# Patient Record
Sex: MALE | Race: WHITE | NOT HISPANIC OR LATINO | Employment: OTHER | URBAN - METROPOLITAN AREA
[De-identification: names, ages, dates, MRNs, and addresses within clinical notes are randomized per-mention and may not be internally consistent; named-entity substitution may affect disease eponyms.]

---

## 2017-01-10 ENCOUNTER — HOSPITAL ENCOUNTER (OUTPATIENT)
Dept: LAB | Facility: MEDICAL CENTER | Age: 82
End: 2017-01-10
Attending: NURSE PRACTITIONER
Payer: MEDICARE

## 2017-01-10 LAB
BASOPHILS # BLD AUTO: 0.7 % (ref 0–1.8)
BASOPHILS # BLD: 0.05 K/UL (ref 0–0.12)
EOSINOPHIL # BLD AUTO: 0.26 K/UL (ref 0–0.51)
EOSINOPHIL NFR BLD: 3.5 % (ref 0–6.9)
ERYTHROCYTE [DISTWIDTH] IN BLOOD BY AUTOMATED COUNT: 41.6 FL (ref 35.9–50)
EST. AVERAGE GLUCOSE BLD GHB EST-MCNC: 183 MG/DL
HBA1C MFR BLD: 8 % (ref 0–5.6)
HCT VFR BLD AUTO: 41.9 % (ref 42–52)
HGB BLD-MCNC: 14.5 G/DL (ref 14–18)
IMM GRANULOCYTES # BLD AUTO: 0.02 K/UL (ref 0–0.11)
IMM GRANULOCYTES NFR BLD AUTO: 0.3 % (ref 0–0.9)
LYMPHOCYTES # BLD AUTO: 1.68 K/UL (ref 1–4.8)
LYMPHOCYTES NFR BLD: 22.7 % (ref 22–41)
MCH RBC QN AUTO: 31.7 PG (ref 27–33)
MCHC RBC AUTO-ENTMCNC: 34.6 G/DL (ref 33.7–35.3)
MCV RBC AUTO: 91.5 FL (ref 81.4–97.8)
MONOCYTES # BLD AUTO: 0.67 K/UL (ref 0–0.85)
MONOCYTES NFR BLD AUTO: 9.1 % (ref 0–13.4)
NEUTROPHILS # BLD AUTO: 4.71 K/UL (ref 1.82–7.42)
NEUTROPHILS NFR BLD: 63.7 % (ref 44–72)
NRBC # BLD AUTO: 0 K/UL
NRBC BLD AUTO-RTO: 0 /100 WBC
PLATELET # BLD AUTO: 178 K/UL (ref 164–446)
PMV BLD AUTO: 10.8 FL (ref 9–12.9)
RBC # BLD AUTO: 4.58 M/UL (ref 4.7–6.1)
WBC # BLD AUTO: 7.4 K/UL (ref 4.8–10.8)

## 2017-01-10 PROCEDURE — 36415 COLL VENOUS BLD VENIPUNCTURE: CPT

## 2017-01-10 PROCEDURE — 83036 HEMOGLOBIN GLYCOSYLATED A1C: CPT

## 2017-01-10 PROCEDURE — 85025 COMPLETE CBC W/AUTO DIFF WBC: CPT

## 2017-04-16 ENCOUNTER — HOSPITAL ENCOUNTER (EMERGENCY)
Facility: MEDICAL CENTER | Age: 82
End: 2017-04-16
Attending: EMERGENCY MEDICINE
Payer: MEDICARE

## 2017-04-16 VITALS
WEIGHT: 173.28 LBS | BODY MASS INDEX: 27.2 KG/M2 | HEIGHT: 67 IN | SYSTOLIC BLOOD PRESSURE: 137 MMHG | RESPIRATION RATE: 18 BRPM | DIASTOLIC BLOOD PRESSURE: 64 MMHG | OXYGEN SATURATION: 95 % | HEART RATE: 61 BPM | TEMPERATURE: 97.7 F

## 2017-04-16 DIAGNOSIS — R73.9 HYPERGLYCEMIA: ICD-10-CM

## 2017-04-16 LAB
ALBUMIN SERPL BCP-MCNC: 4.2 G/DL (ref 3.2–4.9)
ALBUMIN/GLOB SERPL: 1.3 G/DL
ALP SERPL-CCNC: 37 U/L (ref 30–99)
ALT SERPL-CCNC: 30 U/L (ref 2–50)
ANION GAP SERPL CALC-SCNC: 13 MMOL/L (ref 0–11.9)
AST SERPL-CCNC: 29 U/L (ref 12–45)
B-OH-BUTYR SERPL-MCNC: 0.28 MMOL/L (ref 0.02–0.27)
BASOPHILS # BLD AUTO: 0.8 % (ref 0–1.8)
BASOPHILS # BLD: 0.06 K/UL (ref 0–0.12)
BILIRUB SERPL-MCNC: 1.2 MG/DL (ref 0.1–1.5)
BNP SERPL-MCNC: 118 PG/ML (ref 0–100)
BUN SERPL-MCNC: 24 MG/DL (ref 8–22)
CALCIUM SERPL-MCNC: 9.4 MG/DL (ref 8.4–10.2)
CHLORIDE SERPL-SCNC: 97 MMOL/L (ref 96–112)
CO2 SERPL-SCNC: 24 MMOL/L (ref 20–33)
CREAT SERPL-MCNC: 0.82 MG/DL (ref 0.5–1.4)
EOSINOPHIL # BLD AUTO: 0.14 K/UL (ref 0–0.51)
EOSINOPHIL NFR BLD: 1.9 % (ref 0–6.9)
ERYTHROCYTE [DISTWIDTH] IN BLOOD BY AUTOMATED COUNT: 40.6 FL (ref 35.9–50)
GFR SERPL CREATININE-BSD FRML MDRD: >60 ML/MIN/1.73 M 2
GLOBULIN SER CALC-MCNC: 3.2 G/DL (ref 1.9–3.5)
GLUCOSE SERPL-MCNC: 286 MG/DL (ref 65–99)
HCT VFR BLD AUTO: 43.7 % (ref 42–52)
HGB BLD-MCNC: 15.6 G/DL (ref 14–18)
IMM GRANULOCYTES # BLD AUTO: 0.02 K/UL (ref 0–0.11)
IMM GRANULOCYTES NFR BLD AUTO: 0.3 % (ref 0–0.9)
LIPASE SERPL-CCNC: 44 U/L (ref 7–58)
LYMPHOCYTES # BLD AUTO: 1.83 K/UL (ref 1–4.8)
LYMPHOCYTES NFR BLD: 25.1 % (ref 22–41)
MCH RBC QN AUTO: 32.2 PG (ref 27–33)
MCHC RBC AUTO-ENTMCNC: 35.7 G/DL (ref 33.7–35.3)
MCV RBC AUTO: 90.3 FL (ref 81.4–97.8)
MONOCYTES # BLD AUTO: 0.64 K/UL (ref 0–0.85)
MONOCYTES NFR BLD AUTO: 8.8 % (ref 0–13.4)
NEUTROPHILS # BLD AUTO: 4.59 K/UL (ref 1.82–7.42)
NEUTROPHILS NFR BLD: 63.1 % (ref 44–72)
NRBC # BLD AUTO: 0 K/UL
NRBC BLD AUTO-RTO: 0 /100 WBC
PLATELET # BLD AUTO: 208 K/UL (ref 164–446)
PMV BLD AUTO: 10.6 FL (ref 9–12.9)
POTASSIUM SERPL-SCNC: 3.4 MMOL/L (ref 3.6–5.5)
PROT SERPL-MCNC: 7.4 G/DL (ref 6–8.2)
RBC # BLD AUTO: 4.84 M/UL (ref 4.7–6.1)
SODIUM SERPL-SCNC: 134 MMOL/L (ref 135–145)
TROPONIN I SERPL-MCNC: <0.02 NG/ML (ref 0–0.04)
WBC # BLD AUTO: 7.3 K/UL (ref 4.8–10.8)

## 2017-04-16 PROCEDURE — 85025 COMPLETE CBC W/AUTO DIFF WBC: CPT

## 2017-04-16 PROCEDURE — 84484 ASSAY OF TROPONIN QUANT: CPT

## 2017-04-16 PROCEDURE — 83880 ASSAY OF NATRIURETIC PEPTIDE: CPT

## 2017-04-16 PROCEDURE — 82010 KETONE BODYS QUAN: CPT

## 2017-04-16 PROCEDURE — 83690 ASSAY OF LIPASE: CPT

## 2017-04-16 PROCEDURE — 700105 HCHG RX REV CODE 258: Performed by: EMERGENCY MEDICINE

## 2017-04-16 PROCEDURE — 94760 N-INVAS EAR/PLS OXIMETRY 1: CPT

## 2017-04-16 PROCEDURE — 99284 EMERGENCY DEPT VISIT MOD MDM: CPT

## 2017-04-16 PROCEDURE — 96360 HYDRATION IV INFUSION INIT: CPT

## 2017-04-16 PROCEDURE — 96361 HYDRATE IV INFUSION ADD-ON: CPT

## 2017-04-16 PROCEDURE — 80053 COMPREHEN METABOLIC PANEL: CPT

## 2017-04-16 PROCEDURE — 36415 COLL VENOUS BLD VENIPUNCTURE: CPT

## 2017-04-16 RX ORDER — SODIUM CHLORIDE 9 MG/ML
INJECTION, SOLUTION INTRAVENOUS CONTINUOUS
Status: DISCONTINUED | OUTPATIENT
Start: 2017-04-16 | End: 2017-04-16 | Stop reason: HOSPADM

## 2017-04-16 RX ORDER — MULTIVIT-MINERALS/FA/LYCOPENE 0.4 MG-6
1 TABLET ORAL EVERY MORNING
Status: SHIPPED | COMMUNITY
End: 2021-09-27

## 2017-04-16 RX ORDER — SODIUM CHLORIDE 9 MG/ML
500 INJECTION, SOLUTION INTRAVENOUS ONCE
Status: COMPLETED | OUTPATIENT
Start: 2017-04-16 | End: 2017-04-16

## 2017-04-16 RX ORDER — CHLORTHALIDONE 25 MG/1
25 TABLET ORAL 2 TIMES DAILY
Status: ON HOLD | COMMUNITY
End: 2018-11-06

## 2017-04-16 RX ORDER — ANTIOX #8/OM3/DHA/EPA/LUT/ZEAX 250-2.5 MG
1 CAPSULE ORAL 2 TIMES DAILY
Status: SHIPPED | COMMUNITY
End: 2018-08-29

## 2017-04-16 RX ORDER — METOPROLOL TARTRATE 50 MG/1
50 TABLET, FILM COATED ORAL 2 TIMES DAILY
Status: SHIPPED | COMMUNITY
End: 2020-01-08 | Stop reason: SDUPTHER

## 2017-04-16 RX ADMIN — SODIUM CHLORIDE: 9 INJECTION, SOLUTION INTRAVENOUS at 12:16

## 2017-04-16 RX ADMIN — SODIUM CHLORIDE 500 ML: 9 INJECTION, SOLUTION INTRAVENOUS at 11:37

## 2017-04-16 ASSESSMENT — PAIN SCALES - GENERAL
PAINLEVEL_OUTOF10: 0
PAINLEVEL_OUTOF10: 0

## 2017-04-16 NOTE — ED NOTES
"Chief Complaint   Patient presents with   • High Blood Sugar     pt states bs 307 PTA     Pt amb to triage with above complaint. Pt states type 2 diabetic and takes oral medications. Pt states he had a spike in blood sugar 2 years ago and he \"just waited it out.\" pt denies any change in eating habits recently.  "

## 2017-04-16 NOTE — ED PROVIDER NOTES
"ED Provider Note  CHIEF COMPLAINT  Chief Complaint   Patient presents with   • High Blood Sugar     pt states bs 307 PTA       HPI  Julien Dao is a 88 y.o. male who presents for evaluation of a blood sugar of 370 had at home. He has a history of diabetes. He takes Glucophage. He also has hypertension for which he takes Norvasc.  He is asymptomatic. No headache, no dizziness, no chest pain, no difficulty breathing. No polyuria. No polydipsia. No abdominal pain. He has a good appetite. He was just little concerned because his blood sugar was 370 at home. As he feels great and is eating well and taking in fluids well.  REVIEW OF SYSTEMS  No headache, no jaw pain, no chest pain, no abdominal pain. No vomiting or diarrhea.  ALL OTHER SYSTEMS NEGATIVE    ALLERGIES  Allergies   Allergen Reactions   • Iodine        CURRENT MEDICATIONS  Lopressor, Hygroton, Glucophage, Norvasc, Zocor. Prinivil. Lopressor and hydrated diarrhea.    PAST MEDICAL HISTORY  Past Medical History   Diagnosis Date   • CAD (coronary artery disease)    • Hypertension    • DM (diabetes mellitus) ([facility]) 1/7/2014       SURGICAL HISTORY  Past Surgical History   Procedure Laterality Date   • Other cardiac surgery         FAMILY HISTORY  History reviewed. No pertinent family history.    SOCIAL HISTORY  Social History     Social History   • Marital Status:      Spouse Name: N/A   • Number of Children: N/A   • Years of Education: N/A     Social History Main Topics   • Smoking status: Former Smoker     Quit date: 10/19/1972   • Smokeless tobacco: None   • Alcohol Use: No   • Drug Use: No   • Sexual Activity: Not Asked     Other Topics Concern   • None     Social History Narrative       PHYSICAL EXAM  GENERAL: Alert male adult  VITAL SIGNS: /82 mmHg  Pulse 63  Temp(Src) 36.5 °C (97.7 °F)  Resp 18  Ht 1.702 m (5' 7\")  Wt 78.6 kg (173 lb 4.5 oz)  BMI 27.13 kg/m2   Constitutional: Alert healthy-appearing adult HENT: Scalp is normal size and " nontender. Ears are clear. Nose is clear. Throat is clear with no stridor no drooling no trismus. Teeth are all intact.  Eyes: Pupils equal round and reactive to light, extraocular motor fall. There is no scleral icterus.  Neck: Neck is supple and nontender. There is no meningismus. No adenitis. No thyromegaly.  Lymphatic: No adenopathy.   Cardiovascular: Heart regular rhythm without murmurs or gallops   Thorax & Lungs: No chest wall tenderness. Lungs are clear. Patient has good breath sounds bilateral. No rales, no rhonchi, no wheezes.  Abdomen: Abdomen is soft, nontender, not rigid, no guarding, and no organomegaly. There is no palpable hernia   Skin: Warm, pink, and dry with no erythema and no rash.   Back: Nontender, no midline bony tenderness, no flank tenderness.  Extremities: Full range of motion  No tenderness to palpation and no deformities noted. No calf or thigh swelling. No calf or thigh tenderness. No clinical DVT.  Neurologic: Alert & oriented . Cranial nerves are grossly intact as tested. Patient moves all 4 extremities well. Patient has good strong flexion and extension of the ankle joints knee joints hip joints and elbow joints. Sensation is normal and symmetrical in the upper and lower extremities.   Psychiatric: Patient is alert oriented coherent and rational.       COURSE & MEDICAL DECISION MAKING  Patient arrives for evaluation of an elevated blood sugar 307. He is type II diabetic and takes oral medication. He is otherwise asymptomatic.    Plan: #1 IV #2 bolus of 500 mL of IV fluid #3 laboratory including CBC, CMP, acetone level. Rule out infection, anemia, metabolic problem, etc.    Laboratory and reexamination: Blood sugars 286. CO2 24. Potassium minimally low at 3.4. Chemistry panel otherwise unremarkable. Troponin negative. Lipase 44. CBC is normal. No anemia. No bandemia.  Results for orders placed or performed during the hospital encounter of 04/16/17   CBC w/ Differential   Result Value  Ref Range    WBC 7.3 4.8 - 10.8 K/uL    RBC 4.84 4.70 - 6.10 M/uL    Hemoglobin 15.6 14.0 - 18.0 g/dL    Hematocrit 43.7 42.0 - 52.0 %    MCV 90.3 81.4 - 97.8 fL    MCH 32.2 27.0 - 33.0 pg    MCHC 35.7 (H) 33.7 - 35.3 g/dL    RDW 40.6 35.9 - 50.0 fL    Platelet Count 208 164 - 446 K/uL    MPV 10.6 9.0 - 12.9 fL    Neutrophils-Polys 63.10 44.00 - 72.00 %    Lymphocytes 25.10 22.00 - 41.00 %    Monocytes 8.80 0.00 - 13.40 %    Eosinophils 1.90 0.00 - 6.90 %    Basophils 0.80 0.00 - 1.80 %    Immature Granulocytes 0.30 0.00 - 0.90 %    Nucleated RBC 0.00 /100 WBC    Neutrophils (Absolute) 4.59 1.82 - 7.42 K/uL    Lymphs (Absolute) 1.83 1.00 - 4.80 K/uL    Monos (Absolute) 0.64 0.00 - 0.85 K/uL    Eos (Absolute) 0.14 0.00 - 0.51 K/uL    Baso (Absolute) 0.06 0.00 - 0.12 K/uL    Immature Granulocytes (abs) 0.02 0.00 - 0.11 K/uL    NRBC (Absolute) 0.00 K/uL   Complete Metabolic Panel (CMP)   Result Value Ref Range    Sodium 134 (L) 135 - 145 mmol/L    Potassium 3.4 (L) 3.6 - 5.5 mmol/L    Chloride 97 96 - 112 mmol/L    Co2 24 20 - 33 mmol/L    Anion Gap 13.0 (H) 0.0 - 11.9    Glucose 286 (H) 65 - 99 mg/dL    Bun 24 (H) 8 - 22 mg/dL    Creatinine 0.82 0.50 - 1.40 mg/dL    Calcium 9.4 8.4 - 10.2 mg/dL    AST(SGOT) 29 12 - 45 U/L    ALT(SGPT) 30 2 - 50 U/L    Alkaline Phosphatase 37 30 - 99 U/L    Total Bilirubin 1.2 0.1 - 1.5 mg/dL    Albumin 4.2 3.2 - 4.9 g/dL    Total Protein 7.4 6.0 - 8.2 g/dL    Globulin 3.2 1.9 - 3.5 g/dL    A-G Ratio 1.3 g/dL   Troponin   Result Value Ref Range    Troponin I <0.02 0.00 - 0.04 ng/mL   Btype Natriuretic Peptide (BNP)   Result Value Ref Range    B Natriuretic Peptide 118 (H) 0 - 100 pg/mL   LIPASE   Result Value Ref Range    Lipase 44 7 - 58 U/L   ESTIMATED GFR   Result Value Ref Range    GFR If African American >60 >60 mL/min/1.73 m 2    GFR If Non African American >60 >60 mL/min/1.73 m 2        The patient's blood sugar certainly slightly elevated and a 200+ range. However admission is  not indicated. He can follow-up with his family physician. He's been given a copy of all of his reports. He is well-hydrated and otherwise asymptomatic.  FINAL IMPRESSION  1. Diabetes mellitus  2. Stable diabetes with mild hyperglycemia.         Electronically signed by: Gary Gansert, 4/16/2017 1 PM

## 2017-04-16 NOTE — ED AVS SNAPSHOT
Home Care Instructions                                                                                                                Julien Dao   MRN: [id number]    Department:  [facility], Emergency Dept   Date of Visit:  4/16/2017            [facility], Emergency Dept    [address] [place] [address]    Phone:  [phone]      You were seen by     Gary Gansert, M.D.      Your Diagnosis Was     Hyperglycemia     R73.9       These are the medications you received during your hospitalization from 04/16/2017 1047 to 04/16/2017 1327     Date/Time Order Dose Route Action    04/16/2017 1137 NS infusion 500 mL 500 mL Intravenous New Bag    04/16/2017 1216 NS infusion   Intravenous New Bag      Follow-up Information     1. Follow up with JONO Powell.    Specialty:  Family Medicine    Contact information    [address] [place] [address] [phone]          2. Follow up with JONO Powell. Schedule an appointment as soon as possible for a visit in 1 day.    Specialty:  Family Medicine    Contact information    [address] [place] [address] [phone]        Medication Information     Review all of your home medications and newly ordered medications with your primary doctor and/or pharmacist as soon as possible. Follow medication instructions as directed by your doctor and/or pharmacist.     Please keep your complete medication list with you and share with your physician. Update the information when medications are discontinued, doses are changed, or new medications (including over-the-counter products) are added; and carry medication information at all times in the event of emergency situations.               Medication List      ASK your doctor about these medications        Instructions    Morning Afternoon Evening Bedtime    amlodipine 5 MG Tabs   Commonly known as:  NORVASC        Take 1 Tab by mouth 2 Times a Day.   Dose:  5 mg                      aspirin 81 MG tablet        Take 1 Tab by mouth every day.   Dose:  81 mg                        chlorthalidone 25 MG Tabs   Commonly known as:  HYGROTON        Take 25 mg by mouth 2 Times a Day.   Dose:  25 mg                        metformin 500 MG Tabs   Commonly known as:  GLUCOPHAGE        Take 500 mg by mouth 2 times a day, with meals.   Dose:  500 mg                        metoprolol 50 MG Tabs   Commonly known as:  LOPRESSOR        Take 50 mg by mouth 2 times a day.   Dose:  50 mg                        omega-3 acid ethyl esters 1 GM capsule   Commonly known as:  LOVAZA        Take 2 Caps by mouth 2 Times a Day.   Dose:  2 g                        potassium chloride SA 10 MEQ Tbcr   Commonly known as:  K-DUR        Take 1 Tab by mouth every day.   Dose:  10 mEq                        PROSTATE Tabs        Take 1 Tab by mouth every day.   Dose:  1 Tab                        * PX MENS MULTIVITAMINS Tabs        Take 1 Tab by mouth every day.   Dose:  1 Tab                        * PRESERVISION AREDS 2 Caps        Take 1 Cap by mouth 2 Times a Day.   Dose:  1 Cap                        simvastatin 10 MG Tabs   Commonly known as:  ZOCOR        Take 1 Tab by mouth every evening.   Dose:  10 mg                        * Notice:  This list has 2 medication(s) that are the same as other medications prescribed for you. Read the directions carefully, and ask your doctor or other care provider to review them with you.            Procedures and tests performed during your visit     BETA-HYDROXYBUTYRIC ACID    Btype Natriuretic Peptide (BNP)    CBC w/ Differential    Complete Metabolic Panel (CMP)    ESTIMATED GFR    LIPASE    Troponin            Patient Information     Patient Information    Following emergency treatment: all patient requiring follow-up care must return either to a private physician or a clinic if your condition worsens before you are able to obtain further medical attention, please  return to the emergency room.     Billing Information    At [facility], we work to make the billing process streamlined for our patients.  Our Representatives are here to answer any questions you may have regarding your hospital bill.  If you have insurance coverage and have supplied your insurance information to us, we will submit a claim to your insurer on your behalf.  Should you have any questions regarding your bill, we can be reached online or by phone as follows:  Online: You are able pay your bills online or live chat with our representatives about any billing questions you may have. We are here to help Monday - Friday from 8:00am to 7:30pm and 9:00am - 12:00pm on Saturdays.  Please visit [email or web address]  for more information.   Phone:  [phone] or [phone]    Please note that your emergency physician, surgeon, pathologist, radiologist, anesthesiologist, and other specialists are not employed by [facility] and will therefore bill separately for their services.  Please contact them directly for any questions concerning their bills at the numbers below:     Emergency Physician Services:  [phone]  [facility]:  [phone]  [facility]:  [phone]  [facility]:  [phone]    1. Your final bill may vary from the amount quoted upon discharge if all procedures are not complete at that time, or if your doctor has additional procedures of which we are not aware. You will receive an additional bill if you return to the Emergency Department at [facility] for suture removal regardless of the facility of which the sutures were placed.     2. Please arrange for settlement of this account at the emergency registration.    3. All self-pay accounts are due in full at the time of treatment.  If you are unable to meet this obligation then payment is expected within 4-5 days.     4. If you have had radiology studies  (CT, X-ray, Ultrasound, MRI), you have received a preliminary result during your emergency department visit. Please contact the radiology department [phone] to receive a copy of your final result. Please discuss the Final result with your primary physician or with the follow up physician provided.     Crisis Hotline:  [place] Crisis Hotline:  9-428-YRVWERM or 1-861.785.2376  [place] Crisis Hotline:    1-133.226.7656 or 227-021-3826         ED Discharge Follow Up Questions    1. In order to provide you with very good care, we would like to follow up with a phone call in the next few days.  May we have your permission to contact you?     YES /  NO    2. What is the best phone number to call you? (       )_____-__________    3. What is the best time to call you?      Morning  /  Afternoon  /  Evening                   Patient Signature:  ____________________________________________________________    Date:  ____________________________________________________________

## 2017-04-16 NOTE — ED AVS SNAPSHOT
[employer] Access Code: [email or web address]  Expires: 5/16/2017  1:27 PM    Your email address is not on file at [facility].  Email Addresses are required for you to sign up for [employer], please contact [phone] to verify your personal information and to provide your email address prior to attempting to register for [employer].    Julien Dao  [address] [place] [address]    [employer]  A secure, online tool to manage your health information     [employer]’s [employer]® is a secure, online tool that connects you to your personalized health information from the privacy of your home -- day or night - making it very easy for you to manage your healthcare. Once the activation process is completed, you can even access your medical information using the [employer] erinn, which is available for free in the Apple Erinn store or Google Play store.     To learn more about [employer], visit [email or web address]    There are two levels of access available (as shown below):   My Chart Features  [facility] Primary Care Doctor [facility]  Specialists [facility]  Urgent  Care Non-[facility] Primary Care Doctor   Email your healthcare team securely and privately 24/7 X X X    Manage appointments: schedule your next appointment; view details of past/upcoming appointments X      Request prescription refills. X      View recent personal medical records, including lab and immunizations X X X X   View health record, including health history, allergies, medications X X X X   Read reports about your outpatient visits, procedures, consult and ER notes X X X X   See your discharge summary, which is a recap of your hospital and/or ER visit that includes your diagnosis, lab results, and care plan X X  X     How to register for [employer]:  Once your e-mail address has been verified, follow the following steps to sign up for [employer].     1. Go to  [email or web address]  2. Click on the Sign Up Now box, which takes you to the New Member Sign Up page. You will  need to provide the following information:  a. Enter your [employer] Access Code exactly as it appears at the top of this page. (You will not need to use this code after you’ve completed the sign-up process. If you do not sign up before the expiration date, you must request a new code.)   b. Enter your date of birth.   c. Enter your home email address.   d. Click Submit, and follow the next screen’s instructions.  3. Create a [employer] ID. This will be your [employer] login ID and cannot be changed, so think of one that is secure and easy to remember.  4. Create a [employer] password. You can change your password at any time.  5. Enter your Password Reset Question and Answer. This can be used at a later time if you forget your password.   6. Enter your e-mail address. This allows you to receive e-mail notifications when new information is available in [employer].  7. Click Sign Up. You can now view your health information.    For assistance activating your [employer] account, call [phone]

## 2017-04-16 NOTE — ED AVS SNAPSHOT
4/16/2017    Julien Dao  [address] [place] [address]    Dear Julien:    [facility] wants to ensure your discharge home is safe and you or your loved ones have had all of your questions answered regarding your care after you leave the hospital.    Below is a list of resources and contact information should you have any questions regarding your hospital stay, follow-up instructions, or active medical symptoms.    Questions or Concerns Regarding… Contact   Medical Questions Related to Your Discharge  (7 days a week, 8am-5pm) Contact a Nurse Care Coordinator   [phone]   Medical Questions Not Related to Your Discharge  (24 hours a day / 7 days a week)  Contact the Nurse Health Line   [phone]    Medications or Discharge Instructions Refer to your discharge packet   or contact your [facility] Primary Care Provider   [phone]   Follow-up Appointment(s) Schedule your appointment via [employer]   or contact Scheduling [phone]   Billing Review your statement via [employer]  or contact Billing [phone]   Medical Records Review your records via [employer]   or contact Medical Records [phone]     You may receive a telephone call within two days of discharge. This call is to make certain you understand your discharge instructions and have the opportunity to have any questions answered. You can also easily access your medical information, test results and upcoming appointments via the [employer] free online health management tool. You can learn more and sign up at [employer]. For assistance setting up your [employer] account, please call [phone].    Once again, we want to ensure your discharge home is safe and that you have a clear understanding of any next steps in your care. If you have any questions or concerns, please do not hesitate to contact us, we are here for you. Thank you for choosing [facility] for your healthcare needs.    Sincerely,    Your [facility] Healthcare Team

## 2017-04-24 ENCOUNTER — APPOINTMENT (OUTPATIENT)
Dept: RADIOLOGY | Facility: MEDICAL CENTER | Age: 82
End: 2017-04-24
Attending: EMERGENCY MEDICINE
Payer: MEDICARE

## 2017-04-24 ENCOUNTER — HOSPITAL ENCOUNTER (EMERGENCY)
Facility: MEDICAL CENTER | Age: 82
End: 2017-04-24
Attending: EMERGENCY MEDICINE
Payer: MEDICARE

## 2017-04-24 VITALS
HEIGHT: 67 IN | OXYGEN SATURATION: 94 % | BODY MASS INDEX: 27.4 KG/M2 | TEMPERATURE: 98 F | SYSTOLIC BLOOD PRESSURE: 143 MMHG | WEIGHT: 174.6 LBS | DIASTOLIC BLOOD PRESSURE: 77 MMHG | HEART RATE: 49 BPM | RESPIRATION RATE: 14 BRPM

## 2017-04-24 DIAGNOSIS — E11.9 TYPE 2 DIABETES MELLITUS WITHOUT COMPLICATION, WITHOUT LONG-TERM CURRENT USE OF INSULIN (HCC): ICD-10-CM

## 2017-04-24 DIAGNOSIS — R06.02 SOB (SHORTNESS OF BREATH): ICD-10-CM

## 2017-04-24 DIAGNOSIS — T50.905A MEDICATION SIDE EFFECT, INITIAL ENCOUNTER: ICD-10-CM

## 2017-04-24 LAB
ALBUMIN SERPL BCP-MCNC: 4 G/DL (ref 3.2–4.9)
ALBUMIN/GLOB SERPL: 1.2 G/DL
ALP SERPL-CCNC: 32 U/L (ref 30–99)
ALT SERPL-CCNC: 27 U/L (ref 2–50)
ANION GAP SERPL CALC-SCNC: 11 MMOL/L (ref 0–11.9)
AST SERPL-CCNC: 29 U/L (ref 12–45)
BASOPHILS # BLD AUTO: 0.8 % (ref 0–1.8)
BASOPHILS # BLD: 0.06 K/UL (ref 0–0.12)
BILIRUB SERPL-MCNC: 0.8 MG/DL (ref 0.1–1.5)
BNP SERPL-MCNC: 115 PG/ML (ref 0–100)
BUN SERPL-MCNC: 21 MG/DL (ref 8–22)
CALCIUM SERPL-MCNC: 9.7 MG/DL (ref 8.4–10.2)
CHLORIDE SERPL-SCNC: 99 MMOL/L (ref 96–112)
CO2 SERPL-SCNC: 26 MMOL/L (ref 20–33)
CREAT SERPL-MCNC: 0.78 MG/DL (ref 0.5–1.4)
EKG IMPRESSION: NORMAL
EOSINOPHIL # BLD AUTO: 0.18 K/UL (ref 0–0.51)
EOSINOPHIL NFR BLD: 2.4 % (ref 0–6.9)
ERYTHROCYTE [DISTWIDTH] IN BLOOD BY AUTOMATED COUNT: 40.5 FL (ref 35.9–50)
GFR SERPL CREATININE-BSD FRML MDRD: >60 ML/MIN/1.73 M 2
GLOBULIN SER CALC-MCNC: 3.3 G/DL (ref 1.9–3.5)
GLUCOSE SERPL-MCNC: 172 MG/DL (ref 65–99)
HCT VFR BLD AUTO: 41.3 % (ref 42–52)
HGB BLD-MCNC: 14.7 G/DL (ref 14–18)
IMM GRANULOCYTES # BLD AUTO: 0.02 K/UL (ref 0–0.11)
IMM GRANULOCYTES NFR BLD AUTO: 0.3 % (ref 0–0.9)
LYMPHOCYTES # BLD AUTO: 1.28 K/UL (ref 1–4.8)
LYMPHOCYTES NFR BLD: 17.4 % (ref 22–41)
MCH RBC QN AUTO: 32.1 PG (ref 27–33)
MCHC RBC AUTO-ENTMCNC: 35.6 G/DL (ref 33.7–35.3)
MCV RBC AUTO: 90.2 FL (ref 81.4–97.8)
MONOCYTES # BLD AUTO: 0.66 K/UL (ref 0–0.85)
MONOCYTES NFR BLD AUTO: 9 % (ref 0–13.4)
NEUTROPHILS # BLD AUTO: 5.15 K/UL (ref 1.82–7.42)
NEUTROPHILS NFR BLD: 70.1 % (ref 44–72)
NRBC # BLD AUTO: 0 K/UL
NRBC BLD AUTO-RTO: 0 /100 WBC
PLATELET # BLD AUTO: 200 K/UL (ref 164–446)
PMV BLD AUTO: 10.4 FL (ref 9–12.9)
POTASSIUM SERPL-SCNC: 3.3 MMOL/L (ref 3.6–5.5)
PROT SERPL-MCNC: 7.3 G/DL (ref 6–8.2)
RBC # BLD AUTO: 4.58 M/UL (ref 4.7–6.1)
SODIUM SERPL-SCNC: 136 MMOL/L (ref 135–145)
TROPONIN I SERPL-MCNC: <0.02 NG/ML (ref 0–0.04)
WBC # BLD AUTO: 7.4 K/UL (ref 4.8–10.8)

## 2017-04-24 PROCEDURE — 36415 COLL VENOUS BLD VENIPUNCTURE: CPT

## 2017-04-24 PROCEDURE — 85025 COMPLETE CBC W/AUTO DIFF WBC: CPT

## 2017-04-24 PROCEDURE — 83880 ASSAY OF NATRIURETIC PEPTIDE: CPT

## 2017-04-24 PROCEDURE — 84484 ASSAY OF TROPONIN QUANT: CPT

## 2017-04-24 PROCEDURE — 71010 DX-CHEST-PORTABLE (1 VIEW): CPT

## 2017-04-24 PROCEDURE — 93005 ELECTROCARDIOGRAM TRACING: CPT | Performed by: EMERGENCY MEDICINE

## 2017-04-24 PROCEDURE — 80053 COMPREHEN METABOLIC PANEL: CPT

## 2017-04-24 PROCEDURE — 93005 ELECTROCARDIOGRAM TRACING: CPT

## 2017-04-24 PROCEDURE — 99284 EMERGENCY DEPT VISIT MOD MDM: CPT

## 2017-04-24 PROCEDURE — 94760 N-INVAS EAR/PLS OXIMETRY 1: CPT

## 2017-04-24 NOTE — ED PROVIDER NOTES
ED Provider Note    CHIEF COMPLAINT  Chief Complaint   Patient presents with   • Shortness of Breath     pt states SOB x 2 days after starting new med, Janumet.       HPI  Julien Dao is a 88 y.o. male who presents shortness of breath. Patient states that he was recently in the emergency department on 4/16/17 for high blood sugars. States that he went back to see his primary care doctor and they took him off his metformin and placed him on  Janumet. A day later he started to feel short of breath like he couldn't catch his breath. Therefore he talked to the care provider on the help line and they switched him back to metformin last night. He comes in now feeling like he may be having a medication side effect where he just feels like when his breathing is not getting enough air in. He denies any chest pain headaches cough nausea vomiting. Nothing really makes it better or worse. While I was sitting there with him he took a nice deep breath on his own says that feels good to me.    REVIEW OF SYSTEMS  CONSTITUTIONAL:  Denies fever, chills, weight gain or weight loss or weakness  EYES:  Denies photophobia or discharge   ENT:  Denies sore throat, nose or ear pain  CARDIOVASCULAR:  Denies chest pain, palpitations or swelling  RESPIRATORY: He states that he has a feeling the last few days after taking the new medication that he just can't get a good deep breath but denies chest pain shortness of breath.   GI:  Denies abdominal pain, nausea, vomiting, or diarrhea  MUSCULOSKELETAL:  Denies weakness joint swelling or back pain  SKIN:  No rash  ALLERGIC: No itchy rashes.  NEUROLOGIC:  Denies headache, focal weakness or numbness  PSYCHIATRIC: States that his  is depressed but not thinking of harming himself    PAST MEDICAL HISTORY  Past Medical History   Diagnosis Date   • CAD (coronary artery disease)    • Hypertension    • DM (diabetes mellitus) ([facility]) 1/7/2014       FAMILY HISTORY  Positive for diabetes  He is one of 12  "children    SOCIAL HISTORY   reports that he quit smoking about 44 years ago. He does not have any smokeless tobacco history on file. He reports that he does not drink alcohol or use illicit drugs.    SURGICAL HISTORY  Past Surgical History   Procedure Laterality Date   • Other cardiac surgery         CURRENT MEDICATIONS  No current facility-administered medications for this encounter.    Current outpatient prescriptions:   •  metoprolol (LOPRESSOR) 50 MG Tab, Take 50 mg by mouth 2 times a day., Disp: , Rfl:   •  chlorthalidone (HYGROTON) 25 MG Tab, Take 25 mg by mouth 2 Times a Day., Disp: , Rfl:   •  Multiple Vitamins-Minerals (PX MENS MULTIVITAMINS) Tab, Take 1 Tab by mouth every day., Disp: , Rfl:   •  Specialty Vitamins Products (PROSTATE) Tab, Take 1 Tab by mouth every day., Disp: , Rfl:   •  Multiple Vitamins-Minerals (PRESERVISION AREDS 2) Cap, Take 1 Cap by mouth 2 Times a Day., Disp: , Rfl:   •  metformin (GLUCOPHAGE) 500 MG TABS, Take 500 mg by mouth 2 times a day, with meals., Disp: , Rfl:   •  omega-3 acid ethyl esters (LOVAZA) 1 GM capsule, Take 2 Caps by mouth 2 Times a Day., Disp: 360 Cap, Rfl: 1  •  potassium chloride SA (K-DUR) 10 MEQ TBCR, Take 1 Tab by mouth every day., Disp: 90 Each, Rfl: 3  •  amlodipine (NORVASC) 5 MG TABS, Take 1 Tab by mouth 2 Times a Day., Disp: 180 Tab, Rfl: 3  •  simvastatin (ZOCOR) 10 MG TABS, Take 1 Tab by mouth every evening., Disp: 90 Tab, Rfl: 3  •  aspirin 81 MG tablet, Take 1 Tab by mouth every day., Disp: 100 Each, Rfl: 3      ALLERGIES  Allergies   Allergen Reactions   • Iodine        PHYSICAL EXAM  VITAL SIGNS: /77 mmHg  Pulse 55  Temp(Src) 36.7 °C (98 °F)  Resp 16  Ht 1.702 m (5' 7.01\")  Wt 79.2 kg (174 lb 9.7 oz)  BMI 27.34 kg/m2  SpO2 95%     Constitutional: Patient is awake alert person place and time. No acute respiratory distress Well developed, Well nourished, Non-toxic appearance. Peaking in full sentences  HENT: Normocephalic, Atraumatic,  " Bilateral external ears normal, Oropharynx pink moist with no exudates, Nose patent.   Eyes: Sclera and conjunctiva clear, No discharge.   Neck:  Supple no nuchal rigidity, no thyromegaly or mass. Non-tender  Lymphatic: No supraclavicular  Cardiovascular: Heart is regular rate and rhythm no murmur   Thorax & Lungs: Chest is symmetrical, with good breath sounds. No wheezing or crackles. No respiratory distress,   Abdomen:  Soft, No tendernessSkin: Warm, Dry, no petechia, purpura or rash.   Back: Non tender with palpation, No CVA tenderness.   Extremities: No  edema.  Non tender.   Musculoskeletal: Good range of motion upper and lower extremities.  Neurologic: Alert & oriented to person, place, and time.  Strength is 5 over 5 , bicep triceps, quadriceps, plantar flexion and extension. Sensory is intact to light touch face, arms and legs. DTRs are symmetrical biceps, patella  Psychiatric:  Flat affect cooperative    EKG  8:10 AM. Sinus bradycardia rate 57, , axis QRS 0. He has a right bundle branch block. No ST elevations. No change from his EKG of 1/7/14      LABS:  Lab Results   Component Value Date    WBC 7.4 04/24/2017    HEMOGLOBIN 14.7 04/24/2017    HEMATOCRIT 41.3* 04/24/2017    PLATELETCT 200 04/24/2017    SODIUM 136 04/24/2017    POTASSIUM 3.3* 04/24/2017    CHLORIDE 99 04/24/2017    CO2 26 04/24/2017    BUN 21 04/24/2017    GLUCOSE 172* 04/24/2017    CHOLSTRLTOT 129 10/03/2016    LDL 52 10/03/2016    ALTSGPT 27 04/24/2017    ASTSGOT 29 04/24/2017    PSATOTAL 4.5* 01/21/2013    PSATOTAL 5.48* 12/26/2012    INR 1.07 10/14/2011    HBA1C 8.0* 01/10/2017       Troponin negative      RADIOLOGY/PROCEDURES  DX-CHEST-PORTABLE (1 VIEW)   Final Result         1. No acute cardiopulmonary abnormalities are identified.            COURSE & MEDICAL DECISION MAKING  Pertinent Labs & Imaging studies reviewed. (See chart for details)  Differential diagnosis includes but not limited to pneumonia, pulmonary  embolus, coronary disease, congestive heart failure, medication side effects, COPD.        Recheck 1035. The patient feels fine. His up and motor to the bathroom without difficulties. Again he states he is quite sure that it was the medication made him feel this way and now symptoms are gone.      Patient is states that he started a diabetes medicine and about a day later started feeling like he couldn't get a deep breath. However he was able to breathe fine no chest pain or any other complaints really. They didn't switch him back to his old metformin and I states he feels better today. His workup revealed obvious signs of infections. At this point, I don't think a pulmonary embolus myocardial infarction congestive heart failure or infectious process. Prior to him being discharge x-ray state he felt a lot better to walk to the bathroom without problems. This point time I think we discharge improved close follow-up as an outpatient. He will continue to take his metformin since his artery stopped his Janumet yesterday from his primary care doctor.      Stable for discharge      FINAL IMPRESSION  1. Medication side effects  2. Shortness of breath resolved  3. Hypertension     PLAN  1. Follow up with primary care doctor  2.Return to the emergency department for increased pains, fevers, vomiting or change in condition.  Electronically signed by: Garo Armenta, 4/24/2017 8:35 AM

## 2017-04-24 NOTE — ED NOTES
Chief Complaint   Patient presents with   • Shortness of Breath     pt states SOB x 2 days after starting new med, Janumet.

## 2017-04-24 NOTE — ED NOTES
Pt discharged with written instructions. Pt verbalized understanding. Pt's AAOx4. Pt ambulated independently from ER.

## 2017-04-24 NOTE — ED AVS SNAPSHOT
4/24/2017    Julien Dao  [address] [place] [address]    Dear Julien:    [facility] wants to ensure your discharge home is safe and you or your loved ones have had all of your questions answered regarding your care after you leave the hospital.    Below is a list of resources and contact information should you have any questions regarding your hospital stay, follow-up instructions, or active medical symptoms.    Questions or Concerns Regarding… Contact   Medical Questions Related to Your Discharge  (7 days a week, 8am-5pm) Contact a Nurse Care Coordinator   [phone]   Medical Questions Not Related to Your Discharge  (24 hours a day / 7 days a week)  Contact the Nurse Health Line   [phone]    Medications or Discharge Instructions Refer to your discharge packet   or contact your [facility] Primary Care Provider   [phone]   Follow-up Appointment(s) Schedule your appointment via [employer]   or contact Scheduling [phone]   Billing Review your statement via [employer]  or contact Billing [phone]   Medical Records Review your records via [employer]   or contact Medical Records [phone]     You may receive a telephone call within two days of discharge. This call is to make certain you understand your discharge instructions and have the opportunity to have any questions answered. You can also easily access your medical information, test results and upcoming appointments via the [employer] free online health management tool. You can learn more and sign up at [employer]. For assistance setting up your [employer] account, please call [phone].    Once again, we want to ensure your discharge home is safe and that you have a clear understanding of any next steps in your care. If you have any questions or concerns, please do not hesitate to contact us, we are here for you. Thank you for choosing [facility] for your healthcare needs.    Sincerely,    Your [facility] Healthcare Team

## 2017-04-24 NOTE — ED AVS SNAPSHOT
Home Care Instructions                                                                                                                Julien Dao   MRN: [id number]    Department:  [facility], Emergency Dept   Date of Visit:  4/24/2017            [facility], Emergency Dept    [address] [place] [address]    Phone:  [phone]      You were seen by     Garo Armenta M.D.      Your Diagnosis Was     Medication side effect, initial encounter     T88.7XXA       Follow-up Information     1. Follow up with JONO Powell In 2 days.    Specialty:  Family Medicine    Contact information    [address] [address]  [phone]        Medication Information     Review all of your home medications and newly ordered medications with your primary doctor and/or pharmacist as soon as possible. Follow medication instructions as directed by your doctor and/or pharmacist.     Please keep your complete medication list with you and share with your physician. Update the information when medications are discontinued, doses are changed, or new medications (including over-the-counter products) are added; and carry medication information at all times in the event of emergency situations.               Medication List      ASK your doctor about these medications        Instructions    Morning Afternoon Evening Bedtime    amlodipine 5 MG Tabs   Commonly known as:  NORVASC        Take 1 Tab by mouth 2 Times a Day.   Dose:  5 mg                        aspirin 81 MG tablet        Take 1 Tab by mouth every day.   Dose:  81 mg                        chlorthalidone 25 MG Tabs   Commonly known as:  HYGROTON        Take 25 mg by mouth 2 Times a Day.   Dose:  25 mg                        metformin 500 MG Tabs   Commonly known as:  GLUCOPHAGE        Take 500 mg by mouth 2 times a day, with meals.   Dose:  500 mg                        metoprolol 50 MG Tabs   Commonly known  as:  LOPRESSOR        Take 50 mg by mouth 2 times a day.   Dose:  50 mg                        omega-3 acid ethyl esters 1 GM capsule   Commonly known as:  LOVAZA        Take 2 Caps by mouth 2 Times a Day.   Dose:  2 g                        potassium chloride SA 10 MEQ Tbcr   Commonly known as:  K-DUR        Take 1 Tab by mouth every day.   Dose:  10 mEq                        PROSTATE Tabs        Take 1 Tab by mouth every day.   Dose:  1 Tab                        * PX MENS MULTIVITAMINS Tabs        Take 1 Tab by mouth every day.   Dose:  1 Tab                        * PRESERVISION AREDS 2 Caps        Take 1 Cap by mouth 2 Times a Day.   Dose:  1 Cap                        simvastatin 10 MG Tabs   Commonly known as:  ZOCOR        Take 1 Tab by mouth every evening.   Dose:  10 mg                        * Notice:  This list has 2 medication(s) that are the same as other medications prescribed for you. Read the directions carefully, and ask your doctor or other care provider to review them with you.            Procedures and tests performed during your visit     Btype Natriuretic Peptide    CBC w/ Differential    Cardiac Monitoring    Complete Metabolic Panel (CMP)    DX-CHEST-PORTABLE (1 VIEW)    EKG (ER)    EKG (NOW)    ESTIMATED GFR    IV Saline Lock    Obtain Old EKG    Pulse Ox    Troponin STAT        Discharge Instructions       Blood Glucose Monitoring, Adult  Monitoring your blood glucose (also know as blood sugar) helps you to manage your diabetes. It also helps you and your health care provider monitor your diabetes and determine how well your treatment plan is working.  WHY SHOULD YOU MONITOR YOUR BLOOD GLUCOSE?  · It can help you understand how food, exercise, and medicine affect your blood glucose.  · It allows you to know what your blood glucose is at any given moment. You can quickly tell if you are having low blood glucose (hypoglycemia) or high blood glucose (hyperglycemia).  · It can help you and  your health care provider know how to adjust your medicines.  · It can help you understand how to manage an illness or adjust medicine for exercise.  WHEN SHOULD YOU TEST?  Your health care provider will help you decide how often you should check your blood glucose. This may depend on the type of diabetes you have, your diabetes control, or the types of medicines you are taking. Be sure to write down all of your blood glucose readings so that this information can be reviewed with your health care provider. See below for examples of testing times that your health care provider may suggest.  Type 1 Diabetes  · Test at least 2 times per day if your diabetes is well controlled, if you are using an insulin pump, or if you perform multiple daily injections.  · If your diabetes is not well controlled or if you are sick, you may need to test more often.  · It is a good idea to also test:  · Before every insulin injection.  · Before and after exercise.  · Between meals and 2 hours after a meal.  · Occasionally between 2:00 a.m. and 3:00 a.m.  Type 2 Diabetes  · If you are taking insulin, test at least 2 times per day. However, it is best to test before every insulin injection.  · If you take medicines by mouth (orally), test 2 times a day.  · If you are on a controlled diet, test once a day.  · If your diabetes is not well controlled or if you are sick, you may need to monitor more often.  HOW TO MONITOR YOUR BLOOD GLUCOSE  Supplies Needed  · Blood glucose meter.  · Test strips for your meter. Each meter has its own strips. You must use the strips that go with your own meter.  · A pricking needle (lancet).  · A device that holds the lancet (lancing device).  · A journal or log book to write down your results.  Procedure  · Wash your hands with soap and water. Alcohol is not preferred.  · Prick the side of your finger (not the tip) with the lancet.  · Gently milk the finger until a small drop of blood appears.  · Follow the  "instructions that come with your meter for inserting the test strip, applying blood to the strip, and using your blood glucose meter.  Other Areas to Get Blood for Testing  Some meters allow you to use other areas of your body (other than your finger) to test your blood. These areas are called alternative sites. The most common alternative sites are:  · The forearm.  · The thigh.  · The back area of the lower leg.  · The palm of the hand.  The blood flow in these areas is slower. Therefore, the blood glucose values you get may be delayed, and the numbers are different from what you would get from your fingers. Do not use alternative sites if you think you are having hypoglycemia. Your reading will not be accurate. Always use a finger if you are having hypoglycemia. Also, if you cannot feel your lows (hypoglycemia unawareness), always use your fingers for your blood glucose checks.  ADDITIONAL TIPS FOR GLUCOSE MONITORING  · Do not reuse lancets.  · Always carry your supplies with you.  · All blood glucose meters have a 24-hour \"hotline\" number to call if you have questions or need help.  · Adjust (calibrate) your blood glucose meter with a control solution after finishing a few boxes of strips.  BLOOD GLUCOSE RECORD KEEPING  It is a good idea to keep a daily record or log of your blood glucose readings. Most glucose meters, if not all, keep your glucose records stored in the meter. Some meters come with the ability to download your records to your home computer. Keeping a record of your blood glucose readings is especially helpful if you are wanting to look for patterns. Make notes to go along with the blood glucose readings because you might forget what happened at that exact time. Keeping good records helps you and your health care provider to work together to achieve good diabetes management.      This information is not intended to replace advice given to you by your health care provider. Make sure you discuss " any questions you have with your health care provider.     Document Released: 12/20/2004 Document Revised: 01/08/2016 Document Reviewed: 05/12/2014  [employer] Interactive Patient Education ©2016 [employer]    Shortness of Breath  Shortness of breath means you have trouble breathing. Shortness of breath needs medical care right away.  HOME CARE   · Do not smoke.  · Avoid being around chemicals or things (paint fumes, dust) that may bother your breathing.  · Rest as needed. Slowly begin your normal activities.  · Only take medicines as told by your doctor.  · Keep all doctor visits as told.  GET HELP RIGHT AWAY IF:   · Your shortness of breath gets worse.  · You feel lightheaded, pass out (faint), or have a cough that is not helped by medicine.  · You cough up blood.  · You have pain with breathing.  · You have pain in your chest, arms, shoulders, or belly (abdomen).  · You have a fever.  · You cannot walk up stairs or exercise the way you normally do.  · You do not get better in the time expected.  · You have a hard time doing normal activities even with rest.  · You have problems with your medicines.  · You have any new symptoms.  MAKE SURE YOU:  · Understand these instructions.  · Will watch your condition.  · Will get help right away if you are not doing well or get worse.     This information is not intended to replace advice given to you by your health care provider. Make sure you discuss any questions you have with your health care provider.     Document Released: 06/05/2009 Document Revised: 12/23/2014 Document Reviewed: 03/04/2013  [email or web address] Patient Education ©2016 [employer] Inc.            Patient Information     Patient Information    Following emergency treatment: all patient requiring follow-up care must return either to a private physician or a clinic if your condition worsens before you are able to obtain further medical attention, please return to the emergency room.     Billing  Information    At [facility], we work to make the billing process streamlined for our patients.  Our Representatives are here to answer any questions you may have regarding your hospital bill.  If you have insurance coverage and have supplied your insurance information to us, we will submit a claim to your insurer on your behalf.  Should you have any questions regarding your bill, we can be reached online or by phone as follows:  Online: You are able pay your bills online or live chat with our representatives about any billing questions you may have. We are here to help Monday - Friday from 8:00am to 7:30pm and 9:00am - 12:00pm on Saturdays.  Please visit [email or web address]  for more information.   Phone:  [phone] or [phone]    Please note that your emergency physician, surgeon, pathologist, radiologist, anesthesiologist, and other specialists are not employed by [facility] and will therefore bill separately for their services.  Please contact them directly for any questions concerning their bills at the numbers below:     Emergency Physician Services:  [phone]  [facility]:  [phone]  [facility]:  [phone]  [facility]:  [phone]    1. Your final bill may vary from the amount quoted upon discharge if all procedures are not complete at that time, or if your doctor has additional procedures of which we are not aware. You will receive an additional bill if you return to the Emergency Department at [facility] for suture removal regardless of the facility of which the sutures were placed.     2. Please arrange for settlement of this account at the emergency registration.    3. All self-pay accounts are due in full at the time of treatment.  If you are unable to meet this obligation then payment is expected within 4-5 days.     4. If you have had radiology studies (CT, X-ray, Ultrasound, MRI), you have  received a preliminary result during your emergency department visit. Please contact the radiology department [phone] to receive a copy of your final result. Please discuss the Final result with your primary physician or with the follow up physician provided.     Crisis Hotline:  [place] Crisis Hotline:  0-109-JGNILTI or 1-853.609.7908  [place] Crisis Hotline:    1-326.277.8054 or 120-985-0533         ED Discharge Follow Up Questions    1. In order to provide you with very good care, we would like to follow up with a phone call in the next few days.  May we have your permission to contact you?     YES /  NO    2. What is the best phone number to call you? (       )_____-__________    3. What is the best time to call you?      Morning  /  Afternoon  /  Evening                   Patient Signature:  ____________________________________________________________    Date:  ____________________________________________________________

## 2017-04-24 NOTE — ED NOTES
POC was discussed with pt using AIDET. Pt verbalized understanding. Pt with call light in reach and eduarod in low position for pt safety.

## 2017-04-24 NOTE — DISCHARGE INSTRUCTIONS
Blood Glucose Monitoring, Adult  Monitoring your blood glucose (also know as blood sugar) helps you to manage your diabetes. It also helps you and your health care provider monitor your diabetes and determine how well your treatment plan is working.  WHY SHOULD YOU MONITOR YOUR BLOOD GLUCOSE?  · It can help you understand how food, exercise, and medicine affect your blood glucose.  · It allows you to know what your blood glucose is at any given moment. You can quickly tell if you are having low blood glucose (hypoglycemia) or high blood glucose (hyperglycemia).  · It can help you and your health care provider know how to adjust your medicines.  · It can help you understand how to manage an illness or adjust medicine for exercise.  WHEN SHOULD YOU TEST?  Your health care provider will help you decide how often you should check your blood glucose. This may depend on the type of diabetes you have, your diabetes control, or the types of medicines you are taking. Be sure to write down all of your blood glucose readings so that this information can be reviewed with your health care provider. See below for examples of testing times that your health care provider may suggest.  Type 1 Diabetes  · Test at least 2 times per day if your diabetes is well controlled, if you are using an insulin pump, or if you perform multiple daily injections.  · If your diabetes is not well controlled or if you are sick, you may need to test more often.  · It is a good idea to also test:  · Before every insulin injection.  · Before and after exercise.  · Between meals and 2 hours after a meal.  · Occasionally between 2:00 a.m. and 3:00 a.m.  Type 2 Diabetes  · If you are taking insulin, test at least 2 times per day. However, it is best to test before every insulin injection.  · If you take medicines by mouth (orally), test 2 times a day.  · If you are on a controlled diet, test once a day.  · If your diabetes is not well controlled or if you  "are sick, you may need to monitor more often.  HOW TO MONITOR YOUR BLOOD GLUCOSE  Supplies Needed  · Blood glucose meter.  · Test strips for your meter. Each meter has its own strips. You must use the strips that go with your own meter.  · A pricking needle (lancet).  · A device that holds the lancet (lancing device).  · A journal or log book to write down your results.  Procedure  · Wash your hands with soap and water. Alcohol is not preferred.  · Prick the side of your finger (not the tip) with the lancet.  · Gently milk the finger until a small drop of blood appears.  · Follow the instructions that come with your meter for inserting the test strip, applying blood to the strip, and using your blood glucose meter.  Other Areas to Get Blood for Testing  Some meters allow you to use other areas of your body (other than your finger) to test your blood. These areas are called alternative sites. The most common alternative sites are:  · The forearm.  · The thigh.  · The back area of the lower leg.  · The palm of the hand.  The blood flow in these areas is slower. Therefore, the blood glucose values you get may be delayed, and the numbers are different from what you would get from your fingers. Do not use alternative sites if you think you are having hypoglycemia. Your reading will not be accurate. Always use a finger if you are having hypoglycemia. Also, if you cannot feel your lows (hypoglycemia unawareness), always use your fingers for your blood glucose checks.  ADDITIONAL TIPS FOR GLUCOSE MONITORING  · Do not reuse lancets.  · Always carry your supplies with you.  · All blood glucose meters have a 24-hour \"hotline\" number to call if you have questions or need help.  · Adjust (calibrate) your blood glucose meter with a control solution after finishing a few boxes of strips.  BLOOD GLUCOSE RECORD KEEPING  It is a good idea to keep a daily record or log of your blood glucose readings. Most glucose meters, if not all, " keep your glucose records stored in the meter. Some meters come with the ability to download your records to your home computer. Keeping a record of your blood glucose readings is especially helpful if you are wanting to look for patterns. Make notes to go along with the blood glucose readings because you might forget what happened at that exact time. Keeping good records helps you and your health care provider to work together to achieve good diabetes management.      This information is not intended to replace advice given to you by your health care provider. Make sure you discuss any questions you have with your health care provider.     Document Released: 12/20/2004 Document Revised: 01/08/2016 Document Reviewed: 05/12/2014  [employer] Interactive Patient Education ©2016 [employer] Inc.    Shortness of Breath  Shortness of breath means you have trouble breathing. Shortness of breath needs medical care right away.  HOME CARE   · Do not smoke.  · Avoid being around chemicals or things (paint fumes, dust) that may bother your breathing.  · Rest as needed. Slowly begin your normal activities.  · Only take medicines as told by your doctor.  · Keep all doctor visits as told.  GET HELP RIGHT AWAY IF:   · Your shortness of breath gets worse.  · You feel lightheaded, pass out (faint), or have a cough that is not helped by medicine.  · You cough up blood.  · You have pain with breathing.  · You have pain in your chest, arms, shoulders, or belly (abdomen).  · You have a fever.  · You cannot walk up stairs or exercise the way you normally do.  · You do not get better in the time expected.  · You have a hard time doing normal activities even with rest.  · You have problems with your medicines.  · You have any new symptoms.  MAKE SURE YOU:  · Understand these instructions.  · Will watch your condition.  · Will get help right away if you are not doing well or get worse.     This information is not intended to replace advice given  to you by your health care provider. Make sure you discuss any questions you have with your health care provider.     Document Released: 06/05/2009 Document Revised: 12/23/2014 Document Reviewed: 03/04/2013  Elsevier Interactive Patient Education ©2016 Elsevier Inc.

## 2017-04-24 NOTE — ED AVS SNAPSHOT
Elixserve Access Code: [email or web address]  Expires: 5/16/2017  1:27 PM    Your email address is not on file at [facility].  Email Addresses are required for you to sign up for Elixserve, please contact [phone] to verify your personal information and to provide your email address prior to attempting to register for Elixserve.    Julien Dao  [address] [place] [address]    Elixserve  A secure, online tool to manage your health information     [facility]’s Elixserve® is a secure, online tool that connects you to your personalized health information from the privacy of your home -- day or night - making it very easy for you to manage your healthcare. Once the activation process is completed, you can even access your medical information using the Elixserve erinn, which is available for free in the Apple Erinn store or Google Play store.     To learn more about Elixserve, visit [email or web address]    There are two levels of access available (as shown below):   My Chart Features  [facility] Primary Care Doctor [facility]  Specialists [facility]  Urgent  Care Non-[facility] Primary Care Doctor   Email your healthcare team securely and privately 24/7 X X X    Manage appointments: schedule your next appointment; view details of past/upcoming appointments X      Request prescription refills. X      View recent personal medical records, including lab and immunizations X X X X   View health record, including health history, allergies, medications X X X X   Read reports about your outpatient visits, procedures, consult and ER notes X X X X   See your discharge summary, which is a recap of your hospital and/or ER visit that includes your diagnosis, lab results, and care plan X X  X     How to register for icomasoftt:  Once your e-mail address has been verified, follow the following steps to sign up for Elixserve.     1. Go to  [email or web address]  2. Click on the Sign Up Now box, which takes you to the New Member Sign Up page. You will  need to provide the following information:  a. Enter your [employer] AS Access Code exactly as it appears at the top of this page. (You will not need to use this code after you’ve completed the sign-up process. If you do not sign up before the expiration date, you must request a new code.)   b. Enter your date of birth.   c. Enter your home email address.   d. Click Submit, and follow the next screen’s instructions.  3. Create a MadRat Gamest ID. This will be your Hero Card Management AS login ID and cannot be changed, so think of one that is secure and easy to remember.  4. Create a Hero [employer] AS password. You can change your password at any time.  5. Enter your Password Reset Question and Answer. This can be used at a later time if you forget your password.   6. Enter your e-mail address. This allows you to receive e-mail notifications when new information is available in Hero Card Management AS.  7. Click Sign Up. You can now view your health information.    For assistance activating your Hero Card Management AS account, call [phone]

## 2017-04-25 ENCOUNTER — PATIENT OUTREACH (OUTPATIENT)
Dept: HEALTH INFORMATION MANAGEMENT | Facility: OTHER | Age: 82
End: 2017-04-25

## 2017-05-24 ENCOUNTER — HOSPITAL ENCOUNTER (OUTPATIENT)
Dept: HOSPITAL 8 - RAD | Age: 82
Discharge: HOME | End: 2017-05-24
Attending: PHYSICIAN ASSISTANT
Payer: MEDICARE

## 2017-05-24 DIAGNOSIS — M79.662: ICD-10-CM

## 2017-05-24 DIAGNOSIS — G62.9: Primary | ICD-10-CM

## 2017-05-24 LAB
AST SERPL-CCNC: 15 U/L (ref 15–37)
BUN SERPL-MCNC: 21 MG/DL (ref 7–18)

## 2017-05-24 PROCEDURE — 36415 COLL VENOUS BLD VENIPUNCTURE: CPT

## 2017-05-24 PROCEDURE — 85651 RBC SED RATE NONAUTOMATED: CPT

## 2017-05-24 PROCEDURE — 84439 ASSAY OF FREE THYROXINE: CPT

## 2017-05-24 PROCEDURE — 84443 ASSAY THYROID STIM HORMONE: CPT

## 2017-05-24 PROCEDURE — 85025 COMPLETE CBC W/AUTO DIFF WBC: CPT

## 2017-05-24 PROCEDURE — 80053 COMPREHEN METABOLIC PANEL: CPT

## 2017-05-27 ENCOUNTER — HOSPITAL ENCOUNTER (EMERGENCY)
Facility: MEDICAL CENTER | Age: 82
End: 2017-05-27
Attending: EMERGENCY MEDICINE
Payer: MEDICARE

## 2017-05-27 VITALS
HEART RATE: 66 BPM | SYSTOLIC BLOOD PRESSURE: 143 MMHG | RESPIRATION RATE: 18 BRPM | DIASTOLIC BLOOD PRESSURE: 73 MMHG | TEMPERATURE: 98 F | BODY MASS INDEX: 26.99 KG/M2 | HEIGHT: 67 IN | WEIGHT: 171.96 LBS | OXYGEN SATURATION: 96 %

## 2017-05-27 DIAGNOSIS — E11.42 DIABETIC POLYNEUROPATHY ASSOCIATED WITH TYPE 2 DIABETES MELLITUS (HCC): ICD-10-CM

## 2017-05-27 PROCEDURE — 99283 EMERGENCY DEPT VISIT LOW MDM: CPT

## 2017-05-27 RX ORDER — PREGABALIN 50 MG/1
50 CAPSULE ORAL 3 TIMES DAILY
Qty: 90 CAP | Refills: 11 | Status: SHIPPED | OUTPATIENT
Start: 2017-05-27 | End: 2018-08-29

## 2017-05-27 ASSESSMENT — PAIN SCALES - GENERAL: PAINLEVEL_OUTOF10: 5

## 2017-05-27 NOTE — ED NOTES
Completed MRI of lumbar yesterday to follow up on chronic leg pain.  Pt presents with bilateral leg pain.

## 2017-05-27 NOTE — ED AVS SNAPSHOT
Pandoodle Access Code: [email or web address]  Expires: 6/26/2017  4:33 PM    Your email address is not on file at [facility].  Email Addresses are required for you to sign up for Pandoodle, please contact [phone] to verify your personal information and to provide your email address prior to attempting to register for Pandoodle.    Julien Dao  [address] [place] [address]    Pandoodle  A secure, online tool to manage your health information     [facility]’s Pandoodle® is a secure, online tool that connects you to your personalized health information from the privacy of your home -- day or night - making it very easy for you to manage your healthcare. Once the activation process is completed, you can even access your medical information using the Pandoodle erinn, which is available for free in the Apple Erinn store or Google Play store.     To learn more about Pandoodle, visit [email or web address]    There are two levels of access available (as shown below):   My Chart Features  [facility] Primary Care Doctor [facility]  Specialists [facility]  Urgent  Care Non-[facility] Primary Care Doctor   Email your healthcare team securely and privately 24/7 X X X    Manage appointments: schedule your next appointment; view details of past/upcoming appointments X      Request prescription refills. X      View recent personal medical records, including lab and immunizations X X X X   View health record, including health history, allergies, medications X X X X   Read reports about your outpatient visits, procedures, consult and ER notes X X X X   See your discharge summary, which is a recap of your hospital and/or ER visit that includes your diagnosis, lab results, and care plan X X  X     How to register for DealBirdt:  Once your e-mail address has been verified, follow the following steps to sign up for Pandoodle.     1. Go to  [email or web address]  2. Click on the Sign Up Now box, which takes you to the New Member Sign Up page. You will  need to provide the following information:  a. Enter your [employer] Access Code exactly as it appears at the top of this page. (You will not need to use this code after you’ve completed the sign-up process. If you do not sign up before the expiration date, you must request a new code.)   b. Enter your date of birth.   c. Enter your home email address.   d. Click Submit, and follow the next screen’s instructions.  3. Create a SoZo Globalt ID. This will be your Acclaim Games login ID and cannot be changed, so think of one that is secure and easy to remember.  4. Create a [employer] password. You can change your password at any time.  5. Enter your Password Reset Question and Answer. This can be used at a later time if you forget your password.   6. Enter your e-mail address. This allows you to receive e-mail notifications when new information is available in Ac[employer] Games.  7. Click Sign Up. You can now view your health information.    For assistance activating your Acclaim Games account, call [phone]

## 2017-05-27 NOTE — ED AVS SNAPSHOT
5/27/2017    Julien Dao  [address] [place] [address]    Dear Julien:    Critical access hospital wants to ensure your discharge home is safe and you or your loved ones have had all of your questions answered regarding your care after you leave the hospital.    Below is a list of resources and contact information should you have any questions regarding your hospital stay, follow-up instructions, or active medical symptoms.    Questions or Concerns Regarding… Contact   Medical Questions Related to Your Discharge  (7 days a week, 8am-5pm) Contact a Nurse Care Coordinator   [phone]   Medical Questions Not Related to Your Discharge  (24 hours a day / 7 days a week)  Contact the Nurse Health Line   [phone]    Medications or Discharge Instructions Refer to your discharge packet   or contact your [facility] Primary Care Provider   [phone]   Follow-up Appointment(s) Schedule your appointment via Parastructure   or contact Scheduling [phone]   Billing Review your statement via Parastructure  or contact Billing [phone]   Medical Records Review your records via Parastructure   or contact Medical Records [phone]     You may receive a telephone call within two days of discharge. This call is to make certain you understand your discharge instructions and have the opportunity to have any questions answered. You can also easily access your medical information, test results and upcoming appointments via the Parastructure free online health management tool. You can learn more and sign up at [employer]/Parastructure. For assistance setting up your Parastructure account, please call [phone].    Once again, we want to ensure your discharge home is safe and that you have a clear understanding of any next steps in your care. If you have any questions or concerns, please do not hesitate to contact us, we are here for you. Thank you for choosing [facility] for your healthcare needs.    Sincerely,    Your [facility] Healthcare Team

## 2017-05-27 NOTE — DISCHARGE INSTRUCTIONS
Diabetic Neuropathy  Diabetic neuropathy is a nerve disease or nerve damage that is caused by diabetes mellitus. About half of all people with diabetes mellitus have some form of nerve damage. Nerve damage is more common in those who have had diabetes mellitus for many years and who generally have not had good control of their blood sugar (glucose) level. Diabetic neuropathy is a common complication of diabetes mellitus. There are three common types of diabetic neuropathy and a fourth type that is less common and less understood:   · Peripheral neuropathy--This is the most common type of diabetic neuropathy. It causes damage to the nerves of the feet and legs first and then eventually the hands and arms. The damage affects the ability to sense touch.  · Autonomic neuropathy--This type causes damage to the autonomic nervous system, which controls the following functions:  ¨ Heartbeat.  ¨ Body temperature.  ¨ Blood pressure.  ¨ Urination.  ¨ Digestion.  ¨ Sweating.  ¨ Sexual function.  · Focal neuropathy--Focal neuropathy can be painful and unpredictable and occurs most often in older adults with diabetes mellitus. It involves a specific nerve or one area and often comes on suddenly. It usually does not cause long-term problems.  · Radiculoplexus neuropathy-- Sometimes called lumbosacral radiculoplexus neuropathy, radiculoplexus neuropathy affects the nerves of the thighs, hips, buttocks, or legs. It is more common in people with type 2 diabetes mellitus and in older men. It is characterized by debilitating pain, weakness, and atrophy, usually in the thigh muscles.  CAUSES   The cause of peripheral, autonomic, and focal neuropathies is diabetes mellitus that is uncontrolled and high glucose levels. The cause of radiculoplexus neuropathy is unknown. However, it is thought to be caused by inflammation related to uncontrolled glucose levels.  SIGNS AND SYMPTOMS   Peripheral Neuropathy  Peripheral neuropathy develops  slowly over time. When the nerves of the feet and legs no longer work there may be:   · Burning, stabbing, or aching pain in the legs or feet.  · Inability to feel pressure or pain in your feet. This can lead to:  ¨ Thick calluses over pressure areas.  ¨ Pressure sores.  ¨ Ulcers.  · Foot deformities.  · Reduced ability to feel temperature changes.  · Muscle weakness.  Autonomic Neuropathy  The symptoms of autonomic neuropathy vary depending on which nerves are affected. Symptoms may include:  · Problems with digestion, such as:  ¨ Feeling sick to your stomach (nausea).  ¨ Vomiting.  ¨ Bloating.  ¨ Constipation.  ¨ Diarrhea.  ¨ Abdominal pain.  · Difficulty with urination. This occurs if you lose your ability to sense when your bladder is full. Problems include:  ¨ Urine leakage (incontinence).  ¨ Inability to empty your bladder completely (retention).  · Rapid or irregular heartbeat (palpitations).  · Blood pressure drops when you stand up (orthostatic hypotension). When you stand up you may feel:  ¨ Dizzy.  ¨ Weak.  ¨ Faint.  · In men, inability to attain and maintain an erection.  · In women, vaginal dryness and problems with decreased sexual desire and arousal.  · Problems with body temperature regulation.  · Increased or decreased sweating.  Focal Neuropathy  · Abnormal eye movements or abnormal alignment of both eyes.  · Weakness in the wrist.  · Foot drop. This results in an inability to lift the foot properly and abnormal walking or foot movement.  · Paralysis on one side of your face (Bell palsy).  · Chest or abdominal pain.  Radiculoplexus Neuropathy  · Sudden, severe pain in your hip, thigh, or buttocks.  · Weakness and wasting of thigh muscles.  · Difficulty rising from a seated position.  · Abdominal swelling.  · Unexplained weight loss (usually more than 10 lb [4.5 kg]).  DIAGNOSIS   Peripheral Neuropathy  Your senses may be tested. Sensory function testing can be done with:  · A light touch using a  monofilament.  · A vibration with tuning fork.  · A sharp sensation with a pin prick.  Other tests that can help diagnose neuropathy are:  · Nerve conduction velocity. This test checks the transmission of an electrical current through a nerve.  · Electromyography. This shows how muscles respond to electrical signals transmitted by nearby nerves.  · Quantitative sensory testing. This is used to assess how your nerves respond to vibrations and changes in temperature.  Autonomic Neuropathy  Diagnosis is often based on reported symptoms. Tell your health care provider if you experience:   · Dizziness.    · Constipation.    · Diarrhea.    · Inappropriate urination or inability to urinate.    · Inability to get or maintain an erection.    Tests that may be done include:   · Electrocardiography or Holter monitor. These are tests that can help show problems with the heart rate or heart rhythm.    · An X-ray exam may be done.  Focal Neuropathy  Diagnosis is made based on your symptoms and what your health care provider finds during your exam. Other tests may be done. They may include:  · Nerve conduction velocities. This checks the transmission of electrical current through a nerve.  · Electromyography. This shows how muscles respond to electrical signals transmitted by nearby nerves.  · Quantitative sensory testing. This test is used to assess how your nerves respond to vibration and changes in temperature.  Radiculoplexus Neuropathy  · Often the first thing is to eliminate any other issue or problems that might be the cause, as there is no stick test for diagnosis.  · X-ray exam of your spine and lumbar region.  · Spinal tap to rule out cancer.  · MRI to rule out other lesions.  TREATMENT   Once nerve damage occurs, it cannot be reversed. The goal of treatment is to keep the disease or nerve damage from getting worse and affecting more nerve fibers. Controlling your blood glucose level is the key. Most people with  radiculoplexus neuropathy see at least a partial improvement over time. You will need to keep your blood glucose and HbA1c levels in the target range determined by your health care provider. Things that help control blood glucose levels include:   · Blood glucose monitoring.    · Meal planning.    · Physical activity.    · Diabetes medicine.    Over time, maintaining lower blood glucose levels helps lessen symptoms. Sometimes, prescription pain medicine is needed.  HOME CARE INSTRUCTIONS:  · Do not smoke.  · Keep your blood glucose level in the range that you and your health care provider have determined acceptable for you.  · Keep your blood pressure level in the range that you and your health care provider have determined acceptable for you.  · Eat a well-balanced diet.  · Be physically active every day. Include strength training and balance exercises.  · Protect your feet.  ¨ Check your feet every day for sores, cuts, blisters, or signs of infection.  ¨ Wear padded socks and supportive shoes. Use orthotic inserts, if necessary.  ¨ Regularly check the insides of your shoes for worn spots. Make sure there are no rocks or other items inside your shoes before you put them on.  SEEK MEDICAL CARE IF:   · You have burning, stabbing, or aching pain in the legs or feet.  · You are unable to feel pressure or pain in your feet.  · You develop problems with digestion such as:  ¨ Nausea.  ¨ Vomiting.  ¨ Bloating.  ¨ Constipation.  ¨ Diarrhea.  ¨ Abdominal pain.  · You have difficulty with urination, such as:  ¨ Incontinence.  ¨ Retention.  · You have palpitations.  · You develop orthostatic hypotension. When you stand up you may feel:  ¨ Dizzy.  ¨ Weak.  ¨ Faint.  · You cannot attain and maintain an erection (in men).  · You have vaginal dryness and problems with decreased sexual desire and arousal (in women).  · You have severe pain in your thighs, legs, or buttocks.  · You have unexplained weight loss.     This information  is not intended to replace advice given to you by your health care provider. Make sure you discuss any questions you have with your health care provider.     Document Released: 02/26/2003 Document Revised: 01/08/2016 Document Reviewed: 05/29/2014  [employer] Interactive Patient Education ©2016 Elsevier Inc.

## 2017-05-27 NOTE — ED PROVIDER NOTES
"ED Provider Note    CHIEF COMPLAINT  No chief complaint on file.      HPI  Julien Dao is a 88 y.o. male who presents complaining of left intermittent chronic leg pain over the past few months worsening over the past 5 days. He had an MRI of his lumbar spine and the [facility] system yesterday because his pain became more bilateral. He is diabetic and he describes the pain as pins and needles sensation and more of a numbing feeling. Denies any fever, chills, sweats, trauma or other complaints. Patient smells of cigarette smoke. He reports that he is a former smoker. He also reports that he is had a problem with recent administration of Neurontin which is made him very tired and he thinks this is about drug reaction    ROS  Pertinent negative for fever.    PAST MEDICAL HISTORY  Past Medical History   Diagnosis Date   • CAD (coronary artery disease)    • Hypertension    • DM (diabetes mellitus) (CMS-[facility]) 1/7/2014       FAMILY HISTORY  History reviewed. No pertinent family history.    SOCIAL HISTORY   reports that he quit smoking about 44 years ago. He has never used smokeless tobacco. He reports that he does not drink alcohol or use illicit drugs.    SURGICAL HISTORY  Past Surgical History   Procedure Laterality Date   • Other cardiac surgery         CURRENT MEDICATIONS  Home Medications     **Home medications have not yet been reviewed for this encounter**          ALLERGIES  Allergies   Allergen Reactions   • Iodine        PHYSICAL EXAM  VITAL SIGNS: /73 mmHg  Pulse 66  Temp(Src) 36.7 °C (98 °F)  Resp 18  Ht 1.702 m (5' 7\")  Wt 78 kg (171 lb 15.3 oz)  BMI 26.93 kg/m2  SpO2 96%   Constitutional: Well developed, Well nourished, No acute distress, Non-toxic appearance.   Skin: Normal. No evidence of cellulitic change or discoloration  Back: Patient has no significant tenderness of the midline lumbar spine or paraspinous region  Extremities: Nontender except at the anterior aspects of the left greater than " right legs  Musculoskeletal:   Neurologic: Intact. 2+ reflexes of the Achilles and patellar tendons bilaterally and normal gait  Psychiatric:       COURSE & MEDICAL DECISION MAKING  Pertinent Labs & Imaging studies reviewed. (See chart for details)  The patient did not have indication for additional imaging. His reflexes are normal. He has diabetic neuropathy. He states that he has had a recent reaction to Neurontin therapy and would rather use something else. I explained that this is the right class of medication and have prescribed him Lyrica to be started at 50 mg 3 times a day. He will follow-up with his doctor. There is no indication that this is infectious or traumatic and I believe this is a complication of his diabetes which is fairly well controlled in terms of his report on his blood sugars    FINAL IMPRESSION  1. Diabetic polyneuropathy associated with type 2 diabetes mellitus (CMS-HCC)            Electronically signed by: Bunny Banerjee, 5/27/2017 4:26 PM

## 2017-05-27 NOTE — ED AVS SNAPSHOT
Home Care Instructions                                                                                                                Julien Dao   MRN: [id number]    Department:  [facility], Emergency Dept   Date of Visit:  5/27/2017            [facility], Emergency Dept    [address] [place] [address]    Phone:  [phone]      You were seen by     Bunny Banerjee M.D.      Your Diagnosis Was     Diabetic polyneuropathy associated with type 2 diabetes mellitus (CMS-HCC)     E11.42       Follow-up Information     1. Follow up with JONO Powell.    Specialty:  Family Medicine    Contact information    [place] [id number]  [phone]        Medication Information     Review all of your home medications and newly ordered medications with your primary doctor and/or pharmacist as soon as possible. Follow medication instructions as directed by your doctor and/or pharmacist.     Please keep your complete medication list with you and share with your physician. Update the information when medications are discontinued, doses are changed, or new medications (including over-the-counter products) are added; and carry medication information at all times in the event of emergency situations.               Medication List      START taking these medications        Instructions    Morning Afternoon Evening Bedtime    pregabalin 50 MG capsule   Commonly known as:  LYRICA        Take 1 Cap by mouth 3 times a day.   Dose:  50 mg                          ASK your doctor about these medications        Instructions    Morning Afternoon Evening Bedtime    amlodipine 5 MG Tabs   Commonly known as:  NORVASC        Take 1 Tab by mouth 2 Times a Day.   Dose:  5 mg                        aspirin 81 MG tablet        Take 1 Tab by mouth every day.   Dose:  81 mg                        chlorthalidone 25 MG Tabs   Commonly known as:  HYGROTON        Take 25 mg by  mouth 2 Times a Day.   Dose:  25 mg                        metformin 500 MG Tabs   Commonly known as:  GLUCOPHAGE        Take 500 mg by mouth 2 times a day, with meals.   Dose:  500 mg                        metoprolol 50 MG Tabs   Commonly known as:  LOPRESSOR        Take 50 mg by mouth 2 times a day.   Dose:  50 mg                        omega-3 acid ethyl esters 1 GM capsule   Commonly known as:  LOVAZA        Take 2 Caps by mouth 2 Times a Day.   Dose:  2 g                        potassium chloride SA 10 MEQ Tbcr   Commonly known as:  K-DUR        Take 1 Tab by mouth every day.   Dose:  10 mEq                        PROSTATE Tabs        Take 1 Tab by mouth every day.   Dose:  1 Tab                        * PX MENS MULTIVITAMINS Tabs        Take 1 Tab by mouth every day.   Dose:  1 Tab                        * PRESERVISION AREDS 2 Caps        Take 1 Cap by mouth 2 Times a Day.   Dose:  1 Cap                        simvastatin 10 MG Tabs   Commonly known as:  ZOCOR        Take 1 Tab by mouth every evening.   Dose:  10 mg                        * Notice:  This list has 2 medication(s) that are the same as other medications prescribed for you. Read the directions carefully, and ask your doctor or other care provider to review them with you.         Where to Get Your Medications      You can get these medications from any pharmacy     Bring a paper prescription for each of these medications    - pregabalin 50 MG capsule              Discharge Instructions       Diabetic Neuropathy  Diabetic neuropathy is a nerve disease or nerve damage that is caused by diabetes mellitus. About half of all people with diabetes mellitus have some form of nerve damage. Nerve damage is more common in those who have had diabetes mellitus for many years and who generally have not had good control of their blood sugar (glucose) level. Diabetic neuropathy is a common complication of diabetes mellitus. There are three common types of  diabetic neuropathy and a fourth type that is less common and less understood:   · Peripheral neuropathy--This is the most common type of diabetic neuropathy. It causes damage to the nerves of the feet and legs first and then eventually the hands and arms. The damage affects the ability to sense touch.  · Autonomic neuropathy--This type causes damage to the autonomic nervous system, which controls the following functions:  ¨ Heartbeat.  ¨ Body temperature.  ¨ Blood pressure.  ¨ Urination.  ¨ Digestion.  ¨ Sweating.  ¨ Sexual function.  · Focal neuropathy--Focal neuropathy can be painful and unpredictable and occurs most often in older adults with diabetes mellitus. It involves a specific nerve or one area and often comes on suddenly. It usually does not cause long-term problems.  · Radiculoplexus neuropathy-- Sometimes called lumbosacral radiculoplexus neuropathy, radiculoplexus neuropathy affects the nerves of the thighs, hips, buttocks, or legs. It is more common in people with type 2 diabetes mellitus and in older men. It is characterized by debilitating pain, weakness, and atrophy, usually in the thigh muscles.  CAUSES   The cause of peripheral, autonomic, and focal neuropathies is diabetes mellitus that is uncontrolled and high glucose levels. The cause of radiculoplexus neuropathy is unknown. However, it is thought to be caused by inflammation related to uncontrolled glucose levels.  SIGNS AND SYMPTOMS   Peripheral Neuropathy  Peripheral neuropathy develops slowly over time. When the nerves of the feet and legs no longer work there may be:   · Burning, stabbing, or aching pain in the legs or feet.  · Inability to feel pressure or pain in your feet. This can lead to:  ¨ Thick calluses over pressure areas.  ¨ Pressure sores.  ¨ Ulcers.  · Foot deformities.  · Reduced ability to feel temperature changes.  · Muscle weakness.  Autonomic Neuropathy  The symptoms of autonomic neuropathy vary depending on which nerves  are affected. Symptoms may include:  · Problems with digestion, such as:  ¨ Feeling sick to your stomach (nausea).  ¨ Vomiting.  ¨ Bloating.  ¨ Constipation.  ¨ Diarrhea.  ¨ Abdominal pain.  · Difficulty with urination. This occurs if you lose your ability to sense when your bladder is full. Problems include:  ¨ Urine leakage (incontinence).  ¨ Inability to empty your bladder completely (retention).  · Rapid or irregular heartbeat (palpitations).  · Blood pressure drops when you stand up (orthostatic hypotension). When you stand up you may feel:  ¨ Dizzy.  ¨ Weak.  ¨ Faint.  · In men, inability to attain and maintain an erection.  · In women, vaginal dryness and problems with decreased sexual desire and arousal.  · Problems with body temperature regulation.  · Increased or decreased sweating.  Focal Neuropathy  · Abnormal eye movements or abnormal alignment of both eyes.  · Weakness in the wrist.  · Foot drop. This results in an inability to lift the foot properly and abnormal walking or foot movement.  · Paralysis on one side of your face (Bell palsy).  · Chest or abdominal pain.  Radiculoplexus Neuropathy  · Sudden, severe pain in your hip, thigh, or buttocks.  · Weakness and wasting of thigh muscles.  · Difficulty rising from a seated position.  · Abdominal swelling.  · Unexplained weight loss (usually more than 10 lb [4.5 kg]).  DIAGNOSIS   Peripheral Neuropathy  Your senses may be tested. Sensory function testing can be done with:  · A light touch using a monofilament.  · A vibration with tuning fork.  · A sharp sensation with a pin prick.  Other tests that can help diagnose neuropathy are:  · Nerve conduction velocity. This test checks the transmission of an electrical current through a nerve.  · Electromyography. This shows how muscles respond to electrical signals transmitted by nearby nerves.  · Quantitative sensory testing. This is used to assess how your nerves respond to vibrations and changes in  temperature.  Autonomic Neuropathy  Diagnosis is often based on reported symptoms. Tell your health care provider if you experience:   · Dizziness.    · Constipation.    · Diarrhea.    · Inappropriate urination or inability to urinate.    · Inability to get or maintain an erection.    Tests that may be done include:   · Electrocardiography or Holter monitor. These are tests that can help show problems with the heart rate or heart rhythm.    · An X-ray exam may be done.  Focal Neuropathy  Diagnosis is made based on your symptoms and what your health care provider finds during your exam. Other tests may be done. They may include:  · Nerve conduction velocities. This checks the transmission of electrical current through a nerve.  · Electromyography. This shows how muscles respond to electrical signals transmitted by nearby nerves.  · Quantitative sensory testing. This test is used to assess how your nerves respond to vibration and changes in temperature.  Radiculoplexus Neuropathy  · Often the first thing is to eliminate any other issue or problems that might be the cause, as there is no stick test for diagnosis.  · X-ray exam of your spine and lumbar region.  · Spinal tap to rule out cancer.  · MRI to rule out other lesions.  TREATMENT   Once nerve damage occurs, it cannot be reversed. The goal of treatment is to keep the disease or nerve damage from getting worse and affecting more nerve fibers. Controlling your blood glucose level is the key. Most people with radiculoplexus neuropathy see at least a partial improvement over time. You will need to keep your blood glucose and HbA1c levels in the target range determined by your health care provider. Things that help control blood glucose levels include:   · Blood glucose monitoring.    · Meal planning.    · Physical activity.    · Diabetes medicine.    Over time, maintaining lower blood glucose levels helps lessen symptoms. Sometimes, prescription pain medicine is  needed.  HOME CARE INSTRUCTIONS:  · Do not smoke.  · Keep your blood glucose level in the range that you and your health care provider have determined acceptable for you.  · Keep your blood pressure level in the range that you and your health care provider have determined acceptable for you.  · Eat a well-balanced diet.  · Be physically active every day. Include strength training and balance exercises.  · Protect your feet.  ¨ Check your feet every day for sores, cuts, blisters, or signs of infection.  ¨ Wear padded socks and supportive shoes. Use orthotic inserts, if necessary.  ¨ Regularly check the insides of your shoes for worn spots. Make sure there are no rocks or other items inside your shoes before you put them on.  SEEK MEDICAL CARE IF:   · You have burning, stabbing, or aching pain in the legs or feet.  · You are unable to feel pressure or pain in your feet.  · You develop problems with digestion such as:  ¨ Nausea.  ¨ Vomiting.  ¨ Bloating.  ¨ Constipation.  ¨ Diarrhea.  ¨ Abdominal pain.  · You have difficulty with urination, such as:  ¨ Incontinence.  ¨ Retention.  · You have palpitations.  · You develop orthostatic hypotension. When you stand up you may feel:  ¨ Dizzy.  ¨ Weak.  ¨ Faint.  · You cannot attain and maintain an erection (in men).  · You have vaginal dryness and problems with decreased sexual desire and arousal (in women).  · You have severe pain in your thighs, legs, or buttocks.  · You have unexplained weight loss.     This information is not intended to replace advice given to you by your health care provider. Make sure you discuss any questions you have with your health care provider.     Document Released: 02/26/2003 Document Revised: 01/08/2016 Document Reviewed: 05/29/2014  [employer] Interactive Patient Education ©2016 [employer]            Patient Information     Patient Information    Following emergency treatment: all patient requiring follow-up care must return either to a  private physician or a clinic if your condition worsens before you are able to obtain further medical attention, please return to the emergency room.     Billing Information    [facility] we work to make the billing process streamlined for our patients.  Our Representatives are here to answer any questions you may have regarding your hospital bill.  If you have insurance coverage and have supplied your insurance information to us, we will submit a claim to your insurer on your behalf.  Should you have any questions regarding your bill, we can be reached online or by phone as follows:  Online: You are able pay your bills online or live chat with our representatives about any billing questions you may have. We are here to help Monday - Friday from 8:00am to 7:30pm and 9:00am - 12:00pm on Saturdays.  Please visit [email or web address]  for more information.   Phone:  [phone] or [phone]    Please note that your emergency physician, surgeon, pathologist, radiologist, anesthesiologist, and other specialists are not employed by [facility] and will therefore bill separately for their services.  Please contact them directly for any questions concerning their bills at the numbers below:     Emergency Physician Services:  [phone]  [facility]:  [phone]  [facility]:  [phone]  [facility]:  [phone]    1. Your final bill may vary from the amount quoted upon discharge if all procedures are not complete at that time, or if your doctor has additional procedures of which we are not aware. You will receive an additional bill if you return to the [facility] at [facility] for suture removal regardless of the facility of which the sutures were placed.     2. Please arrange for settlement of this account at the emergency registration.    3. All self-pay accounts are due in full at the time of treatment.  If you  are unable to meet this obligation then payment is expected within 4-5 days.     4. If you have had radiology studies (CT, X-ray, Ultrasound, MRI), you have received a preliminary result during your emergency department visit. Please contact the radiology department [phone] to receive a copy of your final result. Please discuss the Final result with your primary physician or with the follow up physician provided.     Crisis Hotline:  [place] Crisis Hotline:  2-543-MDUCJWX or 1-961.553.4316  [place] Crisis Hotline:    1-765.119.9917 or 878-838-3371         ED Discharge Follow Up Questions    1. In order to provide you with very good care, we would like to follow up with a phone call in the next few days.  May we have your permission to contact you?     YES /  NO    2. What is the best phone number to call you? (       )_____-__________    3. What is the best time to call you?      Morning  /  Afternoon  /  Evening                   Patient Signature:  ____________________________________________________________    Date:  ____________________________________________________________

## 2017-05-28 ENCOUNTER — HOSPITAL ENCOUNTER (EMERGENCY)
Dept: HOSPITAL 8 - ED | Age: 82
Discharge: HOME | End: 2017-05-28
Payer: MEDICARE

## 2017-05-28 VITALS — DIASTOLIC BLOOD PRESSURE: 78 MMHG | SYSTOLIC BLOOD PRESSURE: 132 MMHG

## 2017-05-28 VITALS — WEIGHT: 181.37 LBS | BODY MASS INDEX: 28.47 KG/M2 | HEIGHT: 67 IN

## 2017-05-28 DIAGNOSIS — M54.5: Primary | ICD-10-CM

## 2017-05-28 DIAGNOSIS — E11.9: ICD-10-CM

## 2017-05-28 DIAGNOSIS — I10: ICD-10-CM

## 2017-05-28 DIAGNOSIS — E78.5: ICD-10-CM

## 2017-05-28 DIAGNOSIS — Z91.041: ICD-10-CM

## 2017-05-28 PROCEDURE — 96375 TX/PRO/DX INJ NEW DRUG ADDON: CPT

## 2017-05-28 PROCEDURE — 96374 THER/PROPH/DIAG INJ IV PUSH: CPT

## 2017-05-28 PROCEDURE — 99284 EMERGENCY DEPT VISIT MOD MDM: CPT

## 2017-05-30 ENCOUNTER — HOSPITAL ENCOUNTER (EMERGENCY)
Dept: HOSPITAL 8 - ED | Age: 82
Discharge: HOME | End: 2017-05-30
Payer: MEDICARE

## 2017-05-30 VITALS — BODY MASS INDEX: 26.99 KG/M2 | HEIGHT: 67 IN | WEIGHT: 171.96 LBS

## 2017-05-30 VITALS — DIASTOLIC BLOOD PRESSURE: 66 MMHG | SYSTOLIC BLOOD PRESSURE: 113 MMHG

## 2017-05-30 DIAGNOSIS — I10: ICD-10-CM

## 2017-05-30 DIAGNOSIS — M79.652: ICD-10-CM

## 2017-05-30 DIAGNOSIS — E11.9: ICD-10-CM

## 2017-05-30 DIAGNOSIS — M25.552: Primary | ICD-10-CM

## 2017-05-30 DIAGNOSIS — M54.16: ICD-10-CM

## 2017-05-30 DIAGNOSIS — E78.5: ICD-10-CM

## 2017-05-30 DIAGNOSIS — Z87.891: ICD-10-CM

## 2017-05-30 PROCEDURE — 74000: CPT

## 2017-05-30 PROCEDURE — 96372 THER/PROPH/DIAG INJ SC/IM: CPT

## 2017-05-30 PROCEDURE — 99284 EMERGENCY DEPT VISIT MOD MDM: CPT

## 2017-06-08 ENCOUNTER — HOSPITAL ENCOUNTER (OUTPATIENT)
Dept: RADIOLOGY | Facility: MEDICAL CENTER | Age: 82
End: 2017-06-08
Attending: NEUROLOGICAL SURGERY
Payer: MEDICARE

## 2017-06-08 ENCOUNTER — HOSPITAL ENCOUNTER (OUTPATIENT)
Dept: LAB | Facility: MEDICAL CENTER | Age: 82
End: 2017-06-08
Attending: NURSE PRACTITIONER
Payer: MEDICARE

## 2017-06-08 DIAGNOSIS — M54.5 BILATERAL LOW BACK PAIN, UNSPECIFIED CHRONICITY, WITH SCIATICA PRESENCE UNSPECIFIED: ICD-10-CM

## 2017-06-08 LAB
ALBUMIN SERPL BCP-MCNC: 4.1 G/DL (ref 3.2–4.9)
ALBUMIN/GLOB SERPL: 1.6 G/DL
ALP SERPL-CCNC: 34 U/L (ref 30–99)
ALT SERPL-CCNC: 27 U/L (ref 2–50)
ANION GAP SERPL CALC-SCNC: 11 MMOL/L (ref 0–11.9)
AST SERPL-CCNC: 23 U/L (ref 12–45)
BILIRUB SERPL-MCNC: 0.8 MG/DL (ref 0.1–1.5)
BUN SERPL-MCNC: 27 MG/DL (ref 8–22)
CALCIUM SERPL-MCNC: 9.4 MG/DL (ref 8.5–10.5)
CHLORIDE SERPL-SCNC: 94 MMOL/L (ref 96–112)
CO2 SERPL-SCNC: 25 MMOL/L (ref 20–33)
CREAT SERPL-MCNC: 0.77 MG/DL (ref 0.5–1.4)
GFR SERPL CREATININE-BSD FRML MDRD: >60 ML/MIN/1.73 M 2
GLOBULIN SER CALC-MCNC: 2.5 G/DL (ref 1.9–3.5)
GLUCOSE SERPL-MCNC: 149 MG/DL (ref 65–99)
POTASSIUM SERPL-SCNC: 3.3 MMOL/L (ref 3.6–5.5)
PROT SERPL-MCNC: 6.6 G/DL (ref 6–8.2)
SODIUM SERPL-SCNC: 130 MMOL/L (ref 135–145)

## 2017-06-08 PROCEDURE — 72110 X-RAY EXAM L-2 SPINE 4/>VWS: CPT

## 2017-06-28 ENCOUNTER — HOSPITAL ENCOUNTER (OUTPATIENT)
Dept: LAB | Facility: MEDICAL CENTER | Age: 82
End: 2017-06-28
Attending: NURSE PRACTITIONER
Payer: MEDICARE

## 2017-06-28 LAB
ANION GAP SERPL CALC-SCNC: 9 MMOL/L (ref 0–11.9)
BUN SERPL-MCNC: 20 MG/DL (ref 8–22)
CALCIUM SERPL-MCNC: 9.7 MG/DL (ref 8.5–10.5)
CHLORIDE SERPL-SCNC: 98 MMOL/L (ref 96–112)
CO2 SERPL-SCNC: 28 MMOL/L (ref 20–33)
CREAT SERPL-MCNC: 0.71 MG/DL (ref 0.5–1.4)
GFR SERPL CREATININE-BSD FRML MDRD: >60 ML/MIN/1.73 M 2
GLUCOSE SERPL-MCNC: 209 MG/DL (ref 65–99)
POTASSIUM SERPL-SCNC: 3.4 MMOL/L (ref 3.6–5.5)
SODIUM SERPL-SCNC: 135 MMOL/L (ref 135–145)

## 2017-06-28 PROCEDURE — 80048 BASIC METABOLIC PNL TOTAL CA: CPT

## 2017-06-28 PROCEDURE — 36415 COLL VENOUS BLD VENIPUNCTURE: CPT

## 2017-07-19 ENCOUNTER — HOSPITAL ENCOUNTER (OUTPATIENT)
Dept: LAB | Facility: MEDICAL CENTER | Age: 82
End: 2017-07-19
Attending: INTERNAL MEDICINE
Payer: MEDICARE

## 2017-07-19 LAB
ANION GAP SERPL CALC-SCNC: 6 MMOL/L (ref 0–11.9)
BUN SERPL-MCNC: 20 MG/DL (ref 8–22)
CALCIUM SERPL-MCNC: 9.6 MG/DL (ref 8.5–10.5)
CHLORIDE SERPL-SCNC: 99 MMOL/L (ref 96–112)
CO2 SERPL-SCNC: 30 MMOL/L (ref 20–33)
CREAT SERPL-MCNC: 0.74 MG/DL (ref 0.5–1.4)
GFR SERPL CREATININE-BSD FRML MDRD: >60 ML/MIN/1.73 M 2
GLUCOSE SERPL-MCNC: 148 MG/DL (ref 65–99)
POTASSIUM SERPL-SCNC: 3.3 MMOL/L (ref 3.6–5.5)
SODIUM SERPL-SCNC: 135 MMOL/L (ref 135–145)

## 2017-07-19 PROCEDURE — 80048 BASIC METABOLIC PNL TOTAL CA: CPT

## 2017-07-19 PROCEDURE — 36415 COLL VENOUS BLD VENIPUNCTURE: CPT

## 2017-08-07 PROCEDURE — 99283 EMERGENCY DEPT VISIT LOW MDM: CPT

## 2017-08-08 ENCOUNTER — HOSPITAL ENCOUNTER (EMERGENCY)
Facility: MEDICAL CENTER | Age: 82
End: 2017-08-08
Attending: EMERGENCY MEDICINE
Payer: MEDICARE

## 2017-08-08 ENCOUNTER — APPOINTMENT (OUTPATIENT)
Dept: RADIOLOGY | Facility: MEDICAL CENTER | Age: 82
End: 2017-08-08
Attending: EMERGENCY MEDICINE
Payer: MEDICARE

## 2017-08-08 VITALS
RESPIRATION RATE: 20 BRPM | SYSTOLIC BLOOD PRESSURE: 141 MMHG | DIASTOLIC BLOOD PRESSURE: 74 MMHG | HEART RATE: 69 BPM | OXYGEN SATURATION: 94 % | HEIGHT: 67 IN | TEMPERATURE: 98.2 F | BODY MASS INDEX: 27.65 KG/M2 | WEIGHT: 176.15 LBS

## 2017-08-08 DIAGNOSIS — S91.119A LACERATION OF TOE OF LEFT FOOT WITHOUT FOREIGN BODY PRESENT OR DAMAGE TO NAIL, UNSPECIFIED TOE, INITIAL ENCOUNTER: ICD-10-CM

## 2017-08-08 DIAGNOSIS — M79.675 PAIN OF TOE OF LEFT FOOT: ICD-10-CM

## 2017-08-08 PROCEDURE — 90471 IMMUNIZATION ADMIN: CPT

## 2017-08-08 PROCEDURE — 73630 X-RAY EXAM OF FOOT: CPT | Mod: LT

## 2017-08-08 PROCEDURE — 700111 HCHG RX REV CODE 636 W/ 250 OVERRIDE (IP): Performed by: EMERGENCY MEDICINE

## 2017-08-08 PROCEDURE — 90715 TDAP VACCINE 7 YRS/> IM: CPT | Performed by: EMERGENCY MEDICINE

## 2017-08-08 RX ADMIN — CLOSTRIDIUM TETANI TOXOID ANTIGEN (FORMALDEHYDE INACTIVATED), CORYNEBACTERIUM DIPHTHERIAE TOXOID ANTIGEN (FORMALDEHYDE INACTIVATED), BORDETELLA PERTUSSIS TOXOID ANTIGEN (GLUTARALDEHYDE INACTIVATED), BORDETELLA PERTUSSIS FILAMENTOUS HEMAGGLUTININ ANTIGEN (FORMALDEHYDE INACTIVATED), BORDETELLA PERTUSSIS PERTACTIN ANTIGEN, AND BORDETELLA PERTUSSIS FIMBRIAE 2/3 ANTIGEN 0.5 ML: 5; 2; 2.5; 5; 3; 5 INJECTION, SUSPENSION INTRAMUSCULAR at 01:40

## 2017-08-08 NOTE — ED AVS SNAPSHOT
Home Care Instructions                                                                                                                Julien Dao   MRN: [id number]    Department:  [facility], Emergency Dept   Date of Visit:  8/7/2017            [facility], Emergency Dept    [address] [place] [address]    Phone:  [phone]      You were seen by     Eldon Steen M.D.      Your Diagnosis Was     Pain of toe of left foot     M79.675       These are the medications you received during your hospitalization from 08/07/2017 2357 to 08/08/2017 0202     Date/Time Order Dose Route Action    08/08/2017 0140 tetanus-dipth-acell pertussis (ADACEL) inj 0.5 mL 0.5 mL Intramuscular Given      Follow-up Information     1. Follow up with JONO Powell.    Specialty:  Family Medicine    Why:  As needed    Contact information    [address] Soni   [place] [address]  [phone]          2. Follow up with [facility], Emergency Dept.    Specialty:  Emergency Medicine    Why:  If symptoms worsen    Contact information    [address] [address] [phone]      Medication Information     Review all of your home medications and newly ordered medications with your primary doctor and/or pharmacist as soon as possible. Follow medication instructions as directed by your doctor and/or pharmacist.     Please keep your complete medication list with you and share with your physician. Update the information when medications are discontinued, doses are changed, or new medications (including over-the-counter products) are added; and carry medication information at all times in the event of emergency situations.               Medication List      ASK your doctor about these medications        Instructions    Morning Afternoon Evening Bedtime    amlodipine 5 MG Tabs   Commonly known as:  NORVASC        Take 1 Tab by mouth 2 Times a  Day.   Dose:  5 mg                        aspirin 81 MG tablet        Take 1 Tab by mouth every day.   Dose:  81 mg                        chlorthalidone 25 MG Tabs   Commonly known as:  HYGROTON        Take 25 mg by mouth 2 Times a Day.   Dose:  25 mg                        metformin 500 MG Tabs   Commonly known as:  GLUCOPHAGE        Take 500 mg by mouth 2 times a day, with meals.   Dose:  500 mg                        metoprolol 50 MG Tabs   Commonly known as:  LOPRESSOR        Take 50 mg by mouth 2 times a day.   Dose:  50 mg                        omega-3 acid ethyl esters 1 GM capsule   Commonly known as:  LOVAZA        Take 2 Caps by mouth 2 Times a Day.   Dose:  2 g                        potassium chloride SA 10 MEQ Tbcr   Commonly known as:  K-DUR        Take 1 Tab by mouth every day.   Dose:  10 mEq                        pregabalin 50 MG capsule   Commonly known as:  LYRICA        Take 1 Cap by mouth 3 times a day.   Dose:  50 mg                        PROSTATE Tabs        Take 1 Tab by mouth every day.   Dose:  1 Tab                        * PX MENS MULTIVITAMINS Tabs        Take 1 Tab by mouth every day.   Dose:  1 Tab                        * PRESERVISION AREDS 2 Caps        Take 1 Cap by mouth 2 Times a Day.   Dose:  1 Cap                        simvastatin 10 MG Tabs   Commonly known as:  ZOCOR        Take 1 Tab by mouth every evening.   Dose:  10 mg                        * Notice:  This list has 2 medication(s) that are the same as other medications prescribed for you. Read the directions carefully, and ask your doctor or other care provider to review them with you.            Procedures and tests performed during your visit     DX-FOOT-COMPLETE 3+ LEFT            Patient Information     Patient Information    Following emergency treatment: all patient requiring follow-up care must return either to a private physician or a clinic if your condition worsens before you are able to obtain further  medical attention, please return to the emergency room.     Billing Information    At [facility], we work to make the billing process streamlined for our patients.  Our Representatives are here to answer any questions you may have regarding your hospital bill.  If you have insurance coverage and have supplied your insurance information to us, we will submit a claim to your insurer on your behalf.  Should you have any questions regarding your bill, we can be reached online or by phone as follows:  Online: You are able pay your bills online or live chat with our representatives about any billing questions you may have. We are here to help Monday - Friday from 8:00am to 7:30pm and 9:00am - 12:00pm on Saturdays.  Please visit [email or web address]  for more information.   Phone:  [phone] or [phone]    Please note that your emergency physician, surgeon, pathologist, radiologist, anesthesiologist, and other specialists are not employed by [facility] and will therefore bill separately for their services.  Please contact them directly for any questions concerning their bills at the numbers below:     Emergency Physician Services:  [phone]  [facility]:  [phone]  [facility]:  [phone]  [facility]:  [phone]    1. Your final bill may vary from the amount quoted upon discharge if all procedures are not complete at that time, or if your doctor has additional procedures of which we are not aware. You will receive an additional bill if you return to the Emergency Department at [facility] for suture removal regardless of the facility of which the sutures were placed.     2. Please arrange for settlement of this account at the emergency registration.    3. All self-pay accounts are due in full at the time of treatment.  If you are unable to meet this obligation then payment is expected within 4-5 days.     4. If you  have had radiology studies (CT, X-ray, Ultrasound, MRI), you have received a preliminary result during your emergency department visit. Please contact the radiology department [phone] to receive a copy of your final result. Please discuss the Final result with your primary physician or with the follow up physician provided.     Crisis Hotline:  [place] Crisis Hotline:  1-696-SFWNFJW or 1-447.867.6977  [place] Crisis Hotline:    1-863.901.6554 or 187-205-0507         ED Discharge Follow Up Questions    1. In order to provide you with very good care, we would like to follow up with a phone call in the next few days.  May we have your permission to contact you?     YES /  NO    2. What is the best phone number to call you? (       )_____-__________    3. What is the best time to call you?      Morning  /  Afternoon  /  Evening                   Patient Signature:  ____________________________________________________________    Date:  ____________________________________________________________

## 2017-08-08 NOTE — ED AVS SNAPSHOT
Tivra Access Code: [email or web address]  Expires: 9/7/2017  2:01 AM    Your email address is not on file at [facility].  Email Addresses are required for you to sign up for Tivra, please contact [phone] to verify your personal information and to provide your email address prior to attempting to register for Tivra.    Julien Dao  [address] [place] [address]    Tivra  A secure, online tool to manage your health information     [facility]’s Tivra® is a secure, online tool that connects you to your personalized health information from the privacy of your home -- day or night - making it very easy for you to manage your healthcare. Once the activation process is completed, you can even access your medical information using the Tivra erinn, which is available for free in the Apple Erinn store or Google Play store.     To learn more about Tivra, visit [email or web address]    There are two levels of access available (as shown below):   My Chart Features  [facility] Primary Care Doctor [facility]  Specialists [facility]  Urgent  Care Non-[facility] Primary Care Doctor   Email your healthcare team securely and privately 24/7 X X X    Manage appointments: schedule your next appointment; view details of past/upcoming appointments X      Request prescription refills. X      View recent personal medical records, including lab and immunizations X X X X   View health record, including health history, allergies, medications X X X X   Read reports about your outpatient visits, procedures, consult and ER notes X X X X   See your discharge summary, which is a recap of your hospital and/or ER visit that includes your diagnosis, lab results, and care plan X X  X     How to register for Tivra:  Once your e-mail address has been verified, follow the following steps to sign up for Tivra.     1. Go to  [email or web address]  2. Click on the Sign Up Now box, which takes you to the New Member Sign Up page. You will  need to provide the following information:  a. Enter your ZÃ¼m XR Access Code exactly as it appears at the top of this page. (You will not need to use this code after you’ve completed the sign-up process. If you do not sign up before the expiration date, you must request a new code.)   b. Enter your date of birth.   c. Enter your home email address.   d. Click Submit, and follow the next screen’s instructions.  3. Create a [employer] ID. This will be your ZÃ¼m XR login ID and cannot be changed, so think of one that is secure and easy to remember.  4. Create a ZÃ¼m XR password. You can change your password at any time.  5. Enter your Password Reset Question and Answer. This can be used at a later time if you forget your password.   6. Enter your e-mail address. This allows you to receive e-mail notifications when new information is available in ZÃ¼m XR.  7. Click Sign Up. You can now view your health information.    For assistance activating your ZÃ¼m XR account, call [phone]

## 2017-08-08 NOTE — ED AVS SNAPSHOT
8/8/2017    Julien Dao  [address] [place] [address]    Dear Julien:    [facility] wants to ensure your discharge home is safe and you or your loved ones have had all of your questions answered regarding your care after you leave the hospital.    Below is a list of resources and contact information should you have any questions regarding your hospital stay, follow-up instructions, or active medical symptoms.    Questions or Concerns Regarding… Contact   Medical Questions Related to Your Discharge  (7 days a week, 8am-5pm) Contact a Nurse Care Coordinator   [phone]   Medical Questions Not Related to Your Discharge  (24 hours a day / 7 days a week)  Contact the Nurse Health Line   [phone]    Medications or Discharge Instructions Refer to your discharge packet   or contact your [facility] Primary Care Provider   [phone]   Follow-up Appointment(s) Schedule your appointment via "[employer]   or contact Scheduling [phone]   Billing Review your statement via "[employer]  or contact Billing [phone]   Medical Records Review your records via "[employer]   or contact Medical Records [phone]     You may receive a telephone call within two days of discharge. This call is to make certain you understand your discharge instructions and have the opportunity to have any questions answered. You can also easily access your medical information, test results and upcoming appointments via the "[employer] free online health management tool. You can learn more and sign up at [employer]/"[employer]. For assistance setting up your "[employer] account, please call [phone].    Once again, we want to ensure your discharge home is safe and that you have a clear understanding of any next steps in your care. If you have any questions or concerns, please do not hesitate to contact us, we are here for you. Thank you for choosing [facility] for your healthcare needs.    Sincerely,    Your [facility] Healthcare Team

## 2017-08-08 NOTE — ED NOTES
"Chief Complaint   Patient presents with   • Toe Injury     Pt BIB wife c/o left middle toe injury apprx 1 hour ago. Pt states he stepped on \"something, but unaware of what it was\" Pt c/o numbness to injured toe. Pt is concerned whatever he stepped on \"is still stuck in there\"     /70 mmHg  Pulse 68  Temp(Src) 36.8 °C (98.3 °F)  Resp 18  Ht 1.702 m (5' 7.01\")  Wt 79.9 kg (176 lb 2.4 oz)  BMI 27.58 kg/m2  SpO2 93%    "

## 2017-11-02 ENCOUNTER — HOSPITAL ENCOUNTER (OUTPATIENT)
Dept: LAB | Facility: MEDICAL CENTER | Age: 82
End: 2017-11-02
Attending: NURSE PRACTITIONER
Payer: MEDICARE

## 2017-11-02 LAB — GFR SERPL CREATININE-BSD FRML MDRD: >60 ML/MIN/1.73 M 2

## 2017-11-02 PROCEDURE — 80048 BASIC METABOLIC PNL TOTAL CA: CPT

## 2017-11-02 PROCEDURE — 83036 HEMOGLOBIN GLYCOSYLATED A1C: CPT | Mod: GA

## 2017-11-02 PROCEDURE — 36415 COLL VENOUS BLD VENIPUNCTURE: CPT | Mod: GA

## 2017-11-02 PROCEDURE — 85018 HEMOGLOBIN: CPT

## 2017-11-02 PROCEDURE — 82043 UR ALBUMIN QUANTITATIVE: CPT

## 2017-11-02 PROCEDURE — 85014 HEMATOCRIT: CPT

## 2017-11-02 PROCEDURE — 82570 ASSAY OF URINE CREATININE: CPT

## 2017-11-03 LAB
ANION GAP SERPL CALC-SCNC: 9 MMOL/L (ref 0–11.9)
BUN SERPL-MCNC: 18 MG/DL (ref 8–22)
CALCIUM SERPL-MCNC: 9.5 MG/DL (ref 8.5–10.5)
CHLORIDE SERPL-SCNC: 101 MMOL/L (ref 96–112)
CO2 SERPL-SCNC: 26 MMOL/L (ref 20–33)
CREAT SERPL-MCNC: 0.66 MG/DL (ref 0.5–1.4)
CREAT UR-MCNC: 126.4 MG/DL
EST. AVERAGE GLUCOSE BLD GHB EST-MCNC: 174 MG/DL
GLUCOSE SERPL-MCNC: 147 MG/DL (ref 65–99)
HBA1C MFR BLD: 7.7 % (ref 0–5.6)
HCT VFR BLD AUTO: 43.1 % (ref 42–52)
HGB BLD-MCNC: 15.1 G/DL (ref 14–18)
MICROALBUMIN UR-MCNC: 0.8 MG/DL
MICROALBUMIN/CREAT UR: 6 MG/G (ref 0–30)
POTASSIUM SERPL-SCNC: 3.1 MMOL/L (ref 3.6–5.5)
SODIUM SERPL-SCNC: 136 MMOL/L (ref 135–145)

## 2017-11-16 ENCOUNTER — HOSPITAL ENCOUNTER (OUTPATIENT)
Dept: RADIOLOGY | Facility: MEDICAL CENTER | Age: 82
End: 2017-11-16
Attending: NURSE PRACTITIONER
Payer: MEDICARE

## 2017-11-16 DIAGNOSIS — Z91.81 PERSONAL HISTORY OF FALL: ICD-10-CM

## 2017-11-16 DIAGNOSIS — R06.00 DYSPNEA, PAROXYSMAL NOCTURNAL: ICD-10-CM

## 2017-11-16 DIAGNOSIS — M54.6 PAIN IN THORACIC SPINE: ICD-10-CM

## 2017-11-16 PROCEDURE — 71020 DX-CHEST-2 VIEWS: CPT

## 2017-11-16 PROCEDURE — 72072 X-RAY EXAM THORAC SPINE 3VWS: CPT

## 2017-11-17 ENCOUNTER — RESOLUTE PROFESSIONAL BILLING HOSPITAL PROF FEE (OUTPATIENT)
Dept: HOSPITALIST | Facility: MEDICAL CENTER | Age: 82
End: 2017-11-17
Payer: MEDICARE

## 2017-11-17 ENCOUNTER — HOSPITAL ENCOUNTER (OUTPATIENT)
Facility: MEDICAL CENTER | Age: 82
End: 2017-11-18
Attending: EMERGENCY MEDICINE | Admitting: HOSPITALIST
Payer: MEDICARE

## 2017-11-17 ENCOUNTER — APPOINTMENT (OUTPATIENT)
Dept: RADIOLOGY | Facility: MEDICAL CENTER | Age: 82
End: 2017-11-17
Attending: EMERGENCY MEDICINE
Payer: MEDICARE

## 2017-11-17 PROBLEM — R79.89 ELEVATED TROPONIN: Status: ACTIVE | Noted: 2017-11-17

## 2017-11-17 LAB
ALBUMIN SERPL BCP-MCNC: 4 G/DL (ref 3.2–4.9)
ALBUMIN/GLOB SERPL: 1.3 G/DL
ALP SERPL-CCNC: 37 U/L (ref 30–99)
ALT SERPL-CCNC: 37 U/L (ref 2–50)
ANION GAP SERPL CALC-SCNC: 13 MMOL/L (ref 0–11.9)
AST SERPL-CCNC: 36 U/L (ref 12–45)
BASOPHILS # BLD AUTO: 0.8 % (ref 0–1.8)
BASOPHILS # BLD: 0.07 K/UL (ref 0–0.12)
BILIRUB SERPL-MCNC: 0.9 MG/DL (ref 0.1–1.5)
BNP SERPL-MCNC: 102 PG/ML (ref 0–100)
BUN SERPL-MCNC: 18 MG/DL (ref 8–22)
CALCIUM SERPL-MCNC: 9.5 MG/DL (ref 8.4–10.2)
CHLORIDE SERPL-SCNC: 99 MMOL/L (ref 96–112)
CO2 SERPL-SCNC: 24 MMOL/L (ref 20–33)
CREAT SERPL-MCNC: 0.9 MG/DL (ref 0.5–1.4)
EKG IMPRESSION: NORMAL
EOSINOPHIL # BLD AUTO: 0.36 K/UL (ref 0–0.51)
EOSINOPHIL NFR BLD: 3.9 % (ref 0–6.9)
ERYTHROCYTE [DISTWIDTH] IN BLOOD BY AUTOMATED COUNT: 40.9 FL (ref 35.9–50)
GFR SERPL CREATININE-BSD FRML MDRD: >60 ML/MIN/1.73 M 2
GLOBULIN SER CALC-MCNC: 3 G/DL (ref 1.9–3.5)
GLUCOSE BLD-MCNC: 181 MG/DL (ref 65–99)
GLUCOSE BLD-MCNC: 198 MG/DL (ref 65–99)
GLUCOSE BLD-MCNC: 222 MG/DL (ref 65–99)
GLUCOSE SERPL-MCNC: 221 MG/DL (ref 65–99)
HCT VFR BLD AUTO: 45.7 % (ref 42–52)
HGB BLD-MCNC: 16.5 G/DL (ref 14–18)
IMM GRANULOCYTES # BLD AUTO: 0.04 K/UL (ref 0–0.11)
IMM GRANULOCYTES NFR BLD AUTO: 0.4 % (ref 0–0.9)
LYMPHOCYTES # BLD AUTO: 1.84 K/UL (ref 1–4.8)
LYMPHOCYTES NFR BLD: 20.2 % (ref 22–41)
MAGNESIUM SERPL-MCNC: 1.7 MG/DL (ref 1.5–2.5)
MCH RBC QN AUTO: 31.8 PG (ref 27–33)
MCHC RBC AUTO-ENTMCNC: 36.1 G/DL (ref 33.7–35.3)
MCV RBC AUTO: 88.1 FL (ref 81.4–97.8)
MONOCYTES # BLD AUTO: 0.78 K/UL (ref 0–0.85)
MONOCYTES NFR BLD AUTO: 8.5 % (ref 0–13.4)
NEUTROPHILS # BLD AUTO: 6.04 K/UL (ref 1.82–7.42)
NEUTROPHILS NFR BLD: 66.2 % (ref 44–72)
NRBC # BLD AUTO: 0 K/UL
NRBC BLD AUTO-RTO: 0 /100 WBC
PLATELET # BLD AUTO: 216 K/UL (ref 164–446)
PMV BLD AUTO: 10.5 FL (ref 9–12.9)
POTASSIUM SERPL-SCNC: 3 MMOL/L (ref 3.6–5.5)
PROT SERPL-MCNC: 7 G/DL (ref 6–8.2)
RBC # BLD AUTO: 5.19 M/UL (ref 4.7–6.1)
SODIUM SERPL-SCNC: 136 MMOL/L (ref 135–145)
TROPONIN I SERPL-MCNC: 0.05 NG/ML (ref 0–0.04)
TROPONIN I SERPL-MCNC: 0.06 NG/ML (ref 0–0.04)
TROPONIN I SERPL-MCNC: 0.06 NG/ML (ref 0–0.04)
WBC # BLD AUTO: 9.1 K/UL (ref 4.8–10.8)

## 2017-11-17 PROCEDURE — 82962 GLUCOSE BLOOD TEST: CPT | Mod: 91

## 2017-11-17 PROCEDURE — 83735 ASSAY OF MAGNESIUM: CPT

## 2017-11-17 PROCEDURE — 36415 COLL VENOUS BLD VENIPUNCTURE: CPT

## 2017-11-17 PROCEDURE — 85025 COMPLETE CBC W/AUTO DIFF WBC: CPT

## 2017-11-17 PROCEDURE — G0378 HOSPITAL OBSERVATION PER HR: HCPCS

## 2017-11-17 PROCEDURE — A9270 NON-COVERED ITEM OR SERVICE: HCPCS | Performed by: HOSPITALIST

## 2017-11-17 PROCEDURE — 93005 ELECTROCARDIOGRAM TRACING: CPT | Performed by: EMERGENCY MEDICINE

## 2017-11-17 PROCEDURE — 71020 DX-CHEST-2 VIEWS: CPT

## 2017-11-17 PROCEDURE — 94760 N-INVAS EAR/PLS OXIMETRY 1: CPT

## 2017-11-17 PROCEDURE — 84484 ASSAY OF TROPONIN QUANT: CPT

## 2017-11-17 PROCEDURE — 83880 ASSAY OF NATRIURETIC PEPTIDE: CPT

## 2017-11-17 PROCEDURE — 99285 EMERGENCY DEPT VISIT HI MDM: CPT

## 2017-11-17 PROCEDURE — 99220 PR INITIAL OBSERVATION CARE,LEVL III: CPT | Performed by: HOSPITALIST

## 2017-11-17 PROCEDURE — 80053 COMPREHEN METABOLIC PANEL: CPT

## 2017-11-17 PROCEDURE — 700102 HCHG RX REV CODE 250 W/ 637 OVERRIDE(OP): Performed by: HOSPITALIST

## 2017-11-17 RX ORDER — CHLORTHALIDONE 25 MG/1
25 TABLET ORAL 2 TIMES DAILY
Status: DISCONTINUED | OUTPATIENT
Start: 2017-11-17 | End: 2017-11-18 | Stop reason: HOSPADM

## 2017-11-17 RX ORDER — METOPROLOL TARTRATE 50 MG/1
50 TABLET, FILM COATED ORAL 2 TIMES DAILY
Status: DISCONTINUED | OUTPATIENT
Start: 2017-11-17 | End: 2017-11-18 | Stop reason: HOSPADM

## 2017-11-17 RX ORDER — BISACODYL 10 MG
10 SUPPOSITORY, RECTAL RECTAL
Status: DISCONTINUED | OUTPATIENT
Start: 2017-11-17 | End: 2017-11-18 | Stop reason: HOSPADM

## 2017-11-17 RX ORDER — POTASSIUM CHLORIDE 20 MEQ/1
40 TABLET, EXTENDED RELEASE ORAL 3 TIMES DAILY
Status: DISCONTINUED | OUTPATIENT
Start: 2017-11-17 | End: 2017-11-18 | Stop reason: HOSPADM

## 2017-11-17 RX ORDER — CARBOXYMETHYLCELLULOSE SODIUM 5 MG/ML
1 SOLUTION/ DROPS OPHTHALMIC 4 TIMES DAILY
Status: DISCONTINUED | OUTPATIENT
Start: 2017-11-17 | End: 2017-11-18 | Stop reason: HOSPADM

## 2017-11-17 RX ORDER — CYCLOSPORINE 0.5 MG/ML
1 EMULSION OPHTHALMIC 2 TIMES DAILY
COMMUNITY
End: 2020-02-18

## 2017-11-17 RX ORDER — ATORVASTATIN CALCIUM 40 MG/1
40 TABLET, FILM COATED ORAL
Status: DISCONTINUED | OUTPATIENT
Start: 2017-11-17 | End: 2017-11-18 | Stop reason: HOSPADM

## 2017-11-17 RX ORDER — CYCLOSPORINE 0.5 MG/ML
1 EMULSION OPHTHALMIC 2 TIMES DAILY
Status: DISCONTINUED | OUTPATIENT
Start: 2017-11-17 | End: 2017-11-18 | Stop reason: HOSPADM

## 2017-11-17 RX ORDER — CARBOXYMETHYLCELLULOSE SODIUM 5 MG/ML
1 SOLUTION/ DROPS OPHTHALMIC 4 TIMES DAILY
COMMUNITY
End: 2018-08-29

## 2017-11-17 RX ORDER — DEXTROSE MONOHYDRATE 25 G/50ML
25 INJECTION, SOLUTION INTRAVENOUS
Status: DISCONTINUED | OUTPATIENT
Start: 2017-11-17 | End: 2017-11-18 | Stop reason: HOSPADM

## 2017-11-17 RX ORDER — ENALAPRILAT 1.25 MG/ML
1.25 INJECTION INTRAVENOUS EVERY 6 HOURS PRN
Status: DISCONTINUED | OUTPATIENT
Start: 2017-11-17 | End: 2017-11-18 | Stop reason: HOSPADM

## 2017-11-17 RX ORDER — PREGABALIN 25 MG/1
50 CAPSULE ORAL 3 TIMES DAILY
Status: DISCONTINUED | OUTPATIENT
Start: 2017-11-17 | End: 2017-11-18 | Stop reason: HOSPADM

## 2017-11-17 RX ORDER — ASPIRIN 325 MG
325 TABLET ORAL DAILY
Status: DISCONTINUED | OUTPATIENT
Start: 2017-11-17 | End: 2017-11-18 | Stop reason: HOSPADM

## 2017-11-17 RX ORDER — ONDANSETRON 2 MG/ML
4 INJECTION INTRAMUSCULAR; INTRAVENOUS EVERY 4 HOURS PRN
Status: DISCONTINUED | OUTPATIENT
Start: 2017-11-17 | End: 2017-11-18 | Stop reason: HOSPADM

## 2017-11-17 RX ORDER — ONDANSETRON 4 MG/1
4 TABLET, ORALLY DISINTEGRATING ORAL EVERY 4 HOURS PRN
Status: DISCONTINUED | OUTPATIENT
Start: 2017-11-17 | End: 2017-11-18 | Stop reason: HOSPADM

## 2017-11-17 RX ORDER — POLYETHYLENE GLYCOL 3350 17 G/17G
1 POWDER, FOR SOLUTION ORAL
Status: DISCONTINUED | OUTPATIENT
Start: 2017-11-17 | End: 2017-11-18 | Stop reason: HOSPADM

## 2017-11-17 RX ORDER — OMEGA-3-ACID ETHYL ESTERS 1 G/1
2 CAPSULE, LIQUID FILLED ORAL 2 TIMES DAILY
Status: DISCONTINUED | OUTPATIENT
Start: 2017-11-17 | End: 2017-11-18 | Stop reason: HOSPADM

## 2017-11-17 RX ORDER — AMLODIPINE BESYLATE 5 MG/1
5 TABLET ORAL 2 TIMES DAILY
Status: DISCONTINUED | OUTPATIENT
Start: 2017-11-17 | End: 2017-11-18 | Stop reason: HOSPADM

## 2017-11-17 RX ORDER — AMOXICILLIN 250 MG
2 CAPSULE ORAL 2 TIMES DAILY
Status: DISCONTINUED | OUTPATIENT
Start: 2017-11-17 | End: 2017-11-18 | Stop reason: HOSPADM

## 2017-11-17 RX ADMIN — INSULIN HUMAN 2 UNITS: 100 INJECTION, SOLUTION PARENTERAL at 17:32

## 2017-11-17 RX ADMIN — AMLODIPINE BESYLATE 5 MG: 5 TABLET ORAL at 20:12

## 2017-11-17 RX ADMIN — CARBOXYMETHYLCELLULOSE SODIUM 1 DROP: 5 SOLUTION/ DROPS OPHTHALMIC at 17:00

## 2017-11-17 RX ADMIN — PREGABALIN 50 MG: 25 CAPSULE ORAL at 20:12

## 2017-11-17 RX ADMIN — METOPROLOL TARTRATE 50 MG: 50 TABLET ORAL at 20:12

## 2017-11-17 RX ADMIN — ATORVASTATIN CALCIUM 40 MG: 40 TABLET, FILM COATED ORAL at 21:20

## 2017-11-17 RX ADMIN — METFORMIN HYDROCHLORIDE 500 MG: 500 TABLET, FILM COATED ORAL at 17:29

## 2017-11-17 RX ADMIN — CHLORTHALIDONE 25 MG: 25 TABLET ORAL at 20:12

## 2017-11-17 RX ADMIN — POTASSIUM CHLORIDE 40 MEQ: 1500 TABLET, EXTENDED RELEASE ORAL at 20:12

## 2017-11-17 RX ADMIN — INSULIN HUMAN 3 UNITS: 100 INJECTION, SOLUTION PARENTERAL at 20:22

## 2017-11-17 ASSESSMENT — ENCOUNTER SYMPTOMS
DIARRHEA: 0
HEADACHES: 0
NERVOUS/ANXIOUS: 0
SHORTNESS OF BREATH: 1
DIZZINESS: 0
NAUSEA: 0
HEARTBURN: 0
VOMITING: 0
CHILLS: 0
WEIGHT LOSS: 0
WEAKNESS: 0
ABDOMINAL PAIN: 0
BACK PAIN: 1
DEPRESSION: 0
PALPITATIONS: 0
COUGH: 0
DOUBLE VISION: 0
FEVER: 0
STRIDOR: 0
SORE THROAT: 0
SPUTUM PRODUCTION: 0
BLURRED VISION: 0

## 2017-11-17 ASSESSMENT — LIFESTYLE VARIABLES
ALCOHOL_USE: NO
EVER_SMOKED: YES
EVER_SMOKED: UNABLE TO EVALUATE AT THIS TIME - NEEDS ASSESSMENT PRIOR TO DISCHARGE

## 2017-11-17 ASSESSMENT — COPD QUESTIONNAIRES
DO YOU EVER COUGH UP ANY MUCUS OR PHLEGM?: YES, A FEW DAYS A WEEK OR MONTH
HAVE YOU SMOKED AT LEAST 100 CIGARETTES IN YOUR ENTIRE LIFE: YES
DURING THE PAST 4 WEEKS HOW MUCH DID YOU FEEL SHORT OF BREATH: SOME OF THE TIME
COPD SCREENING SCORE: 6

## 2017-11-17 ASSESSMENT — PAIN SCALES - GENERAL
PAINLEVEL_OUTOF10: 0

## 2017-11-17 NOTE — CARE PLAN
Problem: Safety  Goal: Will remain free from falls    Intervention: Implement fall precautions  Instructed patient to call for assistance when needed.      Problem: Knowledge Deficit  Goal: Knowledge of the prescribed therapeutic regimen will improve    Intervention: Discuss information regarding therpeutic regimen and document in education  Discussed plan of care with patient.

## 2017-11-17 NOTE — FLOWSHEET NOTE
11/17/17 1336   Interdisciplinary Plan of Care-Goals (Indications)   Hyperinflation Protocol Indications Atelectasis Documented by Chest X-Ray   Interdisciplinary Plan of Care-Outcomes    Hyperinflation Protocol Goals/Outcome Improvement in Repeat CXR;Greater Than 60% of Predicted I.S. Volume x 24 hrs   Education   Education Yes - Pt. / Family has been Instructed in use of Respiratory Equipment   Incentive Spirometry Group   Incentive Spirometry Instruction Yes   Breathing Exercises Yes   Incentive Spirometer Volume 2000 mL   Incentive Spirometer Date Last Changed 11/17/17   Chest Exam   Respiration 18   Heart Rate (Monitored) 88   Breath Sounds   RLL Breath Sounds Diminished   LLL Breath Sounds Diminished   Oximetry   #Pulse Oximetry (Single Determination) Yes   Oxygen   Home O2 Use Prior To Admission? No   Pulse Oximetry 94 %   O2 (LPM) 0   O2 Daily Delivery Respiratory  Room Air with O2 Available

## 2017-11-17 NOTE — ED NOTES
The Medication Reconciliation process has been completed by interviewing the patient    Allergies have been reviewed  Antibiotic use in 30 days - none    Home Pharmacy:  [facility] - [address]

## 2017-11-17 NOTE — ED NOTES
"Chief Complaint   Patient presents with   • Shortness of Breath     started Tuesday   • Weakness     /90   Pulse 78   Temp 36.8 °C (98.2 °F)   Resp 16   Ht 1.702 m (5' 7\")   Wt 83.3 kg (183 lb 10.3 oz)   SpO2 94%   BMI 28.76 kg/m²     Pt BIB [facility] from home for c/o feeling short of breath since Tuesday and generalized weakness.  PIV placed by [facility],  PTA.  Pt currently denies c/o CP, abd pain or back pain.  "

## 2017-11-17 NOTE — ED NOTES
ERP at bedside. Pt agrees with plan of care discussed by ERP. AIDET acknowledged with patient. Carlos in low position, side rail up for pt safety. Call light within reach. Will continue to monitor.

## 2017-11-17 NOTE — ED NOTES
Chief Complaint   Patient presents with   • Shortness of Breath     started Tuesday   • Weakness     Pt BIB [facility] from home for c/o feeling short of breath and weak since Tuesday.  Pt currently denies c/o CP, abd pain or back pain.

## 2017-11-17 NOTE — ED PROVIDER NOTES
ED Provider Note  CHIEF COMPLAINT  Chief Complaint   Patient presents with   • Shortness of Breath     started Tuesday   • Weakness       HPI  Julien Dao is a 88 y.o. male who presents to the emergency department with complaint of shortness of breath and weakness. He states his symptoms started approximately 3 days prior. Shortnes of breath increased ambulation decreases with rest. Denies chest pain, light headedness, dizziness, swelling of his lower extremities. He did state he took a fall proximal mid 10 days ago was seen yesterday with x-rays are negative for pneumonia, pneumothorax, hemothorax or thoracic spine injury. The patient went to the fire station and EMS was called at point. He states that he is taking his medications as prescribed.    REVIEW OF SYSTEMS  Positives as above. Pertinent negatives include fever, nausea, vomiting, lightheadedness, dizziness, loss of sensation or strength in arms or legs  All other review of systems are negative    PAST MEDICAL HISTORY  Past Medical History:   Diagnosis Date   • CAD (coronary artery disease)    • DM (diabetes mellitus) ([facility]) 1/7/2014   • Falls    • Hypertension        FAMILY HISTORY  Noncontributory    SOCIAL HISTORY  Social History     Social History   • Marital status:      Spouse name: N/A   • Number of children: N/A   • Years of education: N/A     Social History Main Topics   • Smoking status: Former Smoker     Quit date: 10/19/1972   • Smokeless tobacco: Never Used   • Alcohol use No   • Drug use: No   • Sexual activity: Not on file     Other Topics Concern   • Not on file     Social History Narrative   • No narrative on file       SURGICAL HISTORY  Past Surgical History:   Procedure Laterality Date   • CORONARY ARTERY BYPASS, 3     • OTHER CARDIAC SURGERY     • TED RECTAL ABSCESS         CURRENT MEDICATIONS  Home Medications     Reviewed by Anjum Ying (Pharmacy Tech) on 11/17/17 at 1107  Med List Status: Complete   Medication  "Last Dose Status   amlodipine (NORVASC) 5 MG TABS 11/17/2017 Active   aspirin 81 MG tablet 11/17/2017 Active   carboxymethylcellulose (REFRESH PLUS) 0.5 % Solution 11/16/2017 Active   chlorthalidone (HYGROTON) 25 MG Tab 11/17/2017 Active   cyclosporin (RESTASIS) 0.05 % ophthalmic emulsion 11/16/2017 Active   Lifitegrast (XIIDRA) 5 % Solution 11/16/2017 Active   metformin (GLUCOPHAGE) 500 MG TABS 11/17/2017 Active   metoprolol (LOPRESSOR) 50 MG Tab 11/17/2017 Active   Multiple Vitamins-Minerals (PRESERVISION AREDS 2) Cap 11/17/2017 Active   Multiple Vitamins-Minerals (PX MENS MULTIVITAMINS) Tab 11/17/2017 Active   omega-3 acid ethyl esters (LOVAZA) 1 GM capsule 11/17/2017 Active   potassium chloride SA (K-DUR) 10 MEQ TBCR 11/17/2017 Active   pregabalin (LYRICA) 50 MG capsule 11/17/2017 Active   simvastatin (ZOCOR) 10 MG TABS 11/17/2017 Active   Specialty Vitamins Products (PROSTATE) Tab 11/17/2017 Active                ALLERGIES  Allergies   Allergen Reactions   • Iodine        PHYSICAL EXAM  VITAL SIGNS: /90   Pulse 78   Temp 36.8 °C (98.2 °F)   Resp 16   Ht 1.702 m (5' 7\")   Wt 83.3 kg (183 lb 10.3 oz)   SpO2 94%   BMI 28.76 kg/m²      Constitutional: Well developed, Well nourished, No acute distress, Non-toxic appearance.   Eyes: PERRLA, EOMI, Conjunctiva normal, No discharge.   Cardiovascular: Normal heart rate, Normal rhythm, No murmurs, No rubs, No gallops, and intact distal pulses.   Thorax & Lungs:  Sternotomy scar, No respiratory distress, no rales, no rhonchi, No wheezing, No chest wall tenderness.   Abdomen: Bowel sounds normal, Soft, No tenderness, No guarding, No rebound, No pulsatile masses.   Skin: Warm, Dry, No erythema, No rash.   Back: No thoracic or lumbar spine tenderness   Extremities: Full range of motion, no deformity, no pedal edema bilaterally  Neurologic: Alert & oriented x 3, No focal deficits noted, acting appropriately on exam.  Psychiatric: Affect normal for clinical " presentation.  Results for orders placed or performed during the hospital encounter of 11/17/17   CBC w/ Differential   Result Value Ref Range    WBC 9.1 4.8 - 10.8 K/uL    RBC 5.19 4.70 - 6.10 M/uL    Hemoglobin 16.5 14.0 - 18.0 g/dL    Hematocrit 45.7 42.0 - 52.0 %    MCV 88.1 81.4 - 97.8 fL    MCH 31.8 27.0 - 33.0 pg    MCHC 36.1 (H) 33.7 - 35.3 g/dL    RDW 40.9 35.9 - 50.0 fL    Platelet Count 216 164 - 446 K/uL    MPV 10.5 9.0 - 12.9 fL    Neutrophils-Polys 66.20 44.00 - 72.00 %    Lymphocytes 20.20 (L) 22.00 - 41.00 %    Monocytes 8.50 0.00 - 13.40 %    Eosinophils 3.90 0.00 - 6.90 %    Basophils 0.80 0.00 - 1.80 %    Immature Granulocytes 0.40 0.00 - 0.90 %    Nucleated RBC 0.00 /100 WBC    Neutrophils (Absolute) 6.04 1.82 - 7.42 K/uL    Lymphs (Absolute) 1.84 1.00 - 4.80 K/uL    Monos (Absolute) 0.78 0.00 - 0.85 K/uL    Eos (Absolute) 0.36 0.00 - 0.51 K/uL    Baso (Absolute) 0.07 0.00 - 0.12 K/uL    Immature Granulocytes (abs) 0.04 0.00 - 0.11 K/uL    NRBC (Absolute) 0.00 K/uL   Complete Metabolic Panel (CMP)   Result Value Ref Range    Sodium 136 135 - 145 mmol/L    Potassium 3.0 (L) 3.6 - 5.5 mmol/L    Chloride 99 96 - 112 mmol/L    Co2 24 20 - 33 mmol/L    Anion Gap 13.0 (H) 0.0 - 11.9    Glucose 221 (H) 65 - 99 mg/dL    Bun 18 8 - 22 mg/dL    Creatinine 0.90 0.50 - 1.40 mg/dL    Calcium 9.5 8.4 - 10.2 mg/dL    AST(SGOT) 36 12 - 45 U/L    ALT(SGPT) 37 2 - 50 U/L    Alkaline Phosphatase 37 30 - 99 U/L    Total Bilirubin 0.9 0.1 - 1.5 mg/dL    Albumin 4.0 3.2 - 4.9 g/dL    Total Protein 7.0 6.0 - 8.2 g/dL    Globulin 3.0 1.9 - 3.5 g/dL    A-G Ratio 1.3 g/dL   Btype Natriuretic Peptide   Result Value Ref Range    B Natriuretic Peptide 102 (H) 0 - 100 pg/mL   Troponin STAT   Result Value Ref Range    Troponin I 0.05 (H) 0.00 - 0.04 ng/mL   ESTIMATED GFR   Result Value Ref Range    GFR If African American >60 >60 mL/min/1.73 m 2    GFR If Non African American >60 >60 mL/min/1.73 m 2   EKG (ER)   Result Value  Ref Range    Report       [facility] Emergency Dept.    Test Date:  2017  Pt Name:    SAMMY CLIFFORD                 Department: [facility]  MRN:        [id number]                      Room:       -ROOM 7  Gender:     M                            Technician: [id number]  :        [id number]                   Requested By:LOBO DOWD  Order #:    [id number]                    Reading MD: LOBO DOWD DO    Measurements  Intervals                                Axis  Rate:       62                           P:  MI:                                      QRS:        -16  QRSD:       148                          T:          18  QT:         412  QTc:        419    Interpretive Statements  ATRIAL FLUTTER, A-RATE 230  RBBB AND LAFB  Compared to ECG 2017 08:10:07  Left anterior fascicular block now present  Sinus bradycardia no longer present  Myocardial infarct finding no longer present  ST (T wave) deviation no longer present    Electronically Signed On 2017 10:36:14 PST  by LOBO DOWD DO     I evaluated the EKG and there is no evidence of acute changes, agree with the above findings with the ST depression that is chronic and right bundle-branch block.      RADIOLOGY/PROCEDURES  DX-CHEST-2 VIEWS   Final Result      1.  Cardiomegaly.      2.  Bibasilar atelectasis.          COURSE & MEDICAL DECISION MAKING  Pertinent Labs & Imaging studies reviewed. (See chart for details)  This is a charming 88-year-old male that presents with shortness of breath, weakness and chest pressure. EKG shows a significant acute changes, he has an elevated troponin 0.05, x-ray shows atelectasis but no evidence of pneumonia or significant intrathoracic abnormality. I'm concerned secondary to his significant previous cardiac history, slight elevation of troponin and symptomatology therefore the patient will be admitted to Dr. Fernandes for further evaluation and management. The patient is not  hypoxic, tachypneic or tachycardic and I do not suspect pulmonary embolism, aortic dissection, aortic aneurysm. I discussed the patient with Dr. Fernandes at 12 PM who accepts the patient will be writing admission orders. On reevaluation at 12:10 PM, the patient's hemodynamically stable, denies any complaint of pain.      New Prescriptions    No medications on file       FINAL IMPRESSION  Shortness of breath  Weakness  Elevated troponin         Electronically signed by: Anil Alvarenga, 11/17/2017 10:23 AM

## 2017-11-17 NOTE — PROGRESS NOTES
Received patient from ED, report received from Swathi. Patient awake, alert and oriented, calls appropriately. IV saline locked, to right hand. Up independently in room and walks hallway. Room air. Continue to monitor.

## 2017-11-17 NOTE — ED NOTES
ERP at bedside. Pt agrees with plan of care discussed by ERP. AIDET acknowledged with patient. IV established. Blood sent to lab.Carlos in low position, side rail up for pt safety. Call light within reach. Will continue to monitor.

## 2017-11-18 ENCOUNTER — APPOINTMENT (OUTPATIENT)
Dept: RADIOLOGY | Facility: MEDICAL CENTER | Age: 82
End: 2017-11-18
Attending: HOSPITALIST
Payer: MEDICARE

## 2017-11-18 VITALS
RESPIRATION RATE: 16 BRPM | TEMPERATURE: 97.2 F | HEIGHT: 67 IN | HEART RATE: 67 BPM | WEIGHT: 183.64 LBS | BODY MASS INDEX: 28.82 KG/M2 | OXYGEN SATURATION: 94 % | DIASTOLIC BLOOD PRESSURE: 57 MMHG | SYSTOLIC BLOOD PRESSURE: 109 MMHG

## 2017-11-18 LAB
ANION GAP SERPL CALC-SCNC: 11 MMOL/L (ref 0–11.9)
BASOPHILS # BLD AUTO: 0.7 % (ref 0–1.8)
BASOPHILS # BLD: 0.07 K/UL (ref 0–0.12)
BUN SERPL-MCNC: 20 MG/DL (ref 8–22)
CALCIUM SERPL-MCNC: 9 MG/DL (ref 8.4–10.2)
CHLORIDE SERPL-SCNC: 97 MMOL/L (ref 96–112)
CO2 SERPL-SCNC: 26 MMOL/L (ref 20–33)
CREAT SERPL-MCNC: 0.86 MG/DL (ref 0.5–1.4)
EOSINOPHIL # BLD AUTO: 0.33 K/UL (ref 0–0.51)
EOSINOPHIL NFR BLD: 3.5 % (ref 0–6.9)
ERYTHROCYTE [DISTWIDTH] IN BLOOD BY AUTOMATED COUNT: 41.1 FL (ref 35.9–50)
GFR SERPL CREATININE-BSD FRML MDRD: >60 ML/MIN/1.73 M 2
GLUCOSE BLD-MCNC: 178 MG/DL (ref 65–99)
GLUCOSE BLD-MCNC: 250 MG/DL (ref 65–99)
GLUCOSE SERPL-MCNC: 196 MG/DL (ref 65–99)
HCT VFR BLD AUTO: 43 % (ref 42–52)
HGB BLD-MCNC: 15.5 G/DL (ref 14–18)
IMM GRANULOCYTES # BLD AUTO: 0.03 K/UL (ref 0–0.11)
IMM GRANULOCYTES NFR BLD AUTO: 0.3 % (ref 0–0.9)
LYMPHOCYTES # BLD AUTO: 2.33 K/UL (ref 1–4.8)
LYMPHOCYTES NFR BLD: 24.6 % (ref 22–41)
MCH RBC QN AUTO: 31.9 PG (ref 27–33)
MCHC RBC AUTO-ENTMCNC: 36 G/DL (ref 33.7–35.3)
MCV RBC AUTO: 88.5 FL (ref 81.4–97.8)
MONOCYTES # BLD AUTO: 0.95 K/UL (ref 0–0.85)
MONOCYTES NFR BLD AUTO: 10 % (ref 0–13.4)
NEUTROPHILS # BLD AUTO: 5.77 K/UL (ref 1.82–7.42)
NEUTROPHILS NFR BLD: 60.9 % (ref 44–72)
NRBC # BLD AUTO: 0 K/UL
NRBC BLD AUTO-RTO: 0 /100 WBC
PLATELET # BLD AUTO: 197 K/UL (ref 164–446)
PMV BLD AUTO: 10.6 FL (ref 9–12.9)
POTASSIUM SERPL-SCNC: 3 MMOL/L (ref 3.6–5.5)
RBC # BLD AUTO: 4.86 M/UL (ref 4.7–6.1)
SODIUM SERPL-SCNC: 134 MMOL/L (ref 135–145)
TROPONIN I SERPL-MCNC: 0.05 NG/ML (ref 0–0.04)
WBC # BLD AUTO: 9.5 K/UL (ref 4.8–10.8)

## 2017-11-18 PROCEDURE — 84484 ASSAY OF TROPONIN QUANT: CPT

## 2017-11-18 PROCEDURE — A9502 TC99M TETROFOSMIN: HCPCS

## 2017-11-18 PROCEDURE — 700111 HCHG RX REV CODE 636 W/ 250 OVERRIDE (IP)

## 2017-11-18 PROCEDURE — 700102 HCHG RX REV CODE 250 W/ 637 OVERRIDE(OP): Performed by: HOSPITALIST

## 2017-11-18 PROCEDURE — 80048 BASIC METABOLIC PNL TOTAL CA: CPT

## 2017-11-18 PROCEDURE — A9270 NON-COVERED ITEM OR SERVICE: HCPCS | Performed by: HOSPITALIST

## 2017-11-18 PROCEDURE — 99217 PR OBSERVATION CARE DISCHARGE: CPT | Performed by: FAMILY MEDICINE

## 2017-11-18 PROCEDURE — 700111 HCHG RX REV CODE 636 W/ 250 OVERRIDE (IP): Performed by: HOSPITALIST

## 2017-11-18 PROCEDURE — 82962 GLUCOSE BLOOD TEST: CPT

## 2017-11-18 PROCEDURE — 85025 COMPLETE CBC W/AUTO DIFF WBC: CPT

## 2017-11-18 PROCEDURE — G0378 HOSPITAL OBSERVATION PER HR: HCPCS

## 2017-11-18 RX ORDER — REGADENOSON 0.08 MG/ML
INJECTION, SOLUTION INTRAVENOUS
Status: COMPLETED
Start: 2017-11-18 | End: 2017-11-18

## 2017-11-18 RX ORDER — AMINOPHYLLINE 25 MG/ML
INJECTION, SOLUTION INTRAVENOUS
Status: COMPLETED
Start: 2017-11-18 | End: 2017-11-18

## 2017-11-18 RX ORDER — ATORVASTATIN CALCIUM 40 MG/1
40 TABLET, FILM COATED ORAL
Qty: 30 TAB | Refills: 0 | Status: SHIPPED | OUTPATIENT
Start: 2017-11-18 | End: 2018-08-29

## 2017-11-18 RX ADMIN — INSULIN HUMAN 3 UNITS: 100 INJECTION, SOLUTION PARENTERAL at 11:54

## 2017-11-18 RX ADMIN — ENOXAPARIN SODIUM 40 MG: 100 INJECTION SUBCUTANEOUS at 11:33

## 2017-11-18 RX ADMIN — AMINOPHYLLINE 100 MG: 25 INJECTION, SOLUTION INTRAVENOUS at 10:20

## 2017-11-18 RX ADMIN — METOPROLOL TARTRATE 50 MG: 50 TABLET ORAL at 11:35

## 2017-11-18 RX ADMIN — PREGABALIN 50 MG: 25 CAPSULE ORAL at 11:35

## 2017-11-18 RX ADMIN — REGADENOSON 0.4 MG: 0.08 INJECTION, SOLUTION INTRAVENOUS at 10:10

## 2017-11-18 RX ADMIN — AMLODIPINE BESYLATE 5 MG: 5 TABLET ORAL at 11:34

## 2017-11-18 RX ADMIN — CARBOXYMETHYLCELLULOSE SODIUM 1 DROP: 5 SOLUTION/ DROPS OPHTHALMIC at 09:00

## 2017-11-18 RX ADMIN — POTASSIUM CHLORIDE 40 MEQ: 1500 TABLET, EXTENDED RELEASE ORAL at 11:34

## 2017-11-18 ASSESSMENT — PAIN SCALES - GENERAL: PAINLEVEL_OUTOF10: 0

## 2017-11-18 NOTE — PROGRESS NOTES
@ 2030 - Pt assessed, meds administered per MAR, pt no c/o pain, insulin given per sliding scale; pt resting comfortably in bed, made aware of NPO status at 0000 for stress test in the AM.

## 2017-11-18 NOTE — H&P
Hospital Medicine History and Physical    Date of Service  11/17/2017    Chief Complaint  Chief Complaint   Patient presents with   • Shortness of Breath     started Tuesday   • Weakness       History of Presenting Illness  88 y.o. Male with a history of open heart bypass in 2004 who presented 11/17/2017 with shortness of breath over the past few days. Currently he states his symptoms have resolved. He denied any chest pain, nausea, nor diaphoresis.  He has not had any sick contacts, nor upper respiratory symptoms.  He does not smoke.  He went to the nearby firehouse and was instructed to come to the hospital.  Here he is noted to have an abnormal EKG and mildly elevated troponin.    Primary Care Physician  TABITHA Powell.  OUtpatient cardiologist is Dr Figueroa    Code Status  FULL    Review of Systems  Review of Systems   Constitutional: Negative for chills, fever and weight loss.   HENT: Negative for congestion and sore throat.    Eyes: Negative for blurred vision and double vision.   Respiratory: Positive for shortness of breath. Negative for cough, sputum production and stridor.    Cardiovascular: Negative for chest pain, palpitations and leg swelling.   Gastrointestinal: Negative for abdominal pain, diarrhea, heartburn, nausea and vomiting.   Genitourinary: Negative for dysuria.   Musculoskeletal: Positive for back pain (mid back). Negative for joint pain.   Skin: Negative for rash.   Neurological: Negative for dizziness, weakness and headaches.   Psychiatric/Behavioral: Negative for depression. The patient is not nervous/anxious.           Past Medical History  Past Medical History:   Diagnosis Date   • DM (diabetes mellitus) (CMS-[facility]) 1/7/2014   • CAD (coronary artery disease)    • Falls    • Hypertension        Surgical History  Past Surgical History:   Procedure Laterality Date   • CORONARY ARTERY BYPASS, 3     • OTHER CARDIAC SURGERY     • TED RECTAL ABSCESS         Medications  No current  facility-administered medications on file prior to encounter.      Current Outpatient Prescriptions on File Prior to Encounter   Medication Sig Dispense Refill   • pregabalin (LYRICA) 50 MG capsule Take 1 Cap by mouth 3 times a day. 90 Cap 11   • metoprolol (LOPRESSOR) 50 MG Tab Take 50 mg by mouth 2 times a day.     • chlorthalidone (HYGROTON) 25 MG Tab Take 25 mg by mouth 2 Times a Day.     • Multiple Vitamins-Minerals (PX MENS MULTIVITAMINS) Tab Take 1 Tab by mouth every day.     • Specialty Vitamins Products (PROSTATE) Tab Take 1 Tab by mouth every day.     • Multiple Vitamins-Minerals (PRESERVISION AREDS 2) Cap Take 1 Cap by mouth 2 Times a Day.     • metformin (GLUCOPHAGE) 500 MG TABS Take 500 mg by mouth 2 times a day, with meals.     • omega-3 acid ethyl esters (LOVAZA) 1 GM capsule Take 2 Caps by mouth 2 Times a Day. 360 Cap 1   • potassium chloride SA (K-DUR) 10 MEQ TBCR Take 1 Tab by mouth every day. 90 Each 3   • amlodipine (NORVASC) 5 MG TABS Take 1 Tab by mouth 2 Times a Day. 180 Tab 3   • simvastatin (ZOCOR) 10 MG TABS Take 1 Tab by mouth every evening. 90 Tab 3   • aspirin 81 MG tablet Take 1 Tab by mouth every day. 100 Each 3       Family History  Father  of cancer.  Mother of cancer.  No family history of heart disease.    Social History  Social History   Substance Use Topics   • Smoking status: Former Smoker     Quit date: 10/19/1972   • Smokeless tobacco: Never Used   • Alcohol use No       Allergies  Allergies   Allergen Reactions   • Iodine         Physical Exam  Laboratory   Hemodynamics  Temp (24hrs), Av.9 °C (98.4 °F), Min:36.8 °C (98.2 °F), Max:36.9 °C (98.5 °F)   Temperature: 36.9 °C (98.5 °F)  Pulse  Av.5  Min: 67  Max: 78 Heart Rate (Monitored): 88  Blood Pressure : 113/90, NIBP: 121/57      Respiratory      Respiration: 18, Pulse Oximetry: 98 %, O2 Daily Delivery Respiratory : Room Air with O2 Available        RLL Breath Sounds: Diminished, LLL Breath Sounds:  Diminished    Physical Exam   Constitutional: He is oriented to person, place, and time. He appears well-developed and well-nourished. No distress.   HENT:   Head: Normocephalic and atraumatic.   Nose: Nose normal.   Mouth/Throat: Oropharynx is clear and moist.   Eyes: Conjunctivae and EOM are normal. Right eye exhibits no discharge. Left eye exhibits no discharge. No scleral icterus.   Neck: No tracheal deviation present.   Cardiovascular: Normal rate, regular rhythm, normal heart sounds and intact distal pulses.    No murmur heard.  Pulmonary/Chest: Effort normal and breath sounds normal. No stridor. No respiratory distress. He has no wheezes.   Abdominal: Soft. Bowel sounds are normal. He exhibits no distension. There is no tenderness.   Musculoskeletal: He exhibits no edema.   Lymphadenopathy:     He has no cervical adenopathy.   Neurological: He is alert and oriented to person, place, and time. No cranial nerve deficit.   Skin: Skin is warm. He is not diaphoretic.   Psychiatric: He has a normal mood and affect. His behavior is normal. Thought content normal.   Vitals reviewed.      Recent Labs      11/17/17   1026   WBC  9.1   RBC  5.19   HEMOGLOBIN  16.5   HEMATOCRIT  45.7   MCV  88.1   MCH  31.8   MCHC  36.1*   RDW  40.9   PLATELETCT  216   MPV  10.5     Recent Labs      11/17/17   1026   SODIUM  136   POTASSIUM  3.0*   CHLORIDE  99   CO2  24   GLUCOSE  221*   BUN  18   CREATININE  0.90   CALCIUM  9.5         Recent Labs      11/17/17   1026   BNPBTYPENAT  102*           Imaging  CXR: Cardiomegaly and atelectasis  EKG: ST depression in leads V1-V4   Assessment/Plan     I anticipate this patient is appropriate for observation status at this time.    * Dyspnea- (present on admission)   Assessment & Plan    Check ddimer  Concern of possible cardiac of nature given troponin elevation and EKG changes.  CXR unremarkable.        Elevated troponin- (present on admission)   Assessment & Plan    Monitor serial  troponins        DM (diabetes mellitus) (CMS-HCC)- (present on admission)   Assessment & Plan    SSI, accuchecks        Hypokalemia- (present on admission)   Assessment & Plan    Check a Mg.  Supplemental KCl  Check am BMP        HTN (hypertension)- (present on admission)   Assessment & Plan    Monitor vitals        CAD (coronary artery disease) - CABG Nov. 2004- (present on admission)   Assessment & Plan    Asa, statin, metoprolol        Dyslipidemia, goal LDL below 70- (present on admission)   Assessment & Plan    Start atorvastatin  Continue fish oil            VTE prophylaxis None.

## 2017-11-18 NOTE — PROGRESS NOTES
Pt complaint of mild shortness of breath, O2 sat 95-96%, no c/o chest pain or dizziness, pt asked to be put on 1 L of oxygen via nasal cannula, pt reports feeling better - O2 sat @ 98% w/ 1 L

## 2017-11-18 NOTE — PROGRESS NOTES
Tele Summary:    Rhythm: A Fib, right BBB  Rate: 50s -120  SC: -  QRS: 0.12  QT: -    Ectopy: frequent PVC, couplets    Tele strip placed in pt chart.

## 2017-11-18 NOTE — PROGRESS NOTES
Received report from night RN (Young). Assumed care of patient, discussed POC. Safety measures in place.

## 2017-11-18 NOTE — DISCHARGE INSTRUCTIONS
Discharge Instructions    Discharged to home by car with relative. Discharged via wheelchair, hospital escort: Yes.  Special equipment needed: Not Applicable    Be sure to schedule a follow-up appointment with your primary care doctor or any specialists as instructed.     Discharge Plan:   Influenza Vaccine Indication: Patient Refuses    I understand that a diet low in cholesterol, fat, and sodium is recommended for good health. Unless I have been given specific instructions below for another diet, I accept this instruction as my diet prescription.   Other diet: diabetic    Special Instructions: Diagnosis:  Acute Coronary Syndrome (ACS) is a diagnosis that encompasses cardiac-related chest pain and heart attack. ACS occurs when the blood flow to the heart muscle is severely reduced or cut off completely due to a slow process called atherosclerosis.  Atherosclerosis is a disease in which the coronary arteries become narrow from a buildup of fat, cholesterol, and other substances that combine to form plaque. If the plaque breaks, a blood clot will form and block the blood flow to the heart muscle. This lack of blood flow can cause damage or death to the heart muscle which is called a heart attack or Myocardial Infarction (MI). There are two different types of MIs:  ST Elevation Myocardial Infarction or STEMI (the most severe type of heart attack) and Non-ST Elevation Myocardial Infarction or NSTEMI.    Treatment Plan:  · Cardiac Diet  - Low fat, low salt, low cholesterol   · Cardiac Rehab  - Your doctor has ordered you a referral to [facility].  Call [phone] to schedule an appointment.  · Attend my follow-up appointment with my Cardiologist.  · Take my medications as prescribed by my doctor  · Exercise daily  · Reduce stress    Medications:  Certain medications are used to treat ACS.  Remember to always take medications as prescribed and never stop talking medications unless told by your doctor.    You have been  prescribed the following medicatons:    Aspirin - Aspirin is used as a blood thinning medication and you will require this medication indefinitely.    · Is patient discharged on Warfarin / Coumadin?   No     · Is patient Post Blood Transfusion?  No    Depression / Suicide Risk    As you are discharged from this [facility] facility, it is important to learn how to keep safe from harming yourself.    Recognize the warning signs:  · Abrupt changes in personality, positive or negative- including increase in energy   · Giving away possessions  · Change in eating patterns- significant weight changes-  positive or negative  · Change in sleeping patterns- unable to sleep or sleeping all the time   · Unwillingness or inability to communicate  · Depression  · Unusual sadness, discouragement and loneliness  · Talk of wanting to die  · Neglect of personal appearance   · Rebelliousness- reckless behavior  · Withdrawal from people/activities they love  · Confusion- inability to concentrate     If you or a loved one observes any of these behaviors or has concerns about self-harm, here's what you can do:  · Talk about it- your feelings and reasons for harming yourself  · Remove any means that you might use to hurt yourself (examples: pills, rope, extension cords, firearm)  · Get professional help from the community (Mental Health, Substance Abuse, psychological counseling)  · Do not be alone:Call your Safe Contact- someone whom you trust who will be there for you.  · Call your local CRISIS HOTLINE 921-3265 or 838-419-2622  · Call your local Children's Mobile Crisis Response Team [place] (115) 486-8973 or www.[employer]  · Call the toll free National Suicide Prevention Hotlines   · National Suicide Prevention Lifeline 042-930-BCIP (1853)  · National Hope Line Network 800-SUICIDE (670-8737)

## 2017-11-18 NOTE — PROGRESS NOTES
Report given to Reshma day-shift RN. Pt POC discussed, pt resting comfortably in bed, all safety precautions in place.

## 2017-11-18 NOTE — CARE PLAN
Problem: Safety  Goal: Will remain free from falls    Intervention: Implement fall precautions  Patient up with steady gait.      Problem: Knowledge Deficit  Goal: Knowledge of the prescribed therapeutic regimen will improve    Intervention: Discuss information regarding therpeutic regimen and document in education  Discussed plan of care with patient.

## 2017-11-18 NOTE — DISCHARGE SUMMARY
CHIEF COMPLAINT ON ADMISSION  Chief Complaint   Patient presents with   • Shortness of Breath     started Tuesday   • Weakness       CODE STATUS  Full Code    HPI & HOSPITAL COURSE  This is a 88 y.o. male here with Chest pressure and shortness of breath. Chest x-ray was negative for any acute abnormalities. BNP was at the CHF exacerbation criteria patient was saturating well on home oxygen. There was minor troponin leak. Patient had nuclear stress test performed that showed normal myocardial perfusion with a small and mild ischemia of the apical anterior segment which probably represent an old ischemic changes with chronic coronary artery disease. There was no evidence of myocardium infarction. Ejection fraction was 72%. The patient's cardiologist reviewed the test and recommended to discharge the patient and follow-up with him at his office in a week. Patient was chest pain-free. Patient was discharged home in stable condition.    Therefore, he is discharged in good and stable condition with close outpatient follow-up.    SPECIFIC OUTPATIENT FOLLOW-UP  Follow-up with PCP in one week  Follow-up with cardiologist in 1 week as per recommendations.    DISCHARGE PROBLEM LIST  Principal Problem:    Dyspnea POA: Yes  Active Problems:    Elevated troponin POA: Yes    CAD (coronary artery disease) - CABG Nov. 2004 POA: Yes    HTN (hypertension) POA: Yes    Hypokalemia POA: Yes    DM (diabetes mellitus) (CMS-[facility]) POA: Yes    Dyslipidemia, goal LDL below 70 POA: Yes  Resolved Problems:    * No resolved hospital problems. *      FOLLOW UP  Future Appointments  Date Time Provider Department Center   11/20/2017 9:00 AM Leonie Duque MD,FACC [facility] None     No follow-up provider specified.    MEDICATIONS ON DISCHARGE   Julien Dao   Home Medication Instructions [id number]:[id number]    Printed on:11/18/17 1235   Medication Information                      amlodipine (NORVASC) 5 MG TABS  Take 1 Tab by mouth 2 Times a Day.              aspirin 81 MG tablet  Take 1 Tab by mouth every day.             atorvastatin (LIPITOR) 40 MG Tab  Take 1 Tab by mouth every bedtime.             carboxymethylcellulose (REFRESH PLUS) 0.5 % Solution  Place 1 Drop in both eyes 4 times a day.             chlorthalidone (HYGROTON) 25 MG Tab  Take 25 mg by mouth 2 Times a Day.             cyclosporin (RESTASIS) 0.05 % ophthalmic emulsion  Place 1 Drop in both eyes 2 times a day.             Lifitegrast (XIIDRA) 5 % Solution  Place 1 Drop in both eyes 2 Times a Day.             metformin (GLUCOPHAGE) 500 MG TABS  Take 500 mg by mouth 2 times a day, with meals.             metoprolol (LOPRESSOR) 50 MG Tab  Take 50 mg by mouth 2 times a day.             Multiple Vitamins-Minerals (PRESERVISION AREDS 2) Cap  Take 1 Cap by mouth 2 Times a Day.             Multiple Vitamins-Minerals (PX MENS MULTIVITAMINS) Tab  Take 1 Tab by mouth every day.             omega-3 acid ethyl esters (LOVAZA) 1 GM capsule  Take 2 Caps by mouth 2 Times a Day.             potassium chloride SA (K-DUR) 10 MEQ TBCR  Take 1 Tab by mouth every day.             pregabalin (LYRICA) 50 MG capsule  Take 1 Cap by mouth 3 times a day.             Specialty Vitamins Products (PROSTATE) Tab  Take 1 Tab by mouth every day.                 DIET  Orders Placed This Encounter   Procedures   • DIET ORDER     Standing Status:   Standing     Number of Occurrences:   1     Order Specific Question:   Diet:     Answer:   Regular [1]     Order Specific Question:   Miscellaneous modifications:     Answer:   No Decaf, No Caffeine(for test) [11]     Comments:   Protocol 1313 Patient to have no caffeine for 12 hours prior to exam (decaf, coffee, cola, tea, chocolate)   • DISCONTINUE DIET TRAY     Standing Status:   Standing     Number of Occurrences:   1       ACTIVITY  As tolerated.  Weight bearing as tolerated      CONSULTATIONS  Cardiology     PROCEDURES  Nuclear perfusion test of the myocardium showed no evidence of  myocardial infarction with normal left ventricular size and ejection fraction with no acute ischemia.    LABORATORY  Lab Results   Component Value Date/Time    SODIUM 134 (L) 11/18/2017 12:17 AM    POTASSIUM 3.0 (L) 11/18/2017 12:17 AM    CHLORIDE 97 11/18/2017 12:17 AM    CO2 26 11/18/2017 12:17 AM    GLUCOSE 196 (H) 11/18/2017 12:17 AM    BUN 20 11/18/2017 12:17 AM    CREATININE 0.86 11/18/2017 12:17 AM    CREATININE 1.0 12/02/2008 07:26 AM        Lab Results   Component Value Date/Time    WBC 9.5 11/18/2017 12:17 AM    HEMOGLOBIN 15.5 11/18/2017 12:17 AM    HEMATOCRIT 43.0 11/18/2017 12:17 AM    PLATELETCT 197 11/18/2017 12:17 AM        Total time of the discharge process exceeds 38 minutes

## 2017-11-18 NOTE — PROGRESS NOTES
Patient and staff reviews discharge instructions, including f/u appt with Dr. Duque, Monday, November 20 at 0900. Saline lock and telemetry monitor removed for upcoming discharge this afternoon.

## 2017-11-18 NOTE — PROGRESS NOTES
Report received from Reshma day-shift RN. Pt POC discussed, pt resting comfortably in bed, all safety precautions in place.

## 2017-11-18 NOTE — ASSESSMENT & PLAN NOTE
Check ddimer  Concern of possible cardiac of nature given troponin elevation and EKG changes.  CXR unremarkable.

## 2017-11-18 NOTE — DISCHARGE PLANNING
Medical Social Work    Referral: GALILEA reviewed the chart this AM.      Intervention: Per flowsheet, pt lives alone and expects to d.c home.  No SS or DC needs at this time.      Plan: GALILEA Ty available to assist with any d.c planning.      Care Transition Team Assessment    Information Source  Information Given By: Patient    Readmission Evaluation  Is this a readmission?: No    Elopement Risk  Legal Hold: No  Ambulatory or Self Mobile in Wheelchair: Yes  Disoriented: No  Psychiatric Symptoms: None  History of Wandering: No  Elopement this Admit: No  Vocalizing Wanting to Leave: No  Displays Behaviors, Body Language Wanting to Leave: No-Not at Risk for Elopement  Elopement Risk: Not at Risk for Elopement    Interdisciplinary Discharge Planning  Does Admitting Nurse Feel This Could be a Complex Discharge?: No  Primary Care Physician: Dr. Inna Rebolledo  Lives with - Patient's Self Care Capacity: Alone and Able to Care For Self  Patient or legal guardian wants to designate a caregiver (see row info): No  Support Systems: None, Friends / Neighbors  Housing / Facility: [address]  Do You Take your Prescribed Medications Regularly: Yes  Able to Return to Previous ADL's: Yes  Mobility Issues: No  Prior Services: None  Patient Expects to be Discharged to:: home  Assistance Needed: No  Durable Medical Equipment: Not Applicable    Discharge Preparedness  What is your plan after discharge?: Uncertain - pending medical team collaboration  What are your discharge supports?: Other (comment) (Friend)              Vision / Hearing Impairment  Vision Impairment : Yes  Hearing Impairment : No    Values / Beliefs / Concerns  Values / Beliefs Concerns : No    Advance Directive  Advance Directive?: None    Domestic Abuse  Have you ever been the victim of abuse or violence?: No  Physical Abuse or Sexual Abuse: No  Verbal Abuse or Emotional Abuse: No  Possible Abuse Reported to:: Not Applicable    Psychological Assessment  History of  Substance Abuse: None

## 2017-11-18 NOTE — CARE PLAN
Problem: Safety  Goal: Will remain free from falls  Outcome: PROGRESSING AS EXPECTED  Pt educated on call light, fall precautions in place, treaded slipper socks on pt    Problem: Knowledge Deficit  Goal: Knowledge of disease process/condition, treatment plan, diagnostic tests, and medications will improve  Outcome: PROGRESSING AS EXPECTED  POC discussed at the bedside with pt, all pt questions answered and concerns addressed

## 2017-11-19 ENCOUNTER — PATIENT OUTREACH (OUTPATIENT)
Dept: HEALTH INFORMATION MANAGEMENT | Facility: OTHER | Age: 82
End: 2017-11-19

## 2017-11-20 ENCOUNTER — OFFICE VISIT (OUTPATIENT)
Dept: CARDIOLOGY | Facility: MEDICAL CENTER | Age: 82
End: 2017-11-20
Payer: MEDICARE

## 2017-11-20 VITALS
HEART RATE: 68 BPM | SYSTOLIC BLOOD PRESSURE: 110 MMHG | BODY MASS INDEX: 28.19 KG/M2 | WEIGHT: 179.6 LBS | OXYGEN SATURATION: 90 % | DIASTOLIC BLOOD PRESSURE: 60 MMHG | HEIGHT: 67 IN

## 2017-11-20 DIAGNOSIS — E11.8 CONTROLLED TYPE 2 DIABETES MELLITUS WITH COMPLICATION, WITH LONG-TERM CURRENT USE OF INSULIN (HCC): ICD-10-CM

## 2017-11-20 DIAGNOSIS — I25.10 CORONARY ARTERY DISEASE DUE TO LIPID RICH PLAQUE: ICD-10-CM

## 2017-11-20 DIAGNOSIS — Z95.1 HX OF CABG: ICD-10-CM

## 2017-11-20 DIAGNOSIS — I25.83 CORONARY ARTERY DISEASE DUE TO LIPID RICH PLAQUE: ICD-10-CM

## 2017-11-20 DIAGNOSIS — I48.92 ATRIAL FLUTTER, UNSPECIFIED TYPE (HCC): ICD-10-CM

## 2017-11-20 DIAGNOSIS — I10 ESSENTIAL HYPERTENSION: ICD-10-CM

## 2017-11-20 DIAGNOSIS — Z79.4 CONTROLLED TYPE 2 DIABETES MELLITUS WITH COMPLICATION, WITH LONG-TERM CURRENT USE OF INSULIN (HCC): ICD-10-CM

## 2017-11-20 LAB — EKG IMPRESSION: NORMAL

## 2017-11-20 PROCEDURE — 93000 ELECTROCARDIOGRAM COMPLETE: CPT | Performed by: INTERNAL MEDICINE

## 2017-11-20 PROCEDURE — 99204 OFFICE O/P NEW MOD 45 MIN: CPT | Performed by: INTERNAL MEDICINE

## 2017-11-20 RX ORDER — SIMVASTATIN 10 MG
10 TABLET ORAL NIGHTLY
Status: SHIPPED | COMMUNITY
End: 2021-06-20

## 2017-11-20 NOTE — LETTER
[facility]   [address]   [address]  [place] [address]  Phone: [phone]  Fax: [phone]              Julien Dao  3/7/1929    Encounter Date: 11/20/2017    JONO Powell    Thank you for the referral. I had the pleasure of seeing Julien Dao today in cardiology clinic. I've attached my visit note below. If you have any questions please feel free to give me a call anytime.      Leonie Duque MD, PhD, Formerly [facility]  Cardiology and Lipidology  [facility]                                                                    PROGRESS NOTE:  Cardiology Consult Note:    Leonie Duque  Date & Time note created:    11/20/2017   9:23 AM       Patient ID:  Name:             Julien Dao     YOB: 1929  Age:                 88 y.o.  male   MRN:               [id number]                                                             Chief Complaint:   Chief Complaint   Patient presents with   • Follow-Up             History of Present Illness:   Julien Dao has recent  Hospitalization; 88 y.o. male here with Chest pressure and shortness of breath; Mild Troponin elevation;  No CP now; no more back pain;   11/18/2017: NUCLEAR IMAGING INTERPRETATION   Negative stress ECG for ischemia.   No evidence of significant jeopardized viable myocardium or prior myocardial  infarction.   Normal myocardial perfusion smal and mild ischemia of apical anterior segment.   Normal left ventricular size, ejection fraction, and wall motion.   Normal left ventricular wall motion.  LV ejection fraction = 72%.   ECG INTERPRETATION   Negative stress ECG for ischemia.    Decision was made to treat medically, OMT and monitor    Review of Systems:     Constitutional: Denies fevers, Denies weight changes  Eyes: Denies changes in vision, no eye pain  Ears/Nose/Throat/Mouth: Denies nasal congestion or sore throat   Cardiovascular:  Denies chest pain or palpitations   Respiratory: Denies shortness of breath , Denies cough  Gastrointestinal/Hepatic: Denies abdominal pain, nausea, vomiting, diarrhea, constipation or GI bleeding   Genitourinary: Denies bladder dysfunction, dysuria or frequency  Musculoskeletal/Rheum: Denies  joint pain and swelling   Skin/Breast: Denies rash, denies breast lumps or discharge  Neurological: Denies headache, confusion, memory loss or focal weakness/parasthesias  Psychiatric: denies mood disorder   Endocrine: denies hx of diabetes or thyroid dysfunction  Heme/Oncology/Lymph Nodes: Denies enlarged lymph nodes, denies bruising or known bleeding disorder  Allergic/Immunologic: Denies hx of allergies      All other systems were reviewed and are negative (AMA/CMS criteria)    Past Medical History:   Past Medical History:   Diagnosis Date   • CAD (coronary artery disease)    • DM (diabetes mellitus) (CMS-HCC) 1/7/2014   • Falls    • Hypertension          Past Surgical History:  Past Surgical History:   Procedure Laterality Date   • CORONARY ARTERY BYPASS, 3     • OTHER CARDIAC SURGERY     • TED RECTAL ABSCESS         Hospital Medications:    Current Outpatient Prescriptions:   •  simvastatin (ZOCOR) 10 MG Tab, Take 10 mg by mouth every evening., Disp: , Rfl:   •  Lifitegrast (XIIDRA) 5 % Solution, Place 1 Drop in both eyes 2 Times a Day., Disp: , Rfl:   •  carboxymethylcellulose (REFRESH PLUS) 0.5 % Solution, Place 1 Drop in both eyes 4 times a day., Disp: , Rfl:   •  cyclosporin (RESTASIS) 0.05 % ophthalmic emulsion, Place 1 Drop in both eyes 2 times a day., Disp: , Rfl:   •  pregabalin (LYRICA) 50 MG capsule, Take 1 Cap by mouth 3 times a day., Disp: 90 Cap, Rfl: 11  •  metoprolol (LOPRESSOR) 50 MG Tab, Take 50 mg by mouth 2 times a day., Disp: , Rfl:   •  chlorthalidone (HYGROTON) 25 MG Tab, Take 25 mg by mouth 2 Times a Day., Disp: , Rfl:   •  Multiple Vitamins-Minerals (PX MENS MULTIVITAMINS) Tab, Take 1 Tab by mouth  every day., Disp: , Rfl:   •  Specialty Vitamins Products (PROSTATE) Tab, Take 1 Tab by mouth every day., Disp: , Rfl:   •  Multiple Vitamins-Minerals (PRESERVISION AREDS 2) Cap, Take 1 Cap by mouth 2 Times a Day., Disp: , Rfl:   •  metformin (GLUCOPHAGE) 500 MG TABS, Take 500 mg by mouth 2 times a day, with meals., Disp: , Rfl:   •  omega-3 acid ethyl esters (LOVAZA) 1 GM capsule, Take 2 Caps by mouth 2 Times a Day., Disp: 360 Cap, Rfl: 1  •  potassium chloride SA (K-DUR) 10 MEQ TBCR, Take 1 Tab by mouth every day., Disp: 90 Each, Rfl: 3  •  amlodipine (NORVASC) 5 MG TABS, Take 1 Tab by mouth 2 Times a Day., Disp: 180 Tab, Rfl: 3  •  aspirin 81 MG tablet, Take 1 Tab by mouth every day., Disp: 100 Each, Rfl: 3  •  atorvastatin (LIPITOR) 40 MG Tab, Take 1 Tab by mouth every bedtime., Disp: 30 Tab, Rfl: 0    Current Outpatient Medications:  Current Outpatient Prescriptions   Medication Sig Dispense Refill   • simvastatin (ZOCOR) 10 MG Tab Take 10 mg by mouth every evening.     • Lifitegrast (XIIDRA) 5 % Solution Place 1 Drop in both eyes 2 Times a Day.     • carboxymethylcellulose (REFRESH PLUS) 0.5 % Solution Place 1 Drop in both eyes 4 times a day.     • cyclosporin (RESTASIS) 0.05 % ophthalmic emulsion Place 1 Drop in both eyes 2 times a day.     • pregabalin (LYRICA) 50 MG capsule Take 1 Cap by mouth 3 times a day. 90 Cap 11   • metoprolol (LOPRESSOR) 50 MG Tab Take 50 mg by mouth 2 times a day.     • chlorthalidone (HYGROTON) 25 MG Tab Take 25 mg by mouth 2 Times a Day.     • Multiple Vitamins-Minerals (PX MENS MULTIVITAMINS) Tab Take 1 Tab by mouth every day.     • Specialty Vitamins Products (PROSTATE) Tab Take 1 Tab by mouth every day.     • Multiple Vitamins-Minerals (PRESERVISION AREDS 2) Cap Take 1 Cap by mouth 2 Times a Day.     • metformin (GLUCOPHAGE) 500 MG TABS Take 500 mg by mouth 2 times a day, with meals.     • omega-3 acid ethyl esters (LOVAZA) 1 GM capsule Take 2 Caps by mouth 2 Times a Day. 360  "Cap 1   • potassium chloride SA (K-DUR) 10 MEQ TBCR Take 1 Tab by mouth every day. 90 Each 3   • amlodipine (NORVASC) 5 MG TABS Take 1 Tab by mouth 2 Times a Day. 180 Tab 3   • aspirin 81 MG tablet Take 1 Tab by mouth every day. 100 Each 3   • atorvastatin (LIPITOR) 40 MG Tab Take 1 Tab by mouth every bedtime. 30 Tab 0     No current facility-administered medications for this visit.          Medication Allergy/Sensitivities:  Allergies   Allergen Reactions   • Iodine        Family History:  History reviewed. No pertinent family history.    Social History:  Social History     Social History   • Marital status:      Spouse name: N/A   • Number of children: N/A   • Years of education: N/A     Occupational History   • Not on file.     Social History Main Topics   • Smoking status: Former Smoker     Quit date: 10/19/1972   • Smokeless tobacco: Never Used   • Alcohol use No   • Drug use: No   • Sexual activity: Not on file     Other Topics Concern   • Not on file     Social History Narrative   • No narrative on file         Physical Exam:  Vitals  Weight/BMI: Body mass index is 28.12 kg/m².  Blood pressure 110/60, pulse 68, height 1.702 m (5' 7.01\"), weight 81.5 kg (179 lb 9.6 oz), SpO2 90 %.  Vitals:    11/20/17 0851   BP: 110/60   Pulse: 68   SpO2: 90%   Weight: 81.5 kg (179 lb 9.6 oz)   Height: 1.702 m (5' 7.01\")     Oxygen Therapy:  Pulse Oximetry: 90 %  General Appearance:   Well developed, Well nourished, No acute distress, Non-toxic appearance.   HENT:  Normocephalic, Atraumatic, Oropharynx moist mucous membranes, Dentition: , Nose normal.    Eyes:  PERRLA, EOMI, Conjunctiva normal, No discharge.  Neck:  Normal range of motion, No cervical tenderness, Supple, No stridor, no JVD .  No thyromegaly.  No carotid bruit.  Cardiovascular:  Normal heart rate, Normal rhythm,  S1, S2, no S3, S4; No gallops; No murmurs, No rubs, .   Extremitites with intact distal pulses, no cyanosis, clubbing or edema.  No heaves, " thrills, HJR;  Peripheral pulses: carotid 2+, brachial 2+, radial 2+, ulnar 2+, femoral 2+, popliteal 2+, PT 2+, DP 2+;  Lungs:  Respiratory effort is normal. Normal breath sounds, breath sounds clear to auscultation bilaterally,  no rales, no rhonchi, no wheezing.   Abdomen: Bowel sounds normal, Soft, No tenderness, No guarding, No rebound, No masses, No hepatosplenomegaly.  Skin: Warm, Dry, No erythema, No rash, no induration or crepitus.  Neurologic: Alert & oriented x 3, Normal motor function, Normal sensory function, No focal deficits noted, cranial nerves II through XII are normal,  normal gait.  Psychiatric: Affect normal, Judgment normal, Mood normal.      Data Review:     Records reviewed and summarized in current documentation    Lab Data Review:  Lab Results   Component Value Date/Time    SODIUM 134 (L) 11/18/2017 12:17 AM    POTASSIUM 3.0 (L) 11/18/2017 12:17 AM    CHLORIDE 97 11/18/2017 12:17 AM    CO2 26 11/18/2017 12:17 AM    GLUCOSE 196 (H) 11/18/2017 12:17 AM    BUN 20 11/18/2017 12:17 AM    CREATININE 0.86 11/18/2017 12:17 AM    CREATININE 1.0 12/02/2008 07:26 AM      Lab Results   Component Value Date/Time    PROTHROMBTM 12.8 10/14/2011 08:20 PM    INR 1.07 10/14/2011 08:20 PM      Lab Results   Component Value Date/Time    WBC 9.5 11/18/2017 12:17 AM    RBC 4.86 11/18/2017 12:17 AM    HEMOGLOBIN 15.5 11/18/2017 12:17 AM    HEMATOCRIT 43.0 11/18/2017 12:17 AM    MCV 88.5 11/18/2017 12:17 AM    MCH 31.9 11/18/2017 12:17 AM    MCHC 36.0 (H) 11/18/2017 12:17 AM    MPV 10.6 11/18/2017 12:17 AM    NEUTSPOLYS 60.90 11/18/2017 12:17 AM    LYMPHOCYTES 24.60 11/18/2017 12:17 AM    MONOCYTES 10.00 11/18/2017 12:17 AM    EOSINOPHILS 3.50 11/18/2017 12:17 AM    BASOPHILS 0.70 11/18/2017 12:17 AM        Imaging/Procedures Review:    To my review shows:see above    EKG To my review shows:Nahomy 4:1    Assessment and Plan.   88 y.o. male has CAD and old MI with 3vCABG with small apical anterior ischemia;  Optioned to treat medically with close monitoring; No cardiac sx's at this time.   Discussed CV risk and proposed txmt; Will check FLP and aim for OMT, LDL<55  Other med regimen reviewed    AFlutter with high CVA risk  Will start Xarelto 15 mg/d; Sample Rx to [facility]    Return to clinic in  1  months  1. Coronary artery disease due to lipid rich plaque     2. Controlled type 2 diabetes mellitus with complication, with long-term current use of insulin (CMS-[facility])     3. Essential hypertension     4. Hx of CABG      3vCABG 2004         TABITHA Powell.  [address] [facility] [address]  VIA Facsimile: [phone]

## 2017-11-20 NOTE — PROGRESS NOTES
Cardiology Consult Note:    Leonie Duque  Date & Time note created:    11/20/2017   9:23 AM       Patient ID:  Name:             Julien Dao     YOB: 1929  Age:                 88 y.o.  male   MRN:               [id number]                                                             Chief Complaint:   Chief Complaint   Patient presents with   • Follow-Up             History of Present Illness:   Julien Dao has recent  Hospitalization; 88 y.o. male here with Chest pressure and shortness of breath; Mild Troponin elevation;  No CP now; no more back pain;   11/18/2017: NUCLEAR IMAGING INTERPRETATION   Negative stress ECG for ischemia.   No evidence of significant jeopardized viable myocardium or prior myocardial  infarction.   Normal myocardial perfusion smal and mild ischemia of apical anterior segment.   Normal left ventricular size, ejection fraction, and wall motion.   Normal left ventricular wall motion.  LV ejection fraction = 72%.   ECG INTERPRETATION   Negative stress ECG for ischemia.    Decision was made to treat medically, OMT and monitor    Review of Systems:     Constitutional: Denies fevers, Denies weight changes  Eyes: Denies changes in vision, no eye pain  Ears/Nose/Throat/Mouth: Denies nasal congestion or sore throat   Cardiovascular: Denies chest pain or palpitations   Respiratory: Denies shortness of breath , Denies cough  Gastrointestinal/Hepatic: Denies abdominal pain, nausea, vomiting, diarrhea, constipation or GI bleeding   Genitourinary: Denies bladder dysfunction, dysuria or frequency  Musculoskeletal/Rheum: Denies  joint pain and swelling   Skin/Breast: Denies rash, denies breast lumps or discharge  Neurological: Denies headache, confusion, memory loss or focal weakness/parasthesias  Psychiatric: denies mood disorder   Endocrine: denies hx of diabetes or thyroid dysfunction  Heme/Oncology/Lymph Nodes: Denies enlarged lymph nodes, denies bruising or known bleeding  disorder  Allergic/Immunologic: Denies hx of allergies      All other systems were reviewed and are negative (AMA/CMS criteria)    Past Medical History:   Past Medical History:   Diagnosis Date   • CAD (coronary artery disease)    • DM (diabetes mellitus) (CMS-HCC) 1/7/2014   • Falls    • Hypertension          Past Surgical History:  Past Surgical History:   Procedure Laterality Date   • CORONARY ARTERY BYPASS, 3     • OTHER CARDIAC SURGERY     • TED RECTAL ABSCESS         Hospital Medications:    Current Outpatient Prescriptions:   •  simvastatin (ZOCOR) 10 MG Tab, Take 10 mg by mouth every evening., Disp: , Rfl:   •  Lifitegrast (XIIDRA) 5 % Solution, Place 1 Drop in both eyes 2 Times a Day., Disp: , Rfl:   •  carboxymethylcellulose (REFRESH PLUS) 0.5 % Solution, Place 1 Drop in both eyes 4 times a day., Disp: , Rfl:   •  cyclosporin (RESTASIS) 0.05 % ophthalmic emulsion, Place 1 Drop in both eyes 2 times a day., Disp: , Rfl:   •  pregabalin (LYRICA) 50 MG capsule, Take 1 Cap by mouth 3 times a day., Disp: 90 Cap, Rfl: 11  •  metoprolol (LOPRESSOR) 50 MG Tab, Take 50 mg by mouth 2 times a day., Disp: , Rfl:   •  chlorthalidone (HYGROTON) 25 MG Tab, Take 25 mg by mouth 2 Times a Day., Disp: , Rfl:   •  Multiple Vitamins-Minerals (PX MENS MULTIVITAMINS) Tab, Take 1 Tab by mouth every day., Disp: , Rfl:   •  Specialty Vitamins Products (PROSTATE) Tab, Take 1 Tab by mouth every day., Disp: , Rfl:   •  Multiple Vitamins-Minerals (PRESERVISION AREDS 2) Cap, Take 1 Cap by mouth 2 Times a Day., Disp: , Rfl:   •  metformin (GLUCOPHAGE) 500 MG TABS, Take 500 mg by mouth 2 times a day, with meals., Disp: , Rfl:   •  omega-3 acid ethyl esters (LOVAZA) 1 GM capsule, Take 2 Caps by mouth 2 Times a Day., Disp: 360 Cap, Rfl: 1  •  potassium chloride SA (K-DUR) 10 MEQ TBCR, Take 1 Tab by mouth every day., Disp: 90 Each, Rfl: 3  •  amlodipine (NORVASC) 5 MG TABS, Take 1 Tab by mouth 2 Times a Day., Disp: 180 Tab, Rfl: 3  •   aspirin 81 MG tablet, Take 1 Tab by mouth every day., Disp: 100 Each, Rfl: 3  •  atorvastatin (LIPITOR) 40 MG Tab, Take 1 Tab by mouth every bedtime., Disp: 30 Tab, Rfl: 0    Current Outpatient Medications:  Current Outpatient Prescriptions   Medication Sig Dispense Refill   • simvastatin (ZOCOR) 10 MG Tab Take 10 mg by mouth every evening.     • Lifitegrast (XIIDRA) 5 % Solution Place 1 Drop in both eyes 2 Times a Day.     • carboxymethylcellulose (REFRESH PLUS) 0.5 % Solution Place 1 Drop in both eyes 4 times a day.     • cyclosporin (RESTASIS) 0.05 % ophthalmic emulsion Place 1 Drop in both eyes 2 times a day.     • pregabalin (LYRICA) 50 MG capsule Take 1 Cap by mouth 3 times a day. 90 Cap 11   • metoprolol (LOPRESSOR) 50 MG Tab Take 50 mg by mouth 2 times a day.     • chlorthalidone (HYGROTON) 25 MG Tab Take 25 mg by mouth 2 Times a Day.     • Multiple Vitamins-Minerals (PX MENS MULTIVITAMINS) Tab Take 1 Tab by mouth every day.     • Specialty Vitamins Products (PROSTATE) Tab Take 1 Tab by mouth every day.     • Multiple Vitamins-Minerals (PRESERVISION AREDS 2) Cap Take 1 Cap by mouth 2 Times a Day.     • metformin (GLUCOPHAGE) 500 MG TABS Take 500 mg by mouth 2 times a day, with meals.     • omega-3 acid ethyl esters (LOVAZA) 1 GM capsule Take 2 Caps by mouth 2 Times a Day. 360 Cap 1   • potassium chloride SA (K-DUR) 10 MEQ TBCR Take 1 Tab by mouth every day. 90 Each 3   • amlodipine (NORVASC) 5 MG TABS Take 1 Tab by mouth 2 Times a Day. 180 Tab 3   • aspirin 81 MG tablet Take 1 Tab by mouth every day. 100 Each 3   • atorvastatin (LIPITOR) 40 MG Tab Take 1 Tab by mouth every bedtime. 30 Tab 0     No current facility-administered medications for this visit.          Medication Allergy/Sensitivities:  Allergies   Allergen Reactions   • Iodine        Family History:  History reviewed. No pertinent family history.    Social History:  Social History     Social History   • Marital status:      Spouse name:  "N/A   • Number of children: N/A   • Years of education: N/A     Occupational History   • Not on file.     Social History Main Topics   • Smoking status: Former Smoker     Quit date: 10/19/1972   • Smokeless tobacco: Never Used   • Alcohol use No   • Drug use: No   • Sexual activity: Not on file     Other Topics Concern   • Not on file     Social History Narrative   • No narrative on file         Physical Exam:  Vitals  Weight/BMI: Body mass index is 28.12 kg/m².  Blood pressure 110/60, pulse 68, height 1.702 m (5' 7.01\"), weight 81.5 kg (179 lb 9.6 oz), SpO2 90 %.  Vitals:    11/20/17 0851   BP: 110/60   Pulse: 68   SpO2: 90%   Weight: 81.5 kg (179 lb 9.6 oz)   Height: 1.702 m (5' 7.01\")     Oxygen Therapy:  Pulse Oximetry: 90 %  General Appearance:   Well developed, Well nourished, No acute distress, Non-toxic appearance.   HENT:  Normocephalic, Atraumatic, Oropharynx moist mucous membranes, Dentition: , Nose normal.    Eyes:  PERRLA, EOMI, Conjunctiva normal, No discharge.  Neck:  Normal range of motion, No cervical tenderness, Supple, No stridor, no JVD .  No thyromegaly.  No carotid bruit.  Cardiovascular:  Normal heart rate, Normal rhythm,  S1, S2, no S3, S4; No gallops; No murmurs, No rubs, .   Extremitites with intact distal pulses, no cyanosis, clubbing or edema.  No heaves, thrills, HJR;  Peripheral pulses: carotid 2+, brachial 2+, radial 2+, ulnar 2+, femoral 2+, popliteal 2+, PT 2+, DP 2+;  Lungs:  Respiratory effort is normal. Normal breath sounds, breath sounds clear to auscultation bilaterally,  no rales, no rhonchi, no wheezing.   Abdomen: Bowel sounds normal, Soft, No tenderness, No guarding, No rebound, No masses, No hepatosplenomegaly.  Skin: Warm, Dry, No erythema, No rash, no induration or crepitus.  Neurologic: Alert & oriented x 3, Normal motor function, Normal sensory function, No focal deficits noted, cranial nerves II through XII are normal,  normal gait.  Psychiatric: Affect normal, " Judgment normal, Mood normal.      Data Review:     Records reviewed and summarized in current documentation    Lab Data Review:  Lab Results   Component Value Date/Time    SODIUM 134 (L) 11/18/2017 12:17 AM    POTASSIUM 3.0 (L) 11/18/2017 12:17 AM    CHLORIDE 97 11/18/2017 12:17 AM    CO2 26 11/18/2017 12:17 AM    GLUCOSE 196 (H) 11/18/2017 12:17 AM    BUN 20 11/18/2017 12:17 AM    CREATININE 0.86 11/18/2017 12:17 AM    CREATININE 1.0 12/02/2008 07:26 AM      Lab Results   Component Value Date/Time    PROTHROMBTM 12.8 10/14/2011 08:20 PM    INR 1.07 10/14/2011 08:20 PM      Lab Results   Component Value Date/Time    WBC 9.5 11/18/2017 12:17 AM    RBC 4.86 11/18/2017 12:17 AM    HEMOGLOBIN 15.5 11/18/2017 12:17 AM    HEMATOCRIT 43.0 11/18/2017 12:17 AM    MCV 88.5 11/18/2017 12:17 AM    MCH 31.9 11/18/2017 12:17 AM    MCHC 36.0 (H) 11/18/2017 12:17 AM    MPV 10.6 11/18/2017 12:17 AM    NEUTSPOLYS 60.90 11/18/2017 12:17 AM    LYMPHOCYTES 24.60 11/18/2017 12:17 AM    MONOCYTES 10.00 11/18/2017 12:17 AM    EOSINOPHILS 3.50 11/18/2017 12:17 AM    BASOPHILS 0.70 11/18/2017 12:17 AM        Imaging/Procedures Review:    To my review shows:see above    EKG To my review shows:AFlfoster 4:1    Assessment and Plan.   88 y.o. male has CAD and old MI with 3vCABG with small apical anterior ischemia; Optioned to treat medically with close monitoring; No cardiac sx's at this time.   Discussed CV risk and proposed txmt; Will check FLP and aim for OMT, LDL<55  Other med regimen reviewed    Nahomy with high CVA risk  Will start Xarelto 15 mg/d; [facility]    Return to clinic in  1  months  1. Coronary artery disease due to lipid rich plaque     2. Controlled type 2 diabetes mellitus with complication, with long-term current use of insulin (CMS-HCC)     3. Essential hypertension     4. Hx of CABG      3vCABG 2004

## 2017-11-21 DIAGNOSIS — I48.92 ATRIAL FLUTTER, UNSPECIFIED TYPE (HCC): ICD-10-CM

## 2018-02-23 ENCOUNTER — HOSPITAL ENCOUNTER (EMERGENCY)
Facility: MEDICAL CENTER | Age: 83
End: 2018-02-23
Attending: EMERGENCY MEDICINE
Payer: MEDICARE

## 2018-02-23 ENCOUNTER — APPOINTMENT (OUTPATIENT)
Dept: RADIOLOGY | Facility: MEDICAL CENTER | Age: 83
End: 2018-02-23
Attending: EMERGENCY MEDICINE
Payer: MEDICARE

## 2018-02-23 VITALS
TEMPERATURE: 98.5 F | BODY MASS INDEX: 27.47 KG/M2 | DIASTOLIC BLOOD PRESSURE: 100 MMHG | RESPIRATION RATE: 16 BRPM | HEART RATE: 72 BPM | HEIGHT: 67 IN | SYSTOLIC BLOOD PRESSURE: 159 MMHG | WEIGHT: 175 LBS

## 2018-02-23 DIAGNOSIS — S01.01XA LACERATION OF SCALP, INITIAL ENCOUNTER: ICD-10-CM

## 2018-02-23 PROCEDURE — 303485 HCHG DRESSING MEDIUM

## 2018-02-23 PROCEDURE — 303747 HCHG EXTRA SUTURE

## 2018-02-23 PROCEDURE — 700101 HCHG RX REV CODE 250: Performed by: EMERGENCY MEDICINE

## 2018-02-23 PROCEDURE — 304999 HCHG REPAIR-SIMPLE/INTERMED LEVEL 1

## 2018-02-23 PROCEDURE — 304217 HCHG IRRIGATION SYSTEM

## 2018-02-23 PROCEDURE — A6403 STERILE GAUZE>16 <= 48 SQ IN: HCPCS

## 2018-02-23 PROCEDURE — 99284 EMERGENCY DEPT VISIT MOD MDM: CPT

## 2018-02-23 PROCEDURE — 70450 CT HEAD/BRAIN W/O DYE: CPT

## 2018-02-23 RX ORDER — LIDOCAINE HCL/EPINEPHRINE/PF 2%-1:200K
10 VIAL (ML) INJECTION ONCE
Status: COMPLETED | OUTPATIENT
Start: 2018-02-23 | End: 2018-02-23

## 2018-02-23 RX ADMIN — LIDOCAINE HYDROCHLORIDE,EPINEPHRINE BITARTRATE 10 ML: 20; .005 INJECTION, SOLUTION EPIDURAL; INFILTRATION; INTRACAUDAL; PERINEURAL at 14:30

## 2018-02-23 NOTE — ED TRIAGE NOTES
While opening the fridge a glass vase fell from the top onto head. Laceration to forehead .-loc. +blood thinners.

## 2018-02-24 ENCOUNTER — PATIENT OUTREACH (OUTPATIENT)
Dept: HEALTH INFORMATION MANAGEMENT | Facility: OTHER | Age: 83
End: 2018-02-24

## 2018-02-24 ENCOUNTER — HOSPITAL ENCOUNTER (EMERGENCY)
Facility: MEDICAL CENTER | Age: 83
End: 2018-02-24
Attending: EMERGENCY MEDICINE
Payer: MEDICARE

## 2018-02-24 VITALS
WEIGHT: 180.78 LBS | OXYGEN SATURATION: 94 % | HEART RATE: 71 BPM | RESPIRATION RATE: 16 BRPM | TEMPERATURE: 98.8 F | BODY MASS INDEX: 28.37 KG/M2 | HEIGHT: 67 IN | SYSTOLIC BLOOD PRESSURE: 134 MMHG | DIASTOLIC BLOOD PRESSURE: 77 MMHG

## 2018-02-24 DIAGNOSIS — S01.81XD LACERATION OF FOREHEAD, SUBSEQUENT ENCOUNTER: ICD-10-CM

## 2018-02-24 PROCEDURE — 304999 HCHG REPAIR-SIMPLE/INTERMED LEVEL 1

## 2018-02-24 PROCEDURE — 303747 HCHG EXTRA SUTURE

## 2018-02-24 PROCEDURE — 99283 EMERGENCY DEPT VISIT LOW MDM: CPT

## 2018-02-24 ASSESSMENT — PAIN SCALES - GENERAL: PAINLEVEL_OUTOF10: 4

## 2018-02-24 NOTE — ED PROVIDER NOTES
"ED Provider Note    CHIEF COMPLAINT  Chief Complaint   Patient presents with   • Head Laceration     glass vase fell onto head        HPI  Julien Dao is a 88 y.o. male who presents with head trauma. Reached up to pull a glass vase off of the refrigerator. It fell on his head. It bled profusely. He did not lose consciousness and was not dazed. He is on xarelto however. He has not had any weakness numbness neurologic symptoms. No nausea or vomiting. Bleeding is being controlled with a pressure dressing applied by the paramedics.    REVIEW OF SYSTEMS  As per HPI    Denies dizziness, lightheadedness, weakness    PAST MEDICAL HISTORY  Past Medical History:   Diagnosis Date   • CAD (coronary artery disease)    • DM (diabetes mellitus) (CMS-[facility]) 1/7/2014   • Falls    • Hypertension        FAMILY HISTORY  History reviewed. No pertinent family history.    SOCIAL HISTORY  Social History   Substance Use Topics   • Smoking status: Former Smoker     Quit date: 10/19/1972   • Smokeless tobacco: Never Used   • Alcohol use No       SURGICAL HISTORY  Past Surgical History:   Procedure Laterality Date   • CORONARY ARTERY BYPASS, 3     • OTHER CARDIAC SURGERY     • TED RECTAL ABSCESS         CURRENT MEDICATIONS  Home Medications    **Home medications have not yet been reviewed for this encounter**         ALLERGIES  Allergies   Allergen Reactions   • Iodine        PHYSICAL EXAM  VITAL SIGNS: /77   Pulse 68   Temp 36.9 °C (98.5 °F)   Resp 16   Ht 1.702 m (5' 7\")   Wt 79.4 kg (175 lb)   BMI 27.41 kg/m²    Constitutional: Elderly male  HENT: Midline frontal scalp laceration with pulsatile bleeding. Small associated hematoma  Eyes: PERRL, EOMI  Neck: Normal range of motion, No tenderness, Nexus negative  Cardiovascular: Normal heart rate, Normal rhythm  Thorax & Lungs: No respiratory distress  Extremities: Intact distal pulses, atraumatic, full range of motion  Neurologic: Alert, Normal motor function, Normal sensory " function, No focal deficits noted, normal gait    RADIOLOGY/PROCEDURES  CT-HEAD W/O   Final Result      Scalp laceration without evidence of skull fracture or intracranial hemorrhage.         Imaging is interpreted by radiologist    Laceration Repair Procedure Note    Indication: Laceration    Procedure: The patient was placed in the appropriate position and anesthesia around the laceration was obtained by infiltration using 1% Lidocaine with epinephrine. The area was then irrigated with normal saline. The laceration was closed with 5-0 Ethilon using interrupted sutures. There were no additional lacerations requiring repair. The wound area was then dressed with a sterile dressing.      Total repaired wound length: 3 cm.       COURSE & MEDICAL DECISION MAKING  Patient presents with minor mechanism injury to the head. He is neurologically intact. He is however on xarelto. CT scan was obtained and was negative. His wound bled briskly, but with closure and bleeding stopped. There does not appear to be any expanding hematoma. A pressure dressing was subsequently applied. He will be discharged to return to the ER in 1 week to have his sutures removed. He should return immediately for any headache or concerning symptoms.    FINAL IMPRESSION  1. Minor head injury  2. Scalp laceration  3. Chronic anticoagulation with xarelto      This dictation was created using voice recognition software. The accuracy of the dictation is limited to the abilities of the software. I expect there may be some errors of grammar and possibly content. The nursing notes were reviewed and certain aspects of this information were incorporated into this note.      Electronically signed by: Ramiro Childs, 2/23/2018 5:00 PM

## 2018-02-24 NOTE — DISCHARGE INSTRUCTIONS
Laceration Care, Adult  A laceration is a cut that goes through all layers of the skin. The cut also goes into the tissue that is right under the skin. Some cuts heal on their own. Others need to be closed with stitches (sutures), staples, skin adhesive strips, or wound glue. Taking care of your cut lowers your risk of infection and helps your cut to heal better.  HOW TO TAKE CARE OF YOUR CUT  For stitches or staples:  · Keep the wound clean and dry.  · If you were given a bandage (dressing), you should change it at least one time per day or as told by your doctor. You should also change it if it gets wet or dirty.  · Keep the wound completely dry for the first 24 hours or as told by your doctor. After that time, you may take a shower or a bath. However, make sure that the wound is not soaked in water until after the stitches or staples have been removed.  · Clean the wound one time each day or as told by your doctor:  ¨ Wash the wound with soap and water.  ¨ Rinse the wound with water until all of the soap comes off.  ¨ Pat the wound dry with a clean towel. Do not rub the wound.  · After you clean the wound, put a thin layer of antibiotic ointment on it as told by your doctor. This ointment:  ¨ Helps to prevent infection.  ¨ Keeps the bandage from sticking to the wound.  · Have your stitches or staples removed as told by your doctor.  If your doctor used skin adhesive strips:   · Keep the wound clean and dry.  · If you were given a bandage, you should change it at least one time per day or as told by your doctor. You should also change it if it gets dirty or wet.  · Do not get the skin adhesive strips wet. You can take a shower or a bath, but be careful to keep the wound dry.  · If the wound gets wet, pat it dry with a clean towel. Do not rub the wound.  · Skin adhesive strips fall off on their own. You can trim the strips as the wound heals. Do not remove any strips that are still stuck to the wound. They will  fall off after a while.  If your doctor used wound glue:  · Try to keep your wound dry, but you may briefly wet it in the shower or bath. Do not soak the wound in water, such as by swimming.  · After you take a shower or a bath, gently pat the wound dry with a clean towel. Do not rub the wound.  · Do not do any activities that will make you really sweaty until the skin glue has fallen off on its own.  · Do not apply liquid, cream, or ointment medicine to your wound while the skin glue is still on.  · If you were given a bandage, you should change it at least one time per day or as told by your doctor. You should also change it if it gets dirty or wet.  · If a bandage is placed over the wound, do not let the tape for the bandage touch the skin glue.  · Do not pick at the glue. The skin glue usually stays on for 5-10 days. Then, it falls off of the skin.  General Instructions   · To help prevent scarring, make sure to cover your wound with sunscreen whenever you are outside after stitches are removed, after adhesive strips are removed, or when wound glue stays in place and the wound is healed. Make sure to wear a sunscreen of at least 30 SPF.  · Take over-the-counter and prescription medicines only as told by your doctor.  · If you were given antibiotic medicine or ointment, take or apply it as told by your doctor. Do not stop using the antibiotic even if your wound is getting better.  · Do not scratch or pick at the wound.  · Keep all follow-up visits as told by your doctor. This is important.  · Check your wound every day for signs of infection. Watch for:  ¨ Redness, swelling, or pain.  ¨ Fluid, blood, or pus.  · Raise (elevate) the injured area above the level of your heart while you are sitting or lying down, if possible.  GET HELP IF:  · You got a tetanus shot and you have any of these problems at the injection site:  ¨ Swelling.  ¨ Very bad pain.  ¨ Redness.  ¨ Bleeding.  · You have a fever.  · A wound that was  closed breaks open.  · You notice a bad smell coming from your wound or your bandage.  · You notice something coming out of the wound, such as wood or glass.  · Medicine does not help your pain.  · You have more redness, swelling, or pain at the site of your wound.  · You have fluid, blood, or pus coming from your wound.  · You notice a change in the color of your skin near your wound.  · You need to change the bandage often because fluid, blood, or pus is coming from the wound.  · You start to have a new rash.  · You start to have numbness around the wound.  GET HELP RIGHT AWAY IF:  · You have very bad swelling around the wound.  · Your pain suddenly gets worse and is very bad.  · You notice painful lumps near the wound or on skin that is anywhere on your body.  · You have a red streak going away from your wound.  · The wound is on your hand or foot and you cannot move a finger or toe like you usually can.  · The wound is on your hand or foot and you notice that your fingers or toes look pale or bluish.     This information is not intended to replace advice given to you by your health care provider. Make sure you discuss any questions you have with your health care provider.     Document Released: 06/05/2009 Document Revised: 05/03/2016 Document Reviewed: 12/14/2015  [employer] Interactive Patient Education ©2016 [employer]

## 2018-02-25 ENCOUNTER — PATIENT OUTREACH (OUTPATIENT)
Dept: HEALTH INFORMATION MANAGEMENT | Facility: OTHER | Age: 83
End: 2018-02-25

## 2018-02-25 NOTE — ED NOTES
ED tech at bedside cleaning pt head and applying bandage. Stitches applied to anterior, midline forehead laceration by ERP. No s/s of distress.

## 2018-02-25 NOTE — ED NOTES
This  is a 88 y.o. male who presents with head trauma.  He was seen at [facility] yesterday after he  was hit by a  glass vase that fell off his refrigerator. He reports continued bleeding.

## 2018-02-25 NOTE — DISCHARGE INSTRUCTIONS
Laceration Care, Adult    Keep pressure dressing on for next 24 hours.  Keep dressing over the wound for next three days.  Return to the ER for further bleeding or any other problems.      A laceration is a cut that goes through all of the layers of the skin and into the tissue that is right under the skin. Some lacerations heal on their own. Others need to be closed with stitches (sutures), staples, skin adhesive strips, or skin glue. Proper laceration care minimizes the risk of infection and helps the laceration to heal better.  HOW TO CARE FOR YOUR LACERATION  If sutures or staples were used:  · Keep the wound clean and dry.  · If you were given a bandage (dressing), you should change it at least one time per day or as told by your health care provider. You should also change it if it becomes wet or dirty.  · Keep the wound completely dry for the first 24 hours or as told by your health care provider. After that time, you may shower or bathe. However, make sure that the wound is not soaked in water until after the sutures or staples have been removed.  · Clean the wound one time each day or as told by your health care provider:  ¨ Wash the wound with soap and water.  ¨ Rinse the wound with water to remove all soap.  ¨ Pat the wound dry with a clean towel. Do not rub the wound.  · After cleaning the wound, apply a thin layer of antibiotic ointment as told by your health care provider. This will help to prevent infection and keep the dressing from sticking to the wound.  · Have the sutures or staples removed as told by your health care provider.  If skin adhesive strips were used:  · Keep the wound clean and dry.  · If you were given a bandage (dressing), you should change it at least one time per day or as told by your health care provider. You should also change it if it becomes dirty or wet.  · Do not get the skin adhesive strips wet. You may shower or bathe, but be careful to keep the wound dry.  · If the wound  gets wet, pat it dry with a clean towel. Do not rub the wound.  · Skin adhesive strips fall off on their own. You may trim the strips as the wound heals. Do not remove skin adhesive strips that are still stuck to the wound. They will fall off in time.  If skin glue was used:  · Try to keep the wound dry, but you may briefly wet it in the shower or bath. Do not soak the wound in water, such as by swimming.  · After you have showered or bathed, gently pat the wound dry with a clean towel. Do not rub the wound.  · Do not do any activities that will make you sweat heavily until the skin glue has fallen off on its own.  · Do not apply liquid, cream, or ointment medicine to the wound while the skin glue is in place. Using those may loosen the film before the wound has healed.  · If you were given a bandage (dressing), you should change it at least one time per day or as told by your health care provider. You should also change it if it becomes dirty or wet.  · If a dressing is placed over the wound, be careful not to apply tape directly over the skin glue. Doing that may cause the glue to be pulled off before the wound has healed.  · Do not pick at the glue. The skin glue usually remains in place for 5-10 days, then it falls off of the skin.  General Instructions  · Take over-the-counter and prescription medicines only as told by your health care provider.  · If you were prescribed an antibiotic medicine or ointment, take or apply it as told by your doctor. Do not stop using it even if your condition improves.  · To help prevent scarring, make sure to cover your wound with sunscreen whenever you are outside after stitches are removed, after adhesive strips are removed, or when glue remains in place and the wound is healed. Make sure to wear a sunscreen of at least 30 SPF.  · Do not scratch or pick at the wound.  · Keep all follow-up visits as told by your health care provider. This is important.  · Check your wound every  day for signs of infection. Watch for:  ¨ Redness, swelling, or pain.  ¨ Fluid, blood, or pus.  · Raise (elevate) the injured area above the level of your heart while you are sitting or lying down, if possible.  SEEK MEDICAL CARE IF:  · You received a tetanus shot and you have swelling, severe pain, redness, or bleeding at the injection site.  · You have a fever.  · A wound that was closed breaks open.  · You notice a bad smell coming from your wound or your dressing.  · You notice something coming out of the wound, such as wood or glass.  · Your pain is not controlled with medicine.  · You have increased redness, swelling, or pain at the site of your wound.  · You have fluid, blood, or pus coming from your wound.  · You notice a change in the color of your skin near your wound.  · You need to change the dressing frequently due to fluid, blood, or pus draining from the wound.  · You develop a new rash.  · You develop numbness around the wound.  SEEK IMMEDIATE MEDICAL CARE IF:  · You develop severe swelling around the wound.  · Your pain suddenly increases and is severe.  · You develop painful lumps near the wound or on skin that is anywhere on your body.  · You have a red streak going away from your wound.  · The wound is on your hand or foot and you cannot properly move a finger or toe.  · The wound is on your hand or foot and you notice that your fingers or toes look pale or bluish.     This information is not intended to replace advice given to you by your health care provider. Make sure you discuss any questions you have with your health care provider.     Document Released: 12/18/2006 Document Revised: 05/03/2016 Document Reviewed: 12/14/2015  [employer] Interactive Patient Education ©2016 [employer]

## 2018-02-25 NOTE — ED NOTES
Pt given written and oral discharge instructions. Pt verbalized understanding of all instructions given. All questions answered. VSS. Pt verbalized understanding of f/u instructions and educated on s/s of when to return to the ER. Pt educated on home care of laceration. Verbalized understanding. Pt ambulating independently with a steady gait to with wife upon discharge.

## 2018-02-25 NOTE — ED PROVIDER NOTES
ED Provider Note    CHIEF COMPLAINT  Chief Complaint   Patient presents with   • Head Injury       HPI  Julien Dao is a 88 y.o. male with a history of coronary artery disease, status post CABG x 3,  diabetes mellitus, hypertension, atrial flutter, on chronic anticoagulation with Xarelto who presents with complaints of persistent bleeding from a forehead laceration. The patient was struck in the head with a glass vase that fell from the refrigerator yesterday, and suffered a laceration over his forehead. He was seen in the emergency department, had a negative CT scan of the head, and had the laceration repaired. He is on Xarelto, and was having some bleeding from the wound. A pressure dressing was applied, and he was discharged home. 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 He presents to the emergency department because of the continued bleeding. He denies any worsening headache, or having any lightheadedness, dizziness, nausea, or vomiting.    REVIEW OF SYSTEMS  See HPI for further details. All other systems are negative.     PAST MEDICAL HISTORY  Past Medical History:   Diagnosis Date   • CAD (coronary artery disease)    • DM (diabetes mellitus) (CMS-[facility]) 1/7/2014   • Falls    • Hypertension        FAMILY HISTORY  History reviewed. No pertinent family history.    SOCIAL HISTORY  Social History     Social History   • Marital status:      Spouse name: N/A   • Number of children: N/A   • Years of education: N/A     Social History Main Topics   • Smoking status: Former Smoker     Quit date: 10/19/1972   • Smokeless tobacco: Never Used   • Alcohol use No   • Drug use: No   • Sexual activity: Not on file     Other Topics Concern   • Not on file     Social History Narrative   • No narrative on file       SURGICAL HISTORY  Past Surgical History:   Procedure Laterality Date   • CORONARY ARTERY BYPASS, 3     • OTHER CARDIAC SURGERY     • TED  "RECTAL ABSCESS         CURRENT MEDICATIONS  Home Medications     Reviewed by Tyler Crow R.N. (Registered Nurse) on 02/24/18 at 2002  Med List Status: Complete   Medication Last Dose Status   amlodipine (NORVASC) 5 MG TABS 2/24/2018 Active   aspirin 81 MG tablet 2/24/2018 Active   atorvastatin (LIPITOR) 40 MG Tab 2/24/2018 Active   carboxymethylcellulose (REFRESH PLUS) 0.5 % Solution 2/24/2018 Active   chlorthalidone (HYGROTON) 25 MG Tab 2/24/2018 Active   cyclosporin (RESTASIS) 0.05 % ophthalmic emulsion 2/24/2018 Active   Lifitegrast (XIIDRA) 5 % Solution 2/24/2018 Active   metformin (GLUCOPHAGE) 500 MG TABS 2/24/2018 Active   metoprolol (LOPRESSOR) 50 MG Tab 2/24/2018 Active   Multiple Vitamins-Minerals (PRESERVISION AREDS 2) Cap 2/24/2018 Active   Multiple Vitamins-Minerals (PX MENS MULTIVITAMINS) Tab 2/24/2018 Active   omega-3 acid ethyl esters (LOVAZA) 1 GM capsule 2/24/2018 Active   potassium chloride SA (K-DUR) 10 MEQ TBCR 2/24/2018 Active   pregabalin (LYRICA) 50 MG capsule 2/24/2018 Active   rivaroxaban (XARELTO) 15 MG Tab tablet  Active   simvastatin (ZOCOR) 10 MG Tab 2/24/2018 Active   Specialty Vitamins Products (PROSTATE) Tab 2/24/2018 Active                ALLERGIES  Allergies   Allergen Reactions   • Iodine        PHYSICAL EXAM  VITAL SIGNS: /77   Pulse 71   Temp 37.1 °C (98.8 °F)   Resp 16   Ht 1.702 m (5' 7\") Comment: Stated  Wt 82 kg (180 lb 12.4 oz)   SpO2 94%   BMI 28.31 kg/m²   Constitutional: Awake, alert, in no acute distress, Non-toxic appearance.   HENT: There is a laceration over the left forehead with sutures in place, with oozing of blood from the central portion of the wound. There is no significant hematoma. Bilateral external ears normal, mucous membranes moist, throat nonerythematous without exudates, nose is normal. No facial   Eyes: PERRL, EOMI, conjunctiva moist, noninjected.  Neck: Nontender, Normal range of motion, No nuchal rigidity, No stridor.         COURSE & " MEDICAL DECISION MAKING  Pertinent Labs & Imaging studies reviewed. (See chart for details)  The patient presents with persistent bleeding from his forehead laceration. This is likely secondary to being on Xarelto.  The wound was prepped with Betadine solution, and injected with 1% lidocaine with epinephrine. This as she calls a wound to bleed more vigorously. I placed 2 additional sutures in at the site of bleeding, which did not resolve the bleeding. Several sutures were removed, and a horizontal mattress suture was placed with stoppage of the bleeding for a short period, then began bleeding again. This was removed, and a figure-of-eight suture was then placed, which the bleeding for a short period. The wound again begam bleeding from the middle portion of the wound. Surgicel was applied over the wound, which did not resolve the bleeding. I attempted to get quick clot but none was available in the ER or operating room. The wound did appear to stop with pinching of the wound with my fingers, so a long suture of 5-0 nylon was placed across the wound, which did resolve the bleeding. The wound was then dressed with Surgicel and a pressure dressing. The patient is told to keep the dressing in place for the next 24 hours. He is to return to the ER for any  recurrent bleeding, fever, or any other problems. He is to have the sutures removed as scheduled in 6 days.    FINAL IMPRESSION  1. Persistent bleeding from forehead laceration  2.   3.         Electronically signed by: Dimitry Palma, 2/25/2018 12:05 AM

## 2018-03-02 ENCOUNTER — HOSPITAL ENCOUNTER (EMERGENCY)
Facility: MEDICAL CENTER | Age: 83
End: 2018-03-02
Payer: MEDICARE

## 2018-03-02 VITALS
RESPIRATION RATE: 18 BRPM | TEMPERATURE: 98 F | SYSTOLIC BLOOD PRESSURE: 136 MMHG | HEIGHT: 67 IN | OXYGEN SATURATION: 95 % | DIASTOLIC BLOOD PRESSURE: 72 MMHG | BODY MASS INDEX: 28.72 KG/M2 | WEIGHT: 182.98 LBS | HEART RATE: 60 BPM

## 2018-03-02 PROCEDURE — 99281 EMR DPT VST MAYX REQ PHY/QHP: CPT

## 2018-03-02 ASSESSMENT — PAIN SCALES - GENERAL: PAINLEVEL_OUTOF10: 0

## 2018-04-04 ENCOUNTER — HOSPITAL ENCOUNTER (OUTPATIENT)
Dept: LAB | Facility: MEDICAL CENTER | Age: 83
End: 2018-04-04
Attending: NURSE PRACTITIONER
Payer: MEDICARE

## 2018-04-04 LAB
ALBUMIN SERPL BCP-MCNC: 4.3 G/DL (ref 3.2–4.9)
ALBUMIN/GLOB SERPL: 1.6 G/DL
ALP SERPL-CCNC: 30 U/L (ref 30–99)
ALT SERPL-CCNC: 30 U/L (ref 2–50)
ANION GAP SERPL CALC-SCNC: 10 MMOL/L (ref 0–11.9)
AST SERPL-CCNC: 25 U/L (ref 12–45)
BASOPHILS # BLD AUTO: 1.7 % (ref 0–1.8)
BASOPHILS # BLD: 0.12 K/UL (ref 0–0.12)
BILIRUB SERPL-MCNC: 0.9 MG/DL (ref 0.1–1.5)
BUN SERPL-MCNC: 21 MG/DL (ref 8–22)
CALCIUM SERPL-MCNC: 9.6 MG/DL (ref 8.5–10.5)
CHLORIDE SERPL-SCNC: 98 MMOL/L (ref 96–112)
CO2 SERPL-SCNC: 27 MMOL/L (ref 20–33)
CREAT SERPL-MCNC: 0.71 MG/DL (ref 0.5–1.4)
EOSINOPHIL # BLD AUTO: 0.06 K/UL (ref 0–0.51)
EOSINOPHIL NFR BLD: 0.9 % (ref 0–6.9)
ERYTHROCYTE [DISTWIDTH] IN BLOOD BY AUTOMATED COUNT: 43.3 FL (ref 35.9–50)
GIANT PLATELETS BLD QL SMEAR: NORMAL
GLOBULIN SER CALC-MCNC: 2.7 G/DL (ref 1.9–3.5)
GLUCOSE SERPL-MCNC: 255 MG/DL (ref 65–99)
HCT VFR BLD AUTO: 44 % (ref 42–52)
HGB BLD-MCNC: 15 G/DL (ref 14–18)
LYMPHOCYTES # BLD AUTO: 1.71 K/UL (ref 1–4.8)
LYMPHOCYTES NFR BLD: 23.7 % (ref 22–41)
MANUAL DIFF BLD: NORMAL
MCH RBC QN AUTO: 31.6 PG (ref 27–33)
MCHC RBC AUTO-ENTMCNC: 34.1 G/DL (ref 33.7–35.3)
MCV RBC AUTO: 92.8 FL (ref 81.4–97.8)
MONOCYTES # BLD AUTO: 0.63 K/UL (ref 0–0.85)
MONOCYTES NFR BLD AUTO: 8.8 % (ref 0–13.4)
MORPHOLOGY BLD-IMP: NORMAL
NEUTROPHILS # BLD AUTO: 4.67 K/UL (ref 1.82–7.42)
NEUTROPHILS NFR BLD: 64.9 % (ref 44–72)
NRBC # BLD AUTO: 0 K/UL
NRBC BLD-RTO: 0 /100 WBC
OVALOCYTES BLD QL SMEAR: NORMAL
PLATELET # BLD AUTO: 181 K/UL (ref 164–446)
PLATELET BLD QL SMEAR: NORMAL
PMV BLD AUTO: 11.4 FL (ref 9–12.9)
POTASSIUM SERPL-SCNC: 3.4 MMOL/L (ref 3.6–5.5)
PROT SERPL-MCNC: 7 G/DL (ref 6–8.2)
RBC # BLD AUTO: 4.74 M/UL (ref 4.7–6.1)
RBC BLD AUTO: PRESENT
SODIUM SERPL-SCNC: 135 MMOL/L (ref 135–145)
T4 FREE SERPL-MCNC: 0.71 NG/DL (ref 0.53–1.43)
TSH SERPL DL<=0.005 MIU/L-ACNC: 3.62 UIU/ML (ref 0.38–5.33)
VARIANT LYMPHS BLD QL SMEAR: NORMAL
WBC # BLD AUTO: 7.2 K/UL (ref 4.8–10.8)

## 2018-04-04 PROCEDURE — 36415 COLL VENOUS BLD VENIPUNCTURE: CPT | Mod: GA

## 2018-04-04 PROCEDURE — 80053 COMPREHEN METABOLIC PANEL: CPT

## 2018-04-04 PROCEDURE — 85007 BL SMEAR W/DIFF WBC COUNT: CPT

## 2018-04-04 PROCEDURE — 85027 COMPLETE CBC AUTOMATED: CPT

## 2018-04-04 PROCEDURE — 84439 ASSAY OF FREE THYROXINE: CPT

## 2018-04-04 PROCEDURE — 83036 HEMOGLOBIN GLYCOSYLATED A1C: CPT | Mod: GA

## 2018-04-04 PROCEDURE — 84443 ASSAY THYROID STIM HORMONE: CPT

## 2018-04-05 LAB
EST. AVERAGE GLUCOSE BLD GHB EST-MCNC: 194 MG/DL
HBA1C MFR BLD: 8.4 % (ref 0–5.6)

## 2018-06-27 ENCOUNTER — HOSPITAL ENCOUNTER (OUTPATIENT)
Dept: LAB | Facility: MEDICAL CENTER | Age: 83
End: 2018-06-27
Attending: INTERNAL MEDICINE
Payer: MEDICARE

## 2018-06-27 LAB
ANION GAP SERPL CALC-SCNC: 11 MMOL/L (ref 0–11.9)
BUN SERPL-MCNC: 19 MG/DL (ref 8–22)
CALCIUM SERPL-MCNC: 9 MG/DL (ref 8.5–10.5)
CHLORIDE SERPL-SCNC: 99 MMOL/L (ref 96–112)
CO2 SERPL-SCNC: 29 MMOL/L (ref 20–33)
CREAT SERPL-MCNC: 0.73 MG/DL (ref 0.5–1.4)
GLUCOSE SERPL-MCNC: 218 MG/DL (ref 65–99)
POTASSIUM SERPL-SCNC: 3 MMOL/L (ref 3.6–5.5)
SODIUM SERPL-SCNC: 139 MMOL/L (ref 135–145)

## 2018-06-27 PROCEDURE — 36415 COLL VENOUS BLD VENIPUNCTURE: CPT

## 2018-06-27 PROCEDURE — 80048 BASIC METABOLIC PNL TOTAL CA: CPT

## 2018-08-29 ENCOUNTER — APPOINTMENT (OUTPATIENT)
Dept: RADIOLOGY | Facility: MEDICAL CENTER | Age: 83
End: 2018-08-29
Attending: EMERGENCY MEDICINE
Payer: MEDICARE

## 2018-08-29 ENCOUNTER — HOSPITAL ENCOUNTER (EMERGENCY)
Facility: MEDICAL CENTER | Age: 83
End: 2018-08-29
Attending: EMERGENCY MEDICINE
Payer: MEDICARE

## 2018-08-29 VITALS
HEART RATE: 62 BPM | SYSTOLIC BLOOD PRESSURE: 140 MMHG | OXYGEN SATURATION: 95 % | TEMPERATURE: 98.1 F | BODY MASS INDEX: 27.72 KG/M2 | WEIGHT: 176.59 LBS | DIASTOLIC BLOOD PRESSURE: 63 MMHG | HEIGHT: 67 IN | RESPIRATION RATE: 20 BRPM

## 2018-08-29 DIAGNOSIS — I73.9 CLAUDICATION OF RIGHT LOWER EXTREMITY (HCC): ICD-10-CM

## 2018-08-29 LAB
ALBUMIN SERPL BCP-MCNC: 4 G/DL (ref 3.2–4.9)
ALBUMIN/GLOB SERPL: 1.4 G/DL
ALP SERPL-CCNC: 31 U/L (ref 30–99)
ALT SERPL-CCNC: 36 U/L (ref 2–50)
ANION GAP SERPL CALC-SCNC: 12 MMOL/L (ref 0–11.9)
APTT PPP: 33.4 SEC (ref 24.7–36)
AST SERPL-CCNC: 40 U/L (ref 12–45)
BASOPHILS # BLD AUTO: 0.9 % (ref 0–1.8)
BASOPHILS # BLD: 0.07 K/UL (ref 0–0.12)
BILIRUB SERPL-MCNC: 1.2 MG/DL (ref 0.1–1.5)
BUN SERPL-MCNC: 18 MG/DL (ref 8–22)
CALCIUM SERPL-MCNC: 9 MG/DL (ref 8.4–10.2)
CHLORIDE SERPL-SCNC: 96 MMOL/L (ref 96–112)
CO2 SERPL-SCNC: 22 MMOL/L (ref 20–33)
CREAT SERPL-MCNC: 0.66 MG/DL (ref 0.5–1.4)
EOSINOPHIL # BLD AUTO: 0.25 K/UL (ref 0–0.51)
EOSINOPHIL NFR BLD: 3 % (ref 0–6.9)
ERYTHROCYTE [DISTWIDTH] IN BLOOD BY AUTOMATED COUNT: 41.1 FL (ref 35.9–50)
GLOBULIN SER CALC-MCNC: 2.8 G/DL (ref 1.9–3.5)
GLUCOSE SERPL-MCNC: 285 MG/DL (ref 65–99)
HCT VFR BLD AUTO: 39.5 % (ref 42–52)
HGB BLD-MCNC: 14.2 G/DL (ref 14–18)
IMM GRANULOCYTES # BLD AUTO: 0.03 K/UL (ref 0–0.11)
IMM GRANULOCYTES NFR BLD AUTO: 0.4 % (ref 0–0.9)
INR PPP: 1.17 (ref 0.87–1.13)
LYMPHOCYTES # BLD AUTO: 1.35 K/UL (ref 1–4.8)
LYMPHOCYTES NFR BLD: 16.4 % (ref 22–41)
MCH RBC QN AUTO: 32.4 PG (ref 27–33)
MCHC RBC AUTO-ENTMCNC: 35.9 G/DL (ref 33.7–35.3)
MCV RBC AUTO: 90.2 FL (ref 81.4–97.8)
MONOCYTES # BLD AUTO: 0.77 K/UL (ref 0–0.85)
MONOCYTES NFR BLD AUTO: 9.4 % (ref 0–13.4)
NEUTROPHILS # BLD AUTO: 5.76 K/UL (ref 1.82–7.42)
NEUTROPHILS NFR BLD: 69.9 % (ref 44–72)
NRBC # BLD AUTO: 0 K/UL
NRBC BLD-RTO: 0 /100 WBC
PLATELET # BLD AUTO: 207 K/UL (ref 164–446)
PMV BLD AUTO: 10.4 FL (ref 9–12.9)
POTASSIUM SERPL-SCNC: 3.6 MMOL/L (ref 3.6–5.5)
PROT SERPL-MCNC: 6.8 G/DL (ref 6–8.2)
PROTHROMBIN TIME: 14.8 SEC (ref 12–14.6)
RBC # BLD AUTO: 4.38 M/UL (ref 4.7–6.1)
SODIUM SERPL-SCNC: 130 MMOL/L (ref 135–145)
WBC # BLD AUTO: 8.2 K/UL (ref 4.8–10.8)

## 2018-08-29 PROCEDURE — 99284 EMERGENCY DEPT VISIT MOD MDM: CPT

## 2018-08-29 PROCEDURE — 85025 COMPLETE CBC W/AUTO DIFF WBC: CPT

## 2018-08-29 PROCEDURE — 85730 THROMBOPLASTIN TIME PARTIAL: CPT

## 2018-08-29 PROCEDURE — 73630 X-RAY EXAM OF FOOT: CPT | Mod: RT

## 2018-08-29 PROCEDURE — 93925 LOWER EXTREMITY STUDY: CPT

## 2018-08-29 PROCEDURE — 93922 UPR/L XTREMITY ART 2 LEVELS: CPT

## 2018-08-29 PROCEDURE — 80053 COMPREHEN METABOLIC PANEL: CPT

## 2018-08-29 PROCEDURE — 36415 COLL VENOUS BLD VENIPUNCTURE: CPT

## 2018-08-29 PROCEDURE — 85610 PROTHROMBIN TIME: CPT

## 2018-08-29 NOTE — ED NOTES
Med Rec completed per patient with medication list  Allergies reviewed  No ORAL antibiotics in last 30 days

## 2018-08-29 NOTE — ED PROVIDER NOTES
ED Provider Note    CHIEF COMPLAINT  Chief Complaint   Patient presents with   • Leg Pain       HPI  Julien Dao is a 89 y.o. male who presents for evaluation of pain in both of his feet particularly on the right side, tingling sensation. The patient reports that he did injure his right foot around a week or 2 ago. He has a history including advanced age diabetes coronary artery disease. He also reports some claudication with ambulation on the right foot. He denies high fevers or chills no numbness weakness or tingling. No chest pain or headache. Of note the patient is only on oral anticoagulant therapy for his heart disease. No other symptoms reported    REVIEW OF SYSTEMS  See HPI for further details. All other systems are negative.     PAST MEDICAL HISTORY  Past Medical History:   Diagnosis Date   • DM (diabetes mellitus) (CMS-HCC) ([facility]) 1/7/2014   • CAD (coronary artery disease)    • Falls    • Hypertension      Family history  Noncontributory    SOCIAL HISTORY  Social History     Social History   • Marital status:      Spouse name: N/A   • Number of children: N/A   • Years of education: N/A     Social History Main Topics   • Smoking status: Former Smoker     Quit date: 10/19/1972   • Smokeless tobacco: Never Used   • Alcohol use No   • Drug use: No   • Sexual activity: Not on file     Other Topics Concern   • Not on file     Social History Narrative   • No narrative on file     Former smoker  SURGICAL HISTORY  Past Surgical History:   Procedure Laterality Date   • CORONARY ARTERY BYPASS, 3     • OTHER CARDIAC SURGERY     • TED RECTAL ABSCESS         CURRENT MEDICATIONS  Home Medications     Reviewed by Anjum Soriano (Pharmacy Tech) on 08/29/18 at 0959  Med List Status: Complete   Medication Last Dose Status   amlodipine (NORVASC) 5 MG TABS 8/29/2018 Active   aspirin 81 MG tablet 8/28/2018 Active   chlorthalidone (HYGROTON) 25 MG Tab 8/29/2018 Active   cyclosporin (RESTASIS) 0.05 % ophthalmic  "emulsion 8/29/2018 Active   metformin (GLUCOPHAGE) 500 MG TABS 8/29/2018 Active   metoprolol (LOPRESSOR) 50 MG Tab 8/29/2018 Active   Multiple Vitamins-Minerals (PX MENS MULTIVITAMINS) Tab 8/29/2018 Active   omega-3 acid ethyl esters (LOVAZA) 1 GM capsule 8/29/2018 Active   potassium chloride SA (K-DUR) 10 MEQ TBCR 8/29/2018 Active   rivaroxaban (XARELTO) 15 MG Tab tablet 8/28/2018 Active   simvastatin (ZOCOR) 10 MG Tab 8/28/2018 Active   Specialty Vitamins Products (PROSTATE) Tab 8/29/2018 Active   Specialty Vitamins Products (RETAINE VISION PO) 8/29/2018 Active                ALLERGIES  Allergies   Allergen Reactions   • Iodine        PHYSICAL EXAM  VITAL SIGNS: /63   Pulse 62   Temp 36.7 °C (98.1 °F)   Resp (!) 21   Ht 1.702 m (5' 7\")   Wt 80.1 kg (176 lb 9.4 oz)   SpO2 95%   BMI 27.66 kg/m²  Room air O2: 94    Constitutional: Well developed, Well nourished, No acute distress, Non-toxic appearance.   HENT: Normocephalic, Atraumatic, Bilateral external ears normal, Oropharynx moist, No oral exudates, Nose normal.   Eyes: PERRLA, EOMI, Conjunctiva normal, No discharge.   Neck: Normal range of motion, No tenderness, Supple, No stridor.   Cardiovascular: Normal heart rate, Normal rhythm, No murmurs, No rubs, No gallops.   Thorax & Lungs: Normal breath sounds, No respiratory distress, No wheezing, No chest tenderness.   Abdomen: Bowel sounds normal, Soft, No tenderness, No masses, No pulsatile masses.   Skin: Warm, Dry, No erythema, No rash.   Back: No tenderness, No CVA tenderness.   Extremities: Lower extremity exam is notable for a bounding dorsalis pedal pulse on the left side, very limited palpation of dorsalis pedal pulse on the right. There is ecchymosis and bruising at the metatarsal heads of the 2nd through 4th metatarsals no open skin lesions mild erythema. Tenderness to touch is noted.   Neurologic: Alert & oriented x 3, Normal motor function, Normal sensory function, No focal deficits noted. "   Psychiatric: Anxious      RADIOLOGY/PROCEDURES  LE ART DUPLX/IMAG (Specify in Comments Left, Right Or Bilateral)         DX-FOOT-COMPLETE 3+ RIGHT   Final Result      1.  No evidence of acute fracture or dislocation.      2.  Deformity of the distal second metatarsal likely representing sequelae of old trauma or arthropathy.      LE ART/LISA    (Results Pending)   Lower Extremity   Arterial Duplex Report     Vascular Laboratory   CONCLUSIONS       SAMMY CLIFFORD     Exam Date:     2018 11:07     Room #:     Inpatient     Priority:     Stat     Ht (in):             Wt (lb):     Ordering Physician:        CHARAN KNOX     Referring Physician:       [id number] Quezada     Sonographer:               Marietta Reaves RVT     Study Type:                Complete Unilateral     Technical Quality:         Adequate     Age:    89    Gender:     M     MRN:    [id number]     :    1929      BSA:     Indications:     Claudication     CPT Codes:       80023     ICD Codes:       I70.213     History:         Pain in the right leg from the hip to the foot. History of                     controlled hypertension, recent onset of diabetes, and   remote                     history of tobacco use. No prior exams.     Limitations:                   RIGHT   Waveform        Peak Systolic Velocity (cm/s)                   Prox    Prox-Mid  Mid    Mid-Dist  Distal   Monophasic                        -34                      CFA       Monophasic      -162                                       PFA       Monophasic      67                37               25      SFA       Monophasic                        44                       POP       Monophasic      16                                 15      AT       Monophasic      26                                 49      PT       Monophasic      13                                 14      RIKY                     LEFT   Waveform        Peak Systolic Velocity (cm/s)                   Prox     Prox-Mid  Mid    Mid-Dist  Distal                                                              CFA                                                                  PFA                                                                  SFA                                                                  POP                                                                  AT                                                                  PT                                                                  RIKY           FINDINGS   Right.    Heavy calcification is visualized in the distal external iliac artery with    possible collateral flow seen.    Retrograde flow is demonstrated in the common femoral and the profunda    femoral arteries.    Elevated velocities of the proximal profunda may be due to compensatory    flow, though calcific plaque is visualized.   Plaque is seen in the superficial femoral, popliteal, and tibial arteries    with no elevated velocities to indicate hemodynamic significance.    Monophasic waveforms demonstrated throughout the leg.    Results for orders placed or performed during the hospital encounter of 08/29/18   CBC WITH DIFFERENTIAL   Result Value Ref Range    WBC 8.2 4.8 - 10.8 K/uL    RBC 4.38 (L) 4.70 - 6.10 M/uL    Hemoglobin 14.2 14.0 - 18.0 g/dL    Hematocrit 39.5 (L) 42.0 - 52.0 %    MCV 90.2 81.4 - 97.8 fL    MCH 32.4 27.0 - 33.0 pg    MCHC 35.9 (H) 33.7 - 35.3 g/dL    RDW 41.1 35.9 - 50.0 fL    Platelet Count 207 164 - 446 K/uL    MPV 10.4 9.0 - 12.9 fL    Neutrophils-Polys 69.90 44.00 - 72.00 %    Lymphocytes 16.40 (L) 22.00 - 41.00 %    Monocytes 9.40 0.00 - 13.40 %    Eosinophils 3.00 0.00 - 6.90 %    Basophils 0.90 0.00 - 1.80 %    Immature Granulocytes 0.40 0.00 - 0.90 %    Nucleated RBC 0.00 /100 WBC    Neutrophils (Absolute) 5.76 1.82 - 7.42 K/uL    Lymphs (Absolute) 1.35 1.00 - 4.80 K/uL    Monos (Absolute) 0.77 0.00 - 0.85 K/uL    Eos (Absolute) 0.25 0.00 - 0.51 K/uL     Baso (Absolute) 0.07 0.00 - 0.12 K/uL    Immature Granulocytes (abs) 0.03 0.00 - 0.11 K/uL    NRBC (Absolute) 0.00 K/uL   COMP METABOLIC PANEL   Result Value Ref Range    Sodium 130 (L) 135 - 145 mmol/L    Potassium 3.6 3.6 - 5.5 mmol/L    Chloride 96 96 - 112 mmol/L    Co2 22 20 - 33 mmol/L    Anion Gap 12.0 (H) 0.0 - 11.9    Glucose 285 (H) 65 - 99 mg/dL    Bun 18 8 - 22 mg/dL    Creatinine 0.66 0.50 - 1.40 mg/dL    Calcium 9.0 8.4 - 10.2 mg/dL    AST(SGOT) 40 12 - 45 U/L    ALT(SGPT) 36 2 - 50 U/L    Alkaline Phosphatase 31 30 - 99 U/L    Total Bilirubin 1.2 0.1 - 1.5 mg/dL    Albumin 4.0 3.2 - 4.9 g/dL    Total Protein 6.8 6.0 - 8.2 g/dL    Globulin 2.8 1.9 - 3.5 g/dL    A-G Ratio 1.4 g/dL   PROTHROMBIN TIME   Result Value Ref Range    PT 14.8 (H) 12.0 - 14.6 sec    INR 1.17 (H) 0.87 - 1.13   APTT   Result Value Ref Range    APTT 33.4 24.7 - 36.0 sec   ESTIMATED GFR   Result Value Ref Range    GFR If African American >60 >60 mL/min/1.73 m 2    GFR If Non African American >60 >60 mL/min/1.73 m 2        COURSE & MEDICAL DECISION MAKING  Pertinent Labs & Imaging studies reviewed. (See chart for details)  Patient presents here with right foot pain. He did have some trauma and radiographs not to treat any fracture. He does not have a leukocytosis or fever to suggest active infection. I suspect his main issue is that he has claudication related to his peripheral vascular disease. He has some proximal occlusions but there does appear to be reconstitution of flow and he does not have any evidence of necrosis or critical limb threatening ischemia. He is on anticoagulant therapy. I counseled the patient that he'll need follow-up with his PCP and he will be referred to vascular surgery for formal consultation    FINAL IMPRESSION  1. Claudication to the right lower extremity      Electronically signed by: Davion Hartley 8/29/2018 11:31 AM

## 2018-08-29 NOTE — DISCHARGE INSTRUCTIONS
Peripheral Vascular Disease  Peripheral vascular disease (PVD) is a disease of the blood vessels that are not part of your heart and brain. A simple term for PVD is poor circulation. In most cases, PVD narrows the blood vessels that carry blood from your heart to the rest of your body. This can result in a decreased supply of blood to your arms, legs, and internal organs, like your stomach or kidneys. However, it most often affects a person’s lower legs and feet.  There are two types of PVD.  · Organic PVD. This is the more common type. It is caused by damage to the structure of blood vessels.  · Functional PVD. This is caused by conditions that make blood vessels contract and tighten (spasm).  Without treatment, PVD tends to get worse over time.  PVD can also lead to acute ischemic limb. This is when an arm or limb suddenly has trouble getting enough blood. This is a medical emergency.  Follow these instructions at home:  · Take medicines only as told by your doctor.  · Do not use any tobacco products, including cigarettes, chewing tobacco, or electronic cigarettes. If you need help quitting, ask your doctor.  · Lose weight if you are overweight, and maintain a healthy weight as told by your doctor.  · Eat a diet that is low in fat and cholesterol. If you need help, ask your doctor.  · Exercise regularly. Ask your doctor for some good activities for you.  · Take good care of your feet.  ¨ Wear comfortable shoes that fit well.  ¨ Check your feet often for any cuts or sores.  Contact a doctor if:  · You have cramps in your legs while walking.  · You have leg pain when you are at rest.  · You have coldness in a leg or foot.  · Your skin changes.  · You are unable to get or have an erection (erectile dysfunction).  · You have cuts or sores on your feet that are not healing.  Get help right away if:  · Your arm or leg turns cold and blue.  · Your arms or legs become red, warm, swollen, painful, or numb.  · You have  chest pain or trouble breathing.  · You suddenly have weakness in your face, arm, or leg.  · You become very confused or you cannot speak.  · You suddenly have a very bad headache.  · You suddenly cannot see.  This information is not intended to replace advice given to you by your health care provider. Make sure you discuss any questions you have with your health care provider.  Document Released: 03/14/2011 Document Revised: 05/25/2017 Document Reviewed: 05/28/2015  Elsevier Interactive Patient Education © 2017 [employer]

## 2018-08-30 ENCOUNTER — PATIENT OUTREACH (OUTPATIENT)
Dept: HEALTH INFORMATION MANAGEMENT | Facility: OTHER | Age: 83
End: 2018-08-30

## 2018-08-30 NOTE — PROGRESS NOTES
Placed discharge outreach phone call to pt s/p ER discharge 8/29/18.  Left voicemail providing my contact information and instructions to call with any questions or concerns.

## 2018-10-18 ENCOUNTER — HOSPITAL ENCOUNTER (OUTPATIENT)
Dept: LAB | Facility: MEDICAL CENTER | Age: 83
End: 2018-10-18
Attending: NURSE PRACTITIONER
Payer: MEDICARE

## 2018-10-18 LAB
ALBUMIN SERPL BCP-MCNC: 4.3 G/DL (ref 3.2–4.9)
ALBUMIN/GLOB SERPL: 1.5 G/DL
ALP SERPL-CCNC: 37 U/L (ref 30–99)
ALT SERPL-CCNC: 40 U/L (ref 2–50)
ANION GAP SERPL CALC-SCNC: 10 MMOL/L (ref 0–11.9)
AST SERPL-CCNC: 33 U/L (ref 12–45)
BASOPHILS # BLD AUTO: 1.1 % (ref 0–1.8)
BASOPHILS # BLD: 0.09 K/UL (ref 0–0.12)
BILIRUB SERPL-MCNC: 0.8 MG/DL (ref 0.1–1.5)
BUN SERPL-MCNC: 18 MG/DL (ref 8–22)
CALCIUM SERPL-MCNC: 9.8 MG/DL (ref 8.5–10.5)
CHLORIDE SERPL-SCNC: 96 MMOL/L (ref 96–112)
CO2 SERPL-SCNC: 30 MMOL/L (ref 20–33)
CREAT SERPL-MCNC: 0.73 MG/DL (ref 0.5–1.4)
EOSINOPHIL # BLD AUTO: 0.24 K/UL (ref 0–0.51)
EOSINOPHIL NFR BLD: 2.8 % (ref 0–6.9)
ERYTHROCYTE [DISTWIDTH] IN BLOOD BY AUTOMATED COUNT: 41.2 FL (ref 35.9–50)
EST. AVERAGE GLUCOSE BLD GHB EST-MCNC: 209 MG/DL
FASTING STATUS PATIENT QL REPORTED: NORMAL
GLOBULIN SER CALC-MCNC: 2.8 G/DL (ref 1.9–3.5)
GLUCOSE SERPL-MCNC: 186 MG/DL (ref 65–99)
HBA1C MFR BLD: 8.9 % (ref 0–5.6)
HCT VFR BLD AUTO: 40.6 % (ref 42–52)
HGB BLD-MCNC: 14.3 G/DL (ref 14–18)
IMM GRANULOCYTES # BLD AUTO: 0.04 K/UL (ref 0–0.11)
IMM GRANULOCYTES NFR BLD AUTO: 0.5 % (ref 0–0.9)
LYMPHOCYTES # BLD AUTO: 1.54 K/UL (ref 1–4.8)
LYMPHOCYTES NFR BLD: 18.1 % (ref 22–41)
MCH RBC QN AUTO: 31.5 PG (ref 27–33)
MCHC RBC AUTO-ENTMCNC: 35.2 G/DL (ref 33.7–35.3)
MCV RBC AUTO: 89.4 FL (ref 81.4–97.8)
MONOCYTES # BLD AUTO: 0.75 K/UL (ref 0–0.85)
MONOCYTES NFR BLD AUTO: 8.8 % (ref 0–13.4)
NEUTROPHILS # BLD AUTO: 5.83 K/UL (ref 1.82–7.42)
NEUTROPHILS NFR BLD: 68.7 % (ref 44–72)
NRBC # BLD AUTO: 0 K/UL
NRBC BLD-RTO: 0 /100 WBC
PLATELET # BLD AUTO: 241 K/UL (ref 164–446)
PMV BLD AUTO: 10.4 FL (ref 9–12.9)
POTASSIUM SERPL-SCNC: 3.2 MMOL/L (ref 3.6–5.5)
PROT SERPL-MCNC: 7.1 G/DL (ref 6–8.2)
RBC # BLD AUTO: 4.54 M/UL (ref 4.7–6.1)
SODIUM SERPL-SCNC: 136 MMOL/L (ref 135–145)
WBC # BLD AUTO: 8.5 K/UL (ref 4.8–10.8)

## 2018-10-18 PROCEDURE — 36415 COLL VENOUS BLD VENIPUNCTURE: CPT

## 2018-10-18 PROCEDURE — 83036 HEMOGLOBIN GLYCOSYLATED A1C: CPT

## 2018-10-18 PROCEDURE — 80053 COMPREHEN METABOLIC PANEL: CPT

## 2018-10-18 PROCEDURE — 85025 COMPLETE CBC W/AUTO DIFF WBC: CPT

## 2018-10-22 ENCOUNTER — HOSPITAL ENCOUNTER (EMERGENCY)
Facility: MEDICAL CENTER | Age: 83
End: 2018-10-22
Attending: EMERGENCY MEDICINE
Payer: MEDICARE

## 2018-10-22 ENCOUNTER — APPOINTMENT (OUTPATIENT)
Dept: RADIOLOGY | Facility: MEDICAL CENTER | Age: 83
End: 2018-10-22
Attending: EMERGENCY MEDICINE
Payer: MEDICARE

## 2018-10-22 VITALS
BODY MASS INDEX: 27.62 KG/M2 | DIASTOLIC BLOOD PRESSURE: 70 MMHG | HEART RATE: 88 BPM | TEMPERATURE: 97.5 F | OXYGEN SATURATION: 93 % | WEIGHT: 176.37 LBS | RESPIRATION RATE: 18 BRPM | SYSTOLIC BLOOD PRESSURE: 133 MMHG
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
## 2018-10-22 DIAGNOSIS — R53.1 GENERALIZED WEAKNESS: ICD-10-CM

## 2018-10-22 DIAGNOSIS — R53.81 DEBILITY: ICD-10-CM

## 2018-10-22 LAB
ANION GAP SERPL CALC-SCNC: 9 MMOL/L (ref 0–11.9)
APPEARANCE UR: CLEAR
APTT PPP: 33.1 SEC (ref 24.7–36)
BACTERIA #/AREA URNS HPF: ABNORMAL /HPF
BASOPHILS # BLD AUTO: 0.7 % (ref 0–1.8)
BASOPHILS # BLD: 0.05 K/UL (ref 0–0.12)
BILIRUB UR QL STRIP.AUTO: NEGATIVE
BUN SERPL-MCNC: 14 MG/DL (ref 8–22)
CALCIUM SERPL-MCNC: 9.3 MG/DL (ref 8.4–10.2)
CHLORIDE SERPL-SCNC: 94 MMOL/L (ref 96–112)
CO2 SERPL-SCNC: 29 MMOL/L (ref 20–33)
COLOR UR: YELLOW
CREAT SERPL-MCNC: 0.72 MG/DL (ref 0.5–1.4)
EKG IMPRESSION: NORMAL
EOSINOPHIL # BLD AUTO: 0.18 K/UL (ref 0–0.51)
EOSINOPHIL NFR BLD: 2.5 % (ref 0–6.9)
EPI CELLS #/AREA URNS HPF: NEGATIVE /HPF
ERYTHROCYTE [DISTWIDTH] IN BLOOD BY AUTOMATED COUNT: 41.4 FL (ref 35.9–50)
GLUCOSE SERPL-MCNC: 182 MG/DL (ref 65–99)
GLUCOSE UR STRIP.AUTO-MCNC: 100 MG/DL
HCT VFR BLD AUTO: 41.6 % (ref 42–52)
HGB BLD-MCNC: 14.8 G/DL (ref 14–18)
IMM GRANULOCYTES # BLD AUTO: 0.04 K/UL (ref 0–0.11)
IMM GRANULOCYTES NFR BLD AUTO: 0.5 % (ref 0–0.9)
INR PPP: 1.33 (ref 0.87–1.13)
KETONES UR STRIP.AUTO-MCNC: ABNORMAL MG/DL
LEUKOCYTE ESTERASE UR QL STRIP.AUTO: ABNORMAL
LYMPHOCYTES # BLD AUTO: 1.31 K/UL (ref 1–4.8)
LYMPHOCYTES NFR BLD: 18 % (ref 22–41)
MCH RBC QN AUTO: 32 PG (ref 27–33)
MCHC RBC AUTO-ENTMCNC: 35.6 G/DL (ref 33.7–35.3)
MCV RBC AUTO: 90 FL (ref 81.4–97.8)
MICRO URNS: ABNORMAL
MONOCYTES # BLD AUTO: 0.77 K/UL (ref 0–0.85)
MONOCYTES NFR BLD AUTO: 10.6 % (ref 0–13.4)
NEUTROPHILS # BLD AUTO: 4.94 K/UL (ref 1.82–7.42)
NEUTROPHILS NFR BLD: 67.7 % (ref 44–72)
NITRITE UR QL STRIP.AUTO: NEGATIVE
NRBC # BLD AUTO: 0 K/UL
NRBC BLD-RTO: 0 /100 WBC
PH UR STRIP.AUTO: 7 [PH]
PLATELET # BLD AUTO: 213 K/UL (ref 164–446)
PMV BLD AUTO: 9.9 FL (ref 9–12.9)
POTASSIUM SERPL-SCNC: 3.1 MMOL/L (ref 3.6–5.5)
PROT UR QL STRIP: NEGATIVE MG/DL
PROTHROMBIN TIME: 16.3 SEC (ref 12–14.6)
RBC # BLD AUTO: 4.62 M/UL (ref 4.7–6.1)
RBC # URNS HPF: ABNORMAL /HPF
RBC UR QL AUTO: NEGATIVE
SODIUM SERPL-SCNC: 132 MMOL/L (ref 135–145)
SP GR UR STRIP.AUTO: 1.01
TROPONIN I SERPL-MCNC: 0.04 NG/ML (ref 0–0.04)
WBC # BLD AUTO: 7.3 K/UL (ref 4.8–10.8)
WBC #/AREA URNS HPF: ABNORMAL /HPF

## 2018-10-22 PROCEDURE — 84484 ASSAY OF TROPONIN QUANT: CPT

## 2018-10-22 PROCEDURE — 70450 CT HEAD/BRAIN W/O DYE: CPT

## 2018-10-22 PROCEDURE — 85610 PROTHROMBIN TIME: CPT

## 2018-10-22 PROCEDURE — 99284 EMERGENCY DEPT VISIT MOD MDM: CPT

## 2018-10-22 PROCEDURE — 71045 X-RAY EXAM CHEST 1 VIEW: CPT

## 2018-10-22 PROCEDURE — 85730 THROMBOPLASTIN TIME PARTIAL: CPT

## 2018-10-22 PROCEDURE — 93005 ELECTROCARDIOGRAM TRACING: CPT | Performed by: EMERGENCY MEDICINE

## 2018-10-22 PROCEDURE — 80048 BASIC METABOLIC PNL TOTAL CA: CPT

## 2018-10-22 PROCEDURE — 36415 COLL VENOUS BLD VENIPUNCTURE: CPT

## 2018-10-22 PROCEDURE — 85025 COMPLETE CBC W/AUTO DIFF WBC: CPT

## 2018-10-22 PROCEDURE — 81001 URINALYSIS AUTO W/SCOPE: CPT

## 2018-10-22 PROCEDURE — 94760 N-INVAS EAR/PLS OXIMETRY 1: CPT

## 2018-10-22 RX ORDER — INSULIN GLARGINE 100 [IU]/ML
46 INJECTION, SOLUTION SUBCUTANEOUS DAILY
COMMUNITY
End: 2020-03-28

## 2018-10-22 ASSESSMENT — PAIN SCALES - GENERAL: PAINLEVEL_OUTOF10: 0

## 2018-10-22 NOTE — ED NOTES
Cherelle fall assessment completed. Pt is High risk for fall. Interventions complete. Pt placed in yellow non slip socks, wrist band placed, yellow sign on door. Bed locked in low position, call light in place. Personal possessions in place. Personal needs assessed. Safety assessed. Will monitor frequently.

## 2018-10-22 NOTE — ED TRIAGE NOTES
Pt bib [facility]SA. Fall this morning, pt does not recall the fall. Patent takes xarelto. A&OX4 at this time

## 2018-10-22 NOTE — ED PROVIDER NOTES
ED Provider Note    CHIEF COMPLAINT  Chief Complaint   Patient presents with   • Fall   • Weakness       HPI  Julien Dao is a 89 y.o. male who presents to the emergency department with a chief complaint of fall.  The patient says that he tends to fall frequently.  He is somewhat debilitated.  He is fallen multiple times over the course last couple years.  He does have some assistive devices to use to assist him with walking at home.  The patient says that he apparently fell sometime over the night.  He was sleeping in bed last night and then woke up on the floor this morning.  He is too weak to stand up.  He called the paramedics to come help him get up and they brought him to the emergency department.  The patient says that his weakness is about the same as it usually is.  He says that normally he does not feel like he would have the strength to get off the floor so he did not find this to be abnormal.  The patient does not have any complaints of pain.  No headache.  He does not think that he fell hard enough to hurt anything.  No lumps bumps or bruises.  The patient does take blood thinners.  Denies any headache.    REVIEW OF SYSTEMS  See HPI for further details. All other systems are negative.     PAST MEDICAL HISTORY  Past Medical History:   Diagnosis Date   • CAD (coronary artery disease)    • DM (diabetes mellitus) (CMS-HCC) ([facility]) 1/7/2014   • Falls    • Hypertension        FAMILY HISTORY  No family history on file.    SOCIAL HISTORY  Social History     Social History   • Marital status:      Spouse name: N/A   • Number of children: N/A   • Years of education: N/A     Social History Main Topics   • Smoking status: Former Smoker     Quit date: 10/19/1972   • Smokeless tobacco: Never Used   • Alcohol use No   • Drug use: No   • Sexual activity: Not on file     Other Topics Concern   • Not on file     Social History Narrative   • No narrative on file       SURGICAL HISTORY  Past Surgical History:    Procedure Laterality Date   • CORONARY ARTERY BYPASS, 3     • OTHER CARDIAC SURGERY     • TED RECTAL ABSCESS         CURRENT MEDICATIONS  Home Medications     Reviewed by Anjum Burns (Pharmacy Tech) on 10/22/18 at 0827  Med List Status: Complete   Medication Last Dose Status   amlodipine (NORVASC) 5 MG TABS 10/21/2018 Active   aspirin EC (ECOTRIN) 81 MG Tablet Delayed Response 10/20/2018 Active   chlorthalidone (HYGROTON) 25 MG Tab 10/21/2018 Active   cyclosporin (RESTASIS) 0.05 % ophthalmic emulsion 10/21/2018 Active   Insulin Glargine (BASAGLAR KWIKPEN) 100 UNIT/ML Solution Pen-injector 10/21/2018 Active   metformin (GLUCOPHAGE) 500 MG TABS 10/21/2018 Active   metoprolol (LOPRESSOR) 50 MG Tab 10/21/2018 Active   Multiple Vitamins-Minerals (PX MENS MULTIVITAMINS) Tab 10/21/2018 Active   omega-3 acid ethyl esters (LOVAZA) 1 GM capsule 10/21/2018 Active   potassium chloride SA (K-DUR) 10 MEQ TBCR 10/21/2018 Active   rivaroxaban (XARELTO) 20 MG Tab tablet 10/21/2018 Active   simvastatin (ZOCOR) 10 MG Tab 10/20/2018 Active   Specialty Vitamins Products (PROSTATE) Tab 10/21/2018 Active   Specialty Vitamins Products (RETAINE VISION PO) 10/21/2018 Active                ALLERGIES  Allergies   Allergen Reactions   • Iodine      Pt and pts wife report that it has been so long not sure what happens       PHYSICAL EXAM  VITAL SIGNS: /70   Pulse 88   Temp 36.4 °C (97.5 °F)   Resp 18   Wt 80 kg (176 lb 5.9 oz)   SpO2 93%   BMI 27.62 kg/m²   Constitutional: Well developed, Well nourished, No acute distress, Non-toxic appearance.   HENT: Normocephalic, Atraumatic, Bilateral external ears normal, Oropharynx moist, No oral exudates, Nose normal.   Eyes: PERRLA, EOMI, Conjunctiva normal, No discharge.   Neck: Normal range of motion, No tenderness, Supple, No stridor.   Cardiovascular: Regular rate and rhythm.  No audible murmur  Thorax & Lungs: Clear to auscultation bilaterally.  No rales, rhonchi, or  wheezing.  Abdomen: Bowel sounds normal, Soft, No tenderness, No masses, No pulsatile masses.   Skin: Warm, Dry, No erythema, No rash.   Back: No tenderness, No CVA tenderness.   Extremities: Intact distal pulses, No tenderness, No cyanosis, No clubbing.  Atraumatic.  Neurologic: Alert & oriented x 3, Normal motor function, Normal sensory function, No focal deficits noted.     EKG      RADIOLOGY/PROCEDURES  DX-CHEST-PORTABLE (1 VIEW)   Final Result      1.  Cardiomegaly.      2.  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      CT-HEAD W/O   Final Result      1.  No evidence of acute intracranial process.      2.  Cerebral atrophy as well as periventricular chronic small vessel ischemic change.        Results for orders placed or performed during the hospital encounter of 10/22/18   CBC WITH DIFFERENTIAL   Result Value Ref Range    WBC 7.3 4.8 - 10.8 K/uL    RBC 4.62 (L) 4.70 - 6.10 M/uL    Hemoglobin 14.8 14.0 - 18.0 g/dL    Hematocrit 41.6 (L) 42.0 - 52.0 %    MCV 90.0 81.4 - 97.8 fL    MCH 32.0 27.0 - 33.0 pg    MCHC 35.6 (H) 33.7 - 35.3 g/dL    RDW 41.4 35.9 - 50.0 fL    Platelet Count 213 164 - 446 K/uL    MPV 9.9 9.0 - 12.9 fL    Neutrophils-Polys 67.70 44.00 - 72.00 %    Lymphocytes 18.00 (L) 22.00 - 41.00 %    Monocytes 10.60 0.00 - 13.40 %    Eosinophils 2.50 0.00 - 6.90 %    Basophils 0.70 0.00 - 1.80 %    Immature Granulocytes 0.50 0.00 - 0.90 %    Nucleated RBC 0.00 /100 WBC    Neutrophils (Absolute) 4.94 1.82 - 7.42 K/uL    Lymphs (Absolute) 1.31 1.00 - 4.80 K/uL    Monos (Absolute) 0.77 0.00 - 0.85 K/uL    Eos (Absolute) 0.18 0.00 - 0.51 K/uL    Baso (Absolute) 0.05 0.00 - 0.12 K/uL    Immature Granulocytes (abs) 0.04 0.00 - 0.11 K/uL    NRBC (Absolute) 0.00 K/uL   BASIC METABOLIC PANEL   Result Value Ref Range    Sodium 132 (L) 135 - 145 mmol/L    Potassium 3.1 (L) 3.6 - 5.5 mmol/L    Chloride 94 (L) 96 - 112 mmol/L    Co2 29 20 - 33 mmol/L    Glucose 182 (H) 65 - 99 mg/dL    Bun 14 8 - 22 mg/dL    Creatinine 0.72 0.50 -  1.40 mg/dL    Calcium 9.3 8.4 - 10.2 mg/dL    Anion Gap 9.0 0.0 - 11.9   PROTHROMBIN TIME   Result Value Ref Range    PT 16.3 (H) 12.0 - 14.6 sec    INR 1.33 (H) 0.87 - 1.13   APTT   Result Value Ref Range    APTT 33.1 24.7 - 36.0 sec   URINALYSIS CULTURE, IF INDICATED   Result Value Ref Range    Color Yellow     Character Clear     Specific Gravity 1.015 <1.035    Ph 7.0 5.0 - 8.0    Glucose 100 (A) Negative mg/dL    Ketones Trace (A) Negative mg/dL    Protein Negative Negative mg/dL    Bilirubin Negative Negative    Nitrite Negative Negative    Leukocyte Esterase Trace (A) Negative    Occult Blood Negative Negative    Micro Urine Req Microscopic    ESTIMATED GFR   Result Value Ref Range    GFR If African American >60 >60 mL/min/1.73 m 2    GFR If Non African American >60 >60 mL/min/1.73 m 2   TROPONINS - REHAB ONLY   Result Value Ref Range    Troponin I 0.04 0.00 - 0.04 ng/mL   URINE MICROSCOPIC (W/UA)   Result Value Ref Range    WBC 2-5 (A) /hpf    RBC 0-2 (A) /hpf    Bacteria Rare (A) None /hpf    Epithelial Cells Negative Few /hpf   EKG   Result Value Ref Range    Report       [facility] Emergency Dept.    Test Date:  2018-10-22  Pt Name:    SAMMY CLIFFORD                 Department: [facility]  MRN:        [id number]                      [facility]   Gender:     Male                         Technician: [facility]  :        [id number]                   Requested By:MARY IBRAHIM  Order #:    [id number]                    Reading MD: MARY IBRAHIM MD    Measurements  Intervals                                Axis  Rate:       70                           P:          4  MN:         236                          QRS:        -28  QRSD:       134                          T:          74  QT:         436  QTc:        471    Interpretive Statements  SINUS RHYTHM  FIRST DEGREE AV BLOCK  RBBB AND LAFB  Compared to ECG 2017 09:27:09  First degree AV block now present  Left anterior fascicular  block now present  Atrial flutter no longer present  Left ventricular hypertrophy no longer present  ST segment changes similar to prior study.    Elec tronically Signed On 10- 13:17:49 PDT by MARY IBRAHIM MD           COURSE & MEDICAL DECISION MAKING  Pertinent Labs & Imaging studies reviewed. (See chart for details)    Patient presents today after a fall.  Details of the fall are somewhat unclear.  The patient does not remember.  Does not have any external evidence of trauma.  Due to the fact the patient is on blood thinner I have obtained a CT scan of the head in order to evaluate for intracranial hemorrhage.  CT scan was negative.  Laboratory studies are otherwise unremarkable.  EKG is similar to prior study.    Patient has done well in the emergency department.  He feels like he is at his baseline.  We did a trial of ambulation the patient was able to ambulate with a walker as he would at home.  I have encouraged the patient to use his assistive devices to avoid falls.  He is to take whatever measures possible to minimize his risk of fall.  Information sheet is provided.  The patient is discharged home in stable condition.    The patient will return for new or worsening symptoms and is stable at the time of discharge.    The patient is referred to a primary physician for blood pressure management, diabetic screening, and for all other preventative health concerns.    DISPOSITION:  Patient will be discharged home in stable condition.    FOLLOW UP:  [facility], Emergency Dept  [address] [facility] [address]  [phone]    If symptoms worsen    JONO Powell  [address] [facility] [address]  [phone]    Schedule an appointment as soon as possible for a visit         OUTPATIENT MEDICATIONS:  Discharge Medication List as of 10/22/2018 10:39 AM          FINAL IMPRESSION  1. Fall, initial encounter    2. Generalized weakness    3. Debility               Electronically signed by: Andre Young, 10/22/2018 1:13 PM

## 2018-10-22 NOTE — ED NOTES
Med rec updated and complete  Allergies reviewed  Interviewed pt with wife at bedside with permission from pt.  Pts wife had a list of medications, went over list of medications and returned list of medications back to pts wife.  Pt reports no antibiotics in the last 30 days.

## 2018-10-22 NOTE — ED NOTES
Discharge instructions provided.  Pt verbalized the understanding of discharge instructions to follow up with PCP and to return to ER if condition worsens.  Pt wheeled out of ED via wheelchair with friend.

## 2018-10-24 ENCOUNTER — PATIENT OUTREACH (OUTPATIENT)
Dept: HEALTH INFORMATION MANAGEMENT | Facility: OTHER | Age: 83
End: 2018-10-24

## 2018-11-03 ENCOUNTER — HOSPITAL ENCOUNTER (INPATIENT)
Facility: MEDICAL CENTER | Age: 83
LOS: 1 days | DRG: 690 | End: 2018-11-06
Attending: EMERGENCY MEDICINE | Admitting: FAMILY MEDICINE
Payer: MEDICARE

## 2018-11-03 DIAGNOSIS — E11.9 TYPE 2 DIABETES MELLITUS WITHOUT COMPLICATION, UNSPECIFIED WHETHER LONG TERM INSULIN USE (HCC): ICD-10-CM

## 2018-11-03 DIAGNOSIS — I48.92 ATRIAL FLUTTER, UNSPECIFIED TYPE (HCC): ICD-10-CM

## 2018-11-03 LAB
ALBUMIN SERPL BCP-MCNC: 4 G/DL (ref 3.2–4.9)
ALBUMIN/GLOB SERPL: 1.3 G/DL
ALP SERPL-CCNC: 41 U/L (ref 30–99)
ALT SERPL-CCNC: 33 U/L (ref 2–50)
ANION GAP SERPL CALC-SCNC: 15 MMOL/L (ref 0–11.9)
APPEARANCE UR: CLEAR
AST SERPL-CCNC: 27 U/L (ref 12–45)
BACTERIA #/AREA URNS HPF: ABNORMAL /HPF
BASOPHILS # BLD AUTO: 0.3 % (ref 0–1.8)
BASOPHILS # BLD: 0.04 K/UL (ref 0–0.12)
BILIRUB SERPL-MCNC: 0.9 MG/DL (ref 0.1–1.5)
BILIRUB UR QL STRIP.AUTO: NEGATIVE
BUN SERPL-MCNC: 18 MG/DL (ref 8–22)
CALCIUM SERPL-MCNC: 9.6 MG/DL (ref 8.4–10.2)
CHLORIDE SERPL-SCNC: 92 MMOL/L (ref 96–112)
CO2 SERPL-SCNC: 24 MMOL/L (ref 20–33)
COLOR UR: YELLOW
CREAT SERPL-MCNC: 0.8 MG/DL (ref 0.5–1.4)
EOSINOPHIL # BLD AUTO: 0.03 K/UL (ref 0–0.51)
EOSINOPHIL NFR BLD: 0.2 % (ref 0–6.9)
EPI CELLS #/AREA URNS HPF: ABNORMAL /HPF
ERYTHROCYTE [DISTWIDTH] IN BLOOD BY AUTOMATED COUNT: 43.1 FL (ref 35.9–50)
GLOBULIN SER CALC-MCNC: 3 G/DL (ref 1.9–3.5)
GLUCOSE SERPL-MCNC: 173 MG/DL (ref 65–99)
GLUCOSE UR STRIP.AUTO-MCNC: 250 MG/DL
HCT VFR BLD AUTO: 42.1 % (ref 42–52)
HGB BLD-MCNC: 14.5 G/DL (ref 14–18)
IMM GRANULOCYTES # BLD AUTO: 0.06 K/UL (ref 0–0.11)
IMM GRANULOCYTES NFR BLD AUTO: 0.5 % (ref 0–0.9)
KETONES UR STRIP.AUTO-MCNC: 15 MG/DL
LEUKOCYTE ESTERASE UR QL STRIP.AUTO: ABNORMAL
LYMPHOCYTES # BLD AUTO: 0.91 K/UL (ref 1–4.8)
LYMPHOCYTES NFR BLD: 7.6 % (ref 22–41)
MAGNESIUM SERPL-MCNC: 1.6 MG/DL (ref 1.5–2.5)
MCH RBC QN AUTO: 31.3 PG (ref 27–33)
MCHC RBC AUTO-ENTMCNC: 34.4 G/DL (ref 33.7–35.3)
MCV RBC AUTO: 90.9 FL (ref 81.4–97.8)
MICRO URNS: ABNORMAL
MONOCYTES # BLD AUTO: 1 K/UL (ref 0–0.85)
MONOCYTES NFR BLD AUTO: 8.3 % (ref 0–13.4)
MUCOUS THREADS #/AREA URNS HPF: ABNORMAL /HPF
NEUTROPHILS # BLD AUTO: 10.01 K/UL (ref 1.82–7.42)
NEUTROPHILS NFR BLD: 83.1 % (ref 44–72)
NITRITE UR QL STRIP.AUTO: NEGATIVE
NRBC # BLD AUTO: 0 K/UL
NRBC BLD-RTO: 0 /100 WBC
PH UR STRIP.AUTO: 7 [PH]
PHOSPHATE SERPL-MCNC: 3.3 MG/DL (ref 2.5–4.5)
PLATELET # BLD AUTO: 219 K/UL (ref 164–446)
PMV BLD AUTO: 9.9 FL (ref 9–12.9)
POTASSIUM SERPL-SCNC: 3.2 MMOL/L (ref 3.6–5.5)
PROT SERPL-MCNC: 7 G/DL (ref 6–8.2)
PROT UR QL STRIP: NEGATIVE MG/DL
RBC # BLD AUTO: 4.63 M/UL (ref 4.7–6.1)
RBC # URNS HPF: ABNORMAL /HPF
RBC UR QL AUTO: ABNORMAL
SODIUM SERPL-SCNC: 131 MMOL/L (ref 135–145)
SP GR UR STRIP.AUTO: 1.01
WBC # BLD AUTO: 12.1 K/UL (ref 4.8–10.8)
WBC #/AREA URNS HPF: ABNORMAL /HPF

## 2018-11-03 PROCEDURE — A9270 NON-COVERED ITEM OR SERVICE: HCPCS | Performed by: EMERGENCY MEDICINE

## 2018-11-03 PROCEDURE — 36415 COLL VENOUS BLD VENIPUNCTURE: CPT

## 2018-11-03 PROCEDURE — 84100 ASSAY OF PHOSPHORUS: CPT

## 2018-11-03 PROCEDURE — 94760 N-INVAS EAR/PLS OXIMETRY 1: CPT

## 2018-11-03 PROCEDURE — 83735 ASSAY OF MAGNESIUM: CPT

## 2018-11-03 PROCEDURE — 93005 ELECTROCARDIOGRAM TRACING: CPT | Performed by: EMERGENCY MEDICINE

## 2018-11-03 PROCEDURE — 85025 COMPLETE CBC W/AUTO DIFF WBC: CPT

## 2018-11-03 PROCEDURE — 700102 HCHG RX REV CODE 250 W/ 637 OVERRIDE(OP): Performed by: EMERGENCY MEDICINE

## 2018-11-03 PROCEDURE — 81001 URINALYSIS AUTO W/SCOPE: CPT

## 2018-11-03 PROCEDURE — 80053 COMPREHEN METABOLIC PANEL: CPT

## 2018-11-03 PROCEDURE — 99285 EMERGENCY DEPT VISIT HI MDM: CPT

## 2018-11-03 RX ORDER — POTASSIUM CHLORIDE 20 MEQ/1
40 TABLET, EXTENDED RELEASE ORAL ONCE
Status: COMPLETED | OUTPATIENT
Start: 2018-11-03 | End: 2018-11-03

## 2018-11-03 RX ORDER — CEPHALEXIN 250 MG/1
500 CAPSULE ORAL ONCE
Status: COMPLETED | OUTPATIENT
Start: 2018-11-03 | End: 2018-11-03

## 2018-11-03 RX ADMIN — POTASSIUM CHLORIDE 40 MEQ: 1500 TABLET, EXTENDED RELEASE ORAL at 23:50

## 2018-11-03 RX ADMIN — CEPHALEXIN 500 MG: 250 CAPSULE ORAL at 23:50

## 2018-11-03 ASSESSMENT — PAIN SCALES - GENERAL: PAINLEVEL_OUTOF10: 0

## 2018-11-04 ENCOUNTER — APPOINTMENT (OUTPATIENT)
Dept: RADIOLOGY | Facility: MEDICAL CENTER | Age: 83
DRG: 690 | End: 2018-11-04
Attending: HOSPITALIST
Payer: MEDICARE

## 2018-11-04 PROBLEM — R53.1 GENERALIZED WEAKNESS: Status: ACTIVE | Noted: 2018-11-04

## 2018-11-04 PROBLEM — N39.0 UTI (URINARY TRACT INFECTION): Status: ACTIVE | Noted: 2018-11-04

## 2018-11-04 PROBLEM — R33.9 URINARY RETENTION: Status: ACTIVE | Noted: 2018-11-04

## 2018-11-04 LAB
ANION GAP SERPL CALC-SCNC: 7 MMOL/L (ref 0–11.9)
BUN SERPL-MCNC: 14 MG/DL (ref 8–22)
CALCIUM SERPL-MCNC: 8.6 MG/DL (ref 8.4–10.2)
CHLORIDE SERPL-SCNC: 96 MMOL/L (ref 96–112)
CO2 SERPL-SCNC: 27 MMOL/L (ref 20–33)
CREAT SERPL-MCNC: 0.69 MG/DL (ref 0.5–1.4)
GLUCOSE BLD-MCNC: 136 MG/DL (ref 65–99)
GLUCOSE BLD-MCNC: 148 MG/DL (ref 65–99)
GLUCOSE BLD-MCNC: 157 MG/DL (ref 65–99)
GLUCOSE BLD-MCNC: 200 MG/DL (ref 65–99)
GLUCOSE SERPL-MCNC: 189 MG/DL (ref 65–99)
POTASSIUM SERPL-SCNC: 3 MMOL/L (ref 3.6–5.5)
SODIUM SERPL-SCNC: 130 MMOL/L (ref 135–145)

## 2018-11-04 PROCEDURE — 700111 HCHG RX REV CODE 636 W/ 250 OVERRIDE (IP): Performed by: HOSPITALIST

## 2018-11-04 PROCEDURE — 700102 HCHG RX REV CODE 250 W/ 637 OVERRIDE(OP): Performed by: HOSPITALIST

## 2018-11-04 PROCEDURE — G0378 HOSPITAL OBSERVATION PER HR: HCPCS

## 2018-11-04 PROCEDURE — 700101 HCHG RX REV CODE 250: Performed by: FAMILY MEDICINE

## 2018-11-04 PROCEDURE — A9270 NON-COVERED ITEM OR SERVICE: HCPCS | Performed by: INTERNAL MEDICINE

## 2018-11-04 PROCEDURE — 700102 HCHG RX REV CODE 250 W/ 637 OVERRIDE(OP): Performed by: FAMILY MEDICINE

## 2018-11-04 PROCEDURE — 96365 THER/PROPH/DIAG IV INF INIT: CPT

## 2018-11-04 PROCEDURE — 700102 HCHG RX REV CODE 250 W/ 637 OVERRIDE(OP)

## 2018-11-04 PROCEDURE — A9270 NON-COVERED ITEM OR SERVICE: HCPCS

## 2018-11-04 PROCEDURE — 700105 HCHG RX REV CODE 258: Performed by: HOSPITALIST

## 2018-11-04 PROCEDURE — 700102 HCHG RX REV CODE 250 W/ 637 OVERRIDE(OP): Performed by: INTERNAL MEDICINE

## 2018-11-04 PROCEDURE — A9270 NON-COVERED ITEM OR SERVICE: HCPCS | Performed by: HOSPITALIST

## 2018-11-04 PROCEDURE — 82962 GLUCOSE BLOOD TEST: CPT | Mod: 91

## 2018-11-04 PROCEDURE — 99220 PR INITIAL OBSERVATION CARE,LEVL III: CPT | Performed by: FAMILY MEDICINE

## 2018-11-04 PROCEDURE — 80048 BASIC METABOLIC PNL TOTAL CA: CPT

## 2018-11-04 PROCEDURE — 70486 CT MAXILLOFACIAL W/O DYE: CPT

## 2018-11-04 PROCEDURE — A9270 NON-COVERED ITEM OR SERVICE: HCPCS | Performed by: FAMILY MEDICINE

## 2018-11-04 RX ORDER — CEPHALEXIN 250 MG/1
250 CAPSULE ORAL EVERY 6 HOURS
Status: DISCONTINUED | OUTPATIENT
Start: 2018-11-04 | End: 2018-11-04

## 2018-11-04 RX ORDER — POTASSIUM CHLORIDE 20 MEQ/1
40 TABLET, EXTENDED RELEASE ORAL ONCE
Status: DISCONTINUED | OUTPATIENT
Start: 2018-11-04 | End: 2018-11-04

## 2018-11-04 RX ORDER — INSULIN GLARGINE 100 [IU]/ML
0.2 INJECTION, SOLUTION SUBCUTANEOUS EVERY EVENING
Status: DISCONTINUED | OUTPATIENT
Start: 2018-11-04 | End: 2018-11-04

## 2018-11-04 RX ORDER — POLYVINYL ALCOHOL 14 MG/ML
1 SOLUTION/ DROPS OPHTHALMIC
Status: DISCONTINUED | OUTPATIENT
Start: 2018-11-04 | End: 2018-11-06 | Stop reason: HOSPADM

## 2018-11-04 RX ORDER — LABETALOL HYDROCHLORIDE 5 MG/ML
10 INJECTION, SOLUTION INTRAVENOUS EVERY 4 HOURS PRN
Status: DISCONTINUED | OUTPATIENT
Start: 2018-11-04 | End: 2018-11-06 | Stop reason: HOSPADM

## 2018-11-04 RX ORDER — OMEGA-3-ACID ETHYL ESTERS 1 G/1
2 CAPSULE, LIQUID FILLED ORAL 2 TIMES DAILY
Status: DISCONTINUED | OUTPATIENT
Start: 2018-11-04 | End: 2018-11-04

## 2018-11-04 RX ORDER — INSULIN GLARGINE 100 [IU]/ML
30 INJECTION, SOLUTION SUBCUTANEOUS EVERY EVENING
Status: DISCONTINUED | OUTPATIENT
Start: 2018-11-04 | End: 2018-11-06 | Stop reason: HOSPADM

## 2018-11-04 RX ORDER — ACETAMINOPHEN 325 MG/1
650 TABLET ORAL EVERY 6 HOURS PRN
Status: DISCONTINUED | OUTPATIENT
Start: 2018-11-04 | End: 2018-11-06 | Stop reason: HOSPADM

## 2018-11-04 RX ORDER — CYCLOSPORINE 0.5 MG/ML
1 EMULSION OPHTHALMIC 2 TIMES DAILY
Status: DISCONTINUED | OUTPATIENT
Start: 2018-11-04 | End: 2018-11-04

## 2018-11-04 RX ORDER — TAMSULOSIN HYDROCHLORIDE 0.4 MG/1
0.4 CAPSULE ORAL DAILY
Status: DISCONTINUED | OUTPATIENT
Start: 2018-11-05 | End: 2018-11-04

## 2018-11-04 RX ORDER — INSULIN GLARGINE 100 [IU]/ML
30 INJECTION, SOLUTION SUBCUTANEOUS DAILY
Status: DISCONTINUED | OUTPATIENT
Start: 2018-11-04 | End: 2018-11-04

## 2018-11-04 RX ORDER — ONDANSETRON 2 MG/ML
4 INJECTION INTRAMUSCULAR; INTRAVENOUS EVERY 4 HOURS PRN
Status: DISCONTINUED | OUTPATIENT
Start: 2018-11-04 | End: 2018-11-06 | Stop reason: HOSPADM

## 2018-11-04 RX ORDER — MULTIVIT-MINERALS/FA/LYCOPENE 0.4 MG-6
1 TABLET ORAL DAILY
Status: DISCONTINUED | OUTPATIENT
Start: 2018-11-04 | End: 2018-11-04

## 2018-11-04 RX ORDER — METOPROLOL TARTRATE 50 MG/1
50 TABLET, FILM COATED ORAL 2 TIMES DAILY
Status: DISCONTINUED | OUTPATIENT
Start: 2018-11-04 | End: 2018-11-06 | Stop reason: HOSPADM

## 2018-11-04 RX ORDER — ACETAMINOPHEN 500 MG
TABLET ORAL
Status: COMPLETED
Start: 2018-11-04 | End: 2018-11-04

## 2018-11-04 RX ORDER — DEXTROSE MONOHYDRATE 25 G/50ML
25 INJECTION, SOLUTION INTRAVENOUS
Status: DISCONTINUED | OUTPATIENT
Start: 2018-11-04 | End: 2018-11-06 | Stop reason: HOSPADM

## 2018-11-04 RX ORDER — SODIUM CHLORIDE AND POTASSIUM CHLORIDE 150; 900 MG/100ML; MG/100ML
INJECTION, SOLUTION INTRAVENOUS CONTINUOUS
Status: DISCONTINUED | OUTPATIENT
Start: 2018-11-04 | End: 2018-11-06 | Stop reason: HOSPADM

## 2018-11-04 RX ORDER — DIPHENHYDRAMINE HCL 25 MG
TABLET ORAL
Status: COMPLETED
Start: 2018-11-04 | End: 2018-11-04

## 2018-11-04 RX ORDER — TAMSULOSIN HYDROCHLORIDE 0.4 MG/1
0.4 CAPSULE ORAL DAILY
Status: DISCONTINUED | OUTPATIENT
Start: 2018-11-04 | End: 2018-11-06 | Stop reason: HOSPADM

## 2018-11-04 RX ORDER — SIMVASTATIN 10 MG
10 TABLET ORAL NIGHTLY
Status: DISCONTINUED | OUTPATIENT
Start: 2018-11-04 | End: 2018-11-06 | Stop reason: HOSPADM

## 2018-11-04 RX ORDER — POTASSIUM CHLORIDE 20 MEQ/1
10 TABLET, EXTENDED RELEASE ORAL DAILY
Status: DISCONTINUED | OUTPATIENT
Start: 2018-11-04 | End: 2018-11-04

## 2018-11-04 RX ORDER — CHLORTHALIDONE 25 MG/1
25 TABLET ORAL 2 TIMES DAILY
Status: DISCONTINUED | OUTPATIENT
Start: 2018-11-04 | End: 2018-11-04

## 2018-11-04 RX ORDER — AMLODIPINE BESYLATE 5 MG/1
5 TABLET ORAL 2 TIMES DAILY
Status: DISCONTINUED | OUTPATIENT
Start: 2018-11-04 | End: 2018-11-06 | Stop reason: HOSPADM

## 2018-11-04 RX ADMIN — INSULIN LISPRO 5 UNITS: 100 INJECTION, SOLUTION INTRAVENOUS; SUBCUTANEOUS at 11:49

## 2018-11-04 RX ADMIN — SIMVASTATIN 10 MG: 10 TABLET, FILM COATED ORAL at 22:01

## 2018-11-04 RX ADMIN — CEPHALEXIN 250 MG: 250 CAPSULE ORAL at 06:45

## 2018-11-04 RX ADMIN — AMLODIPINE BESYLATE 5 MG: 5 TABLET ORAL at 06:44

## 2018-11-04 RX ADMIN — POTASSIUM CHLORIDE AND SODIUM CHLORIDE: 900; 150 INJECTION, SOLUTION INTRAVENOUS at 17:39

## 2018-11-04 RX ADMIN — CHLORTHALIDONE 25 MG: 25 TABLET ORAL at 06:45

## 2018-11-04 RX ADMIN — DIPHENHYDRAMINE HCL 25 MG: 25 TABLET ORAL at 01:30

## 2018-11-04 RX ADMIN — INSULIN GLARGINE 30 UNITS: 100 INJECTION, SOLUTION SUBCUTANEOUS at 17:42

## 2018-11-04 RX ADMIN — RIVAROXABAN 20 MG: 20 TABLET, FILM COATED ORAL at 17:28

## 2018-11-04 RX ADMIN — POTASSIUM BICARBONATE 25 MEQ: 25 TABLET, EFFERVESCENT ORAL at 17:28

## 2018-11-04 RX ADMIN — TAMSULOSIN HYDROCHLORIDE 0.4 MG: 0.4 CAPSULE ORAL at 22:01

## 2018-11-04 RX ADMIN — METFORMIN HYDROCHLORIDE 500 MG: 500 TABLET, FILM COATED ORAL at 17:29

## 2018-11-04 RX ADMIN — AMPICILLIN SODIUM AND SULBACTAM SODIUM 3 G: 2; 1 INJECTION, POWDER, FOR SOLUTION INTRAMUSCULAR; INTRAVENOUS at 17:30

## 2018-11-04 RX ADMIN — METFORMIN HYDROCHLORIDE 500 MG: 500 TABLET, FILM COATED ORAL at 06:45

## 2018-11-04 RX ADMIN — INSULIN LISPRO 1 UNITS: 100 INJECTION, SOLUTION INTRAVENOUS; SUBCUTANEOUS at 07:01

## 2018-11-04 RX ADMIN — ACETAMINOPHEN 1000 MG: 500 TABLET ORAL at 01:20

## 2018-11-04 RX ADMIN — AMLODIPINE BESYLATE 5 MG: 5 TABLET ORAL at 17:29

## 2018-11-04 RX ADMIN — POTASSIUM BICARBONATE 25 MEQ: 25 TABLET, EFFERVESCENT ORAL at 12:38

## 2018-11-04 RX ADMIN — METOPROLOL TARTRATE 50 MG: 50 TABLET ORAL at 17:28

## 2018-11-04 RX ADMIN — POTASSIUM CHLORIDE 10 MEQ: 1500 TABLET, EXTENDED RELEASE ORAL at 06:45

## 2018-11-04 RX ADMIN — ASPIRIN 81 MG: 81 TABLET, COATED ORAL at 17:28

## 2018-11-04 RX ADMIN — POTASSIUM CHLORIDE AND SODIUM CHLORIDE 1000 ML: 900; 150 INJECTION, SOLUTION INTRAVENOUS at 05:04

## 2018-11-04 RX ADMIN — INSULIN LISPRO 5 UNITS: 100 INJECTION, SOLUTION INTRAVENOUS; SUBCUTANEOUS at 06:56

## 2018-11-04 RX ADMIN — CEPHALEXIN 250 MG: 250 CAPSULE ORAL at 12:37

## 2018-11-04 RX ADMIN — INSULIN LISPRO 1 UNITS: 100 INJECTION, SOLUTION INTRAVENOUS; SUBCUTANEOUS at 11:50

## 2018-11-04 ASSESSMENT — CHA2DS2 SCORE
SEX: MALE
VASCULAR DISEASE: NO
AGE 75 OR GREATER: YES
PRIOR STROKE OR TIA OR THROMBOEMBOLISM: YES
CHF OR LEFT VENTRICULAR DYSFUNCTION: YES
DIABETES: YES
CHA2DS2 VASC SCORE: 7
HYPERTENSION: YES
AGE 65 TO 74: NO

## 2018-11-04 ASSESSMENT — COGNITIVE AND FUNCTIONAL STATUS - GENERAL
TOILETING: A LITTLE
SUGGESTED CMS G CODE MODIFIER DAILY ACTIVITY: CJ
DAILY ACTIVITIY SCORE: 22
MOVING TO AND FROM BED TO CHAIR: A LITTLE
MOVING FROM LYING ON BACK TO SITTING ON SIDE OF FLAT BED: A LITTLE
SUGGESTED CMS G CODE MODIFIER MOBILITY: CK
CLIMB 3 TO 5 STEPS WITH RAILING: A LITTLE
STANDING UP FROM CHAIR USING ARMS: A LITTLE
WALKING IN HOSPITAL ROOM: A LITTLE
DRESSING REGULAR UPPER BODY CLOTHING: A LITTLE
MOBILITY SCORE: 19

## 2018-11-04 ASSESSMENT — ENCOUNTER SYMPTOMS
PSYCHIATRIC NEGATIVE: 1
GASTROINTESTINAL NEGATIVE: 1
WEAKNESS: 1
RESPIRATORY NEGATIVE: 1
CARDIOVASCULAR NEGATIVE: 1

## 2018-11-04 ASSESSMENT — LIFESTYLE VARIABLES: EVER_SMOKED: YES

## 2018-11-04 ASSESSMENT — PAIN SCALES - GENERAL
PAINLEVEL_OUTOF10: 0

## 2018-11-04 ASSESSMENT — PATIENT HEALTH QUESTIONNAIRE - PHQ9
1. LITTLE INTEREST OR PLEASURE IN DOING THINGS: NOT AT ALL
SUM OF ALL RESPONSES TO PHQ9 QUESTIONS 1 AND 2: 0
2. FEELING DOWN, DEPRESSED, IRRITABLE, OR HOPELESS: NOT AT ALL

## 2018-11-04 NOTE — PROGRESS NOTES
Report received from NOC, Pt resting in bed, awake and alert, POC reviewed, Pt up with 2 person assist to void, denies pain, fall precautions and safety precautions in place, call light within reach, bed alarm on.

## 2018-11-04 NOTE — H&P
Hospital Medicine History & Physical Note    Date of Service  11/4/2018    Primary Care Physician  JONO Powell      Code Status  Full code    Chief Complaint  Weakness    History of Presenting Illness  Patient is an 89 year old male who comes to the ER because of generalized weakness and difficulty with ambulation. Patient says that over the past 2 weeks, he has been feeling increasingly weak and unsteady when he walks. He uses a walker for ambulation but still feels like he is going to fall. He was in the ER about 2 weeks ago after he fell at home due to weakness. Today, he feels like he is unsafe to be at home because he might fall again. Patient is also on Xarelto at home although it is unknown why patient is on Xarelto. In the ER, he has mild leukocytosis of 12.1. He is afebrile. UA is positive for a UTI. Potassium is 3.2. Sodium is 3.2.     Review of Systems  Review of Systems   Respiratory: Negative.    Cardiovascular: Negative.    Gastrointestinal: Negative.    Genitourinary:        Urinary retention   Neurological: Positive for weakness.   Psychiatric/Behavioral: Negative.    All other systems reviewed and are negative.      Past Medical History   has a past medical history of CAD (coronary artery disease); DM (diabetes mellitus) ([facility]) (1/7/2014); Falls; and Hypertension. He also has no past medical history of ASTHMA; Cancer ([facility]); Congestive heart failure (HCC); COPD; Infectious disease; Liver disease; Psychiatric disorder; Renal disorder; Seizure disorder (HCC); or Stroke ([facility]).    Surgical History   has a past surgical history that includes other cardiac surgery; coronary artery bypass, 3; and janice rectal abscess.     Family History  family history is not on file.     Social History   reports that he quit smoking about 46 years ago. He has never used smokeless tobacco. He reports that he does not drink alcohol or use drugs.    Allergies  Allergies   Allergen Reactions   • Iodine      Pt and  pts wife report that it has been so long not sure what happens       Medications  Prior to Admission Medications   Prescriptions Last Dose Informant Patient Reported? Taking?   Insulin Glargine (BASAGLAR KWIKPEN) 100 UNIT/ML Solution Pen-injector 11/2/2018 at 1430 Patient Yes No   Sig: Inject 30 Units as instructed every day.   Multiple Vitamins-Minerals (PX MENS MULTIVITAMINS) Tab 11/2/2018 at 0800 Patient Yes No   Sig: Take 1 Tab by mouth every day.   Specialty Vitamins Products (PROSTATE) Tab 11/2/2018 at 0800 Patient Yes No   Sig: Take 1 Tab by mouth every day.   Specialty Vitamins Products (RETAINE VISION PO) 11/2/2018 at 0800 Patient Yes No   Sig: Take 2 Tabs by mouth 2 Times a Day.   amlodipine (NORVASC) 5 MG TABS 11/2/2018 at 0800 Patient No No   Sig: Take 1 Tab by mouth 2 Times a Day.   aspirin EC (ECOTRIN) 81 MG Tablet Delayed Response 11/2/2018 at PM Patient Yes No   Sig: Take 81 mg by mouth every evening.   chlorthalidone (HYGROTON) 25 MG Tab 11/2/2018 at 0800 Patient Yes No   Sig: Take 25 mg by mouth 2 Times a Day.   cyclosporin (RESTASIS) 0.05 % ophthalmic emulsion 11/2/2018 at 0800 Patient Yes No   Sig: Place 1 Drop in both eyes 2 times a day.   metformin (GLUCOPHAGE) 500 MG TABS 11/2/2018 at 0800 Patient Yes No   Sig: Take 500 mg by mouth 2 times a day, with meals.   metoprolol (LOPRESSOR) 50 MG Tab 11/2/2018 at 0800 Patient Yes No   Sig: Take 50 mg by mouth 2 times a day.   omega-3 acid ethyl esters (LOVAZA) 1 GM capsule 11/2/2018 at 0800 Patient No No   Sig: Take 2 Caps by mouth 2 Times a Day.   potassium chloride SA (K-DUR) 10 MEQ TBCR 11/2/2018 at 0800 Patient No No   Sig: Take 1 Tab by mouth every day.   rivaroxaban (XARELTO) 20 MG Tab tablet 11/2/2018 at 1800 Patient Yes No   Sig: Take 20 mg by mouth with dinner.   simvastatin (ZOCOR) 10 MG Tab 11/2/2018 at PM Patient Yes No   Sig: Take 10 mg by mouth every evening.      Facility-Administered Medications: None       Physical Exam  Temp:  [36.7  °C (98 °F)] 36.7 °C (98 °F)  Pulse:  [72-90] 73  Resp:  [16-20] 20  BP: (145)/(72) 145/72    Physical Exam   Gen: NAD, lying comfortably in bed  HEENT: NC/AT, MMM  Chest: symmetrical expansion, no costochondral tenderness on palpation  Lungs: CTA B/L, no w/r/r  CV: +S1, S2, RRR  Abdomen: S/NT/ND, + BS x 4  Ext: no c/c/e, FROM x 4    Skin: dry, warm to touch   Neuro: CN II - XII intact, no focal motor or sensory deficits noted  Psych: A+O x 3, judgment is intact    Laboratory:  Recent Labs      11/03/18   2225   WBC  12.1*   RBC  4.63*   HEMOGLOBIN  14.5   HEMATOCRIT  42.1   MCV  90.9   MCH  31.3   MCHC  34.4   RDW  43.1   PLATELETCT  219   MPV  9.9     Recent Labs      11/03/18   2225   SODIUM  131*   POTASSIUM  3.2*   CHLORIDE  92*   CO2  24   GLUCOSE  173*   BUN  18   CREATININE  0.80   CALCIUM  9.6     Recent Labs      11/03/18   2225   ALTSGPT  33   ASTSGOT  27   ALKPHOSPHAT  41   TBILIRUBIN  0.9   GLUCOSE  173*                 No results for input(s): TROPONINI in the last 72 hours.    Urinalysis:    Recent Labs      11/03/18   2242   SPECGRAVITY  1.015   GLUCOSEUR  250*   KETONES  15*   NITRITE  Negative   LEUKESTERAS  Trace*   WBCURINE  5-10*   RBCURINE  5-10*   BACTERIA  Few*   EPITHELCELL  Few            Assessment/Plan:      Generalized weakness   Assessment & Plan    Patient with progressively worsening weakness over the past 2 weeks. He is having difficulty with ambulation even with his walker and he is worried that he will fall at home again like he did 2 weeks ago. PT/OT ordered. May benefit by being transferred to a rehab facility for conditioning.      UTI (urinary tract infection)   Assessment & Plan    UA is positive for a UTI with mild leukocytosis. Continue Keflex. Urine culture pending     Urinary retention   Assessment & Plan    Sher catheter in place and draining urine. Most likely due to BPH     DM (diabetes mellitus) (CMS-HCC)- (present on admission)   Assessment & Plan    Continue Lantus  and metformin. Started on insulin sliding scale     Hypokalemia- (present on admission)   Assessment & Plan    Current K - 3.2. Will supplement with oral an IV KCl. Repeat BMP in morning     Atrial flutter (HCC)- (present on admission)   Assessment & Plan    Most likely on Xarelto for history of atrial flutter. Currently rate controlled on metoprolol.      HTN (hypertension)- (present on admission)   Assessment & Plan    BP stable at this time. Continue meds

## 2018-11-04 NOTE — CARE PLAN
Problem: Communication  Goal: The ability to communicate needs accurately and effectively will improve    Intervention: Educate patient and significant other/support system about the plan of care, procedures, treatments, medications and allow for questions  Pt VU about POC for the day. CT scan ordered by Dr Holly and reviewed with Pt. Medications reviewed. Pt orientated to call light and room.       Problem: Urinary Elimination:  Goal: Ability to reestablish a normal urinary elimination pattern will improve    Intervention: Assist patient to sit on commode or toilet for voiding  Pt able to stand and use urinal also has sat on BSC to void and have BM. Pt voiding frequently r/t UTI. Condom catheter offered, Pt refuses at this time.

## 2018-11-04 NOTE — PROGRESS NOTES
Report given to Maribel GIRON. POC discussed. Pt resting comfortably in bed. Safety precautions in place.

## 2018-11-04 NOTE — PROGRESS NOTES
Pt self turning in bed, attempting to stand with frequency to use urinal, requiring one person assist. Warm blankets given, Call light and fall precautions reviewed with Pt, bed alarm on.

## 2018-11-04 NOTE — ED PROVIDER NOTES
ED Provider Note    CHIEF COMPLAINT  Chief Complaint   Patient presents with   • Extremity Weakness     the pt is c/o ongoing ble weakness.    • Urinary Retention     the pt is also c/o ongoing difficulty w/ emptying bladder       HPI  Julien Dao is a 89 y.o. male with a past medical history of coronary artery disease and insulin-dependent diabetes who presents with a chief complaint of bilateral lower extremity weakness.  The patient reports he is having difficulty ambulating recently and was recently seen in the emergency department after a fall.  Tonight, he developed worsening weakness and shaking in his bilateral lower extremities that prevented him from walking like normal.  He does have a walker and was trying to use it to get to the bed and his lady friend with whom he lives, was trying to steady him as well but he nearly fell because he was so unsteady.  He denies any numbness in his lower extremities and further denies any other medical issues beyond difficulty emptying his bladder which is chronic for him.  His friend feels that he is unsafe at home and the patient is concerned he is going to fall.  He is currently anticoagulated on Xarelto for an unknown reason.  He follows up with Dr. Figueroa, cardiology, who is currently trying to ascertain the reason why his prior cardiologist put him on Xarelto.  No new numbness in his lower extremities.  No saddle anesthesia.  No IV drug abuse.  No fevers.    REVIEW OF SYSTEMS  See HPI for further details.  Bilateral lower extremity weakness.  Urinary retention.  All other systems are negative.     PAST MEDICAL HISTORY   has a past medical history of CAD (coronary artery disease); DM (diabetes mellitus) (CMS-HCC) ([facility]) (1/7/2014); Falls; and Hypertension.    SOCIAL HISTORY  Social History     Social History Main Topics   • Smoking status: Former Smoker     Quit date: 10/19/1972   • Smokeless tobacco: Never Used   • Alcohol use No   • Drug use: No   • Sexual  "activity: Not on file       SURGICAL HISTORY   has a past surgical history that includes other cardiac surgery; coronary artery bypass, 3; and janice rectal abscess.    CURRENT MEDICATIONS  Home Medications    **Home medications have not yet been reviewed for this encounter**         ALLERGIES  Allergies   Allergen Reactions   • Iodine      Pt and pts wife report that it has been so long not sure what happens       PHYSICAL EXAM  VITAL SIGNS: /72   Pulse 78   Temp 36.7 °C (98 °F)   Resp 16   Ht 1.702 m (5' 7\")   Wt 74 kg (163 lb 2.3 oz)   SpO2 94%   BMI 25.55 kg/m²    Pulse ox interpretation: I interpret this pulse ox as normal.  Constitutional: Alert in no apparent distress.  HENT: No signs of trauma, Bilateral external ears normal, Nose normal.  Moist mucous membranes.  Eyes: Pupils are equal and reactive, Conjunctiva normal, Non-icteric.   Neck: Normal range of motion, No tenderness, Supple, No stridor.   Lymphatic: No lymphadenopathy noted.   Cardiovascular: Regular rate and rhythm, no murmurs.   Thorax & Lungs: Normal breath sounds, No respiratory distress, No wheezing, No chest tenderness.   Abdomen: Bowel sounds normal, Soft, No tenderness, No masses, No pulsatile masses. No peritoneal signs.  Skin: Warm, Dry, No erythema, No rash.   Back: Normal alignment.  Extremities: Intact distal pulses, No edema, No tenderness, No cyanosis.  Musculoskeletal: Good range of motion in all major joints. No tenderness to palpation or major deformities noted.   Neurologic: Alert, Normal motor function, Normal sensory function, No focal deficits noted.   Psychiatric: Affect normal, Judgment normal, Mood normal.       DIAGNOSTIC STUDIES / PROCEDURES    EKG  EKG Interpretation-HR is 76.  Prolonged NV and QRS.  Right bundle branch block.  ST depressions in leads V2 through V4.  No STEMI.  Potential ischemia.  Overall, EKG is unchanged from most recent EKG dated " 10/22/2018.    LABS  CBC  CMP  Magnesium  Phosphorus  Urinalysis      COURSE & MEDICAL DECISION MAKING  Pertinent Labs & Imaging studies reviewed. (See chart for details)  This is an 89-year-old male here with urinary retention and lower extremity weakness.  Differential diagnosis includes, but is not limited to, cauda equina, epidural abscess, electrolyte abnormality, urinary tract infection, BPH, neoplasm, hypo-/hyperglycemia, deconditioning.  Arrives afebrile with normal vital signs.  Appears well-hydrated and nontoxic.  Full sensation in bilateral lower extremities.  Able to lift bilateral lower extremities off of the bed but unable to hold against pressure.    CBC reveals mild leukocytosis without anemia.  The patient does not have any fevers or tachycardia to suggest infectious source or sepsis.  Metabolic panel reveals multiple mild anomalies.  The patient has mild hyponatremia to 131 (corrected to 133) with mild blood glucose elevation to 173.  Patient does have an elevated anion gap to 15 but his phosphorus and mag are both within normal limits.  Normal renal function and no transaminitis.  Urinalysis suggests mild infections the patient will be given 1 dose of Keflex.  This is likely secondary to urinary retention from most likely benign prostatic hyperplasia versus neoplasm.    The patient's potassium was repleted with 40 mEq of K-Dur.    The patient's lady friend with whom he lives feels that he is unsafe to be at home as does he.  I agree with this and due to the fact that he is on Xarelto he is at high risk for falls with pathologic bleeding.  He requires an admission to the hospital for physical therapy and occupational therapy.  Potential discharge to a skilled nursing facility.  He may also require an MRI of his lumbar spine to rule out cauda equina although I have a low suspicion for that at this time.  Patient did have a postvoid residual of greater than 300 mL.  He declines a catheter and he is  able to urinate on his own although it is a small amount.  We will defer catheter placement at this time and treat his urinary tract infection, follow-up with urology.    Discussed with Dr. Orourke, on-call hospitalist, who will admit the patient to his service.  Patient admitted in guarded condition.  FINAL IMPRESSION  1.  Weakness  2.  Urinary retention    Electronically signed by: Jarrod Victor, 11/3/2018 10:25 PM

## 2018-11-04 NOTE — ASSESSMENT & PLAN NOTE
PT and OT ordered  Improving with treatment of cellulitis and UTI  Order front wheeled walker and resume home health at discharge.

## 2018-11-04 NOTE — ASSESSMENT & PLAN NOTE
Serum K dropped to 2.8  Oral Klyte started, repeat level and increase if needed, patient is on telemetry.

## 2018-11-04 NOTE — ASSESSMENT & PLAN NOTE
Chronic retention  Flomax 0.4mg started overnight  Continue to monitor  Patient refused wilson catheter

## 2018-11-04 NOTE — PROGRESS NOTES
Pt oriented to room. Admit profile, med rec, and assessment completed. Pt reports no pain or discomfort. Due meds given per MAR. POC discussed. Questions answered. Non-skid socks in use. Safety precautions in place. Call light in reach. Pt instructed to call for assistance.

## 2018-11-04 NOTE — PROGRESS NOTES
Patient admitted early this morning by Dr. Orourke. Girlfriend is at the bedside. Patient is complaining of pain in the right side of the face and upper teeth. History of multiple dental procedures over the past 6 months. No sinus drainage but having swelling of the right cheek. There is erythema and tenderness of the right zygomatic area and nasolabial fold. Maxillofacial CT ordered; change to IV unasyn, continue IV fluids. Replace potassium orally and stop chlorthalidone diuretic.

## 2018-11-05 PROBLEM — L03.211 FACIAL CELLULITIS: Status: ACTIVE | Noted: 2018-11-05

## 2018-11-05 LAB
ANION GAP SERPL CALC-SCNC: 8 MMOL/L (ref 0–11.9)
BASOPHILS # BLD AUTO: 0.9 % (ref 0–1.8)
BASOPHILS # BLD: 0.06 K/UL (ref 0–0.12)
BUN SERPL-MCNC: 12 MG/DL (ref 8–22)
CALCIUM SERPL-MCNC: 8.7 MG/DL (ref 8.4–10.2)
CHLORIDE SERPL-SCNC: 95 MMOL/L (ref 96–112)
CO2 SERPL-SCNC: 28 MMOL/L (ref 20–33)
CREAT SERPL-MCNC: 0.64 MG/DL (ref 0.5–1.4)
EOSINOPHIL # BLD AUTO: 0.16 K/UL (ref 0–0.51)
EOSINOPHIL NFR BLD: 2.5 % (ref 0–6.9)
ERYTHROCYTE [DISTWIDTH] IN BLOOD BY AUTOMATED COUNT: 43.9 FL (ref 35.9–50)
GLUCOSE BLD-MCNC: 109 MG/DL (ref 65–99)
GLUCOSE BLD-MCNC: 124 MG/DL (ref 65–99)
GLUCOSE BLD-MCNC: 176 MG/DL (ref 65–99)
GLUCOSE SERPL-MCNC: 127 MG/DL (ref 65–99)
HCT VFR BLD AUTO: 41.1 % (ref 42–52)
HGB BLD-MCNC: 14 G/DL (ref 14–18)
IMM GRANULOCYTES # BLD AUTO: 0.03 K/UL (ref 0–0.11)
IMM GRANULOCYTES NFR BLD AUTO: 0.5 % (ref 0–0.9)
LYMPHOCYTES # BLD AUTO: 1.42 K/UL (ref 1–4.8)
LYMPHOCYTES NFR BLD: 21.8 % (ref 22–41)
MCH RBC QN AUTO: 31.4 PG (ref 27–33)
MCHC RBC AUTO-ENTMCNC: 34.1 G/DL (ref 33.7–35.3)
MCV RBC AUTO: 92.2 FL (ref 81.4–97.8)
MONOCYTES # BLD AUTO: 0.73 K/UL (ref 0–0.85)
MONOCYTES NFR BLD AUTO: 11.2 % (ref 0–13.4)
NEUTROPHILS # BLD AUTO: 4.12 K/UL (ref 1.82–7.42)
NEUTROPHILS NFR BLD: 63.1 % (ref 44–72)
NRBC # BLD AUTO: 0 K/UL
NRBC BLD-RTO: 0 /100 WBC
PLATELET # BLD AUTO: 203 K/UL (ref 164–446)
PMV BLD AUTO: 10.3 FL (ref 9–12.9)
POTASSIUM SERPL-SCNC: 2.8 MMOL/L (ref 3.6–5.5)
POTASSIUM SERPL-SCNC: 3.2 MMOL/L (ref 3.6–5.5)
RBC # BLD AUTO: 4.46 M/UL (ref 4.7–6.1)
SODIUM SERPL-SCNC: 131 MMOL/L (ref 135–145)
WBC # BLD AUTO: 6.5 K/UL (ref 4.8–10.8)

## 2018-11-05 PROCEDURE — 700111 HCHG RX REV CODE 636 W/ 250 OVERRIDE (IP): Performed by: HOSPITALIST

## 2018-11-05 PROCEDURE — G8978 MOBILITY CURRENT STATUS: HCPCS | Mod: CJ

## 2018-11-05 PROCEDURE — 700102 HCHG RX REV CODE 250 W/ 637 OVERRIDE(OP): Performed by: HOSPITALIST

## 2018-11-05 PROCEDURE — G8979 MOBILITY GOAL STATUS: HCPCS | Mod: CI

## 2018-11-05 PROCEDURE — 84132 ASSAY OF SERUM POTASSIUM: CPT

## 2018-11-05 PROCEDURE — 97166 OT EVAL MOD COMPLEX 45 MIN: CPT

## 2018-11-05 PROCEDURE — 80048 BASIC METABOLIC PNL TOTAL CA: CPT

## 2018-11-05 PROCEDURE — 85025 COMPLETE CBC W/AUTO DIFF WBC: CPT

## 2018-11-05 PROCEDURE — 97161 PT EVAL LOW COMPLEX 20 MIN: CPT

## 2018-11-05 PROCEDURE — 96366 THER/PROPH/DIAG IV INF ADDON: CPT

## 2018-11-05 PROCEDURE — A9270 NON-COVERED ITEM OR SERVICE: HCPCS | Performed by: HOSPITALIST

## 2018-11-05 PROCEDURE — G8987 SELF CARE CURRENT STATUS: HCPCS | Mod: CJ

## 2018-11-05 PROCEDURE — 82962 GLUCOSE BLOOD TEST: CPT

## 2018-11-05 PROCEDURE — 770006 HCHG ROOM/CARE - MED/SURG/GYN SEMI*

## 2018-11-05 PROCEDURE — 700102 HCHG RX REV CODE 250 W/ 637 OVERRIDE(OP): Performed by: FAMILY MEDICINE

## 2018-11-05 PROCEDURE — 99232 SBSQ HOSP IP/OBS MODERATE 35: CPT | Performed by: HOSPITALIST

## 2018-11-05 PROCEDURE — G8988 SELF CARE GOAL STATUS: HCPCS | Mod: CI

## 2018-11-05 PROCEDURE — A9270 NON-COVERED ITEM OR SERVICE: HCPCS | Performed by: FAMILY MEDICINE

## 2018-11-05 PROCEDURE — 700105 HCHG RX REV CODE 258: Performed by: HOSPITALIST

## 2018-11-05 RX ADMIN — POTASSIUM BICARBONATE 25 MEQ: 25 TABLET, EFFERVESCENT ORAL at 17:50

## 2018-11-05 RX ADMIN — METFORMIN HYDROCHLORIDE 500 MG: 500 TABLET, FILM COATED ORAL at 17:50

## 2018-11-05 RX ADMIN — ASPIRIN 81 MG: 81 TABLET, COATED ORAL at 17:50

## 2018-11-05 RX ADMIN — AMPICILLIN SODIUM AND SULBACTAM SODIUM 3 G: 2; 1 INJECTION, POWDER, FOR SOLUTION INTRAMUSCULAR; INTRAVENOUS at 23:09

## 2018-11-05 RX ADMIN — RIVAROXABAN 20 MG: 20 TABLET, FILM COATED ORAL at 17:50

## 2018-11-05 RX ADMIN — INSULIN LISPRO 5 UNITS: 100 INJECTION, SOLUTION INTRAVENOUS; SUBCUTANEOUS at 18:36

## 2018-11-05 RX ADMIN — SIMVASTATIN 10 MG: 10 TABLET, FILM COATED ORAL at 23:11

## 2018-11-05 RX ADMIN — INSULIN LISPRO 5 UNITS: 100 INJECTION, SOLUTION INTRAVENOUS; SUBCUTANEOUS at 12:32

## 2018-11-05 RX ADMIN — POTASSIUM BICARBONATE 25 MEQ: 25 TABLET, EFFERVESCENT ORAL at 05:12

## 2018-11-05 RX ADMIN — AMPICILLIN SODIUM AND SULBACTAM SODIUM 3 G: 2; 1 INJECTION, POWDER, FOR SOLUTION INTRAMUSCULAR; INTRAVENOUS at 17:50

## 2018-11-05 RX ADMIN — AMPICILLIN SODIUM AND SULBACTAM SODIUM 3 G: 2; 1 INJECTION, POWDER, FOR SOLUTION INTRAMUSCULAR; INTRAVENOUS at 01:08

## 2018-11-05 RX ADMIN — METFORMIN HYDROCHLORIDE 500 MG: 500 TABLET, FILM COATED ORAL at 08:16

## 2018-11-05 RX ADMIN — AMPICILLIN SODIUM AND SULBACTAM SODIUM 3 G: 2; 1 INJECTION, POWDER, FOR SOLUTION INTRAMUSCULAR; INTRAVENOUS at 11:37

## 2018-11-05 RX ADMIN — METOPROLOL TARTRATE 50 MG: 50 TABLET ORAL at 05:12

## 2018-11-05 RX ADMIN — AMLODIPINE BESYLATE 5 MG: 5 TABLET ORAL at 05:12

## 2018-11-05 RX ADMIN — INSULIN GLARGINE 30 UNITS: 100 INJECTION, SOLUTION SUBCUTANEOUS at 18:00

## 2018-11-05 RX ADMIN — AMPICILLIN SODIUM AND SULBACTAM SODIUM 3 G: 2; 1 INJECTION, POWDER, FOR SOLUTION INTRAMUSCULAR; INTRAVENOUS at 05:13

## 2018-11-05 RX ADMIN — AMLODIPINE BESYLATE 5 MG: 5 TABLET ORAL at 17:50

## 2018-11-05 RX ADMIN — METOPROLOL TARTRATE 50 MG: 50 TABLET ORAL at 17:50

## 2018-11-05 RX ADMIN — INSULIN LISPRO 1 UNITS: 100 INJECTION, SOLUTION INTRAVENOUS; SUBCUTANEOUS at 12:31

## 2018-11-05 ASSESSMENT — COGNITIVE AND FUNCTIONAL STATUS - GENERAL
TOILETING: A LITTLE
DAILY ACTIVITIY SCORE: 21
SUGGESTED CMS G CODE MODIFIER DAILY ACTIVITY: CJ
HELP NEEDED FOR BATHING: A LITTLE
DRESSING REGULAR LOWER BODY CLOTHING: A LITTLE

## 2018-11-05 ASSESSMENT — ENCOUNTER SYMPTOMS
DEPRESSION: 0
BRUISES/BLEEDS EASILY: 0
MUSCULOSKELETAL NEGATIVE: 1
PSYCHIATRIC NEGATIVE: 1
SORE THROAT: 0
ABDOMINAL PAIN: 0
WEAKNESS: 1
MYALGIAS: 0
LOSS OF CONSCIOUSNESS: 0
VOMITING: 0
RESPIRATORY NEGATIVE: 1
BACK PAIN: 0
SINUS PAIN: 0
NAUSEA: 0
WEIGHT LOSS: 0
NERVOUS/ANXIOUS: 0
DIZZINESS: 0
STRIDOR: 0
COUGH: 0
SHORTNESS OF BREATH: 0
CARDIOVASCULAR NEGATIVE: 1
FEVER: 0
GASTROINTESTINAL NEGATIVE: 1
CHILLS: 0
FLANK PAIN: 0

## 2018-11-05 ASSESSMENT — PAIN SCALES - GENERAL
PAINLEVEL_OUTOF10: 0

## 2018-11-05 ASSESSMENT — ACTIVITIES OF DAILY LIVING (ADL): TOILETING: INDEPENDENT

## 2018-11-05 ASSESSMENT — GAIT ASSESSMENTS
DISTANCE (FEET): 150
ASSISTIVE DEVICE: FRONT WHEEL WALKER
DEVIATION: SHUFFLED GAIT
GAIT LEVEL OF ASSIST: CONTACT GUARD ASSIST

## 2018-11-05 NOTE — PROGRESS NOTES
Bedside shift report received from Maribel Reveles RN.  Patient updated on POC for NOC shift, patient in agreement with POC.  Call bell and personal belongings within reach.  Patient educated to call for assistance prior to attempting to ambulate.  Non-skid socks on.  Bed alarm on.

## 2018-11-05 NOTE — CARE PLAN
Problem: Safety  Goal: Will remain free from injury  Outcome: PROGRESSING AS EXPECTED  Bed alarm on, non-skid socks on feet. 2 person assist for transfer to BS. Reinforced fall prevention techniques.    Problem: Urinary Elimination:  Goal: Ability to reestablish a normal urinary elimination pattern will improve  Outcome: PROGRESSING AS EXPECTED  Using urinal, must stand to urinate, small amounts each time.     Problem: Skin Integrity  Goal: Risk for impaired skin integrity will decrease  Outcome: PROGRESSING AS EXPECTED  Moving around in bed independently, sitting up on side of bed dangling at times.

## 2018-11-05 NOTE — FACE TO FACE
Face to Face Note  -  Durable Medical Equipment    Mally Holly M.D. - NPI: [id number]  I certify that this patient is under my care and that they have had a durable medical equipment(DME)face to face encounter by myself that meets the physician DME face-to-face encounter requirements with this patient on:    Date of encounter:   Patient:                    MRN:                       YOB: 2018  Julien Dao  [id number]  3/7/1929     The encounter with the patient was in whole, or in part, for the following medical condition, which is the primary reason for durable medical equipment:  Other - UTI, facial cellulitis    I certify that, based on my findings, the following durable medical equipment is medically necessary:  Walkers.    HOME O2 Saturation Measurements:(Values must be present for Home Oxygen orders)         ,     ,         My Clinical findings support the need for the above equipment due to:  Abnormal Gait    Supporting Symptoms: weakness, falling     ------------------------------------------------------------------------------------------------------------------    Face to Face Supporting Documentation - Home Health    The encounter with this patient was in whole or in part the primary reason for home health admission.    Date of encounter:   Patient:                    MRN:                       YOB: 2018  Julien Dao  [id number]  3/7/1929     Home health to see patient for:  Skilled Nursing care for assessment, interventions & education, Physical Therapy evaluation and treatment and Occupational therapy evaluation and treatment    Skilled need for:  Exacerbation of Chronic Disease State atrial flutter, facial cellulitis, uti, bph    Skilled nursing interventions to include:  Comment: PT, OT, skilled nursing    Homebound evidenced status by:  Needs the assistance of another person in order to leave the home. Leaving home must require a considerable and taxing  effort. There must exist a normal inability to leave the home.    Community Physician to provide follow up care: JONO Powell     Optional Interventions    Wound information & treatment:    Home Infusion Therapy orders:    Line/Drain/Airway:    I certify the face to face encounter for this home care referral meets the CMS requirements and the encounter/clinical assessment with the patient was, in whole, or in part, for the medical condition(s) listed above, which is the primary reason for home health care. Based on my clinical findings: the service(s) are medically necessary, support the need for home health care, and the homebound criteria are met.  I certify that this patient has had a face to face encounter by myself.  Mally Holly M.D. - NPI: [id number]    *Debility, frailty and advanced age in the absence of an acute deterioration or exacerbation of a condition do not qualify a patient for home health.

## 2018-11-05 NOTE — ASSESSMENT & PLAN NOTE
Continue IV unasyn facial cellulitis, clinically improving.  CT results reviewed cellulitis only no abscess seen

## 2018-11-05 NOTE — THERAPY
"Physical Therapy Evaluation completed.   Bed Mobility:  Supine to Sit: Contact Guard Assist  Transfers: Sit to Stand: Contact Guard Assist  Gait: Level Of Assist: Contact Guard Assist with Front-Wheel Walker x150 ft       Plan of Care: Will benefit from Physical Therapy 3 times per week  Discharge Recommendations: Equipment: Front-Wheel Walker. Post-acute therapy Discharge to home with outpatient or home health for additional skilled therapy services.    Pt is a 89 yo male with diagnosis of UTI presenting with weakness and deconditioning, is currently not at baseline of independence and therefore recommend continued acute PT to improve strength and mobility. Pt would like to DC home with girlfriend assist, would benefit from HHPT.    See \"Rehab Therapy-Acute\" Patient Summary Report for complete documentation.     "

## 2018-11-05 NOTE — THERAPY
"Occupational Therapy Evaluation completed.   Functional Status:  Pt is an 89 y/o male, admit with increasing weakness, and fall at home. Pt lives alone, has a significant other that can assist. Pt PLOF- I with AMB ADLS with the use of a FWW. Pt presents with decreased generalized strength and endurance, decreased I with ADLS and functional mobility. Pt requires CGA to SBA for ADLS and for functional mobility. Pt will benefit from Acute OT services to increase functional I and safety prior to d/c  Plan of Care: Will benefit from Occupational Therapy 3 times per week  Discharge Recommendations:  Equipment: Will Continue to Assess for Equipment Needs. Post-acute therapy Discharge to home with outpatient or home health for additional skilled therapy services.    See \"Rehab Therapy-Acute\" Patient Summary Report for complete documentation.    "

## 2018-11-05 NOTE — PROGRESS NOTES
Dr. Luu paged and called back.  Patient has chronic urinary retention 2/2 BPH.  Patient is voiding with difficulty, has previously refused wilson catheter.  Requesting flomax to aid with urination. MD to order flomax.

## 2018-11-05 NOTE — PROGRESS NOTES
[facility] Medicine Daily Progress Note    Date of Service  11/5/2018    Chief Complaint  89 y.o. male admitted 11/3/2018 with uti and right cheek cellulitis.    Hospital Course    Patient is an 89 year old male who comes to the ER because of generalized weakness and difficulty with ambulation. Patient says that over the past 2 weeks, he has been feeling increasingly weak and unsteady when he walks. He uses a walker for ambulation but still feels like he is going to fall. He was in the ER about 2 weeks ago after he fell at home due to weakness. he feels like he is unsafe to be at home because he might fall again. In the ER, he has mild leukocytosis of 12.1. He was afebrile on the day of admission.   Additional History: Patient in addition complained of pain and swelling of the right facial area. He stated he has had a lot of dental work done in the past 6 months including multiple tooth extractions.       Interval Problem Update  His facial cellulitis is much improved and he is feeling better, no dental pain but thinks his gums are swollen  C/o urinary retention overnight which has been an ongoing problem for him.    Consultants/Specialty  None.    Code Status  Full code.    Disposition  TBD.    Review of Systems  Review of Systems   Constitutional: Positive for malaise/fatigue. Negative for chills, fever and weight loss.   HENT: Negative.  Negative for sinus pain and sore throat.    Respiratory: Negative.  Negative for cough, shortness of breath and stridor.    Cardiovascular: Negative.  Negative for chest pain and leg swelling.   Gastrointestinal: Negative.  Negative for abdominal pain, nausea and vomiting.   Genitourinary: Positive for dysuria, frequency and urgency. Negative for flank pain and hematuria.   Musculoskeletal: Negative.  Negative for back pain and myalgias.   Neurological: Positive for weakness. Negative for dizziness and loss of consciousness.   Endo/Heme/Allergies: Negative.  Does not bruise/bleed  easily.   Psychiatric/Behavioral: Negative.  Negative for depression. The patient is not nervous/anxious.    All other systems reviewed and are negative.       Physical Exam  Temp:  [36.4 °C (97.6 °F)-37.1 °C (98.7 °F)] 36.4 °C (97.6 °F)  Pulse:  [59-77] 63  Resp:  [18-20] 18  BP: (114-133)/(60-71) 119/64    Physical Exam   Constitutional: He is oriented to person, place, and time. He appears well-developed and well-nourished. No distress.   Plan of care discussed with bedside RN.   HENT:   Nose: Nose normal.   Mouth/Throat: Oropharynx is clear and moist. No oropharyngeal exudate.   Right cheek and nasolabial fold erythematous and tender, no open wound or drainage   Eyes: Conjunctivae are normal. Right eye exhibits no discharge. Left eye exhibits no discharge. No scleral icterus.   Neck: No JVD present. No tracheal deviation present.   Cardiovascular: Normal rate, regular rhythm and normal heart sounds.    Pulmonary/Chest: Effort normal and breath sounds normal. No stridor. No respiratory distress. He has no wheezes. He has no rales. He exhibits no tenderness.   Abdominal: Soft. Bowel sounds are normal. He exhibits no distension. There is no tenderness.   Musculoskeletal: He exhibits no edema or tenderness.   Neurological: He is alert and oriented to person, place, and time. No cranial nerve deficit. He exhibits normal muscle tone.   Skin: Skin is warm and dry. He is not diaphoretic. No pallor.   Psychiatric: He has a normal mood and affect. His behavior is normal. Judgment and thought content normal.   Nursing note and vitals reviewed.      Fluids    Intake/Output Summary (Last 24 hours) at 11/05/18 1442  Last data filed at 11/05/18 1238   Gross per 24 hour   Intake          2420.47 ml   Output             1950 ml   Net           470.47 ml       Laboratory  Recent Labs      11/03/18   2225  11/05/18   0448   WBC  12.1*  6.5   RBC  4.63*  4.46*   HEMOGLOBIN  14.5  14.0   HEMATOCRIT  42.1  41.1*   MCV  90.9  92.2    MCH  31.3  31.4   MCHC  34.4  34.1   RDW  43.1  43.9   PLATELETCT  219  203   MPV  9.9  10.3     Recent Labs      11/03/18   2225  11/04/18   0915  11/05/18   0448   SODIUM  131*  130*  131*   POTASSIUM  3.2*  3.0*  2.8*   CHLORIDE  92*  96  95*   CO2  24  27  28   GLUCOSE  173*  189*  127*   BUN  18  14  12   CREATININE  0.80  0.69  0.64   CALCIUM  9.6  8.6  8.7                   Imaging  CT-MAXILLOFACIAL W/O   Final Result         1.  Mild mucosal thickening in paranasal sinuses and nasal cavity.      2.  Mild induration in subcutaneous fat of right facial area. No abscess or fluid collection or mass is appreciated.      3.  No adenopathy identified.      4.  No bone erosion or bone destruction is appreciated.           Assessment/Plan  Generalized weakness   Assessment & Plan    PT and OT ordered  Improving with treatment of cellulitis and UTI  Order front wheeled walker and resume home health at discharge.       UTI (urinary tract infection)   Assessment & Plan    UA is positive for a UTI with mild leukocytosis. Continue Keflex. Urine culture pending     Urinary retention   Assessment & Plan    Chronic retention  Flomax 0.4mg started overnight  Continue to monitor  Patient refused wilson catheter     DM (diabetes mellitus) (CMS-HCC)- (present on admission)   Assessment & Plan    Continue Lantus and metformin.  accu checks and diabetic diet  Sliding scale insulin     Hypokalemia- (present on admission)   Assessment & Plan    Serum K dropped to 2.8  Oral Klyte started, repeat level and increase if needed, patient is on telemetry.     Facial cellulitis   Assessment & Plan    Continue IV unasyn facial cellulitis, clinically improving.  CT results reviewed cellulitis only no abscess seen       Atrial flutter ([facility])- (present on admission)   Assessment & Plan    Continue Xarelto, history of atrial flutter  Continue metoprolol, rate controlled     HTN (hypertension)- (present on admission)   Assessment & Plan    BP  stable at this time. Continue meds          VTE prophylaxis: xarelto.

## 2018-11-05 NOTE — CARE PLAN
Problem: Safety  Goal: Will remain free from falls  Outcome: PROGRESSING SLOWER THAN EXPECTED  Patient frequently sets off bed alarm and forgets to call prior to attempting to ambulate.  Patient reinforced need to use call bell for assistance.  Call bell and personal belongings within reach.  Bed alarm on.  Non-skid socks on.     Problem: Urinary Elimination:  Goal: Ability to reestablish a normal urinary elimination pattern will improve  Outcome: PROGRESSING SLOWER THAN EXPECTED  Patient continues to have frequency and urgency issues with frequent urinary incontinence.  Flomax administered.

## 2018-11-06 VITALS
SYSTOLIC BLOOD PRESSURE: 106 MMHG | HEIGHT: 67 IN | RESPIRATION RATE: 18 BRPM | WEIGHT: 175.27 LBS | OXYGEN SATURATION: 92 % | HEART RATE: 64 BPM | TEMPERATURE: 97.7 F | BODY MASS INDEX: 27.51 KG/M2 | DIASTOLIC BLOOD PRESSURE: 80 MMHG

## 2018-11-06 PROBLEM — N39.0 UTI (URINARY TRACT INFECTION): Status: RESOLVED | Noted: 2018-11-04 | Resolved: 2018-11-06

## 2018-11-06 PROBLEM — R33.9 URINARY RETENTION: Status: RESOLVED | Noted: 2018-11-04 | Resolved: 2018-11-06

## 2018-11-06 PROBLEM — R53.1 GENERALIZED WEAKNESS: Status: RESOLVED | Noted: 2018-11-04 | Resolved: 2018-11-06

## 2018-11-06 PROBLEM — L03.211 FACIAL CELLULITIS: Status: RESOLVED | Noted: 2018-11-05 | Resolved: 2018-11-06

## 2018-11-06 LAB
ALBUMIN SERPL BCP-MCNC: 3.2 G/DL (ref 3.2–4.9)
ALBUMIN/GLOB SERPL: 1.1 G/DL
ALP SERPL-CCNC: 36 U/L (ref 30–99)
ALT SERPL-CCNC: 32 U/L (ref 2–50)
ANION GAP SERPL CALC-SCNC: 10 MMOL/L (ref 0–11.9)
AST SERPL-CCNC: 36 U/L (ref 12–45)
BILIRUB SERPL-MCNC: 0.7 MG/DL (ref 0.1–1.5)
BUN SERPL-MCNC: 9 MG/DL (ref 8–22)
CALCIUM SERPL-MCNC: 8.5 MG/DL (ref 8.4–10.2)
CHLORIDE SERPL-SCNC: 99 MMOL/L (ref 96–112)
CO2 SERPL-SCNC: 26 MMOL/L (ref 20–33)
CREAT SERPL-MCNC: 0.68 MG/DL (ref 0.5–1.4)
GLOBULIN SER CALC-MCNC: 2.9 G/DL (ref 1.9–3.5)
GLUCOSE BLD-MCNC: 121 MG/DL (ref 65–99)
GLUCOSE BLD-MCNC: 121 MG/DL (ref 65–99)
GLUCOSE SERPL-MCNC: 89 MG/DL (ref 65–99)
POTASSIUM SERPL-SCNC: 3.1 MMOL/L (ref 3.6–5.5)
PROT SERPL-MCNC: 6.1 G/DL (ref 6–8.2)
SODIUM SERPL-SCNC: 135 MMOL/L (ref 135–145)

## 2018-11-06 PROCEDURE — 700102 HCHG RX REV CODE 250 W/ 637 OVERRIDE(OP): Performed by: HOSPITALIST

## 2018-11-06 PROCEDURE — 700105 HCHG RX REV CODE 258: Performed by: HOSPITALIST

## 2018-11-06 PROCEDURE — 700111 HCHG RX REV CODE 636 W/ 250 OVERRIDE (IP): Performed by: HOSPITALIST

## 2018-11-06 PROCEDURE — 700101 HCHG RX REV CODE 250: Performed by: FAMILY MEDICINE

## 2018-11-06 PROCEDURE — 82962 GLUCOSE BLOOD TEST: CPT

## 2018-11-06 PROCEDURE — 700102 HCHG RX REV CODE 250 W/ 637 OVERRIDE(OP): Performed by: INTERNAL MEDICINE

## 2018-11-06 PROCEDURE — A9270 NON-COVERED ITEM OR SERVICE: HCPCS | Performed by: HOSPITALIST

## 2018-11-06 PROCEDURE — A9270 NON-COVERED ITEM OR SERVICE: HCPCS | Performed by: INTERNAL MEDICINE

## 2018-11-06 PROCEDURE — A9270 NON-COVERED ITEM OR SERVICE: HCPCS | Performed by: FAMILY MEDICINE

## 2018-11-06 PROCEDURE — 99239 HOSP IP/OBS DSCHRG MGMT >30: CPT | Performed by: HOSPITALIST

## 2018-11-06 PROCEDURE — 700102 HCHG RX REV CODE 250 W/ 637 OVERRIDE(OP): Performed by: FAMILY MEDICINE

## 2018-11-06 PROCEDURE — 80053 COMPREHEN METABOLIC PANEL: CPT

## 2018-11-06 RX ORDER — TAMSULOSIN HYDROCHLORIDE 0.4 MG/1
0.4 CAPSULE ORAL DAILY
Qty: 30 CAP | Refills: 1 | Status: SHIPPED | OUTPATIENT
Start: 2018-11-07 | End: 2018-11-06

## 2018-11-06 RX ORDER — AMOXICILLIN AND CLAVULANATE POTASSIUM 875; 125 MG/1; MG/1
1 TABLET, FILM COATED ORAL 2 TIMES DAILY
Qty: 8 TAB | Refills: 0 | Status: SHIPPED | OUTPATIENT
Start: 2018-11-06 | End: 2018-11-10

## 2018-11-06 RX ORDER — AMOXICILLIN AND CLAVULANATE POTASSIUM 875; 125 MG/1; MG/1
1 TABLET, FILM COATED ORAL 2 TIMES DAILY
Qty: 8 TAB | Refills: 0 | Status: SHIPPED | OUTPATIENT
Start: 2018-11-06 | End: 2018-11-06

## 2018-11-06 RX ORDER — AMOXICILLIN AND CLAVULANATE POTASSIUM 875; 125 MG/1; MG/1
1 TABLET, FILM COATED ORAL 2 TIMES DAILY
Qty: 6 TAB | Refills: 0 | Status: SHIPPED | OUTPATIENT
Start: 2018-11-06 | End: 2018-11-06

## 2018-11-06 RX ORDER — CHLORTHALIDONE 25 MG/1
25 TABLET ORAL 2 TIMES DAILY
Qty: 30 TAB | Refills: 1
Start: 2018-11-08 | End: 2020-02-18

## 2018-11-06 RX ORDER — POTASSIUM CHLORIDE 20 MEQ/1
40 TABLET, EXTENDED RELEASE ORAL ONCE
Status: COMPLETED | OUTPATIENT
Start: 2018-11-06 | End: 2018-11-06

## 2018-11-06 RX ORDER — TAMSULOSIN HYDROCHLORIDE 0.4 MG/1
0.4 CAPSULE ORAL DAILY
Qty: 30 CAP | Refills: 1 | Status: SHIPPED | OUTPATIENT
Start: 2018-11-07 | End: 2020-02-09

## 2018-11-06 RX ADMIN — METOPROLOL TARTRATE 50 MG: 50 TABLET ORAL at 05:11

## 2018-11-06 RX ADMIN — TAMSULOSIN HYDROCHLORIDE 0.4 MG: 0.4 CAPSULE ORAL at 05:11

## 2018-11-06 RX ADMIN — METFORMIN HYDROCHLORIDE 500 MG: 500 TABLET, FILM COATED ORAL at 08:18

## 2018-11-06 RX ADMIN — POTASSIUM CHLORIDE AND SODIUM CHLORIDE: 900; 150 INJECTION, SOLUTION INTRAVENOUS at 01:03

## 2018-11-06 RX ADMIN — INSULIN LISPRO 5 UNITS: 100 INJECTION, SOLUTION INTRAVENOUS; SUBCUTANEOUS at 12:07

## 2018-11-06 RX ADMIN — AMLODIPINE BESYLATE 5 MG: 5 TABLET ORAL at 06:25

## 2018-11-06 RX ADMIN — POTASSIUM CHLORIDE 40 MEQ: 1500 TABLET, EXTENDED RELEASE ORAL at 11:47

## 2018-11-06 RX ADMIN — POTASSIUM BICARBONATE 25 MEQ: 25 TABLET, EFFERVESCENT ORAL at 05:11

## 2018-11-06 RX ADMIN — INSULIN LISPRO 5 UNITS: 100 INJECTION, SOLUTION INTRAVENOUS; SUBCUTANEOUS at 06:29

## 2018-11-06 RX ADMIN — AMPICILLIN SODIUM AND SULBACTAM SODIUM 3 G: 2; 1 INJECTION, POWDER, FOR SOLUTION INTRAMUSCULAR; INTRAVENOUS at 05:11

## 2018-11-06 RX ADMIN — AMPICILLIN SODIUM AND SULBACTAM SODIUM 3 G: 2; 1 INJECTION, POWDER, FOR SOLUTION INTRAMUSCULAR; INTRAVENOUS at 12:02

## 2018-11-06 ASSESSMENT — PAIN SCALES - GENERAL
PAINLEVEL_OUTOF10: 0

## 2018-11-06 NOTE — PROGRESS NOTES
BM x1 today, incontinent of loose stool. Declines depends brief. Good PO intake.     Patient up walking in halls with PT, tolerated well. Steady with walker and one person assist.

## 2018-11-06 NOTE — DISCHARGE PLANNING
Care Transition Team Assessment    Information Source, patient , provider and chart  Orientation : Oriented x 4  Information Given By: Patient  Informant's Name: Julien         Elopement Risk  Legal Hold: No  Ambulatory or Self Mobile in Wheelchair: No-Not an Elopement Risk  Elopement Risk: Not at Risk for Elopement    Interdisciplinary Discharge Planning  Does Admitting Nurse Feel This Could be a Complex Discharge?: No  Primary Care Physician: Dr. Keyes  Lives with - Patient's Self Care Capacity: Alone and Able to Care For Self  Patient or legal guardian wants to designate a caregiver (see row info): No  Caregiver name: Mercedes Olivas  Caregiver relationship to patient: friend  Caregiver contact info: [phone]  Support Systems: Family Member(s), Friends / Neighbors, Home Care Staff  Housing / Facility: [address]  Do You Take your Prescribed Medications Regularly: Yes  Able to Return to Previous ADL's: Yes  Mobility Issues: Yes  Prior Services: Skilled Home Health Services  Patient Expects to be Discharged to:: home  Assistance Needed: No  Durable Medical Equipment: Walker    Discharge Preparedness  What is your plan after discharge?: Home with help  What are your discharge supports?: Partner              Vision / Hearing Impairment  Vision Impairment : Yes  Right Eye Vision: Impaired, Wears Glasses  Left Eye Vision: Impaired, Wears Glasses  Hearing Impairment : No    Values / Beliefs / Concerns  Values / Beliefs Concerns : No         Domestic Abuse  Have you ever been the victim of abuse or violence?: No              Anticipated Discharge Information  Anticipated discharge disposition: Home

## 2018-11-06 NOTE — DISCHARGE PLANNING
Agency/Facility Name: [facility]  Spoke To: Zoë  Outcome: Patient is on hospital hold, another referral needs to be sent out before they can accept.

## 2018-11-06 NOTE — PROGRESS NOTES
Report given to MACK Chadwick. Patient resting comfortably in bed. Declines any needs at this time. Call light in reach. Bed alarm on.

## 2018-11-06 NOTE — PROGRESS NOTES
Report received from MACK Mandujano.  Patient resting comfortably, denies SOB, chest pain, dizziness.  IVF infusing as ordered.  Call bell in reach.  Bed alarm activated.

## 2018-11-06 NOTE — DISCHARGE PLANNING
Spoke with vic at bedside and obtained choice for [facility] through [facility], patient stated he already has a front wheeled walker that he has obtained through his medicare in the last few months for choice was not necessary for this and he has it at bedside for safe discharge needs.Choice sent to [facility] for home health arrangements.

## 2018-11-06 NOTE — CARE PLAN
Problem: Safety  Goal: Will remain free from falls  Outcome: PROGRESSING AS EXPECTED  Call bell in reach, call bell use reinforced, patient verbalized understanding.    Bed alarm activated    Problem: Respiratory:  Goal: Respiratory status will improve  Outcome: PROGRESSING AS EXPECTED  Cough/Deep breathing exercises performed.

## 2018-11-06 NOTE — DISCHARGE SUMMARY
Discharge Summary    CHIEF COMPLAINT ON ADMISSION  Chief Complaint   Patient presents with   • Extremity Weakness     the pt is c/o ongoing ble weakness.    • Urinary Retention     the pt is also c/o ongoing difficulty w/ emptying bladder       Reason for Admission  Weakness      Admission Date  11/3/2018    CODE STATUS  Full Code    HPI & HOSPITAL COURSE  This is a 89 y.o. male here admitted 11/4/2018 for generalized weakness with difficulty ambulating.  With patient being unsteady on his feet patient was using a front wheel walker at home.  Prior to admission patient felt unsafe at home with the fear that he may fall again.  With initial laboratory examinations patient was seen to have a mild leukocytosis of 12.1, with a noted positive urinalysis as well as noted facial cellulitis involving his periorbital area. Hence a CT maxillofacial was done which did not show any evidence of abscesses.  Hence with continued IV antibiotics patient has had significant clinical improvement, patient is able to ambulate indeed with a walker.  He feels back to normal otherwise.  Had physical and occupational therapy evaluation and recommended outpatient physical therapy in addition to home health which was ordered on his behalf. At this given time, Patient denies fevers/chills, chest pain, shortness of breath or nausea/vommiting.  His facial area swelling and erythema originally reported has resolved.  He denies having any urinary symptoms.  Patient will be discharged with oral antibiotics.      Therefore, he is discharged in good and stable condition to home with close outpatient follow-up.    The patient met 2-midnight criteria for an inpatient stay at the time of discharge.    Discharge Date  11/6/2018    FOLLOW UP ITEMS POST DISCHARGE  PCP in 1 week    DISCHARGE DIAGNOSES  Active Problems:    Hypokalemia POA: Yes    DM (diabetes mellitus) (CMS-HCC) POA: Yes    HTN (hypertension) POA: Yes    Atrial flutter ([facility]) POA:  Yes  Resolved Problems:    Generalized weakness POA: Yes    Urinary retention POA: Unknown    UTI (urinary tract infection) POA: Yes    Facial cellulitis POA: Yes      FOLLOW UP  No future appointments.  [facility] ([facility])  [address] [place] [id number] [phone]          MEDICATIONS ON DISCHARGE     Medication List      START taking these medications      Instructions   amoxicillin-clavulanate 875-125 MG Tabs  Commonly known as:  AUGMENTIN   Take 1 Tab by mouth 2 times a day for 4 days.  Dose:  1 Tab     tamsulosin 0.4 MG capsule  Start taking on:  11/7/2018  Commonly known as:  FLOMAX   Take 1 Cap by mouth every day.  Dose:  0.4 mg        CHANGE how you take these medications      Instructions   chlorthalidone 25 MG Tabs  Start taking on:  11/8/2018  What changed:  These instructions start on 11/8/2018. If you are unsure what to do until then, ask your doctor or other care provider.  Commonly known as:  HYGROTON   Take 1 Tab by mouth 2 Times a Day.  Dose:  25 mg        CONTINUE taking these medications      Instructions   amLODIPine 5 MG Tabs  Commonly known as:  NORVASC   Take 1 Tab by mouth 2 Times a Day.  Dose:  5 mg     aspirin EC 81 MG Tbec  Commonly known as:  ECOTRIN   Take 81 mg by mouth every evening.  Dose:  81 mg     BASAGLAR KWIKPEN 100 UNIT/ML Sopn   Inject 30 Units as instructed every day.  Dose:  30 Units     metFORMIN 500 MG Tabs  Commonly known as:  GLUCOPHAGE   Take 500 mg by mouth 2 times a day, with meals.  Dose:  500 mg     metoprolol 50 MG Tabs  Commonly known as:  LOPRESSOR   Take 50 mg by mouth 2 times a day.  Dose:  50 mg     omega-3 acid ethyl esters 1 GM capsule  Commonly known as:  LOVAZA   Take 2 Caps by mouth 2 Times a Day.  Dose:  2 g     potassium chloride SA 10 MEQ Tbcr  Commonly known as:  K-DUR   Take 1 Tab by mouth every day.  Dose:  10 mEq     * PROSTATE Tabs   Take 1 Tab by mouth 2 Times a Day.  Dose:  1 Tab     * RETAINE VISION PO   Take 2 Tabs by mouth  2 Times a Day.  Dose:  2 Tab     PX MENS MULTIVITAMINS Tabs   Take 1 Tab by mouth every day.  Dose:  1 Tab     RESTASIS 0.05 % ophthalmic emulsion  Generic drug:  cyclosporin   Place 1 Drop in both eyes 2 times a day.  Dose:  1 Drop     simvastatin 10 MG Tabs  Commonly known as:  ZOCOR   Take 10 mg by mouth every evening.  Dose:  10 mg     XARELTO 20 MG Tabs tablet  Generic drug:  rivaroxaban   Take 20 mg by mouth with dinner.  Dose:  20 mg        * This list has 2 medication(s) that are the same as other medications prescribed for you. Read the directions carefully, and ask your doctor or other care provider to review them with you.                Allergies  Allergies   Allergen Reactions   • Iodine      Pt and pts wife report that it has been so long not sure what happens       DIET  Orders Placed This Encounter   Procedures   • Diet Order Diabetic, Cardiac     Standing Status:   Standing     Number of Occurrences:   1     Order Specific Question:   Diet:     Answer:   Diabetic [3]     Order Specific Question:   Diet:     Answer:   Cardiac [6]       ACTIVITY  As tolerated.  Weight bearing as tolerated    CONSULTATIONS  None    PROCEDURES  None    LABORATORY  Lab Results   Component Value Date    SODIUM 135 11/06/2018    POTASSIUM 3.1 (L) 11/06/2018    CHLORIDE 99 11/06/2018    CO2 26 11/06/2018    GLUCOSE 89 11/06/2018    BUN 9 11/06/2018    CREATININE 0.68 11/06/2018    CREATININE 1.0 12/02/2008        Lab Results   Component Value Date    WBC 6.5 11/05/2018    HEMOGLOBIN 14.0 11/05/2018    HEMATOCRIT 41.1 (L) 11/05/2018    PLATELETCT 203 11/05/2018        Total time of the discharge process exceeds 37 minutes.

## 2018-11-06 NOTE — DISCHARGE PLANNING
Received Choice form at 1620  Agency/Facility Name: [facility]  Referral sent per Choice form at 1623.

## 2018-11-06 NOTE — DISCHARGE INSTRUCTIONS
Discharge Instructions    Discharged to home by car with friend. Discharged via wheelchair, hospital escort: Yes.  Special equipment needed: Walker    Be sure to schedule a follow-up appointment with your primary care doctor or any specialists as instructed.     Discharge Plan:   Diet Plan: Discussed  Activity Level: Discussed  Confirmed Follow up Appointment: Patient to Call and Schedule Appointment  Confirmed Symptoms Management: Discussed  Medication Reconciliation Updated: Yes  Pneumococcal Vaccine Administered/Refused: Not given - Patient refused pneumococcal vaccine  Influenza Vaccine Indication: Patient Refuses    I understand that a diet low in cholesterol, fat, and sodium is recommended for good health. Unless I have been given specific instructions below for another diet, I accept this instruction as my diet prescription.   Other diet: diabetic cardiac    Special Instructions: None    · Is patient discharged on Warfarin / Coumadin?   No     Depression / Suicide Risk    As [facility], it is important to learn how to keep safe from harming yourself.    Recognize the warning signs:  · Abrupt changes in personality, positive or negative- including increase in energy   · Giving away possessions  · Change in eating patterns- significant weight changes-  positive or negative  · Change in sleeping patterns- unable to sleep or sleeping all the time   · Unwillingness or inability to communicate  · Depression  · Unusual sadness, discouragement and loneliness  · Talk of wanting to die  · Neglect of personal appearance   · Rebelliousness- reckless behavior  · Withdrawal from people/activities they love  · Confusion- inability to concentrate     If you or a loved one observes any of these behaviors or has concerns about self-harm, here's what you can do:  · Talk about it- your feelings and reasons for harming yourself  · Remove any means that you might use to hurt yourself  (examples: pills, rope, extension cords, firearm)  · Get professional help from the community (Mental Health, Substance Abuse, psychological counseling)  · Do not be alone:Call your Safe Contact- someone whom you trust who will be there for you.  · Call your local CRISIS HOTLINE 785-6706 or 833-837-3206  · Call your local Children's Mobile Crisis Response Team [place] (028) 321-5624 or www.ChipIn  · Call the toll free National Suicide Prevention Hotlines   · National Suicide Prevention Lifeline 981-526-WUNB (3632)  · National Hope Line Network 800-SUICIDE (688-8954)

## 2018-12-06 ENCOUNTER — HOSPITAL ENCOUNTER (OUTPATIENT)
Dept: HOSPITAL 8 - CACL | Age: 83
Discharge: HOME | End: 2018-12-06
Attending: INTERNAL MEDICINE
Payer: MEDICARE

## 2018-12-06 VITALS — WEIGHT: 171.96 LBS | BODY MASS INDEX: 26.99 KG/M2 | HEIGHT: 67 IN

## 2018-12-06 DIAGNOSIS — I48.92: Primary | ICD-10-CM

## 2018-12-06 DIAGNOSIS — Z87.891: ICD-10-CM

## 2018-12-06 DIAGNOSIS — E11.9: ICD-10-CM

## 2018-12-06 DIAGNOSIS — Z79.01: ICD-10-CM

## 2018-12-06 DIAGNOSIS — Z79.899: ICD-10-CM

## 2018-12-06 DIAGNOSIS — E78.00: ICD-10-CM

## 2018-12-06 DIAGNOSIS — Z79.84: ICD-10-CM

## 2018-12-06 DIAGNOSIS — Z79.4: ICD-10-CM

## 2018-12-06 DIAGNOSIS — I10: ICD-10-CM

## 2018-12-06 DIAGNOSIS — I25.810: ICD-10-CM

## 2018-12-06 DIAGNOSIS — Z88.8: ICD-10-CM

## 2018-12-06 PROCEDURE — 33282: CPT

## 2018-12-06 PROCEDURE — C1764 EVENT RECORDER, CARDIAC: HCPCS

## 2019-01-31 LAB — EKG IMPRESSION: NORMAL

## 2019-02-22 ENCOUNTER — HOSPITAL ENCOUNTER (OUTPATIENT)
Dept: LAB | Facility: MEDICAL CENTER | Age: 84
End: 2019-02-22
Attending: NURSE PRACTITIONER
Payer: MEDICARE

## 2019-02-22 LAB
CHOLEST SERPL-MCNC: 120 MG/DL (ref 100–199)
EST. AVERAGE GLUCOSE BLD GHB EST-MCNC: 258 MG/DL
FASTING STATUS PATIENT QL REPORTED: NORMAL
HBA1C MFR BLD: 10.6 % (ref 0–5.6)
HDLC SERPL-MCNC: 34 MG/DL
LDLC SERPL CALC-MCNC: 47 MG/DL
TRIGL SERPL-MCNC: 194 MG/DL (ref 0–149)
TSH SERPL DL<=0.005 MIU/L-ACNC: 6.72 UIU/ML (ref 0.38–5.33)

## 2019-02-22 PROCEDURE — 83036 HEMOGLOBIN GLYCOSYLATED A1C: CPT | Mod: GA

## 2019-02-22 PROCEDURE — 36415 COLL VENOUS BLD VENIPUNCTURE: CPT | Mod: GA

## 2019-02-22 PROCEDURE — 84443 ASSAY THYROID STIM HORMONE: CPT

## 2019-02-22 PROCEDURE — 80061 LIPID PANEL: CPT

## 2019-03-26 ENCOUNTER — HOSPITAL ENCOUNTER (OUTPATIENT)
Dept: LAB | Facility: MEDICAL CENTER | Age: 84
End: 2019-03-26
Attending: INTERNAL MEDICINE
Payer: MEDICARE

## 2019-03-26 LAB
ALBUMIN SERPL BCP-MCNC: 4.8 G/DL (ref 3.2–4.9)
ALBUMIN/GLOB SERPL: 2 G/DL
ALP SERPL-CCNC: 32 U/L (ref 30–99)
ALT SERPL-CCNC: 35 U/L (ref 2–50)
ANION GAP SERPL CALC-SCNC: 12 MMOL/L (ref 0–11.9)
AST SERPL-CCNC: 36 U/L (ref 12–45)
BILIRUB SERPL-MCNC: 0.7 MG/DL (ref 0.1–1.5)
BUN SERPL-MCNC: 16 MG/DL (ref 8–22)
CALCIUM SERPL-MCNC: 9.4 MG/DL (ref 8.5–10.5)
CHLORIDE SERPL-SCNC: 97 MMOL/L (ref 96–112)
CHOLEST SERPL-MCNC: 130 MG/DL (ref 100–199)
CO2 SERPL-SCNC: 27 MMOL/L (ref 20–33)
CREAT SERPL-MCNC: 0.73 MG/DL (ref 0.5–1.4)
FASTING STATUS PATIENT QL REPORTED: NORMAL
GLOBULIN SER CALC-MCNC: 2.4 G/DL (ref 1.9–3.5)
GLUCOSE SERPL-MCNC: 216 MG/DL (ref 65–99)
HDLC SERPL-MCNC: 33 MG/DL
LDLC SERPL CALC-MCNC: 29 MG/DL
POTASSIUM SERPL-SCNC: 3.1 MMOL/L (ref 3.6–5.5)
PROT SERPL-MCNC: 7.2 G/DL (ref 6–8.2)
SODIUM SERPL-SCNC: 136 MMOL/L (ref 135–145)
TRIGL SERPL-MCNC: 339 MG/DL (ref 0–149)

## 2019-03-26 PROCEDURE — 80053 COMPREHEN METABOLIC PANEL: CPT

## 2019-03-26 PROCEDURE — 36415 COLL VENOUS BLD VENIPUNCTURE: CPT

## 2019-03-26 PROCEDURE — 80061 LIPID PANEL: CPT

## 2019-04-04 ENCOUNTER — HOSPITAL ENCOUNTER (OUTPATIENT)
Dept: RADIOLOGY | Facility: MEDICAL CENTER | Age: 84
End: 2019-04-04
Attending: NURSE PRACTITIONER
Payer: MEDICARE

## 2019-04-04 DIAGNOSIS — R31.0 GROSS HEMATURIA: ICD-10-CM

## 2019-04-04 PROCEDURE — 76775 US EXAM ABDO BACK WALL LIM: CPT

## 2019-05-02 ENCOUNTER — HOSPITAL ENCOUNTER (OUTPATIENT)
Dept: LAB | Facility: MEDICAL CENTER | Age: 84
End: 2019-05-02
Attending: NURSE PRACTITIONER
Payer: MEDICARE

## 2019-05-02 LAB
ALBUMIN SERPL BCP-MCNC: 4.2 G/DL (ref 3.2–4.9)
ALBUMIN/GLOB SERPL: 1.7 G/DL
ALP SERPL-CCNC: 31 U/L (ref 30–99)
ALT SERPL-CCNC: 35 U/L (ref 2–50)
ANION GAP SERPL CALC-SCNC: 11 MMOL/L (ref 0–11.9)
AST SERPL-CCNC: 31 U/L (ref 12–45)
BILIRUB SERPL-MCNC: 0.7 MG/DL (ref 0.1–1.5)
BUN SERPL-MCNC: 17 MG/DL (ref 8–22)
CALCIUM SERPL-MCNC: 9.3 MG/DL (ref 8.5–10.5)
CHLORIDE SERPL-SCNC: 99 MMOL/L (ref 96–112)
CO2 SERPL-SCNC: 26 MMOL/L (ref 20–33)
CREAT SERPL-MCNC: 0.78 MG/DL (ref 0.5–1.4)
CREAT UR-MCNC: 68.9 MG/DL
EST. AVERAGE GLUCOSE BLD GHB EST-MCNC: 217 MG/DL
FASTING STATUS PATIENT QL REPORTED: NORMAL
GLOBULIN SER CALC-MCNC: 2.5 G/DL (ref 1.9–3.5)
GLUCOSE SERPL-MCNC: 255 MG/DL (ref 65–99)
HBA1C MFR BLD: 9.2 % (ref 0–5.6)
MICROALBUMIN UR-MCNC: 1.2 MG/DL
MICROALBUMIN/CREAT UR: 17 MG/G (ref 0–30)
POTASSIUM SERPL-SCNC: 3.4 MMOL/L (ref 3.6–5.5)
PROT SERPL-MCNC: 6.7 G/DL (ref 6–8.2)
SODIUM SERPL-SCNC: 136 MMOL/L (ref 135–145)

## 2019-05-02 PROCEDURE — 82043 UR ALBUMIN QUANTITATIVE: CPT

## 2019-05-02 PROCEDURE — 80053 COMPREHEN METABOLIC PANEL: CPT

## 2019-05-02 PROCEDURE — 36415 COLL VENOUS BLD VENIPUNCTURE: CPT

## 2019-05-02 PROCEDURE — 82570 ASSAY OF URINE CREATININE: CPT

## 2019-05-02 PROCEDURE — 83036 HEMOGLOBIN GLYCOSYLATED A1C: CPT

## 2019-08-11 ENCOUNTER — APPOINTMENT (OUTPATIENT)
Dept: RADIOLOGY | Facility: MEDICAL CENTER | Age: 84
End: 2019-08-11
Attending: EMERGENCY MEDICINE
Payer: MEDICARE

## 2019-08-11 ENCOUNTER — HOSPITAL ENCOUNTER (EMERGENCY)
Facility: MEDICAL CENTER | Age: 84
End: 2019-08-11
Attending: EMERGENCY MEDICINE
Payer: MEDICARE

## 2019-08-11 VITALS
SYSTOLIC BLOOD PRESSURE: 112 MMHG | TEMPERATURE: 97.5 F | DIASTOLIC BLOOD PRESSURE: 70 MMHG | BODY MASS INDEX: 30.12 KG/M2 | HEART RATE: 88 BPM | OXYGEN SATURATION: 99 % | RESPIRATION RATE: 17 BRPM | HEIGHT: 66 IN | WEIGHT: 187.39 LBS

## 2019-08-11 DIAGNOSIS — E87.6 HYPOKALEMIA: ICD-10-CM
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
## 2019-08-11 DIAGNOSIS — R41.0 CONFUSION: ICD-10-CM

## 2019-08-11 LAB
ALBUMIN SERPL BCP-MCNC: 3.5 G/DL (ref 3.2–4.9)
ALBUMIN/GLOB SERPL: 1.3 G/DL
ALP SERPL-CCNC: 36 U/L (ref 30–99)
ALT SERPL-CCNC: 46 U/L (ref 2–50)
ANION GAP SERPL CALC-SCNC: 13 MMOL/L (ref 0–11.9)
APTT PPP: 31.3 SEC (ref 24.7–36)
AST SERPL-CCNC: 54 U/L (ref 12–45)
BASOPHILS # BLD AUTO: 0.7 % (ref 0–1.8)
BASOPHILS # BLD: 0.05 K/UL (ref 0–0.12)
BILIRUB SERPL-MCNC: 0.9 MG/DL (ref 0.1–1.5)
BUN SERPL-MCNC: 15 MG/DL (ref 8–22)
CALCIUM SERPL-MCNC: 8.7 MG/DL (ref 8.4–10.2)
CHLORIDE SERPL-SCNC: 96 MMOL/L (ref 96–112)
CO2 SERPL-SCNC: 25 MMOL/L (ref 20–33)
CREAT SERPL-MCNC: 0.79 MG/DL (ref 0.5–1.4)
EKG IMPRESSION: NORMAL
EOSINOPHIL # BLD AUTO: 0.21 K/UL (ref 0–0.51)
EOSINOPHIL NFR BLD: 3 % (ref 0–6.9)
ERYTHROCYTE [DISTWIDTH] IN BLOOD BY AUTOMATED COUNT: 44 FL (ref 35.9–50)
GLOBULIN SER CALC-MCNC: 2.6 G/DL (ref 1.9–3.5)
GLUCOSE SERPL-MCNC: 313 MG/DL (ref 65–99)
HCT VFR BLD AUTO: 40.3 % (ref 42–52)
HGB BLD-MCNC: 14.2 G/DL (ref 14–18)
IMM GRANULOCYTES # BLD AUTO: 0.04 K/UL (ref 0–0.11)
IMM GRANULOCYTES NFR BLD AUTO: 0.6 % (ref 0–0.9)
INR PPP: 1.02 (ref 0.87–1.13)
LYMPHOCYTES # BLD AUTO: 1.37 K/UL (ref 1–4.8)
LYMPHOCYTES NFR BLD: 19.3 % (ref 22–41)
MCH RBC QN AUTO: 31.7 PG (ref 27–33)
MCHC RBC AUTO-ENTMCNC: 35.2 G/DL (ref 33.7–35.3)
MCV RBC AUTO: 90 FL (ref 81.4–97.8)
MONOCYTES # BLD AUTO: 0.58 K/UL (ref 0–0.85)
MONOCYTES NFR BLD AUTO: 8.2 % (ref 0–13.4)
NEUTROPHILS # BLD AUTO: 4.86 K/UL (ref 1.82–7.42)
NEUTROPHILS NFR BLD: 68.2 % (ref 44–72)
NRBC # BLD AUTO: 0 K/UL
NRBC BLD-RTO: 0 /100 WBC
PLATELET # BLD AUTO: 200 K/UL (ref 164–446)
PMV BLD AUTO: 10.6 FL (ref 9–12.9)
POTASSIUM SERPL-SCNC: 2.8 MMOL/L (ref 3.6–5.5)
PROT SERPL-MCNC: 6.1 G/DL (ref 6–8.2)
PROTHROMBIN TIME: 13.5 SEC (ref 12–14.6)
RBC # BLD AUTO: 4.48 M/UL (ref 4.7–6.1)
SODIUM SERPL-SCNC: 134 MMOL/L (ref 135–145)
WBC # BLD AUTO: 7.1 K/UL (ref 4.8–10.8)

## 2019-08-11 PROCEDURE — 700111 HCHG RX REV CODE 636 W/ 250 OVERRIDE (IP): Performed by: EMERGENCY MEDICINE

## 2019-08-11 PROCEDURE — 99285 EMERGENCY DEPT VISIT HI MDM: CPT

## 2019-08-11 PROCEDURE — 93005 ELECTROCARDIOGRAM TRACING: CPT | Performed by: EMERGENCY MEDICINE

## 2019-08-11 PROCEDURE — 80053 COMPREHEN METABOLIC PANEL: CPT

## 2019-08-11 PROCEDURE — 36415 COLL VENOUS BLD VENIPUNCTURE: CPT

## 2019-08-11 PROCEDURE — 96365 THER/PROPH/DIAG IV INF INIT: CPT

## 2019-08-11 PROCEDURE — 85730 THROMBOPLASTIN TIME PARTIAL: CPT

## 2019-08-11 PROCEDURE — 85025 COMPLETE CBC W/AUTO DIFF WBC: CPT

## 2019-08-11 PROCEDURE — 70450 CT HEAD/BRAIN W/O DYE: CPT

## 2019-08-11 PROCEDURE — 71045 X-RAY EXAM CHEST 1 VIEW: CPT

## 2019-08-11 PROCEDURE — 93005 ELECTROCARDIOGRAM TRACING: CPT

## 2019-08-11 PROCEDURE — 700102 HCHG RX REV CODE 250 W/ 637 OVERRIDE(OP): Performed by: EMERGENCY MEDICINE

## 2019-08-11 PROCEDURE — 96366 THER/PROPH/DIAG IV INF ADDON: CPT

## 2019-08-11 PROCEDURE — A9270 NON-COVERED ITEM OR SERVICE: HCPCS | Performed by: EMERGENCY MEDICINE

## 2019-08-11 PROCEDURE — 85610 PROTHROMBIN TIME: CPT

## 2019-08-11 RX ORDER — POTASSIUM CHLORIDE 20 MEQ/1
20 TABLET, EXTENDED RELEASE ORAL DAILY
Qty: 5 TAB | Refills: 0 | Status: SHIPPED | OUTPATIENT
Start: 2019-08-11 | End: 2019-08-16

## 2019-08-11 RX ORDER — POTASSIUM CHLORIDE 20 MEQ/1
40 TABLET, EXTENDED RELEASE ORAL ONCE
Status: COMPLETED | OUTPATIENT
Start: 2019-08-11 | End: 2019-08-11

## 2019-08-11 RX ORDER — CHLORAL HYDRATE 500 MG
2000 CAPSULE ORAL 2 TIMES DAILY
Status: ON HOLD | COMMUNITY
End: 2021-05-03

## 2019-08-11 RX ORDER — GABAPENTIN 100 MG/1
100 CAPSULE ORAL 2 TIMES DAILY
Status: ON HOLD | COMMUNITY
End: 2020-02-24

## 2019-08-11 RX ORDER — MAGNESIUM SULFATE HEPTAHYDRATE 40 MG/ML
2 INJECTION, SOLUTION INTRAVENOUS ONCE
Status: COMPLETED | OUTPATIENT
Start: 2019-08-11 | End: 2019-08-11

## 2019-08-11 RX ORDER — POTASSIUM CHLORIDE 20 MEQ/1
20 TABLET, EXTENDED RELEASE ORAL 2 TIMES DAILY
Qty: 10 TAB | Refills: 0 | Status: SHIPPED | OUTPATIENT
Start: 2019-08-11 | End: 2019-08-11 | Stop reason: SDUPTHER

## 2019-08-11 RX ORDER — AMLODIPINE BESYLATE 5 MG/1
5 TABLET ORAL 2 TIMES DAILY
COMMUNITY
End: 2020-02-13

## 2019-08-11 RX ADMIN — POTASSIUM CHLORIDE 40 MEQ: 1500 TABLET, EXTENDED RELEASE ORAL at 11:44

## 2019-08-11 RX ADMIN — MAGNESIUM SULFATE HEPTAHYDRATE 2 G: 40 INJECTION, SOLUTION INTRAVENOUS at 11:44

## 2019-08-11 ASSESSMENT — PAIN SCALES - WONG BAKER
WONGBAKER_NUMERICALRESPONSE: DOESN'T HURT AT ALL
WONGBAKER_NUMERICALRESPONSE: DOESN'T HURT AT ALL

## 2019-08-11 NOTE — ED NOTES
Med Rec complete per Pt at bedside  Allergies Reviewed  No ABX in the last 14 days    Pt takes XARELTO and ASPRIN

## 2019-08-11 NOTE — ED TRIAGE NOTES
"Chief Complaint   Patient presents with   • Fall Less than 10 Feet     Pt fell walking to restroom last night.    • Head Injury     Pt reports hitting head but denies injury. Woke this morning and said his \" head just felt funny\". Unable to describe in any detail. Pt takes xarelto.    Pt AAOx4.   States he does not know what he hit his head on, denies injury.     "

## 2019-08-11 NOTE — ED PROVIDER NOTES
"ED Provider Note    CHIEF COMPLAINT  Chief Complaint   Patient presents with   • Fall Less than 10 Feet     Pt fell walking to restroom last night.    • Head Injury     Pt reports hitting head but denies injury. Woke this morning and said his \" head just felt funny\". Unable to describe in any detail. Pt takes xarelto.        HPI  Julien Dao is a 89 y.o. male who presents to the ED after a fall.  The patient states that he sleepwalks, he states that 3 or 4 in the morning he was standing in the middle of the hallway, he states that he tends to fall but is been standing for too long.  He did have his walker.  He remembers falling down, hitting his head, he did not pass out, he was able to get up afterwards.  Patient states this morning he had normal breakfast but then started feeling slightly confused.  No new numbness, tingling, weakness.  The patient is on Xarelto, no headache, neck pain, chest pain, shortness of breath, abdominal pains.  Patient did have some nausea after the fall but no vomiting.  The patient states his confusion is gone now.  REVIEW OF SYSTEMS  See HPI for further details. All other systems are negative.     PAST MEDICAL HISTORY  Past Medical History:   Diagnosis Date   • CAD (coronary artery disease)    • DM (diabetes mellitus) ([facility]) 1/7/2014   • Falls    • Hypertension    • UTI (urinary tract infection) 11/4/2018       FAMILY HISTORY  History reviewed. No pertinent family history.    SOCIAL HISTORY  Social History     Socioeconomic History   • Marital status:      Spouse name: Not on file   • Number of children: Not on file   • Years of education: Not on file   • Highest education level: Not on file   Occupational History   • Not on file   Social Needs   • Financial resource strain: Not on file   • Food insecurity:     Worry: Not on file     Inability: Not on file   • Transportation needs:     Medical: Not on file     Non-medical: Not on file   Tobacco Use   • Smoking status: Former " Smoker     Last attempt to quit: 10/19/1972     Years since quittin.8   • Smokeless tobacco: Never Used   Substance and Sexual Activity   • Alcohol use: No   • Drug use: No   • Sexual activity: Not on file   Lifestyle   • Physical activity:     Days per week: Not on file     Minutes per session: Not on file   • Stress: Not on file   Relationships   • Social connections:     Talks on phone: Not on file     Gets together: Not on file     Attends [employer] service: Not on file     Active member of club or organization: Not on file     Attends meetings of clubs or organizations: Not on file     Relationship status: Not on file   • Intimate partner violence:     Fear of current or ex partner: Not on file     Emotionally abused: Not on file     Physically abused: Not on file     Forced sexual activity: Not on file   Other Topics Concern   • Not on file   Social History Narrative   • Not on file       SURGICAL HISTORY  Past Surgical History:   Procedure Laterality Date   • CORONARY ARTERY BYPASS, 3     • OTHER CARDIAC SURGERY     • TED RECTAL ABSCESS         CURRENT MEDICATIONS  Home Medications     Reviewed by Anjum Bob (Pharmacy Tech) on 19 at 1139  Med List Status: Complete   Medication Last Dose Status   amLODIPine (NORVASC) 5 MG Tab 2019 Active   aspirin EC (ECOTRIN) 81 MG Tablet Delayed Response 2019 Active   chlorthalidone (HYGROTON) 25 MG Tab 2019 Active   cyclosporin (RESTASIS) 0.05 % ophthalmic emulsion 2019 Active   gabapentin (NEURONTIN) 100 MG Cap 2019 Active   Insulin Glargine (BASAGLAR KWIKPEN) 100 UNIT/ML Solution Pen-injector 8/10/2019 Active   metformin (GLUCOPHAGE) 500 MG TABS 2019 Active   metoprolol (LOPRESSOR) 50 MG Tab 2019 Active   Multiple Vitamins-Minerals (PX MENS MULTIVITAMINS) Tab 2019 Active   Omega-3 Fatty Acids (FISH OIL) 1000 MG Cap capsule 2019 Active   potassium chloride SA (K-DUR) 10 MEQ TBCR 2019 Active  "  rivaroxaban (XARELTO) 20 MG Tab tablet 8/10/2019 Active   simvastatin (ZOCOR) 10 MG Tab 8/10/2019 Active   Specialty Vitamins Products (PROSTATE) Tab 8/11/2019 Active   Specialty Vitamins Products (RETAINE VISION PO) 8/11/2019 Active   tamsulosin (FLOMAX) 0.4 MG capsule 8/11/2019 Active                ALLERGIES  Allergies   Allergen Reactions   • Iodine      Pt and pts wife report that it has been so long not sure what happens       PHYSICAL EXAM  VITAL SIGNS: /75   Pulse 67   Temp 36.9 °C (98.4 °F) (Temporal)   Resp 17   Ht 1.676 m (5' 6\")   Wt 85 kg (187 lb 6.3 oz)   SpO2 97%   BMI 30.25 kg/m²   Constitutional:  Well developed, Well nourished, no distress, Non-toxic appearance.   HENT: Atraumatic, normocephalic, oropharynx is clear  Eyes: PERRLA, EOMI, Conjunctiva normal, No discharge.   Neck: Normal range of motion, No tenderness, Supple, No stridor.  No C-spine tenderness palpation  Lymphatic: No lymphadenopathy noted.   Cardiovascular: Normal heart rate, Normal rhythm.   Thorax & Lungs: Normal breath sounds, No respiratory distress, No wheezing, No chest tenderness.   Skin: Warm, Dry, No erythema, No rash.   Extremities: Intact distal pulses, No edema, No tenderness.   Neurologic: Alert & oriented x 3, Normal motor function, Normal sensory function, No focal deficits noted.   Psychiatric: Affect normal, Judgment normal, Mood normal.     RADIOLOGY/PROCEDURES  CT-HEAD W/O   Final Result      No acute intracranial abnormality      DX-CHEST-PORTABLE (1 VIEW)   Final Result      No acute cardiopulmonary abnormality identified.          Results for orders placed or performed during the hospital encounter of 08/11/19   CBC WITH DIFFERENTIAL   Result Value Ref Range    WBC 7.1 4.8 - 10.8 K/uL    RBC 4.48 (L) 4.70 - 6.10 M/uL    Hemoglobin 14.2 14.0 - 18.0 g/dL    Hematocrit 40.3 (L) 42.0 - 52.0 %    MCV 90.0 81.4 - 97.8 fL    MCH 31.7 27.0 - 33.0 pg    MCHC 35.2 33.7 - 35.3 g/dL    RDW 44.0 35.9 - 50.0 " fL    Platelet Count 200 164 - 446 K/uL    MPV 10.6 9.0 - 12.9 fL    Neutrophils-Polys 68.20 44.00 - 72.00 %    Lymphocytes 19.30 (L) 22.00 - 41.00 %    Monocytes 8.20 0.00 - 13.40 %    Eosinophils 3.00 0.00 - 6.90 %    Basophils 0.70 0.00 - 1.80 %    Immature Granulocytes 0.60 0.00 - 0.90 %    Nucleated RBC 0.00 /100 WBC    Neutrophils (Absolute) 4.86 1.82 - 7.42 K/uL    Lymphs (Absolute) 1.37 1.00 - 4.80 K/uL    Monos (Absolute) 0.58 0.00 - 0.85 K/uL    Eos (Absolute) 0.21 0.00 - 0.51 K/uL    Baso (Absolute) 0.05 0.00 - 0.12 K/uL    Immature Granulocytes (abs) 0.04 0.00 - 0.11 K/uL    NRBC (Absolute) 0.00 K/uL   COMP METABOLIC PANEL   Result Value Ref Range    Sodium 134 (L) 135 - 145 mmol/L    Potassium 2.8 (L) 3.6 - 5.5 mmol/L    Chloride 96 96 - 112 mmol/L    Co2 25 20 - 33 mmol/L    Anion Gap 13.0 (H) 0.0 - 11.9    Glucose 313 (H) 65 - 99 mg/dL    Bun 15 8 - 22 mg/dL    Creatinine 0.79 0.50 - 1.40 mg/dL    Calcium 8.7 8.4 - 10.2 mg/dL    AST(SGOT) 54 (H) 12 - 45 U/L    ALT(SGPT) 46 2 - 50 U/L    Alkaline Phosphatase 36 30 - 99 U/L    Total Bilirubin 0.9 0.1 - 1.5 mg/dL    Albumin 3.5 3.2 - 4.9 g/dL    Total Protein 6.1 6.0 - 8.2 g/dL    Globulin 2.6 1.9 - 3.5 g/dL    A-G Ratio 1.3 g/dL   PROTHROMBIN TIME   Result Value Ref Range    PT 13.5 12.0 - 14.6 sec    INR 1.02 0.87 - 1.13   APTT   Result Value Ref Range    APTT 31.3 24.7 - 36.0 sec   ESTIMATED GFR   Result Value Ref Range    GFR If African American >60 >60 mL/min/1.73 m 2    GFR If Non African American >60 >60 mL/min/1.73 m 2   EKG   Result Value Ref Range    Report       [facility] Emergency Dept.    Test Date:  2019  Pt Name:    SAMMY CLIFFORD                 Department: EDSM  MRN:        [id number]                      [facility]   Gender:     Male                         Technician: [id number]  :        [id number]                   Requested By:ER TRIAGE PROTOCOL  Order #:    [id number]                    Reading  MD: ALVARADO ALVARADO MD    Measurements  Intervals                                Axis  Rate:       66                           P:          0  WV:         284                          QRS:        -41  QRSD:       170                          T:          63  QT:         495  QTc:        519    Interpretive Statements  Sinus rhythm  Prolonged WV interval  RBBB and LAFB  Compared to ECG 11/03/2018 22:45:42  Left anterior fascicular block now present  Myocardial infarct finding no longer present    Electronically Signed On 8- 9:56:56 PDT by ALVARADO ALVARADO MD          COURSE & MEDICAL DECISION MAKING  Pertinent Labs & Imaging studies reviewed. (See chart for details)  Patient is coming in status post fall, questionable confusion this morning after his fall, he is on Xarelto, did hit his head, does not have a headache.  Will get a CT scan of his head, check basic laboratory test.    CT scan was negative, the patient has hypokalemia, give the patient 40 mEq of potassium here, give the patient 2 g of magnesium, the patient likely has hypokalemia secondary to the patient's thiazide diuretic.  Him to follow-up with his primary care physician for talk about discontinuing the thyroid diuretic.  Will increase the patient's potassium to 40 mEq a day for the next 5 days, have the patient return with worsening symptoms.        FINAL IMPRESSION  1. Fall, initial encounter    2. Confusion    3. Hypokalemia        Patient referred to primary care provider for blood pressure management     This dictation was created using voice recognition software. The accuracy of the dictation is limited to the abilities of the software. I expect there may be some errors of grammar and possibly content. The nursing notes were reviewed and certain aspects of this information were incorporated into this note.    Electronically signed by: Alvarado Alvarado, 8/11/2019 10:07 AM

## 2019-08-19 ENCOUNTER — HOSPITAL ENCOUNTER (OUTPATIENT)
Dept: LAB | Facility: MEDICAL CENTER | Age: 84
End: 2019-08-19
Attending: NURSE PRACTITIONER
Payer: MEDICARE

## 2019-08-19 LAB
ALBUMIN SERPL BCP-MCNC: 4.2 G/DL (ref 3.2–4.9)
ALBUMIN/GLOB SERPL: 1.6 G/DL
ALP SERPL-CCNC: 34 U/L (ref 30–99)
ALT SERPL-CCNC: 41 U/L (ref 2–50)
ANION GAP SERPL CALC-SCNC: 11 MMOL/L (ref 0–11.9)
AST SERPL-CCNC: 39 U/L (ref 12–45)
BILIRUB SERPL-MCNC: 1 MG/DL (ref 0.1–1.5)
BUN SERPL-MCNC: 15 MG/DL (ref 8–22)
CALCIUM SERPL-MCNC: 9.1 MG/DL (ref 8.5–10.5)
CHLORIDE SERPL-SCNC: 99 MMOL/L (ref 96–112)
CO2 SERPL-SCNC: 28 MMOL/L (ref 20–33)
CREAT SERPL-MCNC: 0.73 MG/DL (ref 0.5–1.4)
GLOBULIN SER CALC-MCNC: 2.6 G/DL (ref 1.9–3.5)
GLUCOSE SERPL-MCNC: 156 MG/DL (ref 65–99)
MAGNESIUM SERPL-MCNC: 1.6 MG/DL (ref 1.5–2.5)
POTASSIUM SERPL-SCNC: 3.2 MMOL/L (ref 3.6–5.5)
PROT SERPL-MCNC: 6.8 G/DL (ref 6–8.2)
SODIUM SERPL-SCNC: 138 MMOL/L (ref 135–145)

## 2019-08-19 PROCEDURE — 36415 COLL VENOUS BLD VENIPUNCTURE: CPT

## 2019-08-19 PROCEDURE — 83735 ASSAY OF MAGNESIUM: CPT

## 2019-08-19 PROCEDURE — 80053 COMPREHEN METABOLIC PANEL: CPT

## 2019-09-23 ENCOUNTER — HOSPITAL ENCOUNTER (OUTPATIENT)
Dept: LAB | Facility: MEDICAL CENTER | Age: 84
End: 2019-09-23
Attending: NURSE PRACTITIONER
Payer: MEDICARE

## 2019-09-23 ENCOUNTER — HOSPITAL ENCOUNTER (OUTPATIENT)
Dept: LAB | Facility: MEDICAL CENTER | Age: 84
End: 2019-09-23
Attending: INTERNAL MEDICINE
Payer: MEDICARE

## 2019-09-23 LAB
ALBUMIN SERPL BCP-MCNC: 4 G/DL (ref 3.2–4.9)
ALBUMIN/GLOB SERPL: 1.7 G/DL
ALP SERPL-CCNC: 35 U/L (ref 30–99)
ALT SERPL-CCNC: 35 U/L (ref 2–50)
ANION GAP SERPL CALC-SCNC: 10 MMOL/L (ref 0–11.9)
AST SERPL-CCNC: 33 U/L (ref 12–45)
BILIRUB SERPL-MCNC: 0.7 MG/DL (ref 0.1–1.5)
BUN SERPL-MCNC: 18 MG/DL (ref 8–22)
CALCIUM SERPL-MCNC: 9.6 MG/DL (ref 8.5–10.5)
CHLORIDE SERPL-SCNC: 99 MMOL/L (ref 96–112)
CHOLEST SERPL-MCNC: 124 MG/DL (ref 100–199)
CO2 SERPL-SCNC: 28 MMOL/L (ref 20–33)
CREAT SERPL-MCNC: 0.82 MG/DL (ref 0.5–1.4)
ERYTHROCYTE [DISTWIDTH] IN BLOOD BY AUTOMATED COUNT: 45.4 FL (ref 35.9–50)
FASTING STATUS PATIENT QL REPORTED: NORMAL
GLOBULIN SER CALC-MCNC: 2.4 G/DL (ref 1.9–3.5)
GLUCOSE SERPL-MCNC: 172 MG/DL (ref 65–99)
HCT VFR BLD AUTO: 41.6 % (ref 42–52)
HDLC SERPL-MCNC: 33 MG/DL
HGB BLD-MCNC: 14.2 G/DL (ref 14–18)
LDLC SERPL CALC-MCNC: 43 MG/DL
MCH RBC QN AUTO: 31.8 PG (ref 27–33)
MCHC RBC AUTO-ENTMCNC: 34.1 G/DL (ref 33.7–35.3)
MCV RBC AUTO: 93.3 FL (ref 81.4–97.8)
PLATELET # BLD AUTO: 208 K/UL (ref 164–446)
PMV BLD AUTO: 10.6 FL (ref 9–12.9)
POTASSIUM SERPL-SCNC: 3 MMOL/L (ref 3.6–5.5)
PROT SERPL-MCNC: 6.4 G/DL (ref 6–8.2)
RBC # BLD AUTO: 4.46 M/UL (ref 4.7–6.1)
SODIUM SERPL-SCNC: 137 MMOL/L (ref 135–145)
TRIGL SERPL-MCNC: 238 MG/DL (ref 0–149)
WBC # BLD AUTO: 7.2 K/UL (ref 4.8–10.8)

## 2019-09-23 PROCEDURE — 85027 COMPLETE CBC AUTOMATED: CPT

## 2019-09-23 PROCEDURE — 80061 LIPID PANEL: CPT

## 2019-09-23 PROCEDURE — 36415 COLL VENOUS BLD VENIPUNCTURE: CPT

## 2019-09-23 PROCEDURE — 80053 COMPREHEN METABOLIC PANEL: CPT

## 2020-01-08 ENCOUNTER — APPOINTMENT (OUTPATIENT)
Dept: RADIOLOGY | Facility: MEDICAL CENTER | Age: 85
End: 2020-01-08
Attending: EMERGENCY MEDICINE
Payer: MEDICARE

## 2020-01-08 ENCOUNTER — APPOINTMENT (OUTPATIENT)
Dept: CARDIOLOGY | Facility: MEDICAL CENTER | Age: 85
End: 2020-01-08
Attending: INTERNAL MEDICINE
Payer: MEDICARE

## 2020-01-08 ENCOUNTER — HOSPITAL ENCOUNTER (OUTPATIENT)
Facility: MEDICAL CENTER | Age: 85
End: 2020-01-08
Attending: EMERGENCY MEDICINE | Admitting: INTERNAL MEDICINE
Payer: MEDICARE

## 2020-01-08 VITALS
OXYGEN SATURATION: 99 % | HEIGHT: 67 IN | HEART RATE: 78 BPM | BODY MASS INDEX: 30.17 KG/M2 | SYSTOLIC BLOOD PRESSURE: 137 MMHG | WEIGHT: 192.24 LBS | DIASTOLIC BLOOD PRESSURE: 65 MMHG | RESPIRATION RATE: 20 BRPM | TEMPERATURE: 97.5 F

## 2020-01-08 DIAGNOSIS — R00.1 BRADYCARDIA: ICD-10-CM

## 2020-01-08 DIAGNOSIS — R06.00 DYSPNEA, UNSPECIFIED TYPE: ICD-10-CM

## 2020-01-08 PROBLEM — N40.0 BPH (BENIGN PROSTATIC HYPERPLASIA): Status: ACTIVE | Noted: 2020-01-08

## 2020-01-08 LAB
ALBUMIN SERPL BCP-MCNC: 4.3 G/DL (ref 3.2–4.9)
ALBUMIN/GLOB SERPL: 1.6 G/DL
ALP SERPL-CCNC: 48 U/L (ref 30–99)
ALT SERPL-CCNC: 30 U/L (ref 2–50)
ANION GAP SERPL CALC-SCNC: 13 MMOL/L (ref 0–11.9)
APTT PPP: 41.5 SEC (ref 24.7–36)
AST SERPL-CCNC: 28 U/L (ref 12–45)
BASOPHILS # BLD AUTO: 0.8 % (ref 0–1.8)
BASOPHILS # BLD: 0.06 K/UL (ref 0–0.12)
BILIRUB SERPL-MCNC: 0.5 MG/DL (ref 0.1–1.5)
BUN SERPL-MCNC: 17 MG/DL (ref 8–22)
CALCIUM SERPL-MCNC: 9.4 MG/DL (ref 8.4–10.2)
CHLORIDE SERPL-SCNC: 99 MMOL/L (ref 96–112)
CO2 SERPL-SCNC: 24 MMOL/L (ref 20–33)
CREAT SERPL-MCNC: 0.82 MG/DL (ref 0.5–1.4)
D DIMER PPP IA.FEU-MCNC: <0.4 UG/ML (FEU) (ref 0–0.5)
EKG IMPRESSION: NORMAL
EKG IMPRESSION: NORMAL
EOSINOPHIL # BLD AUTO: 0.16 K/UL (ref 0–0.51)
EOSINOPHIL NFR BLD: 2.2 % (ref 0–6.9)
ERYTHROCYTE [DISTWIDTH] IN BLOOD BY AUTOMATED COUNT: 45.4 FL (ref 35.9–50)
GLOBULIN SER CALC-MCNC: 2.7 G/DL (ref 1.9–3.5)
GLUCOSE BLD-MCNC: 265 MG/DL (ref 65–99)
GLUCOSE BLD-MCNC: 301 MG/DL (ref 65–99)
GLUCOSE SERPL-MCNC: 247 MG/DL (ref 65–99)
HCT VFR BLD AUTO: 40.1 % (ref 42–52)
HGB BLD-MCNC: 13.6 G/DL (ref 14–18)
IMM GRANULOCYTES # BLD AUTO: 0.03 K/UL (ref 0–0.11)
IMM GRANULOCYTES NFR BLD AUTO: 0.4 % (ref 0–0.9)
INR PPP: 1.62 (ref 0.87–1.13)
LV EJECT FRACT  99904: 55
LV EJECT FRACT MOD 2C 99903: 67.46
LV EJECT FRACT MOD 4C 99902: 57.23
LV EJECT FRACT MOD BP 99901: 62.12
LYMPHOCYTES # BLD AUTO: 1.5 K/UL (ref 1–4.8)
LYMPHOCYTES NFR BLD: 20.9 % (ref 22–41)
MCH RBC QN AUTO: 31.2 PG (ref 27–33)
MCHC RBC AUTO-ENTMCNC: 33.9 G/DL (ref 33.7–35.3)
MCV RBC AUTO: 92 FL (ref 81.4–97.8)
MONOCYTES # BLD AUTO: 0.72 K/UL (ref 0–0.85)
MONOCYTES NFR BLD AUTO: 10.1 % (ref 0–13.4)
NEUTROPHILS # BLD AUTO: 4.69 K/UL (ref 1.82–7.42)
NEUTROPHILS NFR BLD: 65.6 % (ref 44–72)
NRBC # BLD AUTO: 0 K/UL
NRBC BLD-RTO: 0 /100 WBC
NT-PROBNP SERPL IA-MCNC: 659 PG/ML (ref 0–125)
PLATELET # BLD AUTO: 196 K/UL (ref 164–446)
PMV BLD AUTO: 10.4 FL (ref 9–12.9)
POTASSIUM SERPL-SCNC: 3.4 MMOL/L (ref 3.6–5.5)
PROT SERPL-MCNC: 7 G/DL (ref 6–8.2)
PROTHROMBIN TIME: 19.6 SEC (ref 12–14.6)
RBC # BLD AUTO: 4.36 M/UL (ref 4.7–6.1)
SODIUM SERPL-SCNC: 136 MMOL/L (ref 135–145)
TROPONIN T SERPL-MCNC: 31 NG/L (ref 6–19)
TROPONIN T SERPL-MCNC: 31 NG/L (ref 6–19)
WBC # BLD AUTO: 7.2 K/UL (ref 4.8–10.8)

## 2020-01-08 PROCEDURE — A9270 NON-COVERED ITEM OR SERVICE: HCPCS | Performed by: INTERNAL MEDICINE

## 2020-01-08 PROCEDURE — 93005 ELECTROCARDIOGRAM TRACING: CPT | Performed by: EMERGENCY MEDICINE

## 2020-01-08 PROCEDURE — 71045 X-RAY EXAM CHEST 1 VIEW: CPT

## 2020-01-08 PROCEDURE — 85025 COMPLETE CBC W/AUTO DIFF WBC: CPT

## 2020-01-08 PROCEDURE — 85730 THROMBOPLASTIN TIME PARTIAL: CPT

## 2020-01-08 PROCEDURE — 96372 THER/PROPH/DIAG INJ SC/IM: CPT

## 2020-01-08 PROCEDURE — 82962 GLUCOSE BLOOD TEST: CPT

## 2020-01-08 PROCEDURE — 85379 FIBRIN DEGRADATION QUANT: CPT

## 2020-01-08 PROCEDURE — G0378 HOSPITAL OBSERVATION PER HR: HCPCS

## 2020-01-08 PROCEDURE — 700102 HCHG RX REV CODE 250 W/ 637 OVERRIDE(OP): Performed by: INTERNAL MEDICINE

## 2020-01-08 PROCEDURE — 84484 ASSAY OF TROPONIN QUANT: CPT

## 2020-01-08 PROCEDURE — 99285 EMERGENCY DEPT VISIT HI MDM: CPT

## 2020-01-08 PROCEDURE — 83880 ASSAY OF NATRIURETIC PEPTIDE: CPT

## 2020-01-08 PROCEDURE — 80053 COMPREHEN METABOLIC PANEL: CPT

## 2020-01-08 PROCEDURE — 99236 HOSP IP/OBS SAME DATE HI 85: CPT | Performed by: INTERNAL MEDICINE

## 2020-01-08 PROCEDURE — 93306 TTE W/DOPPLER COMPLETE: CPT

## 2020-01-08 PROCEDURE — 93306 TTE W/DOPPLER COMPLETE: CPT | Mod: 26 | Performed by: INTERNAL MEDICINE

## 2020-01-08 PROCEDURE — 85610 PROTHROMBIN TIME: CPT

## 2020-01-08 RX ORDER — GABAPENTIN 100 MG/1
100 CAPSULE ORAL 2 TIMES DAILY
Status: DISCONTINUED | OUTPATIENT
Start: 2020-01-08 | End: 2020-01-08 | Stop reason: HOSPADM

## 2020-01-08 RX ORDER — ENALAPRILAT 1.25 MG/ML
1.25 INJECTION INTRAVENOUS EVERY 6 HOURS PRN
Status: DISCONTINUED | OUTPATIENT
Start: 2020-01-08 | End: 2020-01-08 | Stop reason: HOSPADM

## 2020-01-08 RX ORDER — SIMVASTATIN 10 MG
10 TABLET ORAL NIGHTLY
Status: DISCONTINUED | OUTPATIENT
Start: 2020-01-08 | End: 2020-01-08 | Stop reason: HOSPADM

## 2020-01-08 RX ORDER — POTASSIUM CHLORIDE 20 MEQ/1
40 TABLET, EXTENDED RELEASE ORAL 2 TIMES DAILY
Status: DISCONTINUED | OUTPATIENT
Start: 2020-01-08 | End: 2020-01-08 | Stop reason: HOSPADM

## 2020-01-08 RX ORDER — CHLORTHALIDONE 25 MG/1
25 TABLET ORAL 2 TIMES DAILY
Status: DISCONTINUED | OUTPATIENT
Start: 2020-01-08 | End: 2020-01-08 | Stop reason: HOSPADM

## 2020-01-08 RX ORDER — METOPROLOL TARTRATE 50 MG/1
25 TABLET, FILM COATED ORAL 2 TIMES DAILY
Qty: 60 TAB | Refills: 1 | Status: ON HOLD | OUTPATIENT
Start: 2020-01-08 | End: 2020-03-13

## 2020-01-08 RX ORDER — TAMSULOSIN HYDROCHLORIDE 0.4 MG/1
0.4 CAPSULE ORAL DAILY
Status: DISCONTINUED | OUTPATIENT
Start: 2020-01-08 | End: 2020-01-08 | Stop reason: HOSPADM

## 2020-01-08 RX ORDER — AMLODIPINE BESYLATE 5 MG/1
5 TABLET ORAL 2 TIMES DAILY
Status: DISCONTINUED | OUTPATIENT
Start: 2020-01-08 | End: 2020-01-08 | Stop reason: HOSPADM

## 2020-01-08 RX ORDER — POTASSIUM CHLORIDE 20 MEQ/1
20 TABLET, EXTENDED RELEASE ORAL DAILY
Status: ON HOLD | COMMUNITY
End: 2020-03-13

## 2020-01-08 RX ORDER — ONDANSETRON 4 MG/1
4 TABLET, ORALLY DISINTEGRATING ORAL EVERY 4 HOURS PRN
Status: DISCONTINUED | OUTPATIENT
Start: 2020-01-08 | End: 2020-01-08 | Stop reason: HOSPADM

## 2020-01-08 RX ORDER — ONDANSETRON 2 MG/ML
4 INJECTION INTRAMUSCULAR; INTRAVENOUS EVERY 4 HOURS PRN
Status: DISCONTINUED | OUTPATIENT
Start: 2020-01-08 | End: 2020-01-08 | Stop reason: HOSPADM

## 2020-01-08 RX ORDER — BISACODYL 10 MG
10 SUPPOSITORY, RECTAL RECTAL
Status: DISCONTINUED | OUTPATIENT
Start: 2020-01-08 | End: 2020-01-08 | Stop reason: HOSPADM

## 2020-01-08 RX ORDER — CYCLOSPORINE 0.5 MG/ML
1 EMULSION OPHTHALMIC 2 TIMES DAILY
Status: DISCONTINUED | OUTPATIENT
Start: 2020-01-08 | End: 2020-01-08

## 2020-01-08 RX ORDER — INSULIN GLARGINE 100 [IU]/ML
46 INJECTION, SOLUTION SUBCUTANEOUS EVERY EVENING
Status: DISCONTINUED | OUTPATIENT
Start: 2020-01-08 | End: 2020-01-08 | Stop reason: HOSPADM

## 2020-01-08 RX ORDER — POLYETHYLENE GLYCOL 3350 17 G/17G
1 POWDER, FOR SOLUTION ORAL
Status: DISCONTINUED | OUTPATIENT
Start: 2020-01-08 | End: 2020-01-08 | Stop reason: HOSPADM

## 2020-01-08 RX ORDER — ACETAMINOPHEN 325 MG/1
650 TABLET ORAL EVERY 6 HOURS PRN
Status: DISCONTINUED | OUTPATIENT
Start: 2020-01-08 | End: 2020-01-08 | Stop reason: HOSPADM

## 2020-01-08 RX ORDER — AMOXICILLIN 250 MG
2 CAPSULE ORAL 2 TIMES DAILY
Status: DISCONTINUED | OUTPATIENT
Start: 2020-01-08 | End: 2020-01-08 | Stop reason: HOSPADM

## 2020-01-08 RX ORDER — POLYVINYL ALCOHOL 14 MG/ML
1 SOLUTION/ DROPS OPHTHALMIC 2 TIMES DAILY
Status: DISCONTINUED | OUTPATIENT
Start: 2020-01-08 | End: 2020-01-08 | Stop reason: HOSPADM

## 2020-01-08 RX ADMIN — SIMVASTATIN 10 MG: 10 TABLET, FILM COATED ORAL at 05:15

## 2020-01-08 RX ADMIN — INSULIN HUMAN 7 UNITS: 100 INJECTION, SOLUTION PARENTERAL at 13:03

## 2020-01-08 RX ADMIN — TAMSULOSIN HYDROCHLORIDE 0.4 MG: 0.4 CAPSULE ORAL at 05:15

## 2020-01-08 RX ADMIN — INSULIN HUMAN 10 UNITS: 100 INJECTION, SOLUTION PARENTERAL at 08:36

## 2020-01-08 RX ADMIN — GABAPENTIN 100 MG: 100 CAPSULE ORAL at 05:15

## 2020-01-08 RX ADMIN — POTASSIUM CHLORIDE 40 MEQ: 20 TABLET, EXTENDED RELEASE ORAL at 05:15

## 2020-01-08 RX ADMIN — SENNOSIDES AND DOCUSATE SODIUM 2 TABLET: 8.6; 5 TABLET ORAL at 05:14

## 2020-01-08 RX ADMIN — AMLODIPINE BESYLATE 5 MG: 5 TABLET ORAL at 05:20

## 2020-01-08 ASSESSMENT — ENCOUNTER SYMPTOMS
CHILLS: 0
FEVER: 0
EYE REDNESS: 0
VOMITING: 0
HEADACHES: 0
DIZZINESS: 0
COUGH: 1
SPUTUM PRODUCTION: 0
BLURRED VISION: 0
NAUSEA: 1
TINGLING: 0
ABDOMINAL PAIN: 0
CONSTIPATION: 0
PALPITATIONS: 0
FALLS: 0
DIARRHEA: 0
LOSS OF CONSCIOUSNESS: 0
BACK PAIN: 0
STRIDOR: 0
WEAKNESS: 0
MYALGIAS: 0
NECK PAIN: 0
SORE THROAT: 0
DEPRESSION: 0
FOCAL WEAKNESS: 0
SHORTNESS OF BREATH: 1
SEIZURES: 0

## 2020-01-08 NOTE — ED TRIAGE NOTES
"Bb wife for complaints of shortness of breath.     Chief Complaint   Patient presents with   • Shortness of Breath     since earlier this evening, hx of triple bypass.        /72   Pulse 64   Temp 36.4 °C (97.5 °F) (Temporal)   Resp 20   Ht 1.702 m (5' 7\")   Wt 87.2 kg (192 lb 3.9 oz)   SpO2 92%   BMI 30.11 kg/m²     "

## 2020-01-08 NOTE — H&P
[facility] Medicine History & Physical Note    Date of Service  1/8/2020    Primary Care Physician  JONO Powell    Consultants  None    Code Status  Full    Chief Complaint  Shortness of breath    History of Presenting Illness  89 y.o. male who presented 1/8/2020 with dyspnea.  Patient states his symptoms started approximately 3 days ago, in the morning he would wake up with an upset stomach, take Tums and then shortly thereafter have shortness of breath.  This would last in the morning but would resolve and he would be himself in the afternoon.  Last night however he noted shortness of breath whenever he went to bed.  Because of this he decided to present to the emergency department.  He denies any chest pain, does have an occasional dry cough.  He denies any fever, chills or vomiting.  He has had increased sugars over the last month.  He has had a clot in the right leg, has been on Xarelto.  He has been noted to be bradycardic while in the emergency department.  I did discuss the case including labs with the ER physician.    Review of Systems  Review of Systems   Constitutional: Negative for chills, fever and malaise/fatigue.   HENT: Negative for congestion.    Respiratory: Positive for cough and shortness of breath. Negative for sputum production and stridor.    Cardiovascular: Negative for chest pain, palpitations and leg swelling.   Gastrointestinal: Positive for nausea. Negative for abdominal pain, constipation, diarrhea and vomiting.   Genitourinary: Negative for dysuria and urgency.   Musculoskeletal: Negative for falls and myalgias.   Neurological: Negative for dizziness, tingling, loss of consciousness, weakness and headaches.   Psychiatric/Behavioral: Negative for depression and suicidal ideas.   All other systems reviewed and are negative.      Past Medical History   has a past medical history of CAD (coronary artery disease), DM (diabetes mellitus) ([facility]) (1/7/2014), Falls, Hypertension, and  UTI (urinary tract infection) (11/4/2018). He also has no past medical history of ASTHMA, Cancer (HCC), Congestive heart failure (HCC), COPD, Liver disease, Psychiatric disorder, Renal disorder, Seizure disorder (HCC), or Stroke (HCC).    Surgical History   has a past surgical history that includes other cardiac surgery; coronary artery bypass, 3; and janice rectal abscess.     Family History  Coronary artery disease    Social History   reports that he quit smoking about 47 years ago. He has never used smokeless tobacco. He reports that he does not drink alcohol or use drugs.    Allergies  Allergies   Allergen Reactions   • Iodine      Pt and pts wife report that it has been so long not sure what happens       Medications  Prior to Admission Medications   Prescriptions Last Dose Informant Patient Reported? Taking?   Insulin Glargine (BASAGLAR KWIKPEN) 100 UNIT/ML Solution Pen-injector 1/7/2020 at 2000 Patient Yes No   Sig: Inject 46 Units as instructed every evening.   Multiple Vitamins-Minerals (PX MENS MULTIVITAMINS) Tab 1/7/2020 at 0800 Patient Yes No   Sig: Take 1 Tab by mouth every day.   Omega-3 Fatty Acids (FISH OIL) 1000 MG Cap capsule 1/7/2020 at 2000 Patient Yes No   Sig: Take 2,000 mg by mouth 2 Times a Day.   Specialty Vitamins Products (PROSTATE) Tab 1/7/2020 at 2000 Patient Yes No   Sig: Take 1 Tab by mouth 2 Times a Day.   Specialty Vitamins Products (RETAINE VISION PO) 1/7/2020 at 2000 Patient Yes No   Sig: Take 2 Tabs by mouth 2 Times a Day.   amLODIPine (NORVASC) 5 MG Tab 1/7/2020 at 2000 Patient Yes No   Sig: Take 5 mg by mouth 2 Times a Day.   aspirin EC (ECOTRIN) 81 MG Tablet Delayed Response 1/7/2020 at 2000 Patient Yes No   Sig: Take 81 mg by mouth every evening.   chlorthalidone (HYGROTON) 25 MG Tab 1/7/2020 at 2000 Patient No No   Sig: Take 1 Tab by mouth 2 Times a Day.   cyclosporin (RESTASIS) 0.05 % ophthalmic emulsion 1/7/2020 at 2000 Patient Yes No   Sig: Place 1 Drop in both eyes 2 times  a day.   gabapentin (NEURONTIN) 100 MG Cap 1/7/2020 at 2000 Patient Yes No   Sig: Take 100 mg by mouth 2 Times a Day.   metformin (GLUCOPHAGE) 500 MG TABS 1/7/2020 at 2000 Patient Yes No   Sig: Take 500 mg by mouth 2 times a day, with meals.   metoprolol (LOPRESSOR) 50 MG Tab 1/7/2020 at 2000 Patient Yes No   Sig: Take 50 mg by mouth 2 times a day.   potassium chloride SA (K-DUR) 10 MEQ TBCR 1/7/2020 at 0800 Patient No No   Sig: Take 1 Tab by mouth every day.   rivaroxaban (XARELTO) 20 MG Tab tablet 1/7/2020 at 2000 Patient Yes No   Sig: Take 20 mg by mouth with dinner.   simvastatin (ZOCOR) 10 MG Tab 1/7/2020 at 2000 Patient Yes No   Sig: Take 10 mg by mouth every evening.   tamsulosin (FLOMAX) 0.4 MG capsule 1/7/2020 at 0800 Patient No No   Sig: Take 1 Cap by mouth every day.      Facility-Administered Medications: None       Physical Exam  Temp:  [36.4 °C (97.5 °F)] 36.4 °C (97.5 °F)  Pulse:  [61-72] 64  Resp:  [17-25] 21  BP: (104-140)/(60-84) 140/72  SpO2:  [91 %-93 %] 93 %    Physical Exam  Vitals signs and nursing note reviewed.   Constitutional:       General: He is not in acute distress.     Appearance: He is well-developed. He is not toxic-appearing or diaphoretic.   HENT:      Head: Normocephalic and atraumatic.      Right Ear: External ear normal.      Left Ear: External ear normal.      Nose: Nose normal. No congestion or rhinorrhea.      Mouth/Throat:      Mouth: Mucous membranes are moist.      Pharynx: No oropharyngeal exudate.   Eyes:      General: No scleral icterus.        Right eye: No discharge.         Left eye: No discharge.      Extraocular Movements: Extraocular movements intact.   Neck:      Musculoskeletal: Normal range of motion and neck supple. No edema or erythema.      Trachea: No tracheal deviation.   Cardiovascular:      Rate and Rhythm: Normal rate and regular rhythm.      Heart sounds: No murmur. No friction rub. No gallop.    Pulmonary:      Effort: Pulmonary effort is normal. No  respiratory distress.      Breath sounds: Normal breath sounds. No stridor. No wheezing or rales.   Chest:      Chest wall: No tenderness.   Abdominal:      General: Bowel sounds are normal. There is no distension.      Palpations: Abdomen is soft.      Tenderness: There is no tenderness. There is no guarding or rebound.   Musculoskeletal: Normal range of motion.         General: No tenderness.      Right lower leg: No edema.      Left lower leg: No edema.   Lymphadenopathy:      Cervical: No cervical adenopathy.   Skin:     General: Skin is warm and dry.      Coloration: Skin is not jaundiced.      Findings: No erythema or rash.   Neurological:      General: No focal deficit present.      Mental Status: He is alert and oriented to person, place, and time.      Cranial Nerves: No cranial nerve deficit.   Psychiatric:         Mood and Affect: Mood normal.         Behavior: Behavior normal.         Thought Content: Thought content normal.         Judgment: Judgment normal.         Laboratory:  Recent Labs     01/08/20 0148   WBC 7.2   RBC 4.36*   HEMOGLOBIN 13.6*   HEMATOCRIT 40.1*   MCV 92.0   MCH 31.2   MCHC 33.9   RDW 45.4   PLATELETCT 196   MPV 10.4     Recent Labs     01/08/20 0148   SODIUM 136   POTASSIUM 3.4*   CHLORIDE 99   CO2 24   GLUCOSE 247*   BUN 17   CREATININE 0.82   CALCIUM 9.4     Recent Labs     01/08/20 0148   ALTSGPT 30   ASTSGOT 28   ALKPHOSPHAT 48   TBILIRUBIN 0.5   GLUCOSE 247*     Recent Labs     01/08/20 0148   APTT 41.5*   INR 1.62*     Recent Labs     01/08/20 0148   NTPROBNP 659*         Recent Labs     01/08/20 0148   TROPONINT 31*       Urinalysis:    No results found     Imaging:  DX-CHEST-PORTABLE (1 VIEW)   Final Result         1.  No acute cardiopulmonary disease.   2.  Cardiomegaly   3.  Atherosclerosis      EC-ECHOCARDIOGRAM LTD W/O CONT    (Results Pending)         Assessment/Plan:  I anticipate this patient is appropriate for observation status at this time.    Dyspnea-  (present on admission)  Assessment & Plan  -Unknown cause, d-dimer was negative  -Possibly due to his bradycardia  -I did personally review his chest x-ray, noted no acute cardiopulmonary process  -Obtain echocardiogram  -No decreased breath sounds or wheeze to suggest COPD or asthma    DM (diabetes mellitus) (CMS-HCC)- (present on admission)  Assessment & Plan  -With hyperglycemia  -Continue home Lantus  -Hold home metformin  -Start insulin sliding scale  -Adjust as needed    Hypokalemia- (present on admission)  Assessment & Plan  -Inpatient on chlorthalidone  -Start oral potassium  -Repeat BMP in the morning    Bradycardia- (present on admission)  Assessment & Plan  -During my exam, his heart rate was in the low 60s however during the ER stay, has noted to have heart rate in the 40s  -Hold home metoprolol  -I have personally reviewed his EKGs as well as closely monitor him on telemetry, it is difficult to tell whether he is in sinus rhythm or an irregular rhythm, possibly atrial fibrillation  -Regardless, he needs his beta-blocker to be held at this time  -Monitor on telemetry    BPH (benign prostatic hyperplasia)- (present on admission)  Assessment & Plan  -Continue home Flomax    HTN (hypertension)- (present on admission)  Assessment & Plan  -Continue home amlodipine and chlorthalidone  -Hold home metoprolol  -Start PRN enalapril  -Adjust as needed    Dyslipidemia, goal LDL below 70- (present on admission)  Assessment & Plan  -Continue home statin      VTE prophylaxis: Xarelto

## 2020-01-08 NOTE — DISCHARGE SUMMARY
Discharge Summary    CHIEF COMPLAINT ON ADMISSION  Chief Complaint   Patient presents with   • Shortness of Breath     since earlier this evening, hx of triple bypass.        Reason for Admission  SOB     Admission Date  1/8/2020    CODE STATUS  Full Code    HPI & HOSPITAL COURSE  This is a 89 y.o. male here with shortness of breath upon awakening in the morning that would resolve as the day goes on. He came to the hospital when he had shortness of breath at night worse with laying down. In the emergency department he was noted to have bradycardia with a heart rate in the 60's. His dose of metoprolol was held and his bradycardia and shortness of breath symptoms resolved. Serial troponin enzymes were stable at 31 and a chest xray showed no infiltrate or edema. The patient ambulated in the halls with staff and was ambulating steadily at his baseline level.  Labs were remarkable for mildly low potassium for which he was given oral replacement prior to discharge.  An echocardiogram did show some possible worsening cardiac diastolic dysfunction and he will need follow up for this.       Therefore, he is discharged in good and stable condition to home with close outpatient follow-up.        Discharge Date  1/8/2020    FOLLOW UP ITEMS POST DISCHARGE  Follow up with primary care provider for heart rate check and diastolic heart failure follow up    DISCHARGE DIAGNOSES  Active Problems:    Hypokalemia POA: Yes    DM (diabetes mellitus) ([facility]) POA: Yes    BPH (benign prostatic hyperplasia) POA: Yes    Dyslipidemia, goal LDL below 70 POA: Yes    HTN (hypertension) POA: Yes  Resolved Problems:    Dyspnea POA: Yes    Bradycardia POA: Yes      FOLLOW UP  No future appointments.  JONO Powell  [address] [facility] [address]  [phone]    Schedule an appointment as soon as possible for a visit        MEDICATIONS ON DISCHARGE     Medication List      CHANGE how you take these medications      Instructions    metoprolol 50 MG Tabs  What changed:  how much to take  Commonly known as:  LOPRESSOR   Take 0.5 Tabs by mouth 2 times a day.  Dose:  25 mg        CONTINUE taking these medications      Instructions   amLODIPine 5 MG Tabs  Commonly known as:  NORVASC   Take 5 mg by mouth 2 Times a Day.  Dose:  5 mg     aspirin EC 81 MG Tbec  Commonly known as:  ECOTRIN   Take 81 mg by mouth every evening.  Dose:  81 mg     chlorthalidone 25 MG Tabs  Commonly known as:  HYGROTON   Take 1 Tab by mouth 2 Times a Day.  Dose:  25 mg     fish oil 1000 MG Caps capsule   Take 2,000 mg by mouth 2 Times a Day.  Dose:  2,000 mg     gabapentin 100 MG Caps  Commonly known as:  NEURONTIN   Take 100 mg by mouth 2 Times a Day.  Dose:  100 mg     insulin glargine 100 UNIT/ML Sopn injection  Generic drug:  insulin glargine   Inject 46 Units as instructed every evening.  Dose:  46 Units     metFORMIN 500 MG Tabs  Commonly known as:  GLUCOPHAGE   Take 500 mg by mouth 2 times a day, with meals.  Dose:  500 mg     potassium chloride SA 20 MEQ Tbcr  Commonly known as:  Kdur   Take 20 mEq by mouth 2 times a day.  Dose:  20 mEq     * PROSTATE Tabs   Take 1 Tab by mouth 2 Times a Day.  Dose:  1 Tab     * RETAINE VISION PO   Take 2 Tabs by mouth 2 Times a Day.  Dose:  2 Tab     PX MENS MULTIVITAMINS Tabs   Take 1 Tab by mouth every day.  Dose:  1 Tab     RESTASIS 0.05 % ophthalmic emulsion  Generic drug:  cyclosporin   Place 1 Drop in both eyes 2 times a day.  Dose:  1 Drop     simvastatin 10 MG Tabs  Commonly known as:  ZOCOR   Take 10 mg by mouth every evening.  Dose:  10 mg     tamsulosin 0.4 MG capsule  Commonly known as:  FLOMAX   Take 1 Cap by mouth every day.  Dose:  0.4 mg     XARELTO 20 MG Tabs tablet  Generic drug:  rivaroxaban   Take 20 mg by mouth with dinner.  Dose:  20 mg         * This list has 2 medication(s) that are the same as other medications prescribed for you. Read the directions carefully, and ask your doctor or other care provider  to review them with you.                Allergies  Allergies   Allergen Reactions   • Iodine      Pt and pts wife report that it has been so long not sure what happens       DIET  Orders Placed This Encounter   Procedures   • Diet Order Diabetic     Standing Status:   Standing     Number of Occurrences:   1     Order Specific Question:   Diet:     Answer:   Diabetic [3]       ACTIVITY  As tolerated.  Weight bearing as tolerated    CONSULTATIONS  none    PROCEDURES  Echocardiogram showed:  Compared to the images of the prior study done 10/19/2012 - there is no   significant change.  Diastolic dysfunction may have progressed,   previously grade II, but unable to grade on current exam.   Normal left ventricular systolic function.  Left ventricular ejection fraction is visually estimated to be 55%.  Reduced right ventricular systolic function.  Mild mitral regurgitation.  Mild tricuspid regurgitation.  Estimated right ventricular systolic pressure  is 40 mmHg.  Biatrial enlargement.    LABORATORY  Lab Results   Component Value Date    SODIUM 136 01/08/2020    POTASSIUM 3.4 (L) 01/08/2020    CHLORIDE 99 01/08/2020    CO2 24 01/08/2020    GLUCOSE 247 (H) 01/08/2020    BUN 17 01/08/2020    CREATININE 0.82 01/08/2020    CREATININE 1.0 12/02/2008        Lab Results   Component Value Date    WBC 7.2 01/08/2020    HEMOGLOBIN 13.6 (L) 01/08/2020    HEMATOCRIT 40.1 (L) 01/08/2020    PLATELETCT 196 01/08/2020        Total time of the discharge process exceeds 62 minutes.  I watched the patient breathing while ambulating, reviewed his results from chest xray and echocardiogram and discussed proper follow up with the patient.

## 2020-01-08 NOTE — ED PROVIDER NOTES
ED Provider Note    CHIEF COMPLAINT  Chief Complaint   Patient presents with   • Shortness of Breath     since earlier this evening, hx of triple bypass.        HPI  Julien Dao is a 89 y.o. male with history of CAD and prior CABG, diabetes, hypertension, atrial flutter on anticoagulation who presents with shortness of breath.  Patient states that he has had intermittent episodes of shortness of breath with associated abdominal bloating over the last few days however the symptoms have resolved on their own.  Tonight he became short of breath and remained that way for many hours.  He states that it is worse with lying flat.  He is no associated chest pain.  No recent fevers although he has had a intermittent dry cough.  He has not noticed any swelling of his lower extremities.  He states he has a distant history of a blood clot in the right leg and he is taking blood thinners currently.    REVIEW OF SYSTEMS  See HPI for further details.   Review of Systems   Constitutional: Negative for chills and fever.   HENT: Negative for sore throat.    Eyes: Negative for blurred vision and redness.   Respiratory: Positive for cough and shortness of breath.    Cardiovascular: Negative for chest pain and leg swelling.   Gastrointestinal: Negative for abdominal pain and vomiting.   Genitourinary: Negative for dysuria and urgency.   Musculoskeletal: Negative for back pain and neck pain.   Skin: Negative for rash.   Neurological: Negative for focal weakness, seizures and headaches.   Psychiatric/Behavioral: Negative for suicidal ideas.         PAST MEDICAL HISTORY   has a past medical history of CAD (coronary artery disease), DM (diabetes mellitus) ([facility]) (2014), Falls, Hypertension, and UTI (urinary tract infection) (2018).    SOCIAL HISTORY  Social History     Tobacco Use   • Smoking status: Former Smoker     Last attempt to quit: 10/19/1972     Years since quittin.2   • Smokeless tobacco: Never Used   Substance and  "Sexual Activity   • Alcohol use: No   • Drug use: No   • Sexual activity: Not on file       SURGICAL HISTORY   has a past surgical history that includes other cardiac surgery; coronary artery bypass, 3; and janice rectal abscess.    CURRENT MEDICATIONS  Home Medications     Reviewed by Refugio Ayala R.N. (Registered Nurse) on 01/08/20 at 0129  Med List Status: Complete   Medication Last Dose Status   amLODIPine (NORVASC) 5 MG Tab 1/7/2020 Active   aspirin EC (ECOTRIN) 81 MG Tablet Delayed Response 1/7/2020 Active   chlorthalidone (HYGROTON) 25 MG Tab 1/7/2020 Active   cyclosporin (RESTASIS) 0.05 % ophthalmic emulsion 1/7/2020 Active   gabapentin (NEURONTIN) 100 MG Cap 1/7/2020 Active   Insulin Glargine (BASAGLAR KWIKPEN) 100 UNIT/ML Solution Pen-injector 1/7/2020 Active   metformin (GLUCOPHAGE) 500 MG TABS 1/7/2020 Active   metoprolol (LOPRESSOR) 50 MG Tab 1/7/2020 Active   Multiple Vitamins-Minerals (PX MENS MULTIVITAMINS) Tab 1/7/2020 Active   Omega-3 Fatty Acids (FISH OIL) 1000 MG Cap capsule 1/7/2020 Active   potassium chloride SA (K-DUR) 10 MEQ TBCR 1/7/2020 Active   rivaroxaban (XARELTO) 20 MG Tab tablet 1/7/2020 Active   simvastatin (ZOCOR) 10 MG Tab 1/7/2020 Active   Specialty Vitamins Products (PROSTATE) Tab 1/7/2020 Active   Specialty Vitamins Products (RETAINE VISION PO) 1/7/2020 Active   tamsulosin (FLOMAX) 0.4 MG capsule 1/7/2020 Active                ALLERGIES  Allergies   Allergen Reactions   • Iodine      Pt and pts wife report that it has been so long not sure what happens       PHYSICAL EXAM   VITAL SIGNS: /72   Pulse 64   Temp 36.4 °C (97.5 °F) (Temporal)   Resp (!) 21   Ht 1.702 m (5' 7\")   Wt 87.2 kg (192 lb 3.9 oz)   SpO2 93%   BMI 30.11 kg/m²      Physical Exam   Constitutional: He is oriented to person, place, and time and well-developed, well-nourished, and in no distress. No distress.   Nontoxic-appearing elderly male   HENT:   Head: Normocephalic and atraumatic.   Eyes: " Pupils are equal, round, and reactive to light. Conjunctivae are normal.   Neck: Normal range of motion. Neck supple.   Cardiovascular: Normal rate, regular rhythm and normal heart sounds.   Pulmonary/Chest: Effort normal and breath sounds normal. No respiratory distress.   Abdominal: Soft. He exhibits no distension. There is no tenderness.   Musculoskeletal: Normal range of motion.         General: Edema present. No tenderness.      Comments: 1+ symmetric bilateral pitting edema   Neurological: He is alert and oriented to person, place, and time.   Moving all extremities spontaneously   Skin: Skin is warm and dry.   Psychiatric: Affect normal.         DIAGNOSTIC STUDIES    EKG  Results for orders placed or performed during the hospital encounter of 20   EKG (Now)   Result Value Ref Range    Report       [facility] Emergency Dept[facility]    Test Date:  2020  Pt Name:    SAMMY CLIFFORD                 Department: [facility]  MRN:        [id number]                      [facility]  Gender:     Male                         Technician: LUCRETIA  :        [id number]                   Requested By:ANGELICA FERNANDES  Order #:    [id number]                    Reading MD: Angelica Fernandes MD    Measurements  Intervals                                Axis  Rate:       54                           P:  IN:                                      QRS:        -39  QRSD:       173                          T:          16  QT:         481  QTc:        456    Interpretive Statements  Sinus rhythm with pause  Right bundle branch block and LAFB  LVH with secondary repolarization abnormality  ST depressions with T wave inversions in anterior leads  No STEMI  Compared to ECG 2019 09:46:50  Regular rhythm appears new otherwise no significant change  Electronically Signed On 2020 5:07:08  PST by Angelica Fernandes MD     EKG (Now)   Result Value Ref Range    Report       [facility] Emergency  Dept.    Test Date:  2020  Pt Name:    SAMMY CLIFFORD                 Department: [facility]  MRN:        [id number]                      Room:       -ROOM 4  Gender:     Male                         Technician: LUCRETIA  :        [id number]                   Requested By:ANGELICA FERNANDES  Order #:    [id number]                    Reading MD: Angelica Fernandes MD    Measurements  Intervals                                Axis  Rate:       62                           P:          42  NE:         164                          QRS:        -42  QRSD:       171                          T:          18  QT:         480  QTc:        488    Interpretive Statements  Sinus rhythm  RBBB and LAFB  Probable LVH with secondary repol abnrm  ST depressions with T wave inversions in anterior leads  Compared to ECG 2020 01:54:46  Other than more regular rhythm, no significant change from prior  Electronically Signed On 2020 5:07:40 PST by Angelica Fernandes MD             LABS  Personally reviewed by me  Labs Reviewed   CBC WITH DIFFERENTIAL - Abnormal; Notable for the following components:       Result Value    RBC 4.36 (*)     Hemoglobin 13.6 (*)     Hematocrit 40.1 (*)     Lymphocytes 20.90 (*)     All other components within normal limits   COMP METABOLIC PANEL - Abnormal; Notable for the following components:    Potassium 3.4 (*)     Anion Gap 13.0 (*)     Glucose 247 (*)     All other components within normal limits   TROPONIN - Abnormal; Notable for the following components:    Troponin T 31 (*)     All other components within normal limits   PROBRAIN NATRIURETIC PEPTIDE, NT - Abnormal; Notable for the following components:    NT-proBNP 659 (*)     All other components within normal limits   PROTHROMBIN TIME - Abnormal; Notable for the following components:    PT 19.6 (*)     INR 1.62 (*)     All other components within normal limits   APTT - Abnormal; Notable for the following components:    APTT 41.5 (*)     All other components  within normal limits   TROPONIN - Abnormal; Notable for the following components:    Troponin T 31 (*)     All other components within normal limits   D-DIMER   ESTIMATED GFR           RADIOLOGY  Personally reviewed by me  DX-CHEST-PORTABLE (1 VIEW)   Final Result         1.  No acute cardiopulmonary disease.   2.  Cardiomegaly   3.  Atherosclerosis      EC-ECHOCARDIOGRAM LTD W/O CONT    (Results Pending)       ED COURSE  Vitals:    01/08/20 0253 01/08/20 0324 01/08/20 0354 01/08/20 0424   BP: 129/65 129/73 138/84 140/72   Pulse: 65 65 66 64   Resp: 19 (!) 24 20 (!) 21   Temp:       TempSrc:       SpO2: 92% 92% 91% 93%   Weight:       Height:             Medications administered:      MEDICAL DECISION MAKING  Elderly male with multiple medical comorbidities presents with ongoing shortness of breath for the last few days.  He is afebrile with reassuring vital signs on arrival.  He has no signs of hypoxia or respiratory distress to my evaluation.  There is no evidence of wheezing to suggest reactive airways disease.  EKG shows intermittently irregular rhythm however appears to be sinus.  On telemetry, he does have episodes where his heart rate drops into the 40s.  There are no signs of obvious heart block on EKG.  No signs of ischemia although troponin is mildly elevated.  He also has an elevated BNP which may be indicative of heart failure.    Chest x-ray is negative for pneumonia, pneumothorax, pulmonary edema.  D-dimer is negative making risk of pulmonary embolism very low.  Labs are otherwise reassuring without significant anemia or electrolyte abnormality.  I suspect that his shortness of breath may be due to underlying heart failure or possibly bradycardia.  We will plan to admit for telemetry monitoring and echocardiogram in the morning.  Patient was updated with plan of care and agreeable at this time.    I discussed the case with Dr. Luu, hospitalist on-call, who accepts admission of the patient.  Patient  is stable at the time of transfer to the floor.        IMPRESSION  (R06.00) Dyspnea, unspecified type  (R00.1) Bradycardia    Disposition: Admit medicine, stable condition  Results, diagnoses, and treatment options were discussed with the patient and/or family. Patient verbalized understanding of plan of care.    Patient referred to primary care provider for monitoring and treatment of blood pressure.      New Prescriptions    No medications on file           Electronically signed by: Angelica Little, 1/8/2020 2:05 AM

## 2020-01-08 NOTE — ASSESSMENT & PLAN NOTE
-Unknown cause, d-dimer was negative  -Possibly due to his bradycardia  -I did personally review his chest x-ray, noted no acute cardiopulmonary process  -Obtain echocardiogram  -No decreased breath sounds or wheeze to suggest COPD or asthma

## 2020-01-08 NOTE — ASSESSMENT & PLAN NOTE
-With hyperglycemia  -Continue home Lantus  -Hold home metformin  -Start insulin sliding scale  -Adjust as needed

## 2020-01-08 NOTE — DISCHARGE INSTRUCTIONS
Discharge Instructions per Giselle Guerrero M.D.    Follow up with primary care provider for blood pressure and heart rate check    DIET: diabetic    ACTIVITY: as tolerated    DIAGNOSIS: bradycardia    Return to ER if symptoms worsen

## 2020-01-08 NOTE — PROGRESS NOTES
Attending hospitalist MD for 1/08/20 is Dr. Guerrero at [phone].  This Hospitalist RN also available via [employer] Text.    Millie Crowley, MACK  Hospitalist Service   No

## 2020-01-08 NOTE — ASSESSMENT & PLAN NOTE
-Continue home amlodipine and chlorthalidone  -Hold home metoprolol  -Start PRN enalapril  -Adjust as needed

## 2020-01-08 NOTE — ED NOTES
Assumed care of pt. Pt placed back on monitor. VS obtained. Pt assisted to position of comfort in recliner. Declines gurney at this time. Call light in pt's hand. Denies needs. Will monitor frequently.

## 2020-01-08 NOTE — ASSESSMENT & PLAN NOTE
-During my exam, his heart rate was in the low 60s however during the ER stay, has noted to have heart rate in the 40s  -Hold home metoprolol  -I have personally reviewed his EKGs as well as closely monitor him on telemetry, it is difficult to tell whether he is in sinus rhythm or an irregular rhythm, possibly atrial fibrillation  -Regardless, he needs his beta-blocker to be held at this time  -Monitor on telemetry

## 2020-01-08 NOTE — ED NOTES
Took pt for a walk. Pt was ambulate steadily with a walker with even gait, no SOB observed. Pt complained of slight SOB upon returning to room but had saturation of 94%.

## 2020-02-09 ENCOUNTER — HOSPITAL ENCOUNTER (OUTPATIENT)
Facility: MEDICAL CENTER | Age: 85
End: 2020-02-11
Attending: EMERGENCY MEDICINE | Admitting: INTERNAL MEDICINE
Payer: MEDICARE

## 2020-02-09 DIAGNOSIS — N13.8 BENIGN PROSTATIC HYPERPLASIA WITH URINARY OBSTRUCTION: ICD-10-CM

## 2020-02-09 DIAGNOSIS — N40.1 BENIGN PROSTATIC HYPERPLASIA WITH URINARY OBSTRUCTION: ICD-10-CM

## 2020-02-09 DIAGNOSIS — R33.9 URINARY RETENTION: ICD-10-CM

## 2020-02-09 DIAGNOSIS — R73.9 HYPERGLYCEMIA: ICD-10-CM

## 2020-02-09 PROBLEM — Z71.89 ACP (ADVANCE CARE PLANNING): Status: ACTIVE | Noted: 2020-02-09

## 2020-02-09 LAB
ALBUMIN SERPL BCP-MCNC: 4.5 G/DL (ref 3.2–4.9)
ALBUMIN/GLOB SERPL: 1.4 G/DL
ALP SERPL-CCNC: 73 U/L (ref 30–99)
ALT SERPL-CCNC: 47 U/L (ref 2–50)
ANION GAP SERPL CALC-SCNC: 16 MMOL/L (ref 0–11.9)
APPEARANCE UR: CLEAR
AST SERPL-CCNC: 37 U/L (ref 12–45)
BASOPHILS # BLD AUTO: 0.8 % (ref 0–1.8)
BASOPHILS # BLD: 0.07 K/UL (ref 0–0.12)
BILIRUB SERPL-MCNC: 0.7 MG/DL (ref 0.1–1.5)
BILIRUB UR QL STRIP.AUTO: NEGATIVE
BUN SERPL-MCNC: 27 MG/DL (ref 8–22)
CALCIUM SERPL-MCNC: 9.7 MG/DL (ref 8.4–10.2)
CHLORIDE SERPL-SCNC: 91 MMOL/L (ref 96–112)
CO2 SERPL-SCNC: 25 MMOL/L (ref 20–33)
COLOR UR: YELLOW
CREAT SERPL-MCNC: 1.07 MG/DL (ref 0.5–1.4)
EOSINOPHIL # BLD AUTO: 0.08 K/UL (ref 0–0.51)
EOSINOPHIL NFR BLD: 0.9 % (ref 0–6.9)
ERYTHROCYTE [DISTWIDTH] IN BLOOD BY AUTOMATED COUNT: 44.6 FL (ref 35.9–50)
GLOBULIN SER CALC-MCNC: 3.2 G/DL (ref 1.9–3.5)
GLUCOSE BLD-MCNC: 309 MG/DL (ref 65–99)
GLUCOSE BLD-MCNC: 351 MG/DL (ref 65–99)
GLUCOSE BLD-MCNC: 442 MG/DL (ref 65–99)
GLUCOSE BLD-MCNC: 463 MG/DL (ref 65–99)
GLUCOSE BLD-MCNC: 508 MG/DL (ref 65–99)
GLUCOSE SERPL-MCNC: 596 MG/DL (ref 65–99)
GLUCOSE UR STRIP.AUTO-MCNC: >=1000 MG/DL
HCT VFR BLD AUTO: 48.9 % (ref 42–52)
HGB BLD-MCNC: 16.1 G/DL (ref 14–18)
IMM GRANULOCYTES # BLD AUTO: 0.04 K/UL (ref 0–0.11)
IMM GRANULOCYTES NFR BLD AUTO: 0.5 % (ref 0–0.9)
KETONES UR STRIP.AUTO-MCNC: NEGATIVE MG/DL
LEUKOCYTE ESTERASE UR QL STRIP.AUTO: NEGATIVE
LYMPHOCYTES # BLD AUTO: 1.33 K/UL (ref 1–4.8)
LYMPHOCYTES NFR BLD: 15.5 % (ref 22–41)
MCH RBC QN AUTO: 29.9 PG (ref 27–33)
MCHC RBC AUTO-ENTMCNC: 32.9 G/DL (ref 33.7–35.3)
MCV RBC AUTO: 90.7 FL (ref 81.4–97.8)
MICRO URNS: ABNORMAL
MONOCYTES # BLD AUTO: 0.52 K/UL (ref 0–0.85)
MONOCYTES NFR BLD AUTO: 6.1 % (ref 0–13.4)
NEUTROPHILS # BLD AUTO: 6.55 K/UL (ref 1.82–7.42)
NEUTROPHILS NFR BLD: 76.2 % (ref 44–72)
NITRITE UR QL STRIP.AUTO: NEGATIVE
NRBC # BLD AUTO: 0 K/UL
NRBC BLD-RTO: 0 /100 WBC
PH UR STRIP.AUTO: 5 [PH] (ref 5–8)
PLATELET # BLD AUTO: 227 K/UL (ref 164–446)
PMV BLD AUTO: 11 FL (ref 9–12.9)
POTASSIUM SERPL-SCNC: 4.3 MMOL/L (ref 3.6–5.5)
PROT SERPL-MCNC: 7.7 G/DL (ref 6–8.2)
PROT UR QL STRIP: NEGATIVE MG/DL
RBC # BLD AUTO: 5.39 M/UL (ref 4.7–6.1)
RBC UR QL AUTO: NEGATIVE
SODIUM SERPL-SCNC: 132 MMOL/L (ref 135–145)
SP GR UR STRIP.AUTO: 1.01
WBC # BLD AUTO: 8.6 K/UL (ref 4.8–10.8)

## 2020-02-09 PROCEDURE — 99497 ADVNCD CARE PLAN 30 MIN: CPT | Performed by: INTERNAL MEDICINE

## 2020-02-09 PROCEDURE — 85025 COMPLETE CBC W/AUTO DIFF WBC: CPT

## 2020-02-09 PROCEDURE — 51702 INSERT TEMP BLADDER CATH: CPT

## 2020-02-09 PROCEDURE — G0378 HOSPITAL OBSERVATION PER HR: HCPCS

## 2020-02-09 PROCEDURE — 303105 HCHG CATHETER EXTRA

## 2020-02-09 PROCEDURE — 96372 THER/PROPH/DIAG INJ SC/IM: CPT | Mod: XU

## 2020-02-09 PROCEDURE — 96374 THER/PROPH/DIAG INJ IV PUSH: CPT | Mod: XU

## 2020-02-09 PROCEDURE — 700105 HCHG RX REV CODE 258: Performed by: INTERNAL MEDICINE

## 2020-02-09 PROCEDURE — 81003 URINALYSIS AUTO W/O SCOPE: CPT

## 2020-02-09 PROCEDURE — 96376 TX/PRO/DX INJ SAME DRUG ADON: CPT

## 2020-02-09 PROCEDURE — 700105 HCHG RX REV CODE 258: Performed by: EMERGENCY MEDICINE

## 2020-02-09 PROCEDURE — A9270 NON-COVERED ITEM OR SERVICE: HCPCS | Performed by: INTERNAL MEDICINE

## 2020-02-09 PROCEDURE — 82962 GLUCOSE BLOOD TEST: CPT

## 2020-02-09 PROCEDURE — 99285 EMERGENCY DEPT VISIT HI MDM: CPT

## 2020-02-09 PROCEDURE — 700102 HCHG RX REV CODE 250 W/ 637 OVERRIDE(OP): Performed by: INTERNAL MEDICINE

## 2020-02-09 PROCEDURE — 80053 COMPREHEN METABOLIC PANEL: CPT

## 2020-02-09 PROCEDURE — 700102 HCHG RX REV CODE 250 W/ 637 OVERRIDE(OP): Performed by: EMERGENCY MEDICINE

## 2020-02-09 PROCEDURE — 99220 PR INITIAL OBSERVATION CARE,LEVL III: CPT | Mod: 25 | Performed by: INTERNAL MEDICINE

## 2020-02-09 RX ORDER — SODIUM CHLORIDE 9 MG/ML
1000 INJECTION, SOLUTION INTRAVENOUS ONCE
Status: COMPLETED | OUTPATIENT
Start: 2020-02-09 | End: 2020-02-09

## 2020-02-09 RX ORDER — SIMVASTATIN 10 MG
10 TABLET ORAL NIGHTLY
Status: DISCONTINUED | OUTPATIENT
Start: 2020-02-09 | End: 2020-02-11 | Stop reason: HOSPADM

## 2020-02-09 RX ORDER — AMOXICILLIN 250 MG
2 CAPSULE ORAL 2 TIMES DAILY
Status: DISCONTINUED | OUTPATIENT
Start: 2020-02-09 | End: 2020-02-11 | Stop reason: HOSPADM

## 2020-02-09 RX ORDER — TAMSULOSIN HYDROCHLORIDE 0.4 MG/1
0.4 CAPSULE ORAL 2 TIMES DAILY
Status: DISCONTINUED | OUTPATIENT
Start: 2020-02-09 | End: 2020-02-11 | Stop reason: HOSPADM

## 2020-02-09 RX ORDER — POLYETHYLENE GLYCOL 3350 17 G/17G
1 POWDER, FOR SOLUTION ORAL
Status: DISCONTINUED | OUTPATIENT
Start: 2020-02-09 | End: 2020-02-11 | Stop reason: HOSPADM

## 2020-02-09 RX ORDER — SODIUM CHLORIDE 9 MG/ML
INJECTION, SOLUTION INTRAVENOUS CONTINUOUS
Status: ACTIVE | OUTPATIENT
Start: 2020-02-09 | End: 2020-02-10

## 2020-02-09 RX ORDER — ACETAMINOPHEN 325 MG/1
650 TABLET ORAL EVERY 6 HOURS PRN
Status: DISCONTINUED | OUTPATIENT
Start: 2020-02-09 | End: 2020-02-11 | Stop reason: HOSPADM

## 2020-02-09 RX ORDER — AMLODIPINE BESYLATE 5 MG/1
5 TABLET ORAL 2 TIMES DAILY
Status: DISCONTINUED | OUTPATIENT
Start: 2020-02-09 | End: 2020-02-11 | Stop reason: HOSPADM

## 2020-02-09 RX ORDER — TAMSULOSIN HYDROCHLORIDE 0.4 MG/1
0.4 CAPSULE ORAL EVERY MORNING
Status: ON HOLD | COMMUNITY
End: 2021-08-23

## 2020-02-09 RX ORDER — GABAPENTIN 100 MG/1
100 CAPSULE ORAL 2 TIMES DAILY
Status: DISCONTINUED | OUTPATIENT
Start: 2020-02-09 | End: 2020-02-11 | Stop reason: HOSPADM

## 2020-02-09 RX ORDER — INSULIN GLARGINE 100 [IU]/ML
15 INJECTION, SOLUTION SUBCUTANEOUS EVERY EVENING
Status: DISCONTINUED | OUTPATIENT
Start: 2020-02-09 | End: 2020-02-11 | Stop reason: HOSPADM

## 2020-02-09 RX ORDER — BISACODYL 10 MG
10 SUPPOSITORY, RECTAL RECTAL
Status: DISCONTINUED | OUTPATIENT
Start: 2020-02-09 | End: 2020-02-11 | Stop reason: HOSPADM

## 2020-02-09 RX ADMIN — INSULIN HUMAN 5 UNITS: 100 INJECTION, SOLUTION PARENTERAL at 12:38

## 2020-02-09 RX ADMIN — INSULIN HUMAN 9 UNITS: 100 INJECTION, SOLUTION PARENTERAL at 17:51

## 2020-02-09 RX ADMIN — RIVAROXABAN 20 MG: 20 TABLET, FILM COATED ORAL at 17:46

## 2020-02-09 RX ADMIN — METOPROLOL TARTRATE 25 MG: 25 TABLET ORAL at 17:46

## 2020-02-09 RX ADMIN — ACETAMINOPHEN 650 MG: 325 TABLET, FILM COATED ORAL at 23:23

## 2020-02-09 RX ADMIN — TAMSULOSIN HYDROCHLORIDE 0.4 MG: 0.4 CAPSULE ORAL at 17:47

## 2020-02-09 RX ADMIN — SODIUM CHLORIDE 1000 ML: 9 INJECTION, SOLUTION INTRAVENOUS at 12:38

## 2020-02-09 RX ADMIN — GABAPENTIN 100 MG: 100 CAPSULE ORAL at 17:46

## 2020-02-09 RX ADMIN — SIMVASTATIN 10 MG: 10 TABLET, FILM COATED ORAL at 20:45

## 2020-02-09 RX ADMIN — INSULIN GLARGINE 15 UNITS: 100 INJECTION, SOLUTION SUBCUTANEOUS at 19:19

## 2020-02-09 RX ADMIN — ASPIRIN 81 MG: 81 TABLET, COATED ORAL at 17:46

## 2020-02-09 RX ADMIN — INSULIN HUMAN 9 UNITS: 100 INJECTION, SOLUTION PARENTERAL at 20:49

## 2020-02-09 RX ADMIN — AMLODIPINE BESYLATE 5 MG: 5 TABLET ORAL at 17:46

## 2020-02-09 RX ADMIN — SODIUM CHLORIDE: 9 INJECTION, SOLUTION INTRAVENOUS at 17:47

## 2020-02-09 ASSESSMENT — ENCOUNTER SYMPTOMS
WHEEZING: 0
BACK PAIN: 0
FEVER: 0
EYE PAIN: 0
FLANK PAIN: 1
HEARTBURN: 0
FALLS: 0
COUGH: 0
ABDOMINAL PAIN: 0
CONSTIPATION: 0
PALPITATIONS: 0
DIZZINESS: 0
HEADACHES: 0
DIARRHEA: 0
VOMITING: 0
PND: 0
BLURRED VISION: 0
SPEECH CHANGE: 0
DEPRESSION: 0
SHORTNESS OF BREATH: 0
CHILLS: 0
WEAKNESS: 0
SEIZURES: 0
TREMORS: 0
NECK PAIN: 0
SPUTUM PRODUCTION: 0
NAUSEA: 0
WEIGHT LOSS: 0

## 2020-02-09 ASSESSMENT — COGNITIVE AND FUNCTIONAL STATUS - GENERAL
STANDING UP FROM CHAIR USING ARMS: A LITTLE
DRESSING REGULAR LOWER BODY CLOTHING: A LITTLE
WALKING IN HOSPITAL ROOM: A LITTLE
MOBILITY SCORE: 18
MOVING FROM LYING ON BACK TO SITTING ON SIDE OF FLAT BED: A LITTLE
DAILY ACTIVITIY SCORE: 19
DRESSING REGULAR UPPER BODY CLOTHING: A LITTLE
CLIMB 3 TO 5 STEPS WITH RAILING: A LITTLE
HELP NEEDED FOR BATHING: A LITTLE
TURNING FROM BACK TO SIDE WHILE IN FLAT BAD: A LITTLE
TOILETING: A LITTLE
PERSONAL GROOMING: A LITTLE
MOVING TO AND FROM BED TO CHAIR: A LITTLE
SUGGESTED CMS G CODE MODIFIER MOBILITY: CK
SUGGESTED CMS G CODE MODIFIER DAILY ACTIVITY: CK

## 2020-02-09 ASSESSMENT — CHA2DS2 SCORE
HYPERTENSION: YES
VASCULAR DISEASE: YES
DIABETES: YES
SEX: MALE
CHA2DS2 VASC SCORE: 5
AGE 65 TO 74: NO
PRIOR STROKE OR TIA OR THROMBOEMBOLISM: NO
CHF OR LEFT VENTRICULAR DYSFUNCTION: NO
AGE 75 OR GREATER: YES

## 2020-02-09 ASSESSMENT — LIFESTYLE VARIABLES
EVER_SMOKED: YES
SUBSTANCE_ABUSE: 0

## 2020-02-09 ASSESSMENT — PAIN DESCRIPTION - DESCRIPTORS: DESCRIPTORS: ACHING

## 2020-02-09 NOTE — ED NOTES
Med rec updated and complete  Allergies reviewed  Pt reports that he was not sure if he needed to start taking his BASAGLAR.  Pt reports no antibiotics in the last 2 weeks, that he had to take at home.

## 2020-02-09 NOTE — ED NOTES
krishna from Lab called with critical result of glucose of 596  at 1217. Critical lab result read back to krishna.   Dr. morrissey notified of critical lab result at 1217.  Critical lab result read back by Dr. morrissey.

## 2020-02-09 NOTE — ED PROVIDER NOTES
"ED Provider Note    CHIEF COMPLAINT  Chief Complaint   Patient presents with   • Flank Pain     right flank pain   • Unable to Urinate     \" just dribles\" since discharged from last hositalization.        HPI  Julien Dao is a 89 y.o. male who presents complaining of urinary retention.  He states he has not been able to urinate at all since last night.  He is having some difficulty urinating he states since his last hospitalization.  He was just hospitalized recently and discharged several days ago.  While he was in the hospital he did have a urinary catheter but that was removed sometime before his discharge.  He states that since that time he is just had dribbles but is able to urinate some until last evening.  He is now developed some right-sided abdominal pain or right flank pain, and feels that his bladder is completely full but cannot urinate.  Patient does not report any urinary burning or other urinary symptoms as noted.  He is diabetic he states his low blood sugars usually in control    REVIEW OF SYSTEMS  See HPI for further details.  Constitutional no fever or chills   GI no nausea vomiting   all other systems are negative.    PAST MEDICAL HISTORY  Past Medical History:   Diagnosis Date   • CAD (coronary artery disease)    • DM (diabetes mellitus) ([facility]) 1/7/2014   • Falls    • Hypertension    • UTI (urinary tract infection) 11/4/2018       FAMILY HISTORY  History reviewed. No pertinent family history.    SOCIAL HISTORY  Social History     Socioeconomic History   • Marital status:      Spouse name: Not on file   • Number of children: Not on file   • Years of education: Not on file   • Highest education level: Not on file   Occupational History   • Not on file   Social Needs   • Financial resource strain: Not on file   • Food insecurity:     Worry: Not on file     Inability: Not on file   • Transportation needs:     Medical: Not on file     Non-medical: Not on file   Tobacco Use   • Smoking status: " Former Smoker     Last attempt to quit: 10/19/1972     Years since quittin.3   • Smokeless tobacco: Never Used   Substance and Sexual Activity   • Alcohol use: No   • Drug use: No   • Sexual activity: Not on file   Lifestyle   • Physical activity:     Days per week: Not on file     Minutes per session: Not on file   • Stress: Not on file   Relationships   • Social connections:     Talks on phone: Not on file     Gets together: Not on file     Attends Bahai service: Not on file     Active member of club or organization: Not on file     Attends meetings of clubs or organizations: Not on file     Relationship status: Not on file   • Intimate partner violence:     Fear of current or ex partner: Not on file     Emotionally abused: Not on file     Physically abused: Not on file     Forced sexual activity: Not on file   Other Topics Concern   • Not on file   Social History Narrative   • Not on file       SURGICAL HISTORY  Past Surgical History:   Procedure Laterality Date   • CORONARY ARTERY BYPASS, 3     • OTHER CARDIAC SURGERY     • TED RECTAL ABSCESS         CURRENT MEDICATIONS  Home Medications     Reviewed by Anjum Burns (Pharmacy Tech) on 20 at 1149  Med List Status: Complete   Medication Last Dose Status   amLODIPine (NORVASC) 5 MG Tab 2020 Active   aspirin EC (ECOTRIN) 81 MG Tablet Delayed Response 2020 Active   chlorthalidone (HYGROTON) 25 MG Tab 2020 Active   cyclosporin (RESTASIS) 0.05 % ophthalmic emulsion > 2 weeks Active   gabapentin (NEURONTIN) 100 MG Cap 2020 Active   Insulin Glargine (BASAGLAR KWIKPEN) 100 UNIT/ML Solution Pen-injector Did not Active   metformin (GLUCOPHAGE) 500 MG TABS 2020 Active   metoprolol (LOPRESSOR) 50 MG Tab 2020 Active   Multiple Vitamins-Minerals (PX MENS MULTIVITAMINS) Tab 2020 Active   Omega-3 Fatty Acids (FISH OIL) 1000 MG Cap capsule 2020 Active   potassium chloride SA (KDUR) 20 MEQ Tab CR 2020 Active  "  rivaroxaban (XARELTO) 20 MG Tab tablet 2/8/2020 Active   simvastatin (ZOCOR) 10 MG Tab 2/8/2020 Active   Specialty Vitamins Products (PROSTATE) Tab 2/9/2020 Active   Specialty Vitamins Products (RETAINE VISION PO) 2/9/2020 Active   tamsulosin (FLOMAX) 0.4 MG capsule 2/9/2020 Active                ALLERGIES  Allergies   Allergen Reactions   • Iodine      Pt and pts wife report that it has been so long not sure what happens       PHYSICAL EXAM  VITAL SIGNS: /84   Pulse 78   Temp 36.2 °C (97.1 °F) (Temporal)   Resp 18   Ht 1.702 m (5' 7\")   Wt 80 kg (176 lb 5.9 oz)   SpO2 97%   BMI 27.62 kg/m²     Constitutional: Well developed, Well nourished, moderate distress, Non-toxic appearance.   HENT: Normocephalic, Atraumatic, Bilateral external ears normal, mucous membranes moist, No oral exudates, Nose normal.   Eyes: PERRLA,  Conjunctiva and lids normal, No discharge.   Cardiovascular: Normal heart rate, Normal rhythm, No murmurs.   Thorax & Lungs: Normal breath sounds, No respiratory distress, No wheezing, or other abnormal breath sounds. No chest tenderness.   Abdomen: Bowel sounds normal, Soft, right lower tenderness, No masses, No pulsatile masses. No guarding.  : Normal external genitalia  Skin: Warm, Dry, No erythema, No rash.   Back: No tenderness, No CVA tenderness.  Extremities: Intact distal pulses, No edema, No tenderness, No cyanosis, No clubbing.   Musculoskeletal: Good range of motion in all major joints. No tenderness to palpation or major deformities, or injuries noted.   Neurologic: Alert & oriented x 3, Normal motor function, Normal sensory function, No focal deficits noted.         RADIOLOGY/PROCEDURES  Bladder scan showing 880 cc consistent with urinary retention  No orders to display     Results for orders placed or performed during the hospital encounter of 02/09/20   CBC WITH DIFFERENTIAL   Result Value Ref Range    WBC 8.6 4.8 - 10.8 K/uL    RBC 5.39 4.70 - 6.10 M/uL    Hemoglobin " 16.1 14.0 - 18.0 g/dL    Hematocrit 48.9 42.0 - 52.0 %    MCV 90.7 81.4 - 97.8 fL    MCH 29.9 27.0 - 33.0 pg    MCHC 32.9 (L) 33.7 - 35.3 g/dL    RDW 44.6 35.9 - 50.0 fL    Platelet Count 227 164 - 446 K/uL    MPV 11.0 9.0 - 12.9 fL    Neutrophils-Polys 76.20 (H) 44.00 - 72.00 %    Lymphocytes 15.50 (L) 22.00 - 41.00 %    Monocytes 6.10 0.00 - 13.40 %    Eosinophils 0.90 0.00 - 6.90 %    Basophils 0.80 0.00 - 1.80 %    Immature Granulocytes 0.50 0.00 - 0.90 %    Nucleated RBC 0.00 /100 WBC    Neutrophils (Absolute) 6.55 1.82 - 7.42 K/uL    Lymphs (Absolute) 1.33 1.00 - 4.80 K/uL    Monos (Absolute) 0.52 0.00 - 0.85 K/uL    Eos (Absolute) 0.08 0.00 - 0.51 K/uL    Baso (Absolute) 0.07 0.00 - 0.12 K/uL    Immature Granulocytes (abs) 0.04 0.00 - 0.11 K/uL    NRBC (Absolute) 0.00 K/uL   COMP METABOLIC PANEL   Result Value Ref Range    Sodium 132 (L) 135 - 145 mmol/L    Potassium 4.3 3.6 - 5.5 mmol/L    Chloride 91 (L) 96 - 112 mmol/L    Co2 25 20 - 33 mmol/L    Anion Gap 16.0 (H) 0.0 - 11.9    Glucose 596 (HH) 65 - 99 mg/dL    Bun 27 (H) 8 - 22 mg/dL    Creatinine 1.07 0.50 - 1.40 mg/dL    Calcium 9.7 8.4 - 10.2 mg/dL    AST(SGOT) 37 12 - 45 U/L    ALT(SGPT) 47 2 - 50 U/L    Alkaline Phosphatase 73 30 - 99 U/L    Total Bilirubin 0.7 0.1 - 1.5 mg/dL    Albumin 4.5 3.2 - 4.9 g/dL    Total Protein 7.7 6.0 - 8.2 g/dL    Globulin 3.2 1.9 - 3.5 g/dL    A-G Ratio 1.4 g/dL   URINALYSIS CULTURE, IF INDICATED   Result Value Ref Range    Color Yellow     Character Clear     Specific Gravity 1.010 <1.035    Ph 5.0 5.0 - 8.0    Glucose >=1000 (A) Negative mg/dL    Ketones Negative Negative mg/dL    Protein Negative Negative mg/dL    Bilirubin Negative Negative    Nitrite Negative Negative    Leukocyte Esterase Negative Negative    Occult Blood Negative Negative    Micro Urine Req see below    ESTIMATED GFR   Result Value Ref Range    GFR If African American >60 >60 mL/min/1.73 m 2    GFR If Non African American >60 >60 mL/min/1.73 m  2   ACCU-CHEK GLUCOSE   Result Value Ref Range    Glucose - Accu-Ck 508 (HH) 65 - 99 mg/dL   ACCU-CHEK GLUCOSE   Result Value Ref Range    Glucose - Accu-Ck 309 (H) 65 - 99 mg/dL     COURSE & MEDICAL DECISION MAKING  Pertinent Labs & Imaging studies reviewed. (See chart for details)  Patient with acute near urinary retention so a Sher catheter was placed without difficulty and the urine drained the patient is feeling significantly better following that an abdominal exam unremarkable with resolution of his abdominal pain.  Patient also noted on laboratory work-up to have a significantly elevated blood sugar of 598 he is diabetic on metformin and states he normally has good blood sugar control.  Additionally discussed with him whether or not he can manage at home he wanted to try to go home and so we hydrated with IV fluids and gave small amount of IV insulin to reduce his blood sugar is now in the 300s.  However the patient this point the patient does not feel that he is able to manage at home he is not able to see well enough to manage the Sher's catheter, and monitor his blood sugar I will therefore discussed this with the hospitalist for admission observation overnight    FINAL IMPRESSION  1 hyperglycemia  2.  Acute urinary retention  3.       Electronically signed by: Vladimir Shah M.D., 2/9/2020 11:51 AM

## 2020-02-09 NOTE — ED NOTES
Pt requesting something to eat. ERP notified. FSBG rechecked and noted to be critically high at 508. ERP notified. Per ERP, ok for pt to eat. Pt given fresh warm blankets. Primary RN notified.

## 2020-02-09 NOTE — ED NOTES
Pt repositioned for comfort. Bed in low position. Call light in reach. Pt denies any other needs at this time.

## 2020-02-09 NOTE — ED TRIAGE NOTES
"Chief Complaint   Patient presents with   • Flank Pain     right flank pain   • Unable to Urinate     \" just dribles\" since discharged from last hositalization.      Pt bib remsa. Pt complains of right flank pain that has bee resolved since he received fentanyl in route. Pt states he has not been able to urinate since he was discharged from hospital. Pt has 833 ml in bladder per bladder scan results. Abdomen firm.   "

## 2020-02-10 ENCOUNTER — HOME HEALTH ADMISSION (OUTPATIENT)
Dept: HOME HEALTH SERVICES | Facility: HOME HEALTHCARE | Age: 85
End: 2020-02-10
Payer: MEDICARE

## 2020-02-10 LAB
ANION GAP SERPL CALC-SCNC: 14 MMOL/L (ref 0–11.9)
BASOPHILS # BLD AUTO: 0.5 % (ref 0–1.8)
BASOPHILS # BLD: 0.05 K/UL (ref 0–0.12)
BUN SERPL-MCNC: 20 MG/DL (ref 8–22)
CALCIUM SERPL-MCNC: 8.8 MG/DL (ref 8.4–10.2)
CHLORIDE SERPL-SCNC: 102 MMOL/L (ref 96–112)
CO2 SERPL-SCNC: 23 MMOL/L (ref 20–33)
CREAT SERPL-MCNC: 0.82 MG/DL (ref 0.5–1.4)
EOSINOPHIL # BLD AUTO: 0.15 K/UL (ref 0–0.51)
EOSINOPHIL NFR BLD: 1.5 % (ref 0–6.9)
ERYTHROCYTE [DISTWIDTH] IN BLOOD BY AUTOMATED COUNT: 43.3 FL (ref 35.9–50)
GLUCOSE BLD-MCNC: 119 MG/DL (ref 65–99)
GLUCOSE BLD-MCNC: 163 MG/DL (ref 65–99)
GLUCOSE BLD-MCNC: 253 MG/DL (ref 65–99)
GLUCOSE BLD-MCNC: 286 MG/DL (ref 65–99)
GLUCOSE BLD-MCNC: 338 MG/DL (ref 65–99)
GLUCOSE SERPL-MCNC: 133 MG/DL (ref 65–99)
HCT VFR BLD AUTO: 40.8 % (ref 42–52)
HGB BLD-MCNC: 13.9 G/DL (ref 14–18)
IMM GRANULOCYTES # BLD AUTO: 0.03 K/UL (ref 0–0.11)
IMM GRANULOCYTES NFR BLD AUTO: 0.3 % (ref 0–0.9)
LYMPHOCYTES # BLD AUTO: 1.8 K/UL (ref 1–4.8)
LYMPHOCYTES NFR BLD: 17.8 % (ref 22–41)
MCH RBC QN AUTO: 30.2 PG (ref 27–33)
MCHC RBC AUTO-ENTMCNC: 34.1 G/DL (ref 33.7–35.3)
MCV RBC AUTO: 88.7 FL (ref 81.4–97.8)
MONOCYTES # BLD AUTO: 0.88 K/UL (ref 0–0.85)
MONOCYTES NFR BLD AUTO: 8.7 % (ref 0–13.4)
NEUTROPHILS # BLD AUTO: 7.22 K/UL (ref 1.82–7.42)
NEUTROPHILS NFR BLD: 71.2 % (ref 44–72)
NRBC # BLD AUTO: 0 K/UL
NRBC BLD-RTO: 0 /100 WBC
PLATELET # BLD AUTO: 224 K/UL (ref 164–446)
PMV BLD AUTO: 10.6 FL (ref 9–12.9)
POTASSIUM SERPL-SCNC: 3.6 MMOL/L (ref 3.6–5.5)
RBC # BLD AUTO: 4.6 M/UL (ref 4.7–6.1)
SODIUM SERPL-SCNC: 139 MMOL/L (ref 135–145)
WBC # BLD AUTO: 10.1 K/UL (ref 4.8–10.8)

## 2020-02-10 PROCEDURE — 96372 THER/PROPH/DIAG INJ SC/IM: CPT

## 2020-02-10 PROCEDURE — 85025 COMPLETE CBC W/AUTO DIFF WBC: CPT

## 2020-02-10 PROCEDURE — A9270 NON-COVERED ITEM OR SERVICE: HCPCS | Performed by: INTERNAL MEDICINE

## 2020-02-10 PROCEDURE — 87205 SMEAR GRAM STAIN: CPT

## 2020-02-10 PROCEDURE — 87070 CULTURE OTHR SPECIMN AEROBIC: CPT

## 2020-02-10 PROCEDURE — 700102 HCHG RX REV CODE 250 W/ 637 OVERRIDE(OP): Performed by: INTERNAL MEDICINE

## 2020-02-10 PROCEDURE — 99225 PR SUBSEQUENT OBSERVATION CARE,LEVEL II: CPT | Performed by: INTERNAL MEDICINE

## 2020-02-10 PROCEDURE — 80048 BASIC METABOLIC PNL TOTAL CA: CPT

## 2020-02-10 PROCEDURE — 97535 SELF CARE MNGMENT TRAINING: CPT

## 2020-02-10 PROCEDURE — G0378 HOSPITAL OBSERVATION PER HR: HCPCS

## 2020-02-10 PROCEDURE — 97165 OT EVAL LOW COMPLEX 30 MIN: CPT

## 2020-02-10 PROCEDURE — 36415 COLL VENOUS BLD VENIPUNCTURE: CPT

## 2020-02-10 PROCEDURE — 82962 GLUCOSE BLOOD TEST: CPT

## 2020-02-10 PROCEDURE — 700105 HCHG RX REV CODE 258: Performed by: INTERNAL MEDICINE

## 2020-02-10 PROCEDURE — 97162 PT EVAL MOD COMPLEX 30 MIN: CPT

## 2020-02-10 RX ADMIN — INSULIN HUMAN 5 UNITS: 100 INJECTION, SOLUTION PARENTERAL at 11:56

## 2020-02-10 RX ADMIN — INSULIN HUMAN 5 UNITS: 100 INJECTION, SOLUTION PARENTERAL at 17:32

## 2020-02-10 RX ADMIN — AMLODIPINE BESYLATE 5 MG: 5 TABLET ORAL at 17:30

## 2020-02-10 RX ADMIN — INSULIN GLARGINE 15 UNITS: 100 INJECTION, SOLUTION SUBCUTANEOUS at 21:11

## 2020-02-10 RX ADMIN — METOPROLOL TARTRATE 25 MG: 25 TABLET ORAL at 17:30

## 2020-02-10 RX ADMIN — AMLODIPINE BESYLATE 5 MG: 5 TABLET ORAL at 05:09

## 2020-02-10 RX ADMIN — ASPIRIN 81 MG: 81 TABLET, COATED ORAL at 17:31

## 2020-02-10 RX ADMIN — INSULIN HUMAN 6 UNITS: 100 INJECTION, SOLUTION PARENTERAL at 21:12

## 2020-02-10 RX ADMIN — SIMVASTATIN 10 MG: 10 TABLET, FILM COATED ORAL at 21:08

## 2020-02-10 RX ADMIN — TAMSULOSIN HYDROCHLORIDE 0.4 MG: 0.4 CAPSULE ORAL at 05:09

## 2020-02-10 RX ADMIN — ACETAMINOPHEN 650 MG: 325 TABLET, FILM COATED ORAL at 22:34

## 2020-02-10 RX ADMIN — INSULIN HUMAN 2 UNITS: 100 INJECTION, SOLUTION PARENTERAL at 06:06

## 2020-02-10 RX ADMIN — ACETAMINOPHEN 650 MG: 325 TABLET, FILM COATED ORAL at 16:23

## 2020-02-10 RX ADMIN — GABAPENTIN 100 MG: 100 CAPSULE ORAL at 17:31

## 2020-02-10 RX ADMIN — GABAPENTIN 100 MG: 100 CAPSULE ORAL at 05:09

## 2020-02-10 RX ADMIN — TAMSULOSIN HYDROCHLORIDE 0.4 MG: 0.4 CAPSULE ORAL at 17:31

## 2020-02-10 RX ADMIN — RIVAROXABAN 20 MG: 20 TABLET, FILM COATED ORAL at 17:30

## 2020-02-10 RX ADMIN — METOPROLOL TARTRATE 25 MG: 25 TABLET ORAL at 05:09

## 2020-02-10 ASSESSMENT — ENCOUNTER SYMPTOMS
STRIDOR: 0
WHEEZING: 0
FEVER: 0
CONSTIPATION: 0
TREMORS: 0
SORE THROAT: 0
DIZZINESS: 0
MYALGIAS: 0
HEADACHES: 0
PALPITATIONS: 0
CHILLS: 0
COUGH: 0
BACK PAIN: 0
ABDOMINAL PAIN: 0
SHORTNESS OF BREATH: 0
DIARRHEA: 0
VOMITING: 0

## 2020-02-10 ASSESSMENT — GAIT ASSESSMENTS
DISTANCE (FEET): 200
DEVIATION: DECREASED BASE OF SUPPORT;BRADYKINETIC
ASSISTIVE DEVICE: FRONT WHEEL WALKER
GAIT LEVEL OF ASSIST: CONTACT GUARD ASSIST

## 2020-02-10 ASSESSMENT — COGNITIVE AND FUNCTIONAL STATUS - GENERAL
SUGGESTED CMS G CODE MODIFIER DAILY ACTIVITY: CK
TURNING FROM BACK TO SIDE WHILE IN FLAT BAD: A LITTLE
DAILY ACTIVITIY SCORE: 19
MOBILITY SCORE: 18
WALKING IN HOSPITAL ROOM: A LITTLE
STANDING UP FROM CHAIR USING ARMS: A LITTLE
DRESSING REGULAR LOWER BODY CLOTHING: A LOT
HELP NEEDED FOR BATHING: A LITTLE
TOILETING: A LOT
MOVING TO AND FROM BED TO CHAIR: A LITTLE
MOVING FROM LYING ON BACK TO SITTING ON SIDE OF FLAT BED: A LITTLE
CLIMB 3 TO 5 STEPS WITH RAILING: A LITTLE
SUGGESTED CMS G CODE MODIFIER MOBILITY: CK

## 2020-02-10 ASSESSMENT — PATIENT HEALTH QUESTIONNAIRE - PHQ9
2. FEELING DOWN, DEPRESSED, IRRITABLE, OR HOPELESS: NOT AT ALL
1. LITTLE INTEREST OR PLEASURE IN DOING THINGS: NOT AT ALL
SUM OF ALL RESPONSES TO PHQ9 QUESTIONS 1 AND 2: 0

## 2020-02-10 ASSESSMENT — LIFESTYLE VARIABLES
HOW MANY TIMES IN THE PAST YEAR HAVE YOU HAD 5 OR MORE DRINKS IN A DAY: 0
HAVE PEOPLE ANNOYED YOU BY CRITICIZING YOUR DRINKING: NO
TOTAL SCORE: 0
ON A TYPICAL DAY WHEN YOU DRINK ALCOHOL HOW MANY DRINKS DO YOU HAVE: 0
TOTAL SCORE: 0
EVER HAD A DRINK FIRST THING IN THE MORNING TO STEADY YOUR NERVES TO GET RID OF A HANGOVER: NO
HAVE YOU EVER FELT YOU SHOULD CUT DOWN ON YOUR DRINKING: NO
CONSUMPTION TOTAL: NEGATIVE
ALCOHOL_USE: NO
TOTAL SCORE: 0
EVER FELT BAD OR GUILTY ABOUT YOUR DRINKING: NO
AVERAGE NUMBER OF DAYS PER WEEK YOU HAVE A DRINK CONTAINING ALCOHOL: 0

## 2020-02-10 ASSESSMENT — ACTIVITIES OF DAILY LIVING (ADL): TOILETING: INDEPENDENT

## 2020-02-10 NOTE — PROGRESS NOTES
Spoke with Dr. Guerrero, pt penis excoriated and edematous with purulent drainage. MD assessed, new orders received for a culture.

## 2020-02-10 NOTE — THERAPY
"Occupational Therapy Evaluation completed.   Functional Status: Pt present in bed, A&Ox4. Wilson catheter in place. Agreeable for OOB activity. Has tolerated 4 walks today using fww. Sup to sit with Sup,bed rail use. Difficulty with scooting due to wilson/pain near wilson site. ModA with LB dressing; Bhavna to keep dynamic sit balance. Training of dressing with wilson completed (pt may d/c with catheter). STS with Bhavna,fww. Walks in room and to sink with SBA,fww. Grooms with Sup. BTB with Sup.            Plan of Care: Will benefit from Occupational Therapy 3 times per week  Discharge Recommendations:  Equipment: No Equipment Needed.  Discharge to a transitional care facility for continued skilled therapy services vs. home with HH.  Lives alone 5 days/wk and then fely lives with pt 5 days/wk (she works 2 days/wk and stays at her condo during this time). She has taken off work this week to assist as needed upon d/c.  Pt declining snf.       See \"Rehab Therapy-Acute\" Patient Summary Report for complete documentation.    "

## 2020-02-10 NOTE — DISCHARGE PLANNING
Received Choice form at 6817  Agency/Facility Name: [facility]  Referral sent per Choice form @ 2821

## 2020-02-10 NOTE — FACE TO FACE
Face to Face Supporting Documentation - Home Health    The encounter with this patient was in whole or in part the primary reason for home health admission.    Date of encounter:   Patient:                    MRN:                       YOB: 2020  Julien Dao  [id number]  3/7/1929     Home health to see patient for:  Skilled Nursing care for assessment, interventions & education, Physical Therapy evaluation and treatment and Occupational therapy evaluation and treatment    Skilled need for:  Exacerbation of Chronic Disease State enlarged prostate with urinary outlet obstruction    Skilled nursing interventions to include:  Line/Drain/Airway education and care wilson catheter care    Homebound status evidenced by:  Need the aid of supportive devices such as crutches, canes, wheelchairs or walkers. Leaving home requires a considerable and taxing effort. There is a normal inability to leave the home.    Community Physician to provide follow up care: JONO Powell     Optional Interventions? No      I certify the face to face encounter for this home health care referral meets the CMS requirements and the encounter/clinical assessment with the patient was, in whole, or in part, for the medical condition(s) listed above, which is the primary reason for home health care. Based on my clinical findings: the service(s) are medically necessary, support the need for home health care, and the homebound criteria are met.  I certify that this patient has had a face to face encounter by myself.  Giselle Guerrero M.D. - NPI: [id number]

## 2020-02-10 NOTE — PROGRESS NOTES
Received report from Nightshift RN. Assumed care. This pt is AOx4, denies pain, Patient and RN discussed plan of care including PT/OT, discharge planning, wilson catheter, monitor I/Os: questions answered. Chart reviewed. Call light in place, fall precautions in place, patient educated on importance of calling for assistance. No additional needs at this time.

## 2020-02-10 NOTE — PROGRESS NOTES
[facility] Medicine Daily Progress Note    Date of Service  2/10/2020    Chief Complaint  89 y.o. male admitted 2/9/2020 with inability to void    Hospital Course    This is a 89 yo male who presented 2/9/2020 with past medical history of BPH, diabetes, presented with complaint of flank pain and unable to urinate. A wilson catheter was easily placed in the emergency department and his flank pain resolved. The patient is noncompliant with his  Medications and unable to care for the wilson catheter on his own.       Interval Problem Update  The patient has improved blood sugars down to 160's from 400 with insulin and fluids overnight    Consultants/Specialty  none    Code Status  DNR    Disposition  Home health vs group home vs other    Review of Systems  Review of Systems   Constitutional: Negative for chills, fever and malaise/fatigue.   HENT: Negative for congestion and sore throat.    Respiratory: Negative for cough, shortness of breath, wheezing and stridor.    Cardiovascular: Negative for chest pain, palpitations and leg swelling.   Gastrointestinal: Negative for abdominal pain, constipation, diarrhea and vomiting.   Genitourinary: Negative for dysuria, hematuria and urgency.   Musculoskeletal: Negative for back pain and myalgias.   Skin: Negative for itching and rash.   Neurological: Negative for dizziness, tremors and headaches.        Physical Exam  Temp:  [36.2 °C (97.1 °F)-37.1 °C (98.8 °F)] 37.1 °C (98.8 °F)  Pulse:  [60-86] 60  Resp:  [18] 18  BP: (105-177)/(53-98) 105/64  SpO2:  [92 %-99 %] 92 %    Physical Exam  Vitals signs and nursing note reviewed.   Constitutional:       Appearance: He is not ill-appearing or diaphoretic.   HENT:      Nose: No congestion or rhinorrhea.      Mouth/Throat:      Mouth: Mucous membranes are moist.      Pharynx: No oropharyngeal exudate.   Eyes:      General: No scleral icterus.     Conjunctiva/sclera: Conjunctivae normal.   Neck:      Musculoskeletal: Neck supple. No  muscular tenderness.   Cardiovascular:      Rate and Rhythm: Normal rate and regular rhythm.      Heart sounds: Normal heart sounds. No murmur.   Pulmonary:      Effort: Pulmonary effort is normal. No respiratory distress.      Breath sounds: Normal breath sounds. No stridor.   Abdominal:      General: Bowel sounds are normal. There is no distension.      Tenderness: There is no tenderness.   Musculoskeletal:         General: No swelling or tenderness.   Skin:     General: Skin is warm and dry.      Coloration: Skin is not jaundiced.   Neurological:      Mental Status: He is alert and oriented to person, place, and time. Mental status is at baseline.      Motor: No weakness.   Psychiatric:         Mood and Affect: Mood normal.         Behavior: Behavior normal.         Fluids    Intake/Output Summary (Last 24 hours) at 2/10/2020 0934  Last data filed at 2/10/2020 0300  Gross per 24 hour   Intake 2320.83 ml   Output 4100 ml   Net -1779.17 ml       Laboratory  Recent Labs     02/09/20  1117 02/10/20  0223   WBC 8.6 10.1   RBC 5.39 4.60*   HEMOGLOBIN 16.1 13.9*   HEMATOCRIT 48.9 40.8*   MCV 90.7 88.7   MCH 29.9 30.2   MCHC 32.9* 34.1   RDW 44.6 43.3   PLATELETCT 227 224   MPV 11.0 10.6     Recent Labs     02/09/20  1117 02/10/20  0223   SODIUM 132* 139   POTASSIUM 4.3 3.6   CHLORIDE 91* 102   CO2 25 23   GLUCOSE 596* 133*   BUN 27* 20   CREATININE 1.07 0.82   CALCIUM 9.7 8.8                   Imaging  No orders to display        Assessment/Plan  * Urinary retention- (present on admission)  Assessment & Plan  Patient has history of BPH  Continue wilson care, patient unable to care for this himself on discharge will need home health vs placement      Generalized weakness- (present on admission)  Assessment & Plan  Advanced age.  PT/OT ordered and pending    DM (diabetes mellitus) ([facility])- (present on admission)  Assessment & Plan  Type 2 diabetes with long-term use of insulin, without complications.  Currently has  uncontrolled hyperglycemia..  continue a diabetic diet.  Blood sugar is improved overnight with fluids and insulin  Probably patient is noncompliant with medication at home.  Will need supervision for his medications on discharge      ACP (advance care planning)  Assessment & Plan  DNR per patient request      BPH (benign prostatic hyperplasia)- (present on admission)  Assessment & Plan  Continue BID flomax and continue wilson catheter for urinary retention  Patient will need a catheter on discharge and urology follow up         VTE prophylaxis: xarelto

## 2020-02-10 NOTE — H&P
Hospital Medicine History & Physical Note    Date of Service  2/9/2020    Primary Care Physician  JONO Powell    Consultants  none    Code Status  DNR    Chief Complaint  Flank pain and unable to urinate.    History of Presenting Illness  89 y.o. male who presented 2/9/2020 with past medical history of BPH, diabetes, presented with complaint of flank pain and unable to urinate.  In the ER patient was noticed to have significant urinary retention.  Sher catheter was placed without difficulty.  After Sher catheter placed patient's flank pain significantly improved and resolved.  Patient was also noticed to have uncontrolled blood sugar.  Patient was given IV fluid rehydration and insulin.  Patient also complains of significant weakness and general body achiness. Patient's pain is general 2-3/10, intermittent and does not radiate to other location, sharp and with some tingling. Can be controlled by pain meds.   Patient otherwise denies fever, chills, nausea, vomiting, adb pain, SOB, CP, headache, constipation, diarrhea, cough, or sputum.     Review of Systems  Review of Systems   Constitutional: Positive for malaise/fatigue. Negative for chills, fever and weight loss.   HENT: Negative for congestion, ear discharge, ear pain, hearing loss and nosebleeds.    Eyes: Negative for blurred vision and pain.   Respiratory: Negative for cough, sputum production, shortness of breath and wheezing.    Cardiovascular: Negative for chest pain, palpitations, leg swelling and PND.   Gastrointestinal: Negative for abdominal pain, constipation, diarrhea, heartburn, nausea and vomiting.   Genitourinary: Positive for flank pain. Negative for dysuria, frequency and hematuria.        Urinary retention   Musculoskeletal: Negative for back pain, falls, joint pain and neck pain.   Skin: Negative for rash.   Neurological: Negative for dizziness, tremors, speech change, seizures, weakness and headaches.   Psychiatric/Behavioral:  Negative for depression, substance abuse and suicidal ideas.       Past Medical History   has a past medical history of CAD (coronary artery disease), DM (diabetes mellitus) (HCC) (1/7/2014), Falls, Hypertension, and UTI (urinary tract infection) (11/4/2018). He also has no past medical history of ASTHMA, Cancer (HCC), Congestive heart failure (HCC), COPD, Liver disease, Psychiatric disorder, Renal disorder, Seizure disorder (HCC), or Stroke ([facility]).    Surgical History   has a past surgical history that includes other cardiac surgery; coronary artery bypass, 3; and janice rectal abscess.     Family History  Family history reviewed , felt nonpertinent to this encounter.       Social History   reports that he quit smoking about 47 years ago. He has never used smokeless tobacco. He reports that he does not drink alcohol or use drugs.    Allergies  Allergies   Allergen Reactions   • Iodine      Pt and pts wife report that it has been so long not sure what happens       Medications  Prior to Admission Medications   Prescriptions Last Dose Informant Patient Reported? Taking?   Insulin Glargine (BASAGLAR KWIKPEN) 100 UNIT/ML Solution Pen-injector NEW RX Patient Yes No   Sig: Inject 46 Units as instructed every evening.   Multiple Vitamins-Minerals (PX MENS MULTIVITAMINS) Tab 2/9/2020 at 0800 Patient Yes No   Sig: Take 1 Tab by mouth every day.   Omega-3 Fatty Acids (FISH OIL) 1000 MG Cap capsule 2/9/2020 at 0800 Patient Yes No   Sig: Take 1,000 mg by mouth 2 Times a Day.   Specialty Vitamins Products (PROSTATE) Tab 2/9/2020 at 0800 Patient Yes No   Sig: Take 1 Tab by mouth 2 Times a Day.   Specialty Vitamins Products (RETAINE VISION PO) 2/9/2020 at 0800 Patient Yes No   Sig: Take 2 Tabs by mouth 2 Times a Day.   amLODIPine (NORVASC) 5 MG Tab 2/9/2020 at 0800 Patient Yes No   Sig: Take 5 mg by mouth 2 Times a Day.   aspirin EC (ECOTRIN) 81 MG Tablet Delayed Response 2/8/2020 at 2000 Patient Yes No   Sig: Take 81 mg by mouth  every evening.   chlorthalidone (HYGROTON) 25 MG Tab 2/9/2020 at 0800 Patient No No   Sig: Take 1 Tab by mouth 2 Times a Day.   cyclosporin (RESTASIS) 0.05 % ophthalmic emulsion > 2 weeks at Unknown Patient Yes No   Sig: Place 1 Drop in both eyes 2 times a day.   gabapentin (NEURONTIN) 100 MG Cap 2/9/2020 at 0800 Patient Yes No   Sig: Take 100 mg by mouth 2 Times a Day.   metformin (GLUCOPHAGE) 500 MG TABS 2/9/2020 at 0800 Patient Yes No   Sig: Take 500 mg by mouth 2 times a day, with meals.   metoprolol (LOPRESSOR) 50 MG Tab 2/9/2020 at 0800 Patient No No   Sig: Take 0.5 Tabs by mouth 2 times a day.   potassium chloride SA (KDUR) 20 MEQ Tab CR 2/9/2020 at 0800 Patient Yes No   Sig: Take 20 mEq by mouth 2 times a day.   rivaroxaban (XARELTO) 20 MG Tab tablet 2/8/2020 at 1730 Patient Yes No   Sig: Take 20 mg by mouth with dinner.   simvastatin (ZOCOR) 10 MG Tab 2/8/2020 at 2000 Patient Yes No   Sig: Take 10 mg by mouth every evening.   tamsulosin (FLOMAX) 0.4 MG capsule 2/9/2020 at 0800 Patient Yes Yes   Sig: Take 0.4 mg by mouth 2 Times a Day.      Facility-Administered Medications: None       Physical Exam  Temp:  [36.2 °C (97.1 °F)] 36.2 °C (97.1 °F)  Pulse:  [72-86] 83  Resp:  [18] 18  BP: (120-177)/(73-93) 147/88  SpO2:  [94 %-99 %] 96 %    Physical Exam  Constitutional:       General: He is not in acute distress.     Appearance: Normal appearance. He is not ill-appearing, toxic-appearing or diaphoretic.   HENT:      Head: Normocephalic and atraumatic.      Right Ear: Tympanic membrane and external ear normal.      Left Ear: Tympanic membrane and external ear normal.      Nose: No congestion or rhinorrhea.      Mouth/Throat:      Mouth: Mucous membranes are moist.      Pharynx: Oropharynx is clear. No oropharyngeal exudate.   Eyes:      Extraocular Movements: Extraocular movements intact.      Conjunctiva/sclera: Conjunctivae normal.      Pupils: Pupils are equal, round, and reactive to light.   Neck:       Musculoskeletal: Normal range of motion and neck supple. No neck rigidity or muscular tenderness.   Cardiovascular:      Rate and Rhythm: Normal rate and regular rhythm.      Pulses: Normal pulses.      Heart sounds: Normal heart sounds. No murmur. No friction rub. No gallop.    Pulmonary:      Effort: Pulmonary effort is normal. No respiratory distress.      Breath sounds: Normal breath sounds. No stridor. No wheezing, rhonchi or rales.   Chest:      Chest wall: No tenderness.   Abdominal:      General: Abdomen is flat. Bowel sounds are normal. There is no distension.      Palpations: Abdomen is soft. There is no mass.      Tenderness: There is no tenderness. There is no guarding or rebound.      Hernia: No hernia is present.   Musculoskeletal: Normal range of motion.         General: No swelling or deformity.   Skin:     General: Skin is warm and dry.      Capillary Refill: Capillary refill takes more than 3 seconds.   Neurological:      General: No focal deficit present.      Mental Status: He is alert and oriented to person, place, and time. Mental status is at baseline.      Cranial Nerves: No cranial nerve deficit.      Motor: No weakness.   Psychiatric:         Mood and Affect: Mood normal.         Behavior: Behavior normal.         Laboratory:  Recent Labs     02/09/20  1117   WBC 8.6   RBC 5.39   HEMOGLOBIN 16.1   HEMATOCRIT 48.9   MCV 90.7   MCH 29.9   MCHC 32.9*   RDW 44.6   PLATELETCT 227   MPV 11.0     Recent Labs     02/09/20  1117   SODIUM 132*   POTASSIUM 4.3   CHLORIDE 91*   CO2 25   GLUCOSE 596*   BUN 27*   CREATININE 1.07   CALCIUM 9.7     Recent Labs     02/09/20  1117   ALTSGPT 47   ASTSGOT 37   ALKPHOSPHAT 73   TBILIRUBIN 0.7   GLUCOSE 596*         No results for input(s): NTPROBNP in the last 72 hours.      No results for input(s): TROPONINT in the last 72 hours.    Urinalysis:    Recent Labs     02/09/20  1156   SPECGRAVITY 1.010   GLUCOSEUR >=1000*   KETONES Negative   NITRITE Negative    LEUKESTERAS Negative        Imaging:  No orders to display         Assessment/Plan:  I anticipate this patient is appropriate for observation status at this time.    * Urinary retention- (present on admission)  Assessment & Plan  Patient has history of BPH  Coming complains of urinary retention  Urine did not show signs of infection  No sepsis  Put patient on Sher catheter  Needs to follow-up as outpatient  Continue Flomax      Generalized weakness- (present on admission)  Assessment & Plan  Advanced age.  PT OT ordered    DM (diabetes mellitus) (CMS-[facility])- (present on admission)  Assessment & Plan  Type 2 diabetes with long-term use of insulin, without complications.  Currently has uncontrolled hyperglycemia..  I start patient on diabetic diet.  I also put patient on insulin sliding scale to cover.  Close monitor patient's sugar level.  I Also ordered hypoglycemic protocol to prevent hypoglycemic event  Probably patient is noncompliant with medication at home.      ACP (advance care planning)  Assessment & Plan  Total time spent on advanced care planning, excluding time spent on daily care: 16 minutes.    1st 30 minutes [id number]   I discussed extensively with patient and patient's family is regarding code status and plan of care. We also discussed advanced care planning including diagnosis, prognosis, plan of care, risks and benefits of any therapies that could be offered, as well as alternatives including palliation and hospice, as appropriate. My discussion is summarized above in detail. Confirmed with DNR      BPH (benign prostatic hyperplasia)- (present on admission)  Assessment & Plan  BPH  Continue Flomax      VTE prophylaxis: Lovenox

## 2020-02-10 NOTE — CARE PLAN
Problem: Safety  Goal: Will remain free from injury  Outcome: PROGRESSING AS EXPECTED     Problem: Knowledge Deficit  Goal: Knowledge of disease process/condition, treatment plan, diagnostic tests, and medications will improve  Outcome: PROGRESSING AS EXPECTED     Problem: Discharge Barriers/Planning  Goal: Patient's continuum of care needs will be met  Outcome: PROGRESSING AS EXPECTED

## 2020-02-10 NOTE — ASSESSMENT & PLAN NOTE
Type 2 diabetes with long-term use of insulin, without complications.  Currently has uncontrolled hyperglycemia..  continue a diabetic diet.  Blood sugar is improved overnight with fluids and insulin  Probably patient is noncompliant with medication at home.  Will need supervision for his medications on discharge

## 2020-02-10 NOTE — CARE PLAN
Problem: Infection  Goal: Will remain free from infection  Outcome: PROGRESSING AS EXPECTED  Note:   Patient free from signs of infection at this time.      Problem: Venous Thromboembolism (VTW)/Deep Vein Thrombosis (DVT) Prevention:  Goal: Patient will participate in Venous Thrombosis (VTE)/Deep Vein Thrombosis (DVT)Prevention Measures  Outcome: PROGRESSING AS EXPECTED  Flowsheets (Taken 2/9/2020 2200)  Risk Assessment Score: 1  VTE RISK: Moderate  Mechanical Prophylaxis : SCDs, Sequential Compression Device  SCDs, Sequential Compression Device: -- (not ordered, on xarelto)  Pharmacologic Prophylaxis Used: Rivaroxaban (Xarelto) - (ONLY for Total Knee and Total Hip Surgery)

## 2020-02-10 NOTE — PROGRESS NOTES
at bedtime, paged dr. zurita to make aware and per dr. zurita give 9units as ordered in sliding scale. Will monitor.

## 2020-02-10 NOTE — THERAPY
"Physical Therapy Evaluation completed.   Bed Mobility:  Supine to Sit: Minimal Assist  Transfers: Sit to Stand: Contact Guard Assist  Gait: Level Of Assist: Contact Guard Assist with Front-Wheel Walker       Plan of Care: Will benefit from Physical Therapy 4 times per week  Discharge Recommendations: Equipment: Front-Wheel Walker.    See \"Rehab Therapy-Acute\" Patient Summary Report for complete documentation.     Pt was recently admitted for acute urinary retention and flank pain. Pt presents with impaired balance, impaired gait, weakness, and dec activity tolerance. Pt was able to demonstrate Min A during supine to sit, CGA for sit<>stands, transfers, and ambulation with FWW. Pt was able to improve locomotion with continued activity and ambulation. Upon returning back to bed pt was able to demonstrate SBA with supine<>sit. Pt will continue to benefit from skilled PT while in house. Pt encouraged to ambulate at least 3x/day with [facility] staff to prevent further deconditioning. With current functional mobilty pt would benefit from HH therapy services upon d/c to home and assist from SO for a few days. Pt reports he will not be going to a rehab setting and will only go home, however, is agreeable to HH therapy and reports he can have SO at home 24/7 if needed.   "

## 2020-02-10 NOTE — ASSESSMENT & PLAN NOTE
Patient has history of BPH  Continue wilson care, patient unable to care for this himself on discharge will need home health vs placement

## 2020-02-10 NOTE — ASSESSMENT & PLAN NOTE
Continue BID flomax and continue wilson catheter for urinary retention  Patient will need a catheter on discharge and urology follow up

## 2020-02-10 NOTE — PROGRESS NOTES
Patient alert and oriented, vss, denies pain. Patient with wilson in place. PT/OT ordered for tomorrow, patient ambulated in alcantara with RN. Steady with walker. Possible discharge tomorrow post PT eval. Will monitor.

## 2020-02-10 NOTE — ED NOTES
Report called to MACK feliz. Pt will be transported, no acute changes. RN made aware pt has received a total of 15 units of IV insulin.

## 2020-02-11 ENCOUNTER — TELEPHONE (OUTPATIENT)
Dept: HEALTH INFORMATION MANAGEMENT | Facility: OTHER | Age: 85
End: 2020-02-11

## 2020-02-11 VITALS
BODY MASS INDEX: 27.68 KG/M2 | RESPIRATION RATE: 18 BRPM | SYSTOLIC BLOOD PRESSURE: 127 MMHG | WEIGHT: 176.37 LBS | TEMPERATURE: 97.8 F | OXYGEN SATURATION: 94 % | DIASTOLIC BLOOD PRESSURE: 90 MMHG | HEART RATE: 95 BPM | HEIGHT: 67 IN

## 2020-02-11 LAB
GLUCOSE BLD-MCNC: 197 MG/DL (ref 65–99)
GRAM STN SPEC: NORMAL
SIGNIFICANT IND 70042: NORMAL
SITE SITE: NORMAL
SOURCE SOURCE: NORMAL

## 2020-02-11 PROCEDURE — 99217 PR OBSERVATION CARE DISCHARGE: CPT | Performed by: INTERNAL MEDICINE

## 2020-02-11 PROCEDURE — 700102 HCHG RX REV CODE 250 W/ 637 OVERRIDE(OP): Performed by: INTERNAL MEDICINE

## 2020-02-11 PROCEDURE — 82962 GLUCOSE BLOOD TEST: CPT

## 2020-02-11 PROCEDURE — 96372 THER/PROPH/DIAG INJ SC/IM: CPT | Mod: XU

## 2020-02-11 PROCEDURE — A9270 NON-COVERED ITEM OR SERVICE: HCPCS | Performed by: INTERNAL MEDICINE

## 2020-02-11 PROCEDURE — G0378 HOSPITAL OBSERVATION PER HR: HCPCS

## 2020-02-11 RX ORDER — CIPROFLOXACIN 500 MG/1
500 TABLET, FILM COATED ORAL 2 TIMES DAILY
Qty: 6 TAB | Refills: 0 | Status: SHIPPED | OUTPATIENT
Start: 2020-02-11 | End: 2020-02-14

## 2020-02-11 RX ADMIN — METOPROLOL TARTRATE 25 MG: 25 TABLET ORAL at 05:46

## 2020-02-11 RX ADMIN — TAMSULOSIN HYDROCHLORIDE 0.4 MG: 0.4 CAPSULE ORAL at 05:46

## 2020-02-11 RX ADMIN — GABAPENTIN 100 MG: 100 CAPSULE ORAL at 05:45

## 2020-02-11 RX ADMIN — ACETAMINOPHEN 650 MG: 325 TABLET, FILM COATED ORAL at 05:45

## 2020-02-11 RX ADMIN — INSULIN HUMAN 2 UNITS: 100 INJECTION, SOLUTION PARENTERAL at 05:54

## 2020-02-11 RX ADMIN — AMLODIPINE BESYLATE 5 MG: 5 TABLET ORAL at 05:46

## 2020-02-11 RX ADMIN — SENNOSIDES AND DOCUSATE SODIUM 2 TABLET: 8.6; 5 TABLET ORAL at 05:46

## 2020-02-11 NOTE — PROGRESS NOTES
Assumed care of patient at 1900.  Patient is alert and oriented.  Walked in the alcantara with assist of 1 and walker, patient slightly unsteady at times.  Back to bed with bed alarm on.  Head of penis is red and inflamed and tender to touch.  Sher catheter is patent and draining clear pale yellow urine.  Lantus and Humulin R given - patient eating snack at this time.  Hourly rounding continues.

## 2020-02-11 NOTE — CARE PLAN
Problem: Safety  Goal: Will remain free from injury  Outcome: PROGRESSING AS EXPECTED  Note:   Bed in low and locked position.  Side rails up X2.  Call light remains in reach of patient.  Hourly rounding continues.     Problem: Pain Management  Goal: Pain level will decrease to patient's comfort goal  Outcome: PROGRESSING AS EXPECTED  Note:   Patient states pain controlled with Tylenol.

## 2020-02-11 NOTE — DISCHARGE PLANNING
Care Transition Team Assessment  Emergency contact: Mercedes mena  [phone]  LSW met with pt at bedside. LSW verified demographic information. Pt stated he lives with his significant other and receives assistance from her. Pt verified PCP. No AD. Pt plans to discharge home with home health.     Information Source  Orientation : Oriented x 4  Information Given By: Patient  Informant's Name: Julien Dao  Who is responsible for making decisions for patient? : Patient    Readmission Evaluation  Is this a readmission?: No    Elopement Risk  Legal Hold: No  Ambulatory or Self Mobile in Wheelchair: Yes  Disoriented: No  Psychiatric Symptoms: None  History of Wandering: No  Elopement this Admit: No  Vocalizing Wanting to Leave: No  Displays Behaviors, Body Language Wanting to Leave: No-Not at Risk for Elopement  Elopement Risk: Not at Risk for Elopement    Interdisciplinary Discharge Planning  Lives with - Patient's Self Care Capacity: Significant Other  Patient or legal guardian wants to designate a caregiver (see row info): No  Housing / Facility: [address]  Prior Services: Skilled Home Health Services    Discharge Preparedness  What is your plan after discharge?: Home with help, Home health care  What are your discharge supports?: Spouse  Prior Functional Level: Independent with Activities of Daily Living, Independent with Medication Management    Functional Assesment  Prior Functional Level: Independent with Activities of Daily Living, Independent with Medication Management    Finances  Financial Barriers to Discharge: No  Prescription Coverage: Yes    Vision / Hearing Impairment  Vision Impairment : Yes  Right Eye Vision: Wears Glasses  Left Eye Vision: Wears Glasses  Hearing Impairment : Yes  Hearing Impairment: Both Ears  Does Pt Need Special Equipment for the Hearing Impaired?: No         Advance Directive  Advance Directive?: None    Domestic Abuse  Have you ever been the victim of abuse or violence?:  No  Physical Abuse or Sexual Abuse: No  Verbal Abuse or Emotional Abuse: No  Possible Abuse Reported to:: Not Applicable              Anticipated Discharge Information  Anticipated discharge disposition: Home  Discharge Address: [address] [address] [facility] [address] [facility]   Discharge Contact Phone Number: [phone]

## 2020-02-11 NOTE — DISCHARGE PLANNING
We are currently verifying MD acceptance of your patient. This will be resolved ASAP.  Per note entered by admitting MD patient feels he is unable to manage his wilson and blood sugars due to not being able to see very well. Will patient now be able to do this if DC'd home at this time?  Thank you for your patience.    Respectfully,   [facility]

## 2020-02-11 NOTE — DISCHARGE PLANNING
LSW spoke with [facility]. LSW was informed that pt's case was escalated to management due to safety concerns since pt does not have a personal caregiver. LSW verified via PT/OT notes that PT/OT suggested HH due to no skilled needs.

## 2020-02-11 NOTE — DISCHARGE SUMMARY
"Discharge Summary    CHIEF COMPLAINT ON ADMISSION  Chief Complaint   Patient presents with   • Flank Pain     right flank pain   • Unable to Urinate     \" just dribles\" since discharged from last hositalization.        Reason for Admission  EMS     Admission Date  2/9/2020    CODE STATUS  DNAR/DNI    HPI & HOSPITAL COURSE  This is a 89 y.o. male here with inability to urinate with right sided flank pain.  Patient had wilson catheter placed with resolution of pain.  UA showed no evidence of infection.   He ambulates with FWW and has worked with PT/OT who support home health coordination on discharge as long as patient has assistance from another available 24/7.  Patient states he can have the help available as needed.  He feels well enough to return home with his fiance.       Therefore, he is discharged in good and stable condition to home with organized home healthcare and close outpatient follow-up.      Discharge Date  2/11/2020    FOLLOW UP ITEMS POST DISCHARGE  PCP - 1 week  Urology - 1 week    DISCHARGE DIAGNOSES  Principal Problem:    Urinary retention POA: Yes  Active Problems:    Generalized weakness POA: Yes    DM (diabetes mellitus) (CMS-[facility]) POA: Yes    BPH (benign prostatic hyperplasia) POA: Yes    ACP (advance care planning) POA: Unknown  Resolved Problems:    * No resolved hospital problems. *      FOLLOW UP  No future appointments.  JONO Powell  [address] [facility] [id number] [phone]    In 1 week      Julien Vaughn M.D.  [address] [facility] [address]  [phone]    In 1 week  Called and left voice message with primary care to call patient to schedule hopsital follow up. Have patient call Primary office if they do not hear anything in the next 48 hours.       MEDICATIONS ON DISCHARGE     Medication List      START taking these medications      Instructions   ciprofloxacin 500 MG Tabs  Commonly known as:  CIPRO   Take 1 Tab by mouth 2 times a day for 3 days.  Dose:  500 mg   "      CONTINUE taking these medications      Instructions   amLODIPine 5 MG Tabs  Commonly known as:  NORVASC   Take 5 mg by mouth 2 Times a Day.  Dose:  5 mg     aspirin EC 81 MG Tbec  Commonly known as:  ECOTRIN   Take 81 mg by mouth every evening.  Dose:  81 mg     chlorthalidone 25 MG Tabs  Commonly known as:  HYGROTON   Take 1 Tab by mouth 2 Times a Day.  Dose:  25 mg     fish oil 1000 MG Caps capsule   Take 1,000 mg by mouth 2 Times a Day.  Dose:  1,000 mg     gabapentin 100 MG Caps  Commonly known as:  NEURONTIN   Take 100 mg by mouth 2 Times a Day.  Dose:  100 mg     insulin glargine 100 UNIT/ML Sopn injection  Generic drug:  insulin glargine   Inject 46 Units as instructed every evening.  Dose:  46 Units     metFORMIN 500 MG Tabs  Commonly known as:  GLUCOPHAGE   Take 500 mg by mouth 2 times a day, with meals.  Dose:  500 mg     metoprolol 50 MG Tabs  Commonly known as:  LOPRESSOR   Take 0.5 Tabs by mouth 2 times a day.  Dose:  25 mg     potassium chloride SA 20 MEQ Tbcr  Commonly known as:  Kdur   Take 20 mEq by mouth 2 times a day.  Dose:  20 mEq     * Prostate Tabs   Take 1 Tab by mouth 2 Times a Day.  Dose:  1 Tab     * RETAINE VISION PO   Take 2 Tabs by mouth 2 Times a Day.  Dose:  2 Tab     PX Mens Multivitamins Tabs   Take 1 Tab by mouth every day.  Dose:  1 Tab     Restasis 0.05 % ophthalmic emulsion  Generic drug:  cyclosporin   Place 1 Drop in both eyes 2 times a day.  Dose:  1 Drop     simvastatin 10 MG Tabs  Commonly known as:  ZOCOR   Take 10 mg by mouth every evening.  Dose:  10 mg     tamsulosin 0.4 MG capsule  Commonly known as:  FLOMAX   Take 0.4 mg by mouth 2 Times a Day.  Dose:  0.4 mg     Xarelto 20 MG Tabs tablet  Generic drug:  rivaroxaban   Take 20 mg by mouth with dinner.  Dose:  20 mg         * This list has 2 medication(s) that are the same as other medications prescribed for you. Read the directions carefully, and ask your doctor or other care provider to review them with you.                 Allergies  Allergies   Allergen Reactions   • Iodine      Pt and pts wife report that it has been so long not sure what happens       DIET  Orders Placed This Encounter   Procedures   • Diet Order Diabetic     Standing Status:   Standing     Number of Occurrences:   1     Order Specific Question:   Diet:     Answer:   Diabetic [3]       ACTIVITY  As tolerated.  Weight bearing as tolerated    CONSULTATIONS  None  Follow up with urology as outpatient.    PROCEDURES  none    LABORATORY  Lab Results   Component Value Date    SODIUM 139 02/10/2020    POTASSIUM 3.6 02/10/2020    CHLORIDE 102 02/10/2020    CO2 23 02/10/2020    GLUCOSE 133 (H) 02/10/2020    BUN 20 02/10/2020    CREATININE 0.82 02/10/2020    CREATININE 1.0 12/02/2008        Lab Results   Component Value Date    WBC 10.1 02/10/2020    HEMOGLOBIN 13.9 (L) 02/10/2020    HEMATOCRIT 40.8 (L) 02/10/2020    PLATELETCT 224 02/10/2020        Total time of the discharge process exceeds 32 minutes.

## 2020-02-11 NOTE — DISCHARGE PLANNING
ATTN: Case Management  RE: Referral for Home Health    As of 02/11/2020, we have accepted the Home Health referral for the patient listed above.    A [facility] clinician will be out to see the patient within 48 hours. If you have any questions or concerns regarding the patient's transition to Home Health, please do not hesitate to contact us at [phone].      We look forward to collaborating with you,  [facility] Team

## 2020-02-11 NOTE — PROGRESS NOTES
Received report from NOC RN; assumed pt care. Pt A&Ox4, sitting up in bed. Pt resting comfortably. Ambulated halls w/out difficulty. Redness noted to penis. Pt states he feels much better. Pt notified to call for assistance.

## 2020-02-11 NOTE — DISCHARGE INSTRUCTIONS
Sher Catheter Care, Adult  A Sher catheter is a soft, flexible tube. This tube is placed into your bladder to drain pee (urine). If you go home with this catheter in place, follow the instructions below.  TAKING CARE OF THE CATHETER  1. Wash your hands with soap and water.  2. Put soap and water on a clean washcloth.  ¨ Clean the skin where the tube goes into your body.  § Clean away from the tube site.  § Never wipe toward the tube.  § Clean the area using a circular motion.  ¨ Remove all the soap. Pat the area dry with a clean towel. For males, reposition the skin that covers the end of the penis (foreskin).  3. Attach the tube to your leg with tape or a leg strap. Do not stretch the tube tight. If you are using tape, remove any stickiness left behind by past tape you used.  4. Keep the drainage bag below your hips. Keep it off the floor.  5. Check your tube during the day. Make sure it is working and draining. Make sure the tube does not curl, twist, or bend.  6. Do not pull on the tube or try to take it out.  TAKING CARE OF THE DRAINAGE BAGS  You will have a large overnight drainage bag and a small leg bag. You may wear the overnight bag any time. Never wear the small bag at night. Follow the directions below.  Emptying the Drainage Bag  Empty your drainage bag when it is  -½ full or at least 2-3 times a day.  1. Wash your hands with soap and water.  2. Keep the drainage bag below your hips.  3. Hold the dirty bag over the toilet or clean container.  4. Open the pour spout at the bottom of the bag. Empty the pee into the toilet or container. Do not let the pour spout touch anything.  5. Clean the pour spout with a gauze pad or cotton ball that has rubbing alcohol on it.  6. Close the pour spout.  7. Attach the bag to your leg with tape or a leg strap.  8. Wash your hands well.  Changing the Drainage Bag  Change your bag once a month or sooner if it starts to smell or look dirty.   1. Wash your hands with soap  and water.  2. Pinch the rubber tube so that pee does not spill out.  3. Disconnect the catheter tube from the drainage tube at the connection valve. Do not let the tubes touch anything.  4. Clean the end of the catheter tube with an alcohol wipe. Clean the end of a the drainage tube with a different alcohol wipe.  5. Connect the catheter tube to the drainage tube of the clean drainage bag.  6. Attach the new bag to the leg with tape or a leg strap. Avoid attaching the new bag too tightly.  7. Wash your hands well.  Cleaning the Drainage Bag  1. Wash your hands with soap and water.  2. Wash the bag in warm, soapy water.  3. Rinse the bag with warm water.  4. Fill the bag with a mixture of white vinegar and water (1 cup vinegar to 1 quart warm water [.2 liter vinegar to 1 liter warm water]). Close the bag and soak it for 30 minutes in the solution.  5. Rinse the bag with warm water.  6. Hang the bag to dry with the pour spout open and hanging downward.  7. Store the clean bag (once it is dry) in a clean plastic bag.  8. Wash your hands well.  PREVENT INFECTION  · Wash your hands before and after touching your tube.  · Take showers every day. Wash the skin where the tube enters your body. Do not take baths. Replace wet leg straps with dry ones, if this applies.  · Do not use powders, sprays, or lotions on the genital area. Only use creams, lotions, or ointments as told by your doctor.  · For females, wipe from front to back after going to the bathroom.  · Drink enough fluids to keep your pee clear or pale yellow unless you are told not to have too much fluid (fluid restriction).  · Do not let the drainage bag or tubing touch or lie on the floor.  · Wear cotton underwear to keep the area dry.  GET HELP IF:  · Your pee is cloudy or smells unusually bad.  · Your tube becomes clogged.  · You are not draining pee into the bag or your bladder feels full.  · Your tube starts to leak.  GET HELP RIGHT AWAY IF:  · You have  pain, puffiness (swelling), redness, or yellowish-white fluid (pus) where the tube enters the body.  · You have pain in the belly (abdomen), legs, lower back, or bladder.  · You have a fever.  · You see blood fill the tube, or your pee is pink or red.  · You feel sick to your stomach (nauseous), throw up (vomit), or have chills.  · Your tube gets pulled out.  MAKE SURE YOU:   · Understand these instructions.  · Will watch your condition.  · Will get help right away if you are not doing well or get worse.     This information is not intended to replace advice given to you by your health care provider. Make sure you discuss any questions you have with your health care provider.     Document Released: 04/14/2014 Document Revised: 01/08/2016 Document Reviewed: 04/14/2014  [employer] Interactive Patient Education ©2016 [employer]    Discharge Instructions    Discharged to home by car with relative. Discharged via wheelchair, hospital escort: Yes.  Special equipment needed: Not Applicable    Be sure to schedule a follow-up appointment with your primary care doctor or any specialists as instructed.     Discharge Plan:   Diet Plan: Discussed  Activity Level: Discussed  Confirmed Follow up Appointment: Patient to Call and Schedule Appointment  Confirmed Symptoms Management: Discussed  Medication Reconciliation Updated: Yes  Influenza Vaccine Indication: Patient Refuses    I understand that a diet low in cholesterol, fat, and sodium is recommended for good health. Unless I have been given specific instructions below for another diet, I accept this instruction as my diet prescription.   Other diet: Regular    Special Instructions: None    · Is patient discharged on Warfarin / Coumadin?   No     Depression / Suicide Risk    As you are discharged from this [facility] facility, it is important to learn how to keep safe from harming yourself.    Recognize the warning signs:  · Abrupt changes in personality, positive or  negative- including increase in energy   · Giving away possessions  · Change in eating patterns- significant weight changes-  positive or negative  · Change in sleeping patterns- unable to sleep or sleeping all the time   · Unwillingness or inability to communicate  · Depression  · Unusual sadness, discouragement and loneliness  · Talk of wanting to die  · Neglect of personal appearance   · Rebelliousness- reckless behavior  · Withdrawal from people/activities they love  · Confusion- inability to concentrate     If you or a loved one observes any of these behaviors or has concerns about self-harm, here's what you can do:  · Talk about it- your feelings and reasons for harming yourself  · Remove any means that you might use to hurt yourself (examples: pills, rope, extension cords, firearm)  · Get professional help from the community (Mental Health, Substance Abuse, psychological counseling)  · Do not be alone:Call your Safe Contact- someone whom you trust who will be there for you.  · Call your local CRISIS HOTLINE 912-7080 or 500-899-4088  · Call your local Children's Mobile Crisis Response Team [place] (929) 887-7062 or www.eblizz  · Call the toll free National Suicide Prevention Hotlines   · National Suicide Prevention Lifeline 506-862-EVDC (5943)  · National Hope Line Network 800-SUICIDE (753-7391)

## 2020-02-13 ENCOUNTER — HOME CARE VISIT (OUTPATIENT)
Dept: HOME HEALTH SERVICES | Facility: HOME HEALTHCARE | Age: 85
End: 2020-02-13
Payer: MEDICARE

## 2020-02-13 VITALS
HEART RATE: 78 BPM | BODY MASS INDEX: 27.62 KG/M2 | OXYGEN SATURATION: 98 % | RESPIRATION RATE: 16 BRPM | WEIGHT: 176 LBS | TEMPERATURE: 98.3 F | SYSTOLIC BLOOD PRESSURE: 129 MMHG | HEIGHT: 67 IN | DIASTOLIC BLOOD PRESSURE: 87 MMHG

## 2020-02-13 PROCEDURE — 665999 HH PPS REVENUE DEBIT

## 2020-02-13 PROCEDURE — G0493 RN CARE EA 15 MIN HH/HOSPICE: HCPCS

## 2020-02-13 PROCEDURE — 665001 SOC-HOME HEALTH

## 2020-02-13 PROCEDURE — 665998 HH PPS REVENUE CREDIT

## 2020-02-13 ASSESSMENT — ENCOUNTER SYMPTOMS
NAUSEA: DENIES
VOMITING: DENIES

## 2020-02-13 ASSESSMENT — ACTIVITIES OF DAILY LIVING (ADL): OASIS_M1830: 03

## 2020-02-13 ASSESSMENT — PATIENT HEALTH QUESTIONNAIRE - PHQ9
2. FEELING DOWN, DEPRESSED, IRRITABLE, OR HOPELESS: 00
CLINICAL INTERPRETATION OF PHQ2 SCORE: 0
1. LITTLE INTEREST OR PLEASURE IN DOING THINGS: 00

## 2020-02-14 ENCOUNTER — ANTICOAGULATION MONITORING (OUTPATIENT)
Dept: VASCULAR LAB | Facility: MEDICAL CENTER | Age: 85
End: 2020-02-14

## 2020-02-14 LAB
BACTERIA GENITAL AEROBE CULT: NORMAL
GRAM STN SPEC: NORMAL
SIGNIFICANT IND 70042: NORMAL
SITE SITE: NORMAL
SOURCE SOURCE: NORMAL

## 2020-02-14 PROCEDURE — 665999 HH PPS REVENUE DEBIT

## 2020-02-14 PROCEDURE — 665998 HH PPS REVENUE CREDIT

## 2020-02-14 NOTE — PROGRESS NOTES
Received referral from [facility]. Medications reviewed.     Continue to monitor the appropriateness of aspirin and Xarelto as noted below.    Drug-Drug: aspirin EC and rivaroxaban   Use of Direct Factor Xa Inhibitors with Salicylates may increase the risk of bleeding.   Last overridden by: Yovanny Odell M.D. on Nov 6, 2018 1:36 PM       Medication listed below was on his discharge summary but not on his home medication list.    CONTINUE taking these medications      Instructions   amLODIPine 5 MG Tabs  Commonly known as:  NORVASC    Take 5 mg by mouth 2 Times a Day.  Dose:  5 mg              Brian Ghosh, PharmD, MS, BCACP, LCC    This note was created using voice recognition software (Dragon). The accuracy of the dictation is limited by the abilities of the software. I have reviewed the note prior to signing, however some errors in grammar and context are still possible. If you have any questions related to this note please do not hesitate to contact our office.

## 2020-02-15 PROCEDURE — 665999 HH PPS REVENUE DEBIT

## 2020-02-15 PROCEDURE — 665998 HH PPS REVENUE CREDIT

## 2020-02-16 PROCEDURE — 665999 HH PPS REVENUE DEBIT

## 2020-02-16 PROCEDURE — 665998 HH PPS REVENUE CREDIT

## 2020-02-17 ENCOUNTER — HOME CARE VISIT (OUTPATIENT)
Dept: HOME HEALTH SERVICES | Facility: HOME HEALTHCARE | Age: 85
End: 2020-02-17
Payer: MEDICARE

## 2020-02-17 ENCOUNTER — HOSPITAL ENCOUNTER (EMERGENCY)
Facility: MEDICAL CENTER | Age: 85
DRG: 699 | End: 2020-02-17
Attending: EMERGENCY MEDICINE | Admitting: EMERGENCY MEDICINE
Payer: MEDICARE

## 2020-02-17 ENCOUNTER — HOSPITAL ENCOUNTER (INPATIENT)
Facility: MEDICAL CENTER | Age: 85
LOS: 7 days | DRG: 699 | End: 2020-02-25
Attending: EMERGENCY MEDICINE | Admitting: HOSPITALIST
Payer: MEDICARE

## 2020-02-17 ENCOUNTER — APPOINTMENT (OUTPATIENT)
Dept: RADIOLOGY | Facility: MEDICAL CENTER | Age: 85
DRG: 699 | End: 2020-02-17
Attending: EMERGENCY MEDICINE
Payer: MEDICARE

## 2020-02-17 VITALS
WEIGHT: 176.37 LBS | TEMPERATURE: 97.9 F | OXYGEN SATURATION: 96 % | SYSTOLIC BLOOD PRESSURE: 140 MMHG | BODY MASS INDEX: 27.68 KG/M2 | RESPIRATION RATE: 16 BRPM | DIASTOLIC BLOOD PRESSURE: 76 MMHG | HEART RATE: 73 BPM | HEIGHT: 67 IN

## 2020-02-17 DIAGNOSIS — N48.89 PENILE EROSION: ICD-10-CM

## 2020-02-17 DIAGNOSIS — R31.0 GROSS HEMATURIA: ICD-10-CM

## 2020-02-17 DIAGNOSIS — N13.8 BENIGN PROSTATIC HYPERPLASIA WITH URINARY OBSTRUCTION: ICD-10-CM

## 2020-02-17 DIAGNOSIS — N40.1 BENIGN PROSTATIC HYPERPLASIA WITH URINARY OBSTRUCTION: ICD-10-CM

## 2020-02-17 DIAGNOSIS — R42 DIZZINESS: ICD-10-CM

## 2020-02-17 DIAGNOSIS — R53.1 GENERALIZED WEAKNESS: ICD-10-CM

## 2020-02-17 DIAGNOSIS — I48.92 ATRIAL FLUTTER, UNSPECIFIED TYPE (HCC): ICD-10-CM

## 2020-02-17 DIAGNOSIS — B37.42 CANDIDAL BALANITIS: ICD-10-CM

## 2020-02-17 DIAGNOSIS — N30.01 ACUTE HEMORRHAGIC CYSTITIS: ICD-10-CM

## 2020-02-17 DIAGNOSIS — N48.1 BALANITIS: ICD-10-CM

## 2020-02-17 DIAGNOSIS — E11.65 TYPE 2 DIABETES MELLITUS WITH HYPERGLYCEMIA, UNSPECIFIED WHETHER LONG TERM INSULIN USE (HCC): ICD-10-CM

## 2020-02-17 DIAGNOSIS — R33.9 URINARY RETENTION: ICD-10-CM

## 2020-02-17 DIAGNOSIS — N47.6 BALANOPOSTHITIS: ICD-10-CM

## 2020-02-17 LAB
ALBUMIN SERPL BCP-MCNC: 3.9 G/DL (ref 3.2–4.9)
ALBUMIN/GLOB SERPL: 1.3 G/DL
ALP SERPL-CCNC: 62 U/L (ref 30–99)
ALT SERPL-CCNC: 37 U/L (ref 2–50)
AMORPH CRY #/AREA URNS HPF: PRESENT /HPF
ANION GAP SERPL CALC-SCNC: 14 MMOL/L (ref 7–16)
APPEARANCE UR: ABNORMAL
APTT PPP: 34.9 SEC (ref 24.7–36)
AST SERPL-CCNC: 28 U/L (ref 12–45)
BACTERIA #/AREA URNS HPF: ABNORMAL /HPF
BASOPHILS # BLD AUTO: 0.7 % (ref 0–1.8)
BASOPHILS # BLD: 0.07 K/UL (ref 0–0.12)
BILIRUB SERPL-MCNC: 0.3 MG/DL (ref 0.1–1.5)
BILIRUB UR QL STRIP.AUTO: NEGATIVE
BUN SERPL-MCNC: 18 MG/DL (ref 8–22)
CALCIUM SERPL-MCNC: 9.8 MG/DL (ref 8.4–10.2)
CHLORIDE SERPL-SCNC: 98 MMOL/L (ref 96–112)
CO2 SERPL-SCNC: 25 MMOL/L (ref 20–33)
COLOR UR: ABNORMAL
CREAT SERPL-MCNC: 0.77 MG/DL (ref 0.5–1.4)
EOSINOPHIL # BLD AUTO: 0.22 K/UL (ref 0–0.51)
EOSINOPHIL NFR BLD: 2.1 % (ref 0–6.9)
ERYTHROCYTE [DISTWIDTH] IN BLOOD BY AUTOMATED COUNT: 44.8 FL (ref 35.9–50)
GLOBULIN SER CALC-MCNC: 2.9 G/DL (ref 1.9–3.5)
GLUCOSE SERPL-MCNC: 279 MG/DL (ref 65–99)
GLUCOSE UR STRIP.AUTO-MCNC: >=1000 MG/DL
HCT VFR BLD AUTO: 43.9 % (ref 42–52)
HGB BLD-MCNC: 14.6 G/DL (ref 14–18)
IMM GRANULOCYTES # BLD AUTO: 0.07 K/UL (ref 0–0.11)
IMM GRANULOCYTES NFR BLD AUTO: 0.7 % (ref 0–0.9)
INR PPP: 1.25 (ref 0.87–1.13)
KETONES UR STRIP.AUTO-MCNC: ABNORMAL MG/DL
LEUKOCYTE ESTERASE UR QL STRIP.AUTO: NEGATIVE
LYMPHOCYTES # BLD AUTO: 1.59 K/UL (ref 1–4.8)
LYMPHOCYTES NFR BLD: 15.4 % (ref 22–41)
MCH RBC QN AUTO: 29.9 PG (ref 27–33)
MCHC RBC AUTO-ENTMCNC: 33.3 G/DL (ref 33.7–35.3)
MCV RBC AUTO: 90 FL (ref 81.4–97.8)
MICRO URNS: ABNORMAL
MONOCYTES # BLD AUTO: 0.75 K/UL (ref 0–0.85)
MONOCYTES NFR BLD AUTO: 7.3 % (ref 0–13.4)
NEUTROPHILS # BLD AUTO: 7.64 K/UL (ref 1.82–7.42)
NEUTROPHILS NFR BLD: 73.8 % (ref 44–72)
NITRITE UR QL STRIP.AUTO: NEGATIVE
NRBC # BLD AUTO: 0 K/UL
NRBC BLD-RTO: 0 /100 WBC
PH UR STRIP.AUTO: 6.5 [PH] (ref 5–8)
PLATELET # BLD AUTO: 248 K/UL (ref 164–446)
PMV BLD AUTO: 10.6 FL (ref 9–12.9)
POTASSIUM SERPL-SCNC: 3.9 MMOL/L (ref 3.6–5.5)
PROT SERPL-MCNC: 6.8 G/DL (ref 6–8.2)
PROT UR QL STRIP: >=300 MG/DL
PROTHROMBIN TIME: 15.9 SEC (ref 12–14.6)
RBC # BLD AUTO: 4.88 M/UL (ref 4.7–6.1)
RBC # URNS HPF: >150 /HPF
RBC UR QL AUTO: ABNORMAL
SODIUM SERPL-SCNC: 137 MMOL/L (ref 135–145)
SP GR UR STRIP.AUTO: 1.02
WBC # BLD AUTO: 10.3 K/UL (ref 4.8–10.8)
WBC #/AREA URNS HPF: ABNORMAL /HPF

## 2020-02-17 PROCEDURE — 96372 THER/PROPH/DIAG INJ SC/IM: CPT

## 2020-02-17 PROCEDURE — 85025 COMPLETE CBC W/AUTO DIFF WBC: CPT

## 2020-02-17 PROCEDURE — 99285 EMERGENCY DEPT VISIT HI MDM: CPT

## 2020-02-17 PROCEDURE — 665998 HH PPS REVENUE CREDIT

## 2020-02-17 PROCEDURE — 74176 CT ABD & PELVIS W/O CONTRAST: CPT

## 2020-02-17 PROCEDURE — 51702 INSERT TEMP BLADDER CATH: CPT

## 2020-02-17 PROCEDURE — 665999 HH PPS REVENUE DEBIT

## 2020-02-17 PROCEDURE — 80053 COMPREHEN METABOLIC PANEL: CPT

## 2020-02-17 PROCEDURE — 99284 EMERGENCY DEPT VISIT MOD MDM: CPT

## 2020-02-17 PROCEDURE — 303105 HCHG CATHETER EXTRA

## 2020-02-17 PROCEDURE — 85610 PROTHROMBIN TIME: CPT

## 2020-02-17 PROCEDURE — 81001 URINALYSIS AUTO W/SCOPE: CPT

## 2020-02-17 PROCEDURE — 85730 THROMBOPLASTIN TIME PARTIAL: CPT

## 2020-02-17 RX ORDER — CIPROFLOXACIN 500 MG/1
500 TABLET, FILM COATED ORAL 2 TIMES DAILY
Qty: 10 TAB | Refills: 0 | Status: SHIPPED | OUTPATIENT
Start: 2020-02-17 | End: 2020-02-18

## 2020-02-17 NOTE — ED TRIAGE NOTES
"Chief Complaint   Patient presents with   • Urinary Catheter Problem     Around 0230, patient was walking when he felt sudden lower abd pain, now reports blood in wilson catheter bag.     ED Triage Vitals   Enc Vitals Group      Blood Pressure  02/17/20 0448 115/60      Pulse 02/17/20 0448 67      Respiration 02/17/20 0448 16      Temperature 02/17/20 0448 36.6 °C (97.9 °F)      Temp src 02/17/20 0448 Temporal      Pulse Oximetry 02/17/20 0448 97 %      Weight 02/17/20 0446 80 kg (176 lb 5.9 oz)      Height 02/17/20 0446 1.702 m (5' 7\")     "

## 2020-02-17 NOTE — ED PROVIDER NOTES
ED Provider Note    ED Provider Note    Scribed for Deisy Harrell MD by Deisy Harrell M.D.. 2/17/2020, 5:03 AM.    Primary care provider: JONO Powell  Means of arrival: EMS  History obtained from: Patient and EMS  History limited by: None    CHIEF COMPLAINT  Chief Complaint   Patient presents with   • Urinary Catheter Problem     Around 0230, patient was walking when he felt sudden lower abd pain, now reports blood in wilson catheter bag.       HPI  Julien Dao is a 89 y.o. male who presents to the Emergency Department with history of coronary artery disease and coronary artery bypass grafting currently anticoagulated on Xarelto who presents for evaluation of gross hematuria.  Patient noted while he was walking today about 2 hours prior to arrival he had a sudden sharp pain around his penis and bladder.  He noted at that time blood in his Wilson catheter bag.  The catheter is been in place for about 10 days, placed initially for acute urinary retention.  Patient also found to have infective cystitis, he is completed a course of Cipro 2 days ago.  Patient notes pain lasted only for a few seconds, he is feeling well now, but there is obvious gross red blood in his catheter bag.  Also has redness and swelling around the glans penis and the penile shaft for last several days as well.  Patient denies any flank pain, no active abdominal or genital pain at this point.  No nausea, no vomiting, no fever.  No significant trauma noted.    REVIEW OF SYSTEMS  Pertinent positives include general pain, gross hematuria, swelling and redness to the penile shaft and glans. Pertinent negatives include no fever, no vomiting, no flank pain.  All other systems reviewed and negative.    PAST MEDICAL HISTORY   has a past medical history of CAD (coronary artery disease), DM (diabetes mellitus) ([facility]) (1/7/2014), Falls, Hypertension, and UTI (urinary tract infection) (11/4/2018).    SURGICAL HISTORY   has a past  "surgical history that includes other cardiac surgery; coronary artery bypass, 3; and janice rectal abscess.    SOCIAL HISTORY  Social History     Tobacco Use   • Smoking status: Former Smoker     Last attempt to quit: 10/19/1972     Years since quittin.3   • Smokeless tobacco: Never Used   Substance Use Topics   • Alcohol use: No   • Drug use: No      Social History     Substance and Sexual Activity   Drug Use No       FAMILY HISTORY  History reviewed. No pertinent family history.  Noncontributory given age    CURRENT MEDICATIONS  Home Medications    **Home medications have not yet been reviewed for this encounter**         ALLERGIES  Allergies   Allergen Reactions   • Iodine      Pt and pts wife report that it has been so long not sure what happens       PHYSICAL EXAM  VITAL SIGNS: /76   Pulse 73   Temp 36.6 °C (97.9 °F) (Temporal)   Resp 16   Ht 1.702 m (5' 7\")   Wt 80 kg (176 lb 5.9 oz)   SpO2 96%   BMI 27.62 kg/m²     General: Alert, no acute distress  Skin: Warm, dry, normal for ethnicity  Head: Normocephalic, atraumatic  Neck: Trachea midline, no tenderness  Cardiovascular:  Normal peripheral perfusion  Respiratory: Respirations are non-labored  Gastrointestinal: Soft, nontender, non distended, no CVA pubic fullness nor tenderness.  No CVA tenderness.   exam demonstrates moderate induration and erythema to the distal aspect of the penile shaft as well as generally throughout the glans penis.  There is scant white exudate consistent with likely candidal infection noted.  Sher catheter is in place, there is no bleeding or drainage around the urethra.  Obvious gross blood in Sher catheter bag.  After catheter was removed there is a moderate amount of blood still streaming from the urethra, no carlton clots.  Patient remains in no pain and there is no tenderness to palpation throughout the urogenital tract.  Musculoskeletal: No swelling, no deformity  Psychiatric: Cooperative, appropriate mood & " affect      DIAGNOSTIC STUDIES/PROCEDURES    LABS  Results for orders placed or performed during the hospital encounter of 02/17/20   CBC WITH DIFFERENTIAL   Result Value Ref Range    WBC 10.3 4.8 - 10.8 K/uL    RBC 4.88 4.70 - 6.10 M/uL    Hemoglobin 14.6 14.0 - 18.0 g/dL    Hematocrit 43.9 42.0 - 52.0 %    MCV 90.0 81.4 - 97.8 fL    MCH 29.9 27.0 - 33.0 pg    MCHC 33.3 (L) 33.7 - 35.3 g/dL    RDW 44.8 35.9 - 50.0 fL    Platelet Count 248 164 - 446 K/uL    MPV 10.6 9.0 - 12.9 fL    Neutrophils-Polys 73.80 (H) 44.00 - 72.00 %    Lymphocytes 15.40 (L) 22.00 - 41.00 %    Monocytes 7.30 0.00 - 13.40 %    Eosinophils 2.10 0.00 - 6.90 %    Basophils 0.70 0.00 - 1.80 %    Immature Granulocytes 0.70 0.00 - 0.90 %    Nucleated RBC 0.00 /100 WBC    Neutrophils (Absolute) 7.64 (H) 1.82 - 7.42 K/uL    Lymphs (Absolute) 1.59 1.00 - 4.80 K/uL    Monos (Absolute) 0.75 0.00 - 0.85 K/uL    Eos (Absolute) 0.22 0.00 - 0.51 K/uL    Baso (Absolute) 0.07 0.00 - 0.12 K/uL    Immature Granulocytes (abs) 0.07 0.00 - 0.11 K/uL    NRBC (Absolute) 0.00 K/uL   COMP METABOLIC PANEL   Result Value Ref Range    Sodium 137 135 - 145 mmol/L    Potassium 3.9 3.6 - 5.5 mmol/L    Chloride 98 96 - 112 mmol/L    Co2 25 20 - 33 mmol/L    Anion Gap 14.0 7.0 - 16.0    Glucose 279 (H) 65 - 99 mg/dL    Bun 18 8 - 22 mg/dL    Creatinine 0.77 0.50 - 1.40 mg/dL    Calcium 9.8 8.4 - 10.2 mg/dL    AST(SGOT) 28 12 - 45 U/L    ALT(SGPT) 37 2 - 50 U/L    Alkaline Phosphatase 62 30 - 99 U/L    Total Bilirubin 0.3 0.1 - 1.5 mg/dL    Albumin 3.9 3.2 - 4.9 g/dL    Total Protein 6.8 6.0 - 8.2 g/dL    Globulin 2.9 1.9 - 3.5 g/dL    A-G Ratio 1.3 g/dL   URINALYSIS CULTURE, IF INDICATED   Result Value Ref Range    Color Red     Character Cloudy (A)     Specific Gravity 1.025 <1.035    Ph 6.5 5.0 - 8.0    Glucose >=1000 (A) Negative mg/dL    Ketones Trace (A) Negative mg/dL    Protein >=300 (A) Negative mg/dL    Bilirubin Negative Negative    Nitrite Negative Negative     Leukocyte Esterase Negative Negative    Occult Blood Large (A) Negative    Micro Urine Req Microscopic    APTT   Result Value Ref Range    APTT 34.9 24.7 - 36.0 sec   PROTHROMBIN TIME (INR)   Result Value Ref Range    PT 15.9 (H) 12.0 - 14.6 sec    INR 1.25 (H) 0.87 - 1.13   URINE MICROSCOPIC (W/UA)   Result Value Ref Range    WBC 0-2 (A) /hpf    RBC >150 (A) /hpf    Bacteria Rare (A) None /hpf    Amorphous Crystal Present /hpf   ESTIMATED GFR   Result Value Ref Range    GFR If African American >60 >60 mL/min/1.73 m 2    GFR If Non African American >60 >60 mL/min/1.73 m 2     All labs reviewed by me.      RADIOLOGY  CT-RENAL COLIC EVALUATION(A/P W/O)   Final Result         1.  No acute abnormality.   2.  Right nephrolithiasis without visualized obstructive changes   3.  Indeterminate left adrenal nodule, follow-up with adrenal protocol CT or MRI as clinically appropriate for further characterization.   4.  Diverticulosis   5.  Cholelithiasis   6.  Cardiomegaly   7.  Fat-containing bilateral inguinal hernias   8.  Atherosclerosis and atherosclerotic coronary artery disease. Dense atherosclerotic changes in the bilateral proximal common femoral arteries, likely resulting in high-grade stenosis. Correlate with vascular exam. Could be further evaluated with    arterial sonography.   9.  Lingular pulmonary nodule, see nodule follow-up recommendations below.      Low Risk: CT at 6-12 months, then consider CT at 18-24 months      High Risk: CT at 6-12 months, then CT at 18-24 months      Low Risk - Minimal or absent history of smoking and of other known risk factors.      High Risk - History of smoking or of other known risk factors.      Note: These recommendations do not apply to lung cancer screening, patients with immunosuppression, or patients with known primary cancer.      Fleischner Society 2017 Guidelines for Management of Incidentally Detected Pulmonary Nodules in Adults           The radiologist's interpretation  of all radiological studies have been reviewed by me.    COURSE & MEDICAL DECISION MAKING  Pertinent Labs & Imaging studies reviewed. (See chart for details)    5:03 AM - Patient seen and examined at bedside. Patient will be treated with catheter replacement. Ordered Sher catheter change as well as urinalysis and culture, coags given the active bleeding, metabolic work-up to evaluate renal function, and CT imaging to evaluate for potential obstructive uropathy to evaluate his symptoms. The differential diagnoses include but are not limited to: Infectious cystitis, hematuria, obstructive uropathy, coagulopathy    0554: Awaiting CT at this time.    Decision Making:  This is a 89 y.o. year old male who presents with initially pain and then gross blood noted in Sher catheter bag without trauma while he was walking this morning.  Patient undergo Sher catheter change without incident.  Suspect likely infective cystitis given the patient's history, however think is reasonable to evaluate for potential coagulopathy as well as renal impairment and potential obstructive uropathy as well so laboratory analysis and CT imaging will be obtained.    Patient seen at change of shift, please see Dr. Bazan's addendum for final disposition    FINAL IMPRESSION  1. Urinary retention    2. Gross hematuria    3. Candidal balanitis          Deisy ROWLAND M.D. (Scribe), am scribing for, and in the presence of, Deisy Harrell MD.    Electronically signed by: Deisy Harrell M.D. (Scribe), 2/17/2020    Deisy ROWLAND MD personally performed the services described in this documentation, as scribed by Deisy Harrell M.D. in my presence, and it is both accurate and complete    The note accurately reflects work and decisions made by me.  Deisy Harrell M.D.  2/17/2020  5:54 AM    0 700 addendum by Dr. Davion Shah    Please see below findings for labs and imaging as was initiated by my  colleague Dr. Ugalde.  I have had bedside conversation with the patient in regards to the findings therein.  This includes the findings of both the adrenal and pulmonary nodules and need for follow-up.  In the meantime he is feeling significantly better after Sher catheter placement and bladder irrigation.  He does have follow-up with urology tomorrow which I believe is appropriate.  He is understanding of ongoing catheter care and return precautions if needed especially if he is unable to void even with the catheter in place as was the case this morning.  At this point I do not find any abnormalities that would otherwise need further emergent or inpatient care follow-up.    Results for orders placed or performed during the hospital encounter of 02/17/20   CBC WITH DIFFERENTIAL   Result Value Ref Range    WBC 10.3 4.8 - 10.8 K/uL    RBC 4.88 4.70 - 6.10 M/uL    Hemoglobin 14.6 14.0 - 18.0 g/dL    Hematocrit 43.9 42.0 - 52.0 %    MCV 90.0 81.4 - 97.8 fL    MCH 29.9 27.0 - 33.0 pg    MCHC 33.3 (L) 33.7 - 35.3 g/dL    RDW 44.8 35.9 - 50.0 fL    Platelet Count 248 164 - 446 K/uL    MPV 10.6 9.0 - 12.9 fL    Neutrophils-Polys 73.80 (H) 44.00 - 72.00 %    Lymphocytes 15.40 (L) 22.00 - 41.00 %    Monocytes 7.30 0.00 - 13.40 %    Eosinophils 2.10 0.00 - 6.90 %    Basophils 0.70 0.00 - 1.80 %    Immature Granulocytes 0.70 0.00 - 0.90 %    Nucleated RBC 0.00 /100 WBC    Neutrophils (Absolute) 7.64 (H) 1.82 - 7.42 K/uL    Lymphs (Absolute) 1.59 1.00 - 4.80 K/uL    Monos (Absolute) 0.75 0.00 - 0.85 K/uL    Eos (Absolute) 0.22 0.00 - 0.51 K/uL    Baso (Absolute) 0.07 0.00 - 0.12 K/uL    Immature Granulocytes (abs) 0.07 0.00 - 0.11 K/uL    NRBC (Absolute) 0.00 K/uL   COMP METABOLIC PANEL   Result Value Ref Range    Sodium 137 135 - 145 mmol/L    Potassium 3.9 3.6 - 5.5 mmol/L    Chloride 98 96 - 112 mmol/L    Co2 25 20 - 33 mmol/L    Anion Gap 14.0 7.0 - 16.0    Glucose 279 (H) 65 - 99 mg/dL    Bun 18 8 - 22 mg/dL    Creatinine  0.77 0.50 - 1.40 mg/dL    Calcium 9.8 8.4 - 10.2 mg/dL    AST(SGOT) 28 12 - 45 U/L    ALT(SGPT) 37 2 - 50 U/L    Alkaline Phosphatase 62 30 - 99 U/L    Total Bilirubin 0.3 0.1 - 1.5 mg/dL    Albumin 3.9 3.2 - 4.9 g/dL    Total Protein 6.8 6.0 - 8.2 g/dL    Globulin 2.9 1.9 - 3.5 g/dL    A-G Ratio 1.3 g/dL   URINALYSIS CULTURE, IF INDICATED   Result Value Ref Range    Color Red     Character Cloudy (A)     Specific Gravity 1.025 <1.035    Ph 6.5 5.0 - 8.0    Glucose >=1000 (A) Negative mg/dL    Ketones Trace (A) Negative mg/dL    Protein >=300 (A) Negative mg/dL    Bilirubin Negative Negative    Nitrite Negative Negative    Leukocyte Esterase Negative Negative    Occult Blood Large (A) Negative    Micro Urine Req Microscopic    APTT   Result Value Ref Range    APTT 34.9 24.7 - 36.0 sec   PROTHROMBIN TIME (INR)   Result Value Ref Range    PT 15.9 (H) 12.0 - 14.6 sec    INR 1.25 (H) 0.87 - 1.13   URINE MICROSCOPIC (W/UA)   Result Value Ref Range    WBC 0-2 (A) /hpf    RBC >150 (A) /hpf    Bacteria Rare (A) None /hpf    Amorphous Crystal Present /hpf   ESTIMATED GFR   Result Value Ref Range    GFR If African American >60 >60 mL/min/1.73 m 2    GFR If Non African American >60 >60 mL/min/1.73 m 2     CT-RENAL COLIC EVALUATION(A/P W/O)   Final Result         1.  No acute abnormality.   2.  Right nephrolithiasis without visualized obstructive changes   3.  Indeterminate left adrenal nodule, follow-up with adrenal protocol CT or MRI as clinically appropriate for further characterization.   4.  Diverticulosis   5.  Cholelithiasis   6.  Cardiomegaly   7.  Fat-containing bilateral inguinal hernias   8.  Atherosclerosis and atherosclerotic coronary artery disease. Dense atherosclerotic changes in the bilateral proximal common femoral arteries, likely resulting in high-grade stenosis. Correlate with vascular exam. Could be further evaluated with    arterial sonography.   9.  Lingular pulmonary nodule, see nodule follow-up  recommendations below.      Low Risk: CT at 6-12 months, then consider CT at 18-24 months      High Risk: CT at 6-12 months, then CT at 18-24 months      Low Risk - Minimal or absent history of smoking and of other known risk factors.      High Risk - History of smoking or of other known risk factors.      Note: These recommendations do not apply to lung cancer screening, patients with immunosuppression, or patients with known primary cancer.      Fleischner Society 2017 Guidelines for Management of Incidentally Detected Pulmonary Nodules in Adults

## 2020-02-17 NOTE — ED NOTES
850ml Sterile NS manually irrigated with piston syringe through new wilson catheter. Initial return was grossly bloody. At end of irrigation, return was pink/blood-tinged with scant blood clots. EDP aware.

## 2020-02-18 PROBLEM — R31.0 GROSS HEMATURIA: Status: ACTIVE | Noted: 2020-02-18

## 2020-02-18 PROBLEM — N48.1 BALANITIS: Status: ACTIVE | Noted: 2020-02-18

## 2020-02-18 LAB
ALBUMIN SERPL BCP-MCNC: 3.7 G/DL (ref 3.2–4.9)
ALBUMIN/GLOB SERPL: 1.3 G/DL
ALP SERPL-CCNC: 66 U/L (ref 30–99)
ALT SERPL-CCNC: 35 U/L (ref 2–50)
ANION GAP SERPL CALC-SCNC: 14 MMOL/L (ref 7–16)
APTT PPP: 40.5 SEC (ref 24.7–36)
AST SERPL-CCNC: 40 U/L (ref 12–45)
BASOPHILS # BLD AUTO: 0.7 % (ref 0–1.8)
BASOPHILS # BLD: 0.06 K/UL (ref 0–0.12)
BILIRUB SERPL-MCNC: 0.3 MG/DL (ref 0.1–1.5)
BUN SERPL-MCNC: 21 MG/DL (ref 8–22)
CALCIUM SERPL-MCNC: 9.2 MG/DL (ref 8.4–10.2)
CHLORIDE SERPL-SCNC: 94 MMOL/L (ref 96–112)
CO2 SERPL-SCNC: 22 MMOL/L (ref 20–33)
CREAT SERPL-MCNC: 0.73 MG/DL (ref 0.5–1.4)
EOSINOPHIL # BLD AUTO: 0.21 K/UL (ref 0–0.51)
EOSINOPHIL NFR BLD: 2.5 % (ref 0–6.9)
ERYTHROCYTE [DISTWIDTH] IN BLOOD BY AUTOMATED COUNT: 44.7 FL (ref 35.9–50)
GLOBULIN SER CALC-MCNC: 2.9 G/DL (ref 1.9–3.5)
GLUCOSE BLD-MCNC: 253 MG/DL (ref 65–99)
GLUCOSE BLD-MCNC: 256 MG/DL (ref 65–99)
GLUCOSE BLD-MCNC: 286 MG/DL (ref 65–99)
GLUCOSE SERPL-MCNC: 353 MG/DL (ref 65–99)
HCT VFR BLD AUTO: 42 % (ref 42–52)
HGB BLD-MCNC: 14 G/DL (ref 14–18)
IMM GRANULOCYTES # BLD AUTO: 0.08 K/UL (ref 0–0.11)
IMM GRANULOCYTES NFR BLD AUTO: 0.9 % (ref 0–0.9)
INR PPP: 1.7 (ref 0.87–1.13)
LYMPHOCYTES # BLD AUTO: 2.06 K/UL (ref 1–4.8)
LYMPHOCYTES NFR BLD: 24.4 % (ref 22–41)
MCH RBC QN AUTO: 29.9 PG (ref 27–33)
MCHC RBC AUTO-ENTMCNC: 33.3 G/DL (ref 33.7–35.3)
MCV RBC AUTO: 89.7 FL (ref 81.4–97.8)
MONOCYTES # BLD AUTO: 0.77 K/UL (ref 0–0.85)
MONOCYTES NFR BLD AUTO: 9.1 % (ref 0–13.4)
NEUTROPHILS # BLD AUTO: 5.27 K/UL (ref 1.82–7.42)
NEUTROPHILS NFR BLD: 62.4 % (ref 44–72)
NRBC # BLD AUTO: 0 K/UL
NRBC BLD-RTO: 0 /100 WBC
PLATELET # BLD AUTO: 261 K/UL (ref 164–446)
PMV BLD AUTO: 11 FL (ref 9–12.9)
POTASSIUM SERPL-SCNC: 4.4 MMOL/L (ref 3.6–5.5)
PROT SERPL-MCNC: 6.6 G/DL (ref 6–8.2)
PROTHROMBIN TIME: 20.3 SEC (ref 12–14.6)
RBC # BLD AUTO: 4.68 M/UL (ref 4.7–6.1)
SODIUM SERPL-SCNC: 130 MMOL/L (ref 135–145)
WBC # BLD AUTO: 8.5 K/UL (ref 4.8–10.8)

## 2020-02-18 PROCEDURE — 770006 HCHG ROOM/CARE - MED/SURG/GYN SEMI*

## 2020-02-18 PROCEDURE — A9270 NON-COVERED ITEM OR SERVICE: HCPCS | Performed by: INTERNAL MEDICINE

## 2020-02-18 PROCEDURE — 700102 HCHG RX REV CODE 250 W/ 637 OVERRIDE(OP): Performed by: INTERNAL MEDICINE

## 2020-02-18 PROCEDURE — A9270 NON-COVERED ITEM OR SERVICE: HCPCS | Performed by: HOSPITALIST

## 2020-02-18 PROCEDURE — 700102 HCHG RX REV CODE 250 W/ 637 OVERRIDE(OP): Performed by: HOSPITALIST

## 2020-02-18 PROCEDURE — 80053 COMPREHEN METABOLIC PANEL: CPT

## 2020-02-18 PROCEDURE — 99221 1ST HOSP IP/OBS SF/LOW 40: CPT | Mod: AI | Performed by: HOSPITALIST

## 2020-02-18 PROCEDURE — 85730 THROMBOPLASTIN TIME PARTIAL: CPT

## 2020-02-18 PROCEDURE — 700105 HCHG RX REV CODE 258: Performed by: EMERGENCY MEDICINE

## 2020-02-18 PROCEDURE — 82962 GLUCOSE BLOOD TEST: CPT

## 2020-02-18 PROCEDURE — 665998 HH PPS REVENUE CREDIT

## 2020-02-18 PROCEDURE — 85025 COMPLETE CBC W/AUTO DIFF WBC: CPT

## 2020-02-18 PROCEDURE — 665999 HH PPS REVENUE DEBIT

## 2020-02-18 PROCEDURE — 85610 PROTHROMBIN TIME: CPT

## 2020-02-18 RX ORDER — ACETAMINOPHEN 325 MG/1
650 TABLET ORAL EVERY 6 HOURS PRN
Status: DISCONTINUED | OUTPATIENT
Start: 2020-02-18 | End: 2020-02-25 | Stop reason: HOSPADM

## 2020-02-18 RX ORDER — BISACODYL 10 MG
10 SUPPOSITORY, RECTAL RECTAL
Status: DISCONTINUED | OUTPATIENT
Start: 2020-02-18 | End: 2020-02-25 | Stop reason: HOSPADM

## 2020-02-18 RX ORDER — POLYETHYLENE GLYCOL 3350 17 G/17G
1 POWDER, FOR SOLUTION ORAL
Status: DISCONTINUED | OUTPATIENT
Start: 2020-02-18 | End: 2020-02-25 | Stop reason: HOSPADM

## 2020-02-18 RX ORDER — SIMVASTATIN 10 MG
10 TABLET ORAL NIGHTLY
Status: DISCONTINUED | OUTPATIENT
Start: 2020-02-18 | End: 2020-02-25 | Stop reason: HOSPADM

## 2020-02-18 RX ORDER — ONDANSETRON 4 MG/1
4 TABLET, ORALLY DISINTEGRATING ORAL EVERY 4 HOURS PRN
Status: DISCONTINUED | OUTPATIENT
Start: 2020-02-18 | End: 2020-02-25 | Stop reason: HOSPADM

## 2020-02-18 RX ORDER — SODIUM CHLORIDE 9 MG/ML
1000 INJECTION, SOLUTION INTRAVENOUS ONCE
Status: COMPLETED | OUTPATIENT
Start: 2020-02-18 | End: 2020-02-18

## 2020-02-18 RX ORDER — CHLORTHALIDONE 25 MG/1
25 TABLET ORAL 2 TIMES DAILY
Status: DISCONTINUED | OUTPATIENT
Start: 2020-02-18 | End: 2020-02-22

## 2020-02-18 RX ORDER — CIPROFLOXACIN 500 MG/1
500 TABLET, FILM COATED ORAL 2 TIMES DAILY
Status: ON HOLD | COMMUNITY
Start: 2020-02-17 | End: 2020-02-24

## 2020-02-18 RX ORDER — INSULIN GLARGINE 100 [IU]/ML
46 INJECTION, SOLUTION SUBCUTANEOUS EVERY EVENING
Status: DISCONTINUED | OUTPATIENT
Start: 2020-02-18 | End: 2020-02-25 | Stop reason: HOSPADM

## 2020-02-18 RX ORDER — NYSTATIN 100000 U/G
CREAM TOPICAL 2 TIMES DAILY
Status: DISCONTINUED | OUTPATIENT
Start: 2020-02-18 | End: 2020-02-25 | Stop reason: HOSPADM

## 2020-02-18 RX ORDER — AMOXICILLIN 250 MG
2 CAPSULE ORAL 2 TIMES DAILY
Status: DISCONTINUED | OUTPATIENT
Start: 2020-02-18 | End: 2020-02-25 | Stop reason: HOSPADM

## 2020-02-18 RX ORDER — POTASSIUM CHLORIDE 20 MEQ/1
10 TABLET, EXTENDED RELEASE ORAL 2 TIMES DAILY
Status: DISCONTINUED | OUTPATIENT
Start: 2020-02-18 | End: 2020-02-25 | Stop reason: HOSPADM

## 2020-02-18 RX ORDER — GABAPENTIN 100 MG/1
100 CAPSULE ORAL 2 TIMES DAILY
Status: DISCONTINUED | OUTPATIENT
Start: 2020-02-18 | End: 2020-02-21

## 2020-02-18 RX ORDER — OLANZAPINE 5 MG/1
2.5 TABLET, ORALLY DISINTEGRATING ORAL EVERY EVENING
Status: DISCONTINUED | OUTPATIENT
Start: 2020-02-18 | End: 2020-02-25 | Stop reason: HOSPADM

## 2020-02-18 RX ORDER — ONDANSETRON 2 MG/ML
4 INJECTION INTRAMUSCULAR; INTRAVENOUS EVERY 4 HOURS PRN
Status: DISCONTINUED | OUTPATIENT
Start: 2020-02-18 | End: 2020-02-25 | Stop reason: HOSPADM

## 2020-02-18 RX ORDER — TAMSULOSIN HYDROCHLORIDE 0.4 MG/1
0.4 CAPSULE ORAL 2 TIMES DAILY
Status: DISCONTINUED | OUTPATIENT
Start: 2020-02-18 | End: 2020-02-25 | Stop reason: HOSPADM

## 2020-02-18 RX ADMIN — INSULIN HUMAN 7 UNITS: 100 INJECTION, SOLUTION PARENTERAL at 17:49

## 2020-02-18 RX ADMIN — OLANZAPINE 2.5 MG: 5 TABLET, ORALLY DISINTEGRATING ORAL at 17:48

## 2020-02-18 RX ADMIN — TAMSULOSIN HYDROCHLORIDE 0.4 MG: 0.4 CAPSULE ORAL at 17:47

## 2020-02-18 RX ADMIN — SIMVASTATIN 10 MG: 10 TABLET, FILM COATED ORAL at 04:07

## 2020-02-18 RX ADMIN — INSULIN GLARGINE 46 UNITS: 100 INJECTION, SOLUTION SUBCUTANEOUS at 17:50

## 2020-02-18 RX ADMIN — OLANZAPINE 2.5 MG: 5 TABLET, ORALLY DISINTEGRATING ORAL at 04:01

## 2020-02-18 RX ADMIN — ACETAMINOPHEN 650 MG: 325 TABLET, FILM COATED ORAL at 21:16

## 2020-02-18 RX ADMIN — POTASSIUM CHLORIDE 10 MEQ: 20 TABLET, EXTENDED RELEASE ORAL at 17:47

## 2020-02-18 RX ADMIN — GABAPENTIN 100 MG: 100 CAPSULE ORAL at 17:48

## 2020-02-18 RX ADMIN — ASPIRIN 81 MG: 81 TABLET, COATED ORAL at 17:49

## 2020-02-18 RX ADMIN — POTASSIUM CHLORIDE 10 MEQ: 20 TABLET, EXTENDED RELEASE ORAL at 05:19

## 2020-02-18 RX ADMIN — SIMVASTATIN 10 MG: 10 TABLET, FILM COATED ORAL at 21:02

## 2020-02-18 RX ADMIN — TAMSULOSIN HYDROCHLORIDE 0.4 MG: 0.4 CAPSULE ORAL at 05:21

## 2020-02-18 RX ADMIN — SENNOSIDES AND DOCUSATE SODIUM 2 TABLET: 8.6; 5 TABLET ORAL at 17:47

## 2020-02-18 RX ADMIN — GABAPENTIN 100 MG: 100 CAPSULE ORAL at 05:20

## 2020-02-18 RX ADMIN — METOPROLOL TARTRATE 25 MG: 25 TABLET ORAL at 05:20

## 2020-02-18 RX ADMIN — METOPROLOL TARTRATE 25 MG: 25 TABLET ORAL at 18:40

## 2020-02-18 RX ADMIN — SODIUM CHLORIDE 1000 ML: 9 INJECTION, SOLUTION INTRAVENOUS at 01:29

## 2020-02-18 RX ADMIN — NYSTATIN: 100000 CREAM TOPICAL at 21:54

## 2020-02-18 RX ADMIN — CHLORTHALIDONE 25 MG: 25 TABLET ORAL at 05:21

## 2020-02-18 RX ADMIN — METFORMIN HYDROCHLORIDE 500 MG: 500 TABLET ORAL at 08:04

## 2020-02-18 RX ADMIN — INSULIN HUMAN 7 UNITS: 100 INJECTION, SOLUTION PARENTERAL at 06:38

## 2020-02-18 RX ADMIN — METFORMIN HYDROCHLORIDE 500 MG: 500 TABLET ORAL at 17:48

## 2020-02-18 RX ADMIN — INSULIN HUMAN 7 UNITS: 100 INJECTION, SOLUTION PARENTERAL at 21:06

## 2020-02-18 RX ADMIN — CHLORTHALIDONE 25 MG: 25 TABLET ORAL at 17:48

## 2020-02-18 ASSESSMENT — LIFESTYLE VARIABLES
CONSUMPTION TOTAL: NEGATIVE
EVER HAD A DRINK FIRST THING IN THE MORNING TO STEADY YOUR NERVES TO GET RID OF A HANGOVER: NO
HOW MANY TIMES IN THE PAST YEAR HAVE YOU HAD 5 OR MORE DRINKS IN A DAY: 0
TOTAL SCORE: 0
ON A TYPICAL DAY WHEN YOU DRINK ALCOHOL HOW MANY DRINKS DO YOU HAVE: 0
EVER_SMOKED: YES
TOTAL SCORE: 0
HAVE PEOPLE ANNOYED YOU BY CRITICIZING YOUR DRINKING: NO
HAVE YOU EVER FELT YOU SHOULD CUT DOWN ON YOUR DRINKING: NO
TOTAL SCORE: 0
AVERAGE NUMBER OF DAYS PER WEEK YOU HAVE A DRINK CONTAINING ALCOHOL: 0
ALCOHOL_USE: NO
EVER FELT BAD OR GUILTY ABOUT YOUR DRINKING: NO

## 2020-02-18 ASSESSMENT — ENCOUNTER SYMPTOMS
FEVER: 0
NERVOUS/ANXIOUS: 0
LOSS OF CONSCIOUSNESS: 0
VOMITING: 0
ABDOMINAL PAIN: 0
MUSCULOSKELETAL NEGATIVE: 1
NAUSEA: 0
CARDIOVASCULAR NEGATIVE: 1
COUGH: 0
DEPRESSION: 0
DIZZINESS: 0
NEUROLOGICAL NEGATIVE: 1
CONSTITUTIONAL NEGATIVE: 1
PSYCHIATRIC NEGATIVE: 1
CHILLS: 0
RESPIRATORY NEGATIVE: 1
BACK PAIN: 0
SHORTNESS OF BREATH: 0
BRUISES/BLEEDS EASILY: 0
GASTROINTESTINAL NEGATIVE: 1
MYALGIAS: 0
WEIGHT LOSS: 0
FLANK PAIN: 0
WEAKNESS: 0

## 2020-02-18 ASSESSMENT — COGNITIVE AND FUNCTIONAL STATUS - GENERAL
MOVING TO AND FROM BED TO CHAIR: A LITTLE
HELP NEEDED FOR BATHING: A LITTLE
DRESSING REGULAR LOWER BODY CLOTHING: A LOT
CLIMB 3 TO 5 STEPS WITH RAILING: A LITTLE
DRESSING REGULAR UPPER BODY CLOTHING: A LITTLE
SUGGESTED CMS G CODE MODIFIER DAILY ACTIVITY: CK
MOBILITY SCORE: 18
SUGGESTED CMS G CODE MODIFIER MOBILITY: CK
WALKING IN HOSPITAL ROOM: A LITTLE
TOILETING: A LOT
TURNING FROM BACK TO SIDE WHILE IN FLAT BAD: A LITTLE
DAILY ACTIVITIY SCORE: 18
STANDING UP FROM CHAIR USING ARMS: A LITTLE
MOVING FROM LYING ON BACK TO SITTING ON SIDE OF FLAT BED: A LITTLE

## 2020-02-18 ASSESSMENT — COPD QUESTIONNAIRES
IN THE PAST 12 MONTHS DO YOU DO LESS THAN YOU USED TO BECAUSE OF YOUR BREATHING PROBLEMS: DISAGREE/UNSURE
DURING THE PAST 4 WEEKS HOW MUCH DID YOU FEEL SHORT OF BREATH: NONE/LITTLE OF THE TIME
DO YOU EVER COUGH UP ANY MUCUS OR PHLEGM?: NO/ONLY WITH OCCASIONAL COLDS OR INFECTIONS
HAVE YOU SMOKED AT LEAST 100 CIGARETTES IN YOUR ENTIRE LIFE: YES
COPD SCREENING SCORE: 4

## 2020-02-18 ASSESSMENT — PATIENT HEALTH QUESTIONNAIRE - PHQ9
1. LITTLE INTEREST OR PLEASURE IN DOING THINGS: NOT AT ALL
2. FEELING DOWN, DEPRESSED, IRRITABLE, OR HOPELESS: NOT AT ALL
SUM OF ALL RESPONSES TO PHQ9 QUESTIONS 1 AND 2: 0

## 2020-02-18 NOTE — ED PROVIDER NOTES
ED Provider Note    ED Provider Note    Scribed for Deisy Harrell MD by Diesy Harrell M.D.. 2/18/2020, 12:27 AM.    Primary care provider: JONO Powell  Means of arrival: EMS  History obtained from: Patient  History limited by: None    CHIEF COMPLAINT  Hematuria    HPI  Julien Dao is a 89 y.o. male who presents to the Emergency Department for reevaluation of worsening hematuria.  Patient was seen at this facility early yesterday morning.  At that point he noticed initial pain in the suprapubic and penile region followed by a large amounts of blood in his Sher catheter bag.  This was placed on the eighth for urinary retention.  Plan is for follow-up with urology, he has recently completed antibiotics, about 2 days ago for acute cystitis.  Patient is anticoagulated on Xarelto.  No acute trauma, patient relates bladder had been briefly irrigated in the ED yesterday morning, had been feeling better and discharged.  However gradually gross hematuria with what appears to be clots noted in Sher catheter bag getting worse throughout the evening.  Patient initially relates with regard to penile pain and swelling of the shaft as well as the foreskin and glans this has been getting gradually worse throughout the day as well.  Very tender to palpation especially on the glans penis.  There is both blood around the Sher catheter at the urethral meatus as well as in the Sher catheter bag.  He has no flank pain, no fever, no vomiting.  He is feeling somewhat lightheaded has had no syncope.    REVIEW OF SYSTEMS  Pertinent positives include worsening hematuria, lightheaded sensation, penile pain and swelling. Pertinent negatives include no fever, no vomiting, no syncope.  All other systems reviewed and negative.    PAST MEDICAL HISTORY   has a past medical history of CAD (coronary artery disease), DM (diabetes mellitus) ([facility]) (1/7/2014), Falls, Hypertension, and UTI (urinary tract infection)  "(2018).    SURGICAL HISTORY   has a past surgical history that includes other cardiac surgery; coronary artery bypass, 3; and janice rectal abscess.    SOCIAL HISTORY  Social History     Tobacco Use   • Smoking status: Former Smoker     Last attempt to quit: 10/19/1972     Years since quittin.3   • Smokeless tobacco: Never Used   Substance Use Topics   • Alcohol use: No   • Drug use: No      Social History     Substance and Sexual Activity   Drug Use No       FAMILY HISTORY  No family history on file.  Noncontributory given age    CURRENT MEDICATIONS  Home Medications    **Home medications have not yet been reviewed for this encounter**         ALLERGIES  Allergies   Allergen Reactions   • Iodine      Pt and pts wife report that it has been so long not sure what happens       PHYSICAL EXAM  VITAL SIGNS: /78   Pulse 78   Temp 36.8 °C (98.3 °F) (Oral)   Resp 16   Ht 1.702 m (5' 7\")   Wt 79.4 kg (175 lb)   SpO2 96%   BMI 27.41 kg/m²     General: Alert, mild acute distress  Skin: Warm, dry, mildly pale  Head: Normocephalic, atraumatic  Neck: Trachea midline, no tenderness  Eye: PERRL, normal conjunctiva  ENMT: Oral mucosa pink and dry, no pharyngeal erythema or exudate  Cardiovascular: Regular rate and rhythm, No murmur, Normal peripheral perfusion  Respiratory: Lungs CTA, respirations are non-labored, breath sounds are equal  Gastrointestinal: Soft, nontender, non distended.   exam demonstrates moderate to severe swelling along the distal aspect of the penile shaft as well as the foreskin and glans consistent with balanoposthitis.  He has erythema, no carlton fluctuance or exudate.  There is a large amount of blood around the urethral meatus at the catheter site, carlton blood approximately 300 cc is appreciated in Sher catheter bag.  Musculoskeletal: No swelling, no deformity  Neurological: Alert and oriented to person, place, time, and situation  Psychiatric: Cooperative, somewhat flat " affect.      DIAGNOSTIC STUDIES/PROCEDURES    LABS  Results for orders placed or performed during the hospital encounter of 02/17/20   CBC WITH DIFFERENTIAL   Result Value Ref Range    WBC 8.5 4.8 - 10.8 K/uL    RBC 4.68 (L) 4.70 - 6.10 M/uL    Hemoglobin 14.0 14.0 - 18.0 g/dL    Hematocrit 42.0 42.0 - 52.0 %    MCV 89.7 81.4 - 97.8 fL    MCH 29.9 27.0 - 33.0 pg    MCHC 33.3 (L) 33.7 - 35.3 g/dL    RDW 44.7 35.9 - 50.0 fL    Platelet Count 261 164 - 446 K/uL    MPV 11.0 9.0 - 12.9 fL    Neutrophils-Polys 62.40 44.00 - 72.00 %    Lymphocytes 24.40 22.00 - 41.00 %    Monocytes 9.10 0.00 - 13.40 %    Eosinophils 2.50 0.00 - 6.90 %    Basophils 0.70 0.00 - 1.80 %    Immature Granulocytes 0.90 0.00 - 0.90 %    Nucleated RBC 0.00 /100 WBC    Neutrophils (Absolute) 5.27 1.82 - 7.42 K/uL    Lymphs (Absolute) 2.06 1.00 - 4.80 K/uL    Monos (Absolute) 0.77 0.00 - 0.85 K/uL    Eos (Absolute) 0.21 0.00 - 0.51 K/uL    Baso (Absolute) 0.06 0.00 - 0.12 K/uL    Immature Granulocytes (abs) 0.08 0.00 - 0.11 K/uL    NRBC (Absolute) 0.00 K/uL   COMP METABOLIC PANEL   Result Value Ref Range    Sodium 130 (L) 135 - 145 mmol/L    Potassium 4.4 3.6 - 5.5 mmol/L    Chloride 94 (L) 96 - 112 mmol/L    Co2 22 20 - 33 mmol/L    Anion Gap 14.0 7.0 - 16.0    Glucose 353 (H) 65 - 99 mg/dL    Bun 21 8 - 22 mg/dL    Creatinine 0.73 0.50 - 1.40 mg/dL    Calcium 9.2 8.4 - 10.2 mg/dL    AST(SGOT) 40 12 - 45 U/L    ALT(SGPT) 35 2 - 50 U/L    Alkaline Phosphatase 66 30 - 99 U/L    Total Bilirubin 0.3 0.1 - 1.5 mg/dL    Albumin 3.7 3.2 - 4.9 g/dL    Total Protein 6.6 6.0 - 8.2 g/dL    Globulin 2.9 1.9 - 3.5 g/dL    A-G Ratio 1.3 g/dL   PROTHROMBIN TIME (INR)   Result Value Ref Range    PT 20.3 (H) 12.0 - 14.6 sec    INR 1.70 (H) 0.87 - 1.13   APTT   Result Value Ref Range    APTT 40.5 (H) 24.7 - 36.0 sec   ESTIMATED GFR   Result Value Ref Range    GFR If African American >60 >60 mL/min/1.73 m 2    GFR If Non African American >60 >60 mL/min/1.73 m 2  "    All labs reviewed by me, hyperglycemic, H&H only mildly depressed from previous..    RADIOLOGY  Reviewed CT of the abdomen and pelvis without contrast from yesterday, no acute process.    The radiologist's interpretation of all radiological studies have been reviewed by me.    COURSE & MEDICAL DECISION MAKING  Pertinent Labs & Imaging studies reviewed. (See chart for details)    12:27 AM - Patient seen and examined at bedside. Patient will be treated with constant bladder irrigation and fluid resuscitation. Ordered metabolic work-up including coag to evaluate his symptoms. The differential diagnoses include but are not limited to: Hemorrhagic cystitis, coagulopathy, symptomatic anemia    0000: Heart rate unchanged with IV fluids.  Patient tolerating bladder irrigation well.  I spoke with hospitalist who concurs with plan and accepts the patient to her service.    Patient Vitals for the past 24 hrs:   BP Temp Temp src Pulse Resp SpO2 Height Weight   02/18/20 0000 136/78 -- -- 78 16 96 % -- --   02/17/20 2358 136/78 36.8 °C (98.3 °F) Oral 78 16 92 % 1.702 m (5' 7\") 79.4 kg (175 lb)     HYDRATION: Based on the patient's presentation of Dehydration and Other Significant hematuria the patient was given IV fluids. IV Hydration was used because oral hydration was not as rapid as required. Upon recheck following hydration, the patient was Doing well but heart rate essentially unchanged.    Decision Making:  This is a 89 y.o. year old male who presents with worsening gross hematuria from both urethra and around his Sher catheter as well as in the bag.  Patient was seen at this facility yesterday morning have been doing better after some brief irrigation through a Sher catheter.  Symptoms are far worse tonight as his his evidence of balanoposthitis on the genitourinary exam.  Patient is feeling generally weak and does appear to be pale, I will recheck his CBC and coags as I am concerned for symptomatic anemia given the " extent of the bleeding.  Given failure of appropriate outpatient management I think constant bladder irrigation is the next course of action at this point    Patient admitted improved but guarded condition with care of the hospitalist Dr. Holly.    FINAL IMPRESSION  1. Gross hematuria    2. Balanoposthitis    3. Dizziness    4. Acute hemorrhagic cystitis    5. Type 2 diabetes mellitus with hyperglycemia, unspecified whether long term insulin use (HCC)          Deisy ROWLAND M.D. (Lucinda), am scribing for, and in the presence of, Deisy Harrell MD.    Electronically signed by: Deisy Harrell M.D. (Lucinda), 2/18/2020    IDeisy MD personally performed the services described in this documentation, as scribed by Deisy Harrell M.D. in my presence, and it is both accurate and complete    The note accurately reflects work and decisions made by me.  Deisy Harrell M.D.  2/18/2020  2:21 AM

## 2020-02-18 NOTE — ED TRIAGE NOTES
Pt c/o bleeding from urinary catheter. Catheter placed 10 days. Here yesterday for same complaint. Denies any pain

## 2020-02-18 NOTE — DISCHARGE PLANNING
Patient needs to clarify at [place] if he would want to go home with [facility] or [facility]. Per Kirkpatrick patient has been on service with them since 1/2020.    Thank you

## 2020-02-18 NOTE — ED NOTES
Med rec updated and complete  Allergies reviewed  Pt is not sure what medications he is taking, asked for me to call Mercedes.  Called Mercedes (caregiver) @ [phone], went over all prescription medications and over the counter medications.

## 2020-02-18 NOTE — ASSESSMENT & PLAN NOTE
With hyperglycemia, 353 on admit  Continue Lantus 46 units at bedtime, continue metformin 500 mg twice daily.  Diabetic diet, Accu-Cheks before meals and at bedtime.  Sliding scale insulin 3-14 units.

## 2020-02-18 NOTE — ASSESSMENT & PLAN NOTE
With urinary obstruction and retention.  Status post Sher insertion which relieved the obstruction but now with hematuria.  Continue tamsulosin

## 2020-02-18 NOTE — ASSESSMENT & PLAN NOTE
With clotting and Sher catheter obstruction, now resolved with irrigation  Three-way catheter placed, CBI now stopped  Urology seen, signed off, will follow-up with patient outpatient  Stop Xarelto, agree that patient may not be a good candidate for further anticoagulation

## 2020-02-18 NOTE — PROGRESS NOTES
Attending Hospitalist today is Dr Smith starting at 0700. Please call this Physician for orders, updates and questions.

## 2020-02-18 NOTE — H&P
Hospital Medicine History & Physical Note    Date of Service  2/18/2020    Primary Care Physician  TABITHA Powell.    Consultants  Urology (pending)    Code Status  Full    Chief Complaint  Bloody urine.    History of Presenting Illness  89 y.o. male who presented 2/17/2020 with gross hematuria.  Yesterday he was seen in the emergency department for urinary retention and a Sher catheter was placed.  He was sent home but then returns today with gross hematuria and clots in the catheter.  Patient is on Xarelto due to atrial fibrillation.  He also has inflammation around the penile head.    I discussed the presenting symptoms, physical examination, lab and radiological study results with the emergency department physician.      Review of Systems  Review of Systems   Constitutional: Negative.  Negative for chills, fever, malaise/fatigue and weight loss.   HENT: Negative.    Respiratory: Negative.  Negative for cough and shortness of breath.    Cardiovascular: Negative.  Negative for chest pain and leg swelling.   Gastrointestinal: Negative.  Negative for abdominal pain, nausea and vomiting.   Genitourinary: Positive for hematuria. Negative for dysuria, flank pain, frequency and urgency.   Musculoskeletal: Negative.  Negative for back pain and myalgias.   Neurological: Negative.  Negative for dizziness, loss of consciousness and weakness.   Endo/Heme/Allergies: Negative.  Does not bruise/bleed easily.   Psychiatric/Behavioral: Negative.  Negative for depression. The patient is not nervous/anxious.    All other systems reviewed and are negative.      Past Medical History   has a past medical history of CAD (coronary artery disease), DM (diabetes mellitus) ([facility]) (1/7/2014), Falls, Hypertension, and UTI (urinary tract infection) (11/4/2018). He also has no past medical history of ASTHMA, Cancer ([facility]), Congestive heart failure ([facility]), COPD, Liver disease, Psychiatric disorder, Renal disorder, Seizure disorder ([facility]),  or Stroke (HCC).    Surgical History   has a past surgical history that includes other cardiac surgery; coronary artery bypass, 3; and janice rectal abscess.     Family History  family history is not on file.     Social History   reports that he quit smoking about 47 years ago. He has never used smokeless tobacco. He reports that he does not drink alcohol or use drugs.    Allergies  Allergies   Allergen Reactions   • Iodine      Pt and pts wife report that it has been so long not sure what happens       Medications  Prior to Admission Medications   Prescriptions Last Dose Informant Patient Reported? Taking?   Insulin Glargine (BASAGLAR KWIKPEN) 100 UNIT/ML Solution Pen-injector  Patient Yes No   Sig: Inject 46 Units as instructed every evening.   Multiple Vitamins-Minerals (PX MENS MULTIVITAMINS) Tab  Patient Yes No   Sig: Take 1 Tab by mouth every day.   Omega-3 Fatty Acids (FISH OIL) 1000 MG Cap capsule  Patient Yes No   Sig: Take 1,000 mg by mouth 2 Times a Day.   Specialty Vitamins Products (PROSTATE) Tab  Patient Yes No   Sig: Take 1 Tab by mouth 2 Times a Day.   Specialty Vitamins Products (RETAINE VISION PO)  Patient Yes No   Sig: Take 2 Tabs by mouth 2 Times a Day.   aspirin EC (ECOTRIN) 81 MG Tablet Delayed Response  Patient Yes No   Sig: Take 81 mg by mouth every evening.   chlorthalidone (HYGROTON) 25 MG Tab  Patient No No   Sig: Take 1 Tab by mouth 2 Times a Day.   ciprofloxacin (CIPRO) 500 MG Tab   No No   Sig: Take 1 Tab by mouth 2 times a day for 5 days.   cyclosporin (RESTASIS) 0.05 % ophthalmic emulsion  Patient Yes No   Sig: Place 1 Drop in both eyes 2 times a day.   gabapentin (NEURONTIN) 100 MG Cap  Patient Yes No   Sig: Take 100 mg by mouth 2 Times a Day.   metformin (GLUCOPHAGE) 500 MG TABS  Patient Yes No   Sig: Take 500 mg by mouth 2 times a day, with meals.   metoprolol (LOPRESSOR) 50 MG Tab  Patient No No   Sig: Take 0.5 Tabs by mouth 2 times a day.   potassium chloride SA (KDUR) 20 MEQ Tab  CR  Patient Yes No   Sig: Take 10 mEq by mouth 2 times a day.   rivaroxaban (XARELTO) 20 MG Tab tablet  Patient Yes No   Sig: Take 20 mg by mouth with dinner.   simvastatin (ZOCOR) 10 MG Tab  Patient Yes No   Sig: Take 10 mg by mouth every evening.   tamsulosin (FLOMAX) 0.4 MG capsule  Patient Yes No   Sig: Take 0.4 mg by mouth 2 Times a Day.      Facility-Administered Medications: None       Physical Exam  Temp:  [36.6 °C (97.9 °F)-36.8 °C (98.3 °F)] 36.8 °C (98.3 °F)  Pulse:  [67-80] 78  Resp:  [14-16] 16  BP: (115-140)/(60-84) 136/78  SpO2:  [92 %-98 %] 96 %    Physical Exam  Vitals signs and nursing note reviewed.   Constitutional:       General: He is not in acute distress.     Appearance: He is well-developed. He is not diaphoretic.   HENT:      Right Ear: External ear normal.      Left Ear: External ear normal.      Nose: Nose normal.      Mouth/Throat:      Pharynx: No oropharyngeal exudate.   Eyes:      General: No scleral icterus.        Right eye: No discharge.         Left eye: No discharge.      Conjunctiva/sclera: Conjunctivae normal.   Neck:      Vascular: No JVD.      Trachea: No tracheal deviation.   Cardiovascular:      Rate and Rhythm: Normal rate and regular rhythm.      Heart sounds: Normal heart sounds.   Pulmonary:      Effort: Pulmonary effort is normal. No respiratory distress.      Breath sounds: Normal breath sounds. No stridor. No wheezing or rales.   Chest:      Chest wall: No tenderness.   Abdominal:      General: Bowel sounds are normal. There is no distension.      Palpations: Abdomen is soft.      Tenderness: There is no abdominal tenderness.   Genitourinary:     Penis: Erythema, tenderness, discharge and swelling present.        Musculoskeletal:         General: No tenderness.   Skin:     General: Skin is warm and dry.      Coloration: Skin is not pale.   Neurological:      Mental Status: He is alert.      Cranial Nerves: No cranial nerve deficit.      Motor: No abnormal muscle  tone.   Psychiatric:         Mood and Affect: Mood normal.         Behavior: Behavior normal.         Thought Content: Thought content normal.         Judgment: Judgment normal.         Laboratory:  Recent Labs     02/17/20 0533 02/18/20 0049   WBC 10.3 8.5   RBC 4.88 4.68*   HEMOGLOBIN 14.6 14.0   HEMATOCRIT 43.9 42.0   MCV 90.0 89.7   MCH 29.9 29.9   MCHC 33.3* 33.3*   RDW 44.8 44.7   PLATELETCT 248 261   MPV 10.6 11.0     Recent Labs     02/17/20 0533 02/18/20 0049   SODIUM 137 130*   POTASSIUM 3.9 4.4   CHLORIDE 98 94*   CO2 25 22   GLUCOSE 279* 353*   BUN 18 21   CREATININE 0.77 0.73   CALCIUM 9.8 9.2     Recent Labs     02/17/20 0533 02/18/20 0049   ALTSGPT 37 35   ASTSGOT 28 40   ALKPHOSPHAT 62 66   TBILIRUBIN 0.3 0.3   GLUCOSE 279* 353*     Recent Labs     02/17/20 0533 02/18/20 0049   APTT 34.9 40.5*   INR 1.25* 1.70*     No results for input(s): NTPROBNP in the last 72 hours.      No results for input(s): TROPONINT in the last 72 hours.    Urinalysis:    Recent Labs     02/17/20 0525   SPECGRAVITY 1.025   GLUCOSEUR >=1000*   KETONES Trace*   NITRITE Negative   LEUKESTERAS Negative   WBCURINE 0-2*   RBCURINE >150*   BACTERIA Rare*        Imaging:  No orders to display         Assessment/Plan:  I anticipate this patient will require at least two midnights for appropriate medical management, necessitating inpatient admission.    * Gross hematuria- (present on admission)  Assessment & Plan  With clotting and Sher catheter obstruction  Catheter with side-port placed, continuous bladder irrigation, continue until seen by urology  Buddy Cox, patient may not be a good candidate for further anticoagulation    DM (diabetes mellitus) (CMS-HCC)- (present on admission)  Assessment & Plan  With hyperglycemia, 353 on admit  Continue Lantus 46 units at bedtime, continue metformin 500 mg twice daily.  Diabetic diet, Accu-Cheks before meals and at bedtime.  Sliding scale insulin 3-14  units.      Balanitis  Assessment & Plan  Initiate nystatin cream    BPH (benign prostatic hyperplasia)- (present on admission)  Assessment & Plan  With urinary obstruction and retention.  Status post Sher insertion which relieved the obstruction but now with hematuria.  Needs urology consultation  Continue tamsulosin    Atrial flutter (HCC)- (present on admission)  Assessment & Plan  Continue metoprolol 25 mg twice daily, hold Xarelto due to hematuria      VTE prophylaxis: scd

## 2020-02-19 ENCOUNTER — HOME CARE VISIT (OUTPATIENT)
Dept: HOME HEALTH SERVICES | Facility: HOME HEALTHCARE | Age: 85
End: 2020-02-19
Payer: MEDICARE

## 2020-02-19 PROBLEM — N48.89 PENILE EROSION: Status: ACTIVE | Noted: 2020-02-19

## 2020-02-19 LAB
ANION GAP SERPL CALC-SCNC: 15 MMOL/L (ref 7–16)
BASOPHILS # BLD AUTO: 1 % (ref 0–1.8)
BASOPHILS # BLD: 0.08 K/UL (ref 0–0.12)
BUN SERPL-MCNC: 17 MG/DL (ref 8–22)
CALCIUM SERPL-MCNC: 9.3 MG/DL (ref 8.4–10.2)
CHLORIDE SERPL-SCNC: 102 MMOL/L (ref 96–112)
CO2 SERPL-SCNC: 22 MMOL/L (ref 20–33)
CREAT SERPL-MCNC: 0.77 MG/DL (ref 0.5–1.4)
EOSINOPHIL # BLD AUTO: 0.16 K/UL (ref 0–0.51)
EOSINOPHIL NFR BLD: 2 % (ref 0–6.9)
ERYTHROCYTE [DISTWIDTH] IN BLOOD BY AUTOMATED COUNT: 44.8 FL (ref 35.9–50)
GLUCOSE BLD-MCNC: 104 MG/DL (ref 65–99)
GLUCOSE BLD-MCNC: 226 MG/DL (ref 65–99)
GLUCOSE BLD-MCNC: 260 MG/DL (ref 65–99)
GLUCOSE BLD-MCNC: 89 MG/DL (ref 65–99)
GLUCOSE SERPL-MCNC: 76 MG/DL (ref 65–99)
HCT VFR BLD AUTO: 42.4 % (ref 42–52)
HGB BLD-MCNC: 14 G/DL (ref 14–18)
IMM GRANULOCYTES # BLD AUTO: 0.06 K/UL (ref 0–0.11)
IMM GRANULOCYTES NFR BLD AUTO: 0.7 % (ref 0–0.9)
LYMPHOCYTES # BLD AUTO: 1.54 K/UL (ref 1–4.8)
LYMPHOCYTES NFR BLD: 19 % (ref 22–41)
MCH RBC QN AUTO: 30 PG (ref 27–33)
MCHC RBC AUTO-ENTMCNC: 33 G/DL (ref 33.7–35.3)
MCV RBC AUTO: 91 FL (ref 81.4–97.8)
MONOCYTES # BLD AUTO: 0.93 K/UL (ref 0–0.85)
MONOCYTES NFR BLD AUTO: 11.5 % (ref 0–13.4)
NEUTROPHILS # BLD AUTO: 5.35 K/UL (ref 1.82–7.42)
NEUTROPHILS NFR BLD: 65.8 % (ref 44–72)
NRBC # BLD AUTO: 0 K/UL
NRBC BLD-RTO: 0 /100 WBC
PLATELET # BLD AUTO: 231 K/UL (ref 164–446)
PMV BLD AUTO: 10.8 FL (ref 9–12.9)
POTASSIUM SERPL-SCNC: 3.6 MMOL/L (ref 3.6–5.5)
RBC # BLD AUTO: 4.66 M/UL (ref 4.7–6.1)
SODIUM SERPL-SCNC: 139 MMOL/L (ref 135–145)
WBC # BLD AUTO: 8.1 K/UL (ref 4.8–10.8)

## 2020-02-19 PROCEDURE — 700102 HCHG RX REV CODE 250 W/ 637 OVERRIDE(OP): Performed by: HOSPITALIST

## 2020-02-19 PROCEDURE — 80048 BASIC METABOLIC PNL TOTAL CA: CPT

## 2020-02-19 PROCEDURE — 665999 HH PPS REVENUE DEBIT

## 2020-02-19 PROCEDURE — 99233 SBSQ HOSP IP/OBS HIGH 50: CPT | Performed by: HOSPITALIST

## 2020-02-19 PROCEDURE — 665998 HH PPS REVENUE CREDIT

## 2020-02-19 PROCEDURE — 82962 GLUCOSE BLOOD TEST: CPT

## 2020-02-19 PROCEDURE — 85025 COMPLETE CBC W/AUTO DIFF WBC: CPT

## 2020-02-19 PROCEDURE — 36415 COLL VENOUS BLD VENIPUNCTURE: CPT

## 2020-02-19 PROCEDURE — 770006 HCHG ROOM/CARE - MED/SURG/GYN SEMI*

## 2020-02-19 PROCEDURE — A9270 NON-COVERED ITEM OR SERVICE: HCPCS | Performed by: HOSPITALIST

## 2020-02-19 RX ADMIN — METOPROLOL TARTRATE 25 MG: 25 TABLET ORAL at 17:49

## 2020-02-19 RX ADMIN — BACITRACIN ZINC NEOMYCIN SULFATE POLYMYXIN B SULFATE: 400; 3.5; 5 OINTMENT TOPICAL at 12:29

## 2020-02-19 RX ADMIN — INSULIN HUMAN 4 UNITS: 100 INJECTION, SOLUTION PARENTERAL at 18:01

## 2020-02-19 RX ADMIN — METFORMIN HYDROCHLORIDE 500 MG: 500 TABLET ORAL at 08:44

## 2020-02-19 RX ADMIN — POTASSIUM CHLORIDE 10 MEQ: 20 TABLET, EXTENDED RELEASE ORAL at 17:51

## 2020-02-19 RX ADMIN — OLANZAPINE 2.5 MG: 5 TABLET, ORALLY DISINTEGRATING ORAL at 17:50

## 2020-02-19 RX ADMIN — CHLORTHALIDONE 25 MG: 25 TABLET ORAL at 17:52

## 2020-02-19 RX ADMIN — TAMSULOSIN HYDROCHLORIDE 0.4 MG: 0.4 CAPSULE ORAL at 17:51

## 2020-02-19 RX ADMIN — INSULIN GLARGINE 46 UNITS: 100 INJECTION, SOLUTION SUBCUTANEOUS at 18:03

## 2020-02-19 RX ADMIN — POTASSIUM CHLORIDE 10 MEQ: 20 TABLET, EXTENDED RELEASE ORAL at 05:43

## 2020-02-19 RX ADMIN — NYSTATIN: 100000 CREAM TOPICAL at 05:44

## 2020-02-19 RX ADMIN — GABAPENTIN 100 MG: 100 CAPSULE ORAL at 17:52

## 2020-02-19 RX ADMIN — ASPIRIN 81 MG: 81 TABLET, COATED ORAL at 18:00

## 2020-02-19 RX ADMIN — SIMVASTATIN 10 MG: 10 TABLET, FILM COATED ORAL at 22:30

## 2020-02-19 RX ADMIN — GABAPENTIN 100 MG: 100 CAPSULE ORAL at 05:43

## 2020-02-19 RX ADMIN — INSULIN HUMAN 7 UNITS: 100 INJECTION, SOLUTION PARENTERAL at 12:36

## 2020-02-19 RX ADMIN — BACITRACIN ZINC NEOMYCIN SULFATE POLYMYXIN B SULFATE: 400; 3.5; 5 OINTMENT TOPICAL at 18:06

## 2020-02-19 RX ADMIN — METOPROLOL TARTRATE 25 MG: 25 TABLET ORAL at 05:43

## 2020-02-19 RX ADMIN — CHLORTHALIDONE 25 MG: 25 TABLET ORAL at 05:42

## 2020-02-19 RX ADMIN — ACETAMINOPHEN 650 MG: 325 TABLET, FILM COATED ORAL at 23:14

## 2020-02-19 RX ADMIN — NYSTATIN: 100000 CREAM TOPICAL at 18:06

## 2020-02-19 RX ADMIN — METFORMIN HYDROCHLORIDE 500 MG: 500 TABLET ORAL at 17:52

## 2020-02-19 RX ADMIN — TAMSULOSIN HYDROCHLORIDE 0.4 MG: 0.4 CAPSULE ORAL at 05:43

## 2020-02-19 ASSESSMENT — ENCOUNTER SYMPTOMS
NAUSEA: 0
SENSORY CHANGE: 0
PALPITATIONS: 0
TINGLING: 0
BLURRED VISION: 0
DIARRHEA: 0
CHILLS: 0
VOMITING: 0
ABDOMINAL PAIN: 0
TREMORS: 0
FEVER: 0
FLANK PAIN: 0
HEARTBURN: 0
CONSTIPATION: 0

## 2020-02-19 NOTE — CARE PLAN
Problem: Communication  Goal: The ability to communicate needs accurately and effectively will improve  Outcome: PROGRESSING AS EXPECTED     Problem: Safety  Goal: Will remain free from injury  Outcome: PROGRESSING AS EXPECTED  Goal: Will remain free from falls  Outcome: PROGRESSING AS EXPECTED     Problem: Infection  Goal: Will remain free from infection  Outcome: PROGRESSING AS EXPECTED     Problem: Venous Thromboembolism (VTW)/Deep Vein Thrombosis (DVT) Prevention:  Goal: Patient will participate in Venous Thrombosis (VTE)/Deep Vein Thrombosis (DVT)Prevention Measures  Outcome: PROGRESSING AS EXPECTED     Problem: Bowel/Gastric:  Goal: Normal bowel function is maintained or improved  Outcome: PROGRESSING AS EXPECTED  Goal: Will not experience complications related to bowel motility  Outcome: PROGRESSING AS EXPECTED     Problem: Knowledge Deficit  Goal: Knowledge of disease process/condition, treatment plan, diagnostic tests, and medications will improve  Outcome: PROGRESSING AS EXPECTED  Goal: Knowledge of the prescribed therapeutic regimen will improve  Outcome: PROGRESSING AS EXPECTED     Problem: Discharge Barriers/Planning  Goal: Patient's continuum of care needs will be met  Outcome: PROGRESSING AS EXPECTED     Problem: Psychosocial Needs:  Goal: Level of anxiety will decrease  Outcome: PROGRESSING AS EXPECTED     Problem: Pain Management  Goal: Pain level will decrease to patient's comfort goal  Outcome: PROGRESSING AS EXPECTED  Note: Pain managed with prn options     Problem: Respiratory:  Goal: Respiratory status will improve  Outcome: PROGRESSING AS EXPECTED

## 2020-02-19 NOTE — ASSESSMENT & PLAN NOTE
With significant skin maceration and breakdown of distal shaft  Catheter placed  Urology consulted  Wound care continues

## 2020-02-19 NOTE — DOCUMENTATION QUERY
[facility]                                                                       Query Response Note      PATIENT:               SAMMY CLIFFORD  ACCT #:                  [id number]  MRN:                     [id number]  :                      3/7/1929  ADMIT DATE:       2020 11:54 PM  DISCH DATE:          RESPONDING  PROVIDER #:        [id number]           QUERY TEXT:    Please clarify in documentation the relationship, if any, between gross hematuria and xarelto    The patient's Clinical Indicators include:  - Findings:  89 y.o. male who presents with gross hematuria.  On Xarelto due to atrial fibrillation  - Treatments:  bladder irrigation, urology consult, stop xarelto   - Risk factors:  advanced age, chronic use of xarelto, gross hematuria with clotting and wilson catheter obstruction  Options provided:   -- Gross hematuria is due to or associated with xarelto   -- Gross hematuria is not due to or associated with xarelto   -- Unable to determine      Query created by: Dayan Valencia on 2020 11:41 AM    RESPONSE TEXT:    Gross hematuria is due to or associated with xarelto          Electronically signed by:  PATRICIA HOGAN MD 2020 2:09 PM

## 2020-02-19 NOTE — PROGRESS NOTES
Rare clots seen in urine, otherwise urine remains clear and light yellow. Will continue to monitor.

## 2020-02-19 NOTE — PROGRESS NOTES
"HOSPITAL MEDICINE PROGRESS NOTE    Date of service  2/19/2020    Chief Complaint  89 y.o. year old male here with Blood in Urine      Assessment/Plan  Interval History   Active Hospital Problems    Diagnosis   • DM (diabetes mellitus) (CMS-[facility]) [E11.9]     Priority: Medium   • Atrial flutter ([facility]) [I48.92]     Priority: Low   • Penile erosion [N48.89]   • Gross hematuria [R31.0]   • Balanitis [N48.1]   • BPH (benign prostatic hyperplasia) [N40.0]     Seen and examined. Chart reviewed, including labs and any pertinent imaging.    Patient without complaint at this time, he does report penile pain when catheter is nudged otherwise has been pain-free today.  Denies dysuria.    A bit tangential but reports he is now \"aware\" of how to manage his catheter, he reported having some issues with getting appropriate help at home.    Disposition  To remain inpatient     * Gross hematuria- (present on admission)  Assessment & Plan  With clotting and Sher catheter obstruction, now resolved with irrigation  Catheter with side-port placed, continuous bladder irrigation, continue until seen by urology  Messaged urology through [place] text today    Stop Xarelto, patient may not be a good candidate for further anticoagulation    DM (diabetes mellitus) (CMS-HCC)- (present on admission)  Assessment & Plan  With hyperglycemia, 353 on admit  Continue Lantus 46 units at bedtime, continue metformin 500 mg twice daily.  Diabetic diet, Accu-Cheks before meals and at bedtime.  Sliding scale insulin 3-14 units.    Penile erosion  Assessment & Plan  With significant skin maceration and breakdown of distal shaft  Catheter placed  Urology consulted  Wound care continues    Balanitis- (present on admission)  Assessment & Plan  Initiate nystatin cream    BPH (benign prostatic hyperplasia)- (present on admission)  Assessment & Plan  With urinary obstruction and retention.  Status post Sher insertion which relieved the obstruction but now with " hematuria.  Needs urology consultation  Continue tamsulosin    Atrial flutter (HCC)- (present on admission)  Assessment & Plan  Continue metoprolol 25 mg twice daily, hold Xarelto due to hematuria      Consultants/Specialty  Urology    Hospital Course:     89-year-old male admitted with gross hematuria and occlusion of catheter       Review of Systems  Physical Exam   Review of Systems   Constitutional: Negative for chills and fever.   Eyes: Negative for blurred vision.   Cardiovascular: Negative for chest pain and palpitations.   Gastrointestinal: Negative for abdominal pain, constipation, diarrhea, heartburn, nausea and vomiting.   Genitourinary: Negative for dysuria, flank pain and urgency.   Skin: Negative for itching and rash.   Neurological: Negative for tingling, tremors and sensory change.   All other systems reviewed and are negative.    Vitals:    02/18/20 1106 02/18/20 1707 02/18/20 2231 02/19/20 0500   BP: 117/62 145/89 138/57 132/92   Pulse: 84 80 75 92   Resp: 16 16 16 18   Temp: 36.4 °C (97.5 °F) 36.4 °C (97.6 °F) 36.6 °C (97.8 °F) 36.2 °C (97.2 °F)   TempSrc: Oral Oral Oral Oral   SpO2: 95% 95% 94% 96%   Weight:       Height:         Physical Exam   Constitutional: He is oriented to person, place, and time and well-developed, well-nourished, and in no distress. No distress.   HENT:   Head: Normocephalic and atraumatic.   Mouth/Throat: Oropharynx is clear and moist.   Eyes: EOM are normal. Right eye exhibits no discharge. Left eye exhibits no discharge. No scleral icterus.   Neck: Normal range of motion. Neck supple.   Cardiovascular: Normal rate and intact distal pulses.   No murmur heard.  Pulmonary/Chest: Effort normal and breath sounds normal. No stridor. No respiratory distress. He has no wheezes. He has no rales.   Abdominal: Soft. He exhibits no distension. There is no abdominal tenderness. There is no rebound.   Genitourinary: No discharge found.    Genitourinary Comments: Distal penis with  significant erosions  Red and macerated skin surrounding  Catheter placed with clear urine draining from irrigation     Musculoskeletal: Normal range of motion.         General: No tenderness or edema.   Neurological: He is alert and oriented to person, place, and time. No cranial nerve deficit. Coordination normal.   Skin: Skin is warm and dry. He is not diaphoretic. No erythema.   Psychiatric: Memory, affect and judgment normal.   Nursing note and vitals reviewed.       Hematology Chemistry   Lab Results   Component Value Date/Time    WBC 8.1 02/19/2020 03:41 AM    HEMOGLOBIN 14.0 02/19/2020 03:41 AM    HEMATOCRIT 42.4 02/19/2020 03:41 AM    PLATELETCT 231 02/19/2020 03:41 AM     Lab Results   Component Value Date/Time    SODIUM 139 02/19/2020 03:41 AM    POTASSIUM 3.6 02/19/2020 03:41 AM    CHLORIDE 102 02/19/2020 03:41 AM    CO2 22 02/19/2020 03:41 AM    GLUCOSE 76 02/19/2020 03:41 AM    BUN 17 02/19/2020 03:41 AM    CREATININE 0.77 02/19/2020 03:41 AM    CREATININE 1.0 12/02/2008 07:26 AM         Labs not explicitly included in this progress note were reviewed by the author.   Radiology/imaging not explicitly included in this progress note was reviewed by the author.     Medications:  • neomycin-bacitracin-polymyxin   BID   • aspirin EC  81 mg Q EVENING   • chlorthalidone  25 mg BID   • gabapentin  100 mg BID   • metFORMIN  500 mg BID WITH MEALS   • metoprolol  25 mg BID   • potassium chloride SA  10 mEq BID   • simvastatin  10 mg Nightly   • tamsulosin  0.4 mg BID   • senna-docusate  2 Tab BID    And   • polyethylene glycol/lytes  1 Packet QDAY PRN    And   • magnesium hydroxide  30 mL QDAY PRN    And   • bisacodyl  10 mg QDAY PRN   • acetaminophen  650 mg Q6HRS PRN   • ondansetron  4 mg Q4HRS PRN   • ondansetron  4 mg Q4HRS PRN   • insulin regular  3-14 Units 4X/DAY ACHS    And   • glucose  16 g Q15 MIN PRN    And   • dextrose 10% bolus  250 mL Q15 MIN PRN   • OLANZapine zydis  2.5 mg Q EVENING   • insulin  glargine  46 Units Q EVENING   • nystatin   BID       Radiology:     No orders to display       Full Code    I have performed a physical exam and reviewed and updated ROS as of today, 2/19/2020.  In review of yesterday's note dated, 2/18/2020, there are no changes except as documented above.

## 2020-02-19 NOTE — PROGRESS NOTES
"Report received from KRISTA Perry RN. Pt drowsy, pleasant, reports feeling \"horrible\" but denies need for interventions. Skin check done. CBI infusing slowly, urine clear and yellow, no clots noted at this time. Sher draining to gravity. Bed alarm on. Call light in reach. VSS. /57   Pulse 75   Temp 36.6 °C (97.8 °F) (Oral)   Resp 16   Ht 1.702 m (5' 7\")   Wt 79.4 kg (175 lb)   SpO2 94%   BMI 27.41 kg/m²  Will continue to monitor.   "

## 2020-02-19 NOTE — PROGRESS NOTES
2 RN SKin check    Penis head edematous and erythematous with bloody discharge noted. Meatus at 0600 direction with irritation and ulceration. Buttocks redness with blanching noted.

## 2020-02-19 NOTE — WOUND TEAM
[facility]  Inpatient Services  Initial Wound and Skin Care Evaluation    Admission Date: 2/17/2020     Last order of IP CONSULT TO WOUND CARE was found on 2/19/2020 from Hospital Encounter on 2/17/2020     HPI, PMH, SH: Reviewed    Unit where seen by Wound Team: 2214/01     WOUND CONSULT/FOLLOW-UP RELATED TO:  Penis erosion    Self Report / Pain Level:  Sensitivity with cleaning    OBJECTIVE:  On MADELINE bed, 2 stat locks, CBI, foreskin retracted and swollen    WOUND TYPE, LOCATION, CHARACTERISTICS (Pressure Injuries: location, stage, POA or date identified)  Wound 02/18/20 Penis (Active)         Site Assessment Dry;Red;Painful    Periwound Assessment Red;Edema    Margins Undefined edges    Closure Open to air    Drainage Amount Scant    Drainage Description Sanguineous    Treatments Cleansed    Wound Cleansing Normal Saline Irrigation    Dressing Cleansing/Solutions Antibiotic Ointment    Dressing Options Open to Air    NEXT Weekly Photo (Inpatient Only) 02/25/20    Non-staged Wound Description Partial thickness    Wound Length (cm) 0.4 cm 2/19/2020  8:15 AM   Wound Width (cm) 0.2 cm 2/19/2020  8:15 AM   Wound Surface Area (cm^2) 0.08 cm^2 2/19/2020  8:15 AM   Wound Depth (cm) 0.1 cm 2/19/2020  8:15 AM   Wound Volume (cm^3) 0.01 cm^3 2/19/2020  8:15 AM   Tunneling (cm) 0 cm 2/19/2020  8:15 AM   Undermining (cm) 0 cm 2/19/2020  8:15 AM   Shape semi [place]    Wound Odor None    Exposed Structures None    Number of days: 1        Vascular:    LISA:   No results found.      Lab Values:    Lab Results   Component Value Date/Time    WBC 8.1 02/19/2020 03:41 AM    RBC 4.66 (L) 02/19/2020 03:41 AM    HEMOGLOBIN 14.0 02/19/2020 03:41 AM    HEMATOCRIT 42.4 02/19/2020 03:41 AM    SEDRATEWES 1 03/07/2011 11:52 AM    HBA1C 9.2 (H) 05/02/2019 01:25 PM          Culture: na  Culture Results show:  No results found for this or any previous visit (from the past 720 hour(s)).      INTERVENTIONS BY WOUND TEAM:  Cleaned  penis with gauze and NS, then barrier wipes. Reapproximated foreskin.     Interdisciplinary consultation: Patient, Bedside RN    EVALUATION: Pt has erosion @ urethral opening, edematous foreskin. Antbx oint ordered for urethral opening to keep lubricated, has order for antifungal for penile meatus    Goals: Steady decrease in wound area and depth weekly.    NURSING PLAN OF CARE ORDERS (X):    Dressing changes: See Dressing Care orders:   Skin care: See Skin Care orders:   Rectal tube care: See Rectal Tube Care orders:   Other orders: antifungal and antbx oint orders    RSKIN:   CURRENTLY IN PLACE (X), APPLIED THIS VISIT (A), ORDERED (O):   Q shift Rolando:  x  Q shift pressure point assessments:  x  Pressure redistribution mattress    x        Low Airloss          Bariatric MADELINE         Bariatric foam           Heel float boots     Heel Silicone dressing        Float Heels off Bed with Pillows               Barrier wipes    x     Barrier Cream         Barrier paste          Sacral silicone dressing         Silicone O2 tubing         Anchorfast         Cannula fixation Device (Tender )          Gray Foam Ear protectors           Trach with Optifoam split foam                 Waffle cushion        Waffle Overlay         Rectal tube or BMS    Purwick/Condom Cath          Antifungal tx  x    Interdry          Reposition q 2 hours  x      Up to chair        Ambulate      PT/OT        Dietician        Diabetes Education      PO   x  TF     TPN     NPO   # days   Other        WOUND TEAM PLAN OF CARE   Dressing changes by wound team:          Follow up 1-2 times weekly:               Follow up 3 times weekly:                NPWT change 3 times weekly:     Follow up as needed:  Nsg to notify if f/u needed     Other (explain):     Anticipated discharge plans:   LTACH:        SNF/Rehab:                  Home Care:           Outpatient Wound Center:            Self Care:            Other: Unsure of needs @ this time

## 2020-02-20 LAB
GLUCOSE BLD-MCNC: 104 MG/DL (ref 65–99)
GLUCOSE BLD-MCNC: 138 MG/DL (ref 65–99)
GLUCOSE BLD-MCNC: 148 MG/DL (ref 65–99)
GLUCOSE BLD-MCNC: 169 MG/DL (ref 65–99)

## 2020-02-20 PROCEDURE — 665998 HH PPS REVENUE CREDIT

## 2020-02-20 PROCEDURE — 99232 SBSQ HOSP IP/OBS MODERATE 35: CPT | Performed by: HOSPITALIST

## 2020-02-20 PROCEDURE — 82962 GLUCOSE BLOOD TEST: CPT

## 2020-02-20 PROCEDURE — 700102 HCHG RX REV CODE 250 W/ 637 OVERRIDE(OP): Performed by: HOSPITALIST

## 2020-02-20 PROCEDURE — 770006 HCHG ROOM/CARE - MED/SURG/GYN SEMI*

## 2020-02-20 PROCEDURE — 665999 HH PPS REVENUE DEBIT

## 2020-02-20 PROCEDURE — A9270 NON-COVERED ITEM OR SERVICE: HCPCS | Performed by: HOSPITALIST

## 2020-02-20 RX ADMIN — POTASSIUM CHLORIDE 10 MEQ: 20 TABLET, EXTENDED RELEASE ORAL at 05:15

## 2020-02-20 RX ADMIN — GABAPENTIN 100 MG: 100 CAPSULE ORAL at 05:15

## 2020-02-20 RX ADMIN — OLANZAPINE 2.5 MG: 5 TABLET, ORALLY DISINTEGRATING ORAL at 17:17

## 2020-02-20 RX ADMIN — METOPROLOL TARTRATE 25 MG: 25 TABLET ORAL at 17:17

## 2020-02-20 RX ADMIN — ACETAMINOPHEN 650 MG: 325 TABLET, FILM COATED ORAL at 06:52

## 2020-02-20 RX ADMIN — INSULIN GLARGINE 46 UNITS: 100 INJECTION, SOLUTION SUBCUTANEOUS at 17:22

## 2020-02-20 RX ADMIN — NYSTATIN: 100000 CREAM TOPICAL at 17:17

## 2020-02-20 RX ADMIN — CHLORTHALIDONE 25 MG: 25 TABLET ORAL at 05:16

## 2020-02-20 RX ADMIN — METFORMIN HYDROCHLORIDE 500 MG: 500 TABLET ORAL at 17:17

## 2020-02-20 RX ADMIN — SENNOSIDES AND DOCUSATE SODIUM 2 TABLET: 8.6; 5 TABLET ORAL at 17:17

## 2020-02-20 RX ADMIN — CHLORTHALIDONE 25 MG: 25 TABLET ORAL at 17:18

## 2020-02-20 RX ADMIN — ASPIRIN 81 MG: 81 TABLET, COATED ORAL at 17:18

## 2020-02-20 RX ADMIN — SIMVASTATIN 10 MG: 10 TABLET, FILM COATED ORAL at 22:09

## 2020-02-20 RX ADMIN — METFORMIN HYDROCHLORIDE 500 MG: 500 TABLET ORAL at 06:55

## 2020-02-20 RX ADMIN — NYSTATIN: 100000 CREAM TOPICAL at 05:21

## 2020-02-20 RX ADMIN — POTASSIUM CHLORIDE 10 MEQ: 20 TABLET, EXTENDED RELEASE ORAL at 17:18

## 2020-02-20 RX ADMIN — BACITRACIN ZINC NEOMYCIN SULFATE POLYMYXIN B SULFATE: 400; 3.5; 5 OINTMENT TOPICAL at 05:21

## 2020-02-20 RX ADMIN — TAMSULOSIN HYDROCHLORIDE 0.4 MG: 0.4 CAPSULE ORAL at 06:00

## 2020-02-20 RX ADMIN — TAMSULOSIN HYDROCHLORIDE 0.4 MG: 0.4 CAPSULE ORAL at 17:17

## 2020-02-20 RX ADMIN — METOPROLOL TARTRATE 25 MG: 25 TABLET ORAL at 05:16

## 2020-02-20 RX ADMIN — GABAPENTIN 100 MG: 100 CAPSULE ORAL at 17:18

## 2020-02-20 RX ADMIN — INSULIN HUMAN 3 UNITS: 100 INJECTION, SOLUTION PARENTERAL at 22:11

## 2020-02-20 RX ADMIN — BACITRACIN ZINC NEOMYCIN SULFATE POLYMYXIN B SULFATE: 400; 3.5; 5 OINTMENT TOPICAL at 17:17

## 2020-02-20 ASSESSMENT — ENCOUNTER SYMPTOMS
VOMITING: 0
DIARRHEA: 0
BLURRED VISION: 0
CHILLS: 0
CONSTIPATION: 0
TREMORS: 0
NAUSEA: 0
TINGLING: 0
FEVER: 0
FLANK PAIN: 0
SENSORY CHANGE: 0
ABDOMINAL PAIN: 0
PALPITATIONS: 0
HEARTBURN: 0

## 2020-02-20 NOTE — CARE PLAN
Problem: Safety  Goal: Will remain free from injury  Outcome: PROGRESSING AS EXPECTED     Problem: Venous Thromboembolism (VTW)/Deep Vein Thrombosis (DVT) Prevention:  Goal: Patient will participate in Venous Thrombosis (VTE)/Deep Vein Thrombosis (DVT)Prevention Measures  Outcome: PROGRESSING AS EXPECTED     Problem: Knowledge Deficit  Goal: Knowledge of disease process/condition, treatment plan, diagnostic tests, and medications will improve  Outcome: PROGRESSING AS EXPECTED     Problem: Psychosocial Needs:  Goal: Level of anxiety will decrease  Outcome: PROGRESSING AS EXPECTED     Problem: Skin Integrity  Goal: Risk for impaired skin integrity will decrease  Outcome: PROGRESSING AS EXPECTED     Problem: Pain Management  Goal: Pain level will decrease to patient's comfort goal  Outcome: PROGRESSING AS EXPECTED

## 2020-02-20 NOTE — PROGRESS NOTES
Report received from night shift RN. Assume care. Pt. AAOx3 pt is in bed,  Assessment completed. VSS. Denies pain, Sher in place and draining clear yelow fluids, no blood clots noted.  Pt was update for the care for the day. White board updated, All question answered. Pt has call light within reach,  bed is in the lowest position. Pt has no other needs at this time.

## 2020-02-20 NOTE — PROGRESS NOTES
Note to reader: this note follows the APSO format rather than the historical SOAP format. Assessment and plan located at the top of the note for ease of use.    Chief Complaint  89 y.o. year old male here with hematuria    Assessment/Plan  Interval History   BPH with urinary retention  Penile erosion  Gross hematuria, resolved    Recommend continue Flomax, local skin care and wilson. We will plan for voiding trial in office in 7-10 days. Nothing further from  standpoint. Urology signing off    Discussed with patient and Urology-Dr. Chao MOJICA  Patient seen and examined    2/20. Urine remains clear. CBI off. Denies pain.          Review of Systems  Physical Exam   Review of Systems   Constitutional: Negative for fever.   Genitourinary: Negative for hematuria.     Vitals:    02/19/20 1705 02/19/20 2234 02/20/20 0500 02/20/20 1054   BP: 124/87 123/78 127/52 122/67   Pulse: 90 86 71 70   Resp: 18 18 18 18   Temp: 36.5 °C (97.7 °F) 36.6 °C (97.8 °F) 36.4 °C (97.5 °F) 36.6 °C (97.8 °F)   TempSrc: Oral Oral Oral Oral   SpO2: 96% 96% 92% 95%   Weight:       Height:         Physical Exam   Constitutional: He is oriented to person, place, and time. No distress.   HENT:   Head: Normocephalic and atraumatic.   Eyes: Right eye exhibits no discharge. Left eye exhibits no discharge.   Neck: Normal range of motion. Neck supple.   Pulmonary/Chest: Effort normal.   Abdominal: Soft. Bowel sounds are normal. He exhibits no distension and no mass. There is no abdominal tenderness. There is no rebound and no guarding.   Genitourinary:    Genitourinary Comments: Wilson draining clear. Mucous around meatus with erosion from catheter.      Neurological: He is alert and oriented to person, place, and time.   Skin: Skin is warm and dry.   Psychiatric: Affect and judgment normal.   Nursing note and vitals reviewed.       Hematology Chemistry   Lab Results   Component Value Date/Time    WBC 8.1 02/19/2020 03:41 AM    HEMOGLOBIN 14.0  02/19/2020 03:41 AM    HEMATOCRIT 42.4 02/19/2020 03:41 AM    PLATELETCT 231 02/19/2020 03:41 AM     Lab Results   Component Value Date/Time    SODIUM 139 02/19/2020 03:41 AM    POTASSIUM 3.6 02/19/2020 03:41 AM    CHLORIDE 102 02/19/2020 03:41 AM    CO2 22 02/19/2020 03:41 AM    GLUCOSE 76 02/19/2020 03:41 AM    BUN 17 02/19/2020 03:41 AM    CREATININE 0.77 02/19/2020 03:41 AM    CREATININE 1.0 12/02/2008 07:26 AM         Labs not explicitly included in this progress note were reviewed by the author.   Radiology/imaging not explicitly included in this progress note was reviewed by the author.     Medications reviewed and Labs reviewed

## 2020-02-20 NOTE — PROGRESS NOTES
MISSED VISIT 2/19/20  Patient is admitted to [facility] on 2/17/20.

## 2020-02-20 NOTE — CONSULTS
UROLOGY CONSULT (to be dictated)    Requested by:  Dr. viveros  Reason:  Retention, penile wound    Recommendations:  Acute on chronic urinary retention.  Blood was likely from wilson trauma/irritation.  Urine now completely clear.  Off of CBI.  Topical irritation at glans penis.  Plan to keep wilson as outpatient. With void trial in our office.  Continue flomax.  Continue local wound care, including soap and water, as well as abx.    We will arrange follow up.  Please call for any questions.

## 2020-02-20 NOTE — CARE PLAN
Problem: Safety  Goal: Will remain free from injury  Outcome: PROGRESSING AS EXPECTED  Note: Bed alarm in place       Problem: Venous Thromboembolism (VTW)/Deep Vein Thrombosis (DVT) Prevention:  Goal: Patient will participate in Venous Thrombosis (VTE)/Deep Vein Thrombosis (DVT)Prevention Measures  Outcome: PROGRESSING AS EXPECTED  Flowsheets (Taken 2/19/2020 2100)  Pharmacologic Prophylaxis Used: Rivaroxaban (Xarelto) - (ONLY for Total Knee and Total Hip Surgery)

## 2020-02-20 NOTE — CONSULTS
DATE OF SERVICE:  02/19/2020    UROLOGY CONSULTATION    REASON FOR CONSULTATION:  Urology service was consulted by the hospitalist   service, Dr. Smith, for advice and opinion regarding this gentleman's urinary   retention and blood in the urine.    HISTORY OF PRESENT ILLNESS:  The patient is 89 years old and was admitted   recently with a history of urinary retention and blood in the urine.  The   patient states he has not had blood prior to these episodes of hospitalization   and need for catheterization.  He does state he has had some difficulties   with urination, but is not able to elaborate much.    The hospitalists have also noticed some maceration of the glans penis around   the Sher catheter and a wound care consult has been obtained.    For detailed account of the patient's past medical, surgical, social history   and review of systems, please see Dr. Holly's history and physical dated   02/18/2020 in the patient's chart.    PHYSICAL EXAMINATION:  GENERAL:  Elderly gentleman lying in [facility] in no acute distress.  VITAL SIGNS:  Afebrile, vital signs stable.  HEENT:  Oropharynx clear.  NECK:  No cervical lymphadenopathy.  CHEST:  Rises symmetric.  HEART:  Regular, 2+ radial pulses bilaterally.  SKIN:  Warm and dry.  NEUROLOGIC:  Grossly intact.  EXTREMITIES:  No lower extremity edema.  ABDOMEN:  Soft, nontender, nondistended.  No suprapubic fullness or   tenderness.  GENITOURINARY:  Reveals a Sher catheter, 3-way, 18-Danish off of continuous   bladder irrigation draining clear yellow urine.  He is uncircumcised.  His   foreskin is otherwise normal.  The glans penis demonstrates erythema and   slight maceration without open wounds.  There is increased sensitivity, though   the patient states it is better than what it has been.    IMPRESSION AND PLAN:  A 89-year-old gentleman with urinary retention, recent   blood in the urine and glans penis irritation.    Currently, the Sher catheter is  indicated for relief of his urinary   retention.  It should be maintained for approximately 1 week's time.  He   should remain on Flomax as an outpatient and have a void trial in   approximately 1 week with us.    The blood in the urine was likely resultant from Sher catheter placement and   perhaps some trauma.  Continuous bladder irrigation has been stopped and no   further workup is indicated at present time.  Local wound care is indicated   for the glans penis per the wound care team with encouragement of soap and   water cleaning daily and local supportive care as well as antibiotics in case   if there is a microbial component.    There is no further acute intervention from urologic services at present time   and again we will be following up as an outpatient.       ____________________________________     Angel Guidry MD    MCM / NTS    DD:  02/19/2020 19:21:36  DT:  02/19/2020 21:35:58    D#:  [id number]  Job#:  [id number]    cc: Kulwant Smith MD

## 2020-02-21 PROBLEM — R41.0 DELIRIUM: Status: ACTIVE | Noted: 2020-02-21

## 2020-02-21 LAB
GLUCOSE BLD-MCNC: 232 MG/DL (ref 65–99)
GLUCOSE BLD-MCNC: 286 MG/DL (ref 65–99)
GLUCOSE BLD-MCNC: 99 MG/DL (ref 65–99)

## 2020-02-21 PROCEDURE — 770006 HCHG ROOM/CARE - MED/SURG/GYN SEMI*

## 2020-02-21 PROCEDURE — 97166 OT EVAL MOD COMPLEX 45 MIN: CPT

## 2020-02-21 PROCEDURE — 665999 HH PPS REVENUE DEBIT

## 2020-02-21 PROCEDURE — A9270 NON-COVERED ITEM OR SERVICE: HCPCS | Performed by: HOSPITALIST

## 2020-02-21 PROCEDURE — 82962 GLUCOSE BLOOD TEST: CPT | Mod: 91

## 2020-02-21 PROCEDURE — 700102 HCHG RX REV CODE 250 W/ 637 OVERRIDE(OP): Performed by: HOSPITALIST

## 2020-02-21 PROCEDURE — 97162 PT EVAL MOD COMPLEX 30 MIN: CPT

## 2020-02-21 PROCEDURE — 97535 SELF CARE MNGMENT TRAINING: CPT

## 2020-02-21 PROCEDURE — 99233 SBSQ HOSP IP/OBS HIGH 50: CPT | Performed by: HOSPITALIST

## 2020-02-21 PROCEDURE — 665998 HH PPS REVENUE CREDIT

## 2020-02-21 RX ADMIN — TAMSULOSIN HYDROCHLORIDE 0.4 MG: 0.4 CAPSULE ORAL at 18:14

## 2020-02-21 RX ADMIN — POTASSIUM CHLORIDE 10 MEQ: 20 TABLET, EXTENDED RELEASE ORAL at 18:13

## 2020-02-21 RX ADMIN — INSULIN GLARGINE 46 UNITS: 100 INJECTION, SOLUTION SUBCUTANEOUS at 18:17

## 2020-02-21 RX ADMIN — NYSTATIN: 100000 CREAM TOPICAL at 05:07

## 2020-02-21 RX ADMIN — CHLORTHALIDONE 25 MG: 25 TABLET ORAL at 04:58

## 2020-02-21 RX ADMIN — POTASSIUM CHLORIDE 10 MEQ: 20 TABLET, EXTENDED RELEASE ORAL at 04:58

## 2020-02-21 RX ADMIN — SIMVASTATIN 10 MG: 10 TABLET, FILM COATED ORAL at 20:54

## 2020-02-21 RX ADMIN — METFORMIN HYDROCHLORIDE 500 MG: 500 TABLET ORAL at 17:30

## 2020-02-21 RX ADMIN — TAMSULOSIN HYDROCHLORIDE 0.4 MG: 0.4 CAPSULE ORAL at 04:58

## 2020-02-21 RX ADMIN — ASPIRIN 81 MG: 81 TABLET, COATED ORAL at 18:13

## 2020-02-21 RX ADMIN — METOPROLOL TARTRATE 25 MG: 25 TABLET ORAL at 18:13

## 2020-02-21 RX ADMIN — METOPROLOL TARTRATE 25 MG: 25 TABLET ORAL at 04:58

## 2020-02-21 RX ADMIN — BACITRACIN ZINC NEOMYCIN SULFATE POLYMYXIN B SULFATE: 400; 3.5; 5 OINTMENT TOPICAL at 18:00

## 2020-02-21 RX ADMIN — NYSTATIN: 100000 CREAM TOPICAL at 18:00

## 2020-02-21 RX ADMIN — OLANZAPINE 2.5 MG: 5 TABLET, ORALLY DISINTEGRATING ORAL at 18:12

## 2020-02-21 RX ADMIN — METFORMIN HYDROCHLORIDE 500 MG: 500 TABLET ORAL at 05:01

## 2020-02-21 RX ADMIN — GABAPENTIN 100 MG: 100 CAPSULE ORAL at 04:58

## 2020-02-21 RX ADMIN — BACITRACIN ZINC NEOMYCIN SULFATE POLYMYXIN B SULFATE: 400; 3.5; 5 OINTMENT TOPICAL at 05:06

## 2020-02-21 RX ADMIN — INSULIN HUMAN 7 UNITS: 100 INJECTION, SOLUTION PARENTERAL at 21:01

## 2020-02-21 ASSESSMENT — GAIT ASSESSMENTS
DISTANCE (FEET): 300
ASSISTIVE DEVICE: FRONT WHEEL WALKER
DEVIATION: DECREASED BASE OF SUPPORT
GAIT LEVEL OF ASSIST: STAND BY ASSIST

## 2020-02-21 ASSESSMENT — COGNITIVE AND FUNCTIONAL STATUS - GENERAL
MOVING TO AND FROM BED TO CHAIR: A LOT
DRESSING REGULAR UPPER BODY CLOTHING: A LITTLE
TOILETING: A LITTLE
TURNING FROM BACK TO SIDE WHILE IN FLAT BAD: A LITTLE
MOBILITY SCORE: 17
HELP NEEDED FOR BATHING: A LITTLE
WALKING IN HOSPITAL ROOM: A LITTLE
MOVING FROM LYING ON BACK TO SITTING ON SIDE OF FLAT BED: A LITTLE
DAILY ACTIVITIY SCORE: 19
PERSONAL GROOMING: A LITTLE
DRESSING REGULAR LOWER BODY CLOTHING: A LITTLE
STANDING UP FROM CHAIR USING ARMS: A LITTLE
SUGGESTED CMS G CODE MODIFIER DAILY ACTIVITY: CK
SUGGESTED CMS G CODE MODIFIER MOBILITY: CK
CLIMB 3 TO 5 STEPS WITH RAILING: A LITTLE

## 2020-02-21 ASSESSMENT — ACTIVITIES OF DAILY LIVING (ADL): TOILETING: UNABLE TO DETERMINE AT THIS TIME

## 2020-02-21 NOTE — DISCHARGE PLANNING
Pt is on service with [facility]. [facility] sees pt for nursing care, PT, and social work.

## 2020-02-21 NOTE — THERAPY
"Occupational Therapy Evaluation completed.   Functional Status: Pt is a 89 y/o male, admit with hematuria. Pt was confused at time of eval, was agitated and unable to provide PLOF or living situation- instead reported being a.... \" Vagrant and living on the streets\". This is not consistent with prior reports of living situation. Due to level of confusion, Pt requires Supervision to SBA/ Min A for fuctional transfers and self care- with poor safety awareness/ impulsivity. Pt is not safe to d/c home without 24 hr assist / Supervision and will benefit from Acute OT services.    Plan of Care: Will benefit from Occupational Therapy 3 times per week  Discharge Recommendations:  Equipment: Will Continue to Assess for Equipment Needs. Post-acute therapy Discharge to a transitional care facility for continued skilled therapy services.    See \"Rehab Therapy-Acute\" Patient Summary Report for complete documentation.    "

## 2020-02-21 NOTE — PROGRESS NOTES
Received report, assumed pt care. Discussed POC. No signs of blood in urine. Pt with no needs at this time. Will cont to monitor.

## 2020-02-21 NOTE — THERAPY
"Physical Therapy Treatment completed.   Bed Mobility:  Supine to Sit: Moderate Assist  Transfers: Sit to Stand: Minimal Assist  Gait: Level Of Assist: Stand by Assist with Front-Wheel Walker       Plan of Care: Will benefit from Physical Therapy 3 times per week  Discharge Recommendations: Equipment: Front-Wheel Walker. Recommend post-acute placement for continued physical therapy services prior to discharge home.       See \"Rehab Therapy-Acute\" Patient Summary Report for complete documentation.     Pt was recently admitted for medical managment of hematuria. Pt presented to PT with impaired balance, confusion, poor sequencing, and dec activity tolerance. Pt was able to demo SBA for ambulation/transfers, Mod A for bed mobility, and Min A for standing. Pt required frequent cues, redirection, reorienation during ambulation. Pt difficulty to navigate at times and required frequent encouragement to sit back in chair. With current confusion and functional mobility pt would benefit from skilled PT while in house, with recommendation for post acute therapy prior to d/c home, if pt is to go home pt will require 24/7 supervision upon d/c to home with recs for  therapy.   "

## 2020-02-21 NOTE — CARE PLAN
Problem: Safety  Goal: Will remain free from injury  Outcome: PROGRESSING AS EXPECTED  Goal: Will remain free from falls  Outcome: PROGRESSING AS EXPECTED  Note: Bed alarm on , non skid socks on, educated to call for assistance before getting out of bed. Patient needs reinforcement.     Problem: Infection  Goal: Will remain free from infection  Outcome: PROGRESSING AS EXPECTED     Problem: Venous Thromboembolism (VTW)/Deep Vein Thrombosis (DVT) Prevention:  Goal: Patient will participate in Venous Thrombosis (VTE)/Deep Vein Thrombosis (DVT)Prevention Measures  Outcome: PROGRESSING AS EXPECTED  Flowsheets  Taken 2/19/2020 2100 by Leisa Jasmine RENID  Risk Assessment Score: 1  VTE RISK: Moderate  Taken 2/20/2020 2351 by Betzy Alcaraz RLuis MiguelNLuis Miguel  Mechanical Prophylaxis: SCDs, Sequential Compression Device  SCDs, Sequential Compression Device: Refused  Pharmacologic Prophylaxis Used: (hematuria) Contraindicated per MD

## 2020-02-21 NOTE — PROGRESS NOTES
HOSPITAL MEDICINE PROGRESS NOTE    Date of service  2/20/2020    Chief Complaint  89 y.o. year old male here with Blood in Urine      Assessment/Plan  Interval History   Active Hospital Problems    Diagnosis   • DM (diabetes mellitus) (CMS-[facility]) [E11.9]     Priority: Medium   • Atrial flutter (HCC) [I48.92]     Priority: Low   • Penile erosion [N48.89]   • Gross hematuria [R31.0]   • Balanitis [N48.1]   • BPH (benign prostatic hyperplasia) [N40.0]     Seen and examined. Chart reviewed, including labs and any pertinent imaging.    Denies dysuria, no pain.  No events overnight, no fevers or chills.        Disposition  To remain inpatient     * Gross hematuria- (present on admission)  Assessment & Plan  With clotting and Sher catheter obstruction, now resolved with irrigation  Catheter with side-port placed, continuous bladder irrigation, continue until seen by urology  Messaged urology through [facility] text  Stop Xarelto, agree that patient may not be a good candidate for further anticoagulation    DM (diabetes mellitus) (CMS-[facility])- (present on admission)  Assessment & Plan  With hyperglycemia, 353 on admit  Continue Lantus 46 units at bedtime, continue metformin 500 mg twice daily.  Diabetic diet, Accu-Cheks before meals and at bedtime.  Sliding scale insulin 3-14 units.    Penile erosion  Assessment & Plan  With significant skin maceration and breakdown of distal shaft  Catheter placed  Urology consulted  Wound care continues    Balanitis- (present on admission)  Assessment & Plan  Initiate nystatin cream    BPH (benign prostatic hyperplasia)- (present on admission)  Assessment & Plan  With urinary obstruction and retention.  Status post Sher insertion which relieved the obstruction but now with hematuria.  Continue tamsulosin    Atrial flutter (HCC)- (present on admission)  Assessment & Plan  Continue metoprolol 25 mg twice daily, hold Xarelto due to hematuria    Addendum:  Urology signing off, patient to follow-up  outpatient  PT OT ordered for discharge planning    Consultants/Specialty  Urology    Hospital Course:     89-year-old male admitted with gross hematuria and occlusion of catheter       Review of Systems  Physical Exam   Review of Systems   Constitutional: Negative for chills and fever.   Eyes: Negative for blurred vision.   Cardiovascular: Negative for chest pain and palpitations.   Gastrointestinal: Negative for abdominal pain, constipation, diarrhea, heartburn, nausea and vomiting.   Genitourinary: Negative for dysuria, flank pain and urgency.   Skin: Negative for itching and rash.   Neurological: Negative for tingling, tremors and sensory change.   All other systems reviewed and are negative.    Vitals:    02/19/20 1705 02/19/20 2234 02/20/20 0500 02/20/20 1054   BP: 124/87 123/78 127/52 122/67   Pulse: 90 86 71 70   Resp: 18 18 18 18   Temp: 36.5 °C (97.7 °F) 36.6 °C (97.8 °F) 36.4 °C (97.5 °F) 36.6 °C (97.8 °F)   TempSrc: Oral Oral Oral Oral   SpO2: 96% 96% 92% 95%   Weight:       Height:         Physical Exam   Constitutional: He is oriented to person, place, and time and well-developed, well-nourished, and in no distress. No distress.   HENT:   Head: Normocephalic and atraumatic.   Mouth/Throat: Oropharynx is clear and moist.   Eyes: EOM are normal. Right eye exhibits no discharge. Left eye exhibits no discharge. No scleral icterus.   Neck: Normal range of motion. Neck supple.   Cardiovascular: Normal rate and intact distal pulses.   No murmur heard.  Pulmonary/Chest: Effort normal and breath sounds normal. No stridor. No respiratory distress. He has no wheezes. He has no rales.   Abdominal: Soft. He exhibits no distension. There is no abdominal tenderness. There is no rebound.   Genitourinary: No discharge found.    Genitourinary Comments: Distal penis with significant erosions  Red and macerated skin surrounding  Catheter placed with clear urine draining from irrigation     Musculoskeletal: Normal range of  motion.         General: No tenderness or edema.   Neurological: He is alert and oriented to person, place, and time. No cranial nerve deficit. Coordination normal.   Skin: Skin is warm and dry. He is not diaphoretic. No erythema.   Psychiatric: Memory, affect and judgment normal.   Nursing note and vitals reviewed.       Hematology Chemistry   Lab Results   Component Value Date/Time    WBC 8.1 02/19/2020 03:41 AM    HEMOGLOBIN 14.0 02/19/2020 03:41 AM    HEMATOCRIT 42.4 02/19/2020 03:41 AM    PLATELETCT 231 02/19/2020 03:41 AM     Lab Results   Component Value Date/Time    SODIUM 139 02/19/2020 03:41 AM    POTASSIUM 3.6 02/19/2020 03:41 AM    CHLORIDE 102 02/19/2020 03:41 AM    CO2 22 02/19/2020 03:41 AM    GLUCOSE 76 02/19/2020 03:41 AM    BUN 17 02/19/2020 03:41 AM    CREATININE 0.77 02/19/2020 03:41 AM    CREATININE 1.0 12/02/2008 07:26 AM         Labs not explicitly included in this progress note were reviewed by the author.   Radiology/imaging not explicitly included in this progress note was reviewed by the author.     Medications:  • neomycin-bacitracin-polymyxin   BID   • aspirin EC  81 mg Q EVENING   • chlorthalidone  25 mg BID   • gabapentin  100 mg BID   • metFORMIN  500 mg BID WITH MEALS   • metoprolol  25 mg BID   • potassium chloride SA  10 mEq BID   • simvastatin  10 mg Nightly   • tamsulosin  0.4 mg BID   • senna-docusate  2 Tab BID    And   • polyethylene glycol/lytes  1 Packet QDAY PRN    And   • magnesium hydroxide  30 mL QDAY PRN    And   • bisacodyl  10 mg QDAY PRN   • acetaminophen  650 mg Q6HRS PRN   • ondansetron  4 mg Q4HRS PRN   • ondansetron  4 mg Q4HRS PRN   • insulin regular  3-14 Units 4X/DAY ACHS    And   • glucose  16 g Q15 MIN PRN    And   • dextrose 10% bolus  250 mL Q15 MIN PRN   • OLANZapine zydis  2.5 mg Q EVENING   • insulin glargine  46 Units Q EVENING   • nystatin   BID       Radiology:     No orders to display       Full Code    I have performed a physical exam and reviewed  and updated ROS as of today, 2/20/2020.  In review of yesterday's note dated, 2/19/2020, there are no changes except as documented above.

## 2020-02-21 NOTE — PROGRESS NOTES
Patient alert and oriented x2- confused at times. Able to be reoriented. Vss, denies pain. Patient up with assist x1 with walker to bathroom. Plan for discharge home tomorrow.

## 2020-02-22 LAB
GLUCOSE BLD-MCNC: 135 MG/DL (ref 65–99)
GLUCOSE BLD-MCNC: 158 MG/DL (ref 65–99)
GLUCOSE BLD-MCNC: 197 MG/DL (ref 65–99)

## 2020-02-22 PROCEDURE — 99232 SBSQ HOSP IP/OBS MODERATE 35: CPT | Performed by: HOSPITALIST

## 2020-02-22 PROCEDURE — 770006 HCHG ROOM/CARE - MED/SURG/GYN SEMI*

## 2020-02-22 PROCEDURE — 700102 HCHG RX REV CODE 250 W/ 637 OVERRIDE(OP): Performed by: HOSPITALIST

## 2020-02-22 PROCEDURE — 665998 HH PPS REVENUE CREDIT

## 2020-02-22 PROCEDURE — 665999 HH PPS REVENUE DEBIT

## 2020-02-22 PROCEDURE — 82962 GLUCOSE BLOOD TEST: CPT | Mod: 91

## 2020-02-22 PROCEDURE — 700105 HCHG RX REV CODE 258: Performed by: INTERNAL MEDICINE

## 2020-02-22 PROCEDURE — A9270 NON-COVERED ITEM OR SERVICE: HCPCS | Performed by: HOSPITALIST

## 2020-02-22 RX ORDER — CHLORTHALIDONE 25 MG/1
25 TABLET ORAL
Status: DISCONTINUED | OUTPATIENT
Start: 2020-02-23 | End: 2020-02-25 | Stop reason: HOSPADM

## 2020-02-22 RX ORDER — SODIUM CHLORIDE 9 MG/ML
INJECTION, SOLUTION INTRAVENOUS CONTINUOUS
Status: DISCONTINUED | OUTPATIENT
Start: 2020-02-22 | End: 2020-02-25

## 2020-02-22 RX ADMIN — CHLORTHALIDONE 25 MG: 25 TABLET ORAL at 05:24

## 2020-02-22 RX ADMIN — POTASSIUM CHLORIDE 10 MEQ: 20 TABLET, EXTENDED RELEASE ORAL at 05:25

## 2020-02-22 RX ADMIN — INSULIN HUMAN 3 UNITS: 100 INJECTION, SOLUTION PARENTERAL at 20:39

## 2020-02-22 RX ADMIN — METOPROLOL TARTRATE 25 MG: 25 TABLET ORAL at 17:18

## 2020-02-22 RX ADMIN — TAMSULOSIN HYDROCHLORIDE 0.4 MG: 0.4 CAPSULE ORAL at 05:25

## 2020-02-22 RX ADMIN — BACITRACIN ZINC NEOMYCIN SULFATE POLYMYXIN B SULFATE: 400; 3.5; 5 OINTMENT TOPICAL at 05:27

## 2020-02-22 RX ADMIN — TAMSULOSIN HYDROCHLORIDE 0.4 MG: 0.4 CAPSULE ORAL at 17:18

## 2020-02-22 RX ADMIN — METOPROLOL TARTRATE 25 MG: 25 TABLET ORAL at 05:25

## 2020-02-22 RX ADMIN — METFORMIN HYDROCHLORIDE 500 MG: 500 TABLET ORAL at 09:13

## 2020-02-22 RX ADMIN — ASPIRIN 81 MG: 81 TABLET, COATED ORAL at 17:17

## 2020-02-22 RX ADMIN — SODIUM CHLORIDE: 9 INJECTION, SOLUTION INTRAVENOUS at 00:52

## 2020-02-22 RX ADMIN — NYSTATIN: 100000 CREAM TOPICAL at 05:26

## 2020-02-22 RX ADMIN — INSULIN HUMAN 3 UNITS: 100 INJECTION, SOLUTION PARENTERAL at 07:00

## 2020-02-22 RX ADMIN — BACITRACIN ZINC NEOMYCIN SULFATE POLYMYXIN B SULFATE: 400; 3.5; 5 OINTMENT TOPICAL at 17:23

## 2020-02-22 RX ADMIN — OLANZAPINE 2.5 MG: 5 TABLET, ORALLY DISINTEGRATING ORAL at 17:16

## 2020-02-22 RX ADMIN — INSULIN GLARGINE 46 UNITS: 100 INJECTION, SOLUTION SUBCUTANEOUS at 17:23

## 2020-02-22 RX ADMIN — SIMVASTATIN 10 MG: 10 TABLET, FILM COATED ORAL at 20:39

## 2020-02-22 RX ADMIN — POTASSIUM CHLORIDE 10 MEQ: 20 TABLET, EXTENDED RELEASE ORAL at 17:17

## 2020-02-22 RX ADMIN — NYSTATIN: 100000 CREAM TOPICAL at 17:23

## 2020-02-22 RX ADMIN — METFORMIN HYDROCHLORIDE 500 MG: 500 TABLET ORAL at 17:18

## 2020-02-22 ASSESSMENT — ENCOUNTER SYMPTOMS
SENSORY CHANGE: 0
TINGLING: 0
CHILLS: 0
PALPITATIONS: 0
TREMORS: 0
FEVER: 0
FLANK PAIN: 0

## 2020-02-22 NOTE — PROGRESS NOTES
"Pt was A&Ox4 at beginning of shift. Pt is currently A&Ox2 (Disoriented to place and event). Pt has had no c/o pain throughout this shift. Pt only c/o \"sensation\" of catheter but says he would not describe it as pain.  "

## 2020-02-22 NOTE — ASSESSMENT & PLAN NOTE
Waxing and waning cognition on 2/21/2020  Multiple etiologies  Discontinue nighttime gabapentin, appears to have helped  Delirium improving  Redirect as able    Unfortunately catheter in place is risk for worsening delirium

## 2020-02-22 NOTE — PROGRESS NOTES
HOSPITAL MEDICINE PROGRESS NOTE    Date of service  2/21/2020    Chief Complaint  89 y.o. year old male here with Blood in Urine    Assessment/Plan  Interval History   Active Hospital Problems    Diagnosis   • Delirium [R41.0]     Priority: High   • DM (diabetes mellitus) (CMS-[facility]) [E11.9]     Priority: Medium   • Atrial flutter ([facility]) [I48.92]     Priority: Low   • Penile erosion [N48.89]   • Gross hematuria [R31.0]   • Balanitis [N48.1]   • BPH (benign prostatic hyperplasia) [N40.0]     Seen and examined. Chart reviewed, including labs and any pertinent imaging.    This a.m. denies dysuria, no pain.  No events overnight, no fevers or chills.    Later revisited with patient, patient becoming more delirious.  Alert oriented only to self.  Denies pain except with manipulation of catheter.    Discussed with RN and case management at length.    Disposition  To remain inpatient, will likely need skilled nursing     * Gross hematuria- (present on admission)  Assessment & Plan  With clotting and Sher catheter obstruction, now resolved with irrigation  Three-way catheter placed, CBI now stopped  Urology seen, signed off, will follow-up with patient outpatient  Stop Xarelto, agree that patient may not be a good candidate for further anticoagulation    Delirium  Assessment & Plan  Waxing and waning cognition on 2/21/2020  Multiple etiologies  Will discontinue nighttime gabapentin  Redirect as able  Unfortunately catheter in place is risk for worsening delirium    DM (diabetes mellitus) (CMS-HCC)- (present on admission)  Assessment & Plan  With hyperglycemia, 353 on admit  Continue Lantus 46 units at bedtime, continue metformin 500 mg twice daily.  Diabetic diet, Accu-Cheks before meals and at bedtime.  Sliding scale insulin 3-14 units.    Penile erosion  Assessment & Plan  With significant skin maceration and breakdown of distal shaft  Catheter placed  Urology consulted  Wound care continues    Balanitis- (present on  admission)  Assessment & Plan  Initiate nystatin cream    BPH (benign prostatic hyperplasia)- (present on admission)  Assessment & Plan  With urinary obstruction and retention.  Status post Sher insertion which relieved the obstruction but now with hematuria.  Continue tamsulosin    Atrial flutter (HCC)- (present on admission)  Assessment & Plan  Continue metoprolol 25 mg twice daily, hold Xarelto due to hematuria    Consultants/Specialty  Urology    Hospital Course:     89-year-old male admitted with gross hematuria and occlusion of catheter       Review of Systems  Physical Exam   Review of Systems   Unable to perform ROS: Mental acuity     Vitals:    02/20/20 1700 02/20/20 2228 02/21/20 0428 02/21/20 1010   BP: 141/73 118/59 116/76 125/70   Pulse: 93 78 88 84   Resp: 18 18 18 18   Temp: 36.6 °C (97.9 °F) 36.8 °C (98.3 °F) 36.7 °C (98 °F) 36.4 °C (97.6 °F)   TempSrc: Oral Oral Oral Oral   SpO2: 98% 97% 93% 94%   Weight:       Height:         Physical Exam   Constitutional: No distress.   HENT:   Head: Normocephalic and atraumatic.   Mouth/Throat: Oropharynx is clear and moist.   Eyes: EOM are normal. Right eye exhibits no discharge. Left eye exhibits no discharge. No scleral icterus.   Neck: Normal range of motion.   Cardiovascular: Normal rate and intact distal pulses.   No murmur heard.  Pulmonary/Chest: Effort normal. No stridor. No respiratory distress. He has no wheezes. He has no rales.   Abdominal: Soft. He exhibits no distension. There is no abdominal tenderness. There is no rebound.   Genitourinary: No discharge found.    Genitourinary Comments: Three-way catheter in place  Skin breakdown surrounding penis     Musculoskeletal: Normal range of motion.         General: No tenderness or edema.   Neurological: He is alert. No cranial nerve deficit. Coordination abnormal.   Becoming more confused in afternoon, oriented only to self   Skin: Skin is warm and dry. He is not diaphoretic. No erythema.    Psychiatric: Judgment normal.   Nursing note and vitals reviewed.       Hematology Chemistry   Lab Results   Component Value Date/Time    WBC 8.1 02/19/2020 03:41 AM    HEMOGLOBIN 14.0 02/19/2020 03:41 AM    HEMATOCRIT 42.4 02/19/2020 03:41 AM    PLATELETCT 231 02/19/2020 03:41 AM     Lab Results   Component Value Date/Time    SODIUM 139 02/19/2020 03:41 AM    POTASSIUM 3.6 02/19/2020 03:41 AM    CHLORIDE 102 02/19/2020 03:41 AM    CO2 22 02/19/2020 03:41 AM    GLUCOSE 76 02/19/2020 03:41 AM    BUN 17 02/19/2020 03:41 AM    CREATININE 0.77 02/19/2020 03:41 AM    CREATININE 1.0 12/02/2008 07:26 AM         Labs not explicitly included in this progress note were reviewed by the author.   Radiology/imaging not explicitly included in this progress note was reviewed by the author.     Medications:  • neomycin-bacitracin-polymyxin   BID   • aspirin EC  81 mg Q EVENING   • chlorthalidone  25 mg BID   • metFORMIN  500 mg BID WITH MEALS   • metoprolol  25 mg BID   • potassium chloride SA  10 mEq BID   • simvastatin  10 mg Nightly   • tamsulosin  0.4 mg BID   • senna-docusate  2 Tab BID    And   • polyethylene glycol/lytes  1 Packet QDAY PRN    And   • magnesium hydroxide  30 mL QDAY PRN    And   • bisacodyl  10 mg QDAY PRN   • acetaminophen  650 mg Q6HRS PRN   • ondansetron  4 mg Q4HRS PRN   • ondansetron  4 mg Q4HRS PRN   • insulin regular  3-14 Units 4X/DAY ACHS    And   • glucose  16 g Q15 MIN PRN    And   • dextrose 10% bolus  250 mL Q15 MIN PRN   • OLANZapine zydis  2.5 mg Q EVENING   • insulin glargine  46 Units Q EVENING   • nystatin   BID       Radiology:     No orders to display       Full Code    I have performed a physical exam and reviewed and updated ROS as of today, 2/21/2020.  In review of yesterday's note dated, 2/20/2020, there are no changes except as documented above.

## 2020-02-22 NOTE — CARE PLAN
Problem: Safety  Goal: Will remain free from injury  Outcome: PROGRESSING AS EXPECTED  Intervention: Provide assistance with mobility  Flowsheets (Taken 2/21/2020 2100)  Assistance: Assistance of One  Ambulation Tolerance: Does Not Tolerate  Intervention: Collaborate with Interdisciplinary Team for safe transfer and mobilization techniques  Flowsheets (Taken 2/21/2020 2100)  Assistive Devices: Walker - front wheel  Goal: Will remain free from falls  Outcome: PROGRESSING AS EXPECTED  Intervention: Assess risk factors for falls  Flowsheets (Taken 2/21/2020 2100)  Pt Calls for Assistance: Yes  Intervention: Implement fall precautions  Flowsheets  Taken 2/22/2020 0411  Bed Alarm: Yes - Alarm On  Taken 2/21/2020 2100  Environmental Precautions:   Treaded Slipper Socks on Patient   Personal Belongings, Wastebasket, [employer] etc. in Easy Reach   Transferred to Stronger Side   Report Given to Other Health Care Providers Regarding Fall Risk   Bed in Low Position   Communication Sign for Patients & Families   Mobility Assessed & Appropriate Sign Placed     Problem: Pain Management  Goal: Pain level will decrease to patient's comfort goal  Outcome: PROGRESSING AS EXPECTED

## 2020-02-22 NOTE — PROGRESS NOTES
"HOSPITAL MEDICINE PROGRESS NOTE    Date of service  2/22/2020    Chief Complaint  89 y.o. year old male here with Blood in Urine    Assessment/Plan  Interval History   Active Hospital Problems    Diagnosis   • Delirium [R41.0]     Priority: High   • DM (diabetes mellitus) (CMS-[facility]) [E11.9]     Priority: Medium   • Atrial flutter ([facility]) [I48.92]     Priority: Low   • Penile erosion [N48.89]   • Gross hematuria [R31.0]   • Balanitis [N48.1]   • BPH (benign prostatic hyperplasia) [N40.0]     Seen and examined. Chart reviewed, including labs and any pertinent imaging.    Doing better this morning, now alert and oriented x4.  \"Sometimes it happens to me\" when discussing his disorientation.  Denies penile pain, denies any chest pain or shortness of breath.  Lying in bed watching TV comfortably.    Describes his caregivers at home, reports that someone comes to help him out 4 times a week other times he is on his own to do his ADLs.  He feels he is at his baseline is able to go and do this.  He is open to skilled nursing if needed also.    Urology to follow-up with patient as outpatient.  His three-way catheter has 2 ports plugged, draining clear yellow urine.    Discussed with RN and case management at length.    Disposition  To remain inpatient, will likely need skilled nursing     * Gross hematuria- (present on admission)  Assessment & Plan  With clotting and Sher catheter obstruction, now resolved with irrigation  Three-way catheter placed, CBI now stopped  Urology seen, signed off, will follow-up with patient outpatient  Stop Xarelto, agree that patient may not be a good candidate for further anticoagulation    Delirium  Assessment & Plan  Waxing and waning cognition on 2/21/2020  Multiple etiologies  Discontinue nighttime gabapentin, appears to have helped  Delirium improving  Redirect as able    Unfortunately catheter in place is risk for worsening delirium    DM (diabetes mellitus) (CMS-[facility])- (present on " admission)  Assessment & Plan  With hyperglycemia, 353 on admit  Continue Lantus 46 units at bedtime, continue metformin 500 mg twice daily.  Diabetic diet, Accu-Cheks before meals and at bedtime.  Sliding scale insulin 3-14 units.    Penile erosion  Assessment & Plan  With significant skin maceration and breakdown of distal shaft  Catheter placed  Urology consulted  Wound care continues    Balanitis- (present on admission)  Assessment & Plan  Initiate nystatin cream    BPH (benign prostatic hyperplasia)- (present on admission)  Assessment & Plan  With urinary obstruction and retention.  Status post Sher insertion which relieved the obstruction but now with hematuria.  Continue tamsulosin    Atrial flutter (HCC)- (present on admission)  Assessment & Plan  Continue metoprolol 25 mg twice daily, hold Xarelto due to hematuria    Consultants/Specialty  Urology    Hospital Course:     89-year-old male admitted with gross hematuria and occlusion of catheter       Review of Systems  Physical Exam   Review of Systems   Constitutional: Positive for malaise/fatigue. Negative for chills and fever.   Cardiovascular: Negative for chest pain and palpitations.   Genitourinary: Negative for dysuria, flank pain, hematuria and urgency.   Skin: Negative for itching and rash.   Neurological: Negative for tingling, tremors and sensory change.   All other systems reviewed and are negative.    Vitals:    02/21/20 2317 02/21/20 2350 02/22/20 0341 02/22/20 1020   BP: 128/62 (!) 80/52 119/72 120/75   Pulse: 84 81 88 83   Resp: 18   18   Temp: 36.7 °C (98.1 °F)   36.4 °C (97.6 °F)   TempSrc: Oral   Oral   SpO2: 93% 95%  94%   Weight:       Height:         Physical Exam   Constitutional: He is oriented to person, place, and time. No distress.   HENT:   Head: Normocephalic and atraumatic.   Mouth/Throat: Oropharynx is clear and moist.   Eyes: EOM are normal. Right eye exhibits no discharge. Left eye exhibits no discharge. No scleral icterus.    Neck: Normal range of motion.   Cardiovascular: Normal rate and intact distal pulses.   No murmur heard.  Pulmonary/Chest: Effort normal. No stridor. No respiratory distress. He has no wheezes. He has no rales.   Abdominal: Soft. He exhibits no distension. There is no abdominal tenderness. There is no rebound.   Genitourinary: No discharge found.    Genitourinary Comments: Three-way catheter in place  Skin breakdown surrounding penis     Musculoskeletal: Normal range of motion.         General: No tenderness or edema.   Neurological: He is alert and oriented to person, place, and time. No cranial nerve deficit. Coordination abnormal.   Skin: Skin is warm and dry. He is not diaphoretic. No erythema.   Psychiatric: Affect and judgment normal.   Nursing note and vitals reviewed.       Hematology Chemistry   Lab Results   Component Value Date/Time    WBC 8.1 02/19/2020 03:41 AM    HEMOGLOBIN 14.0 02/19/2020 03:41 AM    HEMATOCRIT 42.4 02/19/2020 03:41 AM    PLATELETCT 231 02/19/2020 03:41 AM     Lab Results   Component Value Date/Time    SODIUM 139 02/19/2020 03:41 AM    POTASSIUM 3.6 02/19/2020 03:41 AM    CHLORIDE 102 02/19/2020 03:41 AM    CO2 22 02/19/2020 03:41 AM    GLUCOSE 76 02/19/2020 03:41 AM    BUN 17 02/19/2020 03:41 AM    CREATININE 0.77 02/19/2020 03:41 AM    CREATININE 1.0 12/02/2008 07:26 AM         Labs not explicitly included in this progress note were reviewed by the author.   Radiology/imaging not explicitly included in this progress note was reviewed by the author.     Medications:  • NS   Continuous   • [START ON 2/23/2020] chlorthalidone  25 mg Q DAY   • neomycin-bacitracin-polymyxin   BID   • aspirin EC  81 mg Q EVENING   • metFORMIN  500 mg BID WITH MEALS   • metoprolol  25 mg BID   • potassium chloride SA  10 mEq BID   • simvastatin  10 mg Nightly   • tamsulosin  0.4 mg BID   • senna-docusate  2 Tab BID    And   • polyethylene glycol/lytes  1 Packet QDAY PRN    And   • magnesium hydroxide  30  mL QDAY PRN    And   • bisacodyl  10 mg QDAY PRN   • acetaminophen  650 mg Q6HRS PRN   • ondansetron  4 mg Q4HRS PRN   • ondansetron  4 mg Q4HRS PRN   • insulin regular  3-14 Units 4X/DAY ACHS    And   • glucose  16 g Q15 MIN PRN    And   • dextrose 10% bolus  250 mL Q15 MIN PRN   • OLANZapine zydis  2.5 mg Q EVENING   • insulin glargine  46 Units Q EVENING   • nystatin   BID       Radiology:     No orders to display       Full Code    I have performed a physical exam and reviewed and updated ROS as of today, 2/22/2020.  In review of yesterday's note dated, 2/21/2020, there are no changes except as documented above.

## 2020-02-22 NOTE — PROGRESS NOTES
"Pt c/o \"feeling like I'm going to faint\" following sitting up on EOB to stand to ambulate to the bathroom with RN. BPs at EOB were 80/46 then 71/46. Following return to supine position, 114/66 then 128/62. 30 minutes later, pt asked to use the commode instead, with 2 RN assist. Pt sat on EOB and following a few minutes at EOB, started to c/o \"feeling faint\" again. Pt's BP 80/52. MD paged. MD ordered fluids for pt. Current BP supine 119/72.   "

## 2020-02-23 LAB
GLUCOSE BLD-MCNC: 123 MG/DL (ref 65–99)
GLUCOSE BLD-MCNC: 225 MG/DL (ref 65–99)
GLUCOSE BLD-MCNC: 91 MG/DL (ref 65–99)

## 2020-02-23 PROCEDURE — A9270 NON-COVERED ITEM OR SERVICE: HCPCS | Performed by: HOSPITALIST

## 2020-02-23 PROCEDURE — 770006 HCHG ROOM/CARE - MED/SURG/GYN SEMI*

## 2020-02-23 PROCEDURE — 99232 SBSQ HOSP IP/OBS MODERATE 35: CPT | Performed by: HOSPITALIST

## 2020-02-23 PROCEDURE — 665999 HH PPS REVENUE DEBIT

## 2020-02-23 PROCEDURE — 82962 GLUCOSE BLOOD TEST: CPT

## 2020-02-23 PROCEDURE — 700102 HCHG RX REV CODE 250 W/ 637 OVERRIDE(OP): Performed by: HOSPITALIST

## 2020-02-23 PROCEDURE — 665998 HH PPS REVENUE CREDIT

## 2020-02-23 RX ADMIN — BACITRACIN ZINC NEOMYCIN SULFATE POLYMYXIN B SULFATE: 400; 3.5; 5 OINTMENT TOPICAL at 05:11

## 2020-02-23 RX ADMIN — NYSTATIN: 100000 CREAM TOPICAL at 05:12

## 2020-02-23 RX ADMIN — TAMSULOSIN HYDROCHLORIDE 0.4 MG: 0.4 CAPSULE ORAL at 05:07

## 2020-02-23 RX ADMIN — ASPIRIN 81 MG: 81 TABLET, COATED ORAL at 18:02

## 2020-02-23 RX ADMIN — METFORMIN HYDROCHLORIDE 500 MG: 500 TABLET ORAL at 18:02

## 2020-02-23 RX ADMIN — SIMVASTATIN 10 MG: 10 TABLET, FILM COATED ORAL at 20:17

## 2020-02-23 RX ADMIN — METOPROLOL TARTRATE 25 MG: 25 TABLET ORAL at 18:02

## 2020-02-23 RX ADMIN — NYSTATIN: 100000 CREAM TOPICAL at 18:05

## 2020-02-23 RX ADMIN — TAMSULOSIN HYDROCHLORIDE 0.4 MG: 0.4 CAPSULE ORAL at 18:02

## 2020-02-23 RX ADMIN — BACITRACIN ZINC NEOMYCIN SULFATE POLYMYXIN B SULFATE: 400; 3.5; 5 OINTMENT TOPICAL at 18:05

## 2020-02-23 RX ADMIN — POTASSIUM CHLORIDE 10 MEQ: 20 TABLET, EXTENDED RELEASE ORAL at 18:02

## 2020-02-23 RX ADMIN — CHLORTHALIDONE 25 MG: 25 TABLET ORAL at 05:07

## 2020-02-23 RX ADMIN — POTASSIUM CHLORIDE 10 MEQ: 20 TABLET, EXTENDED RELEASE ORAL at 05:06

## 2020-02-23 RX ADMIN — SENNOSIDES AND DOCUSATE SODIUM 2 TABLET: 8.6; 5 TABLET ORAL at 05:07

## 2020-02-23 RX ADMIN — OLANZAPINE 2.5 MG: 5 TABLET, ORALLY DISINTEGRATING ORAL at 18:00

## 2020-02-23 RX ADMIN — METFORMIN HYDROCHLORIDE 500 MG: 500 TABLET ORAL at 06:38

## 2020-02-23 RX ADMIN — INSULIN HUMAN 4 UNITS: 100 INJECTION, SOLUTION PARENTERAL at 20:17

## 2020-02-23 RX ADMIN — METOPROLOL TARTRATE 25 MG: 25 TABLET ORAL at 05:07

## 2020-02-23 ASSESSMENT — ENCOUNTER SYMPTOMS
FEVER: 0
CHILLS: 0
SHORTNESS OF BREATH: 0
PALPITATIONS: 0
FLANK PAIN: 0
COUGH: 0

## 2020-02-23 NOTE — PROGRESS NOTES
HOSPITAL MEDICINE PROGRESS NOTE    Date of service  2/23/2020    Chief Complaint  89 y.o. year old male here with Blood in Urine    Assessment/Plan  Interval History   Active Hospital Problems    Diagnosis   • Delirium [R41.0]     Priority: High   • DM (diabetes mellitus) (CMS-[facility]) [E11.9]     Priority: Medium   • Atrial flutter ([facility]) [I48.92]     Priority: Low   • Penile erosion [N48.89]   • Gross hematuria [R31.0]   • Balanitis [N48.1]   • BPH (benign prostatic hyperplasia) [N40.0]     Seen and examined. Chart reviewed, including labs and any pertinent imaging.    Doing better this morning, orientation improved. No fever or chills overnight.     Urology to follow-up with patient as outpatient.  His three-way catheter has 2 ports plugged, draining clear yellow urine.    Discussed with RN and case management at length.    Disposition  To remain inpatient, will likely need skilled nursing     * Gross hematuria- (present on admission)  Assessment & Plan  With clotting and Sher catheter obstruction, now resolved with irrigation  Three-way catheter placed, CBI now stopped  Urology seen, signed off, will follow-up with patient outpatient  Stop Xarelto, agree that patient may not be a good candidate for further anticoagulation    Delirium  Assessment & Plan  Waxing and waning cognition on 2/21/2020  Multiple etiologies  Discontinue nighttime gabapentin, appears to have helped  Delirium improving  Redirect as able    Unfortunately catheter in place is risk for worsening delirium    DM (diabetes mellitus) (CMS-HCC)- (present on admission)  Assessment & Plan  With hyperglycemia, 353 on admit  Continue Lantus 46 units at bedtime, continue metformin 500 mg twice daily.  Diabetic diet, Accu-Cheks before meals and at bedtime.  Sliding scale insulin 3-14 units.    Penile erosion  Assessment & Plan  With significant skin maceration and breakdown of distal shaft  Catheter placed  Urology consulted  Wound care  continues    Balanitis- (present on admission)  Assessment & Plan  Initiate nystatin cream    BPH (benign prostatic hyperplasia)- (present on admission)  Assessment & Plan  With urinary obstruction and retention.  Status post Sher insertion which relieved the obstruction but now with hematuria.  Continue tamsulosin    Atrial flutter (HCC)- (present on admission)  Assessment & Plan  Continue metoprolol 25 mg twice daily, hold Xarelto due to hematuria    Consultants/Specialty  Urology    Hospital Course:     89-year-old male admitted with gross hematuria and occlusion of catheter       Review of Systems  Physical Exam   Review of Systems   Constitutional: Positive for malaise/fatigue. Negative for chills and fever.   Respiratory: Negative for cough and shortness of breath.    Cardiovascular: Negative for chest pain and palpitations.   Genitourinary: Negative for dysuria, flank pain, hematuria and urgency.   Skin: Negative for itching and rash.   All other systems reviewed and are negative.    Vitals:    02/22/20 2300 02/23/20 0409 02/23/20 0500 02/23/20 1140   BP: 125/77 101/58 116/55 106/53   Pulse: 71 87 82 72   Resp: 18  18 18   Temp: 36.7 °C (98.1 °F)  36.3 °C (97.4 °F) 36.6 °C (97.8 °F)   TempSrc: Oral  Oral Oral   SpO2: 93%  91% 99%   Weight:       Height:         Physical Exam   Constitutional: He is oriented to person, place, and time. No distress.   HENT:   Head: Normocephalic and atraumatic.   Mouth/Throat: Oropharynx is clear and moist.   Eyes: EOM are normal. Right eye exhibits no discharge. Left eye exhibits no discharge. No scleral icterus.   Neck: Normal range of motion.   Cardiovascular: Normal rate and intact distal pulses.   No murmur heard.  Pulmonary/Chest: Effort normal. No stridor. No respiratory distress. He has no wheezes. He has no rales.   Abdominal: Soft. He exhibits no distension. There is no abdominal tenderness. There is no rebound.   Genitourinary: No discharge found.    Genitourinary  Comments: Three-way catheter in place  Skin breakdown surrounding penis     Musculoskeletal: Normal range of motion.         General: No tenderness or edema.   Neurological: He is alert and oriented to person, place, and time. No cranial nerve deficit. Coordination abnormal.   Skin: Skin is warm and dry. He is not diaphoretic. No erythema.   Psychiatric: Affect and judgment normal.   Nursing note and vitals reviewed.       Hematology Chemistry   Lab Results   Component Value Date/Time    WBC 8.1 02/19/2020 03:41 AM    HEMOGLOBIN 14.0 02/19/2020 03:41 AM    HEMATOCRIT 42.4 02/19/2020 03:41 AM    PLATELETCT 231 02/19/2020 03:41 AM     Lab Results   Component Value Date/Time    SODIUM 139 02/19/2020 03:41 AM    POTASSIUM 3.6 02/19/2020 03:41 AM    CHLORIDE 102 02/19/2020 03:41 AM    CO2 22 02/19/2020 03:41 AM    GLUCOSE 76 02/19/2020 03:41 AM    BUN 17 02/19/2020 03:41 AM    CREATININE 0.77 02/19/2020 03:41 AM    CREATININE 1.0 12/02/2008 07:26 AM         Labs not explicitly included in this progress note were reviewed by the author.   Radiology/imaging not explicitly included in this progress note was reviewed by the author.     Medications:  • NS   Continuous   • chlorthalidone  25 mg Q DAY   • neomycin-bacitracin-polymyxin   BID   • aspirin EC  81 mg Q EVENING   • metFORMIN  500 mg BID WITH MEALS   • metoprolol  25 mg BID   • potassium chloride SA  10 mEq BID   • simvastatin  10 mg Nightly   • tamsulosin  0.4 mg BID   • senna-docusate  2 Tab BID    And   • polyethylene glycol/lytes  1 Packet QDAY PRN    And   • magnesium hydroxide  30 mL QDAY PRN    And   • bisacodyl  10 mg QDAY PRN   • acetaminophen  650 mg Q6HRS PRN   • ondansetron  4 mg Q4HRS PRN   • ondansetron  4 mg Q4HRS PRN   • insulin regular  3-14 Units 4X/DAY ACHS    And   • glucose  16 g Q15 MIN PRN    And   • dextrose 10% bolus  250 mL Q15 MIN PRN   • OLANZapine zydis  2.5 mg Q EVENING   • insulin glargine  46 Units Q EVENING   • nystatin   BID        Radiology:     No orders to display       Full Code    I have performed a physical exam and reviewed and updated ROS as of today, 2/23/2020.  In review of yesterday's note dated, 2/22/2020, there are no changes except as documented above.

## 2020-02-23 NOTE — PROGRESS NOTES
Pt attempted to exit from bed multiple times this shift. Pt reoriented and reeducated. Pt did not c/o dizziness when sitting up in bed this shift.

## 2020-02-23 NOTE — CARE PLAN
Problem: Safety  Goal: Will remain free from injury  Outcome: PROGRESSING AS EXPECTED  Intervention: Provide assistance with mobility  Flowsheets (Taken 2/21/2020 2100)  Assistance: Assistance of One  Ambulation Tolerance: Does Not Tolerate  Intervention: Collaborate with Interdisciplinary Team for safe transfer and mobilization techniques  Flowsheets (Taken 2/22/2020 0756 by You Odell RLuis MiguelNLuis Miguel)  Assistive Devices: Walker - front wheel  Goal: Will remain free from falls  Outcome: PROGRESSING AS EXPECTED  Intervention: Assess risk factors for falls  Flowsheets (Taken 2/23/2020 0752)  Pt Calls for Assistance: No  Intervention: Implement fall precautions  Flowsheets  Taken 2/22/2020 0411  Bed Alarm: Yes - Alarm On  Taken 2/22/2020 2030  Environmental Precautions:   Treaded Slipper Socks on Patient   Personal Belongings, Wastebasket, Call Bell etc. in Easy Reach   Report Given to Other Health Care Providers Regarding Fall Risk   Bed in Low Position   Communication Sign for Patients & Families   Mobility Assessed & Appropriate Sign Placed     Problem: Pain Management  Goal: Pain level will decrease to patient's comfort goal  Outcome: PROGRESSING AS EXPECTED

## 2020-02-23 NOTE — DISCHARGE PLANNING
Agency/Facility Name: [facility]  Spoke To: Darrion  Outcome: Needs DC plan, support? After SNF, will he have 24/7 care? If support is there, they can accept.

## 2020-02-23 NOTE — DISCHARGE PLANNING
"Care Transition Team Assessment    Discussed pt with BSN. Per RN pt experiences intermittent confusion.   Met with pt at bedside and pt was able to answer questions appropriately. Pt states he lives with his \"lady friend\", Mercedes. Pt uses a FWW at baseline. He reports he doesn't require assistance with ADL's and he can still cook his own meals. He states Mercedes provides assistance with transportation.  Mercedes works four days a week and stays home with him on her days off. Pt states he has a daughter named Chantal Jaramillo but he doesn't want her contacted unless it's an emergency. Pt was unable to provide her contact information stating he doesn't have it. [facility] also [facility].  Discussed recommendation for SNF placement prior to returning home. Pt agreeable to SNF and made choice for [facility] locations.   Choice form completed and faxed to [facility]    Attempted to call Mercedes, however there was no answer and no option to leave a VM.     Information Source  Orientation : Disoriented to Event, Disoriented to Time  Information Given By: Patient    Elopement Risk  Legal Hold: No  Ambulatory or Self Mobile in Wheelchair: Yes  Disoriented: Place-At Risk for Elopement, Situation-At Risk for Elopement  Psychiatric Symptoms: None  History of Wandering: No  Elopement this Admit: No  Vocalizing Wanting to Leave: No  Displays Behaviors, Body Language Wanting to Leave: No-Not at Risk for Elopement  Elopement Risk: Not at Risk for Elopement    Interdisciplinary Discharge Planning  Does Admitting Nurse Feel This Could be a Complex Discharge?: Yes  Primary Care Physician: Gladis Sol  Lives with - Patient's Self Care Capacity: Unable To Determine At This Time  Patient or legal guardian wants to designate a caregiver (see row info): No  Support Systems: Spouse / Significant Other  Housing / Facility: Unable To Determine At This Time  Do You Take your Prescribed Medications Regularly: Yes  Able to Return to Previous ADL's: " Yes  Mobility Issues: Yes  Prior Services: Unable To Determine At This Time  Assistance Needed: Unknown at this Time  Durable Medical Equipment: Walker    Discharge Preparedness  What is your plan after discharge?: Skilled nursing facility  What are your discharge supports?: (friend)  Prior Functional Level: Ambulatory, Independent with Activities of Daily Living, Independent with Medication Management  Difficulity with ADLs: None  Difficulity with IADLs: None    Functional Assesment  Prior Functional Level: Ambulatory, Independent with Activities of Daily Living, Independent with Medication Management    Finances  Financial Barriers to Discharge: No  Prescription Coverage: Yes    Vision / Hearing Impairment  Right Eye Vision: Impaired  Left Eye Vision: Impaired  Hearing Impairment : Yes  Hearing Impairment: Both Ears, Hearing Device Not Available  Does Pt Need Special Equipment for the Hearing Impaired?: No              Domestic Abuse  Have you ever been the victim of abuse or violence?: No  Physical Abuse or Sexual Abuse: No  Verbal Abuse or Emotional Abuse: No  Possible Abuse Reported to:: Not Applicable    Psychological Assessment  History of Substance Abuse: None  History of Psychiatric Problems: No  Non-compliant with Treatment: No  Newly Diagnosed Illness: Yes    Discharge Risks or Barriers  Discharge risks or barriers?: No    Anticipated Discharge Information  Anticipated discharge disposition: SNF

## 2020-02-24 LAB
GLUCOSE BLD-MCNC: 162 MG/DL (ref 65–99)
GLUCOSE BLD-MCNC: 178 MG/DL (ref 65–99)
GLUCOSE BLD-MCNC: 284 MG/DL (ref 65–99)

## 2020-02-24 PROCEDURE — A9270 NON-COVERED ITEM OR SERVICE: HCPCS | Performed by: INTERNAL MEDICINE

## 2020-02-24 PROCEDURE — 82962 GLUCOSE BLOOD TEST: CPT | Mod: 91

## 2020-02-24 PROCEDURE — 700102 HCHG RX REV CODE 250 W/ 637 OVERRIDE(OP): Performed by: INTERNAL MEDICINE

## 2020-02-24 PROCEDURE — 665998 HH PPS REVENUE CREDIT

## 2020-02-24 PROCEDURE — 770006 HCHG ROOM/CARE - MED/SURG/GYN SEMI*

## 2020-02-24 PROCEDURE — 700105 HCHG RX REV CODE 258: Performed by: INTERNAL MEDICINE

## 2020-02-24 PROCEDURE — 700102 HCHG RX REV CODE 250 W/ 637 OVERRIDE(OP): Performed by: HOSPITALIST

## 2020-02-24 PROCEDURE — 99232 SBSQ HOSP IP/OBS MODERATE 35: CPT | Performed by: HOSPITALIST

## 2020-02-24 PROCEDURE — 665999 HH PPS REVENUE DEBIT

## 2020-02-24 PROCEDURE — A9270 NON-COVERED ITEM OR SERVICE: HCPCS | Performed by: HOSPITALIST

## 2020-02-24 RX ORDER — NYSTATIN 100000 U/G
1 CREAM TOPICAL 2 TIMES DAILY
Qty: 1 TUBE | Refills: 0 | Status: ON HOLD | OUTPATIENT
Start: 2020-02-24 | End: 2021-05-03

## 2020-02-24 RX ORDER — CHLORTHALIDONE 25 MG/1
25 TABLET ORAL DAILY
Qty: 30 TAB | Refills: 1 | Status: ON HOLD | OUTPATIENT
Start: 2020-02-25 | End: 2021-07-20

## 2020-02-24 RX ORDER — DIPHENHYDRAMINE HCL 25 MG
25 TABLET ORAL NIGHTLY PRN
Status: DISCONTINUED | OUTPATIENT
Start: 2020-02-24 | End: 2020-02-25 | Stop reason: HOSPADM

## 2020-02-24 RX ADMIN — TAMSULOSIN HYDROCHLORIDE 0.4 MG: 0.4 CAPSULE ORAL at 06:10

## 2020-02-24 RX ADMIN — CHLORTHALIDONE 25 MG: 25 TABLET ORAL at 06:10

## 2020-02-24 RX ADMIN — TAMSULOSIN HYDROCHLORIDE 0.4 MG: 0.4 CAPSULE ORAL at 17:35

## 2020-02-24 RX ADMIN — NYSTATIN: 100000 CREAM TOPICAL at 17:35

## 2020-02-24 RX ADMIN — INSULIN HUMAN 3 UNITS: 100 INJECTION, SOLUTION PARENTERAL at 06:15

## 2020-02-24 RX ADMIN — INSULIN HUMAN 7 UNITS: 100 INJECTION, SOLUTION PARENTERAL at 10:56

## 2020-02-24 RX ADMIN — DIPHENHYDRAMINE HCL 25 MG: 25 TABLET ORAL at 01:37

## 2020-02-24 RX ADMIN — METFORMIN HYDROCHLORIDE 500 MG: 500 TABLET ORAL at 08:52

## 2020-02-24 RX ADMIN — METFORMIN HYDROCHLORIDE 500 MG: 500 TABLET ORAL at 17:35

## 2020-02-24 RX ADMIN — METOPROLOL TARTRATE 25 MG: 25 TABLET ORAL at 17:35

## 2020-02-24 RX ADMIN — OLANZAPINE 2.5 MG: 5 TABLET, ORALLY DISINTEGRATING ORAL at 17:35

## 2020-02-24 RX ADMIN — INSULIN GLARGINE 46 UNITS: 100 INJECTION, SOLUTION SUBCUTANEOUS at 21:41

## 2020-02-24 RX ADMIN — SIMVASTATIN 10 MG: 10 TABLET, FILM COATED ORAL at 21:44

## 2020-02-24 RX ADMIN — POTASSIUM CHLORIDE 10 MEQ: 20 TABLET, EXTENDED RELEASE ORAL at 06:09

## 2020-02-24 RX ADMIN — SODIUM CHLORIDE: 9 INJECTION, SOLUTION INTRAVENOUS at 08:45

## 2020-02-24 RX ADMIN — POTASSIUM CHLORIDE 10 MEQ: 20 TABLET, EXTENDED RELEASE ORAL at 17:35

## 2020-02-24 RX ADMIN — METOPROLOL TARTRATE 25 MG: 25 TABLET ORAL at 06:10

## 2020-02-24 RX ADMIN — INSULIN HUMAN 4 UNITS: 100 INJECTION, SOLUTION PARENTERAL at 21:41

## 2020-02-24 RX ADMIN — INSULIN HUMAN 3 UNITS: 100 INJECTION, SOLUTION PARENTERAL at 17:25

## 2020-02-24 RX ADMIN — ASPIRIN 81 MG: 81 TABLET, COATED ORAL at 17:35

## 2020-02-24 RX ADMIN — BACITRACIN ZINC NEOMYCIN SULFATE POLYMYXIN B SULFATE: 400; 3.5; 5 OINTMENT TOPICAL at 17:36

## 2020-02-24 RX ADMIN — NYSTATIN: 100000 CREAM TOPICAL at 06:11

## 2020-02-24 RX ADMIN — BACITRACIN ZINC NEOMYCIN SULFATE POLYMYXIN B SULFATE: 400; 3.5; 5 OINTMENT TOPICAL at 06:11

## 2020-02-24 RX ADMIN — SENNOSIDES AND DOCUSATE SODIUM 2 TABLET: 8.6; 5 TABLET ORAL at 06:10

## 2020-02-24 ASSESSMENT — ENCOUNTER SYMPTOMS
COUGH: 0
CHILLS: 0
FEVER: 0
FLANK PAIN: 0
SHORTNESS OF BREATH: 0
PALPITATIONS: 0

## 2020-02-24 NOTE — PROGRESS NOTES
HOSPITAL MEDICINE PROGRESS NOTE    Date of service  2/24/2020    Chief Complaint  89 y.o. year old male here with Blood in Urine    Assessment/Plan  Interval History   Active Hospital Problems    Diagnosis   • Delirium [R41.0]     Priority: High   • DM (diabetes mellitus) (CMS-[facility]) [E11.9]     Priority: Medium   • Atrial flutter ([facility]) [I48.92]     Priority: Low   • Penile erosion [N48.89]   • Gross hematuria [R31.0]   • Balanitis [N48.1]   • BPH (benign prostatic hyperplasia) [N40.0]     Seen and examined. Chart reviewed, including labs and any pertinent imaging.    Doing better this morning, orientation improved. No fever or chills overnight.  Alert, talkative, feels like he is okay for discharge to home or skilled nursing.    Urology to follow-up with patient as outpatient.  His three-way catheter has 2 ports plugged, draining clear yellow urine.    Discussed with RN and case management at length.    Disposition  Anticipate home to 25,020     * Gross hematuria- (present on admission)  Assessment & Plan  With clotting and Sher catheter obstruction, now resolved with irrigation  Three-way catheter placed, CBI now stopped  Urology seen, signed off, will follow-up with patient outpatient  Stop Xarelto, agree that patient may not be a good candidate for further anticoagulation    Delirium  Assessment & Plan  Waxing and waning cognition on 2/21/2020  Multiple etiologies  Discontinue nighttime gabapentin, appears to have helped  Delirium improving  Redirect as able    Unfortunately catheter in place is risk for worsening delirium    DM (diabetes mellitus) (CMS-HCC)- (present on admission)  Assessment & Plan  With hyperglycemia, 353 on admit  Continue Lantus 46 units at bedtime, continue metformin 500 mg twice daily.  Diabetic diet, Accu-Cheks before meals and at bedtime.  Sliding scale insulin 3-14 units.    Penile erosion  Assessment & Plan  With significant skin maceration and breakdown of distal shaft  Catheter  placed  Urology consulted  Wound care continues    Balanitis- (present on admission)  Assessment & Plan  Initiate nystatin cream    BPH (benign prostatic hyperplasia)- (present on admission)  Assessment & Plan  With urinary obstruction and retention.  Status post Sher insertion which relieved the obstruction but now with hematuria.  Continue tamsulosin    Atrial flutter (HCC)- (present on admission)  Assessment & Plan  Continue metoprolol 25 mg twice daily, hold Xarelto due to hematuria    Consultants/Specialty  Urology    Hospital Course:     89-year-old male admitted with gross hematuria and occlusion of catheter       Review of Systems  Physical Exam   Review of Systems   Constitutional: Positive for malaise/fatigue. Negative for chills and fever.   Respiratory: Negative for cough and shortness of breath.    Cardiovascular: Negative for chest pain and palpitations.   Genitourinary: Negative for dysuria, flank pain, hematuria and urgency.   Skin: Negative for itching and rash.   All other systems reviewed and are negative.    Vitals:    02/23/20 2215 02/24/20 0104 02/24/20 0500 02/24/20 1112   BP: 140/77 139/70 125/70 127/78   Pulse: 78 79 67 78   Resp:   18 18   Temp:   36.6 °C (97.9 °F) 36.2 °C (97.2 °F)   TempSrc:   Oral Oral   SpO2:   95% 98%   Weight:       Height:         Physical Exam   Constitutional: He is oriented to person, place, and time. No distress.   HENT:   Head: Normocephalic and atraumatic.   Mouth/Throat: Oropharynx is clear and moist.   Eyes: EOM are normal. Right eye exhibits no discharge. Left eye exhibits no discharge. No scleral icterus.   Neck: Normal range of motion.   Cardiovascular: Normal rate and intact distal pulses.   No murmur heard.  Pulmonary/Chest: Effort normal. No stridor. No respiratory distress. He has no wheezes. He has no rales.   Abdominal: Soft. He exhibits no distension. There is no abdominal tenderness. There is no rebound.   Genitourinary: No discharge found.     Genitourinary Comments: Three-way catheter in place  Skin breakdown surrounding penis     Musculoskeletal: Normal range of motion.         General: No tenderness or edema.   Neurological: He is alert and oriented to person, place, and time. No cranial nerve deficit. Coordination abnormal.   Skin: Skin is warm and dry. He is not diaphoretic. No erythema.   Psychiatric: Affect and judgment normal.   Nursing note and vitals reviewed.       Hematology Chemistry   Lab Results   Component Value Date/Time    WBC 8.1 02/19/2020 03:41 AM    HEMOGLOBIN 14.0 02/19/2020 03:41 AM    HEMATOCRIT 42.4 02/19/2020 03:41 AM    PLATELETCT 231 02/19/2020 03:41 AM     Lab Results   Component Value Date/Time    SODIUM 139 02/19/2020 03:41 AM    POTASSIUM 3.6 02/19/2020 03:41 AM    CHLORIDE 102 02/19/2020 03:41 AM    CO2 22 02/19/2020 03:41 AM    GLUCOSE 76 02/19/2020 03:41 AM    BUN 17 02/19/2020 03:41 AM    CREATININE 0.77 02/19/2020 03:41 AM    CREATININE 1.0 12/02/2008 07:26 AM         Labs not explicitly included in this progress note were reviewed by the author.   Radiology/imaging not explicitly included in this progress note was reviewed by the author.     Medications:  • diphenhydrAMINE  25 mg HS PRN   • NS   Continuous   • chlorthalidone  25 mg Q DAY   • neomycin-bacitracin-polymyxin   BID   • aspirin EC  81 mg Q EVENING   • metFORMIN  500 mg BID WITH MEALS   • metoprolol  25 mg BID   • potassium chloride SA  10 mEq BID   • simvastatin  10 mg Nightly   • tamsulosin  0.4 mg BID   • senna-docusate  2 Tab BID    And   • polyethylene glycol/lytes  1 Packet QDAY PRN    And   • magnesium hydroxide  30 mL QDAY PRN    And   • bisacodyl  10 mg QDAY PRN   • acetaminophen  650 mg Q6HRS PRN   • ondansetron  4 mg Q4HRS PRN   • ondansetron  4 mg Q4HRS PRN   • insulin regular  3-14 Units 4X/DAY ACHS    And   • glucose  16 g Q15 MIN PRN    And   • dextrose 10% bolus  250 mL Q15 MIN PRN   • OLANZapine zydis  2.5 mg Q EVENING   • insulin glargine   46 Units Q EVENING   • nystatin   BID       Radiology:     No orders to display       Full Code    I have performed a physical exam and reviewed and updated ROS as of today, 2/24/2020.  In review of yesterday's note dated, 2/23/2020, there are no changes except as documented above.

## 2020-02-24 NOTE — DISCHARGE PLANNING
ATTN: Case Management  RE: Referral for Home Health    As of 2/24/20, we have accepted the Home Health referral for the patient listed above.    A [facility] clinician will be out to see the patient within 48 hours. If you have any questions or concerns regarding the patient's transition to Home Health, please do not hesitate to contact us at [phone].      We look forward to collaborating with you,  [facility] Home Health Team

## 2020-02-24 NOTE — PROGRESS NOTES
Received report from Iris and Girish RNs. Assumed care. This pt is AOx3, disoriented to event, denies pain, Patient and RN discussed plan of care including pain management, monitor urine output and color, discharge placement: questions answered. Chart reviewed. Call light in place, fall precautions in place, patient educated on importance of calling for assistance. No additional needs at this time.

## 2020-02-24 NOTE — DISCHARGE PLANNING
Called patient SO. She would prefer the pt to go home with [facility]. SO stated that she would like [facility] .

## 2020-02-24 NOTE — CARE PLAN
Problem: Safety  Goal: Will remain free from injury  2/24/2020 0318 by MARCOS MontañoN.  Outcome: PROGRESSING AS EXPECTED  2/24/2020 0316 by Reina Huerta R.N.  Outcome: PROGRESSING AS EXPECTED  Intervention: Provide assistance with mobility  Flowsheets (Taken 2/24/2020 0110)  Assistance: Standby Assist  Ambulation Tolerance: Tolerates Well  Intervention: Collaborate with Interdisciplinary Team for safe transfer and mobilization techniques  Flowsheets (Taken 2/24/2020 0110)  Assistive Devices: Walker - front wheel  Goal: Will remain free from falls  2/24/2020 0318 by MARCOS MontañoN.  Outcome: PROGRESSING AS EXPECTED  2/24/2020 0316 by Reina Huerta R.N.  Outcome: PROGRESSING AS EXPECTED  Intervention: Assess risk factors for falls  Flowsheets  Taken 2/24/2020 0110  Pt Calls for Assistance: Yes  Taken 2/24/2020 0318  Mobility Status Assessment: 1-1 Healthcare Provider Required for Assistance with Ambulation & Transfer  Intervention: Implement fall precautions  Flowsheets  Taken 2/24/2020 0318  Bed Alarm: Yes - Alarm On  Taken 2/23/2020 2012  Environmental Precautions:   Treaded Slipper Socks on Patient   Personal Belongings, Wastebasket, Call Bell etc. in Easy Reach   Transferred to Stronger Side   Report Given to Other Health Care Providers Regarding Fall Risk   Bed in Low Position   Communication Sign for Patients & Families   Mobility Assessed & Appropriate Sign Placed     Problem: Psychosocial Needs:  Goal: Level of anxiety will decrease  Outcome: PROGRESSING AS EXPECTED  Note: Pt ambulated when restless, PRN Benadryl ordered for difficulty sleeping.     Problem: Pain Management  Goal: Pain level will decrease to patient's comfort goal  Outcome: PROGRESSING AS EXPECTED

## 2020-02-24 NOTE — DISCHARGE PLANNING
Received Choice form at [phone]  Agency/Facility Name: [facility]  Referral sent per Choice form @ [phone]

## 2020-02-24 NOTE — PROGRESS NOTES
Pt attempted to exit from bed multiple times this shift. Pt reoriented and reeducated. Pt did not c/o dizziness when sitting up in bed this shift. Pt had difficulty sleeping this shift and requested medication to help. MD pagerosemarie. MD ordered PRN Benadryl. This morning pt stated the Benadryl did not help. Pt ambulated multiple times this shift and tolerated it well, x1 SBA with FWW. Pt more restless this shift than last night. Pt less confused this shift than last night. Pt's urine clear and light yellow. Penis is still very swollen, red, tender, and has scant serosanguineous drainage.

## 2020-02-24 NOTE — FACE TO FACE
Face to Face Supporting Documentation - Home Health    The encounter with this patient was in whole or in part the primary reason for home health admission.    Date of encounter:   Patient:                    MRN:                       YOB: 2020  Julien Dao  [id number]  3/7/1929     Home health to see patient for:  Skilled Nursing care for assessment, interventions & education, Wound Care, Physical Therapy evaluation and treatment and Occupational therapy evaluation and treatment    Skilled need for:  Recent Deterioration of Health Status penile erosion, BPH, urinary retention     Skilled nursing interventions to include:  Wound Care    Homebound status evidenced by:  Needs the assistance of another person in order to leave the home. Leaving home requires a considerable and taxing effort. There is a normal inability to leave the home.    Community Physician to provide follow up care: JONO Powell     Optional Interventions? No      I certify the face to face encounter for this home health care referral meets the CMS requirements and the encounter/clinical assessment with the patient was, in whole, or in part, for the medical condition(s) listed above, which is the primary reason for home health care. Based on my clinical findings: the service(s) are medically necessary, support the need for home health care, and the homebound criteria are met.  I certify that this patient has had a face to face encounter by myself.  Kulwant Smith M.D. - NPI: [id number]

## 2020-02-24 NOTE — DISCHARGE SUMMARY
Discharge Summary    CHIEF COMPLAINT ON ADMISSION  Chief Complaint   Patient presents with   • Blood in Urine       Reason for Admission  Gross hematuria     Admission Date  2/17/2020    CODE STATUS  Full Code    HPI & HOSPITAL COURSE  89-year-old male admitted with gross hematuria and occlusion of catheter.  This was swapped out for three-way catheter with CBI, obstruction resolved the emergency department however patient continued to have dark bloody urine.  Urology was consulted.  His urine started to clear, therefore they recommended outpatient follow-up for cystoscopy versus further evaluation.  He was continued on Flomax.  He was treated topically for balanitis at wilson catheter site.  An appointment was scheduled for him with urology prior to discharge.  He had waxing waning delirium in the hospital but as of 2/24/2020 patient was alert and oriented x4.  Family in agreement for discharge to home with home health.      Therefore, he is discharged in good and stable condition to home with close outpatient follow-up.    The patient met 2-midnight criteria for an inpatient stay at the time of discharge.    Discharge Date  2/25/20    FOLLOW UP ITEMS POST DISCHARGE  Follow up with Urology as scheduled  Continue wilson catheter    DISCHARGE DIAGNOSES  Principal Problem:    Gross hematuria POA: Yes  Active Problems:    Delirium POA: Unknown    DM (diabetes mellitus) ([facility]) POA: Yes    BPH (benign prostatic hyperplasia) POA: Yes    Balanitis POA: Yes    Penile erosion POA: Unknown    Atrial flutter ([facility]) POA: Yes  Resolved Problems:    * No resolved hospital problems. *      FOLLOW UP  Angel Guidry M.D.  [address] [facility] [address] [facility] [address] [facility] [address]  [phone]    On 3/2/2020  Voiding trial. Check in at 0815 for 0830 appointment    JONO Powell  [address] [facility] [id number] [phone]            MEDICATIONS ON DISCHARGE     Medication List      START taking these medications      Instructions    neomycin-bacitracin-polymyxin 400-5-5000 Oint  Commonly known as:  NEOSPORIN   Apply 1 Each to affected area(s) every day. To penis  Dose:  1 Each     nystatin 625956 UNIT/GM Crea topical cream  Commonly known as:  MYCOSTATIN   Apply 0.0001 g to affected area(s) 2 Times a Day.  Dose:  1 Units        CHANGE how you take these medications      Instructions   chlorthalidone 25 MG Tabs  What changed:  when to take this  Commonly known as:  HYGROTON   Take 1 Tab by mouth every day.  Dose:  25 mg        CONTINUE taking these medications      Instructions   aspirin EC 81 MG Tbec  Commonly known as:  ECOTRIN   Take 81 mg by mouth every evening.  Dose:  81 mg     fish oil 1000 MG Caps capsule   Take 2,000 mg by mouth 2 Times a Day.  Dose:  2,000 mg     insulin glargine 100 UNIT/ML Sopn injection  Generic drug:  insulin glargine   Inject 46 Units as instructed every day.  Dose:  46 Units     metFORMIN 500 MG Tabs  Commonly known as:  GLUCOPHAGE   Take 500 mg by mouth 2 times a day, with meals.  Dose:  500 mg     metoprolol 50 MG Tabs  Commonly known as:  LOPRESSOR   Take 0.5 Tabs by mouth 2 times a day.  Dose:  25 mg     potassium chloride SA 20 MEQ Tbcr  Commonly known as:  Kdur   Take 20 mEq by mouth every day.  Dose:  20 mEq     Prostate Tabs   Take 1 Tab by mouth 2 Times a Day.  Dose:  1 Tab     PX Mens Multivitamins Tabs   Take 1 Tab by mouth every day.  Dose:  1 Tab     simvastatin 10 MG Tabs  Commonly known as:  ZOCOR   Take 10 mg by mouth every evening.  Dose:  10 mg     tamsulosin 0.4 MG capsule  Commonly known as:  FLOMAX   Take 0.4 mg by mouth 2 Times a Day.  Dose:  0.4 mg        STOP taking these medications    ciprofloxacin 500 MG Tabs  Commonly known as:  CIPRO     gabapentin 100 MG Caps  Commonly known as:  NEURONTIN     Xarelto 20 MG Tabs tablet  Generic drug:  rivaroxaban            Allergies  Allergies   Allergen Reactions   • Iodine      Pt and pts wife report that it has been so long not sure what  happens       DIET  Orders Placed This Encounter   Procedures   • Diet Order Consistent Carbohydrate     Standing Status:   Standing     Number of Occurrences:   1     Order Specific Question:   Diet:     Answer:   Consistent Carbohydrate [4]       ACTIVITY  As tolerated.  Weight bearing as tolerated    CONSULTATIONS  Dr. Chiu - Urology    PROCEDURES  None    LABORATORY  Lab Results   Component Value Date    SODIUM 139 02/19/2020    POTASSIUM 3.6 02/19/2020    CHLORIDE 102 02/19/2020    CO2 22 02/19/2020    GLUCOSE 76 02/19/2020    BUN 17 02/19/2020    CREATININE 0.77 02/19/2020    CREATININE 1.0 12/02/2008        Lab Results   Component Value Date    WBC 8.1 02/19/2020    HEMOGLOBIN 14.0 02/19/2020    HEMATOCRIT 42.4 02/19/2020    PLATELETCT 231 02/19/2020        Total time of the discharge process exceeds 40 minutes.

## 2020-02-25 VITALS
SYSTOLIC BLOOD PRESSURE: 126 MMHG | BODY MASS INDEX: 27.47 KG/M2 | OXYGEN SATURATION: 98 % | TEMPERATURE: 97.4 F | RESPIRATION RATE: 18 BRPM | HEART RATE: 96 BPM | WEIGHT: 175 LBS | DIASTOLIC BLOOD PRESSURE: 59 MMHG | HEIGHT: 67 IN

## 2020-02-25 LAB
GLUCOSE BLD-MCNC: 152 MG/DL (ref 65–99)
GLUCOSE BLD-MCNC: 209 MG/DL (ref 65–99)

## 2020-02-25 PROCEDURE — 82962 GLUCOSE BLOOD TEST: CPT

## 2020-02-25 PROCEDURE — A9270 NON-COVERED ITEM OR SERVICE: HCPCS | Performed by: HOSPITALIST

## 2020-02-25 PROCEDURE — 665999 HH PPS REVENUE DEBIT

## 2020-02-25 PROCEDURE — 665998 HH PPS REVENUE CREDIT

## 2020-02-25 PROCEDURE — 700102 HCHG RX REV CODE 250 W/ 637 OVERRIDE(OP): Performed by: HOSPITALIST

## 2020-02-25 PROCEDURE — 99239 HOSP IP/OBS DSCHRG MGMT >30: CPT | Performed by: INTERNAL MEDICINE

## 2020-02-25 RX ADMIN — METOPROLOL TARTRATE 25 MG: 25 TABLET ORAL at 06:06

## 2020-02-25 RX ADMIN — NYSTATIN: 100000 CREAM TOPICAL at 06:07

## 2020-02-25 RX ADMIN — BACITRACIN ZINC NEOMYCIN SULFATE POLYMYXIN B SULFATE: 400; 3.5; 5 OINTMENT TOPICAL at 06:07

## 2020-02-25 RX ADMIN — INSULIN HUMAN 3 UNITS: 100 INJECTION, SOLUTION PARENTERAL at 06:07

## 2020-02-25 RX ADMIN — TAMSULOSIN HYDROCHLORIDE 0.4 MG: 0.4 CAPSULE ORAL at 06:06

## 2020-02-25 RX ADMIN — METFORMIN HYDROCHLORIDE 500 MG: 500 TABLET ORAL at 07:11

## 2020-02-25 RX ADMIN — CHLORTHALIDONE 25 MG: 25 TABLET ORAL at 06:06

## 2020-02-25 RX ADMIN — POTASSIUM CHLORIDE 10 MEQ: 20 TABLET, EXTENDED RELEASE ORAL at 06:06

## 2020-02-25 NOTE — DISCHARGE INSTRUCTIONS
Discharge Instructions per Kulwant Smith M.D.    You were admitted with urinary blockage due to enlarged prostate, in addition to having some skin breakdown of the penis. You were seen by Urology after catheter was placed, they are wanting to follow up with you after discharge and will be in touch to arrange appointment. Please continue the nystatin and antibacterial creams to the penis for the next week. Home health has been arranged to help with some of your care at home until you are seen by Urology.     Given the amount of bleeding you experienced, I recommend discontinuing the blood thinners until follow up with either your PCP or Urology for more discussion.     Return to ER if excessive bleeding reoccurs.     Discharge Instructions    Discharged to home by car with relative. Discharged via wheelchair, hospital escort: Yes.  Special equipment needed: Not Applicable    Be sure to schedule a follow-up appointment with your primary care doctor or any specialists as instructed.     Discharge Plan:   Influenza Vaccine Indication: Patient Refuses    I understand that a diet low in cholesterol, fat, and sodium is recommended for good health. Unless I have been given specific instructions below for another diet, I accept this instruction as my diet prescription.   Other diet: regular    Special Instructions: None    · Is patient discharged on Warfarin / Coumadin?   No     Depression / Suicide Risk    As you are discharged from this [facility] facility, it is important to learn how to keep safe from harming yourself.    Recognize the warning signs:  · Abrupt changes in personality, positive or negative- including increase in energy   · Giving away possessions  · Change in eating patterns- significant weight changes-  positive or negative  · Change in sleeping patterns- unable to sleep or sleeping all the time   · Unwillingness or inability to communicate  · Depression  · Unusual sadness, discouragement and  loneliness  · Talk of wanting to die  · Neglect of personal appearance   · Rebelliousness- reckless behavior  · Withdrawal from people/activities they love  · Confusion- inability to concentrate     If you or a loved one observes any of these behaviors or has concerns about self-harm, here's what you can do:  · Talk about it- your feelings and reasons for harming yourself  · Remove any means that you might use to hurt yourself (examples: pills, rope, extension cords, firearm)  · Get professional help from the community (Mental Health, Substance Abuse, psychological counseling)  · Do not be alone:Call your Safe Contact- someone whom you trust who will be there for you.  · Call your local CRISIS HOTLINE 065-8271 or 599-292-6884  · Call your local Children's Mobile Crisis Response Team [place] (422) 693-5160 or www.Hupu  · Call the toll free National Suicide Prevention Hotlines   · National Suicide Prevention Lifeline 259-844-EMUL (9356)  · National Hope Line Network 800-SUICIDE (047-6119)

## 2020-02-25 NOTE — CARE PLAN
Problem: Bowel/Gastric:  Goal: Will not experience complications related to bowel motility  Outcome: PROGRESSING AS EXPECTED     Problem: Knowledge Deficit  Goal: Knowledge of the prescribed therapeutic regimen will improve  Outcome: PROGRESSING AS EXPECTED     Problem: Skin Integrity  Goal: Risk for impaired skin integrity will decrease  Outcome: PROGRESSING AS EXPECTED

## 2020-02-25 NOTE — PROGRESS NOTES
Report received from NOC RN, assumed care of pt.   POC and medications reviewed with pt. Pt verbalized understanding.   AOx2-4  Denies pain, SOB, or dizziness at this time.   3 way wilson catheter in place. Draining clear urine.  Safety measures in place.  Hourly rounding in place.

## 2020-02-25 NOTE — PROGRESS NOTES
2000: pt is A&Ox3. No pain or n/v. Penis is still red and swollen. Sher in place. Draining clear, yellow urine. Pt has no needs at this time. Fall precautions in place.

## 2020-02-25 NOTE — PROGRESS NOTES
Pt discharged per MD orders.   Pt meeting discharge criteria.   VSS.   Pain controlled.   IV removed.   Discharge instructions and prescriptions reviewed with pt. Pt verbalized understanding.

## 2020-02-26 ENCOUNTER — HOME CARE VISIT (OUTPATIENT)
Dept: HOME HEALTH SERVICES | Facility: HOME HEALTHCARE | Age: 85
End: 2020-02-26
Payer: MEDICARE

## 2020-02-26 VITALS
HEIGHT: 67 IN | SYSTOLIC BLOOD PRESSURE: 138 MMHG | HEART RATE: 73 BPM | WEIGHT: 173 LBS | DIASTOLIC BLOOD PRESSURE: 64 MMHG | RESPIRATION RATE: 16 BRPM | OXYGEN SATURATION: 94 % | TEMPERATURE: 98.7 F | BODY MASS INDEX: 27.15 KG/M2

## 2020-02-26 PROCEDURE — 665998 HH PPS REVENUE CREDIT

## 2020-02-26 PROCEDURE — 665999 HH PPS REVENUE DEBIT

## 2020-02-26 PROCEDURE — G0493 RN CARE EA 15 MIN HH/HOSPICE: HCPCS

## 2020-02-26 ASSESSMENT — PATIENT HEALTH QUESTIONNAIRE - PHQ9
CLINICAL INTERPRETATION OF PHQ2 SCORE: 0
2. FEELING DOWN, DEPRESSED, IRRITABLE, OR HOPELESS: 00
1. LITTLE INTEREST OR PLEASURE IN DOING THINGS: 00

## 2020-02-26 ASSESSMENT — ACTIVITIES OF DAILY LIVING (ADL): OASIS_M1830: 03

## 2020-02-26 ASSESSMENT — ENCOUNTER SYMPTOMS: POOR JUDGMENT: 1

## 2020-02-27 ENCOUNTER — HOME CARE VISIT (OUTPATIENT)
Dept: HOME HEALTH SERVICES | Facility: HOME HEALTHCARE | Age: 85
End: 2020-02-27
Payer: MEDICARE

## 2020-02-27 PROCEDURE — 665999 HH PPS REVENUE DEBIT

## 2020-02-27 PROCEDURE — 665998 HH PPS REVENUE CREDIT

## 2020-02-27 PROCEDURE — G0156 HHCP-SVS OF AIDE,EA 15 MIN: HCPCS

## 2020-02-27 NOTE — PROGRESS NOTES
Primary dx/Skilled need:balanitis,catheter care  SN frequency: ,2w3,1w4,3 prn  Disciplines ordered: PT/OT/MSW/HHA 1W1,2W6  Insurance & authorization: [facility]  Certification period:2/13-4/12  Special considerations: EXAMPLE: specific directions to pt home etc.

## 2020-02-28 ENCOUNTER — HOME CARE VISIT (OUTPATIENT)
Dept: HOME HEALTH SERVICES | Facility: HOME HEALTHCARE | Age: 85
End: 2020-02-28
Payer: MEDICARE

## 2020-02-28 PROCEDURE — 665999 HH PPS REVENUE DEBIT

## 2020-02-28 PROCEDURE — 665998 HH PPS REVENUE CREDIT

## 2020-02-29 ENCOUNTER — HOME CARE VISIT (OUTPATIENT)
Dept: HOME HEALTH SERVICES | Facility: HOME HEALTHCARE | Age: 85
End: 2020-02-29
Payer: MEDICARE

## 2020-02-29 VITALS
TEMPERATURE: 98.1 F | SYSTOLIC BLOOD PRESSURE: 126 MMHG | HEART RATE: 71 BPM | RESPIRATION RATE: 18 BRPM | OXYGEN SATURATION: 98 % | DIASTOLIC BLOOD PRESSURE: 72 MMHG

## 2020-02-29 PROCEDURE — 665999 HH PPS REVENUE DEBIT

## 2020-02-29 PROCEDURE — G0493 RN CARE EA 15 MIN HH/HOSPICE: HCPCS

## 2020-02-29 PROCEDURE — 665998 HH PPS REVENUE CREDIT

## 2020-02-29 ASSESSMENT — ENCOUNTER SYMPTOMS: SHORTNESS OF BREATH: T

## 2020-03-01 VITALS
HEART RATE: 72 BPM | RESPIRATION RATE: 14 BRPM | DIASTOLIC BLOOD PRESSURE: 76 MMHG | TEMPERATURE: 98 F | OXYGEN SATURATION: 98 % | SYSTOLIC BLOOD PRESSURE: 145 MMHG

## 2020-03-01 PROCEDURE — 665998 HH PPS REVENUE CREDIT

## 2020-03-01 PROCEDURE — 665999 HH PPS REVENUE DEBIT

## 2020-03-01 ASSESSMENT — ENCOUNTER SYMPTOMS
MUSCLE WEAKNESS: 1
NAUSEA: DENIES
VOMITING: DENIES

## 2020-03-01 ASSESSMENT — ACTIVITIES OF DAILY LIVING (ADL)
AMBULATION ASSISTANCE: STAND BY ASSIST
CURRENT_FUNCTION: STAND BY ASSIST

## 2020-03-02 ENCOUNTER — HOME CARE VISIT (OUTPATIENT)
Dept: HOME HEALTH SERVICES | Facility: HOME HEALTHCARE | Age: 85
End: 2020-03-02
Payer: MEDICARE

## 2020-03-02 VITALS
OXYGEN SATURATION: 96 % | TEMPERATURE: 97.9 F | HEART RATE: 82 BPM | SYSTOLIC BLOOD PRESSURE: 142 MMHG | RESPIRATION RATE: 16 BRPM | DIASTOLIC BLOOD PRESSURE: 82 MMHG

## 2020-03-02 PROCEDURE — G0156 HHCP-SVS OF AIDE,EA 15 MIN: HCPCS

## 2020-03-02 PROCEDURE — 665999 HH PPS REVENUE DEBIT

## 2020-03-02 PROCEDURE — G0151 HHCP-SERV OF PT,EA 15 MIN: HCPCS

## 2020-03-02 PROCEDURE — 665998 HH PPS REVENUE CREDIT

## 2020-03-02 PROCEDURE — G0155 HHCP-SVS OF CSW,EA 15 MIN: HCPCS

## 2020-03-02 ASSESSMENT — ACTIVITIES OF DAILY LIVING (ADL)
ADLS_COMMENTS: PLEASE REFER TO OT ASSESSMENT
FEEDING_COMMENTS: PLEASE REFER TO OT ASSESSMENT
AMBULATION ASSISTANCE ON FLAT SURFACES: 1
GROOMING_COMMENTS: PLEASE REFER TO OT ASSESSMENT
BATHING_COMMENTS: PLEASE REFER TO OT ASSESSMENT

## 2020-03-02 ASSESSMENT — ENCOUNTER SYMPTOMS
MUSCLE WEAKNESS: 1
POOR JUDGMENT: 1

## 2020-03-03 ENCOUNTER — HOME CARE VISIT (OUTPATIENT)
Dept: HOME HEALTH SERVICES | Facility: HOME HEALTHCARE | Age: 85
End: 2020-03-03
Payer: MEDICARE

## 2020-03-03 VITALS
HEART RATE: 76 BPM | WEIGHT: 183 LBS | TEMPERATURE: 97.9 F | DIASTOLIC BLOOD PRESSURE: 60 MMHG | SYSTOLIC BLOOD PRESSURE: 124 MMHG | BODY MASS INDEX: 28.66 KG/M2 | OXYGEN SATURATION: 96 % | RESPIRATION RATE: 18 BRPM

## 2020-03-03 VITALS
DIASTOLIC BLOOD PRESSURE: 82 MMHG | RESPIRATION RATE: 16 BRPM | OXYGEN SATURATION: 96 % | SYSTOLIC BLOOD PRESSURE: 142 MMHG | TEMPERATURE: 97.9 F | HEART RATE: 82 BPM

## 2020-03-03 PROCEDURE — 665999 HH PPS REVENUE DEBIT

## 2020-03-03 PROCEDURE — 665998 HH PPS REVENUE CREDIT

## 2020-03-03 PROCEDURE — G0495 RN CARE TRAIN/EDU IN HH: HCPCS

## 2020-03-03 ASSESSMENT — ENCOUNTER SYMPTOMS: POOR JUDGMENT: 1

## 2020-03-03 ASSESSMENT — FIBROSIS 4 INDEX: FIB4 SCORE: 2.63

## 2020-03-04 ENCOUNTER — HOME CARE VISIT (OUTPATIENT)
Dept: HOME HEALTH SERVICES | Facility: HOME HEALTHCARE | Age: 85
End: 2020-03-04
Payer: MEDICARE

## 2020-03-04 VITALS — WEIGHT: 183 LBS | HEIGHT: 67 IN | BODY MASS INDEX: 28.72 KG/M2

## 2020-03-04 PROCEDURE — 665999 HH PPS REVENUE DEBIT

## 2020-03-04 PROCEDURE — 665998 HH PPS REVENUE CREDIT

## 2020-03-04 ASSESSMENT — FIBROSIS 4 INDEX: FIB4 SCORE: 2.63

## 2020-03-05 ENCOUNTER — HOME CARE VISIT (OUTPATIENT)
Dept: HOME HEALTH SERVICES | Facility: HOME HEALTHCARE | Age: 85
End: 2020-03-05
Payer: MEDICARE

## 2020-03-05 ENCOUNTER — ANTICOAGULATION MONITORING (OUTPATIENT)
Dept: MEDICAL GROUP | Facility: PHYSICIAN GROUP | Age: 85
End: 2020-03-05

## 2020-03-05 VITALS
BODY MASS INDEX: 29.13 KG/M2 | HEART RATE: 71 BPM | TEMPERATURE: 97.5 F | RESPIRATION RATE: 20 BRPM | OXYGEN SATURATION: 95 % | DIASTOLIC BLOOD PRESSURE: 85 MMHG | SYSTOLIC BLOOD PRESSURE: 135 MMHG | WEIGHT: 186 LBS

## 2020-03-05 PROCEDURE — G0495 RN CARE TRAIN/EDU IN HH: HCPCS

## 2020-03-05 PROCEDURE — G0151 HHCP-SERV OF PT,EA 15 MIN: HCPCS

## 2020-03-05 PROCEDURE — 665999 HH PPS REVENUE DEBIT

## 2020-03-05 PROCEDURE — 665998 HH PPS REVENUE CREDIT

## 2020-03-05 ASSESSMENT — ENCOUNTER SYMPTOMS
POOR JUDGMENT: 1
MUSCLE WEAKNESS: 1

## 2020-03-05 ASSESSMENT — FIBROSIS 4 INDEX: FIB4 SCORE: 2.63

## 2020-03-05 NOTE — PROGRESS NOTES
Received referral from [facility]. Medications reviewed. No clinically significant interactions noted.

## 2020-03-06 VITALS
DIASTOLIC BLOOD PRESSURE: 78 MMHG | OXYGEN SATURATION: 95 % | SYSTOLIC BLOOD PRESSURE: 135 MMHG | HEART RATE: 71 BPM | RESPIRATION RATE: 16 BRPM | TEMPERATURE: 97.5 F

## 2020-03-06 PROCEDURE — 665999 HH PPS REVENUE DEBIT

## 2020-03-06 PROCEDURE — 665998 HH PPS REVENUE CREDIT

## 2020-03-06 ASSESSMENT — ACTIVITIES OF DAILY LIVING (ADL): AMBULATION ASSISTANCE ON FLAT SURFACES: 1

## 2020-03-07 PROCEDURE — 665998 HH PPS REVENUE CREDIT

## 2020-03-07 PROCEDURE — 665999 HH PPS REVENUE DEBIT

## 2020-03-08 PROCEDURE — 665999 HH PPS REVENUE DEBIT

## 2020-03-08 PROCEDURE — 665998 HH PPS REVENUE CREDIT

## 2020-03-09 ENCOUNTER — HOME CARE VISIT (OUTPATIENT)
Dept: HOME HEALTH SERVICES | Facility: HOME HEALTHCARE | Age: 85
End: 2020-03-09
Payer: MEDICARE

## 2020-03-09 PROCEDURE — 665999 HH PPS REVENUE DEBIT

## 2020-03-09 PROCEDURE — 665998 HH PPS REVENUE CREDIT

## 2020-03-09 SDOH — ECONOMIC STABILITY: HOUSING INSECURITY
HOME SAFETY: PATIENT HAD FALL - SEE SEPARATE CASE COMMUNICATION.  FALL RISK OF 6 PER SOC RN CHARTING.  MEDICATION LIST IS UP TO DATE PER REVIEW-  HAS 4 DAY DOSE OF LASIX PER CARDIOLOGY WITH FOLLOW UP APPOINTMENT.  ADDED TO LIST IN HOME.  NO OXYGEN USE.

## 2020-03-10 ENCOUNTER — HOME CARE VISIT (OUTPATIENT)
Dept: HOME HEALTH SERVICES | Facility: HOME HEALTHCARE | Age: 85
End: 2020-03-10
Payer: MEDICARE

## 2020-03-10 VITALS
TEMPERATURE: 97.5 F | OXYGEN SATURATION: 96 % | SYSTOLIC BLOOD PRESSURE: 135 MMHG | RESPIRATION RATE: 16 BRPM | HEART RATE: 84 BPM | DIASTOLIC BLOOD PRESSURE: 62 MMHG

## 2020-03-10 VITALS
HEART RATE: 88 BPM | SYSTOLIC BLOOD PRESSURE: 140 MMHG | OXYGEN SATURATION: 92 % | RESPIRATION RATE: 16 BRPM | TEMPERATURE: 97.4 F | DIASTOLIC BLOOD PRESSURE: 68 MMHG

## 2020-03-10 VITALS
TEMPERATURE: 97.4 F | OXYGEN SATURATION: 92 % | SYSTOLIC BLOOD PRESSURE: 140 MMHG | RESPIRATION RATE: 18 BRPM | DIASTOLIC BLOOD PRESSURE: 68 MMHG | HEART RATE: 88 BPM | WEIGHT: 176 LBS | BODY MASS INDEX: 27.57 KG/M2

## 2020-03-10 PROCEDURE — G0151 HHCP-SERV OF PT,EA 15 MIN: HCPCS

## 2020-03-10 PROCEDURE — 665998 HH PPS REVENUE CREDIT

## 2020-03-10 PROCEDURE — G0152 HHCP-SERV OF OT,EA 15 MIN: HCPCS

## 2020-03-10 PROCEDURE — 665999 HH PPS REVENUE DEBIT

## 2020-03-10 PROCEDURE — G0495 RN CARE TRAIN/EDU IN HH: HCPCS

## 2020-03-10 ASSESSMENT — ACTIVITIES OF DAILY LIVING (ADL)
AMBULATION ASSISTANCE: 1
TOILETING: INDEPENDENT
TELEPHONE USE ASSESSED: 1
DRESSING_LB_CURRENT_FUNCTION: MINIMUM ASSIST
CURRENT_FUNCTION: INDEPENDENT
USING THE TELPHONE: INDEPENDENT
PHYSICAL TRANSFERS ASSESSED: 1
BATHING_CURRENT_FUNCTION: SUPERVISION
TOILETING: 1
BATHING ASSESSED: 1
DRESSING_UB_CURRENT_FUNCTION: INDEPENDENT
GROOMING ASSESSED: 1
AMBULATION ASSISTANCE ON FLAT SURFACES: 1
AMBULATION ASSISTANCE: SUPERVISION
GROOMING_CURRENT_FUNCTION: INDEPENDENT
FEEDING: INDEPENDENT
FEEDING ASSESSED: 1
PREPARING MEALS: INDEPENDENT

## 2020-03-10 ASSESSMENT — ENCOUNTER SYMPTOMS: MUSCLE WEAKNESS: 1

## 2020-03-10 ASSESSMENT — FIBROSIS 4 INDEX: FIB4 SCORE: 2.66

## 2020-03-11 ENCOUNTER — HOME CARE VISIT (OUTPATIENT)
Dept: HOME HEALTH SERVICES | Facility: HOME HEALTHCARE | Age: 85
End: 2020-03-11
Payer: MEDICARE

## 2020-03-11 VITALS
SYSTOLIC BLOOD PRESSURE: 122 MMHG | RESPIRATION RATE: 16 BRPM | HEART RATE: 68 BPM | WEIGHT: 176 LBS | DIASTOLIC BLOOD PRESSURE: 73 MMHG | OXYGEN SATURATION: 98 % | TEMPERATURE: 98.1 F | BODY MASS INDEX: 27.57 KG/M2

## 2020-03-11 PROCEDURE — 665999 HH PPS REVENUE DEBIT

## 2020-03-11 PROCEDURE — 665998 HH PPS REVENUE CREDIT

## 2020-03-11 ASSESSMENT — FIBROSIS 4 INDEX: FIB4 SCORE: 2.66

## 2020-03-12 ENCOUNTER — HOME CARE VISIT (OUTPATIENT)
Dept: HOME HEALTH SERVICES | Facility: HOME HEALTHCARE | Age: 85
End: 2020-03-12
Payer: MEDICARE

## 2020-03-12 VITALS
OXYGEN SATURATION: 97 % | TEMPERATURE: 97.8 F | RESPIRATION RATE: 18 BRPM | WEIGHT: 177 LBS | DIASTOLIC BLOOD PRESSURE: 70 MMHG | BODY MASS INDEX: 27.72 KG/M2 | SYSTOLIC BLOOD PRESSURE: 138 MMHG | HEART RATE: 68 BPM

## 2020-03-12 VITALS
TEMPERATURE: 97.8 F | OXYGEN SATURATION: 97 % | SYSTOLIC BLOOD PRESSURE: 138 MMHG | DIASTOLIC BLOOD PRESSURE: 70 MMHG | HEART RATE: 68 BPM | RESPIRATION RATE: 18 BRPM

## 2020-03-12 PROCEDURE — G0156 HHCP-SVS OF AIDE,EA 15 MIN: HCPCS

## 2020-03-12 PROCEDURE — G0151 HHCP-SERV OF PT,EA 15 MIN: HCPCS

## 2020-03-12 PROCEDURE — G0152 HHCP-SERV OF OT,EA 15 MIN: HCPCS

## 2020-03-12 PROCEDURE — 665998 HH PPS REVENUE CREDIT

## 2020-03-12 PROCEDURE — 665999 HH PPS REVENUE DEBIT

## 2020-03-12 PROCEDURE — G0495 RN CARE TRAIN/EDU IN HH: HCPCS

## 2020-03-12 ASSESSMENT — FIBROSIS 4 INDEX: FIB4 SCORE: 2.66

## 2020-03-12 ASSESSMENT — ENCOUNTER SYMPTOMS: MUSCLE WEAKNESS: 1

## 2020-03-13 ENCOUNTER — HOME CARE VISIT (OUTPATIENT)
Dept: HOME HEALTH SERVICES | Facility: HOME HEALTHCARE | Age: 85
End: 2020-03-13
Payer: MEDICARE

## 2020-03-13 ENCOUNTER — HOSPITAL ENCOUNTER (INPATIENT)
Facility: MEDICAL CENTER | Age: 85
LOS: 2 days | DRG: 699 | End: 2020-03-15
Attending: EMERGENCY MEDICINE | Admitting: HOSPITALIST
Payer: MEDICARE

## 2020-03-13 DIAGNOSIS — N40.1 BENIGN PROSTATIC HYPERPLASIA WITH URINARY OBSTRUCTION: ICD-10-CM

## 2020-03-13 DIAGNOSIS — Z79.01 CHRONIC ANTICOAGULATION: ICD-10-CM

## 2020-03-13 DIAGNOSIS — N13.8 BENIGN PROSTATIC HYPERPLASIA WITH URINARY OBSTRUCTION: ICD-10-CM

## 2020-03-13 DIAGNOSIS — R33.9 URINARY RETENTION: ICD-10-CM

## 2020-03-13 DIAGNOSIS — R31.9 HEMATURIA, UNSPECIFIED TYPE: ICD-10-CM

## 2020-03-13 DIAGNOSIS — T83.091A OBSTRUCTION OF FOLEY CATHETER, INITIAL ENCOUNTER (HCC): ICD-10-CM

## 2020-03-13 LAB
ANION GAP SERPL CALC-SCNC: 16 MMOL/L (ref 7–16)
APPEARANCE UR: ABNORMAL
APTT PPP: 34.3 SEC (ref 24.7–36)
BACTERIA #/AREA URNS HPF: ABNORMAL /HPF
BASOPHILS # BLD AUTO: 0.4 % (ref 0–1.8)
BASOPHILS # BLD: 0.03 K/UL (ref 0–0.12)
BILIRUB UR QL STRIP.AUTO: NEGATIVE
BUN SERPL-MCNC: 15 MG/DL (ref 8–22)
CALCIUM SERPL-MCNC: 9.7 MG/DL (ref 8.4–10.2)
CHLORIDE SERPL-SCNC: 99 MMOL/L (ref 96–112)
CO2 SERPL-SCNC: 25 MMOL/L (ref 20–33)
COLOR UR: ABNORMAL
CREAT SERPL-MCNC: 0.66 MG/DL (ref 0.5–1.4)
EOSINOPHIL # BLD AUTO: 0.09 K/UL (ref 0–0.51)
EOSINOPHIL NFR BLD: 1.2 % (ref 0–6.9)
EPI CELLS #/AREA URNS HPF: ABNORMAL /HPF
ERYTHROCYTE [DISTWIDTH] IN BLOOD BY AUTOMATED COUNT: 47.2 FL (ref 35.9–50)
GLUCOSE BLD-MCNC: 343 MG/DL (ref 65–99)
GLUCOSE SERPL-MCNC: 258 MG/DL (ref 65–99)
GLUCOSE UR STRIP.AUTO-MCNC: 500 MG/DL
HCT VFR BLD AUTO: 43.8 % (ref 42–52)
HGB BLD-MCNC: 14.3 G/DL (ref 14–18)
IMM GRANULOCYTES # BLD AUTO: 0.04 K/UL (ref 0–0.11)
IMM GRANULOCYTES NFR BLD AUTO: 0.5 % (ref 0–0.9)
INR PPP: 1.11 (ref 0.87–1.13)
KETONES UR STRIP.AUTO-MCNC: NEGATIVE MG/DL
LEUKOCYTE ESTERASE UR QL STRIP.AUTO: ABNORMAL
LYMPHOCYTES # BLD AUTO: 1.38 K/UL (ref 1–4.8)
LYMPHOCYTES NFR BLD: 18 % (ref 22–41)
MCH RBC QN AUTO: 29.5 PG (ref 27–33)
MCHC RBC AUTO-ENTMCNC: 32.6 G/DL (ref 33.7–35.3)
MCV RBC AUTO: 90.5 FL (ref 81.4–97.8)
MICRO URNS: ABNORMAL
MONOCYTES # BLD AUTO: 0.62 K/UL (ref 0–0.85)
MONOCYTES NFR BLD AUTO: 8.1 % (ref 0–13.4)
NEUTROPHILS # BLD AUTO: 5.52 K/UL (ref 1.82–7.42)
NEUTROPHILS NFR BLD: 71.8 % (ref 44–72)
NITRITE UR QL STRIP.AUTO: NEGATIVE
NRBC # BLD AUTO: 0 K/UL
NRBC BLD-RTO: 0 /100 WBC
PH UR STRIP.AUTO: 7 [PH] (ref 5–8)
PLATELET # BLD AUTO: 214 K/UL (ref 164–446)
PMV BLD AUTO: 10 FL (ref 9–12.9)
POTASSIUM SERPL-SCNC: 3.7 MMOL/L (ref 3.6–5.5)
PROT UR QL STRIP: 100 MG/DL
PROTHROMBIN TIME: 14.5 SEC (ref 12–14.6)
RBC # BLD AUTO: 4.84 M/UL (ref 4.7–6.1)
RBC # URNS HPF: >150 /HPF
RBC UR QL AUTO: ABNORMAL
SODIUM SERPL-SCNC: 140 MMOL/L (ref 135–145)
SP GR UR STRIP.AUTO: 1.02
WBC # BLD AUTO: 7.7 K/UL (ref 4.8–10.8)
WBC #/AREA URNS HPF: ABNORMAL /HPF

## 2020-03-13 PROCEDURE — 85025 COMPLETE CBC W/AUTO DIFF WBC: CPT

## 2020-03-13 PROCEDURE — 665999 HH PPS REVENUE DEBIT

## 2020-03-13 PROCEDURE — 99221 1ST HOSP IP/OBS SF/LOW 40: CPT | Mod: AI | Performed by: HOSPITALIST

## 2020-03-13 PROCEDURE — 665998 HH PPS REVENUE CREDIT

## 2020-03-13 PROCEDURE — A9270 NON-COVERED ITEM OR SERVICE: HCPCS | Performed by: HOSPITALIST

## 2020-03-13 PROCEDURE — 51702 INSERT TEMP BLADDER CATH: CPT

## 2020-03-13 PROCEDURE — 80048 BASIC METABOLIC PNL TOTAL CA: CPT

## 2020-03-13 PROCEDURE — 700102 HCHG RX REV CODE 250 W/ 637 OVERRIDE(OP): Performed by: HOSPITALIST

## 2020-03-13 PROCEDURE — 81001 URINALYSIS AUTO W/SCOPE: CPT

## 2020-03-13 PROCEDURE — 85610 PROTHROMBIN TIME: CPT

## 2020-03-13 PROCEDURE — 303105 HCHG CATHETER EXTRA

## 2020-03-13 PROCEDURE — 770006 HCHG ROOM/CARE - MED/SURG/GYN SEMI*

## 2020-03-13 PROCEDURE — 82962 GLUCOSE BLOOD TEST: CPT

## 2020-03-13 PROCEDURE — 85730 THROMBOPLASTIN TIME PARTIAL: CPT

## 2020-03-13 PROCEDURE — 99285 EMERGENCY DEPT VISIT HI MDM: CPT

## 2020-03-13 PROCEDURE — 700105 HCHG RX REV CODE 258: Performed by: EMERGENCY MEDICINE

## 2020-03-13 RX ORDER — CYCLOSPORINE 0.5 MG/ML
1 EMULSION OPHTHALMIC EVERY EVENING
Status: ON HOLD | COMMUNITY
End: 2021-08-08

## 2020-03-13 RX ORDER — BISACODYL 10 MG
10 SUPPOSITORY, RECTAL RECTAL
Status: DISCONTINUED | OUTPATIENT
Start: 2020-03-13 | End: 2020-03-15 | Stop reason: HOSPADM

## 2020-03-13 RX ORDER — SPIRONOLACTONE 25 MG/1
25 TABLET ORAL
Status: DISCONTINUED | OUTPATIENT
Start: 2020-03-13 | End: 2020-03-14

## 2020-03-13 RX ORDER — ENALAPRILAT 1.25 MG/ML
1.25 INJECTION INTRAVENOUS EVERY 6 HOURS PRN
Status: DISCONTINUED | OUTPATIENT
Start: 2020-03-13 | End: 2020-03-15 | Stop reason: HOSPADM

## 2020-03-13 RX ORDER — ACETAMINOPHEN 325 MG/1
650 TABLET ORAL EVERY 6 HOURS PRN
Status: DISCONTINUED | OUTPATIENT
Start: 2020-03-13 | End: 2020-03-15 | Stop reason: HOSPADM

## 2020-03-13 RX ORDER — TAMSULOSIN HYDROCHLORIDE 0.4 MG/1
0.4 CAPSULE ORAL 2 TIMES DAILY
Status: DISCONTINUED | OUTPATIENT
Start: 2020-03-13 | End: 2020-03-13

## 2020-03-13 RX ORDER — FINASTERIDE 5 MG/1
5 TABLET, FILM COATED ORAL DAILY
Status: ON HOLD | COMMUNITY
Start: 2020-03-04 | End: 2020-03-13

## 2020-03-13 RX ORDER — SPIRONOLACTONE 25 MG/1
25 TABLET ORAL DAILY
COMMUNITY
End: 2020-03-18 | Stop reason: ALTCHOICE

## 2020-03-13 RX ORDER — ONDANSETRON 4 MG/1
4 TABLET, ORALLY DISINTEGRATING ORAL EVERY 4 HOURS PRN
Status: DISCONTINUED | OUTPATIENT
Start: 2020-03-13 | End: 2020-03-15 | Stop reason: HOSPADM

## 2020-03-13 RX ORDER — RIVAROXABAN 20 MG/1
20 TABLET, FILM COATED ORAL
Status: ON HOLD | COMMUNITY
Start: 2020-03-05 | End: 2020-03-15

## 2020-03-13 RX ORDER — INSULIN GLARGINE 100 [IU]/ML
46 INJECTION, SOLUTION SUBCUTANEOUS EVERY EVENING
Status: DISCONTINUED | OUTPATIENT
Start: 2020-03-13 | End: 2020-03-15 | Stop reason: HOSPADM

## 2020-03-13 RX ORDER — POTASSIUM CHLORIDE 20 MEQ/1
10 TABLET, EXTENDED RELEASE ORAL EVERY EVENING
Status: DISCONTINUED | OUTPATIENT
Start: 2020-03-13 | End: 2020-03-13

## 2020-03-13 RX ORDER — SODIUM CHLORIDE 9 MG/ML
1000 INJECTION, SOLUTION INTRAVENOUS ONCE
Status: COMPLETED | OUTPATIENT
Start: 2020-03-13 | End: 2020-03-14

## 2020-03-13 RX ORDER — GABAPENTIN 100 MG/1
100 CAPSULE ORAL 2 TIMES DAILY
COMMUNITY
End: 2021-04-29

## 2020-03-13 RX ORDER — POLYETHYLENE GLYCOL 3350 17 G/17G
1 POWDER, FOR SOLUTION ORAL
Status: DISCONTINUED | OUTPATIENT
Start: 2020-03-13 | End: 2020-03-15 | Stop reason: HOSPADM

## 2020-03-13 RX ORDER — TAMSULOSIN HYDROCHLORIDE 0.4 MG/1
0.4 CAPSULE ORAL
Status: DISCONTINUED | OUTPATIENT
Start: 2020-03-13 | End: 2020-03-15 | Stop reason: HOSPADM

## 2020-03-13 RX ORDER — POTASSIUM CHLORIDE 20 MEQ/1
10 TABLET, EXTENDED RELEASE ORAL 2 TIMES DAILY
Status: DISCONTINUED | OUTPATIENT
Start: 2020-03-13 | End: 2020-03-15

## 2020-03-13 RX ORDER — AMLODIPINE BESYLATE 5 MG/1
5 TABLET ORAL DAILY
Status: DISCONTINUED | OUTPATIENT
Start: 2020-03-13 | End: 2020-03-15 | Stop reason: HOSPADM

## 2020-03-13 RX ORDER — AMLODIPINE BESYLATE 5 MG/1
5 TABLET ORAL DAILY
Status: ON HOLD | COMMUNITY
Start: 2020-03-05 | End: 2020-03-13

## 2020-03-13 RX ORDER — SIMVASTATIN 10 MG
10 TABLET ORAL NIGHTLY
Status: DISCONTINUED | OUTPATIENT
Start: 2020-03-13 | End: 2020-03-15 | Stop reason: HOSPADM

## 2020-03-13 RX ORDER — OMEGA-3-ACID ETHYL ESTERS 1 G/1
CAPSULE, LIQUID FILLED ORAL
Status: ON HOLD | COMMUNITY
Start: 2020-02-20 | End: 2020-03-13

## 2020-03-13 RX ORDER — AMOXICILLIN 250 MG
2 CAPSULE ORAL 2 TIMES DAILY
Status: DISCONTINUED | OUTPATIENT
Start: 2020-03-13 | End: 2020-03-15 | Stop reason: HOSPADM

## 2020-03-13 RX ORDER — FINASTERIDE 5 MG/1
5 TABLET, FILM COATED ORAL DAILY
Status: DISCONTINUED | OUTPATIENT
Start: 2020-03-13 | End: 2020-03-14

## 2020-03-13 RX ORDER — POTASSIUM CHLORIDE 750 MG/1
10 TABLET, FILM COATED, EXTENDED RELEASE ORAL 2 TIMES DAILY
Status: ON HOLD | COMMUNITY
Start: 2020-03-05 | End: 2020-03-15 | Stop reason: SDUPTHER

## 2020-03-13 RX ORDER — ONDANSETRON 2 MG/ML
4 INJECTION INTRAMUSCULAR; INTRAVENOUS EVERY 4 HOURS PRN
Status: DISCONTINUED | OUTPATIENT
Start: 2020-03-13 | End: 2020-03-15 | Stop reason: HOSPADM

## 2020-03-13 RX ORDER — POTASSIUM CHLORIDE 20 MEQ/1
20 TABLET, EXTENDED RELEASE ORAL DAILY
Status: DISCONTINUED | OUTPATIENT
Start: 2020-03-14 | End: 2020-03-13

## 2020-03-13 RX ORDER — CHLORTHALIDONE 25 MG/1
25 TABLET ORAL DAILY
Status: DISCONTINUED | OUTPATIENT
Start: 2020-03-13 | End: 2020-03-15 | Stop reason: HOSPADM

## 2020-03-13 RX ADMIN — FINASTERIDE 5 MG: 5 TABLET, FILM COATED ORAL at 18:55

## 2020-03-13 RX ADMIN — SODIUM CHLORIDE 1000 ML: 9 INJECTION, SOLUTION INTRAVENOUS at 12:38

## 2020-03-13 RX ADMIN — SPIRONOLACTONE 25 MG: 25 TABLET ORAL at 18:58

## 2020-03-13 RX ADMIN — POTASSIUM CHLORIDE 10 MEQ: 1500 TABLET, EXTENDED RELEASE ORAL at 18:58

## 2020-03-13 RX ADMIN — TAMSULOSIN HYDROCHLORIDE 0.4 MG: 0.4 CAPSULE ORAL at 18:57

## 2020-03-13 RX ADMIN — SENNOSIDES AND DOCUSATE SODIUM 2 TABLET: 8.6; 5 TABLET ORAL at 18:57

## 2020-03-13 RX ADMIN — METOPROLOL TARTRATE 12.5 MG: 25 TABLET ORAL at 18:56

## 2020-03-13 RX ADMIN — METFORMIN HYDROCHLORIDE 500 MG: 500 TABLET ORAL at 18:57

## 2020-03-13 RX ADMIN — INSULIN GLARGINE 46 UNITS: 100 INJECTION, SOLUTION SUBCUTANEOUS at 18:58

## 2020-03-13 RX ADMIN — ACETAMINOPHEN 650 MG: 325 TABLET, FILM COATED ORAL at 18:55

## 2020-03-13 RX ADMIN — SIMVASTATIN 10 MG: 10 TABLET, FILM COATED ORAL at 22:36

## 2020-03-13 ASSESSMENT — ENCOUNTER SYMPTOMS
INSOMNIA: 0
ABDOMINAL PAIN: 0
NECK PAIN: 0
PALPITATIONS: 0
COUGH: 0
BLURRED VISION: 0
SHORTNESS OF BREATH: 0
CHILLS: 0
BACK PAIN: 0
DEPRESSION: 0
NAUSEA: 0
VOMITING: 0
EYE PAIN: 0
DIZZINESS: 0
TINGLING: 0
HEADACHES: 0
FEVER: 0
SORE THROAT: 0

## 2020-03-13 ASSESSMENT — COPD QUESTIONNAIRES
DURING THE PAST 4 WEEKS HOW MUCH DID YOU FEEL SHORT OF BREATH: NONE/LITTLE OF THE TIME
HAVE YOU SMOKED AT LEAST 100 CIGARETTES IN YOUR ENTIRE LIFE: YES
IN THE PAST 12 MONTHS DO YOU DO LESS THAN YOU USED TO BECAUSE OF YOUR BREATHING PROBLEMS: DISAGREE/UNSURE
DO YOU EVER COUGH UP ANY MUCUS OR PHLEGM?: NO/ONLY WITH OCCASIONAL COLDS OR INFECTIONS
COPD SCREENING SCORE: 4

## 2020-03-13 ASSESSMENT — LIFESTYLE VARIABLES
ALCOHOL_USE: NO
TOTAL SCORE: 0
AVERAGE NUMBER OF DAYS PER WEEK YOU HAVE A DRINK CONTAINING ALCOHOL: 0
HOW MANY TIMES IN THE PAST YEAR HAVE YOU HAD 5 OR MORE DRINKS IN A DAY: 0
EVER HAD A DRINK FIRST THING IN THE MORNING TO STEADY YOUR NERVES TO GET RID OF A HANGOVER: NO
HAVE YOU EVER FELT YOU SHOULD CUT DOWN ON YOUR DRINKING: NO
HAVE PEOPLE ANNOYED YOU BY CRITICIZING YOUR DRINKING: NO
ON A TYPICAL DAY WHEN YOU DRINK ALCOHOL HOW MANY DRINKS DO YOU HAVE: 0
TOTAL SCORE: 0
TOTAL SCORE: 0
EVER FELT BAD OR GUILTY ABOUT YOUR DRINKING: NO
CONSUMPTION TOTAL: NEGATIVE

## 2020-03-13 ASSESSMENT — FIBROSIS 4 INDEX: FIB4 SCORE: 2.66

## 2020-03-13 NOTE — ASSESSMENT & PLAN NOTE
Nona reports drinking 6 large glasses water since 3:00pm. In addition, she drank a milk. She voided 650cc. Says that she also forgot and voided in the toilet 3 other times. This nurse advised her that she is getting plenty of fluids and should perhaps slow down.   Severe. Continue BPH meds and has had a wilson for the last 3 weeks.

## 2020-03-13 NOTE — CONSULTS
UROLOGY Consult Note:    MATT Nelson  Date & Time note created:    3/13/2020   4:38 PM     Referring MD:  Dr. Holly    Patient ID:   Name:             Julien Dao     YOB: 1929  Age:                 89 y.o.  male   MRN:               [id number]                                                             Reason for Consult:      Gross hematuria    History of Present Illness:     89 yom who presented to ED today complainign of gross hematuria noted in wilson cath while taking Xerelto.   He was recently discharged from hospital with urinary retention, gross hematuria  Taking Xerelto. During hsi last hospital visit he quit taking Xerelto and GH cleared. He has been seen in our office since and has had wilson exchanged due to failed void trials.  He has continued to take Flomax and recently began taking Xerelto again.  Since has been taken off xarelto. Pt states that aside from hospital stay during February he has not had any episodes of hematuria or retention. He does admit to  nocturia, urgency and incontinence prior to wilson placement.  At this time he denies any flank pain, suprapubic pain, fever, chills, nausea vomiting or dysuria.  CBI was iniated in the ED . Previous CT done in February showed a right  non obstructing kidney stone; adrenal nodule.  He does have a history of smoking, 30 yrs roughly 3/4PPD. He moderately bothered by catheter at this time.  He is scheduled for cysto with Chao in early May.      Review of Systems:      Constitutional: Denies fevers, Denies weight changes  Eyes: Denies changes in vision, no eye pain  Ears/Nose/Throat/Mouth: Denies nasal congestion or sore throat   Cardiovascular: no chest pain, no palpitations   Respiratory: no shortness of breath , Denies cough  Gastrointestinal/Hepatic: Denies abdominal pain, nausea, vomiting, diarrhea, constipation or GI bleeding   Genitourinary: + hematuria, denies any dysuria or frequency  Musculoskeletal/Rheum:  Denies  joint pain and swelling, no edema  Skin: Denies rash  Neurological: Denies headache, confusion, memory loss or focal weakness/parasthesias  Psychiatric: denies mood disorder   Endocrine: Dinah thyroid problems  Heme/Oncology/Lymph Nodes: Denies enlarged lymph nodes, denies brusing or known bleeding disorder  All other systems were reviewed and are negative (AMA/CMS criteria)                Past Medical History:   Past Medical History:   Diagnosis Date   • CAD (coronary artery disease)    • Delirium 2/21/2020   • DM (diabetes mellitus) ([facility]) 1/7/2014   • Falls    • Hypertension    • UTI (urinary tract infection) 11/4/2018     Active Hospital Problems    Diagnosis   • Urinary retention [R33.9]     Priority: High   • DM (diabetes mellitus) (CMS-HCC) [E11.9]     Priority: Medium   • HTN (hypertension) [I10]     Priority: Low   • Gross hematuria [R31.0]   • BPH (benign prostatic hyperplasia) [N40.0]       Past Surgical History:  Past Surgical History:   Procedure Laterality Date   • CORONARY ARTERY BYPASS, 3     • OTHER CARDIAC SURGERY     • TED RECTAL ABSCESS         Hospital Medications:    Current Facility-Administered Medications:   •  amLODIPine (NORVASC) tablet 5 mg, 5 mg, Oral, DAILY, Chintan Holly M.D.  •  chlorthalidone (HYGROTON) tablet 25 mg, 25 mg, Oral, DAILY, Chintan Holly M.D.  •  finasteride (PROSCAR) tablet 5 mg, 5 mg, Oral, DAILY, Chintan Holly M.D.  •  metFORMIN (GLUCOPHAGE) tablet 500 mg, 500 mg, Oral, BID WITH MEALS, Chintan Holly M.D.  •  simvastatin (ZOCOR) tablet 10 mg, 10 mg, Oral, Nightly, Chintan Holly M.D.  •  senna-docusate (PERICOLACE or SENOKOT S) 8.6-50 MG per tablet 2 Tab, 2 Tab, Oral, BID **AND** polyethylene glycol/lytes (MIRALAX) PACKET 1 Packet, 1 Packet, Oral, QDAY PRN **AND** magnesium hydroxide (MILK OF MAGNESIA) suspension 30 mL, 30 mL, Oral, QDAY PRN **AND** bisacodyl (DULCOLAX) suppository 10 mg, 10 mg, Rectal, QDAY PRN, JENNY Ferrara.D.  •   acetaminophen (TYLENOL) tablet 650 mg, 650 mg, Oral, Q6HRS PRN, Chintan Holly M.D.  •  enalaprilat (VASOTEC) injection 1.25 mg, 1.25 mg, Intravenous, Q6HRS PRN, Chintan Holly M.D.  •  ondansetron (ZOFRAN) syringe/vial injection 4 mg, 4 mg, Intravenous, Q4HRS PRN, Chintan Holly M.D.  •  ondansetron (ZOFRAN ODT) dispertab 4 mg, 4 mg, Oral, Q4HRS PRN, Chintan Holly M.D.  •  insulin glargine (LANTUS) injection 46 Units, 46 Units, Subcutaneous, Q EVENING, Chintan Holly M.D.  •  tamsulosin (FLOMAX) capsule 0.4 mg, 0.4 mg, Oral, AFTER BREAKFAST, Chintan Holly M.D.  •  potassium chloride SA (Kdur) tablet 10 mEq, 10 mEq, Oral, BID, Chintan Holly M.D.  •  metoprolol (LOPRESSOR) tablet 12.5 mg, 12.5 mg, Oral, BID, Chintan Holly M.D.  •  spironolactone (ALDACTONE) tablet 25 mg, 25 mg, Oral, Q DAY, Chintan Holly M.D.    Current Outpatient Medications:  Medications Prior to Admission   Medication Sig Dispense Refill Last Dose   • gabapentin (NEURONTIN) 100 MG Cap Take 100-200 mg by mouth 2 Times a Day. 100  MG AT DINNER   200 MG BEDTIME   3/12/2020 at PM   • metoprolol (LOPRESSOR) 25 MG Tab Take 12.5 mg by mouth 2 times a day.   3/12/2020 at PM   • cyclosporin (RESTASIS) 0.05 % ophthalmic emulsion Place 1 Drop in both eyes 2 times a day.   3/12/2020 at PM   • spironolactone (ALDACTONE) 25 MG Tab Take 25 mg by mouth every day.   3/12/2020 at AM   • potassium chloride ER (KLOR-CON) 10 MEQ tablet Take 10 mEq by mouth 2 times a day.   3/12/2020 at PM   • XARELTO 20 MG Tab tablet Take 20 mg by mouth with dinner. AFIB   3/12/2020 at PM   • chlorthalidone (HYGROTON) 25 MG Tab Take 1 Tab by mouth every day. 30 Tab 1 3/12/2020 at AM   • neomycin-bacitracin-polymyxin (NEOSPORIN) 400-5-5000 Ointment Apply 1 Each to affected area(s) every day. To penis 1 Tube 0 3/12/2020 at PM   • nystatin (MYCOSTATIN) 273880 UNIT/GM Cream topical cream Apply 0.0001 g to affected area(s) 2 Times a Day. 1 Tube 0 3/12/2020 at PM   •  tamsulosin (FLOMAX) 0.4 MG capsule Take 0.4 mg by mouth every morning.   3/12/2020 at AM   • Omega-3 Fatty Acids (FISH OIL) 1000 MG Cap capsule Take 2,000 mg by mouth 2 Times a Day.   3/12/2020 at PM   • aspirin EC (ECOTRIN) 81 MG Tablet Delayed Response Take 81 mg by mouth every evening.   3/12/2020 at PM   • Insulin Glargine (BASAGLAR KWIKPEN) 100 UNIT/ML Solution Pen-injector Inject 46 Units as instructed every day.   3/12/2020 at 1200   • simvastatin (ZOCOR) 10 MG Tab Take 10 mg by mouth every evening.   3/12/2020 at PM   • Multiple Vitamins-Minerals (PX MENS MULTIVITAMINS) Tab Take 1 Tab by mouth every day.   3/12/2020 at AM   • Specialty Vitamins Products (PROSTATE) Tab Take 1 Tab by mouth 2 Times a Day.   3/12/2020 at PM   • metformin (GLUCOPHAGE) 500 MG TABS Take 500 mg by mouth 2 times a day, with meals.   3/12/2020 at PM       Medication Allergy:  Allergies   Allergen Reactions   • Iodine      Pt and pts wife report that it has been so long not sure what happens       Family History:  History reviewed. No pertinent family history.    Social History:  Social History     Socioeconomic History   • Marital status:      Spouse name: Not on file   • Number of children: Not on file   • Years of education: Not on file   • Highest education level: Not on file   Occupational History   • Not on file   Social Needs   • Financial resource strain: Not on file   • Food insecurity     Worry: Not on file     Inability: Not on file   • Transportation needs     Medical: Not on file     Non-medical: Not on file   Tobacco Use   • Smoking status: Former Smoker     Last attempt to quit: 10/19/1972     Years since quittin.4   • Smokeless tobacco: Never Used   Substance and Sexual Activity   • Alcohol use: Not Currently   • Drug use: No   • Sexual activity: Not on file   Lifestyle   • Physical activity     Days per week: Not on file     Minutes per session: Not on file   • Stress: Not on file   Relationships   • Social  "connections     Talks on phone: Not on file     Gets together: Not on file     Attends [employer] service: Not on file     Active member of club or organization: Not on file     Attends meetings of clubs or organizations: Not on file     Relationship status: Not on file   • Intimate partner violence     Fear of current or ex partner: Not on file     Emotionally abused: Not on file     Physically abused: Not on file     Forced sexual activity: Not on file   Other Topics Concern   • Not on file   Social History Narrative   • Not on file         Physical Exam:  Vitals/ General Appearance:   Weight/BMI: Body mass index is 29.76 kg/m².  /67   Pulse 87   Temp 36.8 °C (98.3 °F) (Oral)   Resp 17   Ht 1.702 m (5' 7\")   Wt 86.2 kg (190 lb 0.6 oz)   SpO2 95%   Vitals:    03/13/20 1024 03/13/20 1243 03/13/20 1414 03/13/20 1503   BP:  149/62 127/62 131/67   Pulse: 79 63 78 87   Resp:    17   Temp:    36.8 °C (98.3 °F)   TempSrc:    Oral   SpO2: 95% 96% 97% 95%   Weight:       Height:         Oxygen Therapy:  Pulse Oximetry: 95 %, O2 (LPM): 0, O2 Delivery Device: None - Room Air    Constitutional:   Well developed, Well nourished, No acute distress  HENMT:  Normocephalic, Atraumatic, Oropharynx moist mucous membranes, No oral exudates, Nose normal.  No thyromegaly.  Eyes:  EOMI, Conjunctiva normal, No discharge.  Neck:  Normal range of motion, No cervical tenderness,  no JVD.  Cardiovascular:  Normal heart rate, Normal rhythm, No murmurs, No rubs, No gallops.   Extremitites with intact distal pulses, no cyanosis, or edema.  Lungs:  Normal breath sounds, breath sounds clear to auscultation bilaterally,  no crackles, no wheezing.   Abdomen: Bowel sounds normal, Soft, No tenderness, No guarding, No rebound, No masses, No hepatosplenomegaly.  : #- way CBI infusing saline. With  Red Yong- aid colored urine without clots.   Skin: Warm, Dry, No erythema, No rash, no induration.  Neurologic: Alert & oriented x 3, No focal " deficits noted, cranial nerves II through X are grossly intact.  Psychiatric: Affect normal, Judgment normal, Mood normal.      MDM (Data Review):     Records reviewed and summarized in current documentation    Lab Data Review:  Recent Results (from the past 24 hour(s))   URINALYSIS,CULTURE IF INDICATED    Collection Time: 03/13/20 11:20 AM   Result Value Ref Range    Color Red     Character Cloudy (A)     Specific Gravity 1.025 <1.035    Ph 7.0 5.0 - 8.0    Glucose 500 (A) Negative mg/dL    Ketones Negative Negative mg/dL    Protein 100 (A) Negative mg/dL    Bilirubin Negative Negative    Nitrite Negative Negative    Leukocyte Esterase Trace (A) Negative    Occult Blood Moderate (A) Negative    Micro Urine Req Microscopic    CBC WITH DIFFERENTIAL    Collection Time: 03/13/20 11:20 AM   Result Value Ref Range    WBC 7.7 4.8 - 10.8 K/uL    RBC 4.84 4.70 - 6.10 M/uL    Hemoglobin 14.3 14.0 - 18.0 g/dL    Hematocrit 43.8 42.0 - 52.0 %    MCV 90.5 81.4 - 97.8 fL    MCH 29.5 27.0 - 33.0 pg    MCHC 32.6 (L) 33.7 - 35.3 g/dL    RDW 47.2 35.9 - 50.0 fL    Platelet Count 214 164 - 446 K/uL    MPV 10.0 9.0 - 12.9 fL    Neutrophils-Polys 71.80 44.00 - 72.00 %    Lymphocytes 18.00 (L) 22.00 - 41.00 %    Monocytes 8.10 0.00 - 13.40 %    Eosinophils 1.20 0.00 - 6.90 %    Basophils 0.40 0.00 - 1.80 %    Immature Granulocytes 0.50 0.00 - 0.90 %    Nucleated RBC 0.00 /100 WBC    Neutrophils (Absolute) 5.52 1.82 - 7.42 K/uL    Lymphs (Absolute) 1.38 1.00 - 4.80 K/uL    Monos (Absolute) 0.62 0.00 - 0.85 K/uL    Eos (Absolute) 0.09 0.00 - 0.51 K/uL    Baso (Absolute) 0.03 0.00 - 0.12 K/uL    Immature Granulocytes (abs) 0.04 0.00 - 0.11 K/uL    NRBC (Absolute) 0.00 K/uL   BASIC METABOLIC PANEL    Collection Time: 03/13/20 11:20 AM   Result Value Ref Range    Sodium 140 135 - 145 mmol/L    Potassium 3.7 3.6 - 5.5 mmol/L    Chloride 99 96 - 112 mmol/L    Co2 25 20 - 33 mmol/L    Glucose 258 (H) 65 - 99 mg/dL    Bun 15 8 - 22 mg/dL     Creatinine 0.66 0.50 - 1.40 mg/dL    Calcium 9.7 8.4 - 10.2 mg/dL    Anion Gap 16.0 7.0 - 16.0   PROTHROMBIN TIME (INR)    Collection Time: 03/13/20 11:20 AM   Result Value Ref Range    PT 14.5 12.0 - 14.6 sec    INR 1.11 0.87 - 1.13   APTT    Collection Time: 03/13/20 11:20 AM   Result Value Ref Range    APTT 34.3 24.7 - 36.0 sec   ESTIMATED GFR    Collection Time: 03/13/20 11:20 AM   Result Value Ref Range    GFR If African American >60 >60 mL/min/1.73 m 2    GFR If Non African American >60 >60 mL/min/1.73 m 2   URINE MICROSCOPIC (W/UA)    Collection Time: 03/13/20 11:20 AM   Result Value Ref Range    WBC 5-10 (A) /hpf    RBC >150 (A) /hpf    Bacteria Rare (A) None /hpf    Epithelial Cells Rare Few /hpf       Imaging/Procedures Review:    Reviewed    MDM (Assessment and Plan):     Active Hospital Problems    Diagnosis   • Urinary retention [R33.9]     Priority: High   • DM (diabetes mellitus) (CMS-HCC) [E11.9]     Priority: Medium   • HTN (hypertension) [I10]     Priority: Low   • Gross hematuria [R31.0]   • BPH (benign prostatic hyperplasia) [N40.0]         Pt is scheduled for Cysto with Chao 5/6/20- He has Wilson cath changes scheduled monthly. Rec off of Xerelto if able.  Pt to have wilson cath hand irrigated and keep CBI- titrate off to pink urine. We will continue to follow labs.   Plan of care discussed and directed by MD Oropeza

## 2020-03-13 NOTE — H&P
Hospital Medicine History & Physical Note    Date of Service  3/13/2020    Primary Care Physician  TABITHA Powell.    Consultants  urology    Code Status  full    Chief Complaint  Blood in urine    History of Presenting Illness  89 y.o. male who presented 3/13/2020 with hematuria. This is recurrent. He has a history of an indwelling wilson for BPH and has been on aspirin and xarelto for atrial flutter. He was discharged on 2/25 for this same presentation. He denies fevers or chills. He did have some pelvic pain this morning that was intermittent.     I discussed him with the ER physician including labs, imaging and plan      Review of Systems  Review of Systems   Constitutional: Negative for chills and fever.   HENT: Negative for sore throat.    Eyes: Negative for blurred vision and pain.   Respiratory: Negative for cough and shortness of breath.    Cardiovascular: Negative for chest pain and palpitations.   Gastrointestinal: Negative for abdominal pain, nausea and vomiting.   Genitourinary: Positive for hematuria. Negative for dysuria and urgency.   Musculoskeletal: Negative for back pain and neck pain.   Skin: Negative for itching and rash.   Neurological: Negative for dizziness, tingling and headaches.   Psychiatric/Behavioral: Negative for depression. The patient does not have insomnia.    All other systems reviewed and are negative.      Past Medical History   has a past medical history of CAD (coronary artery disease), Delirium (2/21/2020), DM (diabetes mellitus) ([facility]) (1/7/2014), Falls, Hypertension, and UTI (urinary tract infection) (11/4/2018).    Surgical History   has a past surgical history that includes other cardiac surgery; coronary artery bypass, 3; and janice rectal abscess.     Family History  family history is not on file.     Social History   reports that he quit smoking about 47 years ago. He has never used smokeless tobacco. He reports previous alcohol use. He reports that he does not use  drugs.    Allergies  Allergies   Allergen Reactions   • Iodine      Pt and pts wife report that it has been so long not sure what happens       Medications  Prior to Admission Medications   Prescriptions Last Dose Informant Patient Reported? Taking?   Insulin Glargine (BASAGLAR KWIKPEN) 100 UNIT/ML Solution Pen-injector 3/12/2020 at 1200 Caregiver Yes No   Sig: Inject 46 Units as instructed every day.   Multiple Vitamins-Minerals (PX MENS MULTIVITAMINS) Tab 3/12/2020 at AM Caregiver Yes No   Sig: Take 1 Tab by mouth every day.   Omega-3 Fatty Acids (FISH OIL) 1000 MG Cap capsule 3/12/2020 at PM Caregiver Yes No   Sig: Take 2,000 mg by mouth 2 Times a Day.   Specialty Vitamins Products (PROSTATE) Tab 3/12/2020 at PM Caregiver Yes No   Sig: Take 1 Tab by mouth 2 Times a Day.   XARELTO 20 MG Tab tablet   Yes No   Sig: Take 20 mg by mouth with dinner.   amLODIPine (NORVASC) 5 MG Tab   Yes No   Sig: Take 5 mg by mouth every day.   aspirin EC (ECOTRIN) 81 MG Tablet Delayed Response 3/12/2020 at PM Caregiver Yes No   Sig: Take 81 mg by mouth every evening.   chlorthalidone (HYGROTON) 25 MG Tab 3/12/2020 at AM Caregiver No No   Sig: Take 1 Tab by mouth every day.   finasteride (PROSCAR) 5 MG Tab   Yes No   Sig: Take 5 mg by mouth every day.   metformin (GLUCOPHAGE) 500 MG TABS 3/12/2020 at PM Caregiver Yes No   Sig: Take 500 mg by mouth 2 times a day, with meals.   metoprolol (LOPRESSOR) 50 MG Tab 3/12/2020 at PM Caregiver No No   Sig: Take 0.5 Tabs by mouth 2 times a day.   neomycin-bacitracin-polymyxin (NEOSPORIN) 400-5-5000 Ointment 3/12/2020 at PM Caregiver No No   Sig: Apply 1 Each to affected area(s) every day. To penis   nystatin (MYCOSTATIN) 178444 UNIT/GM Cream topical cream  Caregiver No No   Sig: Apply 0.0001 g to affected area(s) 2 Times a Day.   omega-3 acid ethyl esters (LOVAZA) 1 GM capsule   Yes No   potassium chloride ER (KLOR-CON) 10 MEQ tablet   Yes No   Sig: Take 10 mEq by mouth every day.   potassium  chloride SA (KDUR) 20 MEQ Tab CR 3/12/2020 at AM Caregiver Yes No   Sig: Take 20 mEq by mouth every day.   simvastatin (ZOCOR) 10 MG Tab 3/12/2020 at PM Caregiver Yes No   Sig: Take 10 mg by mouth every evening.   tamsulosin (FLOMAX) 0.4 MG capsule 3/12/2020 at PM Caregiver Yes No   Sig: Take 0.4 mg by mouth 2 Times a Day.      Facility-Administered Medications: None       Physical Exam  Temp:  [36.6 °C (97.9 °F)] 36.6 °C (97.9 °F)  Pulse:  [63-79] 63  Resp:  [16] 16  BP: (149-155)/(62-97) 149/62  SpO2:  [95 %-96 %] 96 %    Physical Exam  Vitals signs and nursing note reviewed.   Constitutional:       General: He is not in acute distress.     Appearance: He is well-developed. He is not diaphoretic.      Comments: Patient seen and examined at the bedside. Plan discussed at bedside with the RN.    HENT:      Right Ear: External ear normal.      Left Ear: External ear normal.      Nose: Nose normal.   Eyes:      General: No scleral icterus.        Right eye: No discharge.         Left eye: No discharge.   Neck:      Vascular: No JVD.      Trachea: No tracheal deviation.   Cardiovascular:      Rate and Rhythm: Normal rate.      Heart sounds: Normal heart sounds. No murmur.   Pulmonary:      Effort: Pulmonary effort is normal. No respiratory distress.      Breath sounds: Normal breath sounds. No wheezing or rales.   Abdominal:      General: Bowel sounds are normal. There is no distension.      Palpations: Abdomen is soft.      Tenderness: There is no abdominal tenderness. There is no guarding.   Musculoskeletal:         General: No tenderness.   Skin:     General: Skin is warm and dry.      Findings: No erythema.   Neurological:      Mental Status: He is alert and oriented to person, place, and time.   Psychiatric:         Behavior: Behavior normal.         Laboratory:  Recent Labs     03/13/20  1120   WBC 7.7   RBC 4.84   HEMOGLOBIN 14.3   HEMATOCRIT 43.8   MCV 90.5   MCH 29.5   MCHC 32.6*   RDW 47.2   PLATELETCT 214    MPV 10.0     Recent Labs     03/13/20  1120   SODIUM 140   POTASSIUM 3.7   CHLORIDE 99   CO2 25   GLUCOSE 258*   BUN 15   CREATININE 0.66   CALCIUM 9.7     Recent Labs     03/13/20  1120   GLUCOSE 258*     Recent Labs     03/13/20  1120   APTT 34.3   INR 1.11     No results for input(s): NTPROBNP in the last 72 hours.      No results for input(s): TROPONINT in the last 72 hours.    Urinalysis:    Recent Labs     03/13/20  1120   SPECGRAVITY 1.025   GLUCOSEUR 500*   KETONES Negative   NITRITE Negative   LEUKESTERAS Trace*   WBCURINE 5-10*   RBCURINE >150*   BACTERIA Rare*   EPITHELCELL Rare        Imaging:  No orders to display         Assessment/Plan:  I anticipate this patient will require at least two midnights for appropriate medical management, necessitating inpatient admission.    * Gross hematuria- (present on admission)  Assessment & Plan  Stop xarelto. This likely   Stop asa  CBI  Urology consultation via ED has been done.       Urinary retention- (present on admission)  Assessment & Plan  Wilson has been changed out in the ER  Continue BPH meds    DM (diabetes mellitus) (CMS-HCC)- (present on admission)  Assessment & Plan  Continue meds. Will continue metformin and insulin.     BPH (benign prostatic hyperplasia)- (present on admission)  Assessment & Plan  Severe. Continue BPH meds and has a wilson.     HTN (hypertension)- (present on admission)  Assessment & Plan  Continue meds. Controlled.       VTE prophylaxis: scd

## 2020-03-13 NOTE — ED PROVIDER NOTES
ED Provider Note    CHIEF COMPLAINT  Chief Complaint   Patient presents with   • Blood in Urine   • Urinary Catheter Problem       HPI  Julien Dao is a 89 y.o. male who presents to the emergency department with abdominal pain and hematuria.  Patient awoke this morning.  He had 3 episodes of sharp crampy abdominal pain.  He says that it felt like an explosion in his lower abdomen.  He noticed then that he had some bright red blood in his Sher catheter.  Did not seem to be draining all that well but it did seem to still be draining.  He denies any fevers.  No flank pain.  Pain is all in the very low abdomen.  No upper abdominal pain.  No back pain.  No flank pain.  Patient has a history of chronic anticoagulation on Xarelto.    REVIEW OF SYSTEMS  See HPI for further details. All other systems are negative.     PAST MEDICAL HISTORY  Past Medical History:   Diagnosis Date   • CAD (coronary artery disease)    • Delirium 2020   • DM (diabetes mellitus) ([facility]) 2014   • Falls    • Hypertension    • UTI (urinary tract infection) 2018       FAMILY HISTORY  History reviewed. No pertinent family history.    SOCIAL HISTORY  Social History     Socioeconomic History   • Marital status:      Spouse name: Not on file   • Number of children: Not on file   • Years of education: Not on file   • Highest education level: Not on file   Occupational History   • Not on file   Social Needs   • Financial resource strain: Not on file   • Food insecurity     Worry: Not on file     Inability: Not on file   • Transportation needs     Medical: Not on file     Non-medical: Not on file   Tobacco Use   • Smoking status: Former Smoker     Last attempt to quit: 10/19/1972     Years since quittin.4   • Smokeless tobacco: Never Used   Substance and Sexual Activity   • Alcohol use: Not Currently   • Drug use: No   • Sexual activity: Not on file   Lifestyle   • Physical activity     Days per week: Not on file     Minutes per  session: Not on file   • Stress: Not on file   Relationships   • Social connections     Talks on phone: Not on file     Gets together: Not on file     Attends Hoahaoism service: Not on file     Active member of club or organization: Not on file     Attends meetings of clubs or organizations: Not on file     Relationship status: Not on file   • Intimate partner violence     Fear of current or ex partner: Not on file     Emotionally abused: Not on file     Physically abused: Not on file     Forced sexual activity: Not on file   Other Topics Concern   • Not on file   Social History Narrative   • Not on file       SURGICAL HISTORY  Past Surgical History:   Procedure Laterality Date   • CORONARY ARTERY BYPASS, 3     • OTHER CARDIAC SURGERY     • TED RECTAL ABSCESS         CURRENT MEDICATIONS  Home Medications     Reviewed by Anjum Soriano (Pharmacy Tech) on 03/13/20 at 1315  Med List Status: Partial   Medication Last Dose Status   aspirin EC (ECOTRIN) 81 MG Tablet Delayed Response 3/12/2020 Active   chlorthalidone (HYGROTON) 25 MG Tab 3/12/2020 Active   Insulin Glargine (BASAGLAR KWIKPEN) 100 UNIT/ML Solution Pen-injector 3/12/2020 Active   metformin (GLUCOPHAGE) 500 MG TABS 3/12/2020 Active   metoprolol (LOPRESSOR) 50 MG Tab 3/12/2020 Active   Multiple Vitamins-Minerals (PX MENS MULTIVITAMINS) Tab 3/12/2020 Active   neomycin-bacitracin-polymyxin (NEOSPORIN) 400-5-5000 Ointment 3/12/2020 Active   nystatin (MYCOSTATIN) 773568 UNIT/GM Cream topical cream  Active   Omega-3 Fatty Acids (FISH OIL) 1000 MG Cap capsule 3/12/2020 Active   potassium chloride SA (KDUR) 20 MEQ Tab CR 3/12/2020 Active   simvastatin (ZOCOR) 10 MG Tab 3/12/2020 Active   Specialty Vitamins Products (PROSTATE) Tab 3/12/2020 Active   tamsulosin (FLOMAX) 0.4 MG capsule 3/12/2020 Active                ALLERGIES  Allergies   Allergen Reactions   • Iodine      Pt and pts wife report that it has been so long not sure what happens       PHYSICAL  "EXAM  VITAL SIGNS: /62   Pulse 78   Temp 36.6 °C (97.9 °F) (Temporal)   Resp 16   Ht 1.702 m (5' 7\")   Wt 86.2 kg (190 lb 0.6 oz)   SpO2 97%   BMI 29.76 kg/m²    Constitutional: Well developed, Well nourished, No acute distress, Non-toxic appearance.   HENT: Normocephalic, Atraumatic, Bilateral external ears normal, Oropharynx moist, No oral exudates, Nose normal.   Eyes: PERRLA, EOMI, Conjunctiva normal, No discharge.   Neck: Normal range of motion, No tenderness, Supple, No stridor.   Cardiovascular: Regular rate and rhythm, no audible murmur  Thorax & Lungs: Clear to auscultation bilaterally.  No rales, rhonchi, or wheezing.  Abdomen: Bowel sounds normal, Soft, No tenderness, No masses, No pulsatile masses.  Sher catheter in place draining thick dark blood.  Minimal urine.  Uncircumcised male.  Scrotal contents are normal.  Skin: Warm, Dry, No erythema, No rash.   Back: No tenderness, No CVA tenderness.   Extremities: Intact distal pulses, No tenderness, No cyanosis, No clubbing.  Neurologic: Alert & oriented x 3, Normal motor function, Normal sensory function, No focal deficits noted.     EKG      RADIOLOGY/PROCEDURES  No orders to display     Results for orders placed or performed during the hospital encounter of 03/13/20   URINALYSIS,CULTURE IF INDICATED   Result Value Ref Range    Color Red     Character Cloudy (A)     Specific Gravity 1.025 <1.035    Ph 7.0 5.0 - 8.0    Glucose 500 (A) Negative mg/dL    Ketones Negative Negative mg/dL    Protein 100 (A) Negative mg/dL    Bilirubin Negative Negative    Nitrite Negative Negative    Leukocyte Esterase Trace (A) Negative    Occult Blood Moderate (A) Negative    Micro Urine Req Microscopic    CBC WITH DIFFERENTIAL   Result Value Ref Range    WBC 7.7 4.8 - 10.8 K/uL    RBC 4.84 4.70 - 6.10 M/uL    Hemoglobin 14.3 14.0 - 18.0 g/dL    Hematocrit 43.8 42.0 - 52.0 %    MCV 90.5 81.4 - 97.8 fL    MCH 29.5 27.0 - 33.0 pg    MCHC 32.6 (L) 33.7 - 35.3 g/dL "    RDW 47.2 35.9 - 50.0 fL    Platelet Count 214 164 - 446 K/uL    MPV 10.0 9.0 - 12.9 fL    Neutrophils-Polys 71.80 44.00 - 72.00 %    Lymphocytes 18.00 (L) 22.00 - 41.00 %    Monocytes 8.10 0.00 - 13.40 %    Eosinophils 1.20 0.00 - 6.90 %    Basophils 0.40 0.00 - 1.80 %    Immature Granulocytes 0.50 0.00 - 0.90 %    Nucleated RBC 0.00 /100 WBC    Neutrophils (Absolute) 5.52 1.82 - 7.42 K/uL    Lymphs (Absolute) 1.38 1.00 - 4.80 K/uL    Monos (Absolute) 0.62 0.00 - 0.85 K/uL    Eos (Absolute) 0.09 0.00 - 0.51 K/uL    Baso (Absolute) 0.03 0.00 - 0.12 K/uL    Immature Granulocytes (abs) 0.04 0.00 - 0.11 K/uL    NRBC (Absolute) 0.00 K/uL   BASIC METABOLIC PANEL   Result Value Ref Range    Sodium 140 135 - 145 mmol/L    Potassium 3.7 3.6 - 5.5 mmol/L    Chloride 99 96 - 112 mmol/L    Co2 25 20 - 33 mmol/L    Glucose 258 (H) 65 - 99 mg/dL    Bun 15 8 - 22 mg/dL    Creatinine 0.66 0.50 - 1.40 mg/dL    Calcium 9.7 8.4 - 10.2 mg/dL    Anion Gap 16.0 7.0 - 16.0   PROTHROMBIN TIME (INR)   Result Value Ref Range    PT 14.5 12.0 - 14.6 sec    INR 1.11 0.87 - 1.13   APTT   Result Value Ref Range    APTT 34.3 24.7 - 36.0 sec   ESTIMATED GFR   Result Value Ref Range    GFR If African American >60 >60 mL/min/1.73 m 2    GFR If Non African American >60 >60 mL/min/1.73 m 2   URINE MICROSCOPIC (W/UA)   Result Value Ref Range    WBC 5-10 (A) /hpf    RBC >150 (A) /hpf    Bacteria Rare (A) None /hpf    Epithelial Cells Rare Few /hpf         COURSE & MEDICAL DECISION MAKING  Pertinent Labs & Imaging studies reviewed. (See chart for details)    The patient presents today with carlton hematuria.  He has some lower abdominal pain and cramping.  I suspect that he is obstructed.    Sher catheter is exchanged for three-way catheter in the emergency department.  We have initiated continuous bladder irrigation.  Laboratory studies as above.  No significant anemia or thrombocytopenia.    Urinalysis is remarkable for hematuria.  No evidence of  infection.    In the emergency department bladder irrigation has resulted in some clearing of the urine but it does remain a light red color.  He will be admitted to the hospital for further evaluation and treatment.  I spoke with the on-call hospitalist Dr. Miller for admission.  Dr. Oropeza the urologist will consult.      FINAL IMPRESSION  1. Hematuria, unspecified type    2. Obstruction of Sher catheter, initial encounter ([facility])    3. Chronic anticoagulation              Electronically signed by: Andre Young M.D., 3/13/2020 2:34 PM

## 2020-03-13 NOTE — Clinical Note
----- Message -----  From: Stephanie Rader R.N.  Sent: 3/16/2020   5:38 AM PDT  To: Stephanie Montes De Oca MS,OTR/L      Hold all Home Health services. Patient admitted to (Facilty Name) [facility] on 3/13 with recurrent hematuria

## 2020-03-13 NOTE — ED NOTES
Pt does not meet  [facility] PUI criteria. Pt has not traveled within  14 days internationally or any domestic high risk area, has not had direct contact to known COVID-19 positive patient, or/ and  does not have fever or respiratory symptoms.

## 2020-03-13 NOTE — PROGRESS NOTES
I was on call last night    Mr. Dao called at 730 and stated he was having problems with his wilson catheter, he said it was still in,  no bleeding and he would like a nurse to come see him.    I texted Stephanie mccoy CM

## 2020-03-13 NOTE — ED NOTES
Three way wilson catheter placed, irrigation initiated. Will titrate irrigation based on clarity.

## 2020-03-13 NOTE — DISCHARGE PLANNING
*ATTN Discharge Planning team: This patient is currently on service with [facility]. Please submit a referral order and face-to-face prior to discharging the patient home. If you have any questions, contact our Transitional Care Specialist team at x [phone].

## 2020-03-14 LAB
ALBUMIN SERPL BCP-MCNC: 3.7 G/DL (ref 3.2–4.9)
ALBUMIN/GLOB SERPL: 1.5 G/DL
ALP SERPL-CCNC: 50 U/L (ref 30–99)
ALT SERPL-CCNC: 19 U/L (ref 2–50)
ANION GAP SERPL CALC-SCNC: 13 MMOL/L (ref 7–16)
AST SERPL-CCNC: 19 U/L (ref 12–45)
BASOPHILS # BLD AUTO: 0.5 % (ref 0–1.8)
BASOPHILS # BLD: 0.04 K/UL (ref 0–0.12)
BILIRUB SERPL-MCNC: 0.7 MG/DL (ref 0.1–1.5)
BUN SERPL-MCNC: 14 MG/DL (ref 8–22)
CALCIUM SERPL-MCNC: 9 MG/DL (ref 8.4–10.2)
CHLORIDE SERPL-SCNC: 98 MMOL/L (ref 96–112)
CO2 SERPL-SCNC: 25 MMOL/L (ref 20–33)
CREAT SERPL-MCNC: 0.67 MG/DL (ref 0.5–1.4)
EOSINOPHIL # BLD AUTO: 0.15 K/UL (ref 0–0.51)
EOSINOPHIL NFR BLD: 1.7 % (ref 0–6.9)
ERYTHROCYTE [DISTWIDTH] IN BLOOD BY AUTOMATED COUNT: 47.9 FL (ref 35.9–50)
GLOBULIN SER CALC-MCNC: 2.4 G/DL (ref 1.9–3.5)
GLUCOSE SERPL-MCNC: 156 MG/DL (ref 65–99)
HCT VFR BLD AUTO: 36.3 % (ref 42–52)
HGB BLD-MCNC: 12 G/DL (ref 14–18)
IMM GRANULOCYTES # BLD AUTO: 0.04 K/UL (ref 0–0.11)
IMM GRANULOCYTES NFR BLD AUTO: 0.5 % (ref 0–0.9)
LYMPHOCYTES # BLD AUTO: 1.49 K/UL (ref 1–4.8)
LYMPHOCYTES NFR BLD: 17.3 % (ref 22–41)
MCH RBC QN AUTO: 30.3 PG (ref 27–33)
MCHC RBC AUTO-ENTMCNC: 33.1 G/DL (ref 33.7–35.3)
MCV RBC AUTO: 91.7 FL (ref 81.4–97.8)
MONOCYTES # BLD AUTO: 0.79 K/UL (ref 0–0.85)
MONOCYTES NFR BLD AUTO: 9.2 % (ref 0–13.4)
NEUTROPHILS # BLD AUTO: 6.09 K/UL (ref 1.82–7.42)
NEUTROPHILS NFR BLD: 70.8 % (ref 44–72)
NRBC # BLD AUTO: 0 K/UL
NRBC BLD-RTO: 0 /100 WBC
PLATELET # BLD AUTO: 202 K/UL (ref 164–446)
PMV BLD AUTO: 10.4 FL (ref 9–12.9)
POTASSIUM SERPL-SCNC: 3.4 MMOL/L (ref 3.6–5.5)
PROT SERPL-MCNC: 6.1 G/DL (ref 6–8.2)
RBC # BLD AUTO: 3.96 M/UL (ref 4.7–6.1)
SODIUM SERPL-SCNC: 136 MMOL/L (ref 135–145)
WBC # BLD AUTO: 8.6 K/UL (ref 4.8–10.8)

## 2020-03-14 PROCEDURE — 36415 COLL VENOUS BLD VENIPUNCTURE: CPT

## 2020-03-14 PROCEDURE — 770006 HCHG ROOM/CARE - MED/SURG/GYN SEMI*

## 2020-03-14 PROCEDURE — A9270 NON-COVERED ITEM OR SERVICE: HCPCS | Performed by: HOSPITALIST

## 2020-03-14 PROCEDURE — 665998 HH PPS REVENUE CREDIT

## 2020-03-14 PROCEDURE — 80053 COMPREHEN METABOLIC PANEL: CPT

## 2020-03-14 PROCEDURE — 665999 HH PPS REVENUE DEBIT

## 2020-03-14 PROCEDURE — 700102 HCHG RX REV CODE 250 W/ 637 OVERRIDE(OP): Performed by: HOSPITALIST

## 2020-03-14 PROCEDURE — 99233 SBSQ HOSP IP/OBS HIGH 50: CPT | Performed by: HOSPITALIST

## 2020-03-14 PROCEDURE — 85025 COMPLETE CBC W/AUTO DIFF WBC: CPT

## 2020-03-14 RX ORDER — SPIRONOLACTONE 25 MG/1
25 TABLET ORAL
Status: DISCONTINUED | OUTPATIENT
Start: 2020-03-14 | End: 2020-03-15 | Stop reason: HOSPADM

## 2020-03-14 RX ORDER — HYDROMORPHONE HYDROCHLORIDE 1 MG/ML
0.5 INJECTION, SOLUTION INTRAMUSCULAR; INTRAVENOUS; SUBCUTANEOUS
Status: DISCONTINUED | OUTPATIENT
Start: 2020-03-14 | End: 2020-03-15 | Stop reason: HOSPADM

## 2020-03-14 RX ORDER — FINASTERIDE 5 MG/1
5 TABLET, FILM COATED ORAL DAILY
Status: DISCONTINUED | OUTPATIENT
Start: 2020-03-15 | End: 2020-03-15 | Stop reason: HOSPADM

## 2020-03-14 RX ORDER — POTASSIUM CHLORIDE 20 MEQ/1
40 TABLET, EXTENDED RELEASE ORAL ONCE
Status: COMPLETED | OUTPATIENT
Start: 2020-03-14 | End: 2020-03-14

## 2020-03-14 RX ADMIN — SIMVASTATIN 10 MG: 10 TABLET, FILM COATED ORAL at 22:13

## 2020-03-14 RX ADMIN — AMLODIPINE BESYLATE 5 MG: 5 TABLET ORAL at 05:23

## 2020-03-14 RX ADMIN — INSULIN GLARGINE 46 UNITS: 100 INJECTION, SOLUTION SUBCUTANEOUS at 17:54

## 2020-03-14 RX ADMIN — SPIRONOLACTONE 25 MG: 25 TABLET ORAL at 16:25

## 2020-03-14 RX ADMIN — METOPROLOL TARTRATE 12.5 MG: 25 TABLET ORAL at 17:52

## 2020-03-14 RX ADMIN — POTASSIUM CHLORIDE 40 MEQ: 1500 TABLET, EXTENDED RELEASE ORAL at 11:15

## 2020-03-14 RX ADMIN — FINASTERIDE 5 MG: 5 TABLET, FILM COATED ORAL at 16:24

## 2020-03-14 RX ADMIN — CHLORTHALIDONE 25 MG: 25 TABLET ORAL at 05:22

## 2020-03-14 RX ADMIN — TAMSULOSIN HYDROCHLORIDE 0.4 MG: 0.4 CAPSULE ORAL at 09:40

## 2020-03-14 RX ADMIN — METOPROLOL TARTRATE 12.5 MG: 25 TABLET ORAL at 05:22

## 2020-03-14 RX ADMIN — POTASSIUM CHLORIDE 10 MEQ: 1500 TABLET, EXTENDED RELEASE ORAL at 05:23

## 2020-03-14 RX ADMIN — METFORMIN HYDROCHLORIDE 500 MG: 500 TABLET ORAL at 17:52

## 2020-03-14 RX ADMIN — SENNOSIDES AND DOCUSATE SODIUM 2 TABLET: 8.6; 5 TABLET ORAL at 05:22

## 2020-03-14 RX ADMIN — POTASSIUM CHLORIDE 10 MEQ: 1500 TABLET, EXTENDED RELEASE ORAL at 17:53

## 2020-03-14 ASSESSMENT — ENCOUNTER SYMPTOMS
ABDOMINAL PAIN: 1
NAUSEA: 0
EYE PAIN: 0
PALPITATIONS: 0
FEVER: 0
BLURRED VISION: 0
DIZZINESS: 0
VOMITING: 0
HEADACHES: 0
SHORTNESS OF BREATH: 0
DEPRESSION: 0
CHILLS: 0
BACK PAIN: 0
SORE THROAT: 0
TINGLING: 0
NECK PAIN: 0
INSOMNIA: 0
COUGH: 0

## 2020-03-14 NOTE — CARE PLAN
Admitted to 216 from ER with c/c of hematuria CBI at rapid rate, urine cherry red without clots hand irrigated with NS 1,000cc as ordered med well, no bladder spasms.pt states he would like to discuss Advanced Directives with hospitalist in am.

## 2020-03-14 NOTE — PROGRESS NOTES
"Pt c/o \"pressure\" pain in lower abd and groin area. Pt's wilson flushed with 1000mL sterile water. Multiple clots removed. Pt states pain relief from the flushing. Pt ambulated multiple times this shift to bathroom with x1 SBA. Pt had no BM this shift. Wilson drainage light, clear, red.  "

## 2020-03-14 NOTE — CARE PLAN
Problem: Safety  Goal: Will remain free from injury  3/14/2020 0806 by Reina Huerta R.N.  Outcome: PROGRESSING AS EXPECTED  3/14/2020 0403 by Reina Huerta R.N.  Outcome: PROGRESSING AS EXPECTED  Intervention: Provide assistance with mobility  3/14/2020 0806 by Reina Huerta R.N.  Flowsheets (Taken 3/14/2020 0000 by Prerna Lam, C.N.A.)  Assistance: Assistance of One  Ambulation Tolerance: General Weakness  3/14/2020 0403 by Reina Huerta R.N.  Flowsheets (Taken 3/14/2020 0000 by Prerna Lam, C.N.A.)  Assistance: Assistance of One  Ambulation Tolerance: General Weakness  Intervention: Collaborate with Interdisciplinary Team for safe transfer and mobilization techniques  3/14/2020 0403 by Reina Huerta R.N.  Flowsheets (Taken 3/14/2020 0000 by Prerna Lam, C.N.A.)  Assistive Devices: Walker - front wheel  Goal: Will remain free from falls  3/14/2020 0403 by Reina Huerta R.N.  Outcome: PROGRESSING AS EXPECTED  Intervention: Assess risk factors for falls  3/14/2020 0806 by Reina Huerta R.N.  Flowsherminio  Taken 3/14/2020 0000 by Prerna Lam, C.N.ALuis Miguel  Pt Calls for Assistance: Yes  Taken 3/13/2020 2315 by Reina Huerta R.N.  Mobility Status Assessment: 1-1 Healthcare Provider Required for Assistance with Ambulation & Transfer  3/14/2020 0403 by Reina Huerta R.N.  Flowsheets  Taken 3/14/2020 0000 by Prerna Lam, C.N.ALuis Miguel  Pt Calls for Assistance: Yes  Taken 3/13/2020 2315 by Reina Huerta R.N.  Mobility Status Assessment: 1-1 Healthcare Provider Required for Assistance with Ambulation & Transfer  Intervention: Implement fall precautions  3/14/2020 0806 by Reina Huerta R.N.  Flowsherminio  Taken 3/14/2020 0403  Bed Alarm: Yes - Alarm On  Taken 3/13/2020 2315  Environmental Precautions:   Treaded Slipper Socks on Patient   Personal Belongings, Wastebasket, Call Bell etc. in Easy Reach   Transferred to Stronger Side   Report Given to Other  Health Care Providers Regarding Fall Risk   Bed in Low Position   Communication Sign for Patients & Families   Mobility Assessed & Appropriate Sign Placed  3/14/2020 0403 by Reina Huerta R.N.  Flowsheets  Taken 3/14/2020 0403  Bed Alarm: Yes - Alarm On  Taken 3/13/2020 8723  Environmental Precautions:   Treaded Slipper Socks on Patient   Personal Belongings, Wastebasket, Call Bell etc. in Easy Reach   Transferred to Stronger Side   Report Given to Other Health Care Providers Regarding Fall Risk   Bed in Low Position   Communication Sign for Patients & Families   Mobility Assessed & Appropriate Sign Placed     Problem: Pain Management  Goal: Pain level will decrease to patient's comfort goal  Outcome: PROGRESSING AS EXPECTED        86

## 2020-03-14 NOTE — CARE PLAN
Problem: Communication  Goal: The ability to communicate needs accurately and effectively will improve  Outcome: PROGRESSING AS EXPECTED  Intervention: [place] patient and significant other/support system to call light to alert staff of needs  Flowsheets (Taken 3/14/2020 0287)  Oriented to:: All of the Following : Location of Bathroom, Visiting Policy, Unit Routine, Call Light and Bedside Controls, Bedside Rail Policy, Smoking Policy, Rights and Responsibilities, Bedside Report, and Patient Education Notebook  Note: Pt reoriented to unit during AM rounds. Pt instructed to utilize call light to call for assistance as needed. Pt verbalized understanding and calls appropriately. Hourly rounding in place to ensure needs are met.       Problem: Bowel/Gastric:  Goal: Normal bowel function is maintained or improved  Outcome: PROGRESSING SLOWER THAN EXPECTED  Flowsheets (Taken 3/14/2020 1257 by Sandi Cano, C.N.A.)  Last BM: 03/14/20  Intervention: Educate patient and significant other/support system about diet, fluid intake, medications and activity to promote bowel function  Note: Pt reports no significant difficulty with bowel movements, but pt incontinent of loose stool x2 this shift.

## 2020-03-14 NOTE — PROGRESS NOTES
2 RN skin assessment done; skin not WDL. See Wound flowsheet.redness BLE with few healed scabs at ankle.

## 2020-03-14 NOTE — ASSESSMENT & PLAN NOTE
Controlled. He should likely remain off of anticoagulation until his hematuria issues are corrected with urology in the outpatient setting. He likely should not be discharged on this.

## 2020-03-14 NOTE — PROGRESS NOTES
Hospital Medicine Daily Progress Note    Date of Service  3/14/2020    Chief Complaint  89 y.o. male admitted 3/13/2020 with hematuria.     Hospital Course    He was found to have an obstructed wilson due to gross hematuria. He had an indwelling wilson placed for bph with outflow obstruction. He has also been on anticoagulation for atrial flutter and was taking aspirin as well.  His wilson was changed. His anticoagulation was held, and he was placed onto continuous bladder irrigation. Urology was consulted.       Interval Problem Update  He is still having pain with multiple clots being evacuated. This helps with the pain once it is done but it is recurring. I discussed with urology today. His K is now low and I replaced it.     Consultants/Specialty  urology- I discussed this with them today    Code Status  full    Disposition  tbd    Review of Systems  Review of Systems   Constitutional: Negative for chills and fever.   HENT: Negative for sore throat.    Eyes: Negative for blurred vision and pain.   Respiratory: Negative for cough and shortness of breath.    Cardiovascular: Negative for chest pain and palpitations.   Gastrointestinal: Positive for abdominal pain. Negative for nausea and vomiting.   Genitourinary: Positive for hematuria. Negative for dysuria and urgency.   Musculoskeletal: Negative for back pain and neck pain.   Skin: Negative for itching and rash.   Neurological: Negative for dizziness, tingling and headaches.   Psychiatric/Behavioral: Negative for depression. The patient does not have insomnia.    All other systems reviewed and are negative.       Physical Exam  Temp:  [36.6 °C (97.8 °F)-36.9 °C (98.5 °F)] 36.6 °C (97.8 °F)  Pulse:  [63-91] 87  Resp:  [16-20] 20  BP: (124-161)/(62-97) 124/65  SpO2:  [95 %-97 %] 96 %    Physical Exam  Vitals signs and nursing note reviewed.   Constitutional:       General: He is not in acute distress.     Appearance: He is well-developed. He is not diaphoretic.       Comments: Patient seen and examined at the bedside. Plan discussed at bedside with the RN.    HENT:      Right Ear: External ear normal.      Left Ear: External ear normal.      Nose: Nose normal.   Eyes:      General: No scleral icterus.        Right eye: No discharge.         Left eye: No discharge.   Neck:      Vascular: No JVD.      Trachea: No tracheal deviation.   Cardiovascular:      Rate and Rhythm: Normal rate. Rhythm irregular.      Heart sounds: Normal heart sounds. No murmur.   Pulmonary:      Effort: Pulmonary effort is normal. No respiratory distress.      Breath sounds: Normal breath sounds. No wheezing or rales.   Abdominal:      General: Bowel sounds are normal. There is no distension.      Palpations: Abdomen is soft.      Tenderness: There is no abdominal tenderness. There is no guarding.   Musculoskeletal:         General: No tenderness.   Skin:     General: Skin is warm and dry.      Findings: No erythema.   Neurological:      Mental Status: He is alert and oriented to person, place, and time.   Psychiatric:         Behavior: Behavior normal.         Fluids    Intake/Output Summary (Last 24 hours) at 3/14/2020 0953  Last data filed at 3/14/2020 0935  Gross per 24 hour   Intake 500 ml   Output 1100 ml   Net -600 ml       Laboratory  Recent Labs     03/13/20  1120 03/14/20  0448   WBC 7.7 8.6   RBC 4.84 3.96*   HEMOGLOBIN 14.3 12.0*   HEMATOCRIT 43.8 36.3*   MCV 90.5 91.7   MCH 29.5 30.3   MCHC 32.6* 33.1*   RDW 47.2 47.9   PLATELETCT 214 202   MPV 10.0 10.4     Recent Labs     03/13/20  1120 03/14/20  0448   SODIUM 140 136   POTASSIUM 3.7 3.4*   CHLORIDE 99 98   CO2 25 25   GLUCOSE 258* 156*   BUN 15 14   CREATININE 0.66 0.67   CALCIUM 9.7 9.0     Recent Labs     03/13/20  1120   APTT 34.3   INR 1.11               Imaging  No orders to display        Assessment/Plan  * Gross hematuria- (present on admission)  Assessment & Plan  Not resolving. Off xarelto and asa.   CBI ongoing.   Add IV dilaudid.    Urology following      Urinary retention- (present on admission)  Assessment & Plan  Wilson has been changed out in the ER  Continue BPH meds    DM (diabetes mellitus) (CMS-HCC)- (present on admission)  Assessment & Plan  Continue meds. Will continue metformin and insulin.     Hypokalemia- (present on admission)  Assessment & Plan  Worse. Replace and trend.     BPH (benign prostatic hyperplasia)- (present on admission)  Assessment & Plan  Severe. Continue BPH meds and has had a wilson for the last 3 weeks.     Atrial flutter (HCC)- (present on admission)  Assessment & Plan  Controlled. He should likely remain off of anticoagulation until his hematuria issues are corrected with urology in the outpatient setting. He likely should not be discharged on this.     HTN (hypertension)- (present on admission)  Assessment & Plan  Continue meds. Controlled.        VTE prophylaxis: scd

## 2020-03-15 VITALS
RESPIRATION RATE: 20 BRPM | HEART RATE: 73 BPM | TEMPERATURE: 98.1 F | WEIGHT: 190.04 LBS | BODY MASS INDEX: 29.83 KG/M2 | DIASTOLIC BLOOD PRESSURE: 69 MMHG | SYSTOLIC BLOOD PRESSURE: 130 MMHG | HEIGHT: 67 IN | OXYGEN SATURATION: 96 %

## 2020-03-15 VITALS
DIASTOLIC BLOOD PRESSURE: 70 MMHG | RESPIRATION RATE: 18 BRPM | TEMPERATURE: 97.8 F | OXYGEN SATURATION: 97 % | SYSTOLIC BLOOD PRESSURE: 138 MMHG | HEART RATE: 68 BPM

## 2020-03-15 VITALS
SYSTOLIC BLOOD PRESSURE: 132 MMHG | DIASTOLIC BLOOD PRESSURE: 75 MMHG | HEART RATE: 66 BPM | OXYGEN SATURATION: 98 % | RESPIRATION RATE: 16 BRPM | TEMPERATURE: 97.8 F

## 2020-03-15 LAB
ALBUMIN SERPL BCP-MCNC: 3.6 G/DL (ref 3.2–4.9)
ALBUMIN/GLOB SERPL: 1.3 G/DL
ALP SERPL-CCNC: 51 U/L (ref 30–99)
ALT SERPL-CCNC: 17 U/L (ref 2–50)
ANION GAP SERPL CALC-SCNC: 13 MMOL/L (ref 7–16)
AST SERPL-CCNC: 17 U/L (ref 12–45)
BASOPHILS # BLD AUTO: 0.6 % (ref 0–1.8)
BASOPHILS # BLD: 0.05 K/UL (ref 0–0.12)
BILIRUB SERPL-MCNC: 0.4 MG/DL (ref 0.1–1.5)
BUN SERPL-MCNC: 11 MG/DL (ref 8–22)
CALCIUM SERPL-MCNC: 9.1 MG/DL (ref 8.4–10.2)
CHLORIDE SERPL-SCNC: 96 MMOL/L (ref 96–112)
CO2 SERPL-SCNC: 25 MMOL/L (ref 20–33)
CREAT SERPL-MCNC: 0.77 MG/DL (ref 0.5–1.4)
EOSINOPHIL # BLD AUTO: 0.17 K/UL (ref 0–0.51)
EOSINOPHIL NFR BLD: 1.9 % (ref 0–6.9)
ERYTHROCYTE [DISTWIDTH] IN BLOOD BY AUTOMATED COUNT: 46.4 FL (ref 35.9–50)
GLOBULIN SER CALC-MCNC: 2.7 G/DL (ref 1.9–3.5)
GLUCOSE SERPL-MCNC: 219 MG/DL (ref 65–99)
HCT VFR BLD AUTO: 36.2 % (ref 42–52)
HGB BLD-MCNC: 12.1 G/DL (ref 14–18)
IMM GRANULOCYTES # BLD AUTO: 0.05 K/UL (ref 0–0.11)
IMM GRANULOCYTES NFR BLD AUTO: 0.6 % (ref 0–0.9)
LYMPHOCYTES # BLD AUTO: 1.66 K/UL (ref 1–4.8)
LYMPHOCYTES NFR BLD: 18.9 % (ref 22–41)
MAGNESIUM SERPL-MCNC: 1.5 MG/DL (ref 1.5–2.5)
MCH RBC QN AUTO: 30 PG (ref 27–33)
MCHC RBC AUTO-ENTMCNC: 33.4 G/DL (ref 33.7–35.3)
MCV RBC AUTO: 89.6 FL (ref 81.4–97.8)
MONOCYTES # BLD AUTO: 0.89 K/UL (ref 0–0.85)
MONOCYTES NFR BLD AUTO: 10.1 % (ref 0–13.4)
NEUTROPHILS # BLD AUTO: 5.95 K/UL (ref 1.82–7.42)
NEUTROPHILS NFR BLD: 67.9 % (ref 44–72)
NRBC # BLD AUTO: 0 K/UL
NRBC BLD-RTO: 0 /100 WBC
PLATELET # BLD AUTO: 204 K/UL (ref 164–446)
PMV BLD AUTO: 10.2 FL (ref 9–12.9)
POTASSIUM SERPL-SCNC: 3.2 MMOL/L (ref 3.6–5.5)
PROT SERPL-MCNC: 6.3 G/DL (ref 6–8.2)
RBC # BLD AUTO: 4.04 M/UL (ref 4.7–6.1)
SODIUM SERPL-SCNC: 134 MMOL/L (ref 135–145)
WBC # BLD AUTO: 8.8 K/UL (ref 4.8–10.8)

## 2020-03-15 PROCEDURE — A9270 NON-COVERED ITEM OR SERVICE: HCPCS | Performed by: HOSPITALIST

## 2020-03-15 PROCEDURE — 83036 HEMOGLOBIN GLYCOSYLATED A1C: CPT

## 2020-03-15 PROCEDURE — 665998 HH PPS REVENUE CREDIT

## 2020-03-15 PROCEDURE — 700102 HCHG RX REV CODE 250 W/ 637 OVERRIDE(OP): Performed by: HOSPITALIST

## 2020-03-15 PROCEDURE — 99239 HOSP IP/OBS DSCHRG MGMT >30: CPT | Performed by: INTERNAL MEDICINE

## 2020-03-15 PROCEDURE — 665999 HH PPS REVENUE DEBIT

## 2020-03-15 PROCEDURE — 36415 COLL VENOUS BLD VENIPUNCTURE: CPT

## 2020-03-15 PROCEDURE — 700111 HCHG RX REV CODE 636 W/ 250 OVERRIDE (IP): Performed by: INTERNAL MEDICINE

## 2020-03-15 PROCEDURE — 85025 COMPLETE CBC W/AUTO DIFF WBC: CPT

## 2020-03-15 PROCEDURE — 80053 COMPREHEN METABOLIC PANEL: CPT

## 2020-03-15 PROCEDURE — 83735 ASSAY OF MAGNESIUM: CPT

## 2020-03-15 RX ORDER — AMLODIPINE BESYLATE 5 MG/1
5 TABLET ORAL DAILY
Qty: 30 TAB | Refills: 1 | Status: SHIPPED | OUTPATIENT
Start: 2020-03-15

## 2020-03-15 RX ORDER — MAGNESIUM SULFATE HEPTAHYDRATE 40 MG/ML
2 INJECTION, SOLUTION INTRAVENOUS ONCE
Status: COMPLETED | OUTPATIENT
Start: 2020-03-15 | End: 2020-03-15

## 2020-03-15 RX ORDER — FINASTERIDE 5 MG/1
5 TABLET, FILM COATED ORAL DAILY
Qty: 30 TAB | Refills: 1 | Status: ON HOLD | OUTPATIENT
Start: 2020-03-15 | End: 2021-08-23

## 2020-03-15 RX ORDER — POTASSIUM CHLORIDE 20 MEQ/1
20 TABLET, EXTENDED RELEASE ORAL 2 TIMES DAILY
Status: DISCONTINUED | OUTPATIENT
Start: 2020-03-15 | End: 2020-03-15 | Stop reason: HOSPADM

## 2020-03-15 RX ORDER — POTASSIUM CHLORIDE 1500 MG/1
20 TABLET, EXTENDED RELEASE ORAL 2 TIMES DAILY
Qty: 60 TAB | Refills: 1 | Status: ON HOLD | OUTPATIENT
Start: 2020-03-15 | End: 2021-07-20

## 2020-03-15 RX ADMIN — SENNOSIDES AND DOCUSATE SODIUM 2 TABLET: 8.6; 5 TABLET ORAL at 07:03

## 2020-03-15 RX ADMIN — METFORMIN HYDROCHLORIDE 500 MG: 500 TABLET ORAL at 07:02

## 2020-03-15 RX ADMIN — TAMSULOSIN HYDROCHLORIDE 0.4 MG: 0.4 CAPSULE ORAL at 08:36

## 2020-03-15 RX ADMIN — FINASTERIDE 5 MG: 5 TABLET, FILM COATED ORAL at 07:04

## 2020-03-15 RX ADMIN — MAGNESIUM SULFATE 2 G: 2 INJECTION INTRAVENOUS at 10:47

## 2020-03-15 RX ADMIN — METOPROLOL TARTRATE 12.5 MG: 25 TABLET ORAL at 07:02

## 2020-03-15 RX ADMIN — SPIRONOLACTONE 25 MG: 25 TABLET ORAL at 07:02

## 2020-03-15 RX ADMIN — POTASSIUM CHLORIDE 10 MEQ: 1500 TABLET, EXTENDED RELEASE ORAL at 07:03

## 2020-03-15 RX ADMIN — CHLORTHALIDONE 25 MG: 25 TABLET ORAL at 07:04

## 2020-03-15 RX ADMIN — AMLODIPINE BESYLATE 5 MG: 5 TABLET ORAL at 07:04

## 2020-03-15 ASSESSMENT — COGNITIVE AND FUNCTIONAL STATUS - GENERAL
DRESSING REGULAR LOWER BODY CLOTHING: A LITTLE
MOVING FROM LYING ON BACK TO SITTING ON SIDE OF FLAT BED: A LITTLE
STANDING UP FROM CHAIR USING ARMS: A LITTLE
MOBILITY SCORE: 18
WALKING IN HOSPITAL ROOM: A LITTLE
MOVING TO AND FROM BED TO CHAIR: A LITTLE
DRESSING REGULAR UPPER BODY CLOTHING: A LITTLE
CLIMB 3 TO 5 STEPS WITH RAILING: A LITTLE
HELP NEEDED FOR BATHING: A LITTLE
DAILY ACTIVITIY SCORE: 19
PERSONAL GROOMING: A LITTLE
TOILETING: A LITTLE
SUGGESTED CMS G CODE MODIFIER DAILY ACTIVITY: CK
SUGGESTED CMS G CODE MODIFIER MOBILITY: CK
TURNING FROM BACK TO SIDE WHILE IN FLAT BAD: A LITTLE

## 2020-03-15 ASSESSMENT — ACTIVITIES OF DAILY LIVING (ADL)
FEEDING_WITHIN_DEFINED_LIMITS: 1
GROOMING_WITHIN_DEFINED_LIMITS: 1
AMBULATION ASSISTANCE ON FLAT SURFACES: 1

## 2020-03-15 NOTE — PROGRESS NOTES
"Pt c/o \"pressure\" in lower abd and groin area but no pain. Pt's wilson flushed with 1000mL sterile water. No clots removed. Wilson drainage is clear, light pink-yellow. Pt states pressure relief from the flushing. Pt ambulated multiple times this shift with x1 SBA after c/o restlessness and trying to stand without assistance multiple times. Bed alarm went off each time.  "

## 2020-03-15 NOTE — DISCHARGE PLANNING
Patient is currently on service with [facility]. Psychiatric hospital will accept Resumption of care for this patient.     Thank you,   [facility].

## 2020-03-15 NOTE — DISCHARGE PLANNING
Received Choice form at 1200  Agency/Facility Name: [facility]  Referral sent per Choice form @ 2815

## 2020-03-15 NOTE — DISCHARGE SUMMARY
Discharge Summary    CHIEF COMPLAINT ON ADMISSION  Chief Complaint   Patient presents with   • Blood in Urine   • Urinary Catheter Problem       Reason for Admission  EMS     Admission Date  3/13/2020    CODE STATUS  Full Code    HPI & HOSPITAL COURSE  This is a 89 y.o. male with a history of BPH and chronic indwelling Sher catheter, atrial flutter on Xarelto and aspirin, hypertension, type 2 diabetes here with gross hematuria complicated by obstruction of his Sher catheter.  Upon his arrival, his catheter was changed.  An irrigation catheter was placed and urology was consulted.  He had no evidence of obvious UTI.  His hemoglobin remained stable in the range of approximately 12.0.  Urology did not immediately recommend cystoscopy however they schedule one as an outpatient for early May.  After 24 hours of irrigation, he continued to have occasional clots and hematuria therefore irrigation was continued.  His Xarelto and aspirin were both held.  On the second hospital day, his hematuria resolved and his urine ran clear.  His irrigation catheter was changed to a regular Sher catheter.  He was continued on Proscar medication.  His Xarelto and aspirin were still held on discharge and he was told to follow-up with his primary care physician within 3 to 5 days to discuss when these should be resumed.  He will follow-up with urology as an outpatient for cystoscopy and will continue to have his Sher catheter changed monthly.    Regarding his blood sugars, they were elevated and required sliding scale.  A1c was tested however is pending at the time of this dictation.  Pending his hemoglobin A1c, he may need an increase in his home long-acting insulin.  This should be followed up with his PCP when he sees him on Wednesday Thursday or Friday of this week.       Therefore, he is discharged in good and stable condition to home with organized home healthcare and close outpatient follow-up.    The patient met 2-midnight  criteria for an inpatient stay at the time of discharge.    Discharge Date  3/15/2020    FOLLOW UP ITEMS POST DISCHARGE  Follow-up with PCP within 3 to 5 days to discuss diabetes control and timeline to resume Xarelto, likely hold aspirin  Follow-up with urology for cystoscopy in early May  Have Sher catheter changed monthly with home health    DISCHARGE DIAGNOSES  Principal Problem:    Gross hematuria POA: Yes  Active Problems:    Urinary retention POA: Yes    Hypokalemia POA: Yes    DM (diabetes mellitus) (CMS-HCC) POA: Yes    BPH (benign prostatic hyperplasia) POA: Yes    HTN (hypertension) POA: Yes    Atrial flutter (HCC) POA: Yes  Resolved Problems:    * No resolved hospital problems. *      FOLLOW UP  Future Appointments   Date Time Provider Department Center   3/16/2020 To Be Determined Crystal Shah, C.N.A. [facility] None   3/17/2020  9:30 AM Alysha Tomas, PT [facility] None   3/18/2020 To Be Determined Crystal Shah, C.N.A. [facility] None   3/18/2020 To Be Determined JONATHAN Crain.N. [facility] None   3/19/2020  9:30 AM Alysha Tomas, PT [facility] None   3/23/2020 To Be Determined Crystal Shah, C.N.A. [facility] None   3/25/2020 To Be Determined Crystal Shah, C.N.A. [facility] None   3/25/2020 To Be Determined Stephaniebelkis Rader R.N. [facility] None   3/30/2020 To Be Determined Crystal Shah, C.N.A. [facility] None   4/1/2020 To Be Determined Crystal Shah, C.N.A. [facility] None   4/1/2020 To Be Determined Stephanie Rader R.N. [facility] None   4/6/2020 To Be Determined Crystal Shah, C.N.A. [facility] None   4/8/2020 To Be Determined Crystal Shah, C.N.A. [facility] None   4/10/2020 To Be Determined JONATHAN Crain.N. [facility] None     Chintan Oropeza M.D.  [address] [facility] [id number] [phone]    Schedule an appointment as soon as possible for a visit  Urology follow-up    JONO Powell  [address] [address]  [phone]    Schedule an appointment as soon as possible for a visit in 3 days  Primary Care follow-up      MEDICATIONS ON DISCHARGE      Medication List      CHANGE how you take these medications      Instructions   potassium Chloride ER 20 MEQ Tbcr tablet  What changed:    · medication strength  · how much to take  Commonly known as:  K-TAB   Take 1 Tab by mouth 2 times a day.  Dose:  20 mEq        CONTINUE taking these medications      Instructions   amLODIPine 5 MG Tabs  Commonly known as:  NORVASC   Take 1 Tab by mouth every day.  Dose:  5 mg     chlorthalidone 25 MG Tabs  Commonly known as:  HYGROTON   Take 1 Tab by mouth every day.  Dose:  25 mg     finasteride 5 MG Tabs  Commonly known as:  PROSCAR   Take 1 Tab by mouth every day.  Dose:  5 mg     fish oil 1000 MG Caps capsule   Take 2,000 mg by mouth 2 Times a Day.  Dose:  2,000 mg     gabapentin 100 MG Caps  Commonly known as:  NEURONTIN   Take 100-200 mg by mouth 2 Times a Day. 100  MG AT DINNER   200 MG BEDTIME  Dose:  100-200 mg     insulin glargine 100 UNIT/ML Sopn injection  Generic drug:  insulin glargine   Inject 46 Units as instructed every day.  Dose:  46 Units     metoprolol 25 MG Tabs  Commonly known as:  LOPRESSOR   Take 12.5 mg by mouth 2 times a day.  Dose:  12.5 mg     neomycin-bacitracin-polymyxin 400-5-5000 Oint  Commonly known as:  NEOSPORIN   Apply 1 Each to affected area(s) every day. To penis  Dose:  1 Each     nystatin 281458 UNIT/GM Crea topical cream  Commonly known as:  MYCOSTATIN   Apply 0.0001 g to affected area(s) 2 Times a Day.  Dose:  1 Units     Prostate Tabs   Take 1 Tab by mouth 2 Times a Day.  Dose:  1 Tab     PX Mens Multivitamins Tabs   Take 1 Tab by mouth every day.  Dose:  1 Tab     Restasis 0.05 % ophthalmic emulsion  Generic drug:  cyclosporin   Place 1 Drop in both eyes 2 times a day.  Dose:  1 Drop     simvastatin 10 MG Tabs  Commonly known as:  ZOCOR   Take 10 mg by mouth every evening.  Dose:  10 mg     spironolactone 25 MG Tabs  Commonly known as:  ALDACTONE   Take 25 mg by mouth every day.  Dose:  25 mg     tamsulosin 0.4 MG capsule  Commonly  known as:  FLOMAX   Take 0.4 mg by mouth every morning.  Dose:  0.4 mg        STOP taking these medications    aspirin EC 81 MG Tbec  Commonly known as:  ECOTRIN     metFORMIN 500 MG Tabs  Commonly known as:  GLUCOPHAGE     Xarelto 20 MG Tabs tablet  Generic drug:  rivaroxaban            Allergies  Allergies   Allergen Reactions   • Iodine      Pt and pts wife report that it has been so long not sure what happens       DIET  Orders Placed This Encounter   Procedures   • Diet Order Regular     Standing Status:   Standing     Number of Occurrences:   1     Order Specific Question:   Diet:     Answer:   Regular [1]       ACTIVITY  As tolerated.  Weight bearing as tolerated    CONSULTATIONS  Dr. Oropeza, urology    PROCEDURES  None    LABORATORY  Lab Results   Component Value Date    SODIUM 134 (L) 03/15/2020    POTASSIUM 3.2 (L) 03/15/2020    CHLORIDE 96 03/15/2020    CO2 25 03/15/2020    GLUCOSE 219 (H) 03/15/2020    BUN 11 03/15/2020    CREATININE 0.77 03/15/2020    CREATININE 1.0 12/02/2008        Lab Results   Component Value Date    WBC 8.8 03/15/2020    HEMOGLOBIN 12.1 (L) 03/15/2020    HEMATOCRIT 36.2 (L) 03/15/2020    PLATELETCT 204 03/15/2020        Total time of the discharge process exceeds 40 minutes.

## 2020-03-15 NOTE — PROGRESS NOTES
Pt discharged to home via personal vehicle with family member. Discharge paperwork reviewed and signed. Prescribed home medications reviewed with pt per discharge summary. VSS. Sher catheter left in place, leg bag attached. Large Sher bag sent home with pt. Pt escorted off unit via wheelchair with hospital staff.

## 2020-03-15 NOTE — CARE PLAN
Problem: Safety  Goal: Will remain free from injury  Outcome: PROGRESSING AS EXPECTED  Intervention: Provide assistance with mobility  Flowsheets (Taken 3/15/2020 0200)  Assistance: Standby Assist  Ambulation Tolerance: Tolerates Well  Intervention: Collaborate with Interdisciplinary Team for safe transfer and mobilization techniques  Flowsheets (Taken 3/15/2020 0200)  Assistive Devices: Walker - front wheel  Goal: Will remain free from falls  Outcome: PROGRESSING AS EXPECTED  Intervention: Assess risk factors for falls  Flowsheets  Taken 3/15/2020 0200  Pt Calls for Assistance: Yes  Taken 3/14/2020 2215  Mobility Status Assessment: 1-1 Healthcare Provider Required for Assistance with Ambulation & Transfer  Intervention: Implement fall precautions  Flowsheets  Taken 3/15/2020 0423  Bed Alarm: Yes - Alarm On  Taken 3/14/2020 2215  Environmental Precautions:   Treaded Slipper Socks on Patient   Personal Belongings, Wastebasket, Call Bell etc. in Easy Reach   Transferred to Stronger Side   Report Given to Other Health Care Providers Regarding Fall Risk   Bed in Low Position   Communication Sign for Patients & Families   Mobility Assessed & Appropriate Sign Placed     Problem: Urinary Elimination:  Goal: Ability to reestablish a normal urinary elimination pattern will improve  Outcome: PROGRESSING AS EXPECTED     Problem: Pain Management  Goal: Pain level will decrease to patient's comfort goal  Outcome: PROGRESSING AS EXPECTED

## 2020-03-15 NOTE — DISCHARGE INSTRUCTIONS
Discharge Instructions    Discharged to home by car with relative. Discharged via wheelchair, hospital escort: Yes.  Special equipment needed: Not Applicable    Be sure to schedule a follow-up appointment with your primary care doctor or any specialists as instructed.     Discharge Plan:   Influenza Vaccine Indication: Patient Refuses    I understand that a diet low in cholesterol, fat, and sodium is recommended for good health. Unless I have been given specific instructions below for another diet, I accept this instruction as my diet prescription.   Other diet: n/a    Special Instructions:   Discharge Instructions per Bárbara Flowers D.O.    See your PCP on Wed, Thurs or Fri to discuss resuming one or both of ASA and/or Xarelto  F/U with urology for cystoscopy in early May    DIET: As tolerated    ACTIVITY: Change wilson Monthly    DIAGNOSIS: Hematuria    Return to ER if Blood in urine returns    · Is patient discharged on Warfarin / Coumadin?   No     Depression / Suicide Risk    As you are discharged from [facility], it is important to learn how to keep safe from harming yourself.    Recognize the warning signs:  · Abrupt changes in personality, positive or negative- including increase in energy   · Giving away possessions  · Change in eating patterns- significant weight changes-  positive or negative  · Change in sleeping patterns- unable to sleep or sleeping all the time   · Unwillingness or inability to communicate  · Depression  · Unusual sadness, discouragement and loneliness  · Talk of wanting to die  · Neglect of personal appearance   · Rebelliousness- reckless behavior  · Withdrawal from people/activities they love  · Confusion- inability to concentrate     If you or a loved one observes any of these behaviors or has concerns about self-harm, here's what you can do:  · Talk about it- your feelings and reasons for harming yourself  · Remove any means that you might use to hurt yourself  (examples: pills, rope, extension cords, firearm)  · Get professional help from the community (Mental Health, Substance Abuse, psychological counseling)  · Do not be alone:Call your Safe Contact- someone whom you trust who will be there for you.  · Call your local CRISIS HOTLINE 208-6755 or 519-462-5181  · Call your local Children's Mobile Crisis Response Team [place] (956) 130-1869 or www.[employer]  · Call the toll free National Suicide Prevention Hotlines   · National Suicide Prevention Lifeline 707-115-SNJS (0601)  · National Hope Line Network 800-SUICIDE (447-3154)

## 2020-03-15 NOTE — PROGRESS NOTES
Bedside report received from night RN. Assumed care of patient. Daily plan of care discussed. Pt awake and alert, CBI remains in place at this time. Pt denies significant pain or nausea but reports poor sleep overnight. Hourly rounding in place.

## 2020-03-15 NOTE — FACE TO FACE
Face to Face Supporting Documentation - Home Health    The encounter with this patient was in whole or in part the primary reason for home health admission.    Date of encounter:   Patient:                    MRN:                       YOB: 2020  Julien Dao  [id number]  3/7/1929     Home health to see patient for:  Skilled Nursing care for assessment, interventions & education, Registered dietitian consult, Medical social work consult, Home health aide, Physical Therapy evaluation and treatment and Occupational therapy evaluation and treatment    Skilled need for:  Comment: Sher care    Skilled nursing interventions to include:  Comment: Sher care    Homebound status evidenced by:  Need the aid of supportive devices such as crutches, canes, wheelchairs or walkers. Leaving home requires a considerable and taxing effort. There is a normal inability to leave the home.    Community Physician to provide follow up care: JONO Powell     Optional Interventions? No      I certify the face to face encounter for this home health care referral meets the CMS requirements and the encounter/clinical assessment with the patient was, in whole, or in part, for the medical condition(s) listed above, which is the primary reason for home health care. Based on my clinical findings: the service(s) are medically necessary, support the need for home health care, and the homebound criteria are met.  I certify that this patient has had a face to face encounter by myself.  Bárbara Flowers D.O. - NPI: [id number]

## 2020-03-15 NOTE — DISCHARGE PLANNING
ATTN: Case Management  RE: Referral for Home Health    Reason for referral denial: Not able to see the patient in a safe and timely manner.               Unfortunately, we are not able to accept this referral for the reason listed above. If further clarity is needed, our Transitional Care Specialists are available to discuss any barriers to service at [phone].      We look forward to collaborating with you in the future,  [facility] Team

## 2020-03-16 ENCOUNTER — HOME CARE VISIT (OUTPATIENT)
Dept: HOME HEALTH SERVICES | Facility: HOME HEALTHCARE | Age: 85
End: 2020-03-16
Payer: MEDICARE

## 2020-03-16 LAB
EST. AVERAGE GLUCOSE BLD GHB EST-MCNC: 272 MG/DL
HBA1C MFR BLD: 11.1 % (ref 0–5.6)

## 2020-03-16 PROCEDURE — 665998 HH PPS REVENUE CREDIT

## 2020-03-16 PROCEDURE — 665999 HH PPS REVENUE DEBIT

## 2020-03-16 NOTE — PROGRESS NOTES
Hold all Home Health services. Patient admitted to (Facilty Name) [facility] on 3/13 with recurrent hematuria

## 2020-03-17 PROCEDURE — 665999 HH PPS REVENUE DEBIT

## 2020-03-17 PROCEDURE — 665998 HH PPS REVENUE CREDIT

## 2020-03-17 SDOH — ECONOMIC STABILITY: HOUSING INSECURITY
HOME SAFETY: NO FALLS OR SAFETY CONCERNS PER PATIENT REPORT SINCE LAST CLINICIAN WAS PRESENT.  UNABLE TO REVIEW MEDICATIONS AS PATIENT IS UNSURE OF SOME OF THE THINGS HE TAKES AND HIS SO IS NOT PRESENT TO CLARIFY.  NO OXYGEN USE.

## 2020-03-18 ENCOUNTER — HOME CARE VISIT (OUTPATIENT)
Dept: HOME HEALTH SERVICES | Facility: HOME HEALTHCARE | Age: 85
End: 2020-03-18
Payer: MEDICARE

## 2020-03-18 VITALS
WEIGHT: 175 LBS | SYSTOLIC BLOOD PRESSURE: 112 MMHG | HEART RATE: 65 BPM | BODY MASS INDEX: 27.47 KG/M2 | RESPIRATION RATE: 18 BRPM | DIASTOLIC BLOOD PRESSURE: 60 MMHG | HEIGHT: 67 IN | TEMPERATURE: 98.9 F | OXYGEN SATURATION: 98 %

## 2020-03-18 PROCEDURE — 665001 SOC-HOME HEALTH

## 2020-03-18 PROCEDURE — G0493 RN CARE EA 15 MIN HH/HOSPICE: HCPCS

## 2020-03-18 PROCEDURE — 665998 HH PPS REVENUE CREDIT

## 2020-03-18 PROCEDURE — 665999 HH PPS REVENUE DEBIT

## 2020-03-18 ASSESSMENT — FIBROSIS 4 INDEX: FIB4 SCORE: 1.84

## 2020-03-19 PROCEDURE — 665999 HH PPS REVENUE DEBIT

## 2020-03-19 PROCEDURE — 665998 HH PPS REVENUE CREDIT

## 2020-03-19 NOTE — PROGRESS NOTES
received phone call from Stefany Brand SN Supervisor to see patient because pts wife called/talked to her about her concerns: swelling of foreskin tissue because it was pulled back by previous RN and forgot to pull it forward (away from abd area.), apparently wife tried to roll foreskin and was not able to do it, PRN SNV done, pulled foreskin back over the head of penis with a minimal pain because of swollen foreskin, wife and patient th ankful for snv and pulling foreskin back

## 2020-03-20 ENCOUNTER — HOME CARE VISIT (OUTPATIENT)
Dept: HOME HEALTH SERVICES | Facility: HOME HEALTHCARE | Age: 85
End: 2020-03-20
Payer: MEDICARE

## 2020-03-20 ENCOUNTER — HOSPITAL ENCOUNTER (INPATIENT)
Facility: MEDICAL CENTER | Age: 85
LOS: 1 days | DRG: 726 | End: 2020-03-21
Attending: EMERGENCY MEDICINE | Admitting: HOSPITALIST
Payer: MEDICARE

## 2020-03-20 DIAGNOSIS — R33.9 URINARY RETENTION: ICD-10-CM

## 2020-03-20 DIAGNOSIS — I95.9 HYPOTENSIVE EPISODE: ICD-10-CM

## 2020-03-20 DIAGNOSIS — R73.9 HYPERGLYCEMIA: ICD-10-CM

## 2020-03-20 LAB
ALBUMIN SERPL BCP-MCNC: 3.7 G/DL (ref 3.2–4.9)
ALBUMIN/GLOB SERPL: 1.4 G/DL
ALP SERPL-CCNC: 55 U/L (ref 30–99)
ALT SERPL-CCNC: 18 U/L (ref 2–50)
ANION GAP SERPL CALC-SCNC: 15 MMOL/L (ref 7–16)
ANION GAP SERPL CALC-SCNC: 16 MMOL/L (ref 7–16)
APPEARANCE UR: ABNORMAL
AST SERPL-CCNC: 20 U/L (ref 12–45)
BACTERIA #/AREA URNS HPF: ABNORMAL /HPF
BASOPHILS # BLD AUTO: 0.6 % (ref 0–1.8)
BASOPHILS # BLD: 0.07 K/UL (ref 0–0.12)
BILIRUB SERPL-MCNC: 0.4 MG/DL (ref 0.1–1.5)
BILIRUB UR QL STRIP.AUTO: NEGATIVE
BUN SERPL-MCNC: 20 MG/DL (ref 8–22)
BUN SERPL-MCNC: 23 MG/DL (ref 8–22)
CALCIUM SERPL-MCNC: 8.5 MG/DL (ref 8.4–10.2)
CALCIUM SERPL-MCNC: 9 MG/DL (ref 8.4–10.2)
CHLORIDE SERPL-SCNC: 100 MMOL/L (ref 96–112)
CHLORIDE SERPL-SCNC: 95 MMOL/L (ref 96–112)
CO2 SERPL-SCNC: 20 MMOL/L (ref 20–33)
CO2 SERPL-SCNC: 22 MMOL/L (ref 20–33)
COLOR UR: ABNORMAL
CREAT SERPL-MCNC: 0.69 MG/DL (ref 0.5–1.4)
CREAT SERPL-MCNC: 0.74 MG/DL (ref 0.5–1.4)
EOSINOPHIL # BLD AUTO: 0.1 K/UL (ref 0–0.51)
EOSINOPHIL NFR BLD: 0.9 % (ref 0–6.9)
EPI CELLS #/AREA URNS HPF: ABNORMAL /HPF
ERYTHROCYTE [DISTWIDTH] IN BLOOD BY AUTOMATED COUNT: 46.2 FL (ref 35.9–50)
GLOBULIN SER CALC-MCNC: 2.6 G/DL (ref 1.9–3.5)
GLUCOSE BLD-MCNC: 276 MG/DL (ref 65–99)
GLUCOSE BLD-MCNC: 352 MG/DL (ref 65–99)
GLUCOSE BLD-MCNC: 396 MG/DL (ref 65–99)
GLUCOSE BLD-MCNC: 402 MG/DL (ref 65–99)
GLUCOSE BLD-MCNC: 463 MG/DL (ref 65–99)
GLUCOSE SERPL-MCNC: 259 MG/DL (ref 65–99)
GLUCOSE SERPL-MCNC: 553 MG/DL (ref 65–99)
GLUCOSE UR STRIP.AUTO-MCNC: >=1000 MG/DL
HCT VFR BLD AUTO: 38.2 % (ref 42–52)
HGB BLD-MCNC: 12.7 G/DL (ref 14–18)
IMM GRANULOCYTES # BLD AUTO: 0.08 K/UL (ref 0–0.11)
IMM GRANULOCYTES NFR BLD AUTO: 0.7 % (ref 0–0.9)
KETONES UR STRIP.AUTO-MCNC: ABNORMAL MG/DL
LACTATE BLD-SCNC: 2.7 MMOL/L (ref 0.5–2)
LACTATE BLD-SCNC: 2.9 MMOL/L (ref 0.5–2)
LACTATE BLD-SCNC: NORMAL MMOL/L (ref 0.5–2)
LEUKOCYTE ESTERASE UR QL STRIP.AUTO: ABNORMAL
LYMPHOCYTES # BLD AUTO: 1.11 K/UL (ref 1–4.8)
LYMPHOCYTES NFR BLD: 9.6 % (ref 22–41)
MCH RBC QN AUTO: 29.7 PG (ref 27–33)
MCHC RBC AUTO-ENTMCNC: 33.2 G/DL (ref 33.7–35.3)
MCV RBC AUTO: 89.5 FL (ref 81.4–97.8)
MICRO URNS: ABNORMAL
MONOCYTES # BLD AUTO: 0.86 K/UL (ref 0–0.85)
MONOCYTES NFR BLD AUTO: 7.4 % (ref 0–13.4)
NEUTROPHILS # BLD AUTO: 9.36 K/UL (ref 1.82–7.42)
NEUTROPHILS NFR BLD: 80.8 % (ref 44–72)
NITRITE UR QL STRIP.AUTO: NEGATIVE
NRBC # BLD AUTO: 0 K/UL
NRBC BLD-RTO: 0 /100 WBC
PH UR STRIP.AUTO: 7 [PH] (ref 5–8)
PLATELET # BLD AUTO: 252 K/UL (ref 164–446)
PMV BLD AUTO: 10 FL (ref 9–12.9)
POTASSIUM SERPL-SCNC: 3.4 MMOL/L (ref 3.6–5.5)
POTASSIUM SERPL-SCNC: 4.5 MMOL/L (ref 3.6–5.5)
PROT SERPL-MCNC: 6.3 G/DL (ref 6–8.2)
PROT UR QL STRIP: 100 MG/DL
RBC # BLD AUTO: 4.27 M/UL (ref 4.7–6.1)
RBC # URNS HPF: ABNORMAL /HPF
RBC UR QL AUTO: ABNORMAL
SODIUM SERPL-SCNC: 131 MMOL/L (ref 135–145)
SODIUM SERPL-SCNC: 137 MMOL/L (ref 135–145)
SP GR UR STRIP.AUTO: 1.01
TROPONIN T SERPL-MCNC: 36 NG/L (ref 6–19)
TROPONIN T SERPL-MCNC: 45 NG/L (ref 6–19)
TROPONIN T SERPL-MCNC: NORMAL NG/L (ref 6–19)
WBC # BLD AUTO: 11.6 K/UL (ref 4.8–10.8)
WBC #/AREA URNS HPF: ABNORMAL /HPF

## 2020-03-20 PROCEDURE — 87186 SC STD MICRODIL/AGAR DIL: CPT

## 2020-03-20 PROCEDURE — 700105 HCHG RX REV CODE 258: Performed by: EMERGENCY MEDICINE

## 2020-03-20 PROCEDURE — 700102 HCHG RX REV CODE 250 W/ 637 OVERRIDE(OP): Performed by: EMERGENCY MEDICINE

## 2020-03-20 PROCEDURE — 80048 BASIC METABOLIC PNL TOTAL CA: CPT

## 2020-03-20 PROCEDURE — 770020 HCHG ROOM/CARE - TELE (206)

## 2020-03-20 PROCEDURE — 85025 COMPLETE CBC W/AUTO DIFF WBC: CPT

## 2020-03-20 PROCEDURE — 83605 ASSAY OF LACTIC ACID: CPT | Mod: 91

## 2020-03-20 PROCEDURE — 700111 HCHG RX REV CODE 636 W/ 250 OVERRIDE (IP): Performed by: HOSPITALIST

## 2020-03-20 PROCEDURE — 84484 ASSAY OF TROPONIN QUANT: CPT | Mod: 91

## 2020-03-20 PROCEDURE — 700105 HCHG RX REV CODE 258: Performed by: HOSPITALIST

## 2020-03-20 PROCEDURE — 87086 URINE CULTURE/COLONY COUNT: CPT

## 2020-03-20 PROCEDURE — 99285 EMERGENCY DEPT VISIT HI MDM: CPT

## 2020-03-20 PROCEDURE — 87077 CULTURE AEROBIC IDENTIFY: CPT

## 2020-03-20 PROCEDURE — 96375 TX/PRO/DX INJ NEW DRUG ADDON: CPT

## 2020-03-20 PROCEDURE — 700102 HCHG RX REV CODE 250 W/ 637 OVERRIDE(OP): Performed by: HOSPITALIST

## 2020-03-20 PROCEDURE — A9270 NON-COVERED ITEM OR SERVICE: HCPCS | Performed by: HOSPITALIST

## 2020-03-20 PROCEDURE — 87040 BLOOD CULTURE FOR BACTERIA: CPT

## 2020-03-20 PROCEDURE — 36415 COLL VENOUS BLD VENIPUNCTURE: CPT

## 2020-03-20 PROCEDURE — 81001 URINALYSIS AUTO W/SCOPE: CPT

## 2020-03-20 PROCEDURE — 80053 COMPREHEN METABOLIC PANEL: CPT

## 2020-03-20 PROCEDURE — 700111 HCHG RX REV CODE 636 W/ 250 OVERRIDE (IP): Performed by: EMERGENCY MEDICINE

## 2020-03-20 PROCEDURE — 82962 GLUCOSE BLOOD TEST: CPT | Mod: 91

## 2020-03-20 PROCEDURE — 96365 THER/PROPH/DIAG IV INF INIT: CPT

## 2020-03-20 PROCEDURE — 665999 HH PPS REVENUE DEBIT

## 2020-03-20 PROCEDURE — 303105 HCHG CATHETER EXTRA

## 2020-03-20 PROCEDURE — 665998 HH PPS REVENUE CREDIT

## 2020-03-20 PROCEDURE — 99223 1ST HOSP IP/OBS HIGH 75: CPT | Mod: AI | Performed by: HOSPITALIST

## 2020-03-20 PROCEDURE — 51702 INSERT TEMP BLADDER CATH: CPT

## 2020-03-20 RX ORDER — POTASSIUM CHLORIDE 7.45 MG/ML
10 INJECTION INTRAVENOUS
Status: COMPLETED | OUTPATIENT
Start: 2020-03-20 | End: 2020-03-21

## 2020-03-20 RX ORDER — ONDANSETRON 4 MG/1
4 TABLET, ORALLY DISINTEGRATING ORAL EVERY 4 HOURS PRN
Status: DISCONTINUED | OUTPATIENT
Start: 2020-03-20 | End: 2020-03-21 | Stop reason: HOSPADM

## 2020-03-20 RX ORDER — OXYCODONE HYDROCHLORIDE AND ACETAMINOPHEN 5; 325 MG/1; MG/1
1 TABLET ORAL EVERY 4 HOURS PRN
Status: DISCONTINUED | OUTPATIENT
Start: 2020-03-20 | End: 2020-03-21 | Stop reason: HOSPADM

## 2020-03-20 RX ORDER — ONDANSETRON 2 MG/ML
4 INJECTION INTRAMUSCULAR; INTRAVENOUS EVERY 4 HOURS PRN
Status: DISCONTINUED | OUTPATIENT
Start: 2020-03-20 | End: 2020-03-21 | Stop reason: HOSPADM

## 2020-03-20 RX ORDER — AMOXICILLIN 250 MG
2 CAPSULE ORAL 2 TIMES DAILY
Status: DISCONTINUED | OUTPATIENT
Start: 2020-03-20 | End: 2020-03-21 | Stop reason: HOSPADM

## 2020-03-20 RX ORDER — BISACODYL 10 MG
10 SUPPOSITORY, RECTAL RECTAL
Status: DISCONTINUED | OUTPATIENT
Start: 2020-03-20 | End: 2020-03-21 | Stop reason: HOSPADM

## 2020-03-20 RX ORDER — SODIUM CHLORIDE 9 MG/ML
INJECTION, SOLUTION INTRAVENOUS CONTINUOUS
Status: DISCONTINUED | OUTPATIENT
Start: 2020-03-20 | End: 2020-03-21 | Stop reason: HOSPADM

## 2020-03-20 RX ORDER — ACETAMINOPHEN 325 MG/1
650 TABLET ORAL EVERY 6 HOURS PRN
Status: DISCONTINUED | OUTPATIENT
Start: 2020-03-20 | End: 2020-03-21 | Stop reason: HOSPADM

## 2020-03-20 RX ORDER — INSULIN GLARGINE 100 [IU]/ML
0.2 INJECTION, SOLUTION SUBCUTANEOUS EVERY EVENING
Status: DISCONTINUED | OUTPATIENT
Start: 2020-03-20 | End: 2020-03-21 | Stop reason: HOSPADM

## 2020-03-20 RX ORDER — ENALAPRILAT 1.25 MG/ML
1.25 INJECTION INTRAVENOUS EVERY 6 HOURS PRN
Status: DISCONTINUED | OUTPATIENT
Start: 2020-03-20 | End: 2020-03-21 | Stop reason: HOSPADM

## 2020-03-20 RX ORDER — SODIUM CHLORIDE 9 MG/ML
1000 INJECTION, SOLUTION INTRAVENOUS ONCE
Status: COMPLETED | OUTPATIENT
Start: 2020-03-20 | End: 2020-03-20

## 2020-03-20 RX ORDER — SODIUM CHLORIDE 9 MG/ML
500 INJECTION, SOLUTION INTRAVENOUS ONCE
Status: COMPLETED | OUTPATIENT
Start: 2020-03-20 | End: 2020-03-20

## 2020-03-20 RX ORDER — CEFTRIAXONE 2 G/1
INJECTION, POWDER, FOR SOLUTION INTRAMUSCULAR; INTRAVENOUS
Status: DISPENSED
Start: 2020-03-20 | End: 2020-03-20

## 2020-03-20 RX ORDER — POLYETHYLENE GLYCOL 3350 17 G/17G
1 POWDER, FOR SOLUTION ORAL
Status: DISCONTINUED | OUTPATIENT
Start: 2020-03-20 | End: 2020-03-21 | Stop reason: HOSPADM

## 2020-03-20 RX ADMIN — INSULIN LISPRO 5 UNITS: 100 INJECTION, SOLUTION INTRAVENOUS; SUBCUTANEOUS at 20:18

## 2020-03-20 RX ADMIN — CEFTRIAXONE SODIUM 2 G: 2 INJECTION, POWDER, FOR SOLUTION INTRAMUSCULAR; INTRAVENOUS at 11:52

## 2020-03-20 RX ADMIN — POTASSIUM CHLORIDE 10 MEQ: 7.46 INJECTION, SOLUTION INTRAVENOUS at 21:18

## 2020-03-20 RX ADMIN — INSULIN HUMAN 10 UNITS: 100 INJECTION, SOLUTION PARENTERAL at 10:36

## 2020-03-20 RX ADMIN — INSULIN LISPRO 5 UNITS: 100 INJECTION, SOLUTION INTRAVENOUS; SUBCUTANEOUS at 17:52

## 2020-03-20 RX ADMIN — INSULIN GLARGINE 16 UNITS: 100 INJECTION, SOLUTION SUBCUTANEOUS at 17:35

## 2020-03-20 RX ADMIN — SODIUM CHLORIDE 1000 ML: 900 INJECTION, SOLUTION INTRAVENOUS at 12:00

## 2020-03-20 RX ADMIN — SODIUM CHLORIDE: 9 INJECTION, SOLUTION INTRAVENOUS at 15:36

## 2020-03-20 RX ADMIN — INSULIN LISPRO 8 UNITS: 100 INJECTION, SOLUTION INTRAVENOUS; SUBCUTANEOUS at 17:53

## 2020-03-20 RX ADMIN — POTASSIUM CHLORIDE 10 MEQ: 7.46 INJECTION, SOLUTION INTRAVENOUS at 23:21

## 2020-03-20 RX ADMIN — POTASSIUM CHLORIDE 10 MEQ: 7.46 INJECTION, SOLUTION INTRAVENOUS at 22:17

## 2020-03-20 RX ADMIN — SODIUM CHLORIDE 500 ML: 9 INJECTION, SOLUTION INTRAVENOUS at 11:52

## 2020-03-20 RX ADMIN — OXYCODONE HYDROCHLORIDE AND ACETAMINOPHEN 1 TABLET: 5; 325 TABLET ORAL at 21:18

## 2020-03-20 RX ADMIN — ACETAMINOPHEN 650 MG: 325 TABLET, FILM COATED ORAL at 20:12

## 2020-03-20 RX ADMIN — SENNOSIDES AND DOCUSATE SODIUM 2 TABLET: 8.6; 5 TABLET ORAL at 17:40

## 2020-03-20 ASSESSMENT — ENCOUNTER SYMPTOMS
DOUBLE VISION: 0
DIARRHEA: 0
TREMORS: 0
TINGLING: 1
CHILLS: 1
FEVER: 0
VOMITING: 0
NAUSEA: 0
FLANK PAIN: 1
ABDOMINAL PAIN: 1
BLURRED VISION: 0
BLOOD IN STOOL: 0
CONSTIPATION: 0
SENSORY CHANGE: 0
HEARTBURN: 0

## 2020-03-20 ASSESSMENT — COGNITIVE AND FUNCTIONAL STATUS - GENERAL
HELP NEEDED FOR BATHING: A LITTLE
SUGGESTED CMS G CODE MODIFIER MOBILITY: CJ
DAILY ACTIVITIY SCORE: 22
SUGGESTED CMS G CODE MODIFIER DAILY ACTIVITY: CJ
MOVING TO AND FROM BED TO CHAIR: A LITTLE
MOVING FROM LYING ON BACK TO SITTING ON SIDE OF FLAT BED: A LITTLE
MOBILITY SCORE: 22
DRESSING REGULAR LOWER BODY CLOTHING: A LITTLE

## 2020-03-20 ASSESSMENT — LIFESTYLE VARIABLES
EVER FELT BAD OR GUILTY ABOUT YOUR DRINKING: NO
EVER HAD A DRINK FIRST THING IN THE MORNING TO STEADY YOUR NERVES TO GET RID OF A HANGOVER: NO
TOTAL SCORE: 0
CONSUMPTION TOTAL: NEGATIVE
HAVE YOU EVER FELT YOU SHOULD CUT DOWN ON YOUR DRINKING: NO
AVERAGE NUMBER OF DAYS PER WEEK YOU HAVE A DRINK CONTAINING ALCOHOL: 0
EVER_SMOKED: NEVER
ALCOHOL_USE: NO
TOTAL SCORE: 0
HAVE PEOPLE ANNOYED YOU BY CRITICIZING YOUR DRINKING: NO
ON A TYPICAL DAY WHEN YOU DRINK ALCOHOL HOW MANY DRINKS DO YOU HAVE: 0
HOW MANY TIMES IN THE PAST YEAR HAVE YOU HAD 5 OR MORE DRINKS IN A DAY: 0
DO YOU DRINK ALCOHOL: NO
TOTAL SCORE: 0

## 2020-03-20 ASSESSMENT — PATIENT HEALTH QUESTIONNAIRE - PHQ9
1. LITTLE INTEREST OR PLEASURE IN DOING THINGS: NOT AT ALL
SUM OF ALL RESPONSES TO PHQ9 QUESTIONS 1 AND 2: 0
SUM OF ALL RESPONSES TO PHQ9 QUESTIONS 1 AND 2: 0
2. FEELING DOWN, DEPRESSED, IRRITABLE, OR HOPELESS: NOT AT ALL
2. FEELING DOWN, DEPRESSED, IRRITABLE, OR HOPELESS: NOT AT ALL
1. LITTLE INTEREST OR PLEASURE IN DOING THINGS: NOT AT ALL

## 2020-03-20 ASSESSMENT — FIBROSIS 4 INDEX
FIB4 SCORE: 1.702294102856503299
FIB4 SCORE: 1.84

## 2020-03-20 NOTE — H&P
Hospital Medicine History & Physical Note    Date of Service  3/20/2020    Primary Care Physician  TABITHA Powell.    Consultants  None    Code Status  DNR DNI    Chief Complaint  Abn pain    History of Presenting Illness  89 y.o. male who presented 3/20/2020 with diminishing urine output and penile and abn pain. He has a history of this over last 3 months and has been admitted already 2 times prior, I am familiar with him from previous admission. Urology had been consulted on a visit earlier this month, patient had CBI and urine cleared, is scheduled for cysto with Chao in May. He went home, started noticing diminishing UOP again. In the ED, having chills and rigors, catheter exchanged and relieved the obstruction, now dark urine in the bag. He is feeling better now, still cold with some chills.     He is off Xarelto and ASA. He had SBP into 70s in the ED, IVF started, BCx drawn.    He was told by ED that the previous catheter was not placed correctly, also sounds like he may have had some foreskin tightness, perhaps phimosis? Again, now feeling much better.     Review of Systems  Review of Systems   Constitutional: Positive for chills and malaise/fatigue. Negative for fever.   Eyes: Negative for blurred vision and double vision.   Cardiovascular: Negative for chest pain.   Gastrointestinal: Positive for abdominal pain. Negative for blood in stool, constipation, diarrhea, heartburn, nausea and vomiting.   Genitourinary: Positive for flank pain and hematuria.   Skin: Negative for itching and rash.   Neurological: Positive for tingling. Negative for tremors and sensory change.   All other systems reviewed and are negative.      Past Medical History   has a past medical history of CAD (coronary artery disease), Delirium (2/21/2020), DM (diabetes mellitus) (HCC) (1/7/2014), Falls, Hypertension, and UTI (urinary tract infection) (11/4/2018).    Surgical History   has a past surgical history that includes  other cardiac surgery; coronary artery bypass, 3; and janice rectal abscess.     Family History  Reviewed with patient, denies sig family hx    Social History   reports that he quit smoking about 47 years ago. He has never used smokeless tobacco. He reports previous alcohol use. He reports that he does not use drugs.    Allergies  Allergies   Allergen Reactions   • Iodine      Pt and pts wife report that it has been so long not sure what happens       Medications  Prior to Admission Medications   Prescriptions Last Dose Informant Patient Reported? Taking?   Insulin Glargine (BASAGLAR KWIKPEN) 100 UNIT/ML Solution Pen-injector  Friend Yes No   Sig: Inject 46 Units as instructed every day.   Multiple Vitamins-Minerals (PX MENS MULTIVITAMINS) Tab  Friend Yes No   Sig: Take 1 Tab by mouth every day.   Omega-3 Fatty Acids (FISH OIL) 1000 MG Cap capsule  Friend Yes No   Sig: Take 2,000 mg by mouth 2 Times a Day.   Specialty Vitamins Products (PROSTATE) Tab  Friend Yes No   Sig: Take 1 Tab by mouth 2 Times a Day.   amLODIPine (NORVASC) 5 MG Tab   No No   Sig: Take 1 Tab by mouth every day.   chlorthalidone (HYGROTON) 25 MG Tab  Friend No No   Sig: Take 1 Tab by mouth every day.   cyclosporin (RESTASIS) 0.05 % ophthalmic emulsion  Friend Yes No   Sig: Place 1 Drop in both eyes 2 times a day.   finasteride (PROSCAR) 5 MG Tab   No No   Sig: Take 1 Tab by mouth every day.   gabapentin (NEURONTIN) 100 MG Cap  Friend Yes No   Sig: Take 100-200 mg by mouth 2 Times a Day. 100  MG AT DINNER   200 MG BEDTIME   metoprolol (LOPRESSOR) 25 MG Tab  Friend Yes No   Sig: Take 12.5 mg by mouth 2 times a day.   neomycin-bacitracin-polymyxin (NEOSPORIN) 400-5-5000 Ointment  Friend No No   Sig: Apply 1 Each to affected area(s) every day. To penis   nystatin (MYCOSTATIN) 358255 UNIT/GM Cream topical cream  Friend No No   Sig: Apply 0.0001 g to affected area(s) 2 Times a Day.   potassium chloride ER (K-TAB) 20 MEQ Tab CR tablet   No No   Sig: Take  1 Tab by mouth 2 times a day.   simvastatin (ZOCOR) 10 MG Tab  Friend Yes No   Sig: Take 10 mg by mouth every evening.   tamsulosin (FLOMAX) 0.4 MG capsule  Friend Yes No   Sig: Take 0.4 mg by mouth every morning.      Facility-Administered Medications: None       Physical Exam  Temp:  [36.2 °C (97.2 °F)-36.3 °C (97.4 °F)] 36.3 °C (97.4 °F)  Pulse:  [] 82  Resp:  [16-18] 16  BP: ()/() 121/59  SpO2:  [94 %-100 %] 94 %    Physical Exam  Vitals signs and nursing note reviewed.   Constitutional:       General: He is not in acute distress.     Appearance: He is not diaphoretic.   HENT:      Head: Normocephalic and atraumatic.      Nose: No congestion.      Mouth/Throat:      Mouth: Mucous membranes are moist.   Eyes:      General:         Right eye: No discharge.         Left eye: No discharge.      Extraocular Movements: Extraocular movements intact.      Conjunctiva/sclera: Conjunctivae normal.   Neck:      Musculoskeletal: Normal range of motion. No neck rigidity.   Cardiovascular:      Rate and Rhythm: Normal rate.      Pulses: Normal pulses.   Pulmonary:      Effort: Pulmonary effort is normal. No respiratory distress.      Breath sounds: No wheezing.   Abdominal:      General: Abdomen is flat. There is no distension.      Palpations: Abdomen is soft.      Tenderness: There is no abdominal tenderness. There is no guarding.   Genitourinary:     Comments: Tip of penis with some skin erosion  Sher in place with bloody urine in bag  Musculoskeletal: Normal range of motion.         General: No swelling or deformity.   Skin:     General: Skin is warm and dry.      Coloration: Skin is not jaundiced.      Findings: No bruising.   Neurological:      General: No focal deficit present.      Mental Status: He is alert and oriented to person, place, and time.      Cranial Nerves: No cranial nerve deficit.   Psychiatric:         Mood and Affect: Mood normal.         Thought Content: Thought content normal.        Laboratory:  Recent Labs     03/20/20  0934   WBC 11.6*   RBC 4.27*   HEMOGLOBIN 12.7*   HEMATOCRIT 38.2*   MCV 89.5   MCH 29.7   MCHC 33.2*   RDW 46.2   PLATELETCT 252   MPV 10.0     Recent Labs     03/20/20  0934 03/20/20  1240   SODIUM 131* 137   POTASSIUM 4.5 3.4*   CHLORIDE 95* 100   CO2 20 22   GLUCOSE 553* 259*   BUN 23* 20   CREATININE 0.69 0.74   CALCIUM 9.0 8.5     Recent Labs     03/20/20  0934 03/20/20  1240   ALTSGPT  --  18   ASTSGOT  --  20   ALKPHOSPHAT  --  55   TBILIRUBIN  --  0.4   GLUCOSE 553* 259*         No results for input(s): NTPROBNP in the last 72 hours.      Recent Labs     03/20/20  1158 03/20/20  1240   TROPONINT rr 36*       Urinalysis:    Recent Labs     03/20/20  1021   SPECGRAVITY 1.010   GLUCOSEUR >=1000*   KETONES Trace*   NITRITE Negative   LEUKESTERAS Small*   WBCURINE *   RBCURINE 100-150*   BACTERIA Few*   EPITHELCELL Few        Imaging:  No orders to display     Assessment/Plan:  I anticipate this patient will require at least two midnights for appropriate medical management, necessitating inpatient admission.    Urinary retention- (present on admission)  Assessment & Plan  Likely d/t clots from continued bladder bleeding  Sher swapped in ED  Ultimately will need cytoscopy but not scheduled until May  Continue IVF  No e/o KOSTAS  Follow up AM BMP  Resume BPH medications when BP allows     DM (diabetes mellitus) (CMS-HCC)- (present on admission)  Assessment & Plan  Insulin ordered    HTN (hypertension)- (present on admission)  Assessment & Plan  SBP 70s in ED  Continue IVF  Follow up BCx  Resume home HTN meds when able    CAD (coronary artery disease) - CABG Nov. 2004- (present on admission)  Assessment & Plan  Off ASA and Xarelto now d/t bleed risk    Gross hematuria- (present on admission)  Assessment & Plan  Suspected Bladder CA  Awaiting outpatient cysto with Urology    ACP (advance care planning)- (present on admission)  Assessment & Plan  I discussed advance  care planning with the patient for at least 17 minutes, including diagnosis, prognosis, plan of care, risks and benefits of any therapies that could be offered, as well as alternatives including palliation and hospice, as appropriate. His code status has changed to DNR DNI. He is not interested in palliative care at this time.         BPH (benign prostatic hyperplasia)- (present on admission)  Assessment & Plan  Continue meds when able  Will likely need to keep wilson in on discharge      VTE prophylaxis: SCDs d/t bleeding

## 2020-03-20 NOTE — ED TRIAGE NOTES
"Chief Complaint   Patient presents with   • Abdominal Pain   • Constipation     BP (!) 162/105   Pulse (!) 121   Temp 36.2 °C (97.2 °F)   Resp 18   Ht 1.702 m (5' 7\")   Wt 79 kg (174 lb 2.6 oz)   SpO2 96%   BMI 27.28 kg/m²     BIB [facility] w c/o lower abdominal pain since 0400. Pt states has \"had small bowel movements\" x 3 days. Pt has urinary catheter in place, bloody urine noted.   "

## 2020-03-20 NOTE — ASSESSMENT & PLAN NOTE
Likely d/t clots from continued bladder bleeding  Sher swapped in ED  Ultimately will need cytoscopy but not scheduled until May  Continue IVF  No e/o KOSTAS  Follow up AM BMP  Resume BPH medications when BP allows

## 2020-03-20 NOTE — ED NOTES
New wilson catheter placed. Initial output 1300cc of bloody urine. Pt noted relief. Pt resting comfortably, will continue to monitor.

## 2020-03-20 NOTE — CARE PLAN
Problem: Communication  Goal: The ability to communicate needs accurately and effectively will improve  Outcome: PROGRESSING AS EXPECTED   Pt verbalizes needs      Problem: Safety  Goal: Will remain free from injury  Outcome: PROGRESSING AS EXPECTED  Goal: Will remain free from falls  Outcome: PROGRESSING AS EXPECTED   Safety measures in place

## 2020-03-20 NOTE — ED NOTES
"Pt c/o cramps and \"my legs are hurting\". Pt BP and HR decreased. MD Caba and this RN at bedside. Second IV started. Fluids given, abx started (see MAR). Pt AOx4. Pt BP and HR improved, pt reports relief of symptoms, will continue to monitor.   "

## 2020-03-20 NOTE — ED PROVIDER NOTES
ED Provider Note    CHIEF COMPLAINT  Chief Complaint   Patient presents with   • Abdominal Pain   • Constipation       HPI  Julien Dao is a 89 y.o. male who presents to the Emergency Department with a diagnosis of recent acute cystitis with acute urinary retention.  The patient does have a history of coronary artery bypass grafting and is on Xarelto.  He has a Sher catheter in place and was in the emergency department on  due to obstructive symptoms with blood in his Sher catheter.  He states today he had pain that started at about 4:00 in the morning.  He feels like his lower abdomen is going to explode.  There has been blood in the catheter no drainage of urine.  He denies any fever vomiting chills.  He has had decreased bowel movements yesterday but states he has had small bowel movements each day        REVIEW OF SYSTEMS  Positive for hematuria inability to urinate decreased stool output, Negative for fever vomiting.  As above all other systems are negative.    PAST MEDICAL HISTORY   has a past medical history of CAD (coronary artery disease), Delirium (2020), DM (diabetes mellitus) (HCC) (2014), Falls, Hypertension, and UTI (urinary tract infection) (2018). He also has no past medical history of ASTHMA, Cancer ([facility]), Congestive heart failure ([facility]), COPD, Liver disease, Renal disorder, Seizure disorder ([facility]), or Stroke ([facility]).    FAMILY HISTORY  No family history on file.     SOCIAL HISTORY  Social History     Tobacco Use   • Smoking status: Former Smoker     Last attempt to quit: 10/19/1972     Years since quittin.4   • Smokeless tobacco: Never Used   Substance and Sexual Activity   • Alcohol use: Not Currently   • Drug use: No   • Sexual activity: Not on file       SURGICAL HISTORY   has a past surgical history that includes other cardiac surgery; coronary artery bypass, 3; and janice rectal abscess.    CURRENT MEDICATIONS  Reviewed.  See Encounter Summary.  Include *  Current  Facility-Administered Medications:   •  CEFTRIAXONE SODIUM 2 G INJ SOLR, , , ,   •  NS infusion 1,000 mL, 1,000 mL, Intravenous, Once, Toma Caba M.D.  •  cefTRIAXone (ROCEPHIN) 2 g in  mL IVPB, 2 g, Intravenous, Once, Toma Caba M.D., Last Rate: 200 mL/hr at 03/20/20 1152, 2 g at 03/20/20 1152    Current Outpatient Medications:   •  amLODIPine (NORVASC) 5 MG Tab, Take 1 Tab by mouth every day., Disp: 30 Tab, Rfl: 1  •  finasteride (PROSCAR) 5 MG Tab, Take 1 Tab by mouth every day., Disp: 30 Tab, Rfl: 1  •  potassium chloride ER (K-TAB) 20 MEQ Tab CR tablet, Take 1 Tab by mouth 2 times a day., Disp: 60 Tab, Rfl: 1  •  gabapentin (NEURONTIN) 100 MG Cap, Take 100-200 mg by mouth 2 Times a Day. 100  MG AT DINNER  200 MG BEDTIME, Disp: , Rfl:   •  metoprolol (LOPRESSOR) 25 MG Tab, Take 12.5 mg by mouth 2 times a day., Disp: , Rfl:   •  cyclosporin (RESTASIS) 0.05 % ophthalmic emulsion, Place 1 Drop in both eyes 2 times a day., Disp: , Rfl:   •  chlorthalidone (HYGROTON) 25 MG Tab, Take 1 Tab by mouth every day., Disp: 30 Tab, Rfl: 1  •  neomycin-bacitracin-polymyxin (NEOSPORIN) 400-5-5000 Ointment, Apply 1 Each to affected area(s) every day. To penis, Disp: 1 Tube, Rfl: 0  •  nystatin (MYCOSTATIN) 250194 UNIT/GM Cream topical cream, Apply 0.0001 g to affected area(s) 2 Times a Day., Disp: 1 Tube, Rfl: 0  •  tamsulosin (FLOMAX) 0.4 MG capsule, Take 0.4 mg by mouth every morning., Disp: , Rfl:   •  Omega-3 Fatty Acids (FISH OIL) 1000 MG Cap capsule, Take 2,000 mg by mouth 2 Times a Day., Disp: , Rfl:   •  Insulin Glargine (BASAGLAR KWIKPEN) 100 UNIT/ML Solution Pen-injector, Inject 46 Units as instructed every day., Disp: , Rfl:   •  simvastatin (ZOCOR) 10 MG Tab, Take 10 mg by mouth every evening., Disp: , Rfl:   •  Multiple Vitamins-Minerals (PX MENS MULTIVITAMINS) Tab, Take 1 Tab by mouth every day., Disp: , Rfl:   •  Specialty Vitamins Products (PROSTATE) Tab, Take 1 Tab by mouth 2 Times a  "Day., Disp: , Rfl:       ALLERGIES  Allergies   Allergen Reactions   • Iodine      Pt and pts wife report that it has been so long not sure what happens       PHYSICAL EXAM  VITAL SIGNS: /61   Pulse 93   Temp 36.2 °C (97.2 °F)   Resp 16   Ht 1.702 m (5' 7\")   Wt 79 kg (174 lb 2.6 oz)   SpO2 97%   BMI 27.28 kg/m²   Constitutional: *Appears uncomfortable, Alert and in no apparent distress.  HENT: Normocephalic, Bilateral external ears normal. Nose normal.   Eyes: Pupils are equal and reactive. Conjunctiva normal, non-icteric.   Cardiovascular:  Good Perfusion  Thorax & Lungs: Easy unlabored respirations  Abdomen:  No gross signs of peritonitis, no pain with movement, lower abdomen is distended but is in the Sher catheter with no large amount of urine  Skin: Visualized skin is  Dry, No erythema, No rash.   Extremities:   Moves all extremities   Neurologic: Alert, Grossly non-focal.   Psychiatric: Appropriate Mood      RADIOLOGY/PROCEDURES  Imaging Studies:    No orders to display         Pertinent Labs   Results for orders placed or performed during the hospital encounter of 03/20/20   BMP   Result Value Ref Range    Sodium 131 (L) 135 - 145 mmol/L    Potassium 4.5 3.6 - 5.5 mmol/L    Chloride 95 (L) 96 - 112 mmol/L    Co2 20 20 - 33 mmol/L    Glucose 553 (HH) 65 - 99 mg/dL    Bun 23 (H) 8 - 22 mg/dL    Creatinine 0.69 0.50 - 1.40 mg/dL    Calcium 9.0 8.4 - 10.2 mg/dL    Anion Gap 16.0 7.0 - 16.0   CBC WITH DIFFERENTIAL   Result Value Ref Range    WBC 11.6 (H) 4.8 - 10.8 K/uL    RBC 4.27 (L) 4.70 - 6.10 M/uL    Hemoglobin 12.7 (L) 14.0 - 18.0 g/dL    Hematocrit 38.2 (L) 42.0 - 52.0 %    MCV 89.5 81.4 - 97.8 fL    MCH 29.7 27.0 - 33.0 pg    MCHC 33.2 (L) 33.7 - 35.3 g/dL    RDW 46.2 35.9 - 50.0 fL    Platelet Count 252 164 - 446 K/uL    MPV 10.0 9.0 - 12.9 fL    Neutrophils-Polys 80.80 (H) 44.00 - 72.00 %    Lymphocytes 9.60 (L) 22.00 - 41.00 %    Monocytes 7.40 0.00 - 13.40 %    Eosinophils 0.90 0.00 - " 6.90 %    Basophils 0.60 0.00 - 1.80 %    Immature Granulocytes 0.70 0.00 - 0.90 %    Nucleated RBC 0.00 /100 WBC    Neutrophils (Absolute) 9.36 (H) 1.82 - 7.42 K/uL    Lymphs (Absolute) 1.11 1.00 - 4.80 K/uL    Monos (Absolute) 0.86 (H) 0.00 - 0.85 K/uL    Eos (Absolute) 0.10 0.00 - 0.51 K/uL    Baso (Absolute) 0.07 0.00 - 0.12 K/uL    Immature Granulocytes (abs) 0.08 0.00 - 0.11 K/uL    NRBC (Absolute) 0.00 K/uL   URINALYSIS,CULTURE IF INDICATED   Result Value Ref Range    Color Red     Character Hazy (A)     Specific Gravity 1.010 <1.035    Ph 7.0 5.0 - 8.0    Glucose >=1000 (A) Negative mg/dL    Ketones Trace (A) Negative mg/dL    Protein 100 (A) Negative mg/dL    Bilirubin Negative Negative    Nitrite Negative Negative    Leukocyte Esterase Small (A) Negative    Occult Blood Large (A) Negative    Micro Urine Req Microscopic    ESTIMATED GFR   Result Value Ref Range    GFR If African American >60 >60 mL/min/1.73 m 2    GFR If Non African American >60 >60 mL/min/1.73 m 2   URINE MICROSCOPIC (W/UA)   Result Value Ref Range    WBC  (A) /hpf    -150 (A) /hpf    Bacteria Few (A) None /hpf    Epithelial Cells Few Few /hpf   ACCU-CHEK GLUCOSE   Result Value Ref Range    Glucose - Accu-Ck 463 (HH) 65 - 99 mg/dL   ACCU-CHEK GLUCOSE   Result Value Ref Range    Glucose - Accu-Ck 402 (HH) 65 - 99 mg/dL           COURSE & MEDICAL DECISION MAKING  Nursing notes and vital signs were reviewed. (See chart for details)  The patients prior emergency department records were reviewed, history was obtained from the patient.    The patient presents with likely acute urinary obstruction from underlying benign prostatic hypertrophy and secondarily possible clots creating tube obstruction, clinically there is no signs of infection at this time    Initial orders in the Emergency Department included CBC CMP and initial treatment in the Emergency Department included flushing the catheter, we were unsuccessful with relieving the  obstruction.  Next a Sher catheter was attempted, we were unable to move it past the prostate.  Finally a coudé catheter was placed with good urine output of over 1 L.    ED testing reveals hyperglycemia with a glucose of greater than 500.  This was discussed with the hospitalist.  Secondary to the covid 19 risk, we agreed that the patient should have a trial of lowering the glucose here in the emergency department instead of admission.  The patient was given regular insulin 5 mg IV with a reduction in his glucose to 400 a second 5 mg IV was administered.  The patient's repeat sugar was 353.  In the midst of this therapy he started complaining of tingling in both of his feet and both of his hands followed by pain in his hands.  He does have a pacemaker, he was noted to have increasing ectopy which was then determined to be pacemaker beats.  His blood pressure fell to 74/38, at that time his heart rate was in the 70s.  The patient had 500 cc of fluid bolused.  His blood pressure did improve to a blood pressure in the 90 range systolic.  A second line was requested.  Blood cultures were drawn a lactic acid and repeat chemistry panel as the patient started having chills and rigors in the room.  Troponin was requested.  I am concerned that he may have seeded bacteria from catheter placement and is now septic versus this being a cardiac source.  After an additional 500 of normal saline his blood pressure is now 115 systolic.  The patient will need to be admitted with IV antibiotics and observation    Patient's care was discussed with pharmacy.  He has had ciprofloxacin in the past.  Cultures have been negative of a urethral swab in the past.  No recent urine culture.  He was given Rocephin here.  They also discussed that the patient likely has bladder cancer, I had a discussion with him in light of his hypotension and likely bladder cancer or whether the patient wants to be intubated or resuscitated.  He would like all  other treatment if possible and would like to live till the age of 89 but does not want intubation or resuscitation.  Results show a lactic acid elevation now of 2.4 and the rest of the blood work is pending.  Patient has been discussed with the hospitalist Dr. Moore who will admit    CRITICAL CARE  The very real possibilty of a deterioration of this patient's condition required the highest level of my preparedness for sudden, emergent intervention.  I provided critical care services, which included medication orders, frequent reevaluations of the patient's condition and response to treatment, ordering and reviewing test results, and discussing the case with various consultants.  The critical care time associated with the care of the patient was 35 minutes. Review chart for interventions. This time is exclusive of any other billable procedures.       FINAL IMPRESSION  1.  Acute urinary retention  2.  Hematuria  3.  Hypotensive episode, question bacteremia/sepsis vs. Cardiogenic source       DISPOSITION  Admit    Electronically signed by: Toma Caba M.D., 3/20/2020 9:43 AM

## 2020-03-20 NOTE — ASSESSMENT & PLAN NOTE
I discussed advance care planning with the patient for at least 17 minutes, including diagnosis, prognosis, plan of care, risks and benefits of any therapies that could be offered, as well as alternatives including palliation and hospice, as appropriate. His code status has changed to DNR DNI. He is not interested in palliative care at this time.

## 2020-03-20 NOTE — ED NOTES
Med rec complete per patient  Allergies reviewed    Patient did not  Finasteride and Potassium upon discharge, pharmacy stated they never got prescriptions

## 2020-03-21 VITALS
HEIGHT: 67 IN | HEART RATE: 106 BPM | TEMPERATURE: 97.7 F | RESPIRATION RATE: 20 BRPM | DIASTOLIC BLOOD PRESSURE: 90 MMHG | SYSTOLIC BLOOD PRESSURE: 149 MMHG | WEIGHT: 181.22 LBS | OXYGEN SATURATION: 96 % | BODY MASS INDEX: 28.44 KG/M2

## 2020-03-21 LAB
ALBUMIN SERPL BCP-MCNC: 3.5 G/DL (ref 3.2–4.9)
ALBUMIN/GLOB SERPL: 1.3 G/DL
ALP SERPL-CCNC: 54 U/L (ref 30–99)
ALT SERPL-CCNC: 16 U/L (ref 2–50)
ANION GAP SERPL CALC-SCNC: 12 MMOL/L (ref 7–16)
AST SERPL-CCNC: 18 U/L (ref 12–45)
BASOPHILS # BLD AUTO: 0.4 % (ref 0–1.8)
BASOPHILS # BLD: 0.03 K/UL (ref 0–0.12)
BILIRUB SERPL-MCNC: 0.5 MG/DL (ref 0.1–1.5)
BUN SERPL-MCNC: 16 MG/DL (ref 8–22)
CALCIUM SERPL-MCNC: 8.5 MG/DL (ref 8.4–10.2)
CHLORIDE SERPL-SCNC: 101 MMOL/L (ref 96–112)
CO2 SERPL-SCNC: 23 MMOL/L (ref 20–33)
CREAT SERPL-MCNC: 0.72 MG/DL (ref 0.5–1.4)
EOSINOPHIL # BLD AUTO: 0.21 K/UL (ref 0–0.51)
EOSINOPHIL NFR BLD: 2.8 % (ref 0–6.9)
ERYTHROCYTE [DISTWIDTH] IN BLOOD BY AUTOMATED COUNT: 48.4 FL (ref 35.9–50)
GLOBULIN SER CALC-MCNC: 2.7 G/DL (ref 1.9–3.5)
GLUCOSE BLD-MCNC: 162 MG/DL (ref 65–99)
GLUCOSE SERPL-MCNC: 156 MG/DL (ref 65–99)
HCT VFR BLD AUTO: 36.4 % (ref 42–52)
HGB BLD-MCNC: 11.9 G/DL (ref 14–18)
IMM GRANULOCYTES # BLD AUTO: 0.04 K/UL (ref 0–0.11)
IMM GRANULOCYTES NFR BLD AUTO: 0.5 % (ref 0–0.9)
LYMPHOCYTES # BLD AUTO: 1.9 K/UL (ref 1–4.8)
LYMPHOCYTES NFR BLD: 25.6 % (ref 22–41)
MCH RBC QN AUTO: 29.7 PG (ref 27–33)
MCHC RBC AUTO-ENTMCNC: 32.7 G/DL (ref 33.7–35.3)
MCV RBC AUTO: 90.8 FL (ref 81.4–97.8)
MONOCYTES # BLD AUTO: 0.73 K/UL (ref 0–0.85)
MONOCYTES NFR BLD AUTO: 9.8 % (ref 0–13.4)
NEUTROPHILS # BLD AUTO: 4.51 K/UL (ref 1.82–7.42)
NEUTROPHILS NFR BLD: 60.9 % (ref 44–72)
NRBC # BLD AUTO: 0 K/UL
NRBC BLD-RTO: 0 /100 WBC
PLATELET # BLD AUTO: 239 K/UL (ref 164–446)
PMV BLD AUTO: 10.1 FL (ref 9–12.9)
POTASSIUM SERPL-SCNC: 3.6 MMOL/L (ref 3.6–5.5)
PROT SERPL-MCNC: 6.2 G/DL (ref 6–8.2)
RBC # BLD AUTO: 4.01 M/UL (ref 4.7–6.1)
SODIUM SERPL-SCNC: 136 MMOL/L (ref 135–145)
WBC # BLD AUTO: 7.4 K/UL (ref 4.8–10.8)

## 2020-03-21 PROCEDURE — 700105 HCHG RX REV CODE 258: Performed by: HOSPITALIST

## 2020-03-21 PROCEDURE — 665999 HH PPS REVENUE DEBIT

## 2020-03-21 PROCEDURE — 700111 HCHG RX REV CODE 636 W/ 250 OVERRIDE (IP): Performed by: HOSPITALIST

## 2020-03-21 PROCEDURE — 85025 COMPLETE CBC W/AUTO DIFF WBC: CPT

## 2020-03-21 PROCEDURE — 665998 HH PPS REVENUE CREDIT

## 2020-03-21 PROCEDURE — 99239 HOSP IP/OBS DSCHRG MGMT >30: CPT | Performed by: INTERNAL MEDICINE

## 2020-03-21 PROCEDURE — 80053 COMPREHEN METABOLIC PANEL: CPT

## 2020-03-21 PROCEDURE — 82962 GLUCOSE BLOOD TEST: CPT

## 2020-03-21 RX ADMIN — INSULIN LISPRO 2 UNITS: 100 INJECTION, SOLUTION INTRAVENOUS; SUBCUTANEOUS at 06:02

## 2020-03-21 RX ADMIN — INSULIN LISPRO 5 UNITS: 100 INJECTION, SOLUTION INTRAVENOUS; SUBCUTANEOUS at 06:01

## 2020-03-21 RX ADMIN — POTASSIUM CHLORIDE 10 MEQ: 7.46 INJECTION, SOLUTION INTRAVENOUS at 00:25

## 2020-03-21 RX ADMIN — CEFTRIAXONE SODIUM 1 G: 1 INJECTION, POWDER, FOR SOLUTION INTRAMUSCULAR; INTRAVENOUS at 05:54

## 2020-03-21 NOTE — DISCHARGE INSTRUCTIONS
Discharge Instructions per Giselle Guerrero M.D.    Follow up with urology as scheduled    DIET: regular    ACTIVITY: as tolerated    DIAGNOSIS: clogged wilson catheter    Return to ER if symptoms worsen    Discharge Instructions    Discharged to home by car with relative. Discharged via wheelchair, hospital escort: Yes.  Special equipment needed: Not Applicable    Be sure to schedule a follow-up appointment with your primary care doctor or any specialists as instructed.     Discharge Plan:   Diet Plan: Discussed  Activity Level: Discussed  Confirmed Follow up Appointment: Appointment Scheduled  Confirmed Symptoms Management: Discussed  Medication Reconciliation Updated: Yes  Influenza Vaccine Indication: Patient Refuses    I understand that a diet low in cholesterol, fat, and sodium is recommended for good health. Unless I have been given specific instructions below for another diet, I accept this instruction as my diet prescription.   Other diet: Regular    Special Instructions: None    · Is patient discharged on Warfarin / Coumadin?   No     Depression / Suicide Risk    As you are discharged from this [facility] facility, it is important to learn how to keep safe from harming yourself.    Recognize the warning signs:  · Abrupt changes in personality, positive or negative- including increase in energy   · Giving away possessions  · Change in eating patterns- significant weight changes-  positive or negative  · Change in sleeping patterns- unable to sleep or sleeping all the time   · Unwillingness or inability to communicate  · Depression  · Unusual sadness, discouragement and loneliness  · Talk of wanting to die  · Neglect of personal appearance   · Rebelliousness- reckless behavior  · Withdrawal from people/activities they love  · Confusion- inability to concentrate     If you or a loved one observes any of these behaviors or has concerns about self-harm, here's what you can do:  · Talk about it- your feelings  and reasons for harming yourself  · Remove any means that you might use to hurt yourself (examples: pills, rope, extension cords, firearm)  · Get professional help from the community (Mental Health, Substance Abuse, psychological counseling)  · Do not be alone:Call your Safe Contact- someone whom you trust who will be there for you.  · Call your local CRISIS HOTLINE 627-0779 or 394-247-7886  · Call your local Children's Mobile Crisis Response Team [place] (812) 935-8646 or www"[employer]  · Call the toll free National Suicide Prevention Hotlines   · National Suicide Prevention Lifeline 389-840-ITDK (5869)  · National Hope Line Network 800-SUICIDE (158-2332)        Sher Catheter Care, Adult  A Sher catheter is a soft, flexible tube. This tube is placed into your bladder to drain pee (urine). If you go home with this catheter in place, follow the instructions below.  TAKING CARE OF THE CATHETER  1. Wash your hands with soap and water.  2. Put soap and water on a clean washcloth.  ¨ Clean the skin where the tube goes into your body.  § Clean away from the tube site.  § Never wipe toward the tube.  § Clean the area using a circular motion.  ¨ Remove all the soap. Pat the area dry with a clean towel. For males, reposition the skin that covers the end of the penis (foreskin).  3. Attach the tube to your leg with tape or a leg strap. Do not stretch the tube tight. If you are using tape, remove any stickiness left behind by past tape you used.  4. Keep the drainage bag below your hips. Keep it off the floor.  5. Check your tube during the day. Make sure it is working and draining. Make sure the tube does not curl, twist, or bend.  6. Do not pull on the tube or try to take it out.  TAKING CARE OF THE DRAINAGE BAGS  You will have a large overnight drainage bag and a small leg bag. You may wear the overnight bag any time. Never wear the small bag at night. Follow the directions below.  Emptying the Drainage  Bag  Empty your drainage bag when it is  -½ full or at least 2-3 times a day.  2. Wash your hands with soap and water.  3. Keep the drainage bag below your hips.  4. Hold the dirty bag over the toilet or clean container.  5. Open the pour spout at the bottom of the bag. Empty the pee into the toilet or container. Do not let the pour spout touch anything.  6. Clean the pour spout with a gauze pad or cotton ball that has rubbing alcohol on it.  7. Close the pour spout.  8. Attach the bag to your leg with tape or a leg strap.  9. Wash your hands well.  Changing the Drainage Bag  Change your bag once a month or sooner if it starts to smell or look dirty.   2. Wash your hands with soap and water.  3. Pinch the rubber tube so that pee does not spill out.  4. Disconnect the catheter tube from the drainage tube at the connection valve. Do not let the tubes touch anything.  5. Clean the end of the catheter tube with an alcohol wipe. Clean the end of a the drainage tube with a different alcohol wipe.  6. Connect the catheter tube to the drainage tube of the clean drainage bag.  7. Attach the new bag to the leg with tape or a leg strap. Avoid attaching the new bag too tightly.  8. Wash your hands well.  Cleaning the Drainage Bag  2. Wash your hands with soap and water.  3. Wash the bag in warm, soapy water.  4. Rinse the bag with warm water.  5. Fill the bag with a mixture of white vinegar and water (1 cup vinegar to 1 quart warm water [.2 liter vinegar to 1 liter warm water]). Close the bag and soak it for 30 minutes in the solution.  6. Rinse the bag with warm water.  7. Hang the bag to dry with the pour spout open and hanging downward.  8. Store the clean bag (once it is dry) in a clean plastic bag.  9. Wash your hands well.  PREVENT INFECTION  · Wash your hands before and after touching your tube.  · Take showers every day. Wash the skin where the tube enters your body. Do not take baths. Replace wet leg straps with dry  ones, if this applies.  · Do not use powders, sprays, or lotions on the genital area. Only use creams, lotions, or ointments as told by your doctor.  · For females, wipe from front to back after going to the bathroom.  · Drink enough fluids to keep your pee clear or pale yellow unless you are told not to have too much fluid (fluid restriction).  · Do not let the drainage bag or tubing touch or lie on the floor.  · Wear cotton underwear to keep the area dry.  GET HELP IF:  · Your pee is cloudy or smells unusually bad.  · Your tube becomes clogged.  · You are not draining pee into the bag or your bladder feels full.  · Your tube starts to leak.  GET HELP RIGHT AWAY IF:  · You have pain, puffiness (swelling), redness, or yellowish-white fluid (pus) where the tube enters the body.  · You have pain in the belly (abdomen), legs, lower back, or bladder.  · You have a fever.  · You see blood fill the tube, or your pee is pink or red.  · You feel sick to your stomach (nauseous), throw up (vomit), or have chills.  · Your tube gets pulled out.  MAKE SURE YOU:   · Understand these instructions.  · Will watch your condition.  · Will get help right away if you are not doing well or get worse.     This information is not intended to replace advice given to you by your health care provider. Make sure you discuss any questions you have with your health care provider.     Document Released: 04/14/2014 Document Revised: 01/08/2016 Document Reviewed: 04/14/2014  [employer] Patient Education ©2016 [employer]

## 2020-03-21 NOTE — CARE PLAN
Problem: Pain Management  Goal: Pain level will decrease to patient's comfort goal  Outcome: PROGRESSING AS EXPECTED     Problem: Fluid Volume:  Goal: Will maintain balanced intake and output  Outcome: PROGRESSING AS EXPECTED    Ongoing care. No acute issues at this time. Patient resting well, but slightly c/o pain in his L hand. Gave PRN tylenol for pain and it is already feeling better he says. UO good slightly rakan.    2100 Patient still c/o pain in L hand and leg. Received orders from MD over phone for narcotic pain meds and K replacement for K level 3.4. Lactate trending up to 2.9, order to bump fluid to 100/hr. Will continue care and monitoring.

## 2020-03-21 NOTE — DISCHARGE PLANNING
Patient is currently on a hospital hold from home health services, if patient is medically appropriate and cleared at time of discharge to go home please send [facility] resumption of care order so we are able to see the patient in a timely manner.   Thank you,  Mami Wong RN Liaison

## 2020-03-21 NOTE — DISCHARGE SUMMARY
Discharge Summary    CHIEF COMPLAINT ON ADMISSION  Chief Complaint   Patient presents with   • Abdominal Pain   • Constipation       Reason for Admission  Inability to urinate    Admission Date  3/20/2020    CODE STATUS  DNAR/DNI    HPI & HOSPITAL COURSE  This is a 89 y.o. male here with an indwelling wilson catheter due to urinary retention who has follow up with urology Dr. Guidry in May 2020. He presented to the hospital with low abdominal pain and inability to get urine to drain from his wilson catheter. The wilson was replaced in the emergency department with over a liter of urine drained. The patient had instant relief of his discomfort but his systolic blood pressure did drop to the 70's so he was admitted to the hospital. The patient had improvement in his blood pressure after some iv fluids. He had no recurrence of low blood pressure, urine showed few bacteria but he had no other signs of a urinary infection. He did receive a dose of rocephin in the hospital. His wilson catheter continued to drain appropriately.        Therefore, he is discharged in good and stable condition to home with close outpatient follow-up.    The patient recovered much more quickly than anticipated on admission.    Discharge Date  3/21/20    FOLLOW UP ITEMS POST DISCHARGE  Urology Dr. Guidry in May as scheduled    DISCHARGE DIAGNOSES  Active Problems:    Urinary retention POA: Yes    CAD (coronary artery disease) - CABG Nov. 2004 POA: Yes    HTN (hypertension) POA: Yes    DM (diabetes mellitus) (CMS-[facility]) POA: Yes    BPH (benign prostatic hyperplasia) POA: Yes    ACP (advance care planning) POA: Yes    Gross hematuria POA: Yes  Resolved Problems:    * No resolved hospital problems. *      FOLLOW UP  Future Appointments   Date Time Provider Department Center   3/23/2020 To Be Determined Hilda Shah C.N.A. [facility] None   3/23/2020 To Be Determined Janine Steele R.N. [facility] None   3/25/2020 To Be Determined Hilda Shah C.N.A.  [facility] None   3/26/2020 To Be Determined N  TEAM [facility] None   3/30/2020 To Be Determined N  TEAM [facility] None   3/30/2020 To Be Determined Crystal Shah, C.N.A. [facility] None   4/1/2020 To Be Determined Crystal Shah, C.N.A. [facility] None   4/2/2020 To Be Determined RWN  TEAM [facility] None   4/6/2020 To Be Determined N  TEAM [facility] None   4/6/2020 To Be Determined Crystal Shah, C.N.A. [facility] None   4/8/2020 To Be Determined Crystal Shah, C.N.A. [facility] None   4/9/2020 To Be Determined [facility] TEAM [facility] None     JONO Powell  [address] [address]  [phone]      As needed    Angel Guidry M.D.  [address] [id number] [phone]      in May as scheduled      MEDICATIONS ON DISCHARGE     Medication List      CONTINUE taking these medications      Instructions   amLODIPine 5 MG Tabs  Commonly known as:  NORVASC   Take 1 Tab by mouth every day.  Dose:  5 mg     chlorthalidone 25 MG Tabs  Commonly known as:  HYGROTON   Take 1 Tab by mouth every day.  Dose:  25 mg     finasteride 5 MG Tabs  Commonly known as:  PROSCAR   Take 1 Tab by mouth every day.  Dose:  5 mg     fish oil 1000 MG Caps capsule   Take 2,000 mg by mouth 2 Times a Day.  Dose:  2,000 mg     gabapentin 100 MG Caps  Commonly known as:  NEURONTIN   Take 100-200 mg by mouth 2 Times a Day. 100  MG AT DINNER   200 MG BEDTIME  Dose:  100-200 mg     insulin glargine 100 UNIT/ML Sopn injection  Generic drug:  insulin glargine   Inject 46 Units as instructed every day.  Dose:  46 Units     metoprolol 25 MG Tabs  Commonly known as:  LOPRESSOR   Take 12.5 mg by mouth 2 times a day.  Dose:  12.5 mg     neomycin-bacitracin-polymyxin 400-5-5000 Oint  Commonly known as:  NEOSPORIN   Apply 1 Each to affected area(s) every day. To penis  Dose:  1 Each     nystatin 540703 UNIT/GM Crea topical cream  Commonly known as:  MYCOSTATIN   Apply 0.0001 g to affected area(s) 2 Times a Day.  Dose:  1 Units      potassium Chloride ER 20 MEQ Tbcr tablet  Commonly known as:  K-TAB   Take 1 Tab by mouth 2 times a day.  Dose:  20 mEq     Prostate Tabs   Take 1 Tab by mouth 2 Times a Day.  Dose:  1 Tab     PX Mens Multivitamins Tabs   Take 1 Tab by mouth every day.  Dose:  1 Tab     Restasis 0.05 % ophthalmic emulsion  Generic drug:  cyclosporin   Place 1 Drop in both eyes 2 times a day.  Dose:  1 Drop     simvastatin 10 MG Tabs  Commonly known as:  ZOCOR   Take 10 mg by mouth every evening.  Dose:  10 mg     tamsulosin 0.4 MG capsule  Commonly known as:  FLOMAX   Take 0.4 mg by mouth every morning.  Dose:  0.4 mg            Allergies  Allergies   Allergen Reactions   • Iodine      Pt and pts wife report that it has been so long not sure what happens       DIET  Orders Placed This Encounter   Procedures   • Diet Order Cardiac     Standing Status:   Standing     Number of Occurrences:   1     Order Specific Question:   Diet:     Answer:   Cardiac [6]       ACTIVITY  As tolerated.  Weight bearing as tolerated    CONSULTATIONS  none    PROCEDURES  Sher catheter replacement    LABORATORY  Lab Results   Component Value Date    SODIUM 136 03/21/2020    POTASSIUM 3.6 03/21/2020    CHLORIDE 101 03/21/2020    CO2 23 03/21/2020    GLUCOSE 156 (H) 03/21/2020    BUN 16 03/21/2020    CREATININE 0.72 03/21/2020    CREATININE 1.0 12/02/2008        Lab Results   Component Value Date    WBC 7.4 03/21/2020    HEMOGLOBIN 11.9 (L) 03/21/2020    HEMATOCRIT 36.4 (L) 03/21/2020    PLATELETCT 239 03/21/2020        Total time of the discharge process exceeds 37 minutes.

## 2020-03-22 LAB
BACTERIA UR CULT: ABNORMAL
BACTERIA UR CULT: ABNORMAL
SIGNIFICANT IND 70042: ABNORMAL
SITE SITE: ABNORMAL
SOURCE SOURCE: ABNORMAL

## 2020-03-22 PROCEDURE — 665999 HH PPS REVENUE DEBIT

## 2020-03-22 PROCEDURE — 665998 HH PPS REVENUE CREDIT

## 2020-03-22 ASSESSMENT — ENCOUNTER SYMPTOMS
NAUSEA: DENIES
VOMITING: DENIES
DEBILITATING PAIN: 1

## 2020-03-22 ASSESSMENT — ACTIVITIES OF DAILY LIVING (ADL): OASIS_M1830: 03

## 2020-03-23 ENCOUNTER — HOME CARE VISIT (OUTPATIENT)
Dept: HOME HEALTH SERVICES | Facility: HOME HEALTHCARE | Age: 85
End: 2020-03-23
Payer: MEDICARE

## 2020-03-23 PROCEDURE — 665998 HH PPS REVENUE CREDIT

## 2020-03-23 PROCEDURE — 665999 HH PPS REVENUE DEBIT

## 2020-03-23 NOTE — ED NOTES
ED Positive Culture Follow-up/Notification Note:    Date: 3/23/2020     Patient seen in the ED on 3/20/2020 for acute cystitis with acute urinary retention. Sher catheter was obstructed and a coude catheter was placed with urine output of >1 liter noted.   1. Urinary retention    2. Hyperglycemia    3. Hypotensive episode     Given Rocephin in the ER.     Discharge Medication List as of 3/21/2020 12:40 PM        Allergies: Iodine     Vitals:    03/20/20 2327 03/21/20 0418 03/21/20 0707 03/21/20 1135   BP: 138/73 116/69 132/73 149/90   Pulse: (!) 104 94 82 (!) 106   Resp: 18 16 16 20   Temp: 36.7 °C (98 °F) 36.6 °C (97.8 °F) 36.4 °C (97.6 °F) 36.5 °C (97.7 °F)   TempSrc: Oral Oral Oral Oral   SpO2: 94% 98% 95% 96%   Weight:       Height:         Final cultures:   Results     Procedure Component Value Units Date/Time    URINE CULTURE(NEW) [[id number]]  (Abnormal)  (Susceptibility) Collected:  03/20/20 1021    Order Status:  Completed Specimen:  Urine Updated:  03/22/20 1612     Significant Indicator POS     Source UR     Site -     Culture Result -      Enterococcus faecalis  >100,000 cfu/mL      Susceptibility     Enterococcus faecalis (1)     Antibiotic Interpretation Microscan Method Status    Daptomycin Sensitive <=1 mcg/mL BANDAR Final    Nitrofurantoin Sensitive <=32 mcg/mL BANDAR Final    Ampicillin Sensitive <=2 mcg/mL BANDAR Final    Penicillin Sensitive 2 mcg/mL BANDAR Final    Vancomycin Sensitive 1 mcg/mL BANDAR Final    Tetracycline Resistant >8 mcg/mL BANDAR Final                   Blood Culture [[id number]] Collected:  03/20/20 1158    Order Status:  Completed Specimen:  Blood from Peripheral Updated:  03/21/20 0711     Significant Indicator NEG     Source BLD     Site PERIPHERAL     Culture Result No Growth  Note: Blood cultures are incubated for 5 days and  are monitored continuously.Positive blood cultures  are called to the RN and reported as soon as  they are identified.  Blood culture testing and Gram stain, if  "indicated, are  performed at [facility], [address] [place] [address].  Positive blood cultures are  sent to [facility], [address] [place] [address], for organism identification and  susceptibility testing.      Narrative:       Per Hospital Policy: Only change Specimen Src: to \"Line\" if  specified by physician order.  Left AC    Blood Culture [[id number]] Collected:  03/20/20 1611    Order Status:  Completed Specimen:  Blood from Peripheral Updated:  03/21/20 0711     Significant Indicator NEG     Source BLD     Site PERIPHERAL     Culture Result No Growth  Note: Blood cultures are incubated for 5 days and  are monitored continuously.Positive blood cultures  are called to the RN and reported as soon as  they are identified.  Blood culture testing and Gram stain, if indicated, are  performed at [facility], [address] [place] [address].  Positive blood cultures are  sent to [facility], [address] [place] [address], for organism identification and  susceptibility testing.      Narrative:       Per Hospital Policy: Only change Specimen Src: to \"Line\" if  specified by physician order.  Right AC    URINALYSIS,CULTURE IF INDICATED [[id number]]  (Abnormal) Collected:  03/20/20 1021    Order Status:  Completed Specimen:  Urine Updated:  03/20/20 1039     Color Red     Character Hazy     Specific [place] 1.010     Ph 7.0     Glucose >=1000 mg/dL      Ketones Trace mg/dL      Protein 100 mg/dL      Bilirubin Negative     Nitrite Negative     Leukocyte Esterase Small     Occult Blood Large     Micro Urine Req Microscopic    URINE CULTURE(NEW) [[id number]] Collected:  03/20/20 1021    Order Status:  Canceled           Plan:   Spoke with the patient and I gave him the results. I asked him what pharmacy he would like antibiotics called in, and he said that he is feeling fine so didn't know if he needed antibiotics. After " some discussion, he is not having fever or chills or a sense of bladder fullness, no bladder spasm and no problems at all. He thinks the catheter fixed the problem. Would prefer not to have antibiotics at this time. I have informed him to call me back if anything changes.    Kina Tay, PharmD

## 2020-03-24 PROCEDURE — 665998 HH PPS REVENUE CREDIT

## 2020-03-24 PROCEDURE — 665999 HH PPS REVENUE DEBIT

## 2020-03-24 NOTE — DOCUMENTATION QUERY
"                                                                         [facility]                                                                       Query Response Note      PATIENT:               SAMMY CLIFFORD  ACCT #:                  [id number]  MRN:                     [id number]  :                      3/7/1929  ADMIT DATE:       3/13/2020 10:18 AM  DISCH DATE:        3/15/2020 1:06 PM  RESPONDING  PROVIDER #:        [id number]           QUERY TEXT:    Based on clinical findings, risk factors and treatment, can the cause of the hematuria be further clarified as    NOTE:  If an appropriate response is not listed below, please respond with a new note.    The patient's Clinical Indicators include:    History and Physical  Gross hematuria  Not resolving. Off xarelto and asa.   CBI ongoing.   Add IV dilaudid.   Urology following     Progress Note   \" He was found to have an obstructed wilson  due to gross hematuria.\"    Discharge Summary  \"gross hematuria complicated by obstruction of his Wilson catheter.\"  \" His Xarelto and aspirin were both held.  On the second hospital day, his hematuria resolved  and his urine ran clear.\"  \"His Xarelto and aspirin were still held on discharge and he was told to follow-up with his primary care physician within  3 to 5 days to discuss when these should be resumed.\"    Prothrombin Time INR  PT 14.5  INT 1.11    PTT-Part.Thromb.Time  PTT 34.3  Options provided:   -- Due to aspirin and Xarelto   -- Due to catheter   -- Due to BPH   -- Unable to determine      Query created by: Joann Thomas on 3/24/2020 9:30 AM    RESPONSE TEXT:    Unable to determine          Electronically signed by:  CHENCHO INIGUEZ DO 3/24/2020 9:38 AM              "

## 2020-03-25 LAB
BACTERIA BLD CULT: NORMAL
BACTERIA BLD CULT: NORMAL
SIGNIFICANT IND 70042: NORMAL
SIGNIFICANT IND 70042: NORMAL
SITE SITE: NORMAL
SITE SITE: NORMAL
SOURCE SOURCE: NORMAL
SOURCE SOURCE: NORMAL

## 2020-03-25 PROCEDURE — 665998 HH PPS REVENUE CREDIT

## 2020-03-25 PROCEDURE — 665999 HH PPS REVENUE DEBIT

## 2020-03-26 ENCOUNTER — HOME CARE VISIT (OUTPATIENT)
Dept: HOME HEALTH SERVICES | Facility: HOME HEALTHCARE | Age: 85
End: 2020-03-26
Payer: MEDICARE

## 2020-03-26 PROCEDURE — 665998 HH PPS REVENUE CREDIT

## 2020-03-26 PROCEDURE — 665999 HH PPS REVENUE DEBIT

## 2020-03-27 ENCOUNTER — APPOINTMENT (OUTPATIENT)
Dept: RADIOLOGY | Facility: MEDICAL CENTER | Age: 85
End: 2020-03-27
Attending: EMERGENCY MEDICINE
Payer: MEDICARE

## 2020-03-27 ENCOUNTER — HOSPITAL ENCOUNTER (EMERGENCY)
Facility: MEDICAL CENTER | Age: 85
End: 2020-03-27
Attending: EMERGENCY MEDICINE
Payer: MEDICARE

## 2020-03-27 ENCOUNTER — HOME CARE VISIT (OUTPATIENT)
Dept: HOME HEALTH SERVICES | Facility: HOME HEALTHCARE | Age: 85
End: 2020-03-27
Payer: MEDICARE

## 2020-03-27 VITALS
TEMPERATURE: 96.8 F | DIASTOLIC BLOOD PRESSURE: 64 MMHG | OXYGEN SATURATION: 95 % | SYSTOLIC BLOOD PRESSURE: 115 MMHG | HEIGHT: 67 IN | HEART RATE: 80 BPM | RESPIRATION RATE: 16 BRPM | WEIGHT: 171.96 LBS | BODY MASS INDEX: 26.99 KG/M2

## 2020-03-27 DIAGNOSIS — R10.9 ACUTE ABDOMINAL PAIN: ICD-10-CM

## 2020-03-27 DIAGNOSIS — R33.8 BENIGN PROSTATIC HYPERPLASIA WITH URINARY RETENTION: ICD-10-CM

## 2020-03-27 DIAGNOSIS — N39.0 COMPLICATED UTI (URINARY TRACT INFECTION): ICD-10-CM

## 2020-03-27 DIAGNOSIS — Z86.39 HISTORY OF DIABETES MELLITUS: ICD-10-CM

## 2020-03-27 DIAGNOSIS — E86.0 DEHYDRATION: ICD-10-CM

## 2020-03-27 DIAGNOSIS — R73.9 HYPERGLYCEMIA: ICD-10-CM

## 2020-03-27 DIAGNOSIS — I10 ESSENTIAL HYPERTENSION: ICD-10-CM

## 2020-03-27 DIAGNOSIS — N40.1 BENIGN PROSTATIC HYPERPLASIA WITH URINARY RETENTION: ICD-10-CM

## 2020-03-27 LAB
ALBUMIN SERPL BCP-MCNC: 4.3 G/DL (ref 3.2–4.9)
ALBUMIN/GLOB SERPL: 1.4 G/DL
ALP SERPL-CCNC: 64 U/L (ref 30–99)
ALT SERPL-CCNC: 23 U/L (ref 2–50)
ANION GAP SERPL CALC-SCNC: 15 MMOL/L (ref 7–16)
APPEARANCE UR: ABNORMAL
AST SERPL-CCNC: 18 U/L (ref 12–45)
BACTERIA #/AREA URNS HPF: ABNORMAL /HPF
BASOPHILS # BLD AUTO: 0.4 % (ref 0–1.8)
BASOPHILS # BLD: 0.04 K/UL (ref 0–0.12)
BILIRUB SERPL-MCNC: 0.5 MG/DL (ref 0.1–1.5)
BILIRUB UR QL STRIP.AUTO: NEGATIVE
BUN SERPL-MCNC: 20 MG/DL (ref 8–22)
CALCIUM SERPL-MCNC: 9.8 MG/DL (ref 8.4–10.2)
CHLORIDE SERPL-SCNC: 92 MMOL/L (ref 96–112)
CO2 SERPL-SCNC: 26 MMOL/L (ref 20–33)
COLOR UR: YELLOW
CREAT SERPL-MCNC: 0.83 MG/DL (ref 0.5–1.4)
EKG IMPRESSION: NORMAL
EOSINOPHIL # BLD AUTO: 0.1 K/UL (ref 0–0.51)
EOSINOPHIL NFR BLD: 0.9 % (ref 0–6.9)
ERYTHROCYTE [DISTWIDTH] IN BLOOD BY AUTOMATED COUNT: 45.8 FL (ref 35.9–50)
GLOBULIN SER CALC-MCNC: 3 G/DL (ref 1.9–3.5)
GLUCOSE SERPL-MCNC: 494 MG/DL (ref 65–99)
GLUCOSE UR STRIP.AUTO-MCNC: >=1000 MG/DL
HCT VFR BLD AUTO: 41.9 % (ref 42–52)
HGB BLD-MCNC: 14 G/DL (ref 14–18)
IMM GRANULOCYTES # BLD AUTO: 0.05 K/UL (ref 0–0.11)
IMM GRANULOCYTES NFR BLD AUTO: 0.5 % (ref 0–0.9)
KETONES UR STRIP.AUTO-MCNC: ABNORMAL MG/DL
LACTATE BLD-SCNC: 1.9 MMOL/L (ref 0.5–2)
LACTATE BLD-SCNC: 2.7 MMOL/L (ref 0.5–2)
LEUKOCYTE ESTERASE UR QL STRIP.AUTO: ABNORMAL
LIPASE SERPL-CCNC: 64 U/L (ref 7–58)
LYMPHOCYTES # BLD AUTO: 1.77 K/UL (ref 1–4.8)
LYMPHOCYTES NFR BLD: 16.4 % (ref 22–41)
MCH RBC QN AUTO: 29.7 PG (ref 27–33)
MCHC RBC AUTO-ENTMCNC: 33.4 G/DL (ref 33.7–35.3)
MCV RBC AUTO: 88.8 FL (ref 81.4–97.8)
MICRO URNS: ABNORMAL
MONOCYTES # BLD AUTO: 0.68 K/UL (ref 0–0.85)
MONOCYTES NFR BLD AUTO: 6.3 % (ref 0–13.4)
MUCOUS THREADS #/AREA URNS HPF: ABNORMAL /HPF
NEUTROPHILS # BLD AUTO: 8.18 K/UL (ref 1.82–7.42)
NEUTROPHILS NFR BLD: 75.5 % (ref 44–72)
NITRITE UR QL STRIP.AUTO: NEGATIVE
NRBC # BLD AUTO: 0 K/UL
NRBC BLD-RTO: 0 /100 WBC
PH UR STRIP.AUTO: 5.5 [PH] (ref 5–8)
PLATELET # BLD AUTO: 254 K/UL (ref 164–446)
PMV BLD AUTO: 10.2 FL (ref 9–12.9)
POTASSIUM SERPL-SCNC: 4.2 MMOL/L (ref 3.6–5.5)
PROT SERPL-MCNC: 7.3 G/DL (ref 6–8.2)
PROT UR QL STRIP: NEGATIVE MG/DL
RBC # BLD AUTO: 4.72 M/UL (ref 4.7–6.1)
RBC # URNS HPF: ABNORMAL /HPF
RBC UR QL AUTO: ABNORMAL
SODIUM SERPL-SCNC: 133 MMOL/L (ref 135–145)
SP GR UR STRIP.AUTO: 1.01
WBC # BLD AUTO: 10.8 K/UL (ref 4.8–10.8)
WBC #/AREA URNS HPF: ABNORMAL /HPF

## 2020-03-27 PROCEDURE — 87077 CULTURE AEROBIC IDENTIFY: CPT

## 2020-03-27 PROCEDURE — 74176 CT ABD & PELVIS W/O CONTRAST: CPT

## 2020-03-27 PROCEDURE — 81001 URINALYSIS AUTO W/SCOPE: CPT

## 2020-03-27 PROCEDURE — 83605 ASSAY OF LACTIC ACID: CPT

## 2020-03-27 PROCEDURE — 303105 HCHG CATHETER EXTRA

## 2020-03-27 PROCEDURE — 87086 URINE CULTURE/COLONY COUNT: CPT

## 2020-03-27 PROCEDURE — 665998 HH PPS REVENUE CREDIT

## 2020-03-27 PROCEDURE — 93005 ELECTROCARDIOGRAM TRACING: CPT | Performed by: EMERGENCY MEDICINE

## 2020-03-27 PROCEDURE — 80053 COMPREHEN METABOLIC PANEL: CPT

## 2020-03-27 PROCEDURE — 83690 ASSAY OF LIPASE: CPT

## 2020-03-27 PROCEDURE — 665999 HH PPS REVENUE DEBIT

## 2020-03-27 PROCEDURE — 96375 TX/PRO/DX INJ NEW DRUG ADDON: CPT

## 2020-03-27 PROCEDURE — 700111 HCHG RX REV CODE 636 W/ 250 OVERRIDE (IP): Performed by: EMERGENCY MEDICINE

## 2020-03-27 PROCEDURE — 96365 THER/PROPH/DIAG IV INF INIT: CPT

## 2020-03-27 PROCEDURE — 71045 X-RAY EXAM CHEST 1 VIEW: CPT

## 2020-03-27 PROCEDURE — 700105 HCHG RX REV CODE 258: Performed by: EMERGENCY MEDICINE

## 2020-03-27 PROCEDURE — 85025 COMPLETE CBC W/AUTO DIFF WBC: CPT

## 2020-03-27 PROCEDURE — 51702 INSERT TEMP BLADDER CATH: CPT

## 2020-03-27 PROCEDURE — 87186 SC STD MICRODIL/AGAR DIL: CPT

## 2020-03-27 PROCEDURE — 99285 EMERGENCY DEPT VISIT HI MDM: CPT

## 2020-03-27 RX ORDER — KETAMINE HYDROCHLORIDE 50 MG/ML
INJECTION, SOLUTION INTRAMUSCULAR; INTRAVENOUS
Status: COMPLETED
Start: 2020-03-27 | End: 2020-03-27

## 2020-03-27 RX ORDER — SODIUM CHLORIDE 9 MG/ML
1000 INJECTION, SOLUTION INTRAVENOUS ONCE
Status: COMPLETED | OUTPATIENT
Start: 2020-03-27 | End: 2020-03-27

## 2020-03-27 RX ORDER — CEFDINIR 300 MG/1
300 CAPSULE ORAL 2 TIMES DAILY
Qty: 20 CAP | Refills: 0 | Status: SHIPPED | OUTPATIENT
Start: 2020-03-27 | End: 2020-03-30

## 2020-03-27 RX ORDER — MORPHINE SULFATE 4 MG/ML
2 INJECTION, SOLUTION INTRAMUSCULAR; INTRAVENOUS ONCE
Status: DISCONTINUED | OUTPATIENT
Start: 2020-03-27 | End: 2020-03-27 | Stop reason: HOSPADM

## 2020-03-27 RX ADMIN — CEFTRIAXONE SODIUM 2 G: 2 INJECTION, POWDER, FOR SOLUTION INTRAMUSCULAR; INTRAVENOUS at 02:30

## 2020-03-27 RX ADMIN — SODIUM CHLORIDE 1000 ML: 900 INJECTION, SOLUTION INTRAVENOUS at 01:30

## 2020-03-27 ASSESSMENT — FIBROSIS 4 INDEX: FIB4 SCORE: 1.71

## 2020-03-27 NOTE — ED PROVIDER NOTES
ED Provider Note    CHIEF COMPLAINT  Chief Complaint   Patient presents with   • Abdominal Pain     pt with frequent hx of uti and urine cath from home c/o low abd pain and cloudy urine. from home via ems       HPI    Primary care provider: JONO Powell  Means of arrival: EMS  History obtained from: Patient and paramedics  History limited by: Nothing    Julien Dao is a 89 y.o. male who presents with lower abdominal pain.  Also concerned with cloudy urine in his Sher catheter drainage bag.  Symptoms began at approximately noon today have been constant and worsening.  He has had many prior episodes and many visits to this facility with complications from urinary retention and occasional urine infections.  Denies any alleviating measures attempted today.  No aggravating factors noted.  Past medical history of hypertension and coronary artery disease and diabetes and frequent UTIs and enlarged prostate.  He denies any associated chest pain or fevers or chills or nausea or vomiting or any other associated symptoms.  Pain is isolated to his suprapubic area, no radiation.  Dull and achy.  Mild to moderate in severity.  No contact with anyone with upper respiratory infections or known coronavirus.    PPE Note: I personally donned full PPE for all patient encounters during this visit, including being clean-shaven with an N95 respirator mask, goggles, cap, gloves, and gown.      REVIEW OF SYSTEMS  Constitutional: Negative for fever or chills.   HENT: Negative for rhinorrhea or sore throat.    Respiratory: Negative for cough or shortness of breath.    Cardiovascular: Negative for chest pain or syncope.   Gastrointestinal: Negative for nausea or vomiting, positive for suprapubic/lower mid-abdominal pain.   Genitourinary: Positive for cloudy urine, negative for gross hematuria.  Musculoskeletal: Negative for back pain or joint pain.   Skin: Negative for itching or rash.   Neurological: Negative for sensory or  "motor changes.   See HPI for further details. All other systems are negative.     PAST MEDICAL HISTORY   has a past medical history of CAD (coronary artery disease), Delirium (2020), DM (diabetes mellitus) (HCC) (2014), Falls, Hypertension, and UTI (urinary tract infection) (2018).    PAST FAMILY HISTORY  No family history on file.    SOCIAL HISTORY  Social History     Tobacco Use   • Smoking status: Former Smoker     Last attempt to quit: 10/19/1972     Years since quittin.4   • Smokeless tobacco: Never Used   Substance and Sexual Activity   • Alcohol use: Not Currently   • Drug use: No   • Sexual activity: Not on file       SURGICAL HISTORY   has a past surgical history that includes other cardiac surgery; coronary artery bypass, 3; and janice rectal abscess.    CURRENT MEDICATIONS  Amlodipine, finasteride, gabapentin, metoprolol, insulin, simvastatin    ALLERGIES  Allergies   Allergen Reactions   • Iodine      Pt and pts wife report that it has been so long not sure what happens       PHYSICAL EXAM  VITAL SIGNS: /61   Pulse 82   Temp (!) 35.6 °C (96.1 °F) (Temporal)   Resp 18   Ht 1.702 m (5' 7\")   Wt 78 kg (171 lb 15.3 oz)   SpO2 95%   BMI 26.93 kg/m²    Pulse ox interpretation: On room air, I interpret this pulse ox as normal.  Constitutional: Well appearing for age, sitting up in mild distress.  HEENT: Normocephalic, atraumatic. Posterior pharynx clear, mucous membranes dry.  Eyes:  EOMI. Normal sclerae.  Slightly pale conjunctivae   Neck: Supple, nontender.  Chest/Pulmonary: Slightly diminished to ausculation bilaterally, no wheezes or rhonchi.  Cardiovascular: Regular rate and rhythm, no obvious murmur.   Abdomen: Soft, no upper abdominal tenderness, there is some fullness over the bladder and suprapubic tenderness without rebound or guarding.  : Normal external genitalia Sher catheter in place, no testicular tenderness or swelling, very large smooth prostate on rectal " examination.  Back: No CVA or midline tenderness.   Musculoskeletal: No deformity or edema.  Neuro: Alert, no focal weakness or asymmetry.  Psych: Normal mood and affect.  Skin: No rashes, warm and dry.      DIAGNOSTIC STUDIES / PROCEDURES    LABS & EKG  Results for orders placed or performed during the hospital encounter of 03/27/20   CBC WITH DIFFERENTIAL   Result Value Ref Range    WBC 10.8 4.8 - 10.8 K/uL    RBC 4.72 4.70 - 6.10 M/uL    Hemoglobin 14.0 14.0 - 18.0 g/dL    Hematocrit 41.9 (L) 42.0 - 52.0 %    MCV 88.8 81.4 - 97.8 fL    MCH 29.7 27.0 - 33.0 pg    MCHC 33.4 (L) 33.7 - 35.3 g/dL    RDW 45.8 35.9 - 50.0 fL    Platelet Count 254 164 - 446 K/uL    MPV 10.2 9.0 - 12.9 fL    Neutrophils-Polys 75.50 (H) 44.00 - 72.00 %    Lymphocytes 16.40 (L) 22.00 - 41.00 %    Monocytes 6.30 0.00 - 13.40 %    Eosinophils 0.90 0.00 - 6.90 %    Basophils 0.40 0.00 - 1.80 %    Immature Granulocytes 0.50 0.00 - 0.90 %    Nucleated RBC 0.00 /100 WBC    Neutrophils (Absolute) 8.18 (H) 1.82 - 7.42 K/uL    Lymphs (Absolute) 1.77 1.00 - 4.80 K/uL    Monos (Absolute) 0.68 0.00 - 0.85 K/uL    Eos (Absolute) 0.10 0.00 - 0.51 K/uL    Baso (Absolute) 0.04 0.00 - 0.12 K/uL    Immature Granulocytes (abs) 0.05 0.00 - 0.11 K/uL    NRBC (Absolute) 0.00 K/uL   COMP METABOLIC PANEL   Result Value Ref Range    Sodium 133 (L) 135 - 145 mmol/L    Potassium 4.2 3.6 - 5.5 mmol/L    Chloride 92 (L) 96 - 112 mmol/L    Co2 26 20 - 33 mmol/L    Anion Gap 15.0 7.0 - 16.0    Glucose 494 (H) 65 - 99 mg/dL    Bun 20 8 - 22 mg/dL    Creatinine 0.83 0.50 - 1.40 mg/dL    Calcium 9.8 8.4 - 10.2 mg/dL    AST(SGOT) 18 12 - 45 U/L    ALT(SGPT) 23 2 - 50 U/L    Alkaline Phosphatase 64 30 - 99 U/L    Total Bilirubin 0.5 0.1 - 1.5 mg/dL    Albumin 4.3 3.2 - 4.9 g/dL    Total Protein 7.3 6.0 - 8.2 g/dL    Globulin 3.0 1.9 - 3.5 g/dL    A-G Ratio 1.4 g/dL   LIPASE   Result Value Ref Range    Lipase 64 (H) 7 - 58 U/L   URINALYSIS CULTURE, IF INDICATED   Result Value  Ref Range    Color Yellow     Character Hazy (A)     Specific Gravity 1.010 <1.035    Ph 5.5 5.0 - 8.0    Glucose >=1000 (A) Negative mg/dL    Ketones Trace (A) Negative mg/dL    Protein Negative Negative mg/dL    Bilirubin Negative Negative    Nitrite Negative Negative    Leukocyte Esterase Small (A) Negative    Occult Blood Large (A) Negative    Micro Urine Req Microscopic    LACTIC ACID   Result Value Ref Range    Lactic Acid 2.7 (H) 0.5 - 2.0 mmol/L   ESTIMATED GFR   Result Value Ref Range    GFR If African American >60 >60 mL/min/1.73 m 2    GFR If Non African American >60 >60 mL/min/1.73 m 2   URINE MICROSCOPIC (W/UA)   Result Value Ref Range    WBC 20-50 (A) /hpf    RBC  (A) /hpf    Bacteria Few (A) None /hpf    Mucous Threads Few /hpf   LACTIC ACID   Result Value Ref Range    Lactic Acid 1.9 0.5 - 2.0 mmol/L   EKG (NOW)   Result Value Ref Range    Report       [facility].    Test Date:  2020  Pt Name:    SAMMY CLIFFORD                 Department: [facility]  MRN:        [id number]                      [facility]       [facility]   Gender:     Male                         Technician:   :        [id number]                   Requested By:TITO ROWE  Order #:    [id number]                    Reading MD: Tiot Rowe MD    Measurements  Intervals                                Axis  Rate:       79                           P:  CA:                                      QRS:        -39  QRSD:       173                          T:          -29  QT:         476  QTc:        546    Interpretive Statements  Sinus rhythm with first-degree AV block  Right bundle branch block  Baseline wander in lead(s) V5  Compared to ECG 2020 02:56:52  Stable abnormal EKG  Electronically Signed On 3- 2:36:00 PDT by Tito Rowe MD         RADIOLOGY  DX-CHEST-PORTABLE (1 VIEW)    (Results Pending)   CT-RENAL COLIC EVALUATION(A/P W/O)    (Results Pending)       COURSE &  MEDICAL DECISION MAKING    This is a 89 y.o. male who presents with suprapubic pain and cloudy urine in Sher drainage bag.    Differential Diagnosis includes but is not limited to:  Sher catheter malfunction, hematuria, UTI, sepsis, kidney disease, dehydration    ED Course:  Given the patient's age, he has abdominal pain.  He merits broad work-up including labs and imaging.  Reports Sher last changed approximately 2 weeks ago given cloudy urine plan Sher catheter change up.  He will be screened with labs and a CT of his abdomen and pelvis without contrast.  I will keep him n.p.o. until a surgical process is ruled out, he does look dehydrated so I will give him a crystalloid fluid bolus and parenteral morphine for pain relief.    Thankfully work-up today is reassuring, directly catheter change over.  Labs are reassuring he has a urinalysis consistent with infection plan to treat.  White count thankfully slightly low as a mild hyponatremia probably because of his hyperglycemia there is no anion gap acidosis.  CBC nonacute, he had mild acidosis but after 1 L crystalloid bolus normal and repeat also feel he has had a very response to parenteral nutrition.  On recheck vital signs stable abdomen soft no pain.  Plan start with Omnicef, follow-up with urologist, return for any new or worsening symptoms.    Medications   NS infusion 1,000 mL (has no administration in time range)   morphine (pf) 4 MG/ML injection 2 mg (has no administration in time range)       FINAL IMPRESSION  1. Acute abdominal pain    2. Complicated UTI (urinary tract infection)    3. Benign prostatic hyperplasia with urinary retention    4. Essential hypertension    5. Dehydration    6. Hyperglycemia    7. History of diabetes mellitus        PRESCRIPTIONS  New Prescriptions    CEFDINIR (OMNICEF) 300 MG CAP    Take 1 Cap by mouth 2 times a day.       FOLLOW UP  TABITHA Powell.  [address] [facility] [address]  [phone]    Schedule an  appointment as soon as possible for a visit in 1 day      [facility], Emergency Dept  [address]  [place] [address] [phone]  Today  If you have ANY new or worse symptoms!    Angel Guidry M.D.  [address]  [place] [id number] [phone]    Schedule an appointment as soon as possible for a visit in 3 days  for recheck with urologist        -DISCHARGE-       Results, exam findings, clinical impression, presumed diagnosis, treatment options, and strict return precautions were discussed with the patient, and they verbalized understanding, agreed with, and appreciated the plan of care.    Pertinent Labs & Imaging studies reviewed and verified by myself, as well as nursing notes and the patient's past medical, family, and social histories (See chart for details).    The patient is referred to a primary physician for blood pressure management, diabetic screening, and for all other preventative health concerns.     Portions of this record were made with voice recognition software.  Despite my review, spelling/grammar/context errors may still remain.  Interpretation of this chart should be taken in this context.    Electronically signed by Tito Leyva M.D. on 3/27/2020 at 7:34 AM.

## 2020-03-27 NOTE — ED NOTES
Patient educated on discharge instructions, verbalized understanding. IV removed, bleeding controlled. Pt wheeled out to car by RN, junaids w patient.

## 2020-03-27 NOTE — ED TRIAGE NOTES
Chief Complaint   Patient presents with   • Abdominal Pain     pt with frequent hx of uti and urine cath from home c/o low abd pain and cloudy urine. from home via ems

## 2020-03-27 NOTE — DISCHARGE INSTRUCTIONS
You were seen and evaluated in the Emergency Department at [facility] for:     Abdominal pain and urine changes.     You had the following tests and studies:    Thankfully your tests today show an early urine infection, but nothing else dangerous! Your blood sugar was elevated. Please check this regularly and watch your diet.     You received the following medications:    IV fluids, antibiotics.     You received the following prescriptions:    Omnicef/cefdinir, an antibiotic to take twice/day for the next ten days.   ----------------------------    Please make sure to follow up with:    Your primary care provider for recheck and routine care, but please come right back to the ER for any new or worse symptoms.    Good luck, we hope you get better soon!  ----------------------------    We always encourage patients to return IMMEDIATELY if they have:  Increased or changing pain, passing out, fevers over 100.4 (taken in your mouth or rectally) for more than 2 days, redness or swelling of skin or tissues, feeling like your heart is beating fast, chest pain that is new or worsening, trouble breathing, feeling like your throat is closing up and can not breath, inability to walk, weakness of any part of your body, new dizziness, severe bleeding that won't stop from any part of your body, if you can't eat or drink, or if you have any other concerns.   If you feel worse, please know that you can always return with any questions, concerns, worse symptoms, or you are feeling unsafe. We certainly cannot say for sure that we have ruled out every illness or dangerous disease, but we feel that at this specific time, your exam, tests, and vital signs like heart rate and blood pressure are safe for discharge.

## 2020-03-28 ENCOUNTER — HOME CARE VISIT (OUTPATIENT)
Dept: HOME HEALTH SERVICES | Facility: HOME HEALTHCARE | Age: 85
End: 2020-03-28
Payer: MEDICARE

## 2020-03-28 VITALS
SYSTOLIC BLOOD PRESSURE: 124 MMHG | HEART RATE: 89 BPM | OXYGEN SATURATION: 96 % | TEMPERATURE: 96.9 F | DIASTOLIC BLOOD PRESSURE: 60 MMHG | RESPIRATION RATE: 16 BRPM | BODY MASS INDEX: 26.63 KG/M2 | WEIGHT: 170 LBS

## 2020-03-28 PROCEDURE — A4354 CATH INSERTION TRAY W/BAG: HCPCS

## 2020-03-28 PROCEDURE — 665998 HH PPS REVENUE CREDIT

## 2020-03-28 PROCEDURE — 665999 HH PPS REVENUE DEBIT

## 2020-03-28 ASSESSMENT — FIBROSIS 4 INDEX: FIB4 SCORE: 1.34

## 2020-03-28 ASSESSMENT — ENCOUNTER SYMPTOMS: POOR JUDGMENT: 1

## 2020-03-28 NOTE — PROGRESS NOTES
Patient called this on call SN complaining of pain in his penis where his wilson catheter was. Patient is not on service at this time as he is waitng for a resume/recert after being admitted to the hospital as an in patient. Bethany nurse superviser called and I was advised to call the patient pcp for verbal orders which i did with no reponse.  Patient referred to the ER if he was in pain and could not find relief.

## 2020-03-29 PROCEDURE — 665998 HH PPS REVENUE CREDIT

## 2020-03-29 PROCEDURE — 665999 HH PPS REVENUE DEBIT

## 2020-03-29 ASSESSMENT — PATIENT HEALTH QUESTIONNAIRE - PHQ9
1. LITTLE INTEREST OR PLEASURE IN DOING THINGS: 00
CLINICAL INTERPRETATION OF PHQ2 SCORE: 0
2. FEELING DOWN, DEPRESSED, IRRITABLE, OR HOPELESS: 00

## 2020-03-30 ENCOUNTER — HOME CARE VISIT (OUTPATIENT)
Dept: HOME HEALTH SERVICES | Facility: HOME HEALTHCARE | Age: 85
End: 2020-03-30
Payer: MEDICARE

## 2020-03-30 PROCEDURE — G0156 HHCP-SVS OF AIDE,EA 15 MIN: HCPCS

## 2020-03-30 PROCEDURE — 665998 HH PPS REVENUE CREDIT

## 2020-03-30 PROCEDURE — 665999 HH PPS REVENUE DEBIT

## 2020-03-30 RX ORDER — AMOXICILLIN 875 MG/1
875 TABLET, COATED ORAL 2 TIMES DAILY
Qty: 14 TAB | Refills: 0 | Status: SHIPPED | OUTPATIENT
Start: 2020-03-30 | End: 2020-04-06

## 2020-03-30 NOTE — ED NOTES
"ED Positive Culture Follow-up/Notification Note:    Date: 3/30/2020     Patient seen in the ED on 3/27/2020 for abdominal pain. No CVAT on exam. Last Sher changed 2 weeks prior. Replaced in the ED.   1. Acute abdominal pain    2. Complicated UTI (urinary tract infection)    3. Benign prostatic hyperplasia with urinary retention    4. Essential hypertension    5. Dehydration    6. Hyperglycemia    7. History of diabetes mellitus       Discharge Medication List as of 3/27/2020  6:44 AM      START taking these medications    Details   cefdinir (OMNICEF) 300 MG Cap Take 1 Cap by mouth 2 times a day., Disp-20 Cap, R-0, Print Rx Paper         Ceftriaxone administered in the ED.    Allergies: Iodine     Vitals:    03/27/20 0052 03/27/20 0056 03/27/20 0714   BP:  110/61 115/64   Pulse:  82 80   Resp:  18 16   Temp:  (Abnormal) 35.6 °C (96.1 °F) 36 °C (96.8 °F)   TempSrc:  Temporal Tympanic   SpO2:  95% 95%   Weight: 78 kg (171 lb 15.3 oz)     Height: 1.702 m (5' 7\")         Final cultures:   Results     Procedure Component Value Units Date/Time    URINE CULTURE(NEW) [[id number]]  (Abnormal)  (Susceptibility) Collected:  03/27/20 0115    Order Status:  Completed Specimen:  Urine Updated:  03/29/20 1507     Significant Indicator POS     Source UR     Site -     Culture Result -      Enterococcus faecalis  >100,000 cfu/mL      Susceptibility     Enterococcus faecalis (1)     Antibiotic Interpretation Microscan Method Status    Daptomycin Sensitive <=1 mcg/mL BANDAR Final    Nitrofurantoin Sensitive <=32 mcg/mL BANDAR Final    Ampicillin Sensitive <=2 mcg/mL BANDAR Final    Penicillin Sensitive 2 mcg/mL BANDAR Final    Vancomycin Sensitive 1 mcg/mL BANDAR Final    Tetracycline Resistant >8 mcg/mL BANDAR Final                   URINALYSIS CULTURE, IF INDICATED [[id number]]  (Abnormal) Collected:  03/27/20 0115    Order Status:  Completed Specimen:  Blood Updated:  03/27/20 0153     Color Yellow     Character Hazy     Specific [place] 1.010     Ph " 5.5     Glucose >=1000 mg/dL      Ketones Trace mg/dL      Protein Negative mg/dL      Bilirubin Negative     Nitrite Negative     Leukocyte Esterase Small     Occult Blood Large     Micro Urine Req Microscopic          Plan:   Isolated Enterococcus resistant to prescribed cefdinir. Called patient to inform of result, instruct discontinuation of cefdinir, and discuss that this is the second time patient has cultured the organism in 2 weeks. In the presence of a urinary catheter, this could be chronic colonization. However, patient would like to receive treatment this time. Initiated new Rx to [facility] and faxed result to urologist:    Amoxicillin 875 mg tab, 1 tab PO BID x 7 days, #14, no refills. Per protocol - Dr. Young.    Monica Rascon, PharmD

## 2020-03-31 ENCOUNTER — HOME CARE VISIT (OUTPATIENT)
Dept: HOME HEALTH SERVICES | Facility: HOME HEALTHCARE | Age: 85
End: 2020-03-31
Payer: MEDICARE

## 2020-03-31 VITALS
SYSTOLIC BLOOD PRESSURE: 116 MMHG | RESPIRATION RATE: 18 BRPM | TEMPERATURE: 97.8 F | DIASTOLIC BLOOD PRESSURE: 62 MMHG | HEART RATE: 69 BPM | OXYGEN SATURATION: 98 %

## 2020-03-31 PROCEDURE — 665999 HH PPS REVENUE DEBIT

## 2020-03-31 PROCEDURE — G0299 HHS/HOSPICE OF RN EA 15 MIN: HCPCS

## 2020-03-31 PROCEDURE — 665998 HH PPS REVENUE CREDIT

## 2020-03-31 ASSESSMENT — ENCOUNTER SYMPTOMS
MUSCLE WEAKNESS: 1
VOMITING: DENIES
NAUSEA: DENIES

## 2020-03-31 NOTE — PROGRESS NOTES
Received phone call from JACQUELYN COELLO  today stating she saw pt in the office today and feels she is not getting accurate information from him regarding tracking his blood sugars. She asked if SN could please somehow get accurate account of pts fasting blood sugar values including checking blood sugar during visits, please do this for a week and then call her back next week with results. She states he is saying his sugars are 100-200, wh en she knows he has ran >400 in recent past. She wants accurate info in order to adjust his meds. States pt is supposed to be taking Metformin which is not on his med list. She also stated she has a call in to Cardiology as pt has been holding his Xarelto due to hematuria, but she is concerned for him being off of that, wants to weigh the risks vs benefits of him resuming the med.

## 2020-04-01 ENCOUNTER — HOME CARE VISIT (OUTPATIENT)
Dept: HOME HEALTH SERVICES | Facility: HOME HEALTHCARE | Age: 85
End: 2020-04-01
Payer: MEDICARE

## 2020-04-01 VITALS
TEMPERATURE: 98.1 F | DIASTOLIC BLOOD PRESSURE: 62 MMHG | OXYGEN SATURATION: 98 % | SYSTOLIC BLOOD PRESSURE: 116 MMHG | HEART RATE: 73 BPM | RESPIRATION RATE: 1 BRPM

## 2020-04-01 PROCEDURE — 665999 HH PPS REVENUE DEBIT

## 2020-04-01 PROCEDURE — 665998 HH PPS REVENUE CREDIT

## 2020-04-01 NOTE — PROGRESS NOTES
ACTION NEEDED  Please order antifungal powder for him. He has bilat redness both groins & is out of nystatin powder. Has been on antiobiotics very frequently over the last 2 months

## 2020-04-02 ENCOUNTER — HOME CARE VISIT (OUTPATIENT)
Dept: HOME HEALTH SERVICES | Facility: HOME HEALTHCARE | Age: 85
End: 2020-04-02
Payer: MEDICARE

## 2020-04-02 PROCEDURE — 665999 HH PPS REVENUE DEBIT

## 2020-04-02 PROCEDURE — G0493 RN CARE EA 15 MIN HH/HOSPICE: HCPCS

## 2020-04-02 PROCEDURE — 665998 HH PPS REVENUE CREDIT

## 2020-04-03 ENCOUNTER — HOME CARE VISIT (OUTPATIENT)
Dept: HOME HEALTH SERVICES | Facility: HOME HEALTHCARE | Age: 85
End: 2020-04-03
Payer: MEDICARE

## 2020-04-03 VITALS
OXYGEN SATURATION: 91 % | RESPIRATION RATE: 18 BRPM | SYSTOLIC BLOOD PRESSURE: 100 MMHG | HEART RATE: 78 BPM | DIASTOLIC BLOOD PRESSURE: 50 MMHG | TEMPERATURE: 97.3 F

## 2020-04-03 PROCEDURE — 665999 HH PPS REVENUE DEBIT

## 2020-04-03 PROCEDURE — 665998 HH PPS REVENUE CREDIT

## 2020-04-03 NOTE — PROGRESS NOTES
MODE Hernandez  Upon chart review, it appears metformin was not reordered to be resumed during hospitalization 3/13-3/15 even though it was on home med list. Hgb A1c was 11.1 on 3/15. FSBS at 12 noon was 399 just after he ate his usual fast food lunch.

## 2020-04-04 PROCEDURE — 665999 HH PPS REVENUE DEBIT

## 2020-04-04 PROCEDURE — 665998 HH PPS REVENUE CREDIT

## 2020-04-05 PROCEDURE — 665999 HH PPS REVENUE DEBIT

## 2020-04-05 PROCEDURE — 665998 HH PPS REVENUE CREDIT

## 2020-04-06 PROCEDURE — 665998 HH PPS REVENUE CREDIT

## 2020-04-06 PROCEDURE — 665999 HH PPS REVENUE DEBIT

## 2020-04-07 ENCOUNTER — HOME CARE VISIT (OUTPATIENT)
Dept: HOME HEALTH SERVICES | Facility: HOME HEALTHCARE | Age: 85
End: 2020-04-07
Payer: MEDICARE

## 2020-04-07 VITALS
TEMPERATURE: 97.1 F | DIASTOLIC BLOOD PRESSURE: 60 MMHG | SYSTOLIC BLOOD PRESSURE: 104 MMHG | HEART RATE: 62 BPM | RESPIRATION RATE: 18 BRPM | OXYGEN SATURATION: 98 %

## 2020-04-07 PROCEDURE — G0493 RN CARE EA 15 MIN HH/HOSPICE: HCPCS

## 2020-04-07 PROCEDURE — G0156 HHCP-SVS OF AIDE,EA 15 MIN: HCPCS

## 2020-04-07 PROCEDURE — 665999 HH PPS REVENUE DEBIT

## 2020-04-07 PROCEDURE — 665998 HH PPS REVENUE CREDIT

## 2020-04-08 PROCEDURE — 665999 HH PPS REVENUE DEBIT

## 2020-04-08 PROCEDURE — 665998 HH PPS REVENUE CREDIT

## 2020-04-09 ENCOUNTER — HOME CARE VISIT (OUTPATIENT)
Dept: HOME HEALTH SERVICES | Facility: HOME HEALTHCARE | Age: 85
End: 2020-04-09
Payer: MEDICARE

## 2020-04-09 VITALS
TEMPERATURE: 98.2 F | OXYGEN SATURATION: 98 % | SYSTOLIC BLOOD PRESSURE: 122 MMHG | HEART RATE: 72 BPM | DIASTOLIC BLOOD PRESSURE: 62 MMHG | RESPIRATION RATE: 18 BRPM

## 2020-04-09 PROCEDURE — 665999 HH PPS REVENUE DEBIT

## 2020-04-09 PROCEDURE — 665998 HH PPS REVENUE CREDIT

## 2020-04-09 PROCEDURE — G0493 RN CARE EA 15 MIN HH/HOSPICE: HCPCS

## 2020-04-09 PROCEDURE — G0156 HHCP-SVS OF AIDE,EA 15 MIN: HCPCS

## 2020-04-09 ASSESSMENT — ACTIVITIES OF DAILY LIVING (ADL)
OASIS_M1830: 00
HOME_HEALTH_OASIS: 00
OASIS_M1830: 05

## 2020-04-09 ASSESSMENT — ENCOUNTER SYMPTOMS: MUSCLE WEAKNESS: 1

## 2020-04-09 ASSESSMENT — PATIENT HEALTH QUESTIONNAIRE - PHQ9: CLINICAL INTERPRETATION OF PHQ2 SCORE: 0

## 2020-04-10 VITALS
OXYGEN SATURATION: 98 % | TEMPERATURE: 98.2 F | HEART RATE: 72 BPM | RESPIRATION RATE: 18 BRPM | DIASTOLIC BLOOD PRESSURE: 62 MMHG | SYSTOLIC BLOOD PRESSURE: 122 MMHG

## 2020-04-10 VITALS
OXYGEN SATURATION: 98 % | SYSTOLIC BLOOD PRESSURE: 104 MMHG | HEART RATE: 63 BPM | DIASTOLIC BLOOD PRESSURE: 60 MMHG | TEMPERATURE: 97.1 F | RESPIRATION RATE: 18 BRPM

## 2020-04-10 PROCEDURE — 665998 HH PPS REVENUE CREDIT

## 2020-04-10 PROCEDURE — 665999 HH PPS REVENUE DEBIT

## 2020-04-11 PROCEDURE — 665999 HH PPS REVENUE DEBIT

## 2020-04-11 PROCEDURE — 665998 HH PPS REVENUE CREDIT

## 2020-04-12 PROCEDURE — 665999 HH PPS REVENUE DEBIT

## 2020-04-12 PROCEDURE — 665998 HH PPS REVENUE CREDIT

## 2020-07-30 ENCOUNTER — HOSPITAL ENCOUNTER (OUTPATIENT)
Dept: LAB | Facility: MEDICAL CENTER | Age: 85
End: 2020-07-30
Attending: UROLOGY
Payer: MEDICARE

## 2020-07-31 ENCOUNTER — HOSPITAL ENCOUNTER (OUTPATIENT)
Dept: LAB | Facility: MEDICAL CENTER | Age: 85
End: 2020-07-31
Attending: UROLOGY
Payer: MEDICARE

## 2020-07-31 PROCEDURE — 87086 URINE CULTURE/COLONY COUNT: CPT

## 2020-08-02 LAB
BACTERIA UR CULT: NORMAL
SIGNIFICANT IND 70042: NORMAL
SITE SITE: NORMAL
SOURCE SOURCE: NORMAL

## 2021-01-14 DIAGNOSIS — Z23 NEED FOR VACCINATION: ICD-10-CM

## 2021-03-09 ENCOUNTER — HOSPITAL ENCOUNTER (OUTPATIENT)
Dept: LAB | Facility: MEDICAL CENTER | Age: 86
End: 2021-03-09
Attending: UROLOGY
Payer: MEDICARE

## 2021-03-09 LAB
ANION GAP SERPL CALC-SCNC: 12 MMOL/L (ref 7–16)
BUN SERPL-MCNC: 26 MG/DL (ref 8–22)
CALCIUM SERPL-MCNC: 9.3 MG/DL (ref 8.4–10.2)
CHLORIDE SERPL-SCNC: 92 MMOL/L (ref 96–112)
CO2 SERPL-SCNC: 29 MMOL/L (ref 20–33)
CREAT SERPL-MCNC: 0.94 MG/DL (ref 0.5–1.4)
GLUCOSE SERPL-MCNC: 502 MG/DL (ref 65–99)
POTASSIUM SERPL-SCNC: 3.3 MMOL/L (ref 3.6–5.5)
SODIUM SERPL-SCNC: 133 MMOL/L (ref 135–145)

## 2021-03-09 PROCEDURE — 80048 BASIC METABOLIC PNL TOTAL CA: CPT

## 2021-03-09 PROCEDURE — 36415 COLL VENOUS BLD VENIPUNCTURE: CPT

## 2021-03-10 ENCOUNTER — HOSPITAL ENCOUNTER (OUTPATIENT)
Facility: MEDICAL CENTER | Age: 86
End: 2021-03-12
Attending: EMERGENCY MEDICINE | Admitting: INTERNAL MEDICINE
Payer: MEDICARE

## 2021-03-10 DIAGNOSIS — R19.09 GROIN SWELLING: ICD-10-CM

## 2021-03-10 DIAGNOSIS — R73.9 HYPERGLYCEMIA: ICD-10-CM

## 2021-03-10 DIAGNOSIS — E11.29 TYPE 2 DIABETES MELLITUS WITH OTHER DIABETIC KIDNEY COMPLICATION, WITH LONG-TERM CURRENT USE OF INSULIN (HCC): ICD-10-CM

## 2021-03-10 DIAGNOSIS — Z79.4 TYPE 2 DIABETES MELLITUS WITH OTHER DIABETIC KIDNEY COMPLICATION, WITH LONG-TERM CURRENT USE OF INSULIN (HCC): ICD-10-CM

## 2021-03-10 LAB
ALBUMIN SERPL BCP-MCNC: 4.2 G/DL (ref 3.2–4.9)
ALBUMIN/GLOB SERPL: 1.4 G/DL
ALP SERPL-CCNC: 60 U/L (ref 30–99)
ALT SERPL-CCNC: 15 U/L (ref 2–50)
ANION GAP SERPL CALC-SCNC: 16 MMOL/L (ref 7–16)
APPEARANCE UR: CLEAR
AST SERPL-CCNC: 18 U/L (ref 12–45)
BASOPHILS # BLD AUTO: 1.2 % (ref 0–1.8)
BASOPHILS # BLD: 0.07 K/UL (ref 0–0.12)
BILIRUB SERPL-MCNC: 0.7 MG/DL (ref 0.1–1.5)
BILIRUB UR QL STRIP.AUTO: NEGATIVE
BUN SERPL-MCNC: 26 MG/DL (ref 8–22)
CALCIUM SERPL-MCNC: 9.4 MG/DL (ref 8.4–10.2)
CHLORIDE SERPL-SCNC: 93 MMOL/L (ref 96–112)
CO2 SERPL-SCNC: 26 MMOL/L (ref 20–33)
COLOR UR: YELLOW
CREAT SERPL-MCNC: 0.99 MG/DL (ref 0.5–1.4)
EOSINOPHIL # BLD AUTO: 0.05 K/UL (ref 0–0.51)
EOSINOPHIL NFR BLD: 0.9 % (ref 0–6.9)
ERYTHROCYTE [DISTWIDTH] IN BLOOD BY AUTOMATED COUNT: 41.2 FL (ref 35.9–50)
FLUAV RNA SPEC QL NAA+PROBE: NEGATIVE
FLUBV RNA SPEC QL NAA+PROBE: NEGATIVE
GLOBULIN SER CALC-MCNC: 3 G/DL (ref 1.9–3.5)
GLUCOSE BLD-MCNC: 483 MG/DL (ref 65–99)
GLUCOSE SERPL-MCNC: 524 MG/DL (ref 65–99)
GLUCOSE UR STRIP.AUTO-MCNC: >=1000 MG/DL
HCT VFR BLD AUTO: 45 % (ref 42–52)
HGB BLD-MCNC: 15.6 G/DL (ref 14–18)
IMM GRANULOCYTES # BLD AUTO: 0.02 K/UL (ref 0–0.11)
IMM GRANULOCYTES NFR BLD AUTO: 0.3 % (ref 0–0.9)
KETONES UR STRIP.AUTO-MCNC: 15 MG/DL
LEUKOCYTE ESTERASE UR QL STRIP.AUTO: NEGATIVE
LYMPHOCYTES # BLD AUTO: 1.29 K/UL (ref 1–4.8)
LYMPHOCYTES NFR BLD: 22 % (ref 22–41)
MCH RBC QN AUTO: 30.8 PG (ref 27–33)
MCHC RBC AUTO-ENTMCNC: 34.7 G/DL (ref 33.7–35.3)
MCV RBC AUTO: 88.9 FL (ref 81.4–97.8)
MICRO URNS: ABNORMAL
MONOCYTES # BLD AUTO: 0.58 K/UL (ref 0–0.85)
MONOCYTES NFR BLD AUTO: 9.9 % (ref 0–13.4)
NEUTROPHILS # BLD AUTO: 3.86 K/UL (ref 1.82–7.42)
NEUTROPHILS NFR BLD: 65.7 % (ref 44–72)
NITRITE UR QL STRIP.AUTO: NEGATIVE
NRBC # BLD AUTO: 0 K/UL
NRBC BLD-RTO: 0 /100 WBC
PH UR STRIP.AUTO: 6 [PH] (ref 5–8)
PLATELET # BLD AUTO: 193 K/UL (ref 164–446)
PMV BLD AUTO: 11.2 FL (ref 9–12.9)
POTASSIUM SERPL-SCNC: 3.2 MMOL/L (ref 3.6–5.5)
PROT SERPL-MCNC: 7.2 G/DL (ref 6–8.2)
PROT UR QL STRIP: NEGATIVE MG/DL
RBC # BLD AUTO: 5.06 M/UL (ref 4.7–6.1)
RBC UR QL AUTO: NEGATIVE
RSV RNA SPEC QL NAA+PROBE: NEGATIVE
SARS-COV-2 RNA RESP QL NAA+PROBE: NOTDETECTED
SODIUM SERPL-SCNC: 135 MMOL/L (ref 135–145)
SP GR UR STRIP.AUTO: 1.01
SPECIMEN SOURCE: NORMAL
WBC # BLD AUTO: 5.9 K/UL (ref 4.8–10.8)

## 2021-03-10 PROCEDURE — 0241U HCHG SARS-COV-2 COVID-19 NFCT DS RESP RNA 4 TRGT MIC: CPT

## 2021-03-10 PROCEDURE — C9803 HOPD COVID-19 SPEC COLLECT: HCPCS | Performed by: EMERGENCY MEDICINE

## 2021-03-10 PROCEDURE — 85025 COMPLETE CBC W/AUTO DIFF WBC: CPT

## 2021-03-10 PROCEDURE — 99218 PR INITIAL OBSERVATION CARE,LEVL I: CPT | Performed by: INTERNAL MEDICINE

## 2021-03-10 PROCEDURE — 99285 EMERGENCY DEPT VISIT HI MDM: CPT

## 2021-03-10 PROCEDURE — 81003 URINALYSIS AUTO W/O SCOPE: CPT

## 2021-03-10 PROCEDURE — G0378 HOSPITAL OBSERVATION PER HR: HCPCS

## 2021-03-10 PROCEDURE — 96372 THER/PROPH/DIAG INJ SC/IM: CPT

## 2021-03-10 PROCEDURE — 82962 GLUCOSE BLOOD TEST: CPT | Mod: 91

## 2021-03-10 PROCEDURE — 700102 HCHG RX REV CODE 250 W/ 637 OVERRIDE(OP): Performed by: INTERNAL MEDICINE

## 2021-03-10 PROCEDURE — A9270 NON-COVERED ITEM OR SERVICE: HCPCS | Performed by: INTERNAL MEDICINE

## 2021-03-10 PROCEDURE — 80053 COMPREHEN METABOLIC PANEL: CPT

## 2021-03-10 PROCEDURE — 36415 COLL VENOUS BLD VENIPUNCTURE: CPT

## 2021-03-10 RX ORDER — FINASTERIDE 5 MG/1
5 TABLET, FILM COATED ORAL DAILY
Status: DISCONTINUED | OUTPATIENT
Start: 2021-03-11 | End: 2021-03-12 | Stop reason: HOSPADM

## 2021-03-10 RX ORDER — AMLODIPINE BESYLATE 5 MG/1
5 TABLET ORAL DAILY
Status: DISCONTINUED | OUTPATIENT
Start: 2021-03-11 | End: 2021-03-12 | Stop reason: HOSPADM

## 2021-03-10 RX ORDER — POLYETHYLENE GLYCOL 3350 17 G/17G
1 POWDER, FOR SOLUTION ORAL
Status: DISCONTINUED | OUTPATIENT
Start: 2021-03-10 | End: 2021-03-12 | Stop reason: HOSPADM

## 2021-03-10 RX ORDER — ACETAMINOPHEN 325 MG/1
650 TABLET ORAL EVERY 6 HOURS PRN
Status: DISCONTINUED | OUTPATIENT
Start: 2021-03-10 | End: 2021-03-12 | Stop reason: HOSPADM

## 2021-03-10 RX ORDER — DEXTROSE MONOHYDRATE 25 G/50ML
50 INJECTION, SOLUTION INTRAVENOUS
Status: DISCONTINUED | OUTPATIENT
Start: 2021-03-10 | End: 2021-03-12 | Stop reason: HOSPADM

## 2021-03-10 RX ORDER — POTASSIUM CHLORIDE 20 MEQ/1
20 TABLET, EXTENDED RELEASE ORAL DAILY
Status: DISCONTINUED | OUTPATIENT
Start: 2021-03-11 | End: 2021-03-12 | Stop reason: HOSPADM

## 2021-03-10 RX ORDER — TAMSULOSIN HYDROCHLORIDE 0.4 MG/1
0.4 CAPSULE ORAL EVERY MORNING
Status: DISCONTINUED | OUTPATIENT
Start: 2021-03-11 | End: 2021-03-12 | Stop reason: HOSPADM

## 2021-03-10 RX ORDER — AMOXICILLIN 250 MG
2 CAPSULE ORAL 2 TIMES DAILY
Status: DISCONTINUED | OUTPATIENT
Start: 2021-03-11 | End: 2021-03-12 | Stop reason: HOSPADM

## 2021-03-10 RX ORDER — GABAPENTIN 100 MG/1
100 CAPSULE ORAL 2 TIMES DAILY
Status: DISCONTINUED | OUTPATIENT
Start: 2021-03-10 | End: 2021-03-12 | Stop reason: HOSPADM

## 2021-03-10 RX ORDER — CHLORTHALIDONE 25 MG/1
25 TABLET ORAL DAILY
Status: DISCONTINUED | OUTPATIENT
Start: 2021-03-11 | End: 2021-03-12 | Stop reason: HOSPADM

## 2021-03-10 RX ORDER — SIMVASTATIN 10 MG
10 TABLET ORAL NIGHTLY
Status: DISCONTINUED | OUTPATIENT
Start: 2021-03-10 | End: 2021-03-12 | Stop reason: HOSPADM

## 2021-03-10 RX ORDER — INSULIN GLARGINE 100 [IU]/ML
50 INJECTION, SOLUTION SUBCUTANEOUS EVERY EVENING
Status: DISCONTINUED | OUTPATIENT
Start: 2021-03-10 | End: 2021-03-12 | Stop reason: HOSPADM

## 2021-03-10 RX ORDER — ONDANSETRON 4 MG/1
4 TABLET, ORALLY DISINTEGRATING ORAL EVERY 4 HOURS PRN
Status: DISCONTINUED | OUTPATIENT
Start: 2021-03-10 | End: 2021-03-12 | Stop reason: HOSPADM

## 2021-03-10 RX ORDER — ONDANSETRON 2 MG/ML
4 INJECTION INTRAMUSCULAR; INTRAVENOUS EVERY 4 HOURS PRN
Status: DISCONTINUED | OUTPATIENT
Start: 2021-03-10 | End: 2021-03-12 | Stop reason: HOSPADM

## 2021-03-10 RX ORDER — BISACODYL 10 MG
10 SUPPOSITORY, RECTAL RECTAL
Status: DISCONTINUED | OUTPATIENT
Start: 2021-03-10 | End: 2021-03-12 | Stop reason: HOSPADM

## 2021-03-10 RX ADMIN — METOPROLOL TARTRATE 12.5 MG: 25 TABLET, FILM COATED ORAL at 19:55

## 2021-03-10 RX ADMIN — SIMVASTATIN 10 MG: 10 TABLET, FILM COATED ORAL at 21:41

## 2021-03-10 RX ADMIN — INSULIN HUMAN 12 UNITS: 100 INJECTION, SOLUTION PARENTERAL at 21:42

## 2021-03-10 RX ADMIN — GABAPENTIN 100 MG: 100 CAPSULE ORAL at 19:55

## 2021-03-10 RX ADMIN — INSULIN GLARGINE 50 UNITS: 100 INJECTION, SOLUTION SUBCUTANEOUS at 19:56

## 2021-03-10 ASSESSMENT — LIFESTYLE VARIABLES
CONSUMPTION TOTAL: NEGATIVE
ALCOHOL_USE: NO
TOTAL SCORE: 0
HAVE YOU EVER FELT YOU SHOULD CUT DOWN ON YOUR DRINKING: NO
TOTAL SCORE: 0
EVER FELT BAD OR GUILTY ABOUT YOUR DRINKING: NO
HOW MANY TIMES IN THE PAST YEAR HAVE YOU HAD 5 OR MORE DRINKS IN A DAY: 0
TOTAL SCORE: 0
ON A TYPICAL DAY WHEN YOU DRINK ALCOHOL HOW MANY DRINKS DO YOU HAVE: 0
HAVE PEOPLE ANNOYED YOU BY CRITICIZING YOUR DRINKING: NO
EVER HAD A DRINK FIRST THING IN THE MORNING TO STEADY YOUR NERVES TO GET RID OF A HANGOVER: NO
AVERAGE NUMBER OF DAYS PER WEEK YOU HAVE A DRINK CONTAINING ALCOHOL: 0

## 2021-03-10 ASSESSMENT — COGNITIVE AND FUNCTIONAL STATUS - GENERAL
HELP NEEDED FOR BATHING: A LITTLE
MOBILITY SCORE: 22
SUGGESTED CMS G CODE MODIFIER DAILY ACTIVITY: CJ
DRESSING REGULAR LOWER BODY CLOTHING: A LITTLE
SUGGESTED CMS G CODE MODIFIER MOBILITY: CJ
DAILY ACTIVITIY SCORE: 22
WALKING IN HOSPITAL ROOM: A LITTLE
CLIMB 3 TO 5 STEPS WITH RAILING: A LITTLE

## 2021-03-10 ASSESSMENT — ENCOUNTER SYMPTOMS
NERVOUS/ANXIOUS: 0
INSOMNIA: 0
ABDOMINAL PAIN: 0
SHORTNESS OF BREATH: 0
FEVER: 0
CHILLS: 0
CLAUDICATION: 0
DEPRESSION: 0
CONSTIPATION: 0
SPEECH CHANGE: 0
PHOTOPHOBIA: 0
WEAKNESS: 0
BLURRED VISION: 0
HEADACHES: 0
MYALGIAS: 0
COUGH: 0
DIZZINESS: 0
SENSORY CHANGE: 0
VOMITING: 0
DIARRHEA: 0
SORE THROAT: 0
HEARTBURN: 0

## 2021-03-10 ASSESSMENT — FIBROSIS 4 INDEX: FIB4 SCORE: 1.36

## 2021-03-10 ASSESSMENT — PATIENT HEALTH QUESTIONNAIRE - PHQ9
2. FEELING DOWN, DEPRESSED, IRRITABLE, OR HOPELESS: NOT AT ALL
SUM OF ALL RESPONSES TO PHQ9 QUESTIONS 1 AND 2: 0
1. LITTLE INTEREST OR PLEASURE IN DOING THINGS: NOT AT ALL

## 2021-03-10 NOTE — ED TRIAGE NOTES
"Chief Complaint   Patient presents with   • Abnormal Labs     Per pt \"my glucose is abnormal\"     Pt ambulated to ED w/ Mercedes, spouse, states sent by PCP for elevated glucose levels.  Pt states does not consistently monitor glucose levels.  Pt denies c/o CP, abd pain or back pain.    Covid Screen Negative.  Pt has not received the Covid Vaccines.    "

## 2021-03-11 ENCOUNTER — APPOINTMENT (OUTPATIENT)
Dept: RADIOLOGY | Facility: MEDICAL CENTER | Age: 86
End: 2021-03-11
Attending: UROLOGY
Payer: MEDICARE

## 2021-03-11 ENCOUNTER — APPOINTMENT (OUTPATIENT)
Dept: RADIOLOGY | Facility: MEDICAL CENTER | Age: 86
End: 2021-03-11
Attending: HOSPITALIST
Payer: MEDICARE

## 2021-03-11 PROBLEM — E03.9 HYPOTHYROIDISM: Status: ACTIVE | Noted: 2021-03-11

## 2021-03-11 LAB
ANION GAP SERPL CALC-SCNC: 11 MMOL/L (ref 7–16)
BUN SERPL-MCNC: 24 MG/DL (ref 8–22)
CALCIUM SERPL-MCNC: 9.7 MG/DL (ref 8.4–10.2)
CHLORIDE SERPL-SCNC: 90 MMOL/L (ref 96–112)
CO2 SERPL-SCNC: 31 MMOL/L (ref 20–33)
CREAT SERPL-MCNC: 0.89 MG/DL (ref 0.5–1.4)
ERYTHROCYTE [DISTWIDTH] IN BLOOD BY AUTOMATED COUNT: 41.3 FL (ref 35.9–50)
GLUCOSE BLD-MCNC: 275 MG/DL (ref 65–99)
GLUCOSE BLD-MCNC: 348 MG/DL (ref 65–99)
GLUCOSE BLD-MCNC: 358 MG/DL (ref 65–99)
GLUCOSE BLD-MCNC: 369 MG/DL (ref 65–99)
GLUCOSE BLD-MCNC: 380 MG/DL (ref 65–99)
GLUCOSE SERPL-MCNC: 332 MG/DL (ref 65–99)
HCT VFR BLD AUTO: 45.5 % (ref 42–52)
HGB BLD-MCNC: 15.6 G/DL (ref 14–18)
MAGNESIUM SERPL-MCNC: 1.9 MG/DL (ref 1.5–2.5)
MCH RBC QN AUTO: 30.8 PG (ref 27–33)
MCHC RBC AUTO-ENTMCNC: 34.3 G/DL (ref 33.7–35.3)
MCV RBC AUTO: 89.7 FL (ref 81.4–97.8)
PLATELET # BLD AUTO: 210 K/UL (ref 164–446)
PMV BLD AUTO: 11.3 FL (ref 9–12.9)
POTASSIUM SERPL-SCNC: 2.8 MMOL/L (ref 3.6–5.5)
RBC # BLD AUTO: 5.07 M/UL (ref 4.7–6.1)
SODIUM SERPL-SCNC: 132 MMOL/L (ref 135–145)
WBC # BLD AUTO: 7.2 K/UL (ref 4.8–10.8)

## 2021-03-11 PROCEDURE — 700102 HCHG RX REV CODE 250 W/ 637 OVERRIDE(OP): Performed by: INTERNAL MEDICINE

## 2021-03-11 PROCEDURE — 700111 HCHG RX REV CODE 636 W/ 250 OVERRIDE (IP): Performed by: HOSPITALIST

## 2021-03-11 PROCEDURE — 99226 PR SUBSEQUENT OBSERVATION CARE,LEVEL III: CPT | Performed by: HOSPITALIST

## 2021-03-11 PROCEDURE — 85027 COMPLETE CBC AUTOMATED: CPT

## 2021-03-11 PROCEDURE — 74176 CT ABD & PELVIS W/O CONTRAST: CPT | Mod: MG

## 2021-03-11 PROCEDURE — 700102 HCHG RX REV CODE 250 W/ 637 OVERRIDE(OP): Performed by: HOSPITALIST

## 2021-03-11 PROCEDURE — 700111 HCHG RX REV CODE 636 W/ 250 OVERRIDE (IP): Performed by: INTERNAL MEDICINE

## 2021-03-11 PROCEDURE — A9270 NON-COVERED ITEM OR SERVICE: HCPCS | Performed by: HOSPITALIST

## 2021-03-11 PROCEDURE — A9270 NON-COVERED ITEM OR SERVICE: HCPCS | Performed by: INTERNAL MEDICINE

## 2021-03-11 PROCEDURE — G0378 HOSPITAL OBSERVATION PER HR: HCPCS

## 2021-03-11 PROCEDURE — 80048 BASIC METABOLIC PNL TOTAL CA: CPT

## 2021-03-11 PROCEDURE — 96372 THER/PROPH/DIAG INJ SC/IM: CPT

## 2021-03-11 PROCEDURE — 82962 GLUCOSE BLOOD TEST: CPT

## 2021-03-11 PROCEDURE — 36415 COLL VENOUS BLD VENIPUNCTURE: CPT

## 2021-03-11 PROCEDURE — 83735 ASSAY OF MAGNESIUM: CPT

## 2021-03-11 RX ORDER — POTASSIUM CHLORIDE 20 MEQ/1
40 TABLET, EXTENDED RELEASE ORAL 2 TIMES DAILY
Status: COMPLETED | OUTPATIENT
Start: 2021-03-11 | End: 2021-03-11

## 2021-03-11 RX ORDER — LEVOTHYROXINE SODIUM 0.03 MG/1
25 TABLET ORAL
Status: DISCONTINUED | OUTPATIENT
Start: 2021-03-11 | End: 2021-03-12 | Stop reason: HOSPADM

## 2021-03-11 RX ORDER — POTASSIUM CHLORIDE 7.45 MG/ML
10 INJECTION INTRAVENOUS
Status: COMPLETED | OUTPATIENT
Start: 2021-03-11 | End: 2021-03-11

## 2021-03-11 RX ADMIN — METOPROLOL TARTRATE 12.5 MG: 25 TABLET, FILM COATED ORAL at 17:44

## 2021-03-11 RX ADMIN — POTASSIUM CHLORIDE 40 MEQ: 1500 TABLET, EXTENDED RELEASE ORAL at 05:41

## 2021-03-11 RX ADMIN — ENOXAPARIN SODIUM 40 MG: 40 INJECTION SUBCUTANEOUS at 05:44

## 2021-03-11 RX ADMIN — CHLORTHALIDONE 25 MG: 25 TABLET ORAL at 05:42

## 2021-03-11 RX ADMIN — METOPROLOL TARTRATE 12.5 MG: 25 TABLET, FILM COATED ORAL at 05:41

## 2021-03-11 RX ADMIN — INSULIN GLARGINE 50 UNITS: 100 INJECTION, SOLUTION SUBCUTANEOUS at 17:43

## 2021-03-11 RX ADMIN — FINASTERIDE 5 MG: 5 TABLET, FILM COATED ORAL at 05:41

## 2021-03-11 RX ADMIN — GABAPENTIN 100 MG: 100 CAPSULE ORAL at 17:45

## 2021-03-11 RX ADMIN — AMLODIPINE BESYLATE 5 MG: 5 TABLET ORAL at 05:40

## 2021-03-11 RX ADMIN — POTASSIUM CHLORIDE 40 MEQ: 1500 TABLET, EXTENDED RELEASE ORAL at 17:42

## 2021-03-11 RX ADMIN — SENNOSIDES AND DOCUSATE SODIUM 2 TABLET: 8.6; 5 TABLET ORAL at 17:45

## 2021-03-11 RX ADMIN — POTASSIUM CHLORIDE 10 MEQ: 7.46 INJECTION, SOLUTION INTRAVENOUS at 08:11

## 2021-03-11 RX ADMIN — INSULIN HUMAN 7 UNITS: 100 INJECTION, SOLUTION PARENTERAL at 05:47

## 2021-03-11 RX ADMIN — INSULIN HUMAN 12 UNITS: 100 INJECTION, SOLUTION PARENTERAL at 11:31

## 2021-03-11 RX ADMIN — GABAPENTIN 100 MG: 100 CAPSULE ORAL at 05:41

## 2021-03-11 RX ADMIN — INSULIN HUMAN 10 UNITS: 100 INJECTION, SOLUTION PARENTERAL at 19:57

## 2021-03-11 RX ADMIN — POTASSIUM CHLORIDE 10 MEQ: 7.46 INJECTION, SOLUTION INTRAVENOUS at 12:30

## 2021-03-11 RX ADMIN — LEVOTHYROXINE SODIUM 25 MCG: 25 TABLET ORAL at 07:31

## 2021-03-11 RX ADMIN — SIMVASTATIN 10 MG: 10 TABLET, FILM COATED ORAL at 19:54

## 2021-03-11 RX ADMIN — TAMSULOSIN HYDROCHLORIDE 0.4 MG: 0.4 CAPSULE ORAL at 05:41

## 2021-03-11 RX ADMIN — POTASSIUM CHLORIDE 10 MEQ: 7.46 INJECTION, SOLUTION INTRAVENOUS at 11:31

## 2021-03-11 RX ADMIN — INSULIN HUMAN 12 UNITS: 100 INJECTION, SOLUTION PARENTERAL at 16:58

## 2021-03-11 RX ADMIN — POTASSIUM CHLORIDE 20 MEQ: 1500 TABLET, EXTENDED RELEASE ORAL at 05:41

## 2021-03-11 RX ADMIN — POTASSIUM CHLORIDE 10 MEQ: 7.46 INJECTION, SOLUTION INTRAVENOUS at 09:50

## 2021-03-11 ASSESSMENT — PAIN DESCRIPTION - PAIN TYPE
TYPE: ACUTE PAIN
TYPE: ACUTE PAIN

## 2021-03-11 NOTE — ASSESSMENT & PLAN NOTE
Optimize blood pressure management keep systolic blood pressure less than 140 diastolic under 90  Continue with Norvasc 5 mg daily, I will chlorthalidone 25 mg daily, metoprolol 12.5 mg twice daily

## 2021-03-11 NOTE — ED PROVIDER NOTES
"ED Provider Note    CHIEF COMPLAINT  Chief Complaint   Patient presents with   • Abnormal Labs     Per pt \"my glucose is abnormal\"       HPI  Julien Dao is a 89 y.o. male who presents to the emergency department stating that blood sugar is elevated.  The patient has a history of diabetes and takes medicines for diabetes including oral medications and insulin.  He reports that he has been taking his medications but despite that his blood sugar has been high.  He does have associated polyuria and polydipsia.  He has a bit of a dry cough which is chronic in nature.  Good no chest pain.  No fevers or chills.  He denies any other aggravating or relieving factors or associated complaints.    REVIEW OF SYSTEMS  See HPI for further details. All other systems are negative.    PAST MEDICAL HISTORY  Past Medical History:   Diagnosis Date   • CAD (coronary artery disease)    • Delirium 2020   • DM (diabetes mellitus) ([facility]) 2014   • Falls    • Hypertension    • UTI (urinary tract infection) 2018       FAMILY HISTORY  History reviewed. No pertinent family history.    SOCIAL HISTORY  Social History     Socioeconomic History   • Marital status:      Spouse name: Not on file   • Number of children: Not on file   • Years of education: Not on file   • Highest education level: Not on file   Occupational History   • Not on file   Tobacco Use   • Smoking status: Former Smoker     Quit date: 10/19/1972     Years since quittin.4   • Smokeless tobacco: Never Used   Substance and Sexual Activity   • Alcohol use: Not Currently   • Drug use: No   • Sexual activity: Not on file   Other Topics Concern   • Not on file   Social History Narrative   • Not on file     Social Determinants of Health     Financial Resource Strain:    • Difficulty of Paying Living Expenses:    Food Insecurity:    • Worried About Running Out of Food in the Last Year:    • Ran Out of Food in the Last Year:    Transportation Needs:    • Lack of " Transportation (Medical):    • Lack of Transportation (Non-Medical):    Physical Activity:    • Days of Exercise per Week:    • Minutes of Exercise per Session:    Stress:    • Feeling of Stress :    Social Connections:    • Frequency of Communication with Friends and Family:    • Frequency of Social Gatherings with Friends and Family:    • Attends Confucianism Services:    • Active Member of Clubs or Organizations:    • Attends Club or Organization Meetings:    • Marital Status:    Intimate Partner Violence:    • Fear of Current or Ex-Partner:    • Emotionally Abused:    • Physically Abused:    • Sexually Abused:        SURGICAL HISTORY  Past Surgical History:   Procedure Laterality Date   • CORONARY ARTERY BYPASS, 3     • OTHER CARDIAC SURGERY     • TED RECTAL ABSCESS         CURRENT MEDICATIONS  Home Medications     Reviewed by Yesica DiazD (Pharmacist) on 03/10/21 at 1757  Med List Status: Complete   Medication Last Dose Status   amLODIPine (NORVASC) 5 MG Tab 3/10/2021 Active   chlorthalidone (HYGROTON) 25 MG Tab 3/10/2021 Active   cyclosporin (RESTASIS) 0.05 % ophthalmic emulsion 3/10/2021 Active   finasteride (PROSCAR) 5 MG Tab 3/10/2021 Active   gabapentin (NEURONTIN) 100 MG Cap 3/9/2021 Active   insulin glargine (INSULIN GLARGINE) 100 UNIT/ML Solution Pen-injector injection 3/9/2021 Active   metoprolol (LOPRESSOR) 25 MG Tab 3/10/2021 Active   Multiple Vitamins-Minerals (PX MENS MULTIVITAMINS) Tab 3/10/2021 Active   neomycin-bacitracin-polymyxin (NEOSPORIN) 400-5-5000 Ointment  Active   nystatin (MYCOSTATIN) 219200 UNIT/GM Cream topical cream  Active   Omega-3 Fatty Acids (FISH OIL) 1000 MG Cap capsule 3/10/2021 Active   potassium chloride ER (K-TAB) 20 MEQ Tab CR tablet  Active   simvastatin (ZOCOR) 10 MG Tab 3/9/2021 Active   Specialty Vitamins Products (PROSTATE) Tab 3/10/2021 Active   tamsulosin (FLOMAX) 0.4 MG capsule 3/10/2021 Active                ALLERGIES  Allergies   Allergen Reactions   •  "Iodine      Pt and pts wife report that it has been so long not sure what happens       PHYSICAL EXAM  VITAL SIGNS: Ht 1.702 m (5' 7\")   Wt 77.9 kg (171 lb 11.8 oz)   BMI 26.90 kg/m²    Ht 1.702 m (5' 7\")   Wt 77.9 kg (171 lb 11.8 oz)   BMI 26.90 kg/m²     Constitutional: Awake alert, chronically ill-appearing but in no acute cardiopulmonary distress  HENT: Normocephalic, Atraumatic, Bilateral external ears normal,  Eyes: PERRL, EOMI, Conjunctiva normal, No discharge.   Neck: Normal range of motion, No tenderness, Supple, No stridor.   Cardiovascular: Normal heart rate, Normal rhythm, No murmurs,  Thorax & Lungs: Normal breath sounds, No respiratory distress,  Abdomen: Bowel sounds normal, Soft, No tenderness,  Skin: Warm, Dry, No erythema, No rash.     Musculoskeletal: Good range of motion in all major joints.  Mild symmetric edema  Neurologic: Alert,  No focal deficits noted.   Psychiatric: Affect normal,      Results for orders placed or performed during the hospital encounter of 03/10/21   CBC WITH DIFFERENTIAL   Result Value Ref Range    WBC 5.9 4.8 - 10.8 K/uL    RBC 5.06 4.70 - 6.10 M/uL    Hemoglobin 15.6 14.0 - 18.0 g/dL    Hematocrit 45.0 42.0 - 52.0 %    MCV 88.9 81.4 - 97.8 fL    MCH 30.8 27.0 - 33.0 pg    MCHC 34.7 33.7 - 35.3 g/dL    RDW 41.2 35.9 - 50.0 fL    Platelet Count 193 164 - 446 K/uL    MPV 11.2 9.0 - 12.9 fL    Neutrophils-Polys 65.70 44.00 - 72.00 %    Lymphocytes 22.00 22.00 - 41.00 %    Monocytes 9.90 0.00 - 13.40 %    Eosinophils 0.90 0.00 - 6.90 %    Basophils 1.20 0.00 - 1.80 %    Immature Granulocytes 0.30 0.00 - 0.90 %    Nucleated RBC 0.00 /100 WBC    Neutrophils (Absolute) 3.86 1.82 - 7.42 K/uL    Lymphs (Absolute) 1.29 1.00 - 4.80 K/uL    Monos (Absolute) 0.58 0.00 - 0.85 K/uL    Eos (Absolute) 0.05 0.00 - 0.51 K/uL    Baso (Absolute) 0.07 0.00 - 0.12 K/uL    Immature Granulocytes (abs) 0.02 0.00 - 0.11 K/uL    NRBC (Absolute) 0.00 K/uL   COMP METABOLIC PANEL   Result Value " Ref Range    Sodium 135 135 - 145 mmol/L    Potassium 3.2 (L) 3.6 - 5.5 mmol/L    Chloride 93 (L) 96 - 112 mmol/L    Co2 26 20 - 33 mmol/L    Anion Gap 16.0 7.0 - 16.0    Glucose 524 (HH) 65 - 99 mg/dL    Bun 26 (H) 8 - 22 mg/dL    Creatinine 0.99 0.50 - 1.40 mg/dL    Calcium 9.4 8.4 - 10.2 mg/dL    AST(SGOT) 18 12 - 45 U/L    ALT(SGPT) 15 2 - 50 U/L    Alkaline Phosphatase 60 30 - 99 U/L    Total Bilirubin 0.7 0.1 - 1.5 mg/dL    Albumin 4.2 3.2 - 4.9 g/dL    Total Protein 7.2 6.0 - 8.2 g/dL    Globulin 3.0 1.9 - 3.5 g/dL    A-G Ratio 1.4 g/dL   URINALYSIS CULTURE, IF INDICATED    Specimen: Urine   Result Value Ref Range    Color Yellow     Character Clear     Specific Gravity 1.010 <1.035    Ph 6.0 5.0 - 8.0    Glucose >=1000 (A) Negative mg/dL    Ketones 15 (A) Negative mg/dL    Protein Negative Negative mg/dL    Bilirubin Negative Negative    Nitrite Negative Negative    Leukocyte Esterase Negative Negative    Occult Blood Negative Negative    Micro Urine Req see below    COV-2, FLU A/B, AND RSV BY PCR (2-4 HOURS CEPHEID): Collect NP swab in VTM    Specimen: Respirate   Result Value Ref Range    SARS-CoV-2 Source NP Swab    ESTIMATED GFR   Result Value Ref Range    GFR If African American >60 >60 mL/min/1.73 m 2    GFR If Non African American >60 >60 mL/min/1.73 m 2   ACCU-CHEK GLUCOSE   Result Value Ref Range    Glucose - Accu-Ck 483 (HH) 65 - 99 mg/dL        RADIOLOGY/PROCEDURES  No orders to display         COURSE & MEDICAL DECISION MAKING  Pertinent Labs & Imaging studies reviewed. (See chart for details)    Chart was reviewed.  Recent labs did show an elevated blood sugar around 500.    The patient's chart is reviewed for previous work-up baseline labs.    Today views work-up for hyperglycemia.  Differential diagnosis hyperglycemia, possible diabetic ketoacidosis possible UTI or infection COVID-19 infection.    The patient has marked hyperglycemia.  The blood sugar has been over 500 for last couple of days.   He has some reports compliance with his medication.  At this point the patient has blood sugars out of control.  I do not think you will do well with a persistently high blood sugar greater than 500.  He requires hospitalization for treatment of his hyperglycemia and improvement of his diabetes medication regimen.  I discussed case with the hospitalist and care is transferred at that time.      FINAL IMPRESSION  1. Hyperglycemia         2.   3.         Electronically signed by: Davion Caldwell M.D., 3/10/2021 4:01 PM

## 2021-03-11 NOTE — ASSESSMENT & PLAN NOTE
-supportive measures with synthroid supplementation at 25 mcg/day have been started  -latest TSH is 6.720 and this is with in establish normal guidlines

## 2021-03-11 NOTE — H&P
"Hospital Medicine History & Physical Note    Date of Service  3/10/2021    Primary Care Physician  TABITHA Powell.    Consultants  none    Code Status  DNAR/DNI    Chief Complaint  Chief Complaint   Patient presents with   • Abnormal Labs     Per pt \"my glucose is abnormal\"       History of Presenting Illness  89 y.o. male who presented 3/10/2021 with elevated sugar.  He has known diabetes and is supposed to check his sugars daily but reports he doesn't.  He had a sugar over 500 and called his PCP but couldn't get in, he had a cardiology appointment today and they recommended he go the the ED for evaluation.  He states he isn't sure how long his sugars have been high. He feels slightly fatigued but otherwise feels okay.  He has poor urinary stream but this is not new.  He had episode similar to this last year but that was associated with urine retention and infection.  UA this time is not consistent with infection.  Will check A1C to get better assessment of his chronic sugar levels but I suspect his glucose has been uncontrolled for a long time.  He will be started on sliding scale insulin for meals and needs further education as to dietary intake.  He is not in DKA or HHS so will not be given aggressive IV hydration, will just continue with insulin coverage.    Review of Systems  Review of Systems   Constitutional: Positive for malaise/fatigue. Negative for chills and fever.   HENT: Negative for congestion and sore throat.    Eyes: Negative for blurred vision and photophobia.   Respiratory: Negative for cough and shortness of breath.    Cardiovascular: Negative for chest pain, claudication and leg swelling.   Gastrointestinal: Negative for abdominal pain, constipation, diarrhea, heartburn and vomiting.   Genitourinary: Negative for dysuria, frequency, hematuria and urgency.        Poor stream     Musculoskeletal: Negative for joint pain and myalgias.   Skin: Negative for itching and rash.   Neurological: " Negative for dizziness, sensory change, speech change, weakness and headaches.   Psychiatric/Behavioral: Negative for depression. The patient is not nervous/anxious and does not have insomnia.        Past Medical History   has a past medical history of CAD (coronary artery disease), Delirium (2/21/2020), DM (diabetes mellitus) ([facility]) (1/7/2014), Falls, Hypertension, and UTI (urinary tract infection) (11/4/2018).    Surgical History   has a past surgical history that includes other cardiac surgery; coronary artery bypass, 3; and janice rectal abscess.     Family History  family history is not on file.     Social History   reports that he quit smoking about 48 years ago. He has never used smokeless tobacco. He reports previous alcohol use. He reports that he does not use drugs.    Allergies  Allergies   Allergen Reactions   • Iodine      Pt and pts wife report that it has been so long not sure what happens       Medications  Prior to Admission Medications   Prescriptions Last Dose Informant Patient Reported? Taking?   Multiple Vitamins-Minerals (PX MENS MULTIVITAMINS) Tab 3/10/2021 at AM Significant Other Yes No   Sig: Take 1 Tab by mouth every day.   Omega-3 Fatty Acids (FISH OIL) 1000 MG Cap capsule 3/10/2021 at AM Significant Other Yes No   Sig: Take 2,000 mg by mouth 2 Times a Day.   Specialty Vitamins Products (PROSTATE) Tab 3/10/2021 at AM Significant Other Yes No   Sig: Take 1 Tab by mouth 2 Times a Day.   amLODIPine (NORVASC) 5 MG Tab 3/10/2021 at AM Significant Other No No   Sig: Take 1 Tab by mouth every day.   chlorthalidone (HYGROTON) 25 MG Tab 3/10/2021 at AM Significant Other No No   Sig: Take 1 Tab by mouth every day.   cyclosporin (RESTASIS) 0.05 % ophthalmic emulsion 3/10/2021 at AM Significant Other Yes No   Sig: Place 1 Drop in both eyes 2 times a day.   finasteride (PROSCAR) 5 MG Tab 3/10/2021 at AM Significant Other No No   Sig: Take 1 Tab by mouth every day.   gabapentin (NEURONTIN) 100 MG Cap  3/9/2021 at PM Significant Other Yes No   Sig: Take 100 mg by mouth 2 Times a Day.   insulin glargine (INSULIN GLARGINE) 100 UNIT/ML Solution Pen-injector injection 3/9/2021 at AFTERNOON  Yes No   Sig: Inject 50 Units as instructed every day.   metoprolol (LOPRESSOR) 25 MG Tab 3/10/2021 at AM Significant Other Yes No   Sig: Take 12.5 mg by mouth 2 times a day.   neomycin-bacitracin-polymyxin (NEOSPORIN) 400-5-5000 Ointment  Significant Other No No   Sig: Apply 1 Each to affected area(s) every day. To penis   Patient not taking: Reported on 3/28/2020   nystatin (MYCOSTATIN) 146799 UNIT/GM Cream topical cream  Significant Other No No   Sig: Apply 0.0001 g to affected area(s) 2 Times a Day.   Patient not taking: Reported on 3/28/2020   potassium chloride ER (K-TAB) 20 MEQ Tab CR tablet  Significant Other No No   Sig: Take 1 Tab by mouth 2 times a day.   Patient taking differently: Take 10 mEq by mouth 2 times a day.   simvastatin (ZOCOR) 10 MG Tab 3/9/2021 at PM Significant Other Yes No   Sig: Take 10 mg by mouth every evening.   tamsulosin (FLOMAX) 0.4 MG capsule 3/10/2021 at AM Significant Other Yes No   Sig: Take 0.4 mg by mouth every morning.      Facility-Administered Medications: None       Physical Exam  Temp:  [36.1 °C (97 °F)] 36.1 °C (97 °F)  Pulse:  [66-73] 66  Resp:  [18] 18  BP: (135-161)/(73-79) 161/79  SpO2:  [91 %-95 %] 92 %    Physical Exam  Vitals and nursing note reviewed.   Constitutional:       General: He is not in acute distress.     Appearance: Normal appearance.   HENT:      Head: Normocephalic and atraumatic.   Eyes:      General: No scleral icterus.     Extraocular Movements: Extraocular movements intact.   Cardiovascular:      Rate and Rhythm: Normal rate and regular rhythm.      Pulses: Normal pulses.      Heart sounds: Normal heart sounds. No murmur.   Pulmonary:      Effort: Pulmonary effort is normal. No respiratory distress.      Breath sounds: Normal breath sounds. No wheezing, rhonchi  or rales.   Abdominal:      General: Abdomen is flat. Bowel sounds are normal. There is no distension.      Palpations: Abdomen is soft.      Tenderness: There is no rebound.   Musculoskeletal:         General: No swelling or tenderness.      Cervical back: Normal range of motion and neck supple.   Lymphadenopathy:      Cervical: No cervical adenopathy.   Skin:     Coloration: Skin is not jaundiced.      Findings: No erythema.   Neurological:      General: No focal deficit present.      Mental Status: He is alert and oriented to person, place, and time. Mental status is at baseline.      Cranial Nerves: No cranial nerve deficit.   Psychiatric:         Mood and Affect: Mood normal.         Behavior: Behavior normal.         Laboratory:  Recent Labs     03/10/21  1620   WBC 5.9   RBC 5.06   HEMOGLOBIN 15.6   HEMATOCRIT 45.0   MCV 88.9   MCH 30.8   MCHC 34.7   RDW 41.2   PLATELETCT 193   MPV 11.2     Recent Labs     03/09/21  1042 03/10/21  1620   SODIUM 133* 135   POTASSIUM 3.3* 3.2*   CHLORIDE 92* 93*   CO2 29 26   GLUCOSE 502* 524*   BUN 26* 26*   CREATININE 0.94 0.99   CALCIUM 9.3 9.4     Recent Labs     03/09/21  1042 03/10/21  1620   ALTSGPT  --  15   ASTSGOT  --  18   ALKPHOSPHAT  --  60   TBILIRUBIN  --  0.7   GLUCOSE 502* 524*         No results for input(s): NTPROBNP in the last 72 hours.      No results for input(s): TROPONINT in the last 72 hours.    Imaging:  No orders to display         Assessment/Plan:  I anticipate this patient is appropriate for observation status at this time.    * Hyperglycemia  Assessment & Plan  Sugars 400-500s  Compliant with lantus 50units qhs  SSI and consistent carbohydrate diet  Diabetic education      Urinary retention- (present on admission)  Assessment & Plan  Currently urinating with slow stream  UA without evidence of UTI  Resume home meds      DM (diabetes mellitus) (CMS-HCC)- (present on admission)  Assessment & Plan  Very poor control  Not in HHS/DKA  Start Sliding  scale insulin, diabetic diet, continue home dose lantus 50U qhs      Hypokalemia- (present on admission)  Assessment & Plan  PO repletion      CAD (coronary artery disease) - CABG Nov. 2004- (present on admission)  Assessment & Plan  .      ACP (advance care planning)- (present on admission)  Assessment & Plan  Patient is DNR, POLST reviewed      BPH (benign prostatic hyperplasia)- (present on admission)  Assessment & Plan  Continue proscar/flomax.    HTN (hypertension)- (present on admission)  Assessment & Plan  Resume home meds

## 2021-03-11 NOTE — ASSESSMENT & PLAN NOTE
Patient has uncontrolled diabetes mellitus type 2.  Hemoglobin A1c is 11.1.  The patient is currently on Lantus 50 units subcutaneously nightly  He is on insulin regular with sliding scale.  Needs a diabetic diet  Diabetic teaching

## 2021-03-11 NOTE — ED TRIAGE NOTES
"Chief Complaint   Patient presents with   • Abnormal Labs     Per pt \"my glucose is abnormal\"     /73   Pulse 73   Temp 36.1 °C (97 °F) (Temporal)   Resp 18   Ht 1.702 m (5' 7\")   Wt 77.9 kg (171 lb 11.8 oz)   SpO2 95%   BMI 26.90 kg/m²     "

## 2021-03-11 NOTE — ASSESSMENT & PLAN NOTE
Initial blood sugars were 5-600.  Patient was taking his medications appropriately and he has been placed back on Lantus 50 units subcutaneously nightly.  Give fluid resuscitation  Monitor electrolytes and supplement

## 2021-03-11 NOTE — ED NOTES
Medication history completed with patient and his wife.  The patient confirmed last doses his wife reviewed his updated medication list with me.

## 2021-03-11 NOTE — PROGRESS NOTES
Pt arrived on unit, A&Ox4, denies pain, sob, dizziness, or nausea. VSS, diagnostics reviewed. Pt oriented to room and call light. POC discussed, denies further needs at this time. Safety measures and hourly rounding in place.

## 2021-03-11 NOTE — DISCHARGE PLANNING
Anticipated Discharge Disposition: Likely home with home health once stable.     Action: Pt discussed during IDT rounds. No immediate SW/CM needs at this present time. Pt anticipated to need HH upon d/c.     Barriers to Discharge: Medical clearance, HH need/order/confirmation.     Plan: Continue to follow and assist in d/c planning/coordination, begin referral process for HH once order is placed/if needed.

## 2021-03-11 NOTE — CARE PLAN
Problem: Communication  Goal: The ability to communicate needs accurately and effectively will improve  Outcome: PROGRESSING AS EXPECTED  Intervention: [place] patient and significant other/support system to call light to alert staff of needs  Note: Pt uses call light appropriately for needs.       Problem: Safety  Goal: Will remain free from falls  Outcome: PROGRESSING AS EXPECTED  Intervention: Implement fall precautions  Flowsheets (Taken 3/10/2021 1955)  Environmental Precautions:   Treaded Slipper Socks on Patient   Personal Belongings, Wastebasket, Call Bell etc. in Easy Reach   Transferred to Stronger Side   Report Given to Other Health Care Providers Regarding Fall Risk   Bed in Low Position   Communication Sign for Patients & Families   Mobility Assessed & Appropriate Sign Placed

## 2021-03-11 NOTE — PROGRESS NOTES
Hospital Medicine Daily Progress Note    Date of Service  3/11/2021    Chief Complaint  89 y.o. male admitted 3/10/2021 with abnormal blood sugar    Hospital Course  Patient is a 89-year-old gentleman lives at home with his wife who is 83.  He is known to be diabetic.  His blood sugar apparently was over 500 and so he called his primary care physician but they were unavailable so when he had his cardiology appointment they were told him to go to the emergency room.  The patient in the emergency room was evaluated and did have blood sugar above 500 so he was admitted for hyperglycemia.    Interval Problem Update  Hemoglobin A1c is 11 showing very poor control  Diabetic education  Electrolyte replacement  Needs PT OT evaluation  Pain management  Possibly needs home health    Consultants/Specialty  None    Code Status  DNAR/DNI    Disposition  Most likely home with home health once stabilized    Review of Systems  Review of Systems   Unable to perform ROS: Acuity of condition        Physical Exam  Temp:  [36.1 °C (97 °F)-36.5 °C (97.7 °F)] 36.2 °C (97.1 °F)  Pulse:  [66-77] 72  Resp:  [18] 18  BP: (110-161)/(57-85) 115/57  SpO2:  [91 %-97 %] 95 %    Physical Exam  Vitals and nursing note reviewed.   Constitutional:       General: He is not in acute distress.     Appearance: Normal appearance. He is normal weight. He is not toxic-appearing.   HENT:      Head: Normocephalic and atraumatic.      Right Ear: External ear normal.      Left Ear: External ear normal.      Nose: Nose normal.      Mouth/Throat:      Mouth: Mucous membranes are moist.      Pharynx: Oropharynx is clear.   Eyes:      Extraocular Movements: Extraocular movements intact.      Conjunctiva/sclera: Conjunctivae normal.      Pupils: Pupils are equal, round, and reactive to light.   Neck:      Thyroid: No thyromegaly.      Vascular: No JVD.   Cardiovascular:      Rate and Rhythm: Normal rate and regular rhythm.      Heart sounds: No murmur.   Pulmonary:       Effort: Pulmonary effort is normal.      Breath sounds: Normal breath sounds. No wheezing or rales.   Chest:      Chest wall: No tenderness.   Abdominal:      General: Abdomen is flat. Bowel sounds are normal. There is no distension.      Palpations: Abdomen is soft. There is no mass.      Tenderness: There is no abdominal tenderness. There is no guarding or rebound.   Musculoskeletal:         General: No tenderness. Normal range of motion.      Cervical back: Normal range of motion and neck supple.   Lymphadenopathy:      Cervical: No cervical adenopathy.   Skin:     General: Skin is warm and dry.      Capillary Refill: Capillary refill takes more than 3 seconds.      Findings: No erythema or rash.   Neurological:      General: No focal deficit present.      Mental Status: He is alert. He is disoriented.      GCS: GCS eye subscore is 4. GCS verbal subscore is 5. GCS motor subscore is 6.      Cranial Nerves: No cranial nerve deficit.      Deep Tendon Reflexes: Reflexes are normal and symmetric.   Psychiatric:         Attention and Perception: Attention normal.         Mood and Affect: Mood is anxious. Affect is labile.         Speech: Speech is delayed and slurred.         Behavior: Behavior is agitated and slowed.         Cognition and Memory: Cognition is impaired. Memory is impaired.         Judgment: Judgment is impulsive.         Fluids    Intake/Output Summary (Last 24 hours) at 3/11/2021 1158  Last data filed at 3/11/2021 0900  Gross per 24 hour   Intake 180 ml   Output 800 ml   Net -620 ml       Laboratory  Recent Labs     03/10/21  1620 03/11/21  0014   WBC 5.9 7.2   RBC 5.06 5.07   HEMOGLOBIN 15.6 15.6   HEMATOCRIT 45.0 45.5   MCV 88.9 89.7   MCH 30.8 30.8   MCHC 34.7 34.3   RDW 41.2 41.3   PLATELETCT 193 210   MPV 11.2 11.3     Recent Labs     03/09/21  1042 03/10/21  1620 03/11/21  0014   SODIUM 133* 135 132*   POTASSIUM 3.3* 3.2* 2.8*   CHLORIDE 92* 93* 90*   CO2 29 26 31   GLUCOSE 502* 524* 332*    BUN 26* 26* 24*   CREATININE 0.94 0.99 0.89   CALCIUM 9.3 9.4 9.7                   Imaging  CT-ABDOMEN-PELVIS W/O    (Results Pending)        Assessment/Plan  * DM (diabetes mellitus) (CMS-[facility])- (present on admission)  Assessment & Plan  Patient has uncontrolled diabetes mellitus type 2.  Hemoglobin A1c is 11.1.  The patient is currently on Lantus 50 units subcutaneously nightly  He is on insulin regular with sliding scale.  Needs a diabetic diet  Diabetic teaching      Hyperglycemia  Assessment & Plan  Initial blood sugars were 5-600.  Patient was taking his medications appropriately and he has been placed back on Lantus 50 units subcutaneously nightly.  Give fluid resuscitation  Monitor electrolytes and supplement      Urinary retention- (present on admission)  Assessment & Plan  Continue with finasteride and Flomax  Sher catheter if needed  Monitor intake and output      Hypokalemia- (present on admission)  Assessment & Plan  Potassium level is very low at 2.8.  The patient will be supplemented with potassium in the IV form and he has already been tried in the oral form and actually dropped further down.      CAD (coronary artery disease) - CABG Nov. 2004- (present on admission)  Assessment & Plan  Optimize coronary artery disease management currently chest pain-free.    Hypothyroidism  Assessment & Plan  -supportive measures with synthroid supplementation at 25 mcg/day have been started  -latest TSH is 6.720 and this is with in establish normal guidlines     ACP (advance care planning)- (present on admission)  Assessment & Plan  DNR/DNI  POLST on file    BPH (benign prostatic hyperplasia)- (present on admission)  Assessment & Plan  Patient at this point is on a combination of finasteride and tamsulosin.  Does have some urinary retention continue to monitor intake and output    HTN (hypertension)- (present on admission)  Assessment & Plan  Optimize blood pressure management keep systolic blood pressure less  than 140 diastolic under 90  Continue with Norvasc 5 mg daily, I will chlorthalidone 25 mg daily, metoprolol 12.5 mg twice daily         VTE prophylaxis: Lovenox

## 2021-03-11 NOTE — ASSESSMENT & PLAN NOTE
Potassium level is very low at 2.8.  The patient will be supplemented with potassium in the IV form and he has already been tried in the oral form and actually dropped further down.

## 2021-03-11 NOTE — ASSESSMENT & PLAN NOTE
Patient at this point is on a combination of finasteride and tamsulosin.  Does have some urinary retention continue to monitor intake and output

## 2021-03-11 NOTE — PROGRESS NOTES
2 Rn skin assessment complete. Bilateral redness and 2+ edema to lower extremities. Healing scab on L shin. Calloused, dry elbows. Skin is otherwise intact.

## 2021-03-11 NOTE — ED NOTES
Kristy from Lab called with critical result of glucose 524 at 1702. Critical lab result read back to Kristy.  Dr. Caldwell notified of critical lab result at 1703.  Critical lab result read back by Dr. Caldwell.

## 2021-03-12 VITALS
TEMPERATURE: 98.8 F | WEIGHT: 171.74 LBS | RESPIRATION RATE: 18 BRPM | OXYGEN SATURATION: 95 % | HEART RATE: 71 BPM | BODY MASS INDEX: 26.96 KG/M2 | DIASTOLIC BLOOD PRESSURE: 61 MMHG | SYSTOLIC BLOOD PRESSURE: 105 MMHG | HEIGHT: 67 IN

## 2021-03-12 LAB
GLUCOSE BLD-MCNC: 197 MG/DL (ref 65–99)
GLUCOSE BLD-MCNC: 316 MG/DL (ref 65–99)

## 2021-03-12 PROCEDURE — 700102 HCHG RX REV CODE 250 W/ 637 OVERRIDE(OP): Performed by: HOSPITALIST

## 2021-03-12 PROCEDURE — 700102 HCHG RX REV CODE 250 W/ 637 OVERRIDE(OP): Performed by: INTERNAL MEDICINE

## 2021-03-12 PROCEDURE — 96372 THER/PROPH/DIAG INJ SC/IM: CPT

## 2021-03-12 PROCEDURE — A9270 NON-COVERED ITEM OR SERVICE: HCPCS | Performed by: HOSPITALIST

## 2021-03-12 PROCEDURE — 99217 PR OBSERVATION CARE DISCHARGE: CPT | Performed by: HOSPITALIST

## 2021-03-12 PROCEDURE — G0378 HOSPITAL OBSERVATION PER HR: HCPCS

## 2021-03-12 PROCEDURE — 700111 HCHG RX REV CODE 636 W/ 250 OVERRIDE (IP): Performed by: INTERNAL MEDICINE

## 2021-03-12 PROCEDURE — A9270 NON-COVERED ITEM OR SERVICE: HCPCS | Performed by: INTERNAL MEDICINE

## 2021-03-12 PROCEDURE — 82962 GLUCOSE BLOOD TEST: CPT

## 2021-03-12 RX ORDER — LEVOTHYROXINE SODIUM 0.03 MG/1
25 TABLET ORAL
Qty: 30 TABLET | Refills: 3 | Status: SHIPPED | OUTPATIENT
Start: 2021-03-13

## 2021-03-12 RX ADMIN — TAMSULOSIN HYDROCHLORIDE 0.4 MG: 0.4 CAPSULE ORAL at 05:02

## 2021-03-12 RX ADMIN — SENNOSIDES AND DOCUSATE SODIUM 2 TABLET: 8.6; 5 TABLET ORAL at 05:02

## 2021-03-12 RX ADMIN — INSULIN HUMAN 10 UNITS: 100 INJECTION, SOLUTION PARENTERAL at 10:50

## 2021-03-12 RX ADMIN — FINASTERIDE 5 MG: 5 TABLET, FILM COATED ORAL at 05:03

## 2021-03-12 RX ADMIN — AMLODIPINE BESYLATE 5 MG: 5 TABLET ORAL at 05:02

## 2021-03-12 RX ADMIN — INSULIN HUMAN 3 UNITS: 100 INJECTION, SOLUTION PARENTERAL at 05:58

## 2021-03-12 RX ADMIN — GABAPENTIN 100 MG: 100 CAPSULE ORAL at 05:02

## 2021-03-12 RX ADMIN — ENOXAPARIN SODIUM 40 MG: 40 INJECTION SUBCUTANEOUS at 05:03

## 2021-03-12 RX ADMIN — METOPROLOL TARTRATE 12.5 MG: 25 TABLET, FILM COATED ORAL at 05:02

## 2021-03-12 RX ADMIN — CHLORTHALIDONE 25 MG: 25 TABLET ORAL at 05:03

## 2021-03-12 RX ADMIN — LEVOTHYROXINE SODIUM 25 MCG: 25 TABLET ORAL at 05:03

## 2021-03-12 RX ADMIN — POTASSIUM CHLORIDE 20 MEQ: 1500 TABLET, EXTENDED RELEASE ORAL at 05:03

## 2021-03-12 NOTE — DISCHARGE SUMMARY
"Discharge Summary    CHIEF COMPLAINT ON ADMISSION  Chief Complaint   Patient presents with   • Abnormal Labs     Per pt \"my glucose is abnormal\"       Reason for Admission  abnormal labs, fatigue,      Admission Date  3/10/2021    CODE STATUS  DNAR/DNI    HPI & HOSPITAL COURSE  This is a 89 y.o. male here with uncontrolled blood sugars.  The patient states that it was his fault because he was drinking juices with high sugar content including orange juice and Yong-Aid.  The patient's blood sugar management prior to that he says has been good.  His hemoglobin A1c is 11.1 however.  Initial blood sugar was over 500.  Patient was thus admitted and blood sugar control was achieved.  We gave him electrolyte supplementation as well.  Patient was evaluated for home safety, he does ambulate with a walker independently.  The patient lives with his wife at home whom he apparently takes care of also.  He does most of the cooking at home.  Patient at this point has been given diabetic education and we have modified his overall management.  Patient now has been stabilized he does not want to go to a extended care facility for rehab but he wants home health.  Home health has been ordered for him.  Patient will be discharged home with outpatient follow-ups and monitoring in a safe condition.  We will ask home health to monitor his blood sugars on a regular basis as well.    The patient was taking 50 units of Lantus insulin daily.  This is already reaching the maximum allowed a dose and his he blood sugars have not been well controlled with this.  At this point we are going to switch him over to NPH 70/30 and using the pen he can do 25 units twice daily which will be actually a higher dose than his 50 units of Lantus.  This can also be titrated to a much higher dose and his primary care physician will need to actually titrate this to make sure his blood sugars are well controlled without him having hypoglycemic episodes or severe " hypoglycemia.    Therefore, he is discharged in good and stable condition to home with close outpatient follow-up.    The patient recovered much more quickly than anticipated on admission.    Discharge Date  3/12/2021    FOLLOW UP ITEMS POST DISCHARGE  Follow-up with the primary care physician in 7 to 10 days    DISCHARGE DIAGNOSES  Principal Problem:    DM (diabetes mellitus) (CMS-[facility]) POA: Yes  Active Problems:    Urinary retention POA: Yes      Overview: IMO load March 2020    Hyperglycemia POA: Unknown    CAD (coronary artery disease) - CABG Nov. 2004 POA: Yes    Hypokalemia POA: Yes    HTN (hypertension) POA: Yes    BPH (benign prostatic hyperplasia) POA: Yes    ACP (advance care planning) POA: Yes    Hypothyroidism POA: Unknown  Resolved Problems:    * No resolved hospital problems. *      FOLLOW UP  No future appointments.  No follow-up provider specified.    MEDICATIONS ON DISCHARGE     Medication List      START taking these medications      Instructions   Insulin NPH Isophane & Regular (70-30) 100 UNIT/ML Supn   Inject 25 Units under the skin every 12 hours.  Dose: 25 Units     Insulin Pen Needle 31G X 5 MM Misc   1 Each 2 Times a Day.  Dose: 1 Each     levothyroxine 25 MCG Tabs  Start taking on: March 13, 2021  Commonly known as: SYNTHROID   Take 1 tablet by mouth Every morning on an empty stomach.  Dose: 25 mcg        CHANGE how you take these medications      Instructions   potassium Chloride ER 20 MEQ Tbcr tablet  What changed: how much to take  Commonly known as: K-TAB   Take 1 Tab by mouth 2 times a day.  Dose: 20 mEq        CONTINUE taking these medications      Instructions   amLODIPine 5 MG Tabs  Commonly known as: NORVASC   Take 1 Tab by mouth every day.  Dose: 5 mg     bacitracin-neomycin-polymyxin 400-5-5000 Oint  Commonly known as: Neosporin   Apply 1 Each to affected area(s) every day. To penis  Dose: 1 Each     chlorthalidone 25 MG Tabs  Commonly known as: HYGROTON   Take 1 Tab by mouth  every day.  Dose: 25 mg     finasteride 5 MG Tabs  Commonly known as: PROSCAR   Take 1 Tab by mouth every day.  Dose: 5 mg     fish oil 1000 MG Caps capsule   Take 2,000 mg by mouth 2 Times a Day.  Dose: 2,000 mg     gabapentin 100 MG Caps  Commonly known as: NEURONTIN   Take 100 mg by mouth 2 Times a Day.  Dose: 100 mg     metoprolol tartrate 25 MG Tabs  Commonly known as: LOPRESSOR   Take 12.5 mg by mouth 2 times a day.  Dose: 12.5 mg     nystatin 404272 UNIT/GM Crea topical cream  Commonly known as: MYCOSTATIN   Apply 0.0001 g to affected area(s) 2 Times a Day.  Dose: 1 Units     Prostate Tabs   Take 1 Tab by mouth 2 Times a Day.  Dose: 1 tablet     PX Mens Multivitamins Tabs   Take 1 Tab by mouth every day.  Dose: 1 tablet     Restasis 0.05 % ophthalmic emulsion  Generic drug: cyclosporin   Place 1 Drop in both eyes 2 times a day.  Dose: 1 Drop     simvastatin 10 MG Tabs  Commonly known as: ZOCOR   Take 10 mg by mouth every evening.  Dose: 10 mg     tamsulosin 0.4 MG capsule  Commonly known as: FLOMAX   Take 0.4 mg by mouth every morning.  Dose: 0.4 mg        STOP taking these medications    insulin glargine 100 UNIT/ML Sopn injection  Generic drug: insulin glargine            Allergies  Allergies   Allergen Reactions   • Iodine      Pt and pts wife report that it has been so long not sure what happens       DIET  Orders Placed This Encounter   Procedures   • Diet Order Diet: Consistent CHO (Diabetic)     Standing Status:   Standing     Number of Occurrences:   1     Order Specific Question:   Diet:     Answer:   Consistent CHO (Diabetic) [4]       ACTIVITY  As tolerated.  Weight bearing as tolerated    CONSULTATIONS  None    PROCEDURES  None    LABORATORY  Lab Results   Component Value Date    SODIUM 132 (L) 03/11/2021    POTASSIUM 2.8 (L) 03/11/2021    CHLORIDE 90 (L) 03/11/2021    CO2 31 03/11/2021    GLUCOSE 332 (H) 03/11/2021    BUN 24 (H) 03/11/2021    CREATININE 0.89 03/11/2021    CREATININE 1.0  12/02/2008        Lab Results   Component Value Date    WBC 7.2 03/11/2021    HEMOGLOBIN 15.6 03/11/2021    HEMATOCRIT 45.5 03/11/2021    PLATELETCT 210 03/11/2021        Total time of the discharge process exceeds 37 minutes.

## 2021-03-12 NOTE — CARE PLAN
Problem: Safety  Goal: Will remain free from injury  Outcome: PROGRESSING AS EXPECTED  Goal: Will remain free from falls  Outcome: PROGRESSING AS EXPECTED     Problem: Knowledge Deficit  Goal: Knowledge of disease process/condition, treatment plan, diagnostic tests, and medications will improve  Outcome: PROGRESSING AS EXPECTED     Problem: Urinary Elimination:  Goal: Ability to reestablish a normal urinary elimination pattern will improve  Outcome: PROGRESSING AS EXPECTED

## 2021-03-12 NOTE — DISCHARGE PLANNING
Received Choice form at 4315  Agency/Facility Name: [facility]  Referral sent per Choice form @ [id number]

## 2021-03-12 NOTE — PROGRESS NOTES
Potassium replaced today. Frequently stands with stress incontinence, urinating bed and floor. Towards end of shift becoming disoriented to place.

## 2021-03-12 NOTE — CARE PLAN
Problem: Communication  Goal: The ability to communicate needs accurately and effectively will improve  Outcome: PROGRESSING AS EXPECTED     Problem: Infection  Goal: Will remain free from infection  Outcome: PROGRESSING AS EXPECTED     Problem: Bowel/Gastric:  Goal: Normal bowel function is maintained or improved  Outcome: PROGRESSING AS EXPECTED  Goal: Will not experience complications related to bowel motility  Outcome: PROGRESSING AS EXPECTED     Problem: Mobility  Goal: Risk for activity intolerance will decrease  Outcome: PROGRESSING AS EXPECTED     Problem: Urinary Elimination:  Goal: Ability to reestablish a normal urinary elimination pattern will improve  Outcome: PROGRESSING SLOWER THAN EXPECTED

## 2021-03-12 NOTE — PROGRESS NOTES
Spoke with patient's daughter, Chantal. She will be here to go over patient's discharge instructions and  patient as soon as possible.

## 2021-03-12 NOTE — DISCHARGE INSTRUCTIONS
Discharge Instructions    Discharged to home by car with relative. Discharged via wheelchair, hospital escort: Yes.  Special equipment needed: Not Applicable    Be sure to schedule a follow-up appointment with your primary care doctor or any specialists as instructed.     Discharge Plan:   Diet Plan: Discussed  Activity Level: Discussed  Confirmed Follow up Appointment: Patient to Call and Schedule Appointment  Confirmed Symptoms Management: Discussed  Medication Reconciliation Updated: Yes  Influenza Vaccine Indication: Patient Refuses    I understand that a diet low in cholesterol, fat, and sodium is recommended for good health. Unless I have been given specific instructions below for another diet, I accept this instruction as my diet prescription.   Other diet: diabetic     Special Instructions: None    · Is patient discharged on Warfarin / Coumadin?   No     Depression / Suicide Risk    As you are discharged from this [facility] facility, it is important to learn how to keep safe from harming yourself.    Recognize the warning signs:  · Abrupt changes in personality, positive or negative- including increase in energy   · Giving away possessions  · Change in eating patterns- significant weight changes-  positive or negative  · Change in sleeping patterns- unable to sleep or sleeping all the time   · Unwillingness or inability to communicate  · Depression  · Unusual sadness, discouragement and loneliness  · Talk of wanting to die  · Neglect of personal appearance   · Rebelliousness- reckless behavior  · Withdrawal from people/activities they love  · Confusion- inability to concentrate     If you or a loved one observes any of these behaviors or has concerns about self-harm, here's what you can do:  · Talk about it- your feelings and reasons for harming yourself  · Remove any means that you might use to hurt yourself (examples: pills, rope, extension cords, firearm)  · Get professional help from the community  (Mental Health, Substance Abuse, psychological counseling)  · Do not be alone:Call your Safe Contact- someone whom you trust who will be there for you.  · Call your local CRISIS HOTLINE 236-4839 or 977-802-7077  · Call your local Children's Mobile Crisis Response Team [place] (804) 618-3953 or www.[employer]  · Call the toll free National Suicide Prevention Hotlines   · National Suicide Prevention Lifeline 504-578-CUEY (7571)  · National Hope Line Network 800-SUICIDE (178-7383)      Isophane Insulin (NPH) injection  What is this medicine?  ISOPHANE INSULIN (NPH) (EYE ahsan fane IN centeno eric) is a human-made form of insulin. This medicine lowers the amount of sugar in the blood. It is an intermediate-acting insulin that starts working about 1.5 hours after it is injected.  This medicine may be used for other purposes; ask your health care provider or pharmacist if you have questions.  COMMON BRAND NAME(S): Humulin N, Novolin N, ReliOn  What should I tell my health care provider before I take this medicine?  They need to know if you have any of these conditions:  · episodes of hypoglycemia  · eye disease, vision problems  · kidney disease  · liver disease  · an unusual or allergic reaction to insulin, metacresol, other medicines, foods, dyes, or preservatives  · pregnant or trying to get pregnant  · breast-feeding  How should I use this medicine?  Insulin is for injection under the skin. Use exactly as directed. It is important to follow the directions given to you by your health care professional or doctor. You will be taught how to use this medicine and how to adjust doses for activities and illness. Do not use more insulin than prescribed. Do not use more or less often than prescribed.  Always check the appearance of your insulin before using it. This medicine should be white and cloudy. Do not use it if it is not uniformly cloudy after mixing. If you use a pen, be sure to take off the outer needle cover  before using the dose. It is important that you put your used needles and syringes in a special sharps container. Do not put them in a trash can. If you do not have a sharps container, call your pharmacist or healthcare provider to get one.  Talk to your pediatrician regarding the use of this medicine in children. While this drug may be prescribed for children for selected conditions, precautions do apply.  Overdosage: If you think you have taken too much of this medicine contact a poison control center or emergency room at once.  NOTE: This medicine is only for you. Do not share this medicine with others.  What if I miss a dose?  It is important not to miss a dose. Your health care professional or doctor should discuss a plan for missed doses with you. If you do miss a dose, follow their plan. Do not take double doses.  What may interact with this medicine?  · other medicines for diabetes  Many medications may cause an increase or decrease in blood sugar, these include:  · alcohol containing beverages  · aspirin and aspirin-like drugs  · chloramphenicol  · chromium  · diuretics  · female hormones, like estrogens or progestins and birth control pills  · heart medicines  · isoniazid  · male hormones or anabolic steroids  · medicines for weight loss  · medicines for allergies, asthma, cold, or cough  · medicines for mental problems  · medicines called MAO Inhibitors like Nardil, Parnate, Marplan, Eldepryl  · niacin  · NSAIDs, medicines for pain and inflammation, like ibuprofen or naproxen  · pentamidine  · phenytoin  · probenecid  · quinolone antibiotics like ciprofloxacin, levofloxacin, ofloxacin  · some herbal dietary supplements  · steroid medicines like prednisone or cortisone  · thyroid medicine  Some medications can hide the warning symptoms of low blood sugar. You may need to monitor your blood sugar more closely if you are taking one of these medications. These include:  · beta-blockers such as atenolol,  metoprolol, propranolol  · clonidine  · guanethidine  · reserpine  This list may not describe all possible interactions. Give your health care provider a list of all the medicines, herbs, non-prescription drugs, or dietary supplements you use. Also tell them if you smoke, drink alcohol, or use illegal drugs. Some items may interact with your medicine.  What should I watch for while using this medicine?  Visit your health care professional or doctor for regular checks on your progress.  A test called the HbA1C (A1C) will be monitored. This is a simple blood test. It measures your blood sugar control over the last 2 to 3 months. You will receive this test every 3 to 6 months.  Learn how to check your blood sugar. Learn the symptoms of low and high blood sugar and how to manage them.  Always carry a quick-source of sugar with you in case you have symptoms of low blood sugar. Examples include hard sugar candy or glucose tablets. Make sure others know that you can choke if you eat or drink when you develop serious symptoms of low blood sugar, such as seizures or unconsciousness. They must get medical help at once.  Tell your doctor or health care professional if you have high blood sugar. You might need to change the dose of your medicine. If you are sick or exercising more than usual, you might need to change the dose of your medicine.  Do not skip meals. Ask your doctor or health care professional if you should avoid alcohol. Many nonprescription cough and cold products contain sugar or alcohol. These can affect blood sugar.  Make sure that you have the right kind of syringe for the type of insulin you use. Try not to change the brand and type of insulin or syringe unless your health care professional or doctor tells you to. Switching insulin brand or type can cause dangerously high or low blood sugar. Always keep an extra supply of insulin, syringes, and needles on hand. Use a syringe one time only. Throw away syringe  and needle in a closed container to prevent accidental needle sticks.  Insulin pens and cartridges should never be shared. Even if the needle is changed, sharing may result in passing of viruses like hepatitis or HIV.  Each time you get a new box of pen needles, check to see if they are the same type as the ones you were trained to use. If not, ask your health care professional to show you how to use this new type properly.  Wear a medical ID bracelet or chain, and carry a card that describes your disease and details of your medicine and dosage times.  What side effects may I notice from receiving this medicine?  Side effects that you should report to your doctor or health care professional as soon as possible:  · allergic reactions like skin rash, itching or hives, swelling of the face, lips, or tongue  · breathing problems  · signs and symptoms of high blood sugar such as dizziness, dry mouth, dry skin, fruity breath, nausea, stomach pain, increased hunger or thirst, increased urination  · signs and symptoms of low blood sugar such as feeling anxious, confusion, dizziness, increased hunger, unusually weak or tired, sweating, shakiness, cold, irritable, headache, blurred vision, fast heartbeat, loss of consciousness  Side effects that usually do not require medical attention (report to your doctor or health care professional if they continue or are bothersome):  · increase or decrease in fatty tissue under the skin due to overuse of a particular injection site  · itching, burning, swelling, or rash at site where injected  This list may not describe all possible side effects. Call your doctor for medical advice about side effects. You may report side effects to FDA at 3-670-FDA-1159.  Where should I keep my medicine?  Keep out of the reach of children.  Unopened Vials:  Humulin N Vials: Store in a refrigerator between 2 and 8 degrees C (36 and 46 degrees F) or at room temperature below 30 degrees C (86 degrees F).  Do not freeze or use if the insulin has been frozen. Protect from light and excessive heat. If stored at room temperature, the vial must be discarded after 31 days. Throw away any unopened and unused medicine that has been stored in the refrigerator after the expiration date.  Novolin N Vials: Store in a refrigerator between 2 and 8 degrees C (36 and 46 degrees F) or at room temperature below 25 degrees C (77 degrees F). Do not freeze or use if the insulin has been frozen. Protect from light and excessive heat. If stored at room temperature, the vial must be discarded after 42 days. Throw away any unopened and unused medicine that has been stored in the refrigerator after the expiration date.  Unopened Pens:  Humulin N KwikPens: Store in a refrigerator between 2 and 8 degrees C (36 and 46 degrees F) or at room temperature below 30 degrees C (86 degrees F). Do not freeze or use if the insulin has been frozen. Protect from light and excessive heat. If stored at room temperature, the pen must be discarded after 14 days. Throw away any unopened and unused medicine that has been stored in the refrigerator after the expiration date.  Novolin N FlexPens: Store in a refrigerator between 2 and 8 degrees C (36 and 46 degrees F) or at room temperature below 30 degrees C (86 degrees F). Do not freeze or use if the insulin has been frozen. Protect from light and excessive heat. If stored at room temperature, the pen must be discarded after 28 days. Throw away any unopened and unused medicine that has been stored in the refrigerator after the expiration date.  Vials that you are using:  Humulin N vials: Store in the refrigerator or at room temperature below 30 degrees C (86 degrees F). Do not freeze. Keep away from heat and light. Throw the opened vial away after 31 days.  Novolin N vials: Store at room temperature below 25 degrees C (77 degrees F). Do not refrigerate. Do not freeze. Keep away from heat and light. Throw the  opened vial away after 42 days.  Pens that you are using:  Humulin N KwikPens: Store at room temperature below 30 degrees C (86 degrees F). Do not refrigerate or freeze. Keep away from heat and light. Throw the pen away after 14 days, even if it still has insulin left in it.  Novolin N FlexPens: Store at room temperature below 30 degrees C (86 degrees F). Do not refrigerate or freeze. Keep away from heat and light. Throw the pen away after 28 days, even if it still has insulin left in it.  NOTE: This sheet is a summary. It may not cover all possible information. If you have questions about this medicine, talk to your doctor, pharmacist, or health care provider.  © 2020 [employer]Gold Standard (2020-03-24 14:38:17)    Levothyroxine tablets  What is this medicine?  LEVOTHYROXINE (manoj voe thye SARAY een) is a thyroid hormone. This medicine can improve symptoms of thyroid deficiency such as slow speech, lack of energy, weight gain, hair loss, dry skin, and feeling cold. It also helps to treat goiter (an enlarged thyroid gland). It is also used to treat some kinds of thyroid cancer along with surgery and other medicines.  This medicine may be used for other purposes; ask your health care provider or pharmacist if you have questions.  COMMON BRAND NAME(S): Estre, Euthyrox, Levo-T, Levothroid, Levoxyl, Synthroid, Thyro-Tabs, Unithroid  What should I tell my health care provider before I take this medicine?  They need to know if you have any of these conditions:  · Brian's disease or other adrenal gland problem  · angina  · bone problems  · diabetes  · dieting or on a weight loss program  · fertility problems  · heart disease  · pituitary gland problem  · take medicines that treat or prevent blood clots  · an unusual or allergic reaction to levothyroxine, thyroid hormones, other medicines, foods, dyes, or preservatives  · pregnant or trying to get pregnant  · breast-feeding  How should I use this medicine?  Take this  medicine by mouth with plenty of water. It is best to take on an empty stomach, at least 30 minutes before or 2 hours after food. Follow the directions on the prescription label. Take at the same time each day. Do not take your medicine more often than directed.  Contact your pediatrician regarding the use of this medicine in children. While this drug may be prescribed for children and infants as young as a few days of age for selected conditions, precautions do apply. For infants, you may crush the tablet and place in a small amount of (5-10 ml or 1 to 2 teaspoonfuls) of water, breast milk, or non-soy based infant formula. Do not mix with soy-based infant formula. Give as directed.  Overdosage: If you think you have taken too much of this medicine contact a poison control center or emergency room at once.  NOTE: This medicine is only for you. Do not share this medicine with others.  What if I miss a dose?  If you miss a dose, take it as soon as you can. If it is almost time for your next dose, take only that dose. Do not take double or extra doses.  What may interact with this medicine?  · amiodarone  · antacids  · anti-thyroid medicines  · calcium supplements  · carbamazepine  · certain medicines for depression  · certain medicines to treat cancer  · cholestyramine  · clofibrate  · colesevelam  · colestipol  · digoxin  · female hormones, like estrogens or progestins and birth control pills, patches, rings, or injections  · iron supplements  · kayexylate  · ketamine  · liquid nutrition products like Ensure  · lithium  · medicines for colds and breathing difficulties  · medicines for diabetes  · medicines or dietary supplements for weight loss  · methadone  · niacin  · orlistat  · oxandrolone  · phenobarbital or other barbiturates  · phenytoin  · rifampin  · sevelamer  · simethicone  · soy isoflavones  · steroid medicines like prednisone or cortisone  · sucralfate  · testosterone  · theophylline  · warfarin  This  list may not describe all possible interactions. Give your health care provider a list of all the medicines, herbs, non-prescription drugs, or dietary supplements you use. Also tell them if you smoke, drink alcohol, or use illegal drugs. Some items may interact with your medicine.  What should I watch for while using this medicine?  Be sure to take this medicine with plenty of fluids. Some tablets may cause choking, gagging, or difficulty swallowing from the tablet getting stuck in your throat. Most of these problems disappear if the medicine is taken with the right amount of water or other fluids.  Do not switch brands of this medicine unless your health care professional agrees with the change. Ask questions if you are uncertain.  You will need regular exams and occasional blood tests to check the response to treatment. If you are receiving this medicine for an underactive thyroid, it may be several weeks before you notice an improvement. Check with your doctor or health care professional if your symptoms do not improve.  It may be necessary for you to take this medicine for the rest of your life. Do not stop using this medicine unless your doctor or health care professional advises you to.  This medicine can affect blood sugar levels. If you have diabetes, check your blood sugar as directed.  You may lose some of your hair when you first start treatment. With time, this usually corrects itself.  If you are going to have surgery, tell your doctor or health care professional that you are taking this medicine.  What side effects may I notice from receiving this medicine?  Side effects that you should report to your doctor or health care professional as soon as possible:  · allergic reactions like skin rash, itching or hives, swelling of the face, lips, or tongue  · anxious  · breathing problems  · changes in menstrual periods  · chest pain  · diarrhea  · excessive sweating or intolerance to heat  · fast or irregular  heartbeat  · leg cramps  · nervousness  · swelling of ankles, feet, or legs  · tremors  · trouble sleeping  · vomiting  Side effects that usually do not require medical attention (report to your doctor or health care professional if they continue or are bothersome):  · changes in appetite  · headache  · irritable  · nausea  · weight loss  This list may not describe all possible side effects. Call your doctor for medical advice about side effects. You may report side effects to FDA at 4-445-ESM-2923.  Where should I keep my medicine?  Keep out of the reach of children.  Store at room temperature between 15 and 30 degrees C (59 and 86 degrees F). Protect from light and moisture. Keep container tightly closed. Throw away any unused medicine after the expiration date.  NOTE: This sheet is a summary. It may not cover all possible information. If you have questions about this medicine, talk to your doctor, pharmacist, or health care provider.  © 2020 [employer]/Gold Standard (2018-01-29 15:39:30)      Diabetes Mellitus and Nutrition, Adult  When you have diabetes (diabetes mellitus), it is very important to have healthy eating habits because your blood sugar (glucose) levels are greatly affected by what you eat and drink. Eating healthy foods in the appropriate amounts, at about the same times every day, can help you:  · Control your blood glucose.  · Lower your risk of heart disease.  · Improve your blood pressure.  · Reach or maintain a healthy weight.  Every person with diabetes is different, and each person has different needs for a meal plan. Your health care provider may recommend that you work with a diet and nutrition specialist (dietitian) to make a meal plan that is best for you. Your meal plan may vary depending on factors such as:  · The calories you need.  · The medicines you take.  · Your weight.  · Your blood glucose, blood pressure, and cholesterol levels.  · Your activity level.  · Other health conditions  "you have, such as heart or kidney disease.  How do carbohydrates affect me?  Carbohydrates, also called carbs, affect your blood glucose level more than any other type of food. Eating carbs naturally raises the amount of glucose in your blood. Carb counting is a method for keeping track of how many carbs you eat. Counting carbs is important to keep your blood glucose at a healthy level, especially if you use insulin or take certain oral diabetes medicines.  It is important to know how many carbs you can safely have in each meal. This is different for every person. Your dietitian can help you calculate how many carbs you should have at each meal and for each snack.  Foods that contain carbs include:  · Bread, cereal, rice, pasta, and crackers.  · Potatoes and corn.  · Peas, beans, and lentils.  · Milk and yogurt.  · Fruit and juice.  · Desserts, such as cakes, cookies, ice cream, and candy.  How does alcohol affect me?  Alcohol can cause a sudden decrease in blood glucose (hypoglycemia), especially if you use insulin or take certain oral diabetes medicines. Hypoglycemia can be a life-threatening condition. Symptoms of hypoglycemia (sleepiness, dizziness, and confusion) are similar to symptoms of having too much alcohol.  If your health care provider says that alcohol is safe for you, follow these guidelines:  · Limit alcohol intake to no more than 1 drink per day for nonpregnant women and 2 drinks per day for men. One drink equals 12 oz of beer, 5 oz of wine, or 1½ oz of hard liquor.  · Do not drink on an empty stomach.  · Keep yourself hydrated with water, diet soda, or unsweetened iced tea.  · Keep in mind that regular soda, juice, and other mixers may contain a lot of sugar and must be counted as carbs.  What are tips for following this plan?    Reading food labels  · Start by checking the serving size on the \"Nutrition Facts\" label of packaged foods and drinks. The amount of calories, carbs, fats, and other " "nutrients listed on the label is based on one serving of the item. Many items contain more than one serving per package.  · Check the total grams (g) of carbs in one serving. You can calculate the number of servings of carbs in one serving by dividing the total carbs by 15. For example, if a food has 30 g of total carbs, it would be equal to 2 servings of carbs.  · Check the number of grams (g) of saturated and trans fats in one serving. Choose foods that have low or no amount of these fats.  · Check the number of milligrams (mg) of salt (sodium) in one serving. Most people should limit total sodium intake to less than 2,300 mg per day.  · Always check the nutrition information of foods labeled as \"low-fat\" or \"nonfat\". These foods may be higher in added sugar or refined carbs and should be avoided.  · Talk to your dietitian to identify your daily goals for nutrients listed on the label.  Shopping  · Avoid buying canned, premade, or processed foods. These foods tend to be high in fat, sodium, and added sugar.  · Shop around the outside edge of the grocery store. This includes fresh fruits and vegetables, bulk grains, fresh meats, and fresh dairy.  Cooking  · Use low-heat cooking methods, such as baking, instead of high-heat cooking methods like deep frying.  · Cook using healthy oils, such as olive, canola, or sunflower oil.  · Avoid cooking with butter, cream, or high-fat meats.  Meal planning  · Eat meals and snacks regularly, preferably at the same times every day. Avoid going long periods of time without eating.  · Eat foods high in fiber, such as fresh fruits, vegetables, beans, and whole grains. Talk to your dietitian about how many servings of carbs you can eat at each meal.  · Eat 4-6 ounces (oz) of lean protein each day, such as lean meat, chicken, fish, eggs, or tofu. One oz of lean protein is equal to:  ? 1 oz of meat, chicken, or fish.  ? 1 egg.  ? ¼ cup of tofu.  · Eat some foods each day that contain " healthy fats, such as avocado, nuts, seeds, and fish.  Lifestyle  · Check your blood glucose regularly.  · Exercise regularly as told by your health care provider. This may include:  ? 150 minutes of moderate-intensity or vigorous-intensity exercise each week. This could be brisk walking, biking, or water aerobics.  ? Stretching and doing strength exercises, such as yoga or weightlifting, at least 2 times a week.  · Take medicines as told by your health care provider.  · Do not use any products that contain nicotine or tobacco, such as cigarettes and e-cigarettes. If you need help quitting, ask your health care provider.  · Work with a counselor or diabetes educator to identify strategies to manage stress and any emotional and social challenges.  Questions to ask a health care provider  · Do I need to meet with a diabetes educator?  · Do I need to meet with a dietitian?  · What number can I call if I have questions?  · When are the best times to check my blood glucose?  Where to find more information:  · American Diabetes Association: diabetes.org  · Academy of Nutrition and Dietetics: www.eatright.org  · National Roma of Diabetes and Digestive and Kidney Diseases (NIH): www.niddk.nih.gov  Summary  · A healthy meal plan will help you control your blood glucose and maintain a healthy lifestyle.  · Working with a diet and nutrition specialist (dietitian) can help you make a meal plan that is best for you.  · Keep in mind that carbohydrates (carbs) and alcohol have immediate effects on your blood glucose levels. It is important to count carbs and to use alcohol carefully.  This information is not intended to replace advice given to you by your health care provider. Make sure you discuss any questions you have with your health care provider.  Document Released: 09/14/2006 Document Revised: 11/30/2018 Document Reviewed: 01/22/2018  Elsevier Patient Education © 2020 Elsevier Inc.

## 2021-03-12 NOTE — DISCHARGE PLANNING
Anticipated Discharge Disposition:   Home with HH     Action:   Chart review complete.   Per MD, patient to be discharged home today with HH. MD to place order.     0900: Appropriate HH order and face to face in place.    0905: RN met with patient to discuss HH. Patient agreeable to HH and stated he has had it before but cannot recall the name. Per chart review, it appears that patient had [facility] in 2020. At this time. [facility] only taking SCP.     HH form filled out with choice: 1- [facility], 2- [facility]     0915: choice form faxed to [facility].     1115: Informed by DPA that [facility] will not accept patient due to the patient not having been seen by PCP for a long term. RN CM informed patient and received choice for [facility] due to [facility] being able to set up PCP.     1120: Choice form faxed to [facility]    1215: Received a call from [facility]. [facility] is able to accept the patient if the patient is okay with a PCP coming to his home for a home visit.     1220: RN CM spoke with patient about above plan. Patient agreeable. MACK DACOSTA called [facility] number given ([phone]). No answer, voicemail left with call back number.     1225: Received confirmation from [facility] that patient has been accepted by [facility]. Bedside RN updated via Voalte. Referral to [facility] canceled. DPA to follow up with [facility].     Barriers to Discharge:   None     Plan:   Hospital care management will continue to assist with discharge planning needs.       Care Transition Team Assessment    Information Source  Orientation : Oriented x 4  Information Given By: Patient  Informant's Name: Julien Dao   Who is responsible for making decisions for patient? : Patient    Readmission Evaluation  Is this a readmission?: Yes - unplanned readmission  Why do you think you were readmitted?: rachana blood sugar    Elopement Risk  Legal Hold: No  Ambulatory or Self Mobile in Wheelchair: Yes  Disoriented: Situation-At Risk for Elopement  Psychiatric Symptoms: None  History  of Wandering: No  Elopement this Admit: No  Vocalizing Wanting to Leave: No  Displays Behaviors, Body Language Wanting to Leave: No-Not at Risk for Elopement  Elopement Risk: Not at Risk for Elopement    Interdisciplinary Discharge Planning  Does Admitting Nurse Feel This Could be a Complex Discharge?: No  Primary Care Physician: TABITHA LANDON  Lives with - Patient's Self Care Capacity: Significant Other  Patient or legal guardian wants to designate a caregiver: No  Support Systems: Family Member(s), Spouse / Significant Other  Housing / Facility: [address] [facility]  Do You Take your Prescribed Medications Regularly: Yes  Able to Return to Previous ADL's: Future Time w/Therapy  Mobility Issues: Yes  Prior Services: Skilled Home Health Services  Patient Prefers to be Discharged to:: home  Durable Medical Equipment: Not Applicable    Discharge Preparedness  What is your plan after discharge?: Home with help, Home health care  What are your discharge supports?: Spouse, Child  Prior Functional Level: Ambulatory, Needs Assist with Activities of Daily Living    Functional Assesment  Prior Functional Level: Ambulatory, Needs Assist with Activities of Daily Living    Finances  Financial Barriers to Discharge: No  Prescription Coverage: Yes    Vision / Hearing Impairment  Vision Impairment : Yes  Right Eye Vision: Impaired, Wears Glasses  Left Eye Vision: Impaired, Wears Glasses  Hearing Impairment : Yes  Hearing Impairment: Both Ears, Hearing Device Not Available  Does Pt Need Special Equipment for the Hearing Impaired?: No    Advance Directive  Advance Directive?: POLST    Domestic Abuse  Have you ever been the victim of abuse or violence?: No  Physical Abuse or Sexual Abuse: No  Verbal Abuse or Emotional Abuse: No  Possible Abuse/Neglect Reported to:: Not Applicable    Psychological Assessment  History of Substance Abuse: None  History of Psychiatric Problems: No    Discharge Risks or Barriers  Discharge risks or  barriers?: Complex medical needs  Patient risk factors: Vulnerable adult, Complex medical needs    Anticipated Discharge Information  Discharge Disposition: Discharged to home/self care (01)  Discharge Address: [address] [place], [place] [address]JONATHAN \[facility]\""   Discharge Contact Phone Number: [phone]

## 2021-03-12 NOTE — FACE TO FACE
Face to Face Supporting Documentation - Home Health    The encounter with this patient was in whole or in part the primary reason for home health admission.    Date of encounter:   Patient:                    MRN:                       YOB: 2021  Julien Dao  [id number]  3/7/1929     Home health to see patient for:  Skilled Nursing care for assessment, interventions & education, Registered dietitian consult, Medical social work consult, Home health aide, Physical Therapy evaluation and treatment and Occupational therapy evaluation and treatment    Skilled need for:  Exacerbation of Chronic Disease State uncontrolled diabetes mellitus type 2    Skilled nursing interventions to include:  Comment: Needs home assesment and evaluation for safety, home dietary management for diabetes, assurance he is safe at home and able to take care of himself and his wife    Homebound status evidenced by:  Need the aid of supportive devices such as crutches, canes, wheelchairs or walkers. Leaving home requires a considerable and taxing effort. There is a normal inability to leave the home.    Community Physician to provide follow up care: JOSE PowellPENID     Optional Interventions? No      I certify the face to face encounter for this home health care referral meets the CMS requirements and the encounter/clinical assessment with the patient was, in whole, or in part, for the medical condition(s) listed above, which is the primary reason for home health care. Based on my clinical findings: the service(s) are medically necessary, support the need for home health care, and the homebound criteria are met.  I certify that this patient has had a face to face encounter by myself.  Harley Vega M.D. - NPI: [id number]

## 2021-03-12 NOTE — PROGRESS NOTES
Called patient's daughter to notify about discharge. No answer. Message left. Also attempted to call patient's significant other. No answer.

## 2021-03-12 NOTE — PROGRESS NOTES
Bedside report completed with MACK Watson. Assumed care of patient. Patient is currently sleeping. Bed in low position. Bed alarm on.

## 2021-03-12 NOTE — PROGRESS NOTES
Pt resting in bed, resp even/unlabored. Pt denies pain or needs at this time. Call light in reach, bed in lowest position.

## 2021-03-12 NOTE — DISCHARGE PLANNING
Received Choice form at 1141  Agency/Facility Name: [facility]  Referral sent per Choice form @ 3728     @2429  Pt is accepted at [facility]  Notified Smaantha GIRON CM

## 2021-04-29 ENCOUNTER — HOSPITAL ENCOUNTER (OUTPATIENT)
Facility: MEDICAL CENTER | Age: 86
End: 2021-05-03
Attending: EMERGENCY MEDICINE | Admitting: HOSPITALIST
Payer: MEDICARE

## 2021-04-29 ENCOUNTER — HOSPITAL ENCOUNTER (EMERGENCY)
Facility: MEDICAL CENTER | Age: 86
End: 2021-04-29
Payer: MEDICARE

## 2021-04-29 DIAGNOSIS — E87.6 ACUTE HYPOKALEMIA: ICD-10-CM

## 2021-04-29 DIAGNOSIS — E11.69 TYPE 2 DIABETES MELLITUS WITH OTHER SPECIFIED COMPLICATION, WITH LONG-TERM CURRENT USE OF INSULIN (HCC): ICD-10-CM

## 2021-04-29 DIAGNOSIS — E86.0 DEHYDRATION: ICD-10-CM

## 2021-04-29 DIAGNOSIS — Z79.4 CONTROLLED TYPE 2 DIABETES MELLITUS WITH HYPERGLYCEMIA, WITH LONG-TERM CURRENT USE OF INSULIN (HCC): ICD-10-CM

## 2021-04-29 DIAGNOSIS — E11.65 CONTROLLED TYPE 2 DIABETES MELLITUS WITH HYPERGLYCEMIA, WITH LONG-TERM CURRENT USE OF INSULIN (HCC): ICD-10-CM

## 2021-04-29 DIAGNOSIS — N17.9 ACUTE KIDNEY INJURY (NONTRAUMATIC) (HCC): ICD-10-CM

## 2021-04-29 DIAGNOSIS — Z79.4 TYPE 2 DIABETES MELLITUS WITH OTHER SPECIFIED COMPLICATION, WITH LONG-TERM CURRENT USE OF INSULIN (HCC): ICD-10-CM

## 2021-04-29 PROBLEM — E87.1 HYPONATREMIA: Status: ACTIVE | Noted: 2021-04-29

## 2021-04-29 PROBLEM — Z66 DNR (DO NOT RESUSCITATE): Status: ACTIVE | Noted: 2021-04-29

## 2021-04-29 LAB
ALBUMIN SERPL BCP-MCNC: 4 G/DL (ref 3.2–4.9)
ALBUMIN/GLOB SERPL: 1.4 G/DL
ALP SERPL-CCNC: 74 U/L (ref 30–99)
ALT SERPL-CCNC: 24 U/L (ref 2–50)
ANION GAP SERPL CALC-SCNC: 13 MMOL/L (ref 7–16)
ANION GAP SERPL CALC-SCNC: 14 MMOL/L (ref 7–16)
APPEARANCE UR: CLEAR
AST SERPL-CCNC: 15 U/L (ref 12–45)
B-OH-BUTYR SERPL-MCNC: 1.17 MMOL/L (ref 0.02–0.27)
BASE EXCESS BLDV CALC-SCNC: 3 MMOL/L
BASOPHILS # BLD AUTO: 0.5 % (ref 0–1.8)
BASOPHILS # BLD: 0.03 K/UL (ref 0–0.12)
BILIRUB SERPL-MCNC: 1 MG/DL (ref 0.1–1.5)
BILIRUB UR QL STRIP.AUTO: NEGATIVE
BODY TEMPERATURE: ABNORMAL CENTIGRADE
BUN SERPL-MCNC: 28 MG/DL (ref 8–22)
BUN SERPL-MCNC: 31 MG/DL (ref 8–22)
CALCIUM SERPL-MCNC: 9.1 MG/DL (ref 8.4–10.2)
CALCIUM SERPL-MCNC: 9.2 MG/DL (ref 8.4–10.2)
CHLORIDE SERPL-SCNC: 85 MMOL/L (ref 96–112)
CHLORIDE SERPL-SCNC: 95 MMOL/L (ref 96–112)
CO2 SERPL-SCNC: 25 MMOL/L (ref 20–33)
CO2 SERPL-SCNC: 28 MMOL/L (ref 20–33)
COLOR UR: YELLOW
CREAT SERPL-MCNC: 0.98 MG/DL (ref 0.5–1.4)
CREAT SERPL-MCNC: 1.04 MG/DL (ref 0.5–1.4)
EOSINOPHIL # BLD AUTO: 0.05 K/UL (ref 0–0.51)
EOSINOPHIL NFR BLD: 0.8 % (ref 0–6.9)
ERYTHROCYTE [DISTWIDTH] IN BLOOD BY AUTOMATED COUNT: 39.2 FL (ref 35.9–50)
GLOBULIN SER CALC-MCNC: 2.8 G/DL (ref 1.9–3.5)
GLUCOSE BLD-MCNC: 512 MG/DL (ref 65–99)
GLUCOSE BLD-MCNC: 553 MG/DL (ref 65–99)
GLUCOSE BLD-MCNC: 556 MG/DL (ref 65–99)
GLUCOSE SERPL-MCNC: 572 MG/DL (ref 65–99)
GLUCOSE SERPL-MCNC: 717 MG/DL (ref 65–99)
GLUCOSE UR STRIP.AUTO-MCNC: >=1000 MG/DL
HCO3 BLDV-SCNC: 30 MMOL/L (ref 24–28)
HCT VFR BLD AUTO: 41.4 % (ref 42–52)
HGB BLD-MCNC: 14.8 G/DL (ref 14–18)
IMM GRANULOCYTES # BLD AUTO: 0.02 K/UL (ref 0–0.11)
IMM GRANULOCYTES NFR BLD AUTO: 0.3 % (ref 0–0.9)
KETONES UR STRIP.AUTO-MCNC: ABNORMAL MG/DL
LEUKOCYTE ESTERASE UR QL STRIP.AUTO: NEGATIVE
LYMPHOCYTES # BLD AUTO: 1.45 K/UL (ref 1–4.8)
LYMPHOCYTES NFR BLD: 21.8 % (ref 22–41)
MAGNESIUM SERPL-MCNC: 2.1 MG/DL (ref 1.5–2.5)
MCH RBC QN AUTO: 30.8 PG (ref 27–33)
MCHC RBC AUTO-ENTMCNC: 35.7 G/DL (ref 33.7–35.3)
MCV RBC AUTO: 86.3 FL (ref 81.4–97.8)
MICRO URNS: ABNORMAL
MONOCYTES # BLD AUTO: 0.72 K/UL (ref 0–0.85)
MONOCYTES NFR BLD AUTO: 10.8 % (ref 0–13.4)
NEUTROPHILS # BLD AUTO: 4.39 K/UL (ref 1.82–7.42)
NEUTROPHILS NFR BLD: 65.8 % (ref 44–72)
NITRITE UR QL STRIP.AUTO: NEGATIVE
NRBC # BLD AUTO: 0 K/UL
NRBC BLD-RTO: 0 /100 WBC
PCO2 BLDV: 53.6 MMHG (ref 41–51)
PH BLDV: 7.37 [PH] (ref 7.31–7.45)
PH UR STRIP.AUTO: 6 [PH] (ref 5–8)
PLATELET # BLD AUTO: 197 K/UL (ref 164–446)
PMV BLD AUTO: 10.8 FL (ref 9–12.9)
PO2 BLDV: 22.4 MMHG (ref 25–40)
POTASSIUM SERPL-SCNC: 2.9 MMOL/L (ref 3.6–5.5)
POTASSIUM SERPL-SCNC: 3.6 MMOL/L (ref 3.6–5.5)
PROT SERPL-MCNC: 6.8 G/DL (ref 6–8.2)
PROT UR QL STRIP: NEGATIVE MG/DL
RBC # BLD AUTO: 4.8 M/UL (ref 4.7–6.1)
RBC UR QL AUTO: NEGATIVE
SAO2 % BLDV: 38.3 %
SODIUM SERPL-SCNC: 127 MMOL/L (ref 135–145)
SODIUM SERPL-SCNC: 133 MMOL/L (ref 135–145)
SP GR UR STRIP.AUTO: <=1.005
WBC # BLD AUTO: 6.7 K/UL (ref 4.8–10.8)

## 2021-04-29 PROCEDURE — 80048 BASIC METABOLIC PNL TOTAL CA: CPT

## 2021-04-29 PROCEDURE — 700101 HCHG RX REV CODE 250: Performed by: HOSPITALIST

## 2021-04-29 PROCEDURE — 700102 HCHG RX REV CODE 250 W/ 637 OVERRIDE(OP): Performed by: EMERGENCY MEDICINE

## 2021-04-29 PROCEDURE — 99220 PR INITIAL OBSERVATION CARE,LEVL III: CPT | Performed by: HOSPITALIST

## 2021-04-29 PROCEDURE — 96365 THER/PROPH/DIAG IV INF INIT: CPT

## 2021-04-29 PROCEDURE — A9270 NON-COVERED ITEM OR SERVICE: HCPCS | Performed by: EMERGENCY MEDICINE

## 2021-04-29 PROCEDURE — G0378 HOSPITAL OBSERVATION PER HR: HCPCS

## 2021-04-29 PROCEDURE — 80053 COMPREHEN METABOLIC PANEL: CPT

## 2021-04-29 PROCEDURE — 83735 ASSAY OF MAGNESIUM: CPT

## 2021-04-29 PROCEDURE — U0005 INFEC AGEN DETEC AMPLI PROBE: HCPCS

## 2021-04-29 PROCEDURE — U0003 INFECTIOUS AGENT DETECTION BY NUCLEIC ACID (DNA OR RNA); SEVERE ACUTE RESPIRATORY SYNDROME CORONAVIRUS 2 (SARS-COV-2) (CORONAVIRUS DISEASE [COVID-19]), AMPLIFIED PROBE TECHNIQUE, MAKING USE OF HIGH THROUGHPUT TECHNOLOGIES AS DESCRIBED BY CMS-2020-01-R: HCPCS

## 2021-04-29 PROCEDURE — 82803 BLOOD GASES ANY COMBINATION: CPT

## 2021-04-29 PROCEDURE — 82010 KETONE BODYS QUAN: CPT

## 2021-04-29 PROCEDURE — 96375 TX/PRO/DX INJ NEW DRUG ADDON: CPT

## 2021-04-29 PROCEDURE — 700111 HCHG RX REV CODE 636 W/ 250 OVERRIDE (IP): Performed by: EMERGENCY MEDICINE

## 2021-04-29 PROCEDURE — A9270 NON-COVERED ITEM OR SERVICE: HCPCS | Performed by: HOSPITALIST

## 2021-04-29 PROCEDURE — 700102 HCHG RX REV CODE 250 W/ 637 OVERRIDE(OP): Performed by: HOSPITALIST

## 2021-04-29 PROCEDURE — 81003 URINALYSIS AUTO W/O SCOPE: CPT

## 2021-04-29 PROCEDURE — 85025 COMPLETE CBC W/AUTO DIFF WBC: CPT

## 2021-04-29 PROCEDURE — 36415 COLL VENOUS BLD VENIPUNCTURE: CPT

## 2021-04-29 PROCEDURE — 96372 THER/PROPH/DIAG INJ SC/IM: CPT | Mod: XU

## 2021-04-29 PROCEDURE — 700105 HCHG RX REV CODE 258: Performed by: EMERGENCY MEDICINE

## 2021-04-29 PROCEDURE — 82962 GLUCOSE BLOOD TEST: CPT

## 2021-04-29 PROCEDURE — 99285 EMERGENCY DEPT VISIT HI MDM: CPT

## 2021-04-29 RX ORDER — SODIUM CHLORIDE AND POTASSIUM CHLORIDE 300; 900 MG/100ML; MG/100ML
INJECTION, SOLUTION INTRAVENOUS CONTINUOUS
Status: DISCONTINUED | OUTPATIENT
Start: 2021-04-29 | End: 2021-05-03 | Stop reason: HOSPADM

## 2021-04-29 RX ORDER — AMLODIPINE BESYLATE 5 MG/1
5 TABLET ORAL DAILY
Status: DISCONTINUED | OUTPATIENT
Start: 2021-04-30 | End: 2021-05-03 | Stop reason: HOSPADM

## 2021-04-29 RX ORDER — DEXTROSE MONOHYDRATE 25 G/50ML
50 INJECTION, SOLUTION INTRAVENOUS
Status: DISCONTINUED | OUTPATIENT
Start: 2021-04-29 | End: 2021-05-03 | Stop reason: HOSPADM

## 2021-04-29 RX ORDER — SODIUM CHLORIDE 9 MG/ML
INJECTION, SOLUTION INTRAVENOUS CONTINUOUS
Status: DISCONTINUED | OUTPATIENT
Start: 2021-04-29 | End: 2021-04-29

## 2021-04-29 RX ORDER — INSULIN GLARGINE 100 [IU]/ML
5 INJECTION, SOLUTION SUBCUTANEOUS EVERY EVENING
Status: DISCONTINUED | OUTPATIENT
Start: 2021-04-29 | End: 2021-04-30

## 2021-04-29 RX ORDER — SIMVASTATIN 10 MG
10 TABLET ORAL NIGHTLY
Status: DISCONTINUED | OUTPATIENT
Start: 2021-04-29 | End: 2021-05-03 | Stop reason: HOSPADM

## 2021-04-29 RX ORDER — POLYVINYL ALCOHOL 14 MG/ML
1-2 SOLUTION/ DROPS OPHTHALMIC
Status: DISCONTINUED | OUTPATIENT
Start: 2021-04-29 | End: 2021-05-03 | Stop reason: HOSPADM

## 2021-04-29 RX ORDER — POTASSIUM CHLORIDE 7.45 MG/ML
10 INJECTION INTRAVENOUS
Status: ACTIVE | OUTPATIENT
Start: 2021-04-29 | End: 2021-04-29

## 2021-04-29 RX ORDER — FINASTERIDE 5 MG/1
5 TABLET, FILM COATED ORAL DAILY
Status: DISCONTINUED | OUTPATIENT
Start: 2021-04-30 | End: 2021-05-03 | Stop reason: HOSPADM

## 2021-04-29 RX ORDER — POTASSIUM CHLORIDE 7.45 MG/ML
10 INJECTION INTRAVENOUS ONCE
Status: COMPLETED | OUTPATIENT
Start: 2021-04-29 | End: 2021-04-29

## 2021-04-29 RX ORDER — SODIUM CHLORIDE 9 MG/ML
1000 INJECTION, SOLUTION INTRAVENOUS ONCE
Status: COMPLETED | OUTPATIENT
Start: 2021-04-29 | End: 2021-04-29

## 2021-04-29 RX ORDER — POTASSIUM CHLORIDE 20 MEQ/1
40 TABLET, EXTENDED RELEASE ORAL ONCE
Status: COMPLETED | OUTPATIENT
Start: 2021-04-29 | End: 2021-04-29

## 2021-04-29 RX ORDER — POTASSIUM CHLORIDE 20 MEQ/1
40 TABLET, EXTENDED RELEASE ORAL ONCE
Status: ACTIVE | OUTPATIENT
Start: 2021-04-29 | End: 2021-04-30

## 2021-04-29 RX ORDER — LEVOTHYROXINE SODIUM 0.03 MG/1
25 TABLET ORAL
Status: DISCONTINUED | OUTPATIENT
Start: 2021-04-30 | End: 2021-05-03 | Stop reason: HOSPADM

## 2021-04-29 RX ORDER — POLYETHYLENE GLYCOL 3350 17 G/17G
1 POWDER, FOR SOLUTION ORAL
Status: DISCONTINUED | OUTPATIENT
Start: 2021-04-29 | End: 2021-05-03 | Stop reason: HOSPADM

## 2021-04-29 RX ORDER — BISACODYL 10 MG
10 SUPPOSITORY, RECTAL RECTAL
Status: DISCONTINUED | OUTPATIENT
Start: 2021-04-29 | End: 2021-05-03 | Stop reason: HOSPADM

## 2021-04-29 RX ORDER — AMOXICILLIN 250 MG
2 CAPSULE ORAL 2 TIMES DAILY
Status: DISCONTINUED | OUTPATIENT
Start: 2021-04-30 | End: 2021-05-03 | Stop reason: HOSPADM

## 2021-04-29 RX ORDER — TAMSULOSIN HYDROCHLORIDE 0.4 MG/1
0.4 CAPSULE ORAL EVERY MORNING
Status: DISCONTINUED | OUTPATIENT
Start: 2021-04-30 | End: 2021-05-03 | Stop reason: HOSPADM

## 2021-04-29 RX ADMIN — SODIUM CHLORIDE 1000 ML: 9 INJECTION, SOLUTION INTRAVENOUS at 16:06

## 2021-04-29 RX ADMIN — POTASSIUM CHLORIDE AND SODIUM CHLORIDE 1000 ML: 900; 300 INJECTION, SOLUTION INTRAVENOUS at 20:14

## 2021-04-29 RX ADMIN — SIMVASTATIN 10 MG: 10 TABLET, FILM COATED ORAL at 21:02

## 2021-04-29 RX ADMIN — INSULIN LISPRO 9 UNITS: 100 INJECTION, SOLUTION INTRAVENOUS; SUBCUTANEOUS at 21:08

## 2021-04-29 RX ADMIN — METOPROLOL TARTRATE 25 MG: 25 TABLET, FILM COATED ORAL at 21:02

## 2021-04-29 RX ADMIN — POTASSIUM CHLORIDE 40 MEQ: 1500 TABLET, EXTENDED RELEASE ORAL at 16:03

## 2021-04-29 RX ADMIN — INSULIN HUMAN 5 UNITS: 100 INJECTION, SOLUTION PARENTERAL at 16:08

## 2021-04-29 RX ADMIN — POTASSIUM CHLORIDE 10 MEQ: 7.46 INJECTION, SOLUTION INTRAVENOUS at 16:07

## 2021-04-29 RX ADMIN — INSULIN GLARGINE 5 UNITS: 100 INJECTION, SOLUTION SUBCUTANEOUS at 21:05

## 2021-04-29 ASSESSMENT — ENCOUNTER SYMPTOMS
STRIDOR: 0
BRUISES/BLEEDS EASILY: 0
VOMITING: 0
COUGH: 0
EYE REDNESS: 0
NERVOUS/ANXIOUS: 0
EYE DISCHARGE: 0
CHILLS: 0
MYALGIAS: 0
FOCAL WEAKNESS: 0
SHORTNESS OF BREATH: 0
FEVER: 0
FLANK PAIN: 0
ABDOMINAL PAIN: 0

## 2021-04-29 ASSESSMENT — COGNITIVE AND FUNCTIONAL STATUS - GENERAL
SUGGESTED CMS G CODE MODIFIER MOBILITY: CJ
CLIMB 3 TO 5 STEPS WITH RAILING: A LITTLE
WALKING IN HOSPITAL ROOM: A LITTLE
DAILY ACTIVITIY SCORE: 22
STANDING UP FROM CHAIR USING ARMS: A LITTLE
HELP NEEDED FOR BATHING: A LITTLE
MOVING FROM LYING ON BACK TO SITTING ON SIDE OF FLAT BED: A LITTLE
SUGGESTED CMS G CODE MODIFIER DAILY ACTIVITY: CJ
MOBILITY SCORE: 20
DRESSING REGULAR LOWER BODY CLOTHING: A LITTLE

## 2021-04-29 ASSESSMENT — LIFESTYLE VARIABLES
TOTAL SCORE: 0
EVER HAD A DRINK FIRST THING IN THE MORNING TO STEADY YOUR NERVES TO GET RID OF A HANGOVER: NO
HOW MANY TIMES IN THE PAST YEAR HAVE YOU HAD 5 OR MORE DRINKS IN A DAY: 0
CONSUMPTION TOTAL: NEGATIVE
EVER FELT BAD OR GUILTY ABOUT YOUR DRINKING: NO
ALCOHOL_USE: NO
HAVE YOU EVER FELT YOU SHOULD CUT DOWN ON YOUR DRINKING: NO
TOTAL SCORE: 0
TOTAL SCORE: 0
HAVE PEOPLE ANNOYED YOU BY CRITICIZING YOUR DRINKING: NO
AVERAGE NUMBER OF DAYS PER WEEK YOU HAVE A DRINK CONTAINING ALCOHOL: 0
ON A TYPICAL DAY WHEN YOU DRINK ALCOHOL HOW MANY DRINKS DO YOU HAVE: 0

## 2021-04-29 ASSESSMENT — COPD QUESTIONNAIRES
COPD SCREENING SCORE: 2
DURING THE PAST 4 WEEKS HOW MUCH DID YOU FEEL SHORT OF BREATH: NONE/LITTLE OF THE TIME
HAVE YOU SMOKED AT LEAST 100 CIGARETTES IN YOUR ENTIRE LIFE: NO/DON'T KNOW
DO YOU EVER COUGH UP ANY MUCUS OR PHLEGM?: NO/ONLY WITH OCCASIONAL COLDS OR INFECTIONS

## 2021-04-29 ASSESSMENT — FIBROSIS 4 INDEX: FIB4 SCORE: 2.04

## 2021-04-29 ASSESSMENT — PAIN DESCRIPTION - PAIN TYPE: TYPE: ACUTE PAIN

## 2021-04-29 NOTE — ED PROVIDER NOTES
"ED Provider Note    ED Provider Note    Scribed for Deisy Harrell MD by Deisy Harrell M.D.. 2021, 3:37 PM.    Primary care provider: JONO Powell  Means of arrival: EMS  History obtained from: Patient and EMS  History limited by: None    CHIEF COMPLAINT  Chief Complaint   Patient presents with    Hyperglycemia     pt brought in by [facility] from home for high blood sugar of 400. pt has hx of UTI and enlarged prostate. pt took his required insulin this morning.        HPI  Julien Dao is a 89 y.o. male who presents to the Emergency Department for evaluation of high blood sugars. Patient has established history of diabetes mellitus, he knows no recent changes medication regimen, he is on both oral medications as well as long acting insulin. Patient notes he has had high readings for the last 12 weeks. The visiting nurse noted his blood sugar red \"high\" today and sent them to the ER to be evaluated via EMS. He has a history of frequent UTIs but notes no dysuria, no fever, denies any medication noncompliance. No pain of any kind, today is actually quite hungry but notes no obvious polyuria nor polydyspnea.    REVIEW OF SYSTEMS  Pertinent positives include dry mouth, elevated blood sugars with history of diabetes. Pertinent negatives include no ever, no vomiting.  All other systems reviewed and negative.    PAST MEDICAL HISTORY   has a past medical history of CAD (coronary artery disease), Delirium (2020), DM (diabetes mellitus) ([facility]) (2014), Falls, Hypertension, and UTI (urinary tract infection) (2018).    SURGICAL HISTORY   has a past surgical history that includes other cardiac surgery; coronary artery bypass, 3; and janice rectal abscess.    SOCIAL HISTORY  Social History     Tobacco Use    Smoking status: Former Smoker     Quit date: 10/19/1972     Years since quittin.5    Smokeless tobacco: Never Used   Substance Use Topics    Alcohol use: Not Currently    Drug use: " "No      Social History     Substance and Sexual Activity   Drug Use No       FAMILY HISTORY  History reviewed. No pertinent family history. Noncontributory given age    CURRENT MEDICATIONS  Home Medications       Reviewed by Anjum Moore (Pharmacy Tech) on 04/29/21 at 1705  Med List Status: Complete     Medication Last Dose Status   amLODIPine (NORVASC) 5 MG Tab 4/29/2021 Active   chlorthalidone (HYGROTON) 25 MG Tab 4/29/2021 Active   cyclosporin (RESTASIS) 0.05 % ophthalmic emulsion 4/28/2021 Active   finasteride (PROSCAR) 5 MG Tab 4/29/2021 Active   Insulin NPH Isophane & Regular (70-30) 100 UNIT/ML Suspension Pen-injector 4/28/2021 Active   Insulin Pen Needle 31G X 5 MM Misc  Active   levothyroxine (SYNTHROID) 25 MCG Tab 4/29/2021 Active   metoprolol tartrate (LOPRESSOR) 50 MG Tab 4/29/2021 Active   Multiple Vitamins-Minerals (PX MENS MULTIVITAMINS) Tab 4/29/2021 Active   neomycin-bacitracin-polymyxin (NEOSPORIN) 400-5-5000 Ointment  Active   nystatin (MYCOSTATIN) 252722 UNIT/GM Cream topical cream  Active   Omega-3 Fatty Acids (FISH OIL) 1000 MG Cap capsule 4/29/2021 Active   potassium chloride ER (K-TAB) 20 MEQ Tab CR tablet 4/29/2021 Active   simvastatin (ZOCOR) 10 MG Tab 4/28/2021 Active   Specialty Vitamins Products (PROSTATE) Tab 4/29/2021 Active   tamsulosin (FLOMAX) 0.4 MG capsule 4/29/2021 Active                    ALLERGIES  Allergies   Allergen Reactions    Iodine Anaphylaxis     Pt and pts wife report that it has been so long not sure what happens       PHYSICAL EXAM  VITAL SIGNS: /69   Pulse 67   Temp 36.5 °C (97.7 °F) (Oral)   Resp 16   Ht 1.702 m (5' 7\")   Wt 74.8 kg (165 lb)   SpO2 90%   BMI 25.84 kg/m²     General: Alert, no acute distress  Skin: Warm, dry, pale  Head: Normocephalic, atraumatic  Neck: Trachea midline, no tenderness  Eye: PERRL, normal conjunctiva  ENMT: Oral mucosa pink and dry, no pharyngeal erythema or exudate  Cardiovascular: Regular rate and rhythm, No " murmur, Normal peripheral perfusion  Respiratory: Lungs CTA, respirations are non-labored, breath sounds are equal  Gastrointestinal: Soft, nontender, non distended  Musculoskeletal: No swelling, no deformity  Neurological: Alert and oriented to person, place, time, and situation  Lymphatics: No lymphadenopathy  Psychiatric: Cooperative, appropriate mood & affect      DIAGNOSTIC STUDIES/PROCEDURES    LABS  Results for orders placed or performed during the hospital encounter of 04/29/21   CBC WITH DIFFERENTIAL   Result Value Ref Range    WBC 6.7 4.8 - 10.8 K/uL    RBC 4.80 4.70 - 6.10 M/uL    Hemoglobin 14.8 14.0 - 18.0 g/dL    Hematocrit 41.4 (L) 42.0 - 52.0 %    MCV 86.3 81.4 - 97.8 fL    MCH 30.8 27.0 - 33.0 pg    MCHC 35.7 (H) 33.7 - 35.3 g/dL    RDW 39.2 35.9 - 50.0 fL    Platelet Count 197 164 - 446 K/uL    MPV 10.8 9.0 - 12.9 fL    Neutrophils-Polys 65.80 44.00 - 72.00 %    Lymphocytes 21.80 (L) 22.00 - 41.00 %    Monocytes 10.80 0.00 - 13.40 %    Eosinophils 0.80 0.00 - 6.90 %    Basophils 0.50 0.00 - 1.80 %    Immature Granulocytes 0.30 0.00 - 0.90 %    Nucleated RBC 0.00 /100 WBC    Neutrophils (Absolute) 4.39 1.82 - 7.42 K/uL    Lymphs (Absolute) 1.45 1.00 - 4.80 K/uL    Monos (Absolute) 0.72 0.00 - 0.85 K/uL    Eos (Absolute) 0.05 0.00 - 0.51 K/uL    Baso (Absolute) 0.03 0.00 - 0.12 K/uL    Immature Granulocytes (abs) 0.02 0.00 - 0.11 K/uL    NRBC (Absolute) 0.00 K/uL   COMP METABOLIC PANEL   Result Value Ref Range    Sodium 127 (L) 135 - 145 mmol/L    Potassium 2.9 (L) 3.6 - 5.5 mmol/L    Chloride 85 (L) 96 - 112 mmol/L    Co2 28 20 - 33 mmol/L    Anion Gap 14.0 7.0 - 16.0    Glucose 717 (HH) 65 - 99 mg/dL    Bun 31 (H) 8 - 22 mg/dL    Creatinine 1.04 0.50 - 1.40 mg/dL    Calcium 9.2 8.4 - 10.2 mg/dL    AST(SGOT) 15 12 - 45 U/L    ALT(SGPT) 24 2 - 50 U/L    Alkaline Phosphatase 74 30 - 99 U/L    Total Bilirubin 1.0 0.1 - 1.5 mg/dL    Albumin 4.0 3.2 - 4.9 g/dL    Total Protein 6.8 6.0 - 8.2 g/dL     Globulin 2.8 1.9 - 3.5 g/dL    A-G Ratio 1.4 g/dL   URINALYSIS,CULTURE IF INDICATED    Specimen: Urine, Clean Catch   Result Value Ref Range    Color Yellow     Character Clear     Specific Gravity <=1.005 <1.035    Ph 6.0 5.0 - 8.0    Glucose >=1000 (A) Negative mg/dL    Ketones Trace (A) Negative mg/dL    Protein Negative Negative mg/dL    Bilirubin Negative Negative    Nitrite Negative Negative    Leukocyte Esterase Negative Negative    Occult Blood Negative Negative    Micro Urine Req see below    VENOUS BLOOD GAS   Result Value Ref Range    Venous Bg Ph 7.37 7.31 - 7.45    Venous Bg Pco2 53.6 (H) 41.0 - 51.0 mmHg    Venous Bg Po2 22.4 (L) 25.0 - 40.0 mmHg    Venous Bg O2 Saturation 38.3 %    Venous Bg Hco3 30 (H) 24 - 28 mmol/L    Venous Bg Base Excess 3 mmol/L    Body Temp see below Centigrade   BETA-HYDROXYBUTYRIC ACID   Result Value Ref Range    beta-Hydroxybutyric Acid 1.17 (H) 0.02 - 0.27 mmol/L   ESTIMATED GFR   Result Value Ref Range    GFR If African American >60 >60 mL/min/1.73 m 2    GFR If Non African American >60 >60 mL/min/1.73 m 2   Basic Metabolic Panel   Result Value Ref Range    Sodium 133 (L) 135 - 145 mmol/L    Potassium 3.6 3.6 - 5.5 mmol/L    Chloride 95 (L) 96 - 112 mmol/L    Co2 25 20 - 33 mmol/L    Glucose 572 (HH) 65 - 99 mg/dL    Bun 28 (H) 8 - 22 mg/dL    Creatinine 0.98 0.50 - 1.40 mg/dL    Calcium 9.1 8.4 - 10.2 mg/dL    Anion Gap 13.0 7.0 - 16.0   SARS-CoV-2 PCR (24 hour In-House): Collect NP swab in [facility]    Specimen: Respirate   Result Value Ref Range    SARS-CoV-2 Source NP Swab    MAGNESIUM   Result Value Ref Range    Magnesium 2.1 1.5 - 2.5 mg/dL   ESTIMATED GFR   Result Value Ref Range    GFR If African American >60 >60 mL/min/1.73 m 2    GFR If Non African American >60 >60 mL/min/1.73 m 2   POCT glucose device results   Result Value Ref Range    Glucose - Accu-Ck 556 (HH) 65 - 99 mg/dL   POCT glucose device results   Result Value Ref Range    Glucose - Accu-Ck 512 () 65 - 99  "mg/dL   POCT glucose device results   Result Value Ref Range    Glucose - Accu-Ck 553 (HH) 65 - 99 mg/dL      All labs reviewed by me, evaluated beta hydroxy butyrate, no acidosis, impressive hypoglycemia with concerning hypokalemia noted.      COURSE & MEDICAL DECISION MAKING  Pertinent Labs & Imaging studies reviewed. (See chart for details)    3:37 PM - Patient seen and examined at bedside. Patient will be treated with alliterative crystalline. Ordered metabolic workup as well as beta hydroxy butyrate to evaluate his symptoms. The differential diagnoses include but are not limited to: hyperglycemia, hyperosmolar nonketotic state diabetic ketoacidosis, electrolyte abnormalities    1532: concern initial blood sugar noted. I have ordered regular insulin 10 units IV. Hypokalemia have ordered both PO potassium, 40 mg, as well as 10 mEq IV.    1717: I spoke with a hospital supercritical plan for admission, places blood sugar is improving, 512 at this time.    Patient Vitals for the past 24 hrs:   BP Temp Temp src Pulse Resp SpO2 Height Weight   04/29/21 2000 148/69 36.5 °C (97.7 °F) Oral 67 16 90 % -- --   04/1935 153/84 36.9 °C (98.4 °F) -- 69 -- 95 % -- --   04/29/21 1458 116/65 36.2 °C (97.2 °F) Temporal 70 16 95 % 1.702 m (5' 7\") 74.8 kg (165 lb)   HYDRATION: Based on the patient's presentation of Dehydration and Hyperglycemia the patient was given IV fluids. IV Hydration was used because oral hydration was not as rapid as required. Upon recheck following hydration, the patient was better, heart improving, currently 67.        Decision Making:  This is a 89 y.o. year old male who presents with elevated blood pressure readings. He notes he had hypoglycemia for over a month but it was found to be over 400 today at home by the visiting nurse. No evidence of significant infectious process, he has no fever, leukocytosis, and his urine is unremarkable. Patient has had very impressive hyperglycemia noted but " thankfully no evidence of anion gap. Beta hydroxy butyrate is mildly elevated but given above no evidence of diabetic ketoacidosis. I suspect hyper osmolarity stage. IV fluids clearly indicated. Blood sugar is improving but still markedly elevated. Given his acute kidney injury, given he does have positive beta hydroxy butyrate, I do feel his benefit for inpatient management. Spoke with hospitalist who concurs and accepts the patient to the service.    Patient is admitted to the care the hospitalist in improved but guarded condition to the medical surgical floor.    FINAL IMPRESSION  1. Controlled type 2 diabetes mellitus with hyperglycemia, with long-term current use of insulin (HCC)    2. Acute hypokalemia    3. Dehydration    4. Acute kidney injury (nontraumatic) (formerly [facility])          Deisy ROWLAND M.D. (Scribe), am scribing for, and in the presence of, Deisy Harrell MD.    Electronically signed by: Deisy Harrell M.D. (Scribe), 4/29/2021    IDeisy MD personally performed the services described in this documentation, as scribed by Deisy Harrell M.D. in my presence, and it is both accurate and complete    The note accurately reflects work and decisions made by me.  Deisy Harrell M.D.  4/29/2021  10:22 PM

## 2021-04-29 NOTE — ED TRIAGE NOTES
Chief Complaint   Patient presents with   • Hyperglycemia     pt brought in by Moustapha from home for high blood sugar of 400. pt has hx of UTI and enlarged prostate. pt took his required insulin this morning.

## 2021-04-29 NOTE — ED NOTES
Mahi from Lab called with critical result of glucose 717 at 1550. Critical lab result read back to Mahi.   Dr. Harrell notified of critical lab result at 1550.  Critical lab result read back by Dr. Harrell.

## 2021-04-30 PROBLEM — Z91.81 HISTORY OF FALL: Status: ACTIVE | Noted: 2021-04-30

## 2021-04-30 LAB
ALBUMIN SERPL BCP-MCNC: 4 G/DL (ref 3.2–4.9)
ALBUMIN/GLOB SERPL: 1.3 G/DL
ALP SERPL-CCNC: 74 U/L (ref 30–99)
ALT SERPL-CCNC: 24 U/L (ref 2–50)
ANION GAP SERPL CALC-SCNC: 10 MMOL/L (ref 7–16)
ANION GAP SERPL CALC-SCNC: 11 MMOL/L (ref 7–16)
ANION GAP SERPL CALC-SCNC: 11 MMOL/L (ref 7–16)
AST SERPL-CCNC: 17 U/L (ref 12–45)
BASOPHILS # BLD AUTO: 0.6 % (ref 0–1.8)
BASOPHILS # BLD: 0.05 K/UL (ref 0–0.12)
BILIRUB SERPL-MCNC: 0.5 MG/DL (ref 0.1–1.5)
BUN SERPL-MCNC: 23 MG/DL (ref 8–22)
BUN SERPL-MCNC: 24 MG/DL (ref 8–22)
BUN SERPL-MCNC: 25 MG/DL (ref 8–22)
CALCIUM SERPL-MCNC: 8.9 MG/DL (ref 8.4–10.2)
CALCIUM SERPL-MCNC: 9 MG/DL (ref 8.4–10.2)
CALCIUM SERPL-MCNC: 9.3 MG/DL (ref 8.4–10.2)
CHLORIDE SERPL-SCNC: 101 MMOL/L (ref 96–112)
CHLORIDE SERPL-SCNC: 98 MMOL/L (ref 96–112)
CHLORIDE SERPL-SCNC: 99 MMOL/L (ref 96–112)
CO2 SERPL-SCNC: 25 MMOL/L (ref 20–33)
CO2 SERPL-SCNC: 28 MMOL/L (ref 20–33)
CO2 SERPL-SCNC: 28 MMOL/L (ref 20–33)
CREAT SERPL-MCNC: 0.87 MG/DL (ref 0.5–1.4)
CREAT SERPL-MCNC: 0.98 MG/DL (ref 0.5–1.4)
CREAT SERPL-MCNC: 1.01 MG/DL (ref 0.5–1.4)
EOSINOPHIL # BLD AUTO: 0.13 K/UL (ref 0–0.51)
EOSINOPHIL NFR BLD: 1.6 % (ref 0–6.9)
ERYTHROCYTE [DISTWIDTH] IN BLOOD BY AUTOMATED COUNT: 39.7 FL (ref 35.9–50)
GLOBULIN SER CALC-MCNC: 3 G/DL (ref 1.9–3.5)
GLUCOSE BLD-MCNC: 261 MG/DL (ref 65–99)
GLUCOSE BLD-MCNC: 262 MG/DL (ref 65–99)
GLUCOSE BLD-MCNC: 294 MG/DL (ref 65–99)
GLUCOSE BLD-MCNC: 363 MG/DL (ref 65–99)
GLUCOSE BLD-MCNC: 385 MG/DL (ref 65–99)
GLUCOSE SERPL-MCNC: 241 MG/DL (ref 65–99)
GLUCOSE SERPL-MCNC: 262 MG/DL (ref 65–99)
GLUCOSE SERPL-MCNC: 384 MG/DL (ref 65–99)
HCT VFR BLD AUTO: 45.5 % (ref 42–52)
HGB BLD-MCNC: 15.7 G/DL (ref 14–18)
IMM GRANULOCYTES # BLD AUTO: 0.03 K/UL (ref 0–0.11)
IMM GRANULOCYTES NFR BLD AUTO: 0.4 % (ref 0–0.9)
LYMPHOCYTES # BLD AUTO: 1.74 K/UL (ref 1–4.8)
LYMPHOCYTES NFR BLD: 22 % (ref 22–41)
MAGNESIUM SERPL-MCNC: 2.1 MG/DL (ref 1.5–2.5)
MCH RBC QN AUTO: 30 PG (ref 27–33)
MCHC RBC AUTO-ENTMCNC: 34.5 G/DL (ref 33.7–35.3)
MCV RBC AUTO: 86.8 FL (ref 81.4–97.8)
MONOCYTES # BLD AUTO: 0.75 K/UL (ref 0–0.85)
MONOCYTES NFR BLD AUTO: 9.5 % (ref 0–13.4)
NEUTROPHILS # BLD AUTO: 5.2 K/UL (ref 1.82–7.42)
NEUTROPHILS NFR BLD: 65.9 % (ref 44–72)
NRBC # BLD AUTO: 0 K/UL
NRBC BLD-RTO: 0 /100 WBC
PLATELET # BLD AUTO: 199 K/UL (ref 164–446)
PMV BLD AUTO: 10.9 FL (ref 9–12.9)
POTASSIUM SERPL-SCNC: 3.1 MMOL/L (ref 3.6–5.5)
POTASSIUM SERPL-SCNC: 3.4 MMOL/L (ref 3.6–5.5)
POTASSIUM SERPL-SCNC: 3.6 MMOL/L (ref 3.6–5.5)
PROT SERPL-MCNC: 7 G/DL (ref 6–8.2)
RBC # BLD AUTO: 5.24 M/UL (ref 4.7–6.1)
SARS-COV-2 RNA RESP QL NAA+PROBE: NOTDETECTED
SODIUM SERPL-SCNC: 135 MMOL/L (ref 135–145)
SODIUM SERPL-SCNC: 137 MMOL/L (ref 135–145)
SODIUM SERPL-SCNC: 139 MMOL/L (ref 135–145)
SPECIMEN SOURCE: NORMAL
T4 FREE SERPL-MCNC: 1.12 NG/DL (ref 0.93–1.7)
TSH SERPL DL<=0.005 MIU/L-ACNC: 8.51 UIU/ML (ref 0.38–5.33)
WBC # BLD AUTO: 7.9 K/UL (ref 4.8–10.8)

## 2021-04-30 PROCEDURE — 700102 HCHG RX REV CODE 250 W/ 637 OVERRIDE(OP): Performed by: HOSPITALIST

## 2021-04-30 PROCEDURE — 83036 HEMOGLOBIN GLYCOSYLATED A1C: CPT

## 2021-04-30 PROCEDURE — 84443 ASSAY THYROID STIM HORMONE: CPT

## 2021-04-30 PROCEDURE — 80048 BASIC METABOLIC PNL TOTAL CA: CPT | Mod: 91

## 2021-04-30 PROCEDURE — G0378 HOSPITAL OBSERVATION PER HR: HCPCS

## 2021-04-30 PROCEDURE — 82962 GLUCOSE BLOOD TEST: CPT

## 2021-04-30 PROCEDURE — 700102 HCHG RX REV CODE 250 W/ 637 OVERRIDE(OP): Performed by: INTERNAL MEDICINE

## 2021-04-30 PROCEDURE — A9270 NON-COVERED ITEM OR SERVICE: HCPCS | Performed by: HOSPITALIST

## 2021-04-30 PROCEDURE — 80053 COMPREHEN METABOLIC PANEL: CPT

## 2021-04-30 PROCEDURE — 36415 COLL VENOUS BLD VENIPUNCTURE: CPT

## 2021-04-30 PROCEDURE — 99226 PR SUBSEQUENT OBSERVATION CARE,LEVEL III: CPT | Performed by: INTERNAL MEDICINE

## 2021-04-30 PROCEDURE — 84439 ASSAY OF FREE THYROXINE: CPT

## 2021-04-30 PROCEDURE — 96372 THER/PROPH/DIAG INJ SC/IM: CPT

## 2021-04-30 PROCEDURE — 700101 HCHG RX REV CODE 250: Performed by: HOSPITALIST

## 2021-04-30 PROCEDURE — 85025 COMPLETE CBC W/AUTO DIFF WBC: CPT

## 2021-04-30 PROCEDURE — 83735 ASSAY OF MAGNESIUM: CPT

## 2021-04-30 PROCEDURE — 700111 HCHG RX REV CODE 636 W/ 250 OVERRIDE (IP): Performed by: HOSPITALIST

## 2021-04-30 RX ORDER — ONDANSETRON 2 MG/ML
4 INJECTION INTRAMUSCULAR; INTRAVENOUS EVERY 4 HOURS PRN
Status: DISCONTINUED | OUTPATIENT
Start: 2021-04-30 | End: 2021-05-03 | Stop reason: HOSPADM

## 2021-04-30 RX ADMIN — AMLODIPINE BESYLATE 5 MG: 5 TABLET ORAL at 06:45

## 2021-04-30 RX ADMIN — LEVOTHYROXINE SODIUM 25 MCG: 0.03 TABLET ORAL at 06:45

## 2021-04-30 RX ADMIN — METOPROLOL TARTRATE 25 MG: 25 TABLET, FILM COATED ORAL at 17:32

## 2021-04-30 RX ADMIN — METOPROLOL TARTRATE 25 MG: 25 TABLET, FILM COATED ORAL at 06:45

## 2021-04-30 RX ADMIN — SIMVASTATIN 10 MG: 10 TABLET, FILM COATED ORAL at 21:17

## 2021-04-30 RX ADMIN — INSULIN LISPRO 5 UNITS: 100 INJECTION, SOLUTION INTRAVENOUS; SUBCUTANEOUS at 17:33

## 2021-04-30 RX ADMIN — ENOXAPARIN SODIUM 40 MG: 40 INJECTION SUBCUTANEOUS at 06:45

## 2021-04-30 RX ADMIN — POTASSIUM CHLORIDE AND SODIUM CHLORIDE 1000 ML: 900; 300 INJECTION, SOLUTION INTRAVENOUS at 06:49

## 2021-04-30 RX ADMIN — SENNOSIDES AND DOCUSATE SODIUM 2 TABLET: 8.6; 5 TABLET ORAL at 17:24

## 2021-04-30 RX ADMIN — INSULIN LISPRO 8 UNITS: 100 INJECTION, SOLUTION INTRAVENOUS; SUBCUTANEOUS at 11:05

## 2021-04-30 RX ADMIN — INSULIN HUMAN 30 UNITS: 100 INJECTION, SUSPENSION SUBCUTANEOUS at 17:34

## 2021-04-30 RX ADMIN — INSULIN LISPRO 5 UNITS: 100 INJECTION, SOLUTION INTRAVENOUS; SUBCUTANEOUS at 21:17

## 2021-04-30 RX ADMIN — FINASTERIDE 5 MG: 5 TABLET, FILM COATED ORAL at 06:45

## 2021-04-30 RX ADMIN — INSULIN LISPRO 8 UNITS: 100 INJECTION, SOLUTION INTRAVENOUS; SUBCUTANEOUS at 06:49

## 2021-04-30 RX ADMIN — TAMSULOSIN HYDROCHLORIDE 0.4 MG: 0.4 CAPSULE ORAL at 06:45

## 2021-04-30 ASSESSMENT — PAIN DESCRIPTION - PAIN TYPE: TYPE: ACUTE PAIN

## 2021-04-30 ASSESSMENT — ENCOUNTER SYMPTOMS
RESPIRATORY NEGATIVE: 1
WEAKNESS: 1
EYES NEGATIVE: 1
MUSCULOSKELETAL NEGATIVE: 1
CARDIOVASCULAR NEGATIVE: 1

## 2021-04-30 NOTE — PROGRESS NOTES
Received report from nightshift RN and assumed care of patient at 0700. Patient is A&O4 denies SOB or pain at this time. Patient reports nausea but declines intervention. Call light in reach. Bed alarm on. Bed in lowest locked position. Hourly rounding to continue.

## 2021-04-30 NOTE — DISCHARGE PLANNING
Anticipated Discharge Disposition:   TBD, possibly home with HH     Action:   Chart review complete.     Discussed patient's plan of care and plans for discharge during rounds.   Per MD, patient is not medically cleared to discharge. PT/OT pending. RN CM will continue to follow and assist as needed.     Barriers to Discharge:   PT/OT pending   Medical Clearance    Plan:   Hospital care management will continue to assist with discharge planning needs.     Care Transition Team Assessment    In the event of an emergency, the patient's NOK is his spouse and his daughter, Mercedes Olivas and Chantal Faulkner, respectively.     Information Source  Orientation Level: Oriented X4  Information Given By: Other (Comments)(chart review)  Who is responsible for making decisions for patient? : Patient    Readmission Evaluation  Is this a readmission?: Yes - unplanned readmission    Elopement Risk  Legal Hold: No  Ambulatory or Self Mobile in Wheelchair: Yes  Disoriented: No  Psychiatric Symptoms: None  History of Wandering: No  Elopement this Admit: No  Vocalizing Wanting to Leave: No  Displays Behaviors, Body Language Wanting to Leave: No-Not at Risk for Elopement  Elopement Risk: Not at Risk for Elopement    Interdisciplinary Discharge Planning  Does Admitting Nurse Feel This Could be a Complex Discharge?: No  Primary Care Physician: JONO LANDON  Lives with - Patient's Self Care Capacity: Spouse  Patient or legal guardian wants to designate a caregiver: No  Support Systems: Family Member(s), Spouse / Significant Other, Children  Do You Take your Prescribed Medications Regularly: Yes  Able to Return to Previous ADL's: Future Time w/Therapy  Mobility Issues: Yes  Prior Services: Skilled Home Health Services  Patient Prefers to be Discharged to:: home  Assistance Needed: Unknown at this Time    Discharge Preparedness  What is your plan after discharge?: Home health care  What are your discharge supports?: Spouse,  Child    Finances  Financial Barriers to Discharge: No  Prescription Coverage: Yes    Vision / Hearing Impairment  Right Eye Vision: Wears Glasses  Left Eye Vision: Wears Glasses  Hearing Impairment: Both Ears, Hearing Device Not Available    Advance Directive  Advance Directive?: POLST    Domestic Abuse  Have you ever been the victim of abuse or violence?: No  Physical Abuse or Sexual Abuse: No  Verbal Abuse or Emotional Abuse: No  Possible Abuse/Neglect Reported to:: Not Applicable    Psychological Assessment  History of Substance Abuse: None  History of Psychiatric Problems: No    Discharge Risks or Barriers  Discharge risks or barriers?: Complex medical needs  Patient risk factors: Vulnerable adult, Complex medical needs    Anticipated Discharge Information  Discharge Disposition: Still a Patient (30)

## 2021-04-30 NOTE — CARE PLAN
Problem: Safety  Goal: Will remain free from falls  Outcome: PROGRESSING AS EXPECTED  Note: Pt agreed to all fall protocols     Problem: Knowledge Deficit  Goal: Knowledge of disease process/condition, treatment plan, diagnostic tests, and medications will improve  Outcome: PROGRESSING AS EXPECTED  Note: Pt aware glucose is high even though he takes meds which wife administers

## 2021-04-30 NOTE — H&P
Hospital Medicine History & Physical Note    Date of Service  4/29/2021    Primary Care Physician  TABITHA Powell.    Consultants  None    Code Status  DNAR/DNI    Chief Complaint  Chief Complaint   Patient presents with   • Hyperglycemia     pt brought in by [facility] from home for high blood sugar of 400. pt has hx of UTI and enlarged prostate. pt took his required insulin this morning.        History of Presenting Illness  89 y.o. male with a past medical history of diabetes, hypothyroidism and hypertension who presented 4/29/2021 with generalized weakness and found to have extremely elevated blood sugars in the 700 range.  Patient reports that he has been compliant with his diabetic medications however he has been having frequent high blood sugars.  He denies having abdominal pain frequency dysuria.  He denies having fevers chills cough shortness of breath.  He reports that he is generally compliant with his diabetic diet.    Review of Systems  Review of Systems   Constitutional: Positive for malaise/fatigue. Negative for chills and fever.   Eyes: Negative for discharge and redness.   Respiratory: Negative for cough, shortness of breath and stridor.    Cardiovascular: Negative for chest pain and leg swelling.   Gastrointestinal: Negative for abdominal pain and vomiting.   Genitourinary: Negative for flank pain.   Musculoskeletal: Negative for myalgias.   Skin: Negative.    Neurological: Negative for focal weakness.   Endo/Heme/Allergies: Does not bruise/bleed easily.   Psychiatric/Behavioral: The patient is not nervous/anxious.      Past Medical History   has a past medical history of CAD (coronary artery disease), Delirium (2/21/2020), DM (diabetes mellitus) (HCC) (1/7/2014), Falls, Hypertension, and UTI (urinary tract infection) (11/4/2018).    Surgical History   has a past surgical history that includes other cardiac surgery; coronary artery bypass, 3; and janice rectal abscess.     Family History  family  history is not on file.     Social History   reports that he quit smoking about 48 years ago. He has never used smokeless tobacco. He reports previous alcohol use. He reports that he does not use drugs.    Allergies  Allergies   Allergen Reactions   • Iodine Anaphylaxis     Pt and pts wife report that it has been so long not sure what happens     Medications  Prior to Admission Medications   Prescriptions Last Dose Informant Patient Reported? Taking?   Insulin NPH Isophane & Regular (70-30) 100 UNIT/ML Suspension Pen-injector 2021 at pm Significant Other No No   Sig: Inject 25 Units under the skin every 12 hours.   Insulin Pen Needle 31G X 5 MM Misc  Significant Other No No   Si Each 2 Times a Day.   Multiple Vitamins-Minerals (PX MENS MULTIVITAMINS) Tab 2021 at 0830 Significant Other Yes No   Sig: Take 1 Tab by mouth every day.   Omega-3 Fatty Acids (FISH OIL) 1000 MG Cap capsule 2021 at 0830 Significant Other Yes No   Sig: Take 2,000 mg by mouth 2 Times a Day.   Specialty Vitamins Products (PROSTATE) Tab 2021 at 0830 Significant Other Yes No   Sig: Take 1 Tab by mouth 2 Times a Day.   amLODIPine (NORVASC) 5 MG Tab 2021 at 0830 Significant Other No No   Sig: Take 1 Tab by mouth every day.   chlorthalidone (HYGROTON) 25 MG Tab 2021 at 0830 Significant Other No No   Sig: Take 1 Tab by mouth every day.   cyclosporin (RESTASIS) 0.05 % ophthalmic emulsion 2021 at pm Significant Other Yes No   Sig: Place 1 Drop in both eyes 2 times a day.   finasteride (PROSCAR) 5 MG Tab 2021 at 0830 Significant Other No No   Sig: Take 1 Tab by mouth every day.   levothyroxine (SYNTHROID) 25 MCG Tab 2021 at 0830 Significant Other No No   Sig: Take 1 tablet by mouth Every morning on an empty stomach.   metoprolol tartrate (LOPRESSOR) 50 MG Tab 2021 at 0830 Significant Other Yes No   Sig: Take 25 mg by mouth 2 times a day.   neomycin-bacitracin-polymyxin (NEOSPORIN) 400-5-5000 Ointment   Significant Other No No   Sig: Apply 1 Each to affected area(s) every day. To penis   Patient not taking: Reported on 3/28/2020   nystatin (MYCOSTATIN) 389772 UNIT/GM Cream topical cream  Significant Other No No   Sig: Apply 0.0001 g to affected area(s) 2 Times a Day.   Patient not taking: Reported on 3/28/2020   potassium chloride ER (K-TAB) 20 MEQ Tab CR tablet 4/29/2021 at 0830 Significant Other No No   Sig: Take 1 Tab by mouth 2 times a day.   simvastatin (ZOCOR) 10 MG Tab 4/28/2021 at pm Significant Other Yes No   Sig: Take 10 mg by mouth every evening.   tamsulosin (FLOMAX) 0.4 MG capsule 4/29/2021 at 0830 Significant Other Yes No   Sig: Take 0.4 mg by mouth every morning.      Facility-Administered Medications: None     Physical Exam  Temp:  [36.2 °C (97.2 °F)] 36.2 °C (97.2 °F)  Pulse:  [70] 70  Resp:  [16] 16  BP: (116)/(65) 116/65  SpO2:  [95 %] 95 %    Physical Exam  Constitutional:       General: He is not in acute distress.     Appearance: He is not ill-appearing or diaphoretic.   HENT:      Head: Atraumatic.      Right Ear: External ear normal.      Left Ear: External ear normal.      Nose: No congestion or rhinorrhea.      Mouth/Throat:      Mouth: Mucous membranes are moist.   Eyes:      General: No scleral icterus.        Right eye: No discharge.         Left eye: No discharge.      Pupils: Pupils are equal, round, and reactive to light.   Cardiovascular:      Rate and Rhythm: Normal rate and regular rhythm.   Pulmonary:      Effort: Pulmonary effort is normal.   Abdominal:      General: There is no distension.   Musculoskeletal:      Cervical back: Neck supple. No rigidity. No muscular tenderness.      Right lower leg: No edema.      Left lower leg: No edema.   Skin:     Coloration: Skin is not jaundiced or pale.   Neurological:      Mental Status: He is alert and oriented to person, place, and time.      Coordination: Coordination normal.   Psychiatric:         Mood and Affect: Mood normal.          Behavior: Behavior normal.       Laboratory:  Recent Labs     04/29/21  1508   WBC 6.7   RBC 4.80   HEMOGLOBIN 14.8   HEMATOCRIT 41.4*   MCV 86.3   MCH 30.8   MCHC 35.7*   RDW 39.2   PLATELETCT 197   MPV 10.8     Recent Labs     04/29/21  1508   SODIUM 127*   POTASSIUM 2.9*   CHLORIDE 85*   CO2 28   GLUCOSE 717*   BUN 31*   CREATININE 1.04   CALCIUM 9.2     Recent Labs     04/29/21  1508   ALTSGPT 24   ASTSGOT 15   ALKPHOSPHAT 74   TBILIRUBIN 1.0   GLUCOSE 717*         No results for input(s): NTPROBNP in the last 72 hours.      No results for input(s): TROPONINT in the last 72 hours.    Imaging:  No orders to display     Assessment/Plan:  I anticipate this patient is appropriate for observation status at this time.    Pseudo-hyponatremia- (present on admission)  Assessment & Plan  Measured sodium is 127  Corrected sodium is 137-141, as blood sugars are in the 700's   Expect measured sodium to improve with correcting blood sugars    DM (diabetes mellitus) (CMS-HCC)- (present on admission)  Assessment & Plan  With significant hyperglycemia in 700's range   Last glycated hemoglobin was 11.1%,   I will start Long & short & pre-meal acting insulin  Accu-Checks, hypoglycemia protocol     DNR (do not resuscitate)- (present on admission)  Assessment & Plan  Confirmed with the patient at bedside    Hypokalemia- (present on admission)  Assessment & Plan  Replacing, checking magnesium   Continue to monitor replace as needed     HTN (hypertension)- (present on admission)  Assessment & Plan  Resume home amlodipine & metoprolol with hold parameter     Hypothyroidism- (present on admission)  Assessment & Plan  Resume home levothyroxine

## 2021-04-30 NOTE — ASSESSMENT & PLAN NOTE
Measured sodium is 127  Corrected sodium is 137-141, as blood sugars are in the 700's   Expect measured sodium to improve with correcting blood sugars

## 2021-04-30 NOTE — ASSESSMENT & PLAN NOTE
With significant hyperglycemia in 700's range, and multiple previous hospitalizations for similar episodes of hypoglycemia, there is definitely concern for patient taking his insulin.  He was recently changed to insulin 70/30 due to high doses, I do not believe it is the dose he needs, however noncompliance.  We will continue to work with family to ensure the patient is getting his insulin as needed.  Last glycated hemoglobin was 11.1%,   Change to glargine 60 units daily, discussed with pharmacy if there is no oral medication we can give, wife was hesitant to start more pills, but was in agreement with reducing insulin doses to once daily for improved compliance.   Have also referred for dietary services in the outpatient setting  Accu-Checks, hypoglycemia protocol

## 2021-04-30 NOTE — FLOWSHEET NOTE
04/29/21 2000   Patient History   Pulmonary Diagnosis N/A   Procedures Relevant to Respiratory Status None   Home O2 No   Nocturnal CPAP No   Home Treatments/Frequency No   Sleep Apnea Screening   Have you had a sleep study? No   Have you been diagnosed with sleep apnea? No   S - Have you been told that you SNORE? 1   T - Are you often TIRED during the day? 0   O - Do you know if you stop breathing or has anyone witnessed you stop breathing while you were asleep? (OBSTRUCTION) 0   P - Do you have high blood PRESSURE or on medication to control high blood pressure? 1   B - Is your Body Mass Index greater than 35? (BMI) 0   A - Are you 50 years old or older? (AGE) 1   N - Are you a male with a NECK circumference greater than 17 inches, or a female with a neck circumference greater than 16 inches? 0   G - Are you male? (GENDER) 1   Stop Bang Total Score 4   COPD Risk Screening   Do you have a history of COPD? No   COPD Population Screener   During the past 4 weeks, how much did you feel short of breath? 0   Do you ever cough up any mucus or phlegm? 0   In the past 12 months, you do less than you used to because of your breathing problems 0   Have you smoked at least 100 cigarettes in your entire life? 0   How old are you? 2   COPD Screening Score 2   COPD Coordinator Not Recommended Yes   Protocol Pathways   Protocol Pathways None

## 2021-04-30 NOTE — ED NOTES
Report given to Steve GIRON, care accepted. Pt verbalizes understanding of being admitted to hospital.

## 2021-04-30 NOTE — ED NOTES
Pharmacy Medication Reconciliation      Medication reconciliation updated and complete per pt spouse over the phone  Allergies have been verified and updated   No oral ABX within the last 14 days  Patient home pharmacy:[facility]    No current facility-administered medications on file prior to encounter.     Current Outpatient Medications on File Prior to Encounter   Medication Sig Dispense Refill   • levothyroxine (SYNTHROID) 25 MCG Tab Take 1 tablet by mouth Every morning on an empty stomach. 30 tablet 3   • Insulin NPH Isophane & Regular (70-30) 100 UNIT/ML Suspension Pen-injector Inject 25 Units under the skin every 12 hours. 2 Each 3   • Insulin Pen Needle 31G X 5 MM Misc 1 Each 2 Times a Day. 100 Each 3   • amLODIPine (NORVASC) 5 MG Tab Take 1 Tab by mouth every day. 30 Tab 1   • finasteride (PROSCAR) 5 MG Tab Take 1 Tab by mouth every day. 30 Tab 1   • potassium chloride ER (K-TAB) 20 MEQ Tab CR tablet Take 1 Tab by mouth 2 times a day. 60 Tab 1   • metoprolol tartrate (LOPRESSOR) 50 MG Tab Take 25 mg by mouth 2 times a day.     • cyclosporin (RESTASIS) 0.05 % ophthalmic emulsion Place 1 Drop in both eyes 2 times a day.     • [DISCONTINUED] gabapentin (NEURONTIN) 100 MG Cap Take 100 mg by mouth 2 Times a Day.     • chlorthalidone (HYGROTON) 25 MG Tab Take 1 Tab by mouth every day. 30 Tab 1   • neomycin-bacitracin-polymyxin (NEOSPORIN) 400-5-5000 Ointment Apply 1 Each to affected area(s) every day. To penis (Patient not taking: Reported on 3/28/2020) 1 Tube 0   • nystatin (MYCOSTATIN) 583734 UNIT/GM Cream topical cream Apply 0.0001 g to affected area(s) 2 Times a Day. (Patient not taking: Reported on 3/28/2020) 1 Tube 0   • tamsulosin (FLOMAX) 0.4 MG capsule Take 0.4 mg by mouth every morning.     • Omega-3 Fatty Acids (FISH OIL) 1000 MG Cap capsule Take 2,000 mg by mouth 2 Times a Day.     • simvastatin (ZOCOR) 10 MG Tab Take 10 mg by mouth every evening.     • Multiple Vitamins-Minerals (PX MENS  MULTIVITAMINS) Tab Take 1 Tab by mouth every day.     • Specialty Vitamins Products (PROSTATE) Tab Take 1 Tab by mouth 2 Times a Day.

## 2021-04-30 NOTE — CARE PLAN
Problem: Safety  Goal: Will remain free from falls  Outcome: PROGRESSING AS EXPECTED  Note: Patient oriented to unit. Bed in lowest, locked position, bed alarm on, call bell in reach. Walker out of sight. Patient educated to call.      Problem: Knowledge Deficit  Goal: Knowledge of disease process/condition, treatment plan, diagnostic tests, and medications will improve  Outcome: PROGRESSING AS EXPECTED  Note: Patient educated on infection prevention, current diabetic diagnosis and diabetic diet.

## 2021-04-30 NOTE — PROGRESS NOTES
Performed two RN skin check. Skin tear front  right shin pt stated he bumped it in garage. Perineum skin folds red rash pt states just started a few days ago. Skin intact everywhere else.

## 2021-05-01 LAB
ANION GAP SERPL CALC-SCNC: 13 MMOL/L (ref 7–16)
BUN SERPL-MCNC: 21 MG/DL (ref 8–22)
CALCIUM SERPL-MCNC: 8.6 MG/DL (ref 8.4–10.2)
CHLORIDE SERPL-SCNC: 103 MMOL/L (ref 96–112)
CO2 SERPL-SCNC: 23 MMOL/L (ref 20–33)
CREAT SERPL-MCNC: 0.86 MG/DL (ref 0.5–1.4)
ERYTHROCYTE [DISTWIDTH] IN BLOOD BY AUTOMATED COUNT: 41 FL (ref 35.9–50)
EST. AVERAGE GLUCOSE BLD GHB EST-MCNC: 413 MG/DL
GLUCOSE BLD-MCNC: 122 MG/DL (ref 65–99)
GLUCOSE BLD-MCNC: 230 MG/DL (ref 65–99)
GLUCOSE BLD-MCNC: 263 MG/DL (ref 65–99)
GLUCOSE SERPL-MCNC: 103 MG/DL (ref 65–99)
HBA1C MFR BLD: 16 % (ref 4–5.6)
HCT VFR BLD AUTO: 40 % (ref 42–52)
HGB BLD-MCNC: 13.8 G/DL (ref 14–18)
MAGNESIUM SERPL-MCNC: 1.8 MG/DL (ref 1.5–2.5)
MCH RBC QN AUTO: 30.5 PG (ref 27–33)
MCHC RBC AUTO-ENTMCNC: 34.5 G/DL (ref 33.7–35.3)
MCV RBC AUTO: 88.3 FL (ref 81.4–97.8)
PLATELET # BLD AUTO: 181 K/UL (ref 164–446)
PMV BLD AUTO: 11 FL (ref 9–12.9)
POTASSIUM SERPL-SCNC: 2.8 MMOL/L (ref 3.6–5.5)
RBC # BLD AUTO: 4.53 M/UL (ref 4.7–6.1)
SODIUM SERPL-SCNC: 139 MMOL/L (ref 135–145)
WBC # BLD AUTO: 7 K/UL (ref 4.8–10.8)

## 2021-05-01 PROCEDURE — 97165 OT EVAL LOW COMPLEX 30 MIN: CPT

## 2021-05-01 PROCEDURE — 96366 THER/PROPH/DIAG IV INF ADDON: CPT

## 2021-05-01 PROCEDURE — 700111 HCHG RX REV CODE 636 W/ 250 OVERRIDE (IP): Performed by: INTERNAL MEDICINE

## 2021-05-01 PROCEDURE — 700111 HCHG RX REV CODE 636 W/ 250 OVERRIDE (IP): Performed by: HOSPITALIST

## 2021-05-01 PROCEDURE — 82962 GLUCOSE BLOOD TEST: CPT

## 2021-05-01 PROCEDURE — 83735 ASSAY OF MAGNESIUM: CPT

## 2021-05-01 PROCEDURE — 700102 HCHG RX REV CODE 250 W/ 637 OVERRIDE(OP): Performed by: INTERNAL MEDICINE

## 2021-05-01 PROCEDURE — 99226 PR SUBSEQUENT OBSERVATION CARE,LEVEL III: CPT | Performed by: INTERNAL MEDICINE

## 2021-05-01 PROCEDURE — 85027 COMPLETE CBC AUTOMATED: CPT

## 2021-05-01 PROCEDURE — 36415 COLL VENOUS BLD VENIPUNCTURE: CPT

## 2021-05-01 PROCEDURE — A9270 NON-COVERED ITEM OR SERVICE: HCPCS | Performed by: HOSPITALIST

## 2021-05-01 PROCEDURE — 700102 HCHG RX REV CODE 250 W/ 637 OVERRIDE(OP): Performed by: HOSPITALIST

## 2021-05-01 PROCEDURE — A9270 NON-COVERED ITEM OR SERVICE: HCPCS | Performed by: INTERNAL MEDICINE

## 2021-05-01 PROCEDURE — 96367 TX/PROPH/DG ADDL SEQ IV INF: CPT

## 2021-05-01 PROCEDURE — G0378 HOSPITAL OBSERVATION PER HR: HCPCS

## 2021-05-01 PROCEDURE — 97162 PT EVAL MOD COMPLEX 30 MIN: CPT

## 2021-05-01 PROCEDURE — 96372 THER/PROPH/DIAG INJ SC/IM: CPT

## 2021-05-01 PROCEDURE — 80048 BASIC METABOLIC PNL TOTAL CA: CPT

## 2021-05-01 RX ORDER — POTASSIUM CHLORIDE 20 MEQ/1
40 TABLET, EXTENDED RELEASE ORAL ONCE
Status: COMPLETED | OUTPATIENT
Start: 2021-05-01 | End: 2021-05-01

## 2021-05-01 RX ORDER — INSULIN GLARGINE 100 [IU]/ML
30 INJECTION, SOLUTION SUBCUTANEOUS EVERY EVENING
Status: DISCONTINUED | OUTPATIENT
Start: 2021-05-01 | End: 2021-05-03 | Stop reason: HOSPADM

## 2021-05-01 RX ORDER — MAGNESIUM SULFATE HEPTAHYDRATE 40 MG/ML
2 INJECTION, SOLUTION INTRAVENOUS ONCE
Status: COMPLETED | OUTPATIENT
Start: 2021-05-01 | End: 2021-05-01

## 2021-05-01 RX ADMIN — INSULIN HUMAN 30 UNITS: 100 INJECTION, SUSPENSION SUBCUTANEOUS at 06:00

## 2021-05-01 RX ADMIN — ENOXAPARIN SODIUM 40 MG: 40 INJECTION SUBCUTANEOUS at 06:00

## 2021-05-01 RX ADMIN — FINASTERIDE 5 MG: 5 TABLET, FILM COATED ORAL at 06:13

## 2021-05-01 RX ADMIN — INSULIN LISPRO 3 UNITS: 100 INJECTION, SOLUTION INTRAVENOUS; SUBCUTANEOUS at 20:21

## 2021-05-01 RX ADMIN — AMLODIPINE BESYLATE 5 MG: 5 TABLET ORAL at 06:13

## 2021-05-01 RX ADMIN — MAGNESIUM SULFATE HEPTAHYDRATE 2 G: 40 INJECTION, SOLUTION INTRAVENOUS at 10:30

## 2021-05-01 RX ADMIN — SIMVASTATIN 10 MG: 10 TABLET, FILM COATED ORAL at 20:21

## 2021-05-01 RX ADMIN — INSULIN GLARGINE 30 UNITS: 100 INJECTION, SOLUTION SUBCUTANEOUS at 17:59

## 2021-05-01 RX ADMIN — LEVOTHYROXINE SODIUM 25 MCG: 0.03 TABLET ORAL at 06:00

## 2021-05-01 RX ADMIN — POTASSIUM CHLORIDE 40 MEQ: 1500 TABLET, EXTENDED RELEASE ORAL at 10:30

## 2021-05-01 RX ADMIN — TAMSULOSIN HYDROCHLORIDE 0.4 MG: 0.4 CAPSULE ORAL at 06:13

## 2021-05-01 RX ADMIN — INSULIN LISPRO 5 UNITS: 100 INJECTION, SOLUTION INTRAVENOUS; SUBCUTANEOUS at 07:00

## 2021-05-01 RX ADMIN — METOPROLOL TARTRATE 25 MG: 25 TABLET, FILM COATED ORAL at 06:00

## 2021-05-01 RX ADMIN — SENNOSIDES AND DOCUSATE SODIUM 2 TABLET: 8.6; 5 TABLET ORAL at 17:59

## 2021-05-01 ASSESSMENT — COGNITIVE AND FUNCTIONAL STATUS - GENERAL
SUGGESTED CMS G CODE MODIFIER DAILY ACTIVITY: CI
HELP NEEDED FOR BATHING: A LITTLE
MOBILITY SCORE: 24
DAILY ACTIVITIY SCORE: 23
SUGGESTED CMS G CODE MODIFIER MOBILITY: CH

## 2021-05-01 ASSESSMENT — ENCOUNTER SYMPTOMS
EYES NEGATIVE: 1
MUSCULOSKELETAL NEGATIVE: 1
CARDIOVASCULAR NEGATIVE: 1
RESPIRATORY NEGATIVE: 1
WEAKNESS: 1

## 2021-05-01 ASSESSMENT — FIBROSIS 4 INDEX: FIB4 SCORE: 1.76

## 2021-05-01 ASSESSMENT — GAIT ASSESSMENTS
DISTANCE (FEET): 150
GAIT LEVEL OF ASSIST: SUPERVISED
DISTANCE (FEET): 50
DEVIATION: STEP TO

## 2021-05-01 ASSESSMENT — ACTIVITIES OF DAILY LIVING (ADL): TOILETING: INDEPENDENT

## 2021-05-01 ASSESSMENT — PAIN DESCRIPTION - PAIN TYPE: TYPE: ACUTE PAIN

## 2021-05-01 NOTE — PROGRESS NOTES
"Hospital Medicine Daily Progress Note    Date of Service  5/1/2021    Chief Complaint  89 y.o. male admitted 4/29/2021 with high blood sugar    Hospital Course  Per notes, \"89 y.o. male with a past medical history of diabetes, hypothyroidism and hypertension who presented 4/29/2021 with generalized weakness and found to have extremely elevated blood sugars in the 700 range.  Patient reports that he has been compliant with his diabetic medications however he has been having frequent high blood sugars.  He denies having abdominal pain frequency dysuria.  He denies having fevers chills cough shortness of breath.  He reports that he is generally compliant with his diabetic diet.\"      Interval Problem Update  Patient is seen exam by me this morning at bedside, per nursing report patient stated he did not care about diet or even taking his insulin sometimes.  Patient reported to me that he did still want to take his insulin, request to be \"cured\" I did explain to patient that there is no cure for diabetes, the treatment is insulin and we will continue to work status diabetes under control.    I did a long discussion with patient's wife, she stated there have been episodes of falls in the past, patient \"constantly\" eats while he is at home, does not follow a diet, they have tried to be consistent with insulin but have missed 1-2 times over the past week.  Patient does have [facility] home [facility] once a week and PT/OT.  Would benefit from additional home health services as well as additional nutrition services.    Patient did appear alert and oriented, however continues to state that he lives with his daughter, so there may be an underlying degree of delirium versus cognitive deficit.    Consultants/Specialty  None    Code Status  DNAR/DNI    Disposition  TBD    Review of Systems  Review of Systems   Constitutional: Positive for malaise/fatigue.   Eyes: Negative.    Respiratory: Negative.    Cardiovascular: Negative.  "   Genitourinary: Negative.    Musculoskeletal: Negative.    Neurological: Positive for weakness.   All other systems reviewed and are negative.       Physical Exam  Temp:  [36.2 °C (97.1 °F)-36.8 °C (98.3 °F)] 36.6 °C (97.9 °F)  Pulse:  [60-63] 60  Resp:  [17-20] 17  BP: (112-150)/() 112/60  SpO2:  [94 %-100 %] 98 %    Physical Exam  Vitals and nursing note reviewed.   Constitutional:       Appearance: Normal appearance.   Cardiovascular:      Rate and Rhythm: Normal rate and regular rhythm.      Pulses: Normal pulses.      Heart sounds: Normal heart sounds.   Pulmonary:      Effort: Pulmonary effort is normal.      Breath sounds: Normal breath sounds.   Abdominal:      General: Abdomen is flat. Bowel sounds are normal.      Palpations: Abdomen is soft.   Musculoskeletal:         General: Normal range of motion.   Skin:     General: Skin is warm and dry.   Neurological:      General: No focal deficit present.      Mental Status: He is alert and oriented to person, place, and time.         Fluids    Intake/Output Summary (Last 24 hours) at 5/1/2021 1204  Last data filed at 5/1/2021 0900  Gross per 24 hour   Intake 700 ml   Output 550 ml   Net 150 ml       Laboratory  Recent Labs     04/29/21  1508 04/30/21  0043 05/01/21  0402   WBC 6.7 7.9 7.0   RBC 4.80 5.24 4.53*   HEMOGLOBIN 14.8 15.7 13.8*   HEMATOCRIT 41.4* 45.5 40.0*   MCV 86.3 86.8 88.3   MCH 30.8 30.0 30.5   MCHC 35.7* 34.5 34.5   RDW 39.2 39.7 41.0   PLATELETCT 197 199 181   MPV 10.8 10.9 11.0     Recent Labs     04/30/21  0619 04/30/21  1025 05/01/21  0402   SODIUM 135 139 139   POTASSIUM 3.6 3.4* 2.8*   CHLORIDE 99 101 103   CO2 25 28 23   GLUCOSE 384* 241* 103*   BUN 23* 24* 21   CREATININE 0.87 1.01 0.86   CALCIUM 8.9 9.0 8.6                   Imaging  No orders to display        Assessment/Plan  Pseudo-hyponatremia- (present on admission)  Assessment & Plan  Measured sodium is 127  Corrected sodium is 137-141, as blood sugars are in the 700's    Expect measured sodium to improve with correcting blood sugars    DM (diabetes mellitus) (CMS-HCC)- (present on admission)  Assessment & Plan  With significant hyperglycemia in 700's range, and multiple previous hospitalizations for similar episodes of hypoglycemia, there is definitely concern for patient taking his insulin.  He was recently changed to insulin 70/30 due to high doses, I do not believe it is the dose he needs, however noncompliance.  We will continue to work with family to ensure the patient is getting his insulin as needed.  Last glycated hemoglobin was 11.1%,   Change to glargine 60 units daily, will discuss with pharmacy if there is an appropriate oral medication we can give  Accu-Checks, hypoglycemia protocol     DNR (do not resuscitate)- (present on admission)  Assessment & Plan  Confirmed with the patient at bedside    Hypokalemia- (present on admission)  Assessment & Plan  Replacing, checking magnesium   Continue to monitor replace as needed     HTN (hypertension)- (present on admission)  Assessment & Plan  Resume home amlodipine & metoprolol with hold parameter     History of fall- (present on admission)  Assessment & Plan  Patient denied any fall, however, daughter stated the patient had recurrent falls, inside the house was very unsteady on feet  Pending PT/OT evaluation      Hypothyroidism- (present on admission)  Assessment & Plan  Continue levothyroxine        VTE prophylaxis: SCDs

## 2021-05-01 NOTE — CARE PLAN
Problem: Safety  Goal: Will remain free from falls  Outcome: PROGRESSING AS EXPECTED  Note: Pt agreed to fall precautions however does need reminding     Problem: Knowledge Deficit  Goal: Knowledge of disease process/condition, treatment plan, diagnostic tests, and medications will improve  Outcome: PROGRESSING AS EXPECTED  Note: Discussed importance of med compliance to control blood sugars

## 2021-05-01 NOTE — ASSESSMENT & PLAN NOTE
Patient denied any fall, however, daughter stated the patient had recurrent falls, inside the house was very unsteady on feet  Pending PT/OT evaluation

## 2021-05-01 NOTE — THERAPY
Occupational Therapy   Initial Evaluation     Patient Name: Julien Dao  Age:  89 y.o., Sex:  male  Medical Record #: [id number]  Today's Date: 5/1/2021     Precautions  Precautions: (P) Fall Risk    Assessment  Patient is 89 y.o. male admitted with high blood sugars. Presents A&Ox4, motivated for OOB activity. Shows good balance during ADL's. Performs self-cares with Sup/Mod Indep. States he sleep walks; discussed home safety. Has had HH in the past. Lives with spouse.       Plan  Recommend Occupational Therapy for Evaluation only.    DC Equipment Recommendations: (P) None  Discharge Recommendations: (P) Recommend home health for continued occupational therapy services      05/01/21 0981   Prior Living Situation   Prior Services Skilled Home Health Services   Housing / Facility 1 Story House   Bathroom Set up Walk In Shower;Grab Bars;Shower Chair   Equipment Owned Front-Wheel Walker;Tub / Shower Seat;Grab Bar(s) In Tub / Shower   Lives with - Patient's Self Care Capacity Spouse   Comments Pt keeps active. Wife drives, pt states he does cooking and laundry.     Prior Level of ADL Function   Self Feeding Independent   Grooming / Hygiene Independent   Bathing Independent   Dressing Independent   Toileting Independent   Prior Level of IADL Function   Medication Management Unable To Determine At This Time   Laundry Independent   Kitchen Mobility Independent   Finances Unable To Determine At This Time   Home Management Independent   Shopping Requires Assist   Prior Level Of Mobility Independent Without Device in Home   Driving / Transportation Relatives / Others Provide Transportation   Occupation (Pre-Hospital Vocational) Retired Due To Age   Leisure Interests Hobbies;Family   Balance Assessment   Sitting Balance (Static) Good   Sitting Balance (Dynamic) Good   Standing Balance (Static) Good   Standing Balance (Dynamic) Fair +   Weight Shift Sitting Good   Weight Shift Standing Good   Bed Mobility    Supine to Sit  Independent   Sit to Supine Independent   Scooting Independent   ADL Assessment   Eating Independent   Grooming Supervision;Standing   Upper Body Dressing Independent   Lower Body Dressing Supervision   Toileting Supervision   Functional Mobility   Sit to Stand Supervised   Bed, Chair, Wheelchair Transfer Supervised   Toilet Transfers Supervised   Transfer Method Stand Step   Distance (Feet) 50   # of Times Distance was Traveled 1

## 2021-05-01 NOTE — HOSPITAL COURSE
"Per notes, \"89 y.o. male with a past medical history of diabetes, hypothyroidism and hypertension who presented 4/29/2021 with generalized weakness and found to have extremely elevated blood sugars in the 700 range.  Patient reports that he has been compliant with his diabetic medications however he has been having frequent high blood sugars.  He denies having abdominal pain frequency dysuria.  He denies having fevers chills cough shortness of breath.  He reports that he is generally compliant with his diabetic diet.\"  "

## 2021-05-01 NOTE — THERAPY
Physical Therapy   Initial Evaluation     Patient Name: Julien Dao  Age:  89 y.o., Sex:  male  Medical Record #: [id number]  Today's Date: 5/1/2021     Precautions: (P) Fall Risk    Assessment  Patient is 89 y.o. male with a diagnosis of hyperglycemia.He lives at home with his wife he has a history of falls.Pt is safe with transfers,ambulation and stairs with supervision,he has no equipment needs     Plan    Recommend Physical Therapy for Evaluation only      05/01/21 1000   Total Time Spent   Total Time Spent (Mins) 30   Charge Group   PT Evaluation PT Evaluation Mod   Initial Contact Note    Initial Contact Note Order Received and Verified, Physical Therapy Evaluation in Progress with Full Report to Follow.   Precautions   Precautions Fall Risk   Prior Living Situation   Prior Services Skilled Home Health Services   Housing / Facility 1 Story House   Steps Into Home 1   Steps In Home 0   Equipment Owned Front-Wheel Walker   Lives with - Patient's Self Care Capacity Spouse   Prior Level of Functional Mobility   Bed Mobility Independent   Transfer Status Independent   Ambulation Independent   Distance Ambulation (Feet)   (limited community)   Assistive Devices Used Front-Wheel Walker   Stairs Independent   History of Falls   History of Falls Yes   Passive ROM Lower Body   Passive ROM Lower Body WDL   Active ROM Lower Body    Active ROM Lower Body  WDL   Strength Lower Body   Lower Body Strength  WDL   Coordination Lower Body    Coordination Lower Body  WDL   Balance Assessment   Sitting Balance (Static) Good   Sitting Balance (Dynamic) Good   Standing Balance (Static) Good   Standing Balance (Dynamic) Fair +   Weight Shift Sitting Good   Weight Shift Standing Good   Gait Analysis   Gait Level Of Assist Supervised   Assistive Device None   Distance (Feet) 150   # of Times Distance was Traveled 1   Deviation Step To   # of Stairs Climbed 3   Level of Assist with Stairs Supervised   Weight Bearing Status full   Bed  Mobility    Supine to Sit Independent   Sit to Supine Independent   Scooting Independent   Functional Mobility   Sit to Stand Supervised   Bed, Chair, Wheelchair Transfer Supervised   Toilet Transfers Supervised   Transfer Method Stand Step   How much difficulty does the patient currently have...   Turning over in bed (including adjusting bedclothes, sheets and blankets)? 4   Sitting down on and standing up from a chair with arms (e.g., wheelchair, bedside commode, etc.) 4   Moving from lying on back to sitting on the side of the bed? 4   How much help from another person does the patient currently need...   Moving to and from a bed to a chair (including a wheelchair)? 4   Need to walk in a hospital room? 4   Climbing 3-5 steps with a railing? 4   6 clicks Mobility Score 24   Activity Tolerance   Sitting Edge of Bed 10   Standing 10   Patient / Family Goals    Patient / Family Goal #1 Home   Anticipated Discharge Equipment and Recommendations   DC Equipment Recommendations None   Discharge Recommendations Recommend home health for continued physical therapy services   Interdisciplinary Plan of Care Collaboration   IDT Collaboration with  Nursing   Session Information   Date / Session Number  5/1    Priority 0       DC Equipment Recommendations: (P) None  Discharge Recommendations: (P) Recommend home health for continued physical therapy services

## 2021-05-01 NOTE — FACE TO FACE
Face to Face Supporting Documentation - Home Health    The encounter with this patient was in whole or in part the primary reason for home health admission.    Date of encounter:   Patient:                    MRN:                       YOB: 2021  Julien Dao  [id number]  3/7/1929     Home health to see patient for:  Skilled Nursing care for assessment, interventions & education, Registered dietitian consult, Home health aide, Physical Therapy evaluation and treatment and Occupational therapy evaluation and treatment    Skilled need for:  Exacerbation of Chronic Disease State diabetes    Skilled nursing interventions to include:  Comment: PT/OT/diabetescare    Homebound status evidenced by:  Need the aid of supportive devices such as crutches, canes, wheelchairs or walkers or Needs the assistance of another person in order to leave the home. Leaving home requires a considerable and taxing effort. There is a normal inability to leave the home.    Community Physician to provide follow up care: JONO Powell     Optional Interventions? No      I certify the face to face encounter for this home health care referral meets the CMS requirements and the encounter/clinical assessment with the patient was, in whole, or in part, for the medical condition(s) listed above, which is the primary reason for home health care. Based on my clinical findings: the service(s) are medically necessary, support the need for home health care, and the homebound criteria are met.  I certify that this patient has had a face to face encounter by myself.  Blas Garcias M.D. - [place]: [phone]

## 2021-05-01 NOTE — PROGRESS NOTES
Received report from nightshift RN and assumed care of patient at 0700. Patient is A&O4 denies SOB, pain, N/V or further needs at this time. Assessment complete. Bed alarm on. Call light in reach. Bed in lowest locked position. Hourly rounding to continue.

## 2021-05-01 NOTE — PROGRESS NOTES
"Hospital Medicine Daily Progress Note    Date of Service  4/30/2021    Chief Complaint  89 y.o. male admitted 4/29/2021 with high blood sugar    Hospital Course  Per notes, \"89 y.o. male with a past medical history of diabetes, hypothyroidism and hypertension who presented 4/29/2021 with generalized weakness and found to have extremely elevated blood sugars in the 700 range.  Patient reports that he has been compliant with his diabetic medications however he has been having frequent high blood sugars.  He denies having abdominal pain frequency dysuria.  He denies having fevers chills cough shortness of breath.  He reports that he is generally compliant with his diabetic diet.\"      Interval Problem Update  Patient was seen examined by me this morning at bedside, at time of examination he was very nauseous.  He stated he lived with daughter and his daughter helps him with insulin, he denied any missed doses.  I did speak with patient's daughter at his request, she stated patient did not live with her and she believed his wife helped him with his insulin.  Attempts were made to contact patient's wife, was unable to speak with her today, will continue to track.    Patient did appear alert and oriented, however continues to state that he lives with his daughter, so there may be an underlying degree of delirium versus cognitive deficit.    Consultants/Specialty  None    Code Status  DNAR/DNI    Disposition  TBD    Review of Systems  Review of Systems   Constitutional: Positive for malaise/fatigue.   Eyes: Negative.    Respiratory: Negative.    Cardiovascular: Negative.    Genitourinary: Negative.    Musculoskeletal: Negative.    Neurological: Positive for weakness.   All other systems reviewed and are negative.       Physical Exam  Temp:  [36.2 °C (97.1 °F)-36.9 °C (98.4 °F)] 36.2 °C (97.1 °F)  Pulse:  [58-69] 63  Resp:  [16-20] 20  BP: ()/() 129/104  SpO2:  [90 %-100 %] 100 %    Physical Exam  Vitals and " nursing note reviewed.   Constitutional:       Appearance: Normal appearance.   Cardiovascular:      Rate and Rhythm: Normal rate and regular rhythm.      Pulses: Normal pulses.      Heart sounds: Normal heart sounds.   Pulmonary:      Effort: Pulmonary effort is normal.      Breath sounds: Normal breath sounds.   Abdominal:      General: Abdomen is flat. Bowel sounds are normal.      Palpations: Abdomen is soft.   Musculoskeletal:         General: Normal range of motion.   Skin:     General: Skin is warm and dry.   Neurological:      General: No focal deficit present.      Mental Status: He is alert and oriented to person, place, and time.         Fluids    Intake/Output Summary (Last 24 hours) at 4/30/2021 1733  Last data filed at 4/30/2021 1400  Gross per 24 hour   Intake 420 ml   Output 100 ml   Net 320 ml       Laboratory  Recent Labs     04/29/21  1508 04/30/21  0043   WBC 6.7 7.9   RBC 4.80 5.24   HEMOGLOBIN 14.8 15.7   HEMATOCRIT 41.4* 45.5   MCV 86.3 86.8   MCH 30.8 30.0   MCHC 35.7* 34.5   RDW 39.2 39.7   PLATELETCT 197 199   MPV 10.8 10.9     Recent Labs     04/30/21  0043 04/30/21  0619 04/30/21  1025   SODIUM 137 135 139   POTASSIUM 3.1* 3.6 3.4*   CHLORIDE 98 99 101   CO2 28 25 28   GLUCOSE 262* 384* 241*   BUN 25* 23* 24*   CREATININE 0.98 0.87 1.01   CALCIUM 9.3 8.9 9.0                   Imaging  No orders to display        Assessment/Plan  Pseudo-hyponatremia- (present on admission)  Assessment & Plan  Measured sodium is 127  Corrected sodium is 137-141, as blood sugars are in the 700's   Expect measured sodium to improve with correcting blood sugars    DM (diabetes mellitus) (CMS-[facility])- (present on admission)  Assessment & Plan  With significant hyperglycemia in 700's range, and multiple previous hospitalizations for similar episodes of hypoglycemia, there is definitely concern for patient taking his insulin.  He was recently changed to insulin 70/30 due to high doses, I do not believe it is the  dose he needs, however noncompliance.  We will continue to work with family to ensure the patient is getting his insulin as needed.  Last glycated hemoglobin was 11.1%,   Continue with sliding scale and long-acting insulin, adjust as needed  Accu-Checks, hypoglycemia protocol     DNR (do not resuscitate)- (present on admission)  Assessment & Plan  Confirmed with the patient at bedside    Hypokalemia- (present on admission)  Assessment & Plan  Replacing, checking magnesium   Continue to monitor replace as needed     HTN (hypertension)- (present on admission)  Assessment & Plan  Resume home amlodipine & metoprolol with hold parameter     History of fall- (present on admission)  Assessment & Plan  Patient denied any fall, however, daughter stated the patient had recurrent falls, inside the house was very unsteady on feet  Pending PT/OT evaluation      Hypothyroidism- (present on admission)  Assessment & Plan  Continue levothyroxine        VTE prophylaxis: SCDs

## 2021-05-01 NOTE — DIETARY
Nutrition Services:    Pt admitted with high blood sugar (>700), calorie count ordered. Messaged MD - pt is non compliant with diet/insulin, calorie count not appropriate per MD. MD to discontinue Calorie Count Order.    Pt is currently on a diabetic diet and per chart pt PO %. Ht: 170.2 cm, Wt: 74.8 kg, BMI 25.84 (Normal).      Consult RD as needed. RD will re-screen weekly. RD available PRN.

## 2021-05-01 NOTE — CARE PLAN
Problem: Bowel/Gastric:  Goal: Normal bowel function is maintained or improved  Outcome: PROGRESSING AS EXPECTED  Note: Patient educated on diet, fluid intake and medications to promote bowel function.      Problem: Skin Integrity  Goal: Risk for impaired skin integrity will decrease  Outcome: PROGRESSING AS EXPECTED  Note: Patient turns self. Barrier paste in use. Frequent ambulation provided.

## 2021-05-02 PROBLEM — Z63.6 CAREGIVER BURDEN: Status: ACTIVE | Noted: 2021-05-02

## 2021-05-02 LAB
GLUCOSE BLD-MCNC: 111 MG/DL (ref 65–99)
GLUCOSE BLD-MCNC: 203 MG/DL (ref 65–99)
GLUCOSE BLD-MCNC: 228 MG/DL (ref 65–99)
GLUCOSE BLD-MCNC: 325 MG/DL (ref 65–99)
GLUCOSE BLD-MCNC: 358 MG/DL (ref 65–99)

## 2021-05-02 PROCEDURE — 96372 THER/PROPH/DIAG INJ SC/IM: CPT

## 2021-05-02 PROCEDURE — 700102 HCHG RX REV CODE 250 W/ 637 OVERRIDE(OP): Performed by: HOSPITALIST

## 2021-05-02 PROCEDURE — 82962 GLUCOSE BLOOD TEST: CPT | Mod: 91

## 2021-05-02 PROCEDURE — 700111 HCHG RX REV CODE 636 W/ 250 OVERRIDE (IP): Performed by: HOSPITALIST

## 2021-05-02 PROCEDURE — 99226 PR SUBSEQUENT OBSERVATION CARE,LEVEL III: CPT | Performed by: INTERNAL MEDICINE

## 2021-05-02 PROCEDURE — G0378 HOSPITAL OBSERVATION PER HR: HCPCS

## 2021-05-02 PROCEDURE — A9270 NON-COVERED ITEM OR SERVICE: HCPCS | Performed by: HOSPITALIST

## 2021-05-02 RX ADMIN — INSULIN LISPRO 6 UNITS: 100 INJECTION, SOLUTION INTRAVENOUS; SUBCUTANEOUS at 11:13

## 2021-05-02 RX ADMIN — TAMSULOSIN HYDROCHLORIDE 0.4 MG: 0.4 CAPSULE ORAL at 06:40

## 2021-05-02 RX ADMIN — FINASTERIDE 5 MG: 5 TABLET, FILM COATED ORAL at 06:40

## 2021-05-02 RX ADMIN — AMLODIPINE BESYLATE 5 MG: 5 TABLET ORAL at 06:40

## 2021-05-02 RX ADMIN — INSULIN LISPRO 3 UNITS: 100 INJECTION, SOLUTION INTRAVENOUS; SUBCUTANEOUS at 06:41

## 2021-05-02 RX ADMIN — ENOXAPARIN SODIUM 40 MG: 40 INJECTION SUBCUTANEOUS at 06:41

## 2021-05-02 RX ADMIN — LEVOTHYROXINE SODIUM 25 MCG: 0.03 TABLET ORAL at 06:40

## 2021-05-02 RX ADMIN — METOPROLOL TARTRATE 25 MG: 25 TABLET, FILM COATED ORAL at 06:40

## 2021-05-02 RX ADMIN — INSULIN LISPRO 3 UNITS: 100 INJECTION, SOLUTION INTRAVENOUS; SUBCUTANEOUS at 17:15

## 2021-05-02 RX ADMIN — INSULIN GLARGINE 30 UNITS: 100 INJECTION, SOLUTION SUBCUTANEOUS at 17:17

## 2021-05-02 RX ADMIN — METOPROLOL TARTRATE 25 MG: 25 TABLET, FILM COATED ORAL at 17:19

## 2021-05-02 RX ADMIN — INSULIN LISPRO 8 UNITS: 100 INJECTION, SOLUTION INTRAVENOUS; SUBCUTANEOUS at 20:33

## 2021-05-02 RX ADMIN — SIMVASTATIN 10 MG: 10 TABLET, FILM COATED ORAL at 20:33

## 2021-05-02 RX ADMIN — SENNOSIDES AND DOCUSATE SODIUM 2 TABLET: 8.6; 5 TABLET ORAL at 06:40

## 2021-05-02 ASSESSMENT — PAIN DESCRIPTION - PAIN TYPE
TYPE: ACUTE PAIN
TYPE: ACUTE PAIN

## 2021-05-02 ASSESSMENT — ENCOUNTER SYMPTOMS
EYES NEGATIVE: 1
MUSCULOSKELETAL NEGATIVE: 1
WEAKNESS: 1
RESPIRATORY NEGATIVE: 1
CARDIOVASCULAR NEGATIVE: 1

## 2021-05-02 NOTE — CARE PLAN
Problem: Communication  Goal: The ability to communicate needs accurately and effectively will improve  Outcome: PROGRESSING AS EXPECTED     Problem: Safety  Goal: Will remain free from falls  Outcome: PROGRESSING AS EXPECTED     Problem: Venous Thromboembolism (VTW)/Deep Vein Thrombosis (DVT) Prevention:  Goal: Patient will participate in Venous Thrombosis (VTE)/Deep Vein Thrombosis (DVT)Prevention Measures  Outcome: PROGRESSING AS EXPECTED     Problem: Discharge Barriers/Planning  Goal: Patient's continuum of care needs will be met  Outcome: PROGRESSING AS EXPECTED

## 2021-05-02 NOTE — DISCHARGE PLANNING
Received Choice form at 5393  Agency/Facility Name: [facility]  Referral sent per Choice form @ 9011

## 2021-05-02 NOTE — CARE PLAN
Problem: Communication  Goal: The ability to communicate needs accurately and effectively will improve  Outcome: PROGRESSING SLOWER THAN EXPECTED     Problem: Knowledge Deficit  Goal: Knowledge of disease process/condition, treatment plan, diagnostic tests, and medications will improve  Outcome: PROGRESSING SLOWER THAN EXPECTED

## 2021-05-02 NOTE — ASSESSMENT & PLAN NOTE
Patient's wife states patient has falls, is noncompliant with diet, legally blind, and incontinent, she does report some episodes of confusion.  Currently patient has delirium, but there is concern for underlying cognitive decline given patient's age and reports.  It could also be due to very poorly controlled diabetes.  Patient does have home health, but likely needs more resources.  I discussed with patient's wife and she said patient is adamantly against going to assisted living facility, I did recommend that in the future his level of care may exceed what is provided at home, will also discuss with patient's daughter.

## 2021-05-02 NOTE — ASSESSMENT & PLAN NOTE
Patient did have some transient confusion reported by nursing, likely delirium from being in the hospital.  There is concern for underlying cognitive decline, but could also be related with very poorly controlled diabetes.  I discussed this with patient's wife.

## 2021-05-02 NOTE — DISCHARGE PLANNING
Anticipated Discharge Disposition: Home with HH    Action: Met with pt at bedside to obtain choice for HH. Pt states he's on service with [facility] and would like to resume.  Choice form complete and faxed to [facility]    Barriers to Discharge: None    Plan: Care coordination will continue to follow and assist with discharge planning

## 2021-05-02 NOTE — PROGRESS NOTES
"Hospital Medicine Daily Progress Note    Date of Service  5/2/2021    Chief Complaint  89 y.o. male admitted 4/29/2021 with high blood sugar    Hospital Course  Per notes, \"89 y.o. male with a past medical history of diabetes, hypothyroidism and hypertension who presented 4/29/2021 with generalized weakness and found to have extremely elevated blood sugars in the 700 range.  Patient reports that he has been compliant with his diabetic medications however he has been having frequent high blood sugars.  He denies having abdominal pain frequency dysuria.  He denies having fevers chills cough shortness of breath.  He reports that he is generally compliant with his diabetic diet.\"      Interval Problem Update  Patient seen and examined this morning at bedside, no complaints at examination, sugars well controlled.  Nursing reported some confusion, likely delirium.    Per discussion with patient's wife, there have been episodes of falls in the past, patient \"constantly\" eats while he is at home, does not follow a diet, they have tried to be consistent with insulin but have missed 1-2 times over the past week.  [facility] does have [facility] once a week and PT/OT.  Would benefit from additional home health services as well as additional nutrition services.    Patient did appear alert and oriented, however continues to state that he lives with his daughter, so there may be an underlying degree of delirium versus cognitive deficit.    Consultants/Specialty  None    Code Status  DNAR/DNI    Disposition  TBD    Review of Systems  Review of Systems   Constitutional: Positive for malaise/fatigue.   Eyes: Negative.    Respiratory: Negative.    Cardiovascular: Negative.    Genitourinary: Negative.    Musculoskeletal: Negative.    Neurological: Positive for weakness.   All other systems reviewed and are negative.       Physical Exam  Temp:  [36.3 °C (97.4 °F)-36.6 °C (97.9 °F)] 36.3 °C (97.4 °F)  Pulse:  [60-63] 63  Resp:  " [17-20] 20  BP: (111-156)/(60-76) 156/62  SpO2:  [94 %-98 %] 94 %    Physical Exam  Vitals and nursing note reviewed.   Constitutional:       Appearance: Normal appearance.   Cardiovascular:      Rate and Rhythm: Normal rate and regular rhythm.      Pulses: Normal pulses.      Heart sounds: Normal heart sounds.   Pulmonary:      Effort: Pulmonary effort is normal.      Breath sounds: Normal breath sounds.   Abdominal:      General: Abdomen is flat. Bowel sounds are normal.      Palpations: Abdomen is soft.   Musculoskeletal:         General: Normal range of motion.   Skin:     General: Skin is warm and dry.   Neurological:      General: No focal deficit present.      Mental Status: He is alert and oriented to person, place, and time.         Fluids    Intake/Output Summary (Last 24 hours) at 5/2/2021 1016  Last data filed at 5/2/2021 0849  Gross per 24 hour   Intake 820 ml   Output 500 ml   Net 320 ml       Laboratory  Recent Labs     04/29/21  1508 04/30/21  0043 05/01/21  0402   WBC 6.7 7.9 7.0   RBC 4.80 5.24 4.53*   HEMOGLOBIN 14.8 15.7 13.8*   HEMATOCRIT 41.4* 45.5 40.0*   MCV 86.3 86.8 88.3   MCH 30.8 30.0 30.5   MCHC 35.7* 34.5 34.5   RDW 39.2 39.7 41.0   PLATELETCT 197 199 181   MPV 10.8 10.9 11.0     Recent Labs     04/30/21  0619 04/30/21  1025 05/01/21  0402   SODIUM 135 139 139   POTASSIUM 3.6 3.4* 2.8*   CHLORIDE 99 101 103   CO2 25 28 23   GLUCOSE 384* 241* 103*   BUN 23* 24* 21   CREATININE 0.87 1.01 0.86   CALCIUM 8.9 9.0 8.6                   Imaging  No orders to display        Assessment/Plan  Pseudo-hyponatremia- (present on admission)  Assessment & Plan  Measured sodium is 127  Corrected sodium is 137-141, as blood sugars are in the 700's   Expect measured sodium to improve with correcting blood sugars    Delirium- (present on admission)  Assessment & Plan  Patient did have some transient confusion reported by nursing, likely delirium from being in the hospital.  There is concern for underlying  cognitive decline, but could also be related with very poorly controlled diabetes.  I discussed this with patient's wife.    DM (diabetes mellitus) (CMS-[facility])- (present on admission)  Assessment & Plan  With significant hyperglycemia in 700's range, and multiple previous hospitalizations for similar episodes of hypoglycemia, there is definitely concern for patient taking his insulin.  He was recently changed to insulin 70/30 due to high doses, I do not believe it is the dose he needs, however noncompliance.  We will continue to work with family to ensure the patient is getting his insulin as needed.  Last glycated hemoglobin was 11.1%,   Change to glargine 60 units daily, discussed with pharmacy if there is no oral medication we can give, wife was hesitant to start more pills, but was in agreement with reducing insulin doses to once daily for improved compliance.   Have also referred for dietary services in the outpatient setting  Accu-Checks, hypoglycemia protocol     DNR (do not resuscitate)- (present on admission)  Assessment & Plan  Confirmed with the patient at bedside    Hypokalemia- (present on admission)  Assessment & Plan  Replacing, checking magnesium   Continue to monitor replace as needed     HTN (hypertension)- (present on admission)  Assessment & Plan  Resume home amlodipine & metoprolol with hold parameter     Caregiver burden- (present on admission)  Assessment & Plan  Patient's wife states patient has falls, is noncompliant with diet, legally blind, and incontinent, she does report some episodes of confusion.  Currently patient has delirium, but there is concern for underlying cognitive decline given patient's age and reports.  It could also be due to very poorly controlled diabetes.  Patient does have home health, but likely needs more resources.  I discussed with patient's wife and she said patient is adamantly against going to assisted living facility, I did recommend that in the future his level  of care may exceed what is provided at home, will also discuss with patient's daughter.    History of fall- (present on admission)  Assessment & Plan  Patient denied any fall, however, daughter stated the patient had recurrent falls, inside the house was very unsteady on feet  Pending PT/OT evaluation      Hypothyroidism- (present on admission)  Assessment & Plan  Continue levothyroxine        VTE prophylaxis: SCDs

## 2021-05-03 VITALS
DIASTOLIC BLOOD PRESSURE: 65 MMHG | HEART RATE: 65 BPM | RESPIRATION RATE: 17 BRPM | WEIGHT: 172.4 LBS | HEIGHT: 67 IN | BODY MASS INDEX: 27.06 KG/M2 | TEMPERATURE: 97.7 F | OXYGEN SATURATION: 97 % | SYSTOLIC BLOOD PRESSURE: 141 MMHG

## 2021-05-03 PROBLEM — E87.1 HYPONATREMIA: Status: RESOLVED | Noted: 2021-04-29 | Resolved: 2021-05-03

## 2021-05-03 PROBLEM — R41.0 DELIRIUM: Status: RESOLVED | Noted: 2020-02-21 | Resolved: 2021-05-03

## 2021-05-03 LAB
GLUCOSE BLD-MCNC: 189 MG/DL (ref 65–99)
GLUCOSE BLD-MCNC: 290 MG/DL (ref 65–99)

## 2021-05-03 PROCEDURE — 99217 PR OBSERVATION CARE DISCHARGE: CPT | Performed by: INTERNAL MEDICINE

## 2021-05-03 PROCEDURE — 82962 GLUCOSE BLOOD TEST: CPT

## 2021-05-03 PROCEDURE — A9270 NON-COVERED ITEM OR SERVICE: HCPCS | Performed by: HOSPITALIST

## 2021-05-03 PROCEDURE — 700102 HCHG RX REV CODE 250 W/ 637 OVERRIDE(OP): Performed by: HOSPITALIST

## 2021-05-03 PROCEDURE — 700111 HCHG RX REV CODE 636 W/ 250 OVERRIDE (IP): Performed by: HOSPITALIST

## 2021-05-03 PROCEDURE — 96372 THER/PROPH/DIAG INJ SC/IM: CPT

## 2021-05-03 PROCEDURE — G0378 HOSPITAL OBSERVATION PER HR: HCPCS

## 2021-05-03 RX ORDER — INSULIN GLARGINE 100 [IU]/ML
30 INJECTION, SOLUTION SUBCUTANEOUS EVERY EVENING
Qty: 9 ML | Refills: 0 | Status: SHIPPED | OUTPATIENT
Start: 2021-05-03 | End: 2021-06-02

## 2021-05-03 RX ADMIN — INSULIN LISPRO 5 UNITS: 100 INJECTION, SOLUTION INTRAVENOUS; SUBCUTANEOUS at 10:32

## 2021-05-03 RX ADMIN — INSULIN LISPRO 2 UNITS: 100 INJECTION, SOLUTION INTRAVENOUS; SUBCUTANEOUS at 06:22

## 2021-05-03 RX ADMIN — TAMSULOSIN HYDROCHLORIDE 0.4 MG: 0.4 CAPSULE ORAL at 06:22

## 2021-05-03 RX ADMIN — METOPROLOL TARTRATE 25 MG: 25 TABLET, FILM COATED ORAL at 06:22

## 2021-05-03 RX ADMIN — LEVOTHYROXINE SODIUM 25 MCG: 0.03 TABLET ORAL at 06:22

## 2021-05-03 RX ADMIN — AMLODIPINE BESYLATE 5 MG: 5 TABLET ORAL at 06:22

## 2021-05-03 RX ADMIN — FINASTERIDE 5 MG: 5 TABLET, FILM COATED ORAL at 06:22

## 2021-05-03 RX ADMIN — ENOXAPARIN SODIUM 40 MG: 40 INJECTION SUBCUTANEOUS at 06:22

## 2021-05-03 ASSESSMENT — PAIN DESCRIPTION - PAIN TYPE: TYPE: ACUTE PAIN

## 2021-05-03 NOTE — CARE PLAN
Problem: Safety  Goal: Will remain free from falls  Outcome: PROGRESSING AS EXPECTED     Problem: Venous Thromboembolism (VTW)/Deep Vein Thrombosis (DVT) Prevention:  Goal: Patient will participate in Venous Thrombosis (VTE)/Deep Vein Thrombosis (DVT)Prevention Measures  Outcome: PROGRESSING AS EXPECTED     Problem: Mobility  Goal: Risk for activity intolerance will decrease  Outcome: PROGRESSING AS EXPECTED

## 2021-05-03 NOTE — DISCHARGE SUMMARY
"Discharge Summary    CHIEF COMPLAINT ON ADMISSION  Chief Complaint   Patient presents with   • Hyperglycemia     pt brought in by [facility] from home for high blood sugar of 400. pt has hx of UTI and enlarged prostate. pt took his required insulin this morning.        Reason for Admission  [facility]     Admission Date  4/29/2021    CODE STATUS  DNAR/DNI    HPI & HOSPITAL COURSE  Per notes, \"89 y.o. male with a past medical history of diabetes, hypothyroidism and hypertension who presented 4/29/2021 with generalized weakness and found to have extremely elevated blood sugars in the 700 range.  Patient reports that he has been compliant with his diabetic medications however he has been having frequent high blood sugars.  He denies having abdominal pain frequency dysuria.  He denies having fevers chills cough shortness of breath.  He reports that he is generally compliant with his diabetic diet.\"    Patient was admitted for uncontrolled diabetes, initial sugars in the 700s.  After discussing with patient and his wife, was determined the patient had possibly missed doses of insulin.  I did discuss the importance of continue with insulin.  Patient was recently switched to insulin 2 times daily, will return to insulin 1 time daily as a very good compliance on this.  Additionally patient's wife stated he has a very poor diet at home, and importance of continuing with a low sugar/carb hydrate diet was discussed in detail with both the patient and his wife.  Patient does appear to have some cognitive decline, did have some delirium in the hospital which improved by time of discharge.  I discussed the importance of planning for future care needs in detail with the patient's wife.  Patient does have home health care, currently going only 1 time a week.  I would recommend patient have home health care at least 3 times per week to monitor blood sugar, encourage compliance with medication and assist with overall care of " patient.    Therefore, he is discharged in good and stable condition to home with organized home healthcare and close outpatient follow-up.    The patient met 2-midnight criteria for an inpatient stay at the time of discharge.    Discharge Date  5/3/2021    FOLLOW UP ITEMS POST DISCHARGE  Follow-up with PCP  Follow-up with home health    DISCHARGE DIAGNOSES  Active Problems:    DM (diabetes mellitus) (CMS-[facility]) POA: Yes    HTN (hypertension) POA: Yes    DNR (do not resuscitate) POA: Yes    History of fall POA: Yes    Caregiver burden POA: Yes    Hypothyroidism POA: Yes  Resolved Problems:    Delirium POA: Yes    Pseudo-hyponatremia POA: Yes    Hypokalemia POA: Yes      FOLLOW UP  No future appointments.  Angela Keyes, A.P.N.  [address] [facility]  [address]  [place] [id number] [phone]    Schedule an appointment as soon as possible for a visit in 2 weeks        MEDICATIONS ON DISCHARGE     Medication List      START taking these medications      Instructions   insulin glargine 100 UNIT/ML Soln  Commonly known as: Lantus   Inject 30 Units under the skin every evening for 30 days.  Dose: 30 Units        CONTINUE taking these medications      Instructions   amLODIPine 5 MG Tabs  Commonly known as: NORVASC   Take 1 Tab by mouth every day.  Dose: 5 mg     chlorthalidone 25 MG Tabs  Commonly known as: HYGROTON   Take 1 Tab by mouth every day.  Dose: 25 mg     finasteride 5 MG Tabs  Commonly known as: PROSCAR   Take 1 Tab by mouth every day.  Dose: 5 mg     Insulin Pen Needle 31G X 5 MM Misc   1 Each 2 Times a Day.  Dose: 1 Each     levothyroxine 25 MCG Tabs  Commonly known as: SYNTHROID   Take 1 tablet by mouth Every morning on an empty stomach.  Dose: 25 mcg     metoprolol tartrate 50 MG Tabs  Commonly known as: LOPRESSOR   Take 25 mg by mouth 2 times a day.  Dose: 25 mg     potassium Chloride ER 20 MEQ Tbcr tablet  Commonly known as: K-TAB   Take 1 Tab by mouth 2 times a day.  Dose: 20 mEq     Prostate Tabs   Take  1 Tab by mouth 2 Times a Day.  Dose: 1 tablet     PX Mens Multivitamins Tabs   Take 1 Tab by mouth every day.  Dose: 1 tablet     Restasis 0.05 % ophthalmic emulsion  Generic drug: cyclosporin   Place 1 Drop in both eyes 2 times a day.  Dose: 1 Drop     simvastatin 10 MG Tabs  Commonly known as: ZOCOR   Take 10 mg by mouth every evening.  Dose: 10 mg     tamsulosin 0.4 MG capsule  Commonly known as: FLOMAX   Take 0.4 mg by mouth every morning.  Dose: 0.4 mg        STOP taking these medications    bacitracin-neomycin-polymyxin 400-5-5000 Oint  Commonly known as: Neosporin     fish oil 1000 MG Caps capsule     Insulin NPH Isophane & Regular (70-30) 100 UNIT/ML Supn     nystatin 042091 UNIT/GM Crea topical cream  Commonly known as: MYCOSTATIN            Allergies  Allergies   Allergen Reactions   • Iodine Anaphylaxis     Pt and pts wife report that it has been so long not sure what happens       DIET  Orders Placed This Encounter   Procedures   • Diet Order Diet: Consistent CHO (Diabetic)     Standing Status:   Standing     Number of Occurrences:   1     Order Specific Question:   Diet:     Answer:   Consistent CHO (Diabetic) [4]       ACTIVITY  As tolerated.  Weight bearing as tolerated    CONSULTATIONS  None    PROCEDURES  None    LABORATORY  Lab Results   Component Value Date    SODIUM 139 05/01/2021    POTASSIUM 2.8 (L) 05/01/2021    CHLORIDE 103 05/01/2021    CO2 23 05/01/2021    GLUCOSE 103 (H) 05/01/2021    BUN 21 05/01/2021    CREATININE 0.86 05/01/2021    CREATININE 1.0 12/02/2008        Lab Results   Component Value Date    WBC 7.0 05/01/2021    HEMOGLOBIN 13.8 (L) 05/01/2021    HEMATOCRIT 40.0 (L) 05/01/2021    PLATELETCT 181 05/01/2021        Total time of the discharge process exceeds 45 minutes.

## 2021-05-03 NOTE — PROGRESS NOTES
Received report; assumed care of pt. A&O x3-4 (forgetful), denies pain/nausea. Educated on POC and fall risk precautions. Advised patient not to get out of bed without calling for help. Socks put on and bed alarm activated. Pt up at edge of bed for breakfast.    0800 Pt stood without calling for assistance. Reminded pt about call light use and fall risk. Bed alarm on.     0930 Pt sleeping comfortably in bed.     1000 Pt stood up without calling again, reinforced call light use and fall precautions.

## 2021-05-03 NOTE — CARE PLAN
Problem: Safety  Goal: Will remain free from falls  Outcome: PROGRESSING AS EXPECTED     Problem: Knowledge Deficit  Goal: Knowledge of disease process/condition, treatment plan, diagnostic tests, and medications will improve  Outcome: PROGRESSING AS EXPECTED     Problem: Discharge Barriers/Planning  Goal: Patient's continuum of care needs will be met  Outcome: PROGRESSING AS EXPECTED

## 2021-05-03 NOTE — PROGRESS NOTES
Reviewed discharge instructions with pt. Pt verbalized understanding. Called spouse to pick him up. Pt escorted to ED entrance via wheelchair with transport.

## 2021-05-03 NOTE — DISCHARGE PLANNING
Anticipated Discharge Disposition:   [facility] with [facility]     Action:   Chart review complete.     Per MD, patient is medically cleared to discharge home with HH. RN CM to follow up with [facility] for re-acceptance.     0900: MACK DACOSTA called Kina with [facility]. This patient has been accepted back into service. RN CM inquired about frequency of visits. Per Kina, this patient was being seen by an RN once a week and PT twice a week. RN CM inquired if the frequency of visits could be increased. Kina stated that the frequency depends on their initial assessment but that the MD can place her recommendation in her note. MD notified.     Barriers to Discharge:   None     Plan:   Hospital care management will continue to assist with discharge planning needs.

## 2021-05-03 NOTE — DISCHARGE INSTRUCTIONS
Hyperglycemia  Hyperglycemia occurs when the level of sugar (glucose) in the blood is too high. Glucose is a type of sugar that provides the body's main source of energy. Certain hormones (insulin and glucagon) control the level of glucose in the blood. Insulin lowers blood glucose, and glucagon increases blood glucose. Hyperglycemia can result from having too little insulin in the bloodstream, or from the body not responding normally to insulin.  Hyperglycemia occurs most often in people who have diabetes (diabetes mellitus), but it can happen in people who do not have diabetes. It can develop quickly, and it can be life-threatening if it causes you to become severely dehydrated (diabetic ketoacidosis or hyperglycemic hyperosmolar state). Severe hyperglycemia is a medical emergency.  What are the causes?  If you have diabetes, hyperglycemia may be caused by:  · Diabetes medicine.  · Medicines that increase blood glucose or affect your diabetes control.  · Not eating enough, or not eating often enough.  · Changes in physical activity level.  · Being sick or having an infection.  If you have prediabetes or undiagnosed diabetes:  · Hyperglycemia may be caused by those conditions.  If you do not have diabetes, hyperglycemia may be caused by:  · Certain medicines, including steroid medicines, beta-blockers, epinephrine, and thiazide diuretics.  · Stress.  · Serious illness.  · Surgery.  · Diseases of the pancreas.  · Infection.  What increases the risk?  Hyperglycemia is more likely to develop in people who have risk factors for diabetes, such as:  · Having a family member with diabetes.  · Having a gene for type 1 diabetes that is passed from parent to child (inherited).  · Living in an area with cold weather conditions.  · Exposure to certain viruses.  · Certain conditions in which the body's disease-fighting (immune) system attacks itself (autoimmune disorders).  · Being overweight or obese.  · Having an inactive  (sedentary) lifestyle.  · Having been diagnosed with insulin resistance.  · Having a history of prediabetes, gestational diabetes, or polycystic ovarian syndrome (PCOS).  · Being of American-, -American, /, or / descent.  What are the signs or symptoms?  Hyperglycemia may not cause any symptoms. If you do have symptoms, they may include early warning signs, such as:  · Increased thirst.  · Hunger.  · Feeling very tired.  · Needing to urinate more often than usual.  · Blurry vision.  Other symptoms may develop if hyperglycemia gets worse, such as:  · Dry mouth.  · Loss of appetite.  · Fruity-smelling breath.  · Weakness.  · Unexpected or rapid weight gain or weight loss.  · Tingling or numbness in the hands or feet.  · Headache.  · Skin that does not quickly return to normal after being lightly pinched and released (poor skin turgor).  · Abdominal pain.  · Cuts or bruises that are slow to heal.  How is this diagnosed?  Hyperglycemia is diagnosed with a blood test to measure your blood glucose level. This blood test is usually done while you are having symptoms. Your health care provider may also do a physical exam and review your medical history.  You may have more tests to determine the cause of your hyperglycemia, such as:  · A fasting blood glucose (FBG) test. You will not be allowed to eat (you will fast) for at least 8 hours before a blood sample is taken.  · An A1c (hemoglobin A1c) blood test. This provides information about blood glucose control over the previous 2-3 months.  · An oral glucose tolerance test (OGTT). This measures your blood glucose at two times:  ? After fasting. This is your baseline blood glucose level.  ? Two hours after drinking a beverage that contains glucose.  How is this treated?  Treatment depends on the cause of your hyperglycemia. Treatment may include:  · Taking medicine to regulate your blood glucose levels. If you take insulin or  other diabetes medicines, your medicine or dosage may be adjusted.  · Lifestyle changes, such as exercising more, eating healthier foods, or losing weight.  · Treating an illness or infection, if this caused your hyperglycemia.  · Checking your blood glucose more often.  · Stopping or reducing steroid medicines, if these caused your hyperglycemia.  If your hyperglycemia becomes severe and it results in hyperglycemic hyperosmolar state, you must be hospitalized and given IV fluids.  Follow these instructions at home:    General instructions  · Take over-the-counter and prescription medicines only as told by your health care provider.  · Do not use any products that contain nicotine or tobacco, such as cigarettes and e-cigarettes. If you need help quitting, ask your health care provider.  · Limit alcohol intake to no more than 1 drink per day for nonpregnant women and 2 drinks per day for men. One drink equals 12 oz of beer, 5 oz of wine, or 1½ oz of hard liquor.  · Learn to manage stress. If you need help with this, ask your health care provider.  · Keep all follow-up visits as told by your health care provider. This is important.  Eating and drinking    · Maintain a healthy weight.  · Exercise regularly, as directed by your health care provider.  · Stay hydrated, especially when you exercise, get sick, or spend time in hot temperatures.  · Eat healthy foods, such as:  ? Lean proteins.  ? Complex carbohydrates.  ? Fresh fruits and vegetables.  ? Low-fat dairy products.  ? Healthy fats.  · Drink enough fluid to keep your urine clear or pale yellow.  If you have diabetes:  · Make sure you know the symptoms of hyperglycemia.  · Follow your diabetes management plan, as told by your health care provider. Make sure you:  ? Take your insulin and medicines as directed.  ? Follow your exercise plan.  ? Follow your meal plan. Eat on time, and do not skip meals.  ? Check your blood glucose as often as directed. Make sure to  check your blood glucose before and after exercise. If you exercise longer or in a different way than usual, check your blood glucose more often.  ? Follow your sick day plan whenever you cannot eat or drink normally. Make this plan in advance with your health care provider.  · Share your diabetes management plan with people in your workplace, school, and household.  · Check your urine for ketones when you are ill and as told by your health care provider.  · Carry a medical alert card or wear medical alert jewelry.  Contact a health care provider if:  · Your blood glucose is at or above 240 mg/dL (13.3 mmol/L) for 2 days in a row.  · You have problems keeping your blood glucose in your target range.  · You have frequent episodes of hyperglycemia.  Get help right away if:  · You have difficulty breathing.  · You have a change in how you think, feel, or act (mental status).  · You have nausea or vomiting that does not go away.  These symptoms may represent a serious problem that is an emergency. Do not wait to see if the symptoms will go away. Get medical help right away. Call your local emergency services (911 in the [place] Do not drive yourself to the hospital.  Summary  · Hyperglycemia occurs when the level of sugar (glucose) in the blood is too high.  · Hyperglycemia is diagnosed with a blood test to measure your blood glucose level. This blood test is usually done while you are having symptoms. Your health care provider may also do a physical exam and review your medical history.  · If you have diabetes, follow your diabetes management plan as told by your health care provider.  · Contact your health care provider if you have problems keeping your blood glucose in your target range.  This information is not intended to replace advice given to you by your health care provider. Make sure you discuss any questions you have with your health care provider.  Document Released: 06/13/2002 Document Revised: 09/04/2017  Document Reviewed: 09/04/2017  [employer] Patient Education © 2020 [employer]      Discharge Instructions    Discharged to home by car with relative. Discharged via wheelchair, hospital escort: Yes.  Special equipment needed: Not Applicable    Be sure to schedule a follow-up appointment with your primary care doctor or any specialists as instructed.     Discharge Plan:        I understand that a diet low in cholesterol, fat, and sodium is recommended for good health. Unless I have been given specific instructions below for another diet, I accept this instruction as my diet prescription.   Other diet: Diabetic    Special Instructions: None    · Is patient discharged on Warfarin / Coumadin?   No     Depression / Suicide Risk    As you are discharged from this [facility] facility, it is important to learn how to keep safe from harming yourself.    Recognize the warning signs:  · Abrupt changes in personality, positive or negative- including increase in energy   · Giving away possessions  · Change in eating patterns- significant weight changes-  positive or negative  · Change in sleeping patterns- unable to sleep or sleeping all the time   · Unwillingness or inability to communicate  · Depression  · Unusual sadness, discouragement and loneliness  · Talk of wanting to die  · Neglect of personal appearance   · Rebelliousness- reckless behavior  · Withdrawal from people/activities they love  · Confusion- inability to concentrate     If you or a loved one observes any of these behaviors or has concerns about self-harm, here's what you can do:  · Talk about it- your feelings and reasons for harming yourself  · Remove any means that you might use to hurt yourself (examples: pills, rope, extension cords, firearm)  · Get professional help from the community (Mental Health, Substance Abuse, psychological counseling)  · Do not be alone:Call your Safe Contact- someone whom you trust who will be there for you.  · Call your  local CRISIS HOTLINE 231-1132 or 433-081-0153  · Call your local Children's Mobile Crisis Response Team [place] (329) 056-0910 or www."[employer]  · Call the toll free National Suicide Prevention Hotlines   · National Suicide Prevention Lifeline 239-456-COHD (6051)  · National Lydia Line Network 800-SUICIDE (751-4617)

## 2021-05-18 ENCOUNTER — HOSPITAL ENCOUNTER (OUTPATIENT)
Facility: MEDICAL CENTER | Age: 86
End: 2021-05-18
Attending: INTERNAL MEDICINE
Payer: MEDICARE

## 2021-05-18 LAB
APPEARANCE UR: CLEAR
BACTERIA #/AREA URNS HPF: ABNORMAL /HPF
BILIRUB UR QL STRIP.AUTO: NEGATIVE
COLOR UR: YELLOW
EPI CELLS #/AREA URNS HPF: NEGATIVE /HPF
GLUCOSE UR STRIP.AUTO-MCNC: >=1000 MG/DL
HYALINE CASTS #/AREA URNS LPF: ABNORMAL /LPF
KETONES UR STRIP.AUTO-MCNC: NEGATIVE MG/DL
LEUKOCYTE ESTERASE UR QL STRIP.AUTO: ABNORMAL
MICRO URNS: ABNORMAL
NITRITE UR QL STRIP.AUTO: NEGATIVE
PH UR STRIP.AUTO: 6.5 [PH] (ref 5–8)
PROT UR QL STRIP: NEGATIVE MG/DL
RBC # URNS HPF: ABNORMAL /HPF
RBC UR QL AUTO: NEGATIVE
SP GR UR STRIP.AUTO: 1.01
UROBILINOGEN UR STRIP.AUTO-MCNC: 0.2 MG/DL
WBC #/AREA URNS HPF: ABNORMAL /HPF
YEAST BUDDING URNS QL: PRESENT /HPF

## 2021-05-18 PROCEDURE — 87086 URINE CULTURE/COLONY COUNT: CPT

## 2021-05-18 PROCEDURE — 81001 URINALYSIS AUTO W/SCOPE: CPT

## 2021-05-20 LAB
BACTERIA UR CULT: NORMAL
SIGNIFICANT IND 70042: NORMAL
SITE SITE: NORMAL
SOURCE SOURCE: NORMAL

## 2021-06-14 ENCOUNTER — HOSPITAL ENCOUNTER (INPATIENT)
Facility: MEDICAL CENTER | Age: 86
LOS: 3 days | DRG: 683 | End: 2021-06-18
Attending: EMERGENCY MEDICINE | Admitting: STUDENT IN AN ORGANIZED HEALTH CARE EDUCATION/TRAINING PROGRAM
Payer: MEDICARE

## 2021-06-14 ENCOUNTER — APPOINTMENT (OUTPATIENT)
Dept: RADIOLOGY | Facility: MEDICAL CENTER | Age: 86
DRG: 683 | End: 2021-06-14
Attending: EMERGENCY MEDICINE
Payer: MEDICARE

## 2021-06-14 ENCOUNTER — APPOINTMENT (OUTPATIENT)
Dept: RADIOLOGY | Facility: MEDICAL CENTER | Age: 86
DRG: 683 | End: 2021-06-14
Attending: HOSPITALIST
Payer: MEDICARE

## 2021-06-14 DIAGNOSIS — R41.82 ALTERED MENTAL STATUS, UNSPECIFIED ALTERED MENTAL STATUS TYPE: ICD-10-CM

## 2021-06-14 PROBLEM — I95.9 HYPOTENSION: Status: ACTIVE | Noted: 2021-06-14

## 2021-06-14 PROBLEM — N17.9 AKI (ACUTE KIDNEY INJURY) (HCC): Status: ACTIVE | Noted: 2021-06-14

## 2021-06-14 PROBLEM — E87.20 LACTIC ACIDOSIS: Status: ACTIVE | Noted: 2021-06-14

## 2021-06-14 LAB
ALBUMIN SERPL BCP-MCNC: 3.5 G/DL (ref 3.2–4.9)
ALBUMIN/GLOB SERPL: 1.3 G/DL
ALP SERPL-CCNC: 56 U/L (ref 30–99)
ALT SERPL-CCNC: 20 U/L (ref 2–50)
ANION GAP SERPL CALC-SCNC: 13 MMOL/L (ref 7–16)
ANION GAP SERPL CALC-SCNC: 16 MMOL/L (ref 7–16)
APPEARANCE UR: CLEAR
AST SERPL-CCNC: 22 U/L (ref 12–45)
BASOPHILS # BLD AUTO: 0.6 % (ref 0–1.8)
BASOPHILS # BLD: 0.05 K/UL (ref 0–0.12)
BILIRUB SERPL-MCNC: 0.4 MG/DL (ref 0.1–1.5)
BILIRUB UR QL STRIP.AUTO: NEGATIVE
BUN SERPL-MCNC: 27 MG/DL (ref 8–22)
BUN SERPL-MCNC: 30 MG/DL (ref 8–22)
CALCIUM SERPL-MCNC: 8.8 MG/DL (ref 8.4–10.2)
CALCIUM SERPL-MCNC: 9 MG/DL (ref 8.4–10.2)
CHLORIDE SERPL-SCNC: 92 MMOL/L (ref 96–112)
CHLORIDE SERPL-SCNC: 96 MMOL/L (ref 96–112)
CK SERPL-CCNC: 141 U/L (ref 0–154)
CO2 SERPL-SCNC: 27 MMOL/L (ref 20–33)
CO2 SERPL-SCNC: 28 MMOL/L (ref 20–33)
COLOR UR: YELLOW
CREAT SERPL-MCNC: 1.23 MG/DL (ref 0.5–1.4)
CREAT SERPL-MCNC: 1.54 MG/DL (ref 0.5–1.4)
EKG IMPRESSION: NORMAL
EOSINOPHIL # BLD AUTO: 0.09 K/UL (ref 0–0.51)
EOSINOPHIL NFR BLD: 1 % (ref 0–6.9)
ERYTHROCYTE [DISTWIDTH] IN BLOOD BY AUTOMATED COUNT: 41.5 FL (ref 35.9–50)
FLUAV RNA SPEC QL NAA+PROBE: NEGATIVE
FLUBV RNA SPEC QL NAA+PROBE: NEGATIVE
GLOBULIN SER CALC-MCNC: 2.6 G/DL (ref 1.9–3.5)
GLUCOSE BLD-MCNC: 339 MG/DL (ref 65–99)
GLUCOSE BLD-MCNC: 381 MG/DL (ref 65–99)
GLUCOSE BLD-MCNC: 398 MG/DL (ref 65–99)
GLUCOSE BLD-MCNC: 427 MG/DL (ref 65–99)
GLUCOSE SERPL-MCNC: 322 MG/DL (ref 65–99)
GLUCOSE SERPL-MCNC: 458 MG/DL (ref 65–99)
GLUCOSE UR STRIP.AUTO-MCNC: >=1000 MG/DL
HCT VFR BLD AUTO: 37.7 % (ref 42–52)
HGB BLD-MCNC: 13.2 G/DL (ref 14–18)
IMM GRANULOCYTES # BLD AUTO: 0.04 K/UL (ref 0–0.11)
IMM GRANULOCYTES NFR BLD AUTO: 0.4 % (ref 0–0.9)
KETONES UR STRIP.AUTO-MCNC: NEGATIVE MG/DL
LACTATE BLD-SCNC: 1.5 MMOL/L (ref 0.5–2)
LACTATE BLD-SCNC: 1.9 MMOL/L (ref 0.5–2)
LACTATE BLD-SCNC: 3.4 MMOL/L (ref 0.5–2)
LACTATE BLD-SCNC: 3.5 MMOL/L (ref 0.5–2)
LACTATE BLD-SCNC: 3.5 MMOL/L (ref 0.5–2)
LEUKOCYTE ESTERASE UR QL STRIP.AUTO: NEGATIVE
LYMPHOCYTES # BLD AUTO: 2.35 K/UL (ref 1–4.8)
LYMPHOCYTES NFR BLD: 26.2 % (ref 22–41)
MAGNESIUM SERPL-MCNC: 1.7 MG/DL (ref 1.5–2.5)
MCH RBC QN AUTO: 31.7 PG (ref 27–33)
MCHC RBC AUTO-ENTMCNC: 35 G/DL (ref 33.7–35.3)
MCV RBC AUTO: 90.4 FL (ref 81.4–97.8)
MICRO URNS: ABNORMAL
MONOCYTES # BLD AUTO: 0.64 K/UL (ref 0–0.85)
MONOCYTES NFR BLD AUTO: 7.1 % (ref 0–13.4)
NEUTROPHILS # BLD AUTO: 5.8 K/UL (ref 1.82–7.42)
NEUTROPHILS NFR BLD: 64.7 % (ref 44–72)
NITRITE UR QL STRIP.AUTO: NEGATIVE
NRBC # BLD AUTO: 0 K/UL
NRBC BLD-RTO: 0 /100 WBC
NT-PROBNP SERPL IA-MCNC: 1945 PG/ML (ref 0–125)
PH UR STRIP.AUTO: 7 [PH] (ref 5–8)
PLATELET # BLD AUTO: 222 K/UL (ref 164–446)
PMV BLD AUTO: 11.3 FL (ref 9–12.9)
POTASSIUM SERPL-SCNC: 2.8 MMOL/L (ref 3.6–5.5)
POTASSIUM SERPL-SCNC: 2.8 MMOL/L (ref 3.6–5.5)
PROCALCITONIN SERPL-MCNC: 0.06 NG/ML
PROT SERPL-MCNC: 6.1 G/DL (ref 6–8.2)
PROT UR QL STRIP: NEGATIVE MG/DL
RBC # BLD AUTO: 4.17 M/UL (ref 4.7–6.1)
RBC UR QL AUTO: NEGATIVE
RSV RNA SPEC QL NAA+PROBE: NEGATIVE
SARS-COV-2 RNA RESP QL NAA+PROBE: NOTDETECTED
SODIUM SERPL-SCNC: 135 MMOL/L (ref 135–145)
SODIUM SERPL-SCNC: 137 MMOL/L (ref 135–145)
SP GR UR STRIP.AUTO: 1.01
SPECIMEN SOURCE: NORMAL
TROPONIN T SERPL-MCNC: 51 NG/L (ref 6–19)
TROPONIN T SERPL-MCNC: 66 NG/L (ref 6–19)
WBC # BLD AUTO: 9 K/UL (ref 4.8–10.8)

## 2021-06-14 PROCEDURE — 82962 GLUCOSE BLOOD TEST: CPT

## 2021-06-14 PROCEDURE — 93005 ELECTROCARDIOGRAM TRACING: CPT | Performed by: HOSPITALIST

## 2021-06-14 PROCEDURE — G0378 HOSPITAL OBSERVATION PER HR: HCPCS

## 2021-06-14 PROCEDURE — 87186 SC STD MICRODIL/AGAR DIL: CPT

## 2021-06-14 PROCEDURE — 700101 HCHG RX REV CODE 250: Performed by: HOSPITALIST

## 2021-06-14 PROCEDURE — 700105 HCHG RX REV CODE 258: Performed by: EMERGENCY MEDICINE

## 2021-06-14 PROCEDURE — 84145 PROCALCITONIN (PCT): CPT

## 2021-06-14 PROCEDURE — 70450 CT HEAD/BRAIN W/O DYE: CPT | Mod: MG

## 2021-06-14 PROCEDURE — 80053 COMPREHEN METABOLIC PANEL: CPT

## 2021-06-14 PROCEDURE — 83735 ASSAY OF MAGNESIUM: CPT

## 2021-06-14 PROCEDURE — 71045 X-RAY EXAM CHEST 1 VIEW: CPT

## 2021-06-14 PROCEDURE — 85025 COMPLETE CBC W/AUTO DIFF WBC: CPT

## 2021-06-14 PROCEDURE — 700111 HCHG RX REV CODE 636 W/ 250 OVERRIDE (IP): Performed by: EMERGENCY MEDICINE

## 2021-06-14 PROCEDURE — 93010 ELECTROCARDIOGRAM REPORT: CPT | Performed by: INTERNAL MEDICINE

## 2021-06-14 PROCEDURE — 87077 CULTURE AEROBIC IDENTIFY: CPT

## 2021-06-14 PROCEDURE — 700111 HCHG RX REV CODE 636 W/ 250 OVERRIDE (IP): Performed by: HOSPITALIST

## 2021-06-14 PROCEDURE — 87040 BLOOD CULTURE FOR BACTERIA: CPT

## 2021-06-14 PROCEDURE — 83605 ASSAY OF LACTIC ACID: CPT | Mod: 91

## 2021-06-14 PROCEDURE — 80048 BASIC METABOLIC PNL TOTAL CA: CPT

## 2021-06-14 PROCEDURE — 96367 TX/PROPH/DG ADDL SEQ IV INF: CPT

## 2021-06-14 PROCEDURE — 76775 US EXAM ABDO BACK WALL LIM: CPT

## 2021-06-14 PROCEDURE — C9803 HOPD COVID-19 SPEC COLLECT: HCPCS | Performed by: EMERGENCY MEDICINE

## 2021-06-14 PROCEDURE — 82550 ASSAY OF CK (CPK): CPT

## 2021-06-14 PROCEDURE — 0241U HCHG SARS-COV-2 COVID-19 NFCT DS RESP RNA 4 TRGT MIC: CPT

## 2021-06-14 PROCEDURE — 87086 URINE CULTURE/COLONY COUNT: CPT

## 2021-06-14 PROCEDURE — A9270 NON-COVERED ITEM OR SERVICE: HCPCS | Performed by: HOSPITALIST

## 2021-06-14 PROCEDURE — 81003 URINALYSIS AUTO W/O SCOPE: CPT

## 2021-06-14 PROCEDURE — 96372 THER/PROPH/DIAG INJ SC/IM: CPT

## 2021-06-14 PROCEDURE — 99220 PR INITIAL OBSERVATION CARE,LEVL III: CPT | Performed by: HOSPITALIST

## 2021-06-14 PROCEDURE — 96365 THER/PROPH/DIAG IV INF INIT: CPT

## 2021-06-14 PROCEDURE — 700102 HCHG RX REV CODE 250 W/ 637 OVERRIDE(OP): Performed by: HOSPITALIST

## 2021-06-14 PROCEDURE — 87150 DNA/RNA AMPLIFIED PROBE: CPT

## 2021-06-14 PROCEDURE — 99285 EMERGENCY DEPT VISIT HI MDM: CPT

## 2021-06-14 PROCEDURE — 84484 ASSAY OF TROPONIN QUANT: CPT | Mod: 91

## 2021-06-14 PROCEDURE — 83880 ASSAY OF NATRIURETIC PEPTIDE: CPT

## 2021-06-14 RX ORDER — CYCLOSPORINE 0.5 MG/ML
1 EMULSION OPHTHALMIC EVERY EVENING
Status: DISCONTINUED | OUTPATIENT
Start: 2021-06-14 | End: 2021-06-14

## 2021-06-14 RX ORDER — GABAPENTIN 100 MG/1
100 CAPSULE ORAL EVERY EVENING
Status: DISCONTINUED | OUTPATIENT
Start: 2021-06-14 | End: 2021-06-18 | Stop reason: HOSPADM

## 2021-06-14 RX ORDER — SODIUM CHLORIDE 9 MG/ML
INJECTION, SOLUTION INTRAVENOUS CONTINUOUS
Status: DISCONTINUED | OUTPATIENT
Start: 2021-06-14 | End: 2021-06-15

## 2021-06-14 RX ORDER — CARBOXYMETHYLCELLULOSE SODIUM 0.5 %
1 DROPPERETTE, SINGLE-USE DROP DISPENSER OPHTHALMIC (EYE) EVERY EVENING
COMMUNITY
End: 2021-07-14

## 2021-06-14 RX ORDER — POTASSIUM CHLORIDE 20 MEQ/1
40 TABLET, EXTENDED RELEASE ORAL ONCE
Status: COMPLETED | OUTPATIENT
Start: 2021-06-14 | End: 2021-06-14

## 2021-06-14 RX ORDER — POLYVINYL ALCOHOL 14 MG/ML
1 SOLUTION/ DROPS OPHTHALMIC EVERY EVENING
Status: DISCONTINUED | OUTPATIENT
Start: 2021-06-14 | End: 2021-06-18 | Stop reason: HOSPADM

## 2021-06-14 RX ORDER — TAMSULOSIN HYDROCHLORIDE 0.4 MG/1
0.4 CAPSULE ORAL EVERY MORNING
Status: DISCONTINUED | OUTPATIENT
Start: 2021-06-14 | End: 2021-06-18 | Stop reason: HOSPADM

## 2021-06-14 RX ORDER — TRIAMCINOLONE ACETONIDE 1 MG/G
1 CREAM TOPICAL 2 TIMES DAILY
Status: SHIPPED | COMMUNITY
End: 2021-06-20

## 2021-06-14 RX ORDER — GABAPENTIN 100 MG/1
100 CAPSULE ORAL 2 TIMES DAILY
Status: ON HOLD | COMMUNITY
End: 2021-10-25

## 2021-06-14 RX ORDER — MAGNESIUM SULFATE 1 G/100ML
1 INJECTION INTRAVENOUS ONCE
Status: COMPLETED | OUTPATIENT
Start: 2021-06-14 | End: 2021-06-14

## 2021-06-14 RX ORDER — SIMVASTATIN 10 MG
10 TABLET ORAL NIGHTLY
Status: DISCONTINUED | OUTPATIENT
Start: 2021-06-14 | End: 2021-06-18 | Stop reason: HOSPADM

## 2021-06-14 RX ORDER — SODIUM CHLORIDE AND POTASSIUM CHLORIDE 150; 900 MG/100ML; MG/100ML
INJECTION, SOLUTION INTRAVENOUS CONTINUOUS
Status: DISCONTINUED | OUTPATIENT
Start: 2021-06-14 | End: 2021-06-16

## 2021-06-14 RX ORDER — POLYETHYLENE GLYCOL 3350 17 G/17G
1 POWDER, FOR SOLUTION ORAL
Status: DISCONTINUED | OUTPATIENT
Start: 2021-06-14 | End: 2021-06-18 | Stop reason: HOSPADM

## 2021-06-14 RX ORDER — INSULIN GLARGINE 100 [IU]/ML
10 INJECTION, SOLUTION SUBCUTANEOUS EVERY EVENING
COMMUNITY

## 2021-06-14 RX ORDER — CARBOXYMETHYLCELLULOSE SODIUM 5 MG/ML
1 SOLUTION/ DROPS OPHTHALMIC EVERY EVENING
Status: DISCONTINUED | OUTPATIENT
Start: 2021-06-14 | End: 2021-06-14

## 2021-06-14 RX ORDER — BISACODYL 10 MG
10 SUPPOSITORY, RECTAL RECTAL
Status: DISCONTINUED | OUTPATIENT
Start: 2021-06-14 | End: 2021-06-18 | Stop reason: HOSPADM

## 2021-06-14 RX ORDER — MINERAL OIL/PETROLATUM,WHITE 20%-80%
1 OINTMENT (GRAM) OPHTHALMIC (EYE) EVERY EVENING
Status: SHIPPED | COMMUNITY
End: 2021-06-14

## 2021-06-14 RX ORDER — AMOXICILLIN 250 MG
2 CAPSULE ORAL 2 TIMES DAILY
Status: DISCONTINUED | OUTPATIENT
Start: 2021-06-14 | End: 2021-06-18 | Stop reason: HOSPADM

## 2021-06-14 RX ORDER — AZITHROMYCIN 500 MG/1
500 INJECTION, POWDER, LYOPHILIZED, FOR SOLUTION INTRAVENOUS ONCE
Status: COMPLETED | OUTPATIENT
Start: 2021-06-14 | End: 2021-06-14

## 2021-06-14 RX ORDER — MULTIPLE VITAMINS W/ MINERALS TAB 9MG-400MCG
1 TAB ORAL EVERY MORNING
Status: DISCONTINUED | OUTPATIENT
Start: 2021-06-14 | End: 2021-06-14

## 2021-06-14 RX ORDER — LEVOTHYROXINE SODIUM 0.03 MG/1
25 TABLET ORAL
Status: DISCONTINUED | OUTPATIENT
Start: 2021-06-15 | End: 2021-06-18 | Stop reason: HOSPADM

## 2021-06-14 RX ORDER — FINASTERIDE 5 MG/1
5 TABLET, FILM COATED ORAL DAILY
Status: DISCONTINUED | OUTPATIENT
Start: 2021-06-14 | End: 2021-06-18 | Stop reason: HOSPADM

## 2021-06-14 RX ORDER — TRIAMCINOLONE ACETONIDE 1 MG/G
1 CREAM TOPICAL 2 TIMES DAILY
Status: DISCONTINUED | OUTPATIENT
Start: 2021-06-14 | End: 2021-06-18 | Stop reason: HOSPADM

## 2021-06-14 RX ORDER — DEXTROSE MONOHYDRATE 25 G/50ML
50 INJECTION, SOLUTION INTRAVENOUS
Status: DISCONTINUED | OUTPATIENT
Start: 2021-06-14 | End: 2021-06-16

## 2021-06-14 RX ADMIN — POTASSIUM CHLORIDE AND SODIUM CHLORIDE: 900; 150 INJECTION, SOLUTION INTRAVENOUS at 22:18

## 2021-06-14 RX ADMIN — TRIAMCINOLONE ACETONIDE 1 APPLICATION: 1 CREAM TOPICAL at 17:20

## 2021-06-14 RX ADMIN — POTASSIUM CHLORIDE 40 MEQ: 1500 TABLET, EXTENDED RELEASE ORAL at 12:54

## 2021-06-14 RX ADMIN — INSULIN HUMAN 4 UNITS: 100 INJECTION, SOLUTION PARENTERAL at 17:33

## 2021-06-14 RX ADMIN — GABAPENTIN 100 MG: 100 CAPSULE ORAL at 17:15

## 2021-06-14 RX ADMIN — MAGNESIUM SULFATE 1 G: 1 INJECTION INTRAVENOUS at 12:49

## 2021-06-14 RX ADMIN — ENOXAPARIN SODIUM 30 MG: 30 INJECTION SUBCUTANEOUS at 12:52

## 2021-06-14 RX ADMIN — INSULIN HUMAN 5 UNITS: 100 INJECTION, SOLUTION PARENTERAL at 12:41

## 2021-06-14 RX ADMIN — INSULIN GLARGINE 30 UNITS: 100 INJECTION, SOLUTION SUBCUTANEOUS at 12:41

## 2021-06-14 RX ADMIN — SODIUM CHLORIDE 1000 ML: 9 INJECTION, SOLUTION INTRAVENOUS at 08:52

## 2021-06-14 RX ADMIN — INSULIN HUMAN 6 UNITS: 100 INJECTION, SOLUTION PARENTERAL at 20:52

## 2021-06-14 RX ADMIN — SODIUM CHLORIDE 3 G: 900 INJECTION INTRAVENOUS at 08:53

## 2021-06-14 RX ADMIN — POTASSIUM CHLORIDE AND SODIUM CHLORIDE: 900; 150 INJECTION, SOLUTION INTRAVENOUS at 12:37

## 2021-06-14 RX ADMIN — AZITHROMYCIN MONOHYDRATE 500 MG: 500 INJECTION, POWDER, LYOPHILIZED, FOR SOLUTION INTRAVENOUS at 09:31

## 2021-06-14 RX ADMIN — SIMVASTATIN 10 MG: 10 TABLET, FILM COATED ORAL at 20:53

## 2021-06-14 RX ADMIN — POLYVINYL ALCOHOL 1 DROP: 14 SOLUTION/ DROPS OPHTHALMIC at 17:18

## 2021-06-14 ASSESSMENT — LIFESTYLE VARIABLES
ALCOHOL_USE: NO
CONSUMPTION TOTAL: NEGATIVE
TOTAL SCORE: 0
EVER FELT BAD OR GUILTY ABOUT YOUR DRINKING: NO
AVERAGE NUMBER OF DAYS PER WEEK YOU HAVE A DRINK CONTAINING ALCOHOL: 0
HOW MANY TIMES IN THE PAST YEAR HAVE YOU HAD 5 OR MORE DRINKS IN A DAY: 0
TOTAL SCORE: 0
HAVE YOU EVER FELT YOU SHOULD CUT DOWN ON YOUR DRINKING: NO
TOTAL SCORE: 0
ON A TYPICAL DAY WHEN YOU DRINK ALCOHOL HOW MANY DRINKS DO YOU HAVE: 0
HAVE PEOPLE ANNOYED YOU BY CRITICIZING YOUR DRINKING: NO
EVER HAD A DRINK FIRST THING IN THE MORNING TO STEADY YOUR NERVES TO GET RID OF A HANGOVER: NO

## 2021-06-14 ASSESSMENT — COGNITIVE AND FUNCTIONAL STATUS - GENERAL
PERSONAL GROOMING: A LOT
MOVING FROM LYING ON BACK TO SITTING ON SIDE OF FLAT BED: A LOT
EATING MEALS: A LOT
SUGGESTED CMS G CODE MODIFIER DAILY ACTIVITY: CL
STANDING UP FROM CHAIR USING ARMS: A LOT
HELP NEEDED FOR BATHING: A LOT
DAILY ACTIVITIY SCORE: 12
TURNING FROM BACK TO SIDE WHILE IN FLAT BAD: A LOT
MOBILITY SCORE: 12
DRESSING REGULAR LOWER BODY CLOTHING: A LOT
MOVING TO AND FROM BED TO CHAIR: A LOT
TOILETING: A LOT
DRESSING REGULAR UPPER BODY CLOTHING: A LOT
WALKING IN HOSPITAL ROOM: A LOT
CLIMB 3 TO 5 STEPS WITH RAILING: A LOT
SUGGESTED CMS G CODE MODIFIER MOBILITY: CL

## 2021-06-14 ASSESSMENT — ENCOUNTER SYMPTOMS
NAUSEA: 0
LOSS OF CONSCIOUSNESS: 0
ABDOMINAL PAIN: 0
FEVER: 0
COUGH: 0
HEADACHES: 0
SPUTUM PRODUCTION: 0
HEARTBURN: 0
PALPITATIONS: 0
BACK PAIN: 0
BLURRED VISION: 0
DIZZINESS: 0
FALLS: 0
SORE THROAT: 0
CHILLS: 0
SHORTNESS OF BREATH: 0
DOUBLE VISION: 0

## 2021-06-14 ASSESSMENT — FIBROSIS 4 INDEX
FIB4 SCORE: 2.04
FIB4 SCORE: 2.04
FIB4 SCORE: 1.44

## 2021-06-14 ASSESSMENT — PATIENT HEALTH QUESTIONNAIRE - PHQ9
SUM OF ALL RESPONSES TO PHQ9 QUESTIONS 1 AND 2: 0
2. FEELING DOWN, DEPRESSED, IRRITABLE, OR HOPELESS: NOT AT ALL
1. LITTLE INTEREST OR PLEASURE IN DOING THINGS: NOT AT ALL

## 2021-06-14 NOTE — ED NOTES
Med rec completed per Pt's spouse via phone ([phone]).  Allergies reviewed with Pt's spouse.  Pt's spouse states that Pt sets out his medications for the morning and evening and that his medications for last night and this morning both appear to have been taken. Pt's spouse unsure of exact times of evening/morning doses.  No oral antibiotics in last 14 days.  [facility].

## 2021-06-14 NOTE — PROGRESS NOTES
4 Eyes Skin Assessment Completed by MACK Jacobs and MACK Greenberg.    Head WDL  Ears WDL  Nose WDL  Mouth WDL  Neck WDL  Breast/Chest WDL  Shoulder Blades WDL  Spine WDL  (R) Arm/Elbow/Hand WDL  (L) Arm/Elbow/Hand WDL  Abdomen WDL  Groin Redness  Scrotum/Coccyx/Buttocks Redness  (R) Leg WDL  (L) Leg WDL  (R) Heel/Foot/Toe Scab  (L) Heel/Foot/Toe Redness, skin tear on anterior 2nd toe          Devices In Places Tele Box      Interventions In Place Q2 Turns    Possible Skin Injury No    Pictures Uploaded Into Epic N/A  Wound Consult Placed N/A  RN Wound Prevention Protocol Ordered No

## 2021-06-14 NOTE — DISCHARGE PLANNING
ER CM met with pt and wife at bedside. Wife is very frail. Wife Mercedes is very pleasant. Julien is 89 yo. They live in a 1 story home with 1 step in and have Home health with [facility]. No PCP at present MODE Keyes discharged him for missed appts. Dr Lehman is signing HH orders. [facility] Ever[facility] left  as they are agreeable to work with him on establishing new PCP. [facility] for incidental meds. FWW x2 at home ,he does have a oxygen concentrator but not sure who from and does not use it. He is a [place] but does not use [facility] services.   Care Transition Team Assessment    Information Source  Orientation Level: Oriented to place, Oriented to situation, Oriented to person (found by wife this am sitting on kitchen floor not responding appropriatley)  Information Given By: Patient, Spouse  Informant's Name: Julien/Mercedes  Who is responsible for making decisions for patient? : Patient         Elopement Risk  Legal Hold: No    Interdisciplinary Discharge Planning  Primary Care Physician: Dr Lehman (Needs new one Mode Keyes No more Dominik with HH)  Lives with - Patient's Self Care Capacity: Significant Other  Patient or legal guardian wants to designate a caregiver: No  Support Systems: Spouse / Significant Other  Housing / Facility: 1 Story House  Do You Take your Prescribed Medications Regularly: Yes  Able to Return to Previous ADL's: Yes  Mobility Issues: Yes  Prior Services: Skilled Home Health Services ([facility])  Assistance Needed: Yes  Durable Medical Equipment: Home Oxygen, [facility] (concentrator unknown provider doesnt use)    Discharge Preparedness  What is your plan after discharge?: Home with help  What are your discharge supports?: Spouse  Prior Functional Level: Uses Walker    Functional Assesment  Prior Functional Level: Uses Walker    Finances  Prescription Coverage: Yes ([facility] [place])    Vision / Hearing Impairment  Vision Impairment :  Yes  Right Eye Vision: Impaired, Wears Glasses  Left Eye Vision: Impaired, Wears Glasses  Hearing Impairment : No              Domestic Abuse  Have you ever been the victim of abuse or violence?: No  Physical Abuse or Sexual Abuse: No  Verbal Abuse or Emotional Abuse: No  Possible Abuse/Neglect Reported to:: Not Applicable    Psychological Assessment  History of Substance Abuse: None         Anticipated Discharge Information  Discharge Disposition: Still a Patient (30)

## 2021-06-14 NOTE — CARE PLAN
Problem: Knowledge Deficit - Standard  Goal: Patient and family/care givers will demonstrate understanding of plan of care, disease process/condition, diagnostic tests and medications  Outcome: Discharged - Not Met  Note: Pts bladder scan completed. Pt retaining, wilson placed. MD notified.       Problem: Fall Risk  Goal: Patient will remain free from falls  Outcome: Discharged - Not Met  Note: Pt at risk for falls. Pt has strip and bed alarm in place.    The patient is Watcher - Medium risk of patient condition declining or worsening         Progress made toward(s) clinical / shift goals:  wilson placement, watching labs    Patient is not progressing towards the following goals:

## 2021-06-14 NOTE — ED PROVIDER NOTES
ED Provider Note    CHIEF COMPLAINT  Chief Complaint   Patient presents with   • ALOC   • High Blood Sugar       HPI  Julien Dao is a 89 y.o. male here for evaluation of altered mentation and possible fall.  Patient was brought in from home after is noted that his wife found him sitting on the kitchen floor.  Patient does have some confusion, but no medical complaints.  He has no chest pain or noted shortness of breath.  When he came in via rhymes that he was on a nonrebreather mask because he had sats of 70% at home, and hypotension.  He subsequently improved when he was here, and is now on 3 L nasal cannula and his pressure is 112/65.  Patient has no neck pain, chest pain, or abdominal pain.      ROS  See HPI for further details, o/w negative.     PAST MEDICAL HISTORY   has a past medical history of CAD (coronary artery disease), Delirium (2020), DM (diabetes mellitus) (HCC) (2014), Falls, Hypertension, and UTI (urinary tract infection) (2018).    SOCIAL HISTORY  Social History     Tobacco Use   • Smoking status: Former Smoker     Quit date: 10/19/1972     Years since quittin.6   • Smokeless tobacco: Never Used   Vaping Use   • Vaping Use: Never used   Substance and Sexual Activity   • Alcohol use: Not Currently   • Drug use: No   • Sexual activity: Not on file       Family History  No bleeding disorders    SURGICAL HISTORY   has a past surgical history that includes other cardiac surgery; coronary artery bypass, 3; and janice rectal abscess.    CURRENT MEDICATIONS  Home Medications    **Home medications have not yet been reviewed for this encounter**         ALLERGIES  Allergies   Allergen Reactions   • Iodine Anaphylaxis     Pt and pts wife report that it has been so long not sure what happens       REVIEW OF SYSTEMS  See HPI for further details. Review of systems as above, otherwise all other systems are negative.     PHYSICAL EXAM  Constitutional: Elderly, well-nourished.  Mild acute  distress.  HEENT: Normocephalic, atraumatic. Posterior pharynx clear and dry  Eyes:  EOMI. Normal sclera.  Neck: Supple, Full range of motion, nontender.  Chest/Pulmonary: clear to ausculation. Symmetrical expansion.   Cardio: Regular rate and rhythm with no murmur.   Abdomen: Soft, nontender. No peritoneal signs. No guarding. No palpable masses.  Back: No CVA tenderness, nontender midline, no step offs.  Musculoskeletal: No deformity, no edema, neurovascular intact.   Neuro: Clear speech, appropriate, cooperative, cranial nerves II-XII grossly intact.  Psych: Normal mood and affect      PROCEDURES     MEDICAL RECORD  I have reviewed patient's medical record and pertinent results are listed.    COURSE & MEDICAL DECISION MAKING  I have reviewed any medical record information, laboratory studies and radiographic results as noted above.      Results for orders placed or performed during the hospital encounter of 06/14/21   CBC WITH DIFFERENTIAL   Result Value Ref Range    WBC 9.0 4.8 - 10.8 K/uL    RBC 4.17 (L) 4.70 - 6.10 M/uL    Hemoglobin 13.2 (L) 14.0 - 18.0 g/dL    Hematocrit 37.7 (L) 42.0 - 52.0 %    MCV 90.4 81.4 - 97.8 fL    MCH 31.7 27.0 - 33.0 pg    MCHC 35.0 33.7 - 35.3 g/dL    RDW 41.5 35.9 - 50.0 fL    Platelet Count 222 164 - 446 K/uL    MPV 11.3 9.0 - 12.9 fL    Neutrophils-Polys 64.70 44.00 - 72.00 %    Lymphocytes 26.20 22.00 - 41.00 %    Monocytes 7.10 0.00 - 13.40 %    Eosinophils 1.00 0.00 - 6.90 %    Basophils 0.60 0.00 - 1.80 %    Immature Granulocytes 0.40 0.00 - 0.90 %    Nucleated RBC 0.00 /100 WBC    Neutrophils (Absolute) 5.80 1.82 - 7.42 K/uL    Lymphs (Absolute) 2.35 1.00 - 4.80 K/uL    Monos (Absolute) 0.64 0.00 - 0.85 K/uL    Eos (Absolute) 0.09 0.00 - 0.51 K/uL    Baso (Absolute) 0.05 0.00 - 0.12 K/uL    Immature Granulocytes (abs) 0.04 0.00 - 0.11 K/uL    NRBC (Absolute) 0.00 K/uL   COMP METABOLIC PANEL   Result Value Ref Range    Sodium 135 135 - 145 mmol/L    Potassium 2.8 (L) 3.6 -  5.5 mmol/L    Chloride 92 (L) 96 - 112 mmol/L    Co2 27 20 - 33 mmol/L    Anion Gap 16.0 7.0 - 16.0    Glucose 322 (H) 65 - 99 mg/dL    Bun 30 (H) 8 - 22 mg/dL    Creatinine 1.54 (H) 0.50 - 1.40 mg/dL    Calcium 8.8 8.4 - 10.2 mg/dL    AST(SGOT) 22 12 - 45 U/L    ALT(SGPT) 20 2 - 50 U/L    Alkaline Phosphatase 56 30 - 99 U/L    Total Bilirubin 0.4 0.1 - 1.5 mg/dL    Albumin 3.5 3.2 - 4.9 g/dL    Total Protein 6.1 6.0 - 8.2 g/dL    Globulin 2.6 1.9 - 3.5 g/dL    A-G Ratio 1.3 g/dL   proBrain Natriuretic Peptide, NT   Result Value Ref Range    NT-proBNP 1945 (H) 0 - 125 pg/mL   TROPONIN   Result Value Ref Range    Troponin T 66 (H) 6 - 19 ng/L   LACTIC ACID   Result Value Ref Range    Lactic Acid 3.5 (H) 0.5 - 2.0 mmol/L   ESTIMATED GFR   Result Value Ref Range    GFR If  51 (A) >60 mL/min/1.73 m 2    GFR If Non  42 (A) >60 mL/min/1.73 m 2   CoV-2, Flu A/B, And RSV by PCR   Result Value Ref Range    Influenza virus A RNA Negative Negative    Influenza virus B, PCR Negative Negative    RSV, PCR Negative Negative    SARS-CoV-2 by PCR NotDetected     SARS-CoV-2 Source NP Swab      DX-CHEST-PORTABLE (1 VIEW)   Final Result      Cardiomegaly.      CT-HEAD W/O   Final Result      1.  No evidence of acute intracranial process.      2.  Cerebral atrophy as well as periventricular chronic small vessel ischemic change.        CRITICAL CARE  The very real possibility of a deterioration of this patient's condition required the highest level of my preparedness for sudden, emergent intervention.  I provided critical care services, which included medication orders, frequent reevaluations of the patient's condition and response to treatment, ordering and reviewing test results, and discussing the case with various consultants.  The critical care time associated with the care of the patient was 45 minutes. Review chart for interventions. This time is exclusive of any other billable procedures.        HYDRATION: Based on the patient's presentation of Dehydration the patient was given IV fluids. IV Hydration was used because oral hydration was not adequate alone. Upon recheck following hydration, the patient was improved.           FINAL IMPRESSION  1. Altered mental status, unspecified altered mental status type       2.     Critical care time 45 minutes.     Electronically signed by: Orlin Cuadra D.O., 6/14/2021 8:55 AM

## 2021-06-14 NOTE — PROGRESS NOTES
Telemetry Shift Summary    Rhythm Paced  HR Range 75  Ectopy Rare PVC, Coup  Measurements 0.24/0.18/0.44        Normal Values  Rhythm SR  HR Range    Measurements 0.12-0.20 / 0.06-0.10  / 0.30-0.52

## 2021-06-14 NOTE — PROGRESS NOTES
Spiritual Care Note    Patient Information     Patient's Name: Julien Dao   MRN: [id number]    YOB: 1929   Age and Gender: 89 y.o. male   Service Area: Medical [facility]   Room (and Bed): [facility]/   Ethnicity or Nationality:     Primary Language: English   Lutheran/Spiritual preference: None   Place of Residence: [place]   Family/Friends/Others Present: no   Clinical Team Present: no   Medical Diagnosis(-es)/Procedure(s): KOSTAS   Code Status: DNAR/DNI    Date of Admission: 6/14/2021   Length of Stay: 0 days        Spiritual Care Provider Information:  Name of Spiritual Care Provider: Olga Guaman  Title of Spiritual Care Provider: Associate   Phone Number: [phone]  E-mail: [email or web address]  Total time : 5 minutes    Spiritual Screen Results:    Gen Nursing  Spiritual Screen  Was spiritual care education provided to the patient?: Yes     Palliative Care  PC Lutheran/Spiritual Screening  Was spiritual care education provided to the patient?: Yes      Encounter/Request Information  Encounter/Request Type   Visited With: Patient not available (pt sleeping)  Nature of the Visit: Initial, On shift  Continue Visiting: Yes  Next Follow-up Date: 06/17/21  General Visit: Yes  Referral From/ Origin of Request: Epic nursing    Religous Needs/Values       Spiritual Assessment   Spiritual Care Encounters    Observations/Symptoms: Other (Comment) (pt sleeping)    Interacton/Conversation: Upon 's arrival, pt was sleeping.   will f/u Thursday.  If pt desires a visit before then, please place another EPIC order for spiritual care request.  Thank You!    Assessment:  (unable to assess)    Plan:  (f/u Thursday)    Notes:

## 2021-06-14 NOTE — ED NOTES
RA sats maintaining at 95-96%.  NS bolus and 1st abx infusing.  Given warm blankets.  Call light in reach.

## 2021-06-14 NOTE — ASSESSMENT & PLAN NOTE
KOSTAS improving   Monitor BMP and assess response  Avoid IV contrast/nephrotoxins/NSAIDs  Dose adjust meds for decreased GFR  On wilson now for  Urinary retention    Resolved

## 2021-06-14 NOTE — ASSESSMENT & PLAN NOTE
Uncontrolled with hyperglycemia  Pt apparently recently stopped metformin  Cont home lantus, ISS< diabetic diet, hypoglycemic protocl

## 2021-06-14 NOTE — ED NOTES
Report to MACK Jacobs.  Aware that patient is on the way and that I am available for any questions or concerns regarding the patient.  Preparing for transfer via gurney w/ tele.  No change in condition on departing ED.      Arlene aware insulin due once verified by pharmacy.

## 2021-06-14 NOTE — H&P
Hospital Medicine History & Physical Note    Date of Service  6/14/2021    Primary Care Physician  TABITHA Powell.    Consultants  none    Code Status  DNAR/DNI    Chief Complaint  Chief Complaint   Patient presents with   • ALOC   • High Blood Sugar       History of Presenting Illness  89 y.o. male with past medical history of diabetes mellitus, hypothyroidism, hypertension who presented 6/14/2021 with altered mental status.  He was noted to be hypotensive with blood pressure in 50s by EMS and given 350 cc bolus.  He was reportedly 78% on room air per EMS however was noted to be saturating 95% on room air while in ER.  Patient cannot recall the events that led to his hospitalization.  His wife is at bedside and states she found him down in the kitchen.  Patient states he has been having a good appetite and denies any lightheadedness, dizziness, palpitations, abdominal pain, chest pain, shortness of breath.  Patient's wife says he is always leaking urine. He was noted to have KOSTAS and hypokalemia.    In ER patient was given Unasyn and azithromycin.    Review of Systems  Review of Systems   Constitutional: Negative for chills and fever.   HENT: Negative for congestion, hearing loss and sore throat.    Eyes: Negative for blurred vision and double vision.   Respiratory: Negative for cough, sputum production and shortness of breath.    Cardiovascular: Negative for chest pain and palpitations.   Gastrointestinal: Negative for abdominal pain, heartburn and nausea.   Genitourinary: Negative for dysuria and urgency.   Musculoskeletal: Negative for back pain and falls.   Skin: Negative for itching and rash.   Neurological: Negative for dizziness, loss of consciousness and headaches.   All other systems reviewed and are negative.      Past Medical History   has a past medical history of KOSTAS (acute kidney injury) ([facility]) (6/14/2021), CAD (coronary artery disease), Delirium (2/21/2020), DM (diabetes mellitus) ([facility])  (2014), Falls, Hypertension, and UTI (urinary tract infection) (2018).    Surgical History   has a past surgical history that includes other cardiac surgery; coronary artery bypass, 3; and janice rectal abscess.     Family History  family history is not on file.     Social History   reports that he quit smoking about 48 years ago. He has never used smokeless tobacco. He reports previous alcohol use. He reports that he does not use drugs.    Allergies  Allergies   Allergen Reactions   • Iodine Anaphylaxis     Pt and pts wife report that it has been so long not sure what happens       Medications  Prior to Admission Medications   Prescriptions Last Dose Informant Patient Reported? Taking?   Insulin Pen Needle 31G X 5 MM Misc N/A at N/A Significant Other No No   Si Each 2 Times a Day.   Multiple Vitamins-Minerals (PX MENS MULTIVITAMINS) Tab 2021 at AM Significant Other Yes No   Sig: Take 1 tablet by mouth every morning.   RETAINE CMC 0.5 % Solution 2021 at PM Significant Other Yes Yes   Sig: Administer 1 Drop into both eyes every evening.   Specialty Vitamins Products (PROSTATE) Tab 2021 at AM Significant Other Yes No   Sig: Take 1 tablet by mouth every morning.   amLODIPine (NORVASC) 5 MG Tab 2021 at PM Significant Other No No   Sig: Take 1 Tab by mouth every day.   chlorthalidone (HYGROTON) 25 MG Tab 2021 at PM Significant Other No No   Sig: Take 1 Tab by mouth every day.   cyclosporin (RESTASIS) 0.05 % ophthalmic emulsion 2021 at PM Significant Other Yes No   Sig: Administer 1 Drop into both eyes every evening.   finasteride (PROSCAR) 5 MG Tab 2021 at AM Significant Other No No   Sig: Take 1 Tab by mouth every day.   gabapentin (NEURONTIN) 100 MG Cap 2021 at PM Significant Other Yes Yes   Sig: Take 100 mg by mouth every evening.   insulin glargine (INSULIN GLARGINE) 100 UNIT/ML Solution Pen-injector injection 2021 at 1400 Significant Other Yes Yes   Sig: Inject  30 Units under the skin every day.   levothyroxine (SYNTHROID) 25 MCG Tab 6/14/2021 at AM Significant Other No No   Sig: Take 1 tablet by mouth Every morning on an empty stomach.   metoprolol tartrate (LOPRESSOR) 25 MG Tab 6/13/2021 at PM Significant Other Yes No   Sig: Take 12.5 mg by mouth every evening. 0.5 tablet = 12.5 mg.   potassium chloride ER (K-TAB) 20 MEQ Tab CR tablet 6/14/2021 at AM Significant Other No No   Sig: Take 1 Tab by mouth 2 times a day.   Patient taking differently: Take 20 mEq by mouth every morning.   simvastatin (ZOCOR) 10 MG Tab 6/13/2021 at PM Significant Other Yes No   Sig: Take 10 mg by mouth every evening.   tamsulosin (FLOMAX) 0.4 MG capsule 6/14/2021 at AM Significant Other Yes No   Sig: Take 0.4 mg by mouth every morning.   triamcinolone acetonide (KENALOG) 0.1 % Cream 6/13/2021 at PM Significant Other Yes No   Sig: Apply 1 Application topically 2 times a day. To genital area.      Facility-Administered Medications: None       Physical Exam  Temp:  [36.1 °C (96.9 °F)-36.4 °C (97.6 °F)] 36.4 °C (97.6 °F)  Pulse:  [59-70] 70  Resp:  [14-18] 18  BP: ()/(60-81) 131/64  SpO2:  [91 %-100 %] 93 %    Physical Exam  Vitals and nursing note reviewed.   Constitutional:       Appearance: Normal appearance.   HENT:      Head: Normocephalic and atraumatic.      Right Ear: External ear normal.      Left Ear: External ear normal.      Nose: Nose normal.      Mouth/Throat:      Mouth: Mucous membranes are moist.      Pharynx: Oropharynx is clear.   Eyes:      Extraocular Movements: Extraocular movements intact.      Pupils: Pupils are equal, round, and reactive to light.   Cardiovascular:      Rate and Rhythm: Normal rate and regular rhythm.      Heart sounds: No murmur heard.     Pulmonary:      Effort: Pulmonary effort is normal. No respiratory distress.      Breath sounds: Normal breath sounds.   Abdominal:      General: Abdomen is flat. Bowel sounds are normal. There is no distension.       Palpations: Abdomen is soft.      Tenderness: There is no abdominal tenderness.   Musculoskeletal:      Cervical back: Normal range of motion and neck supple.      Right lower leg: No edema.      Left lower leg: No edema.   Skin:     General: Skin is warm and dry.   Neurological:      General: No focal deficit present.      Mental Status: He is alert and oriented to person, place, and time.   Psychiatric:         Mood and Affect: Mood normal.         Behavior: Behavior normal.         Laboratory:  Recent Labs     06/14/21  0809   WBC 9.0   RBC 4.17*   HEMOGLOBIN 13.2*   HEMATOCRIT 37.7*   MCV 90.4   MCH 31.7   MCHC 35.0   RDW 41.5   PLATELETCT 222   MPV 11.3     Recent Labs     06/14/21  0809   SODIUM 135   POTASSIUM 2.8*   CHLORIDE 92*   CO2 27   GLUCOSE 322*   BUN 30*   CREATININE 1.54*   CALCIUM 8.8     Recent Labs     06/14/21  0809   ALTSGPT 20   ASTSGOT 22   ALKPHOSPHAT 56   TBILIRUBIN 0.4   GLUCOSE 322*         Recent Labs     06/14/21  0809   NTPROBNP 1945*         Recent Labs     06/14/21  0809   TROPONINT 66*       Imaging:  DX-CHEST-PORTABLE (1 VIEW)   Final Result      Cardiomegaly.      CT-HEAD W/O   Final Result      1.  No evidence of acute intracranial process.      2.  Cerebral atrophy as well as periventricular chronic small vessel ischemic change.      US-RENAL    (Results Pending)         Assessment/Plan:  I anticipate this patient is appropriate for observation status at this time.    Lactic acidosis  Assessment & Plan  Cont IVF and trend levels    KOSTAS (acute kidney injury) (HCC)  Assessment & Plan  Monitor BMP and assess response  Avoid IV contrast/nephrotoxins/NSAIDs  Dose adjust meds for decreased GFR  Rule out obstructive cause with bladder scan and renal ultrasound  Cont IVF  Repeat BMP in AM  If bladder scan >400cc will place wilson catheter      Hypotension  Assessment & Plan  Now resolved after fluid bolus given in EMS  Hold home antihypertensives    Hypothyroidism- (present on  admission)  Assessment & Plan  Cont synthroid      BPH (benign prostatic hyperplasia)- (present on admission)  Assessment & Plan  Cont finasteride and flomax    Elevated troponin- (present on admission)  Assessment & Plan  likley d/t demand ischemia  Denies any chest pain  I have ordered ekg  Repeat once    DM (diabetes mellitus) (CMS-HCC)- (present on admission)  Assessment & Plan  Uncontrolled with hyperglycemia  Pt apparently recently stopped metformin  Cont home lantus, ISS< diabetic diet, hypoglycemic protocl    Hypokalemia  Assessment & Plan  Potassium critically low at 2.8  I have started patient on K replacement  Repeat BMP later this evening and tomorrow morning  I will also check a Mg level  Monitor on tele

## 2021-06-14 NOTE — ED TRIAGE NOTES
BIB EMS from home where pt lives w/ wife.  Wife found him sitting on kitchen floor this am, not responding appropriately.  On scene: FSBS 404, HR 60, BP 52/42 to 68/46 then 74/52 after 350ml NS bolus.  RA sats 78% up to 96% 6L NRB mask.     Recently admitted for same (hypotension/altered).    Recently take off metformin and wife stated to EMS that this is when pt's blood sugar issued started.

## 2021-06-14 NOTE — PROGRESS NOTES
Bladder scan completed per MD order. Pt has >400mL in bladder. Sher placed. EKG complete. MD notified. Orders placed for repeat trop.

## 2021-06-14 NOTE — ED NOTES
"Labs/Covid in process.  To CT via gurney.  BP stable at this time.  Weaned to 3L NC.  Responding slowly but appropriately.  States height.  States name/city/\"in hospital\".  Requests pillow and blanket because \"it is cold in here\".  Agrees to procedures/interventions.    "

## 2021-06-15 PROBLEM — R78.81 BACTEREMIA: Status: ACTIVE | Noted: 2021-06-15

## 2021-06-15 PROBLEM — R41.82 AMS (ALTERED MENTAL STATUS): Status: ACTIVE | Noted: 2021-06-15

## 2021-06-15 PROBLEM — R33.9 URINARY RETENTION: Status: ACTIVE | Noted: 2021-06-15

## 2021-06-15 PROBLEM — N13.30 HYDRONEPHROSIS: Status: ACTIVE | Noted: 2021-06-15

## 2021-06-15 LAB
ANION GAP SERPL CALC-SCNC: 11 MMOL/L (ref 7–16)
ANION GAP SERPL CALC-SCNC: 16 MMOL/L (ref 7–16)
BUN SERPL-MCNC: 21 MG/DL (ref 8–22)
BUN SERPL-MCNC: 25 MG/DL (ref 8–22)
CALCIUM SERPL-MCNC: 8.4 MG/DL (ref 8.4–10.2)
CALCIUM SERPL-MCNC: 9.1 MG/DL (ref 8.4–10.2)
CHLORIDE SERPL-SCNC: 101 MMOL/L (ref 96–112)
CHLORIDE SERPL-SCNC: 99 MMOL/L (ref 96–112)
CO2 SERPL-SCNC: 24 MMOL/L (ref 20–33)
CO2 SERPL-SCNC: 26 MMOL/L (ref 20–33)
CREAT SERPL-MCNC: 1 MG/DL (ref 0.5–1.4)
CREAT SERPL-MCNC: 1.11 MG/DL (ref 0.5–1.4)
ERYTHROCYTE [DISTWIDTH] IN BLOOD BY AUTOMATED COUNT: 41.3 FL (ref 35.9–50)
GLUCOSE BLD-MCNC: 106 MG/DL (ref 65–99)
GLUCOSE BLD-MCNC: 266 MG/DL (ref 65–99)
GLUCOSE BLD-MCNC: 293 MG/DL (ref 65–99)
GLUCOSE BLD-MCNC: 311 MG/DL (ref 65–99)
GLUCOSE SERPL-MCNC: 282 MG/DL (ref 65–99)
GLUCOSE SERPL-MCNC: 84 MG/DL (ref 65–99)
HCT VFR BLD AUTO: 41.1 % (ref 42–52)
HGB BLD-MCNC: 14.1 G/DL (ref 14–18)
LACTATE BLD-SCNC: 2.1 MMOL/L (ref 0.5–2)
MAGNESIUM SERPL-MCNC: 1.5 MG/DL (ref 1.5–2.5)
MCH RBC QN AUTO: 31.3 PG (ref 27–33)
MCHC RBC AUTO-ENTMCNC: 34.3 G/DL (ref 33.7–35.3)
MCV RBC AUTO: 91.1 FL (ref 81.4–97.8)
PLATELET # BLD AUTO: 247 K/UL (ref 164–446)
PMV BLD AUTO: 11.3 FL (ref 9–12.9)
POTASSIUM SERPL-SCNC: 2.5 MMOL/L (ref 3.6–5.5)
POTASSIUM SERPL-SCNC: 2.9 MMOL/L (ref 3.6–5.5)
RBC # BLD AUTO: 4.51 M/UL (ref 4.7–6.1)
SODIUM SERPL-SCNC: 134 MMOL/L (ref 135–145)
SODIUM SERPL-SCNC: 143 MMOL/L (ref 135–145)
WBC # BLD AUTO: 10.1 K/UL (ref 4.8–10.8)

## 2021-06-15 PROCEDURE — 700102 HCHG RX REV CODE 250 W/ 637 OVERRIDE(OP): Performed by: HOSPITALIST

## 2021-06-15 PROCEDURE — 700102 HCHG RX REV CODE 250 W/ 637 OVERRIDE(OP): Performed by: INTERNAL MEDICINE

## 2021-06-15 PROCEDURE — 700105 HCHG RX REV CODE 258: Performed by: INTERNAL MEDICINE

## 2021-06-15 PROCEDURE — 85027 COMPLETE CBC AUTOMATED: CPT

## 2021-06-15 PROCEDURE — A9270 NON-COVERED ITEM OR SERVICE: HCPCS | Performed by: HOSPITALIST

## 2021-06-15 PROCEDURE — 700111 HCHG RX REV CODE 636 W/ 250 OVERRIDE (IP): Performed by: HOSPITALIST

## 2021-06-15 PROCEDURE — 770020 HCHG ROOM/CARE - TELE (206)

## 2021-06-15 PROCEDURE — A9270 NON-COVERED ITEM OR SERVICE: HCPCS | Performed by: INTERNAL MEDICINE

## 2021-06-15 PROCEDURE — 97165 OT EVAL LOW COMPLEX 30 MIN: CPT

## 2021-06-15 PROCEDURE — 96372 THER/PROPH/DIAG INJ SC/IM: CPT

## 2021-06-15 PROCEDURE — 80048 BASIC METABOLIC PNL TOTAL CA: CPT

## 2021-06-15 PROCEDURE — 700101 HCHG RX REV CODE 250: Performed by: HOSPITALIST

## 2021-06-15 PROCEDURE — 700111 HCHG RX REV CODE 636 W/ 250 OVERRIDE (IP): Performed by: STUDENT IN AN ORGANIZED HEALTH CARE EDUCATION/TRAINING PROGRAM

## 2021-06-15 PROCEDURE — 83605 ASSAY OF LACTIC ACID: CPT

## 2021-06-15 PROCEDURE — 83735 ASSAY OF MAGNESIUM: CPT

## 2021-06-15 PROCEDURE — 82962 GLUCOSE BLOOD TEST: CPT

## 2021-06-15 PROCEDURE — 87040 BLOOD CULTURE FOR BACTERIA: CPT | Mod: 91

## 2021-06-15 PROCEDURE — 96367 TX/PROPH/DG ADDL SEQ IV INF: CPT

## 2021-06-15 PROCEDURE — 97162 PT EVAL MOD COMPLEX 30 MIN: CPT

## 2021-06-15 PROCEDURE — 99233 SBSQ HOSP IP/OBS HIGH 50: CPT | Performed by: STUDENT IN AN ORGANIZED HEALTH CARE EDUCATION/TRAINING PROGRAM

## 2021-06-15 PROCEDURE — 96366 THER/PROPH/DIAG IV INF ADDON: CPT

## 2021-06-15 PROCEDURE — 700111 HCHG RX REV CODE 636 W/ 250 OVERRIDE (IP): Performed by: INTERNAL MEDICINE

## 2021-06-15 RX ORDER — POTASSIUM CHLORIDE 20 MEQ/1
40 TABLET, EXTENDED RELEASE ORAL 3 TIMES DAILY
Status: COMPLETED | OUTPATIENT
Start: 2021-06-15 | End: 2021-06-15

## 2021-06-15 RX ORDER — MAGNESIUM SULFATE HEPTAHYDRATE 40 MG/ML
2 INJECTION, SOLUTION INTRAVENOUS ONCE
Status: COMPLETED | OUTPATIENT
Start: 2021-06-15 | End: 2021-06-15

## 2021-06-15 RX ORDER — POTASSIUM CHLORIDE 7.45 MG/ML
10 INJECTION INTRAVENOUS
Status: DISCONTINUED | OUTPATIENT
Start: 2021-06-15 | End: 2021-06-15

## 2021-06-15 RX ADMIN — DOCUSATE SODIUM 50 MG AND SENNOSIDES 8.6 MG 2 TABLET: 8.6; 5 TABLET, FILM COATED ORAL at 05:33

## 2021-06-15 RX ADMIN — GABAPENTIN 100 MG: 100 CAPSULE ORAL at 17:24

## 2021-06-15 RX ADMIN — POTASSIUM CHLORIDE 40 MEQ: 1500 TABLET, EXTENDED RELEASE ORAL at 17:24

## 2021-06-15 RX ADMIN — TRIAMCINOLONE ACETONIDE 1 APPLICATION: 1 CREAM TOPICAL at 17:45

## 2021-06-15 RX ADMIN — POTASSIUM CHLORIDE AND SODIUM CHLORIDE: 900; 150 INJECTION, SOLUTION INTRAVENOUS at 18:00

## 2021-06-15 RX ADMIN — AMPICILLIN AND SULBACTAM 3 G: 2; 1 INJECTION, POWDER, FOR SOLUTION INTRAVENOUS at 06:00

## 2021-06-15 RX ADMIN — MAGNESIUM SULFATE IN WATER 2 G: 40 INJECTION, SOLUTION INTRAVENOUS at 12:59

## 2021-06-15 RX ADMIN — AMPICILLIN AND SULBACTAM 3 G: 2; 1 INJECTION, POWDER, FOR SOLUTION INTRAVENOUS at 17:57

## 2021-06-15 RX ADMIN — AMPICILLIN AND SULBACTAM 3 G: 2; 1 INJECTION, POWDER, FOR SOLUTION INTRAVENOUS at 11:58

## 2021-06-15 RX ADMIN — ENOXAPARIN SODIUM 30 MG: 30 INJECTION SUBCUTANEOUS at 05:34

## 2021-06-15 RX ADMIN — INSULIN HUMAN 3 UNITS: 100 INJECTION, SOLUTION PARENTERAL at 16:37

## 2021-06-15 RX ADMIN — THERA TABS 1 TABLET: TAB at 05:34

## 2021-06-15 RX ADMIN — POTASSIUM CHLORIDE 40 MEQ: 1500 TABLET, EXTENDED RELEASE ORAL at 07:26

## 2021-06-15 RX ADMIN — DOCUSATE SODIUM 50 MG AND SENNOSIDES 8.6 MG 2 TABLET: 8.6; 5 TABLET, FILM COATED ORAL at 17:24

## 2021-06-15 RX ADMIN — SIMVASTATIN 10 MG: 10 TABLET, FILM COATED ORAL at 20:02

## 2021-06-15 RX ADMIN — FINASTERIDE 5 MG: 5 TABLET, FILM COATED ORAL at 05:33

## 2021-06-15 RX ADMIN — INSULIN GLARGINE 30 UNITS: 100 INJECTION, SOLUTION SUBCUTANEOUS at 05:51

## 2021-06-15 RX ADMIN — INSULIN HUMAN 4 UNITS: 100 INJECTION, SOLUTION PARENTERAL at 20:03

## 2021-06-15 RX ADMIN — AMPICILLIN AND SULBACTAM 3 G: 2; 1 INJECTION, POWDER, FOR SOLUTION INTRAVENOUS at 01:49

## 2021-06-15 RX ADMIN — INSULIN HUMAN 3 UNITS: 100 INJECTION, SOLUTION PARENTERAL at 10:59

## 2021-06-15 RX ADMIN — TRIAMCINOLONE ACETONIDE 1 APPLICATION: 1 CREAM TOPICAL at 05:47

## 2021-06-15 RX ADMIN — TAMSULOSIN HYDROCHLORIDE 0.4 MG: 0.4 CAPSULE ORAL at 05:33

## 2021-06-15 RX ADMIN — LEVOTHYROXINE SODIUM 25 MCG: 0.03 TABLET ORAL at 05:33

## 2021-06-15 RX ADMIN — POTASSIUM CHLORIDE AND SODIUM CHLORIDE: 900; 150 INJECTION, SOLUTION INTRAVENOUS at 07:28

## 2021-06-15 RX ADMIN — POLYVINYL ALCOHOL 1 DROP: 14 SOLUTION/ DROPS OPHTHALMIC at 17:45

## 2021-06-15 RX ADMIN — POTASSIUM CHLORIDE 40 MEQ: 1500 TABLET, EXTENDED RELEASE ORAL at 11:06

## 2021-06-15 ASSESSMENT — ENCOUNTER SYMPTOMS
MYALGIAS: 0
COUGH: 0
PALPITATIONS: 0
BLURRED VISION: 0
BRUISES/BLEEDS EASILY: 0
DIZZINESS: 0
VOMITING: 0
FEVER: 0
NAUSEA: 0

## 2021-06-15 ASSESSMENT — GAIT ASSESSMENTS
DEVIATION: STEP TO;DECREASED BASE OF SUPPORT;BRADYKINETIC;SHUFFLED GAIT
ASSISTIVE DEVICE: FRONT WHEEL WALKER
GAIT LEVEL OF ASSIST: MINIMAL ASSIST
DISTANCE (FEET): 150
DISTANCE (FEET): 10

## 2021-06-15 ASSESSMENT — COGNITIVE AND FUNCTIONAL STATUS - GENERAL
SUGGESTED CMS G CODE MODIFIER DAILY ACTIVITY: CK
EATING MEALS: A LITTLE
STANDING UP FROM CHAIR USING ARMS: A LITTLE
DAILY ACTIVITIY SCORE: 14
TOILETING: A LOT
WALKING IN HOSPITAL ROOM: A LITTLE
SUGGESTED CMS G CODE MODIFIER MOBILITY: CK
PERSONAL GROOMING: A LITTLE
DRESSING REGULAR LOWER BODY CLOTHING: A LOT
MOVING FROM LYING ON BACK TO SITTING ON SIDE OF FLAT BED: UNABLE
MOBILITY SCORE: 17
DRESSING REGULAR UPPER BODY CLOTHING: A LOT
CLIMB 3 TO 5 STEPS WITH RAILING: A LOT
HELP NEEDED FOR BATHING: A LOT

## 2021-06-15 ASSESSMENT — ACTIVITIES OF DAILY LIVING (ADL): TOILETING: INDEPENDENT

## 2021-06-15 NOTE — PROGRESS NOTES
Mary from lab called with a critical result of positive blood cultures for gram + cocci at 0032. Critical lab result read back to Mary. Dr. Luu notified of critical lab result at 0035. Critical lab result read back by Dr. Luu.

## 2021-06-15 NOTE — THERAPY
Physical Therapy   Initial Evaluation     Patient Name: Julien Dao  Age:  89 y.o., Sex:  male  Medical Record #: [id number]  Today's Date: 6/15/2021     Precautions: (P) Fall Risk    Assessment  Patient is 89 y.o. male who was recently admitted for AMS and hypotension. Pt presented to PT with impaired balance, weakness, impaired gait, impaired coordination, poor upright standing balance, impaired cognition, and dec activity tolerance. Pt was primarily limited by poor gait mechanics, poor balance, and weakness. Pt demonstrated with 2 carlton LOB during ambulation and required Min A for correction from therapist. With current functional impairments pt is a high fall risk and is currently not at his baseline mobility to safely d/c home with spouse assist. iPt will continue to benefit from skilled PT while in house. [place] [facility] [place] pt at least 3x/day to prevent further deconditionng. With current mobility pt would benefit from post acute therapy prior to d/c home given current objective findings, however, if pt progresses to SPV level while in house, pt may be able to d/c home with spouse assist and HH therapy, will continue to follow.     Plan    Recommend Physical Therapy 4 times per week until therapy goals are met for the following treatments:  Bed Mobility, Community Re-integration, Equipment, Gait Training, Manual Therapy, Neuro Re-Education / Balance, Self Care/Home Evaluation, Stair Training, Therapeutic Activities and Therapeutic Exercises    DC Equipment Recommendations: (P) Front-Wheel Walker  Discharge Recommendations: (P) Other - (SNF vs. HH therapy depending on progress )     Objective     06/15/21 0807   Initial Contact Note    Initial Contact Note Order Received and Verified, Physical Therapy Evaluation in Progress with Full Report to Follow.   Precautions   Precautions Fall Risk   Comments hx of dementia    Prior Living Situation   Prior Services Skilled Home Health Services   Housing /  Facility [address]   Steps Into Home 2   Steps In Home 0   Equipment Owned Front-Wheel Walker;Single Point Cane   Lives with - Patient's Self Care Capacity Spouse   Comments pt states she is able to assist intermittently upon d/c to home    Prior Level of Functional Mobility   Bed Mobility Independent   Transfer Status Independent   Ambulation Independent   Distance Ambulation (Feet)   (household distances )   Assistive Devices Used Front-Wheel Walker   Stairs Independent   Comments per spouse report pt is primarily I within household setting, however, has had a hx of falls prior to admission    History of Falls   History of Falls Yes   Date of Last Fall   (spouse reports multiple falls prior to admission )   Cognition    Cognition / Consciousness X   Level of Consciousness Alert   Safety Awareness Impaired   Attention Impaired   Comments requires cues and redirection to follow; cues for navigation; tends to stand up self without instruction    Passive ROM Lower Body   Passive ROM Lower Body WDL   Active ROM Lower Body    Active ROM Lower Body  WDL   Strength Lower Body   Lower Body Strength  X   Gross Strength Generalized Weakness, Equal Bilaterally   Comments presents with gross strength of 4/5; functional for standing and ambulation    Sensation Lower Body   Lower Extremity Sensation   WDL   Lower Body Muscle Tone   Lower Body Muscle Tone  WDL   Strength Upper Body   Upper Body Strength  Not Tested   Upper Body Muscle Tone   Upper Body Muscle Tone  Not Tested   Neurological Concerns   Neurological Concerns Yes   Comments hx of dementia    Coordination Lower Body    Coordination Lower Body  X   Comments presents with narrow LUCILA and scissoring gait at times    Balance Assessment   Sitting Balance (Static) Fair   Sitting Balance (Dynamic) Fair -   Standing Balance (Static) Fair -   Standing Balance (Dynamic) Poor +   Weight Shift Sitting Fair   Weight Shift Standing Poor   Comments w/fww use; demonstrated with 2  carlton LOB with upright mobility   Gait Analysis   Gait Level Of Assist Minimal Assist   Assistive Device Front Wheel Walker   Distance (Feet) 150   # of Times Distance was Traveled 1   Deviation Step To;Decreased Base Of Support;Bradykinetic;Shuffled Gait   Weight Bearing Status fwb   Comments pt demonstrated with 2 carlton LOB during L sided turns, requires close guarding during turns; cues to keep wider LUCILA; does better looking down at feet for upright balance    Bed Mobility    Supine to Sit Minimal Assist   Sit to Supine Supervised   Scooting Minimal Assist   Comments HOB flat and rails up; takes extra time and effort    Functional Mobility   Sit to Stand Minimal Assist   Bed, Chair, Wheelchair Transfer Minimal Assist   Transfer Method Stand Step   Mobility EOB, sit<>stand, ambulation, back to bed    Comments cues for handplacement and assist to bring COG over hips upon standing; presents with posterior lean upon standing   How much difficulty does the patient currently have...   Turning over in bed (including adjusting bedclothes, sheets and blankets)? 4   Sitting down on and standing up from a chair with arms (e.g., wheelchair, bedside commode, etc.) 1   Moving from lying on back to sitting on the side of the bed? 4   How much help from another person does the patient currently need...   Moving to and from a bed to a chair (including a wheelchair)? 3   Need to walk in a hospital room? 3   Climbing 3-5 steps with a railing? 2   6 clicks Mobility Score 17   Activity Tolerance   Sitting in Chair NT   Sitting Edge of Bed 5 mins   Standing 8 mins   Comments limited due to fatigue and weakness    Edema / Skin Assessment   Edema / Skin  Not Assessed   Patient / Family Goals    Patient / Family Goal #1 to go home    Short Term Goals    Short Term Goal # 1 pt will go supine<>sit w/hob flat and rails down w/spv in 6tx for safe d/c home   Short Term Goal # 2 pt will go sit<>stand w/fww w/spv in 6tx for safe d/c home     Short Term Goal # 3 pt will transfer bed<>chair w/fww w/spv in 6tx for safe d/c home    Short Term Goal # 4 pt will ambulate for 150ft w/fww w/spv in 6tx for safe d/c home    Short Term Goal # 5 pt will go up/down 2 steps w/spv in 6tx for safe d/c home    Education Group   Education Provided Role of Physical Therapist   Role of Physical Therapist Patient Response Patient;Acceptance;Explanation;Demonstration;Verbal Demonstration;Action Demonstration   Problem List    Problems Impaired Bed Mobility;Impaired Transfers;Impaired Ambulation;Functional Strength Deficit;Impaired Balance;Impaired Coordination;Decreased Activity Tolerance;Safety Awareness Deficits / Cognition   Anticipated Discharge Equipment and Recommendations   DC Equipment Recommendations Front-Wheel Walker   Discharge Recommendations Other -  (SNF vs. HH therapy depending on progress )   Interdisciplinary Plan of Care Collaboration   IDT Collaboration with  Nursing   Patient Position at End of Therapy In Bed;Bed Alarm On;Call Light within Reach;Tray Table within Reach;Phone within Reach   Collaboration Comments aware of visit and recs    Session Information   Date / Session Number  6/15-1 (1/4, 6/21)

## 2021-06-15 NOTE — DIETARY
"Nutrition services: Day 0 of admit.  Julien Dao is a 89 y.o. male with admitting DX of KOSTAS (acute kidney injury).    Consult received for MST of 2 on nutrition screen due to report of 14-23 lb wt loss x 6 months. No report of poor PO PTA.      Assessment:  Height: 170.2 cm (5' 7\")  Weight: 71.7 kg (158 lb 1.1 oz)(bed scale)  Body mass index is 24.76 kg/m²., BMI classification: WNL  Diet/Intake: consistent CHO (diabetic)/% of dinner last night    Evaluation:   1. HX includes DM, hypothyroidism, HTN.   2. RD met with pt to discuss wt hx. Pt's spouse at bedside also. Pt reports weight of 194-199 lb in the past. PT said that his weight loss is planned. He is leaving some of his meal on his plate. Per pt's wife, he is snacking though. He will get up at 3:00 AM and make an egg on toast. Pt's wife is preparing healthier foods. They will usually eat some type of meat with steamed vegetables as meals. He is eating less potatoes and starchy foods.   3. Pt's lunch tray at bedside. He ate all of his lunch.   4. Pt reports that his glucose levels always run high. Per pt's wife, pt snacks a lot including in the middle of the night. RD encouraged regular meal times including bedtime snack and avoiding midnight snacking. Based on pt's advanced age, strict CHO restriction is not recommended.     5. Labs: 6/15/21: potassium=2.5 (LL). Glucose accu-ck: 106-398  6. Meds: Semglee, SSI, MVI, potassium chloride    Malnutrition Risk: criteria not met    Recommendations/Plan:   1. Encourage intake of >50%  2. Document intake of all meals as % taken in ADL's to provide interdisciplinary communication across all shifts.   3. Monitor weight.  4. Nutrition rep will continue to see patient for ongoing meal and snack preferences.           "

## 2021-06-15 NOTE — CARE PLAN
The patient is Watcher - Medium risk of patient condition declining or worsening    Shift Goals  Clinical Goals: determine baseline LOC, increase K, yellow urine  Patient Goals: rest, increase mentation    Progress made toward(s) clinical / shift goals:  Awaiting AM lab draw for potassium, last value 2.8. Pt lactic acid increasing and blood culures + for gram + cocci. IV abx initiated this shift. Pt alert and oriented x4 for this RN with intermittent reorientation needs. Pt urine has become more yellow/pink instead of red as reported by last shift. Pt currently resting comfortably in bed.    Patient is not progressing towards the following goals: N/A    Problem: Fall Risk  Goal: Patient will remain free from falls  Outcome: Progressing   Pt displays HIGH fall risk based on RN assessment and fall risk charting tool. Proper fall risk sign placed outside door, treaded socks on, bed alarm ON, bed locked and in lowest position, charge RN notified, and call light within reach. Pt educated on fall risk and proper interventions. Safety measures in place at this time.     Problem: Skin Integrity  Goal: Skin integrity is maintained or improved  Outcome: Progressing  Pt skin integrity continuing to progress. Pt has redness at the groin which has already been addressed through the MAR. Barrier cream used at last brief change.

## 2021-06-15 NOTE — PROGRESS NOTES
Report given to Arlene GIRON via bedside report. Patient handed off in stable condition. Labs reviewed. Safety measures in place. Call light within reach. Care relinquished.

## 2021-06-15 NOTE — PROGRESS NOTES
Mary from lab called with a critical result of potassium 2.5 at 0559. Critical lab result read back to Mary. Dr. Luu notified of critical lab result at 0600. Critical lab result read back by Dr. Luu.

## 2021-06-15 NOTE — CARE PLAN
Problem: Skin Integrity  Goal: Skin integrity is maintained or improved  Outcome: Discharged - Not Met  Note: Pt has redness in groin area, pt has barrier cream at bedside.Pt educated to use in groin and to keep area clan and dry.      Problem: Fall Risk  Goal: Patient will remain free from falls  Outcome: Discharged - Not Met  Note: Pt unsteady. Pt confused about location at times. Pt calling out for wife. Pt reoriented. Bed alarm and strip alarm in place.    The patient is Stable - Low risk of patient condition declining or worsening    Shift Goals  Clinical Goals: determine baseline LOC, increase K, yellow urine  Patient Goals: rest, increase mentation    Progress made toward(s) clinical / shift goals:  ambulate, reorient     Patient is not progressing towards the following goals:

## 2021-06-15 NOTE — PROGRESS NOTES
Hospital Medicine Daily Progress Note    Date of Service  6/15/2021    Chief Complaint  89 y.o. male admitted 6/14/2021 with AMS    Hospital Course  89 y.o. male with past medical history of diabetes mellitus, hypothyroidism, hypertension who presented 6/14/2021 with altered mental status.  He was noted to be hypotensive with blood pressure in 50s by EMS and given 350 cc bolus.  He was reportedly 78% on room air per EMS however was noted to be saturating 95% on room air while in ER.  Patient cannot recall the events that led to his hospitalization.  His wife is at bedside and states she found him down in the kitchen.  Patient states he has been having a good appetite and denies any lightheadedness, dizziness, palpitations, abdominal pain, chest pain, shortness of breath.  Patient's wife says he is always leaking urine. He was noted to have KOSTAS and hypokalemia.     In ER patient was given Unasyn and azithromycin.    Interval Problem Update  6/15/2021  Vitals remained stable  Labs noted with severe hypokalemia.  KCl added  KOSTAS improving      Consultants/Specialty  None     Code Status  DNAR/DNI    Disposition  SNF     Review of Systems  Review of Systems   Constitutional: Positive for malaise/fatigue. Negative for fever.   HENT: Negative for hearing loss.    Eyes: Negative for blurred vision.   Respiratory: Negative for cough.    Cardiovascular: Negative for chest pain and palpitations.   Gastrointestinal: Negative for nausea and vomiting.   Genitourinary: Negative for dysuria.   Musculoskeletal: Negative for myalgias.   Skin: Negative for rash.   Neurological: Negative for dizziness.   Endo/Heme/Allergies: Does not bruise/bleed easily.        Physical Exam  Temp:  [36.2 °C (97.2 °F)-37.1 °C (98.7 °F)] 36.5 °C (97.7 °F)  Pulse:  [64-76] 69  Resp:  [17-18] 18  BP: (115-145)/(54-81) 126/67  SpO2:  [92 %-96 %] 94 %    Physical Exam  Constitutional:       General: He is not in acute distress.     Appearance: Normal  appearance.   HENT:      Head: Normocephalic and atraumatic.      Right Ear: Tympanic membrane normal.      Left Ear: Tympanic membrane normal.      Nose: Nose normal.      Mouth/Throat:      Mouth: Mucous membranes are moist.   Cardiovascular:      Rate and Rhythm: Normal rate and regular rhythm.      Pulses: Normal pulses.      Heart sounds: Normal heart sounds.   Pulmonary:      Effort: Pulmonary effort is normal. No respiratory distress.      Breath sounds: Normal breath sounds.   Abdominal:      General: Abdomen is flat. There is no distension.      Palpations: Abdomen is soft.   Genitourinary:     Comments: On wilson   Musculoskeletal:      Right lower leg: No edema.      Left lower leg: No edema.   Skin:     General: Skin is warm.   Neurological:      General: No focal deficit present.      Mental Status: He is alert and oriented to person, place, and time. Mental status is at baseline.   Psychiatric:         Mood and Affect: Mood normal.         Fluids    Intake/Output Summary (Last 24 hours) at 6/15/2021 1132  Last data filed at 6/15/2021 0600  Gross per 24 hour   Intake 240 ml   Output 6250 ml   Net -6010 ml       Laboratory  Recent Labs     06/14/21  0809 06/15/21  0417   WBC 9.0 10.1   RBC 4.17* 4.51*   HEMOGLOBIN 13.2* 14.1   HEMATOCRIT 37.7* 41.1*   MCV 90.4 91.1   MCH 31.7 31.3   MCHC 35.0 34.3   RDW 41.5 41.3   PLATELETCT 222 247   MPV 11.3 11.3     Recent Labs     06/14/21  0809 06/14/21  1831 06/15/21  0417   SODIUM 135 137 143   POTASSIUM 2.8* 2.8* 2.5*   CHLORIDE 92* 96 101   CO2 27 28 26   GLUCOSE 322* 458* 84   BUN 30* 27* 25*   CREATININE 1.54* 1.23 1.11   CALCIUM 8.8 9.0 9.1                   Imaging  US-RENAL   Final Result      1.  Mild bilateral hydronephrosis.      2.  Multiple bilateral simple appearing renal cysts. The largest is located adjacent to the lower pole of the left kidney and likely represents an exophytic cyst. No follow-up indicated for simple renal cysts.       DX-CHEST-PORTABLE (1 VIEW)   Final Result      Cardiomegaly.      CT-HEAD W/O   Final Result      1.  No evidence of acute intracranial process.      2.  Cerebral atrophy as well as periventricular chronic small vessel ischemic change.           Assessment/Plan  Hydronephrosis  Assessment & Plan  Us noted with mild hydronephrosis   On wilson   Continue to monitor     Urinary retention  Assessment & Plan  Us kidney noted with Mild bilateral hydronephrosis  Wilson placed   On flomax  Monitor for now   Repeat US in future       AMS (altered mental status)  Assessment & Plan  improving     Lactic acidosis  Assessment & Plan  Cont IVF and trend levels    KOSTAS (acute kidney injury) ([facility])  Assessment & Plan  KOSTAS improving   Monitor BMP and assess response  Avoid IV contrast/nephrotoxins/NSAIDs  Dose adjust meds for decreased GFR  On wilson now for  Urinary retention      Hypotension  Assessment & Plan  Now resolved after fluid bolus given in EMS  Hold home antihypertensives    Hypothyroidism- (present on admission)  Assessment & Plan  Cont synthroid      BPH (benign prostatic hyperplasia)- (present on admission)  Assessment & Plan  Cont finasteride and flomax    Elevated troponin- (present on admission)  Assessment & Plan  likley d/t demand ischemia  Denies any chest pain  I have ordered ekg  Repeat once    DM (diabetes mellitus) (CMS-HCC)- (present on admission)  Assessment & Plan  Uncontrolled with hyperglycemia  Pt apparently recently stopped metformin  Cont home lantus, ISS< diabetic diet, hypoglycemic protocl    Hypokalemia  Assessment & Plan  Potassium critically low at 2.8  I have started patient on K replacement  Repeat BMP later this evening and tomorrow morning  I will also check a Mg level  Monitor on tele         VTE prophylaxis: heparin SC

## 2021-06-15 NOTE — THERAPY
"Occupational Therapy   Initial Evaluation     Patient Name: Julien Dao  Age:  89 y.o., Sex:  male  Medical Record #: [id number]  Today's Date: 6/15/2021     Precautions  Precautions: Fall Risk  Comments: Dementia    Assessment    Patient is 89 y.o. male with a diagnosis of AMS, hypotension, Dementia. Pt lives with wife and was receiving intermittent assistance with IADLs, normally at Mod I with ADLs. Pt was also receiving HH services prior. He reports to be ambulatory with \"50/50\" use of FWW. Pt now presents with decreased activity tolerance, reduced balance, and impaired safety -- affecting his ADLs and functional mob/txfs. He will benefit from post acute placement prior to dc home, unless he makes significant progress in house to safely transition back home with HH services resumption. He will continue to be followed by acute OT. Pt currently unsafe to dc home, as wife is frail and can only provide limited support/assistance physically.    Plan    Recommend Occupational Therapy 3 times per week until therapy goals are met for the following treatments:  Adaptive Equipment, Cognitive Skill Development, Community Re-integration, Neuro Re-Education / Balance, Self Care/Activities of Daily Living, Therapeutic Activities and Therapeutic Exercises.    DC Equipment Recommendations: Unable to determine at this time  Discharge Recommendations: Recommend post-acute placement for additional occupational therapy services prior to discharge home     Subjective    \"If I'm ever napping again when my wife gets here, will you wake me?\"     Objective       06/15/21 1041   Prior Living Situation   Prior Services Skilled Home Health Services   Housing / Facility 1 Story House   Steps Into Home 2   Steps In Home 0   Bathroom Set up Walk In Shower;Grab Bars;Shower Chair   Equipment Owned Front-Wheel Walker;Single Point Cane;Tub / Shower Seat;Grab Bar(s) In Tub / Shower   Lives with - Patient's Self Care Capacity Spouse   Comments pt " lives with wife who can provide limited assistance upon dc home   Prior Level of ADL Function   Self Feeding Independent   Grooming / Hygiene Independent   Bathing Requires Assist   Dressing Independent   Toileting Independent   Prior Level of IADL Function   Medication Management Requires Assist   Laundry Requires Assist   Kitchen Mobility Independent   Finances Requires Assist   Home Management Requires Assist   Shopping Requires Assist   Prior Level Of Mobility Independent With Device in Community;Independent With Device in Home   Driving / Transportation Relatives / Others Provide Transportation   Occupation (Pre-Hospital Vocational) Retired Due To Age   History of Falls   History of Falls Yes   Date of Last Fall   (wife reported multiple falls in the home)   Precautions   Precautions Fall Risk   Comments Dementia   Pain   Pain Scales 0 to 10 Scale    Intervention Declines   Pain 0 - 10 Group   Pain Rating Scale (NPRS) 0   Therapist Pain Assessment Post Activity Pain Same as Prior to Activity   Non Verbal Descriptors   Non Verbal Scale  Calm;Unlabored Breathing   Cognition    Cognition / Consciousness X   Level of Consciousness Alert   Safety Awareness Impaired   Attention Impaired   Comments cues for redirection and safety   Passive ROM Upper Body   Passive ROM Upper Body WDL   Active ROM Upper Body   Active ROM Upper Body  WDL   Strength Upper Body   Upper Body Strength  WDL   Balance Assessment   Sitting Balance (Static) Fair   Sitting Balance (Dynamic) Fair -   Standing Balance (Static) Fair -   Standing Balance (Dynamic) Poor +   Weight Shift Sitting Fair   Weight Shift Standing Poor   Comments with FWW   Bed Mobility    Supine to Sit Minimal Assist   Sit to Supine Supervised   Scooting Minimal Assist   Comments HOB flat, rails utilized   ADL Assessment   Grooming Standing;Minimal Assist   Upper Body Dressing Moderate Assist   Lower Body Dressing Moderate Assist   How much help from another person does the  patient currently need...   Putting on and taking off regular lower body clothing? 2   Bathing (including washing, rinsing, and drying)? 2   Toileting, which includes using a toilet, bedpan, or urinal? 2   Putting on and taking off regular upper body clothing? 2   Taking care of personal grooming such as brushing teeth? 3   Eating meals? 3   6 Clicks Daily Activity Score 14   Functional Mobility   Sit to Stand Minimal Assist   Bed, Chair, Wheelchair Transfer Minimal Assist   Transfer Method Stand Step   Mobility in room with FWW   Distance (Feet) 10   # of Times Distance was Traveled 2   Activity Tolerance   Sitting in Chair NT   Sitting Edge of Bed 5 mins    Standing 5 mins   Comments limited by fatigue and weakness   Short Term Goals   Short Term Goal # 1 pt will complete FB dressing with min A   Short Term Goal # 2 pt will complete ADL txfs using LRAD at spv level   Short Term Goal # 3 pt will complete G/H standing sinkside x 10mins at spv level   Education Group   Education Provided Role of Occupational Therapist;Home Safety;Transfers;Activities of Daily Living   Role of Occupational Therapist Patient Response Patient;Acceptance;Explanation;Verbal Demonstration   Home Safety Patient Response Patient;Acceptance;Explanation;Reinforcement Needed   Transfers Patient Response Patient;Acceptance;Explanation;Reinforcement Needed   ADL Patient Response Patient;Acceptance;Explanation;Reinforcement Needed   Anticipated Discharge Equipment and Recommendations   DC Equipment Recommendations Unable to determine at this time   Discharge Recommendations Recommend post-acute placement for additional occupational therapy services prior to discharge home   Interdisciplinary Plan of Care Collaboration   IDT Collaboration with  Nursing   Patient Position at End of Therapy In Bed;Bed Alarm On;Call Light within Reach;Tray Table within Reach;Phone within Reach   Collaboration Comments RN aware of OT session and dc recs   Session  Information   Date / Session Number  6/15 #1 (1/3, 6/21)    Priority 2

## 2021-06-15 NOTE — DISCHARGE PLANNING
Received Choice form at [phone]  Agency/Facility Name: (1) [facility], (2) [facility] (3) [facility]  Referral sent per Choice form @ [phone]

## 2021-06-15 NOTE — PROGRESS NOTES
Received report from Yanet GIRON. Pt confused, IV reinforced. Fluids running. Bed alarm in place.

## 2021-06-15 NOTE — PROGRESS NOTES
Telemetry Shift Summary     Rhythm: V Paced/AFlutter/SR  Rate:   Measurements: -/0.20/0.44  Ectopy (reported by Monitor Tech): N/A     Normal Values  Rhythm: Sinus  HR:   Measurements: 0.12-0.20/0.06-0.10/0.30-0.52

## 2021-06-15 NOTE — DISCHARGE PLANNING
Anticipated Discharge Disposition: SNF     Action: LSW met with pt and pt's spouse at bedside. Pt and spouse, Mercedes able to discuss SNF referrals. #1- [facility] #2- [facility] #3- [facility]. Pt provided signature for SNF CHOICE form.     Pt discussed during IDT rounds. Per Dr. Baker pt may be cleared for transfer to SNF tomorrow.     Barriers to Discharge: None    Plan: f/u with medical clearance, Await SNF acceptance, LSW to assist as needed     Addendum 1243  LSW faxed SNF CHOICE to Jana HUGHES.     Addendum 1253  LSW able to complete PASRR.   PASRR#[id number]

## 2021-06-15 NOTE — ASSESSMENT & PLAN NOTE
Us kidney noted with Mild bilateral hydronephrosis  Sher placed   On flomax  Monitor for now   Repeat US in future

## 2021-06-15 NOTE — PROGRESS NOTES
Telemetry Shift Summary    Rhythm SR  HR Range 75-81  Ectopy Occasional PVC, Rare Coup   Measurements 0.26/0.14/0.36        Normal Values  Rhythm SR  HR Range    Measurements 0.12-0.20 / 0.06-0.10  / 0.30-0.52

## 2021-06-15 NOTE — ASSESSMENT & PLAN NOTE
Us noted with mild hydronephrosis   On wilson   Continue to monitor   Urology follow up as outpatient

## 2021-06-16 LAB
ANION GAP SERPL CALC-SCNC: 13 MMOL/L (ref 7–16)
ANION GAP SERPL CALC-SCNC: 14 MMOL/L (ref 7–16)
BUN SERPL-MCNC: 12 MG/DL (ref 8–22)
BUN SERPL-MCNC: 14 MG/DL (ref 8–22)
CALCIUM SERPL-MCNC: 8.5 MG/DL (ref 8.4–10.2)
CALCIUM SERPL-MCNC: 9 MG/DL (ref 8.4–10.2)
CHLORIDE SERPL-SCNC: 102 MMOL/L (ref 96–112)
CHLORIDE SERPL-SCNC: 99 MMOL/L (ref 96–112)
CO2 SERPL-SCNC: 25 MMOL/L (ref 20–33)
CO2 SERPL-SCNC: 27 MMOL/L (ref 20–33)
CREAT SERPL-MCNC: 0.88 MG/DL (ref 0.5–1.4)
CREAT SERPL-MCNC: 0.97 MG/DL (ref 0.5–1.4)
GLUCOSE BLD-MCNC: 146 MG/DL (ref 65–99)
GLUCOSE BLD-MCNC: 199 MG/DL (ref 65–99)
GLUCOSE BLD-MCNC: 263 MG/DL (ref 65–99)
GLUCOSE BLD-MCNC: 309 MG/DL (ref 65–99)
GLUCOSE SERPL-MCNC: 113 MG/DL (ref 65–99)
GLUCOSE SERPL-MCNC: 289 MG/DL (ref 65–99)
MAGNESIUM SERPL-MCNC: 1.6 MG/DL (ref 1.5–2.5)
MAGNESIUM SERPL-MCNC: 1.8 MG/DL (ref 1.5–2.5)
POTASSIUM SERPL-SCNC: 2.8 MMOL/L (ref 3.6–5.5)
POTASSIUM SERPL-SCNC: 2.9 MMOL/L (ref 3.6–5.5)
POTASSIUM SERPL-SCNC: 3.3 MMOL/L (ref 3.6–5.5)
SODIUM SERPL-SCNC: 137 MMOL/L (ref 135–145)
SODIUM SERPL-SCNC: 143 MMOL/L (ref 135–145)

## 2021-06-16 PROCEDURE — 82962 GLUCOSE BLOOD TEST: CPT | Mod: 91

## 2021-06-16 PROCEDURE — A9270 NON-COVERED ITEM OR SERVICE: HCPCS | Performed by: HOSPITALIST

## 2021-06-16 PROCEDURE — 700102 HCHG RX REV CODE 250 W/ 637 OVERRIDE(OP): Performed by: HOSPITALIST

## 2021-06-16 PROCEDURE — 700111 HCHG RX REV CODE 636 W/ 250 OVERRIDE (IP): Performed by: STUDENT IN AN ORGANIZED HEALTH CARE EDUCATION/TRAINING PROGRAM

## 2021-06-16 PROCEDURE — 700101 HCHG RX REV CODE 250: Performed by: HOSPITALIST

## 2021-06-16 PROCEDURE — 84132 ASSAY OF SERUM POTASSIUM: CPT

## 2021-06-16 PROCEDURE — 700105 HCHG RX REV CODE 258: Performed by: INTERNAL MEDICINE

## 2021-06-16 PROCEDURE — A9270 NON-COVERED ITEM OR SERVICE: HCPCS | Performed by: STUDENT IN AN ORGANIZED HEALTH CARE EDUCATION/TRAINING PROGRAM

## 2021-06-16 PROCEDURE — 99233 SBSQ HOSP IP/OBS HIGH 50: CPT | Performed by: STUDENT IN AN ORGANIZED HEALTH CARE EDUCATION/TRAINING PROGRAM

## 2021-06-16 PROCEDURE — 80048 BASIC METABOLIC PNL TOTAL CA: CPT

## 2021-06-16 PROCEDURE — 700102 HCHG RX REV CODE 250 W/ 637 OVERRIDE(OP): Performed by: STUDENT IN AN ORGANIZED HEALTH CARE EDUCATION/TRAINING PROGRAM

## 2021-06-16 PROCEDURE — 83735 ASSAY OF MAGNESIUM: CPT

## 2021-06-16 PROCEDURE — 770020 HCHG ROOM/CARE - TELE (206)

## 2021-06-16 PROCEDURE — 700111 HCHG RX REV CODE 636 W/ 250 OVERRIDE (IP): Performed by: INTERNAL MEDICINE

## 2021-06-16 RX ORDER — POTASSIUM CHLORIDE 7.45 MG/ML
10 INJECTION INTRAVENOUS
Status: COMPLETED | OUTPATIENT
Start: 2021-06-16 | End: 2021-06-16

## 2021-06-16 RX ORDER — DEXTROSE MONOHYDRATE 25 G/50ML
50 INJECTION, SOLUTION INTRAVENOUS
Status: DISCONTINUED | OUTPATIENT
Start: 2021-06-16 | End: 2021-06-18 | Stop reason: HOSPADM

## 2021-06-16 RX ORDER — POTASSIUM CHLORIDE 20 MEQ/1
40 TABLET, EXTENDED RELEASE ORAL EVERY 4 HOURS
Status: DISPENSED | OUTPATIENT
Start: 2021-06-16 | End: 2021-06-17

## 2021-06-16 RX ORDER — INSULIN LISPRO 100 [IU]/ML
2-9 INJECTION, SOLUTION INTRAVENOUS; SUBCUTANEOUS
Status: DISCONTINUED | OUTPATIENT
Start: 2021-06-16 | End: 2021-06-18 | Stop reason: HOSPADM

## 2021-06-16 RX ORDER — POTASSIUM CHLORIDE 20 MEQ/1
40 TABLET, EXTENDED RELEASE ORAL EVERY 4 HOURS
Status: COMPLETED | OUTPATIENT
Start: 2021-06-16 | End: 2021-06-16

## 2021-06-16 RX ORDER — INSULIN LISPRO 100 [IU]/ML
0.2 INJECTION, SOLUTION INTRAVENOUS; SUBCUTANEOUS
Status: DISCONTINUED | OUTPATIENT
Start: 2021-06-16 | End: 2021-06-18 | Stop reason: HOSPADM

## 2021-06-16 RX ADMIN — AMPICILLIN AND SULBACTAM 3 G: 2; 1 INJECTION, POWDER, FOR SOLUTION INTRAVENOUS at 13:23

## 2021-06-16 RX ADMIN — TRIAMCINOLONE ACETONIDE 1 APPLICATION: 1 CREAM TOPICAL at 17:02

## 2021-06-16 RX ADMIN — GABAPENTIN 100 MG: 100 CAPSULE ORAL at 17:14

## 2021-06-16 RX ADMIN — AMPICILLIN AND SULBACTAM 3 G: 2; 1 INJECTION, POWDER, FOR SOLUTION INTRAVENOUS at 08:03

## 2021-06-16 RX ADMIN — INSULIN GLARGINE 30 UNITS: 100 INJECTION, SOLUTION SUBCUTANEOUS at 06:12

## 2021-06-16 RX ADMIN — INSULIN LISPRO 5 UNITS: 100 INJECTION, SOLUTION INTRAVENOUS; SUBCUTANEOUS at 17:09

## 2021-06-16 RX ADMIN — POTASSIUM CHLORIDE 10 MEQ: 7.46 INJECTION, SOLUTION INTRAVENOUS at 12:06

## 2021-06-16 RX ADMIN — POTASSIUM CHLORIDE 40 MEQ: 1500 TABLET, EXTENDED RELEASE ORAL at 17:03

## 2021-06-16 RX ADMIN — TAMSULOSIN HYDROCHLORIDE 0.4 MG: 0.4 CAPSULE ORAL at 06:07

## 2021-06-16 RX ADMIN — POTASSIUM CHLORIDE 40 MEQ: 1500 TABLET, EXTENDED RELEASE ORAL at 21:24

## 2021-06-16 RX ADMIN — POTASSIUM CHLORIDE 40 MEQ: 1500 TABLET, EXTENDED RELEASE ORAL at 11:14

## 2021-06-16 RX ADMIN — POTASSIUM CHLORIDE AND SODIUM CHLORIDE: 900; 150 INJECTION, SOLUTION INTRAVENOUS at 08:03

## 2021-06-16 RX ADMIN — LEVOTHYROXINE SODIUM 25 MCG: 0.03 TABLET ORAL at 06:07

## 2021-06-16 RX ADMIN — FINASTERIDE 5 MG: 5 TABLET, FILM COATED ORAL at 06:07

## 2021-06-16 RX ADMIN — POTASSIUM CHLORIDE 10 MEQ: 7.46 INJECTION, SOLUTION INTRAVENOUS at 13:23

## 2021-06-16 RX ADMIN — SIMVASTATIN 10 MG: 10 TABLET, FILM COATED ORAL at 21:24

## 2021-06-16 RX ADMIN — INSULIN HUMAN 4 UNITS: 100 INJECTION, SOLUTION PARENTERAL at 11:16

## 2021-06-16 RX ADMIN — ENOXAPARIN SODIUM 40 MG: 40 INJECTION SUBCUTANEOUS at 06:08

## 2021-06-16 RX ADMIN — THERA TABS 1 TABLET: TAB at 06:07

## 2021-06-16 RX ADMIN — AMPICILLIN AND SULBACTAM 3 G: 2; 1 INJECTION, POWDER, FOR SOLUTION INTRAVENOUS at 18:18

## 2021-06-16 RX ADMIN — POLYVINYL ALCOHOL 1 DROP: 14 SOLUTION/ DROPS OPHTHALMIC at 17:02

## 2021-06-16 RX ADMIN — POTASSIUM CHLORIDE 10 MEQ: 7.46 INJECTION, SOLUTION INTRAVENOUS at 14:46

## 2021-06-16 RX ADMIN — POTASSIUM CHLORIDE 40 MEQ: 1500 TABLET, EXTENDED RELEASE ORAL at 14:45

## 2021-06-16 RX ADMIN — TRIAMCINOLONE ACETONIDE 1 APPLICATION: 1 CREAM TOPICAL at 06:08

## 2021-06-16 RX ADMIN — AMPICILLIN AND SULBACTAM 3 G: 2; 1 INJECTION, POWDER, FOR SOLUTION INTRAVENOUS at 02:20

## 2021-06-16 RX ADMIN — DOCUSATE SODIUM 50 MG AND SENNOSIDES 8.6 MG 2 TABLET: 8.6; 5 TABLET, FILM COATED ORAL at 06:06

## 2021-06-16 RX ADMIN — INSULIN LISPRO 2 UNITS: 100 INJECTION, SOLUTION INTRAVENOUS; SUBCUTANEOUS at 22:05

## 2021-06-16 RX ADMIN — INSULIN LISPRO 5 UNITS: 100 INJECTION, SOLUTION INTRAVENOUS; SUBCUTANEOUS at 17:11

## 2021-06-16 ASSESSMENT — FIBROSIS 4 INDEX: FIB4 SCORE: 1.83

## 2021-06-16 NOTE — PROGRESS NOTES
Hospital Medicine Daily Progress Note    Date of Service  6/16/2021  Chief Complaint  89 y.o. male admitted 6/14/2021 with AMS     Hospital Course  89 y.o. male with past medical history of diabetes mellitus, hypothyroidism, hypertension who presented 6/14/2021 with altered mental status.  He was noted to be hypotensive with blood pressure in 50s by EMS and given 350 cc bolus.  He was reportedly 78% on room air per EMS however was noted to be saturating 95% on room air while in ER.  Patient cannot recall the events that led to his hospitalization.  His wife is at bedside and states she found him down in the kitchen.  Patient states he has been having a good appetite and denies any lightheadedness, dizziness, palpitations, abdominal pain, chest pain, shortness of breath.  Patient's wife says he is always leaking urine. He was noted to have KOSTAS and hypokalemia.     In ER patient was given Unasyn and azithromycin.     Interval Problem Update  6/15/2021  Vitals remained stable  Labs noted with severe hypokalemia.  KCl added  KOSTAS improving     6/16/2021  Vitals remained stable  Blood culture remain negative  Labs noted with hypokalemia.  KCl replaced  We will repeat labs later  Plan to discharge to SNF tomorrow  Consultants/Specialty  None      Code Status  DNAR/DNI     Disposition  SNF      Review of Systems  Review of Systems   Constitutional: Positive for malaise/fatigue. Negative for fever.   HENT: Negative for hearing loss.    Eyes: Negative for blurred vision.   Respiratory: Negative for cough.    Cardiovascular: Negative for chest pain and palpitations.   Gastrointestinal: Negative for nausea and vomiting.   Genitourinary: Negative for dysuria.   Musculoskeletal: Negative for myalgias.   Skin: Negative for rash.   Neurological: Negative for dizziness.   Endo/Heme/Allergies: Does not bruise/bleed easily.         Physical Exam  Temp:  [36.2 °C (97.2 °F)-37.1 °C (98.7 °F)] 36.5 °C (97.7 °F)  Pulse:  [64-76] 69  Resp:   [17-18] 18  BP: (115-145)/(54-81) 126/67  SpO2:  [92 %-96 %] 94 %     Physical Exam  Constitutional:       General: He is not in acute distress.     Appearance: Normal appearance.   HENT:      Head: Normocephalic and atraumatic.      Right Ear: Tympanic membrane normal.      Left Ear: Tympanic membrane normal.      Nose: Nose normal.      Mouth/Throat:      Mouth: Mucous membranes are moist.   Cardiovascular:      Rate and Rhythm: Normal rate and regular rhythm.      Pulses: Normal pulses.      Heart sounds: Normal heart sounds.   Pulmonary:      Effort: Pulmonary effort is normal. No respiratory distress.      Breath sounds: Normal breath sounds.   Abdominal:      General: Abdomen is flat. There is no distension.      Palpations: Abdomen is soft.   Genitourinary:     Comments: On wilson   Musculoskeletal:      Right lower leg: No edema.      Left lower leg: No edema.   Skin:     General: Skin is warm.   Neurological:      General: No focal deficit present.      Mental Status: He is alert and oriented to person, place, and time. Mental status is at baseline.   Psychiatric:         Mood and Affect: Mood normal.   Fluids    Intake/Output Summary (Last 24 hours) at 6/16/2021 1550  Last data filed at 6/16/2021 0419  Gross per 24 hour   Intake 120 ml   Output 5650 ml   Net -5530 ml       Laboratory  Recent Labs     06/14/21  0809 06/15/21  0417   WBC 9.0 10.1   RBC 4.17* 4.51*   HEMOGLOBIN 13.2* 14.1   HEMATOCRIT 37.7* 41.1*   MCV 90.4 91.1   MCH 31.7 31.3   MCHC 35.0 34.3   RDW 41.5 41.3   PLATELETCT 222 247   MPV 11.3 11.3     Recent Labs     06/15/21  1142 06/16/21  0431 06/16/21  1342   SODIUM 134* 143 137   POTASSIUM 2.9* 2.8* 2.9*   CHLORIDE 99 102 99   CO2 24 27 25   GLUCOSE 282* 113* 289*   BUN 21 14 12   CREATININE 1.00 0.88 0.97   CALCIUM 8.4 9.0 8.5                   Imaging  US-RENAL   Final Result      1.  Mild bilateral hydronephrosis.      2.  Multiple bilateral simple appearing renal cysts. The largest is  located adjacent to the lower pole of the left kidney and likely represents an exophytic cyst. No follow-up indicated for simple renal cysts.      DX-CHEST-PORTABLE (1 VIEW)   Final Result      Cardiomegaly.      CT-HEAD W/O   Final Result      1.  No evidence of acute intracranial process.      2.  Cerebral atrophy as well as periventricular chronic small vessel ischemic change.           Assessment/Plan  * KOSTAS (acute kidney injury) ([facility])  Assessment & Plan  KOSTAS improving   Monitor BMP and assess response  Avoid IV contrast/nephrotoxins/NSAIDs  Dose adjust meds for decreased GFR  On wilson now for  Urinary retention      Bacteremia  Assessment & Plan  likely contaminated   Repeat blood culture   On unasyn    Hydronephrosis  Assessment & Plan  Us noted with mild hydronephrosis   On wilson   Continue to monitor     Urinary retention  Assessment & Plan  Us kidney noted with Mild bilateral hydronephrosis  Wilson placed   On flomax  Monitor for now   Repeat US in future       AMS (altered mental status)  Assessment & Plan  improving     Lactic acidosis  Assessment & Plan  Cont IVF and trend levels    Hypotension  Assessment & Plan  Now resolved after fluid bolus given in EMS  Hold home antihypertensives    Hypothyroidism- (present on admission)  Assessment & Plan  Cont synthroid      BPH (benign prostatic hyperplasia)- (present on admission)  Assessment & Plan  Cont finasteride and flomax    Elevated troponin- (present on admission)  Assessment & Plan  likley d/t demand ischemia  Denies any chest pain  I have ordered ekg  Repeat once    DM (diabetes mellitus) (CMS-[facility])- (present on admission)  Assessment & Plan  Uncontrolled with hyperglycemia  Pt apparently recently stopped metformin  Cont home lantus, ISS< diabetic diet, hypoglycemic protocl    Hypokalemia  Assessment & Plan  Potassium critically low at 2.8  I have started patient on K replacement  Repeat BMP later this evening and tomorrow morning  I will also check a  Mg level  Monitor on tele         VTE prophylaxis: Lovenox

## 2021-06-16 NOTE — DISCHARGE PLANNING
Agency/Facility Name: [facility], [place], [facility]  Outcome: All SNFs accepted per Epic response.

## 2021-06-16 NOTE — CARE PLAN
The patient is Watcher - Medium risk of patient condition declining or worsening    Shift Goals  Clinical Goals: correct electrolyte imbalances, positive change in mentation  Patient Goals: rest, see Mercedes    Progress made toward(s) clinical / shift goals:  Pt experiencing intermittent confusion but most often alert and oriented x for this RN.     Patient is not progressing towards the following goals: Pt has not slept much during this shift.    Problem: Knowledge Deficit:  Goal: Patient's knowledge of the prescribed therapeutic regimen will improve  Outcome: Not Progressing   No evidence of learning from the pt, reinforcement needed for all teachings.    Problem: Urinary Elimination:  Goal: Ability to achieve a balanced intake and output will improve  Outcome: Progressing   Pt able to intake more liquids and output is now straw/yellow in the wilson bag, improving colors from last night.

## 2021-06-16 NOTE — PROGRESS NOTES
Assumed report and patient care from Arlene GIRON via bedside report. Patient is resting comfortably in bed with no signs of labored breathing or restlessness. Safety measures in place, call light within reach.

## 2021-06-16 NOTE — PROGRESS NOTES
Telemetry Shift Summary    Rhythm: SR, VPOD, 1AVB and BBB  HR: 60-90  Ectopy: R-PVC, R-BIG    Measurements for strip printed 6/16/2021 at 1636  HR 80  0.26 / 0.14 / 0.36    Normal Values  Rhythm: SR  HR:   Measurements: 0.12-0.20 / 0.06-0.10 / 0.30-0.52

## 2021-06-16 NOTE — PROGRESS NOTES
Telemetry Shift Summary     Rhythm: SR  Rate: 68-79  Measurements: 0.24/0.16/0.42  Ectopy (reported by Monitor Tech): R-O PVC     Normal Values  Rhythm: Sinus  HR:   Measurements: 0.12-0.20/0.06-0.10/0.30-0.52

## 2021-06-16 NOTE — CARE PLAN
The patient is Watcher - Medium risk of patient condition declining or worsening    Shift Goals  Clinical Goals: correct potassium  Patient Goals: see Mercedes    Progress made toward(s) clinical / shift goals:  New orders for PO potassium; contact Mercedes to schedule time for a visit with pt      Problem: Psychosocial Needs:  Goal: Ability to cope will improve  Outcome: Progressing  Note: Mecredes provided phone number and pt lets staff know that he would like to talk to her     Problem: Psychosocial  Goal: Patient's level of anxiety will decrease  Outcome: Progressing  Flowsheets (Taken 6/16/2021 9655)  Patient Behaviors:   Confused   Restless  Note: Frequently re-orienting pt and reassuring pt
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
## 2021-06-17 ENCOUNTER — PATIENT OUTREACH (OUTPATIENT)
Dept: HEALTH INFORMATION MANAGEMENT | Facility: OTHER | Age: 86
End: 2021-06-17

## 2021-06-17 LAB
ANION GAP SERPL CALC-SCNC: 11 MMOL/L (ref 7–16)
BACTERIA UR CULT: NORMAL
CHLORIDE SERPL-SCNC: 103 MMOL/L (ref 96–112)
CO2 SERPL-SCNC: 26 MMOL/L (ref 20–33)
GLUCOSE BLD-MCNC: 125 MG/DL (ref 65–99)
GLUCOSE BLD-MCNC: 182 MG/DL (ref 65–99)
GLUCOSE BLD-MCNC: 220 MG/DL (ref 65–99)
GLUCOSE BLD-MCNC: 280 MG/DL (ref 65–99)
POTASSIUM SERPL-SCNC: 3.4 MMOL/L (ref 3.6–5.5)
SIGNIFICANT IND 70042: NORMAL
SITE SITE: NORMAL
SODIUM SERPL-SCNC: 140 MMOL/L (ref 135–145)
SOURCE SOURCE: NORMAL

## 2021-06-17 PROCEDURE — 97110 THERAPEUTIC EXERCISES: CPT

## 2021-06-17 PROCEDURE — 97535 SELF CARE MNGMENT TRAINING: CPT

## 2021-06-17 PROCEDURE — 99233 SBSQ HOSP IP/OBS HIGH 50: CPT | Performed by: STUDENT IN AN ORGANIZED HEALTH CARE EDUCATION/TRAINING PROGRAM

## 2021-06-17 PROCEDURE — 80051 ELECTROLYTE PANEL: CPT

## 2021-06-17 PROCEDURE — 700102 HCHG RX REV CODE 250 W/ 637 OVERRIDE(OP): Performed by: STUDENT IN AN ORGANIZED HEALTH CARE EDUCATION/TRAINING PROGRAM

## 2021-06-17 PROCEDURE — 97530 THERAPEUTIC ACTIVITIES: CPT

## 2021-06-17 PROCEDURE — 700111 HCHG RX REV CODE 636 W/ 250 OVERRIDE (IP): Performed by: INTERNAL MEDICINE

## 2021-06-17 PROCEDURE — 700111 HCHG RX REV CODE 636 W/ 250 OVERRIDE (IP): Performed by: STUDENT IN AN ORGANIZED HEALTH CARE EDUCATION/TRAINING PROGRAM

## 2021-06-17 PROCEDURE — 97116 GAIT TRAINING THERAPY: CPT

## 2021-06-17 PROCEDURE — 770020 HCHG ROOM/CARE - TELE (206)

## 2021-06-17 PROCEDURE — A9270 NON-COVERED ITEM OR SERVICE: HCPCS | Performed by: STUDENT IN AN ORGANIZED HEALTH CARE EDUCATION/TRAINING PROGRAM

## 2021-06-17 PROCEDURE — 700105 HCHG RX REV CODE 258: Performed by: INTERNAL MEDICINE

## 2021-06-17 PROCEDURE — A9270 NON-COVERED ITEM OR SERVICE: HCPCS | Performed by: HOSPITALIST

## 2021-06-17 PROCEDURE — 700102 HCHG RX REV CODE 250 W/ 637 OVERRIDE(OP): Performed by: HOSPITALIST

## 2021-06-17 PROCEDURE — 82962 GLUCOSE BLOOD TEST: CPT | Mod: 91

## 2021-06-17 RX ORDER — AMLODIPINE BESYLATE 5 MG/1
5 TABLET ORAL
Status: DISCONTINUED | OUTPATIENT
Start: 2021-06-17 | End: 2021-06-18 | Stop reason: HOSPADM

## 2021-06-17 RX ORDER — POTASSIUM CHLORIDE 20 MEQ/1
40 TABLET, EXTENDED RELEASE ORAL EVERY 4 HOURS
Status: COMPLETED | OUTPATIENT
Start: 2021-06-17 | End: 2021-06-17

## 2021-06-17 RX ADMIN — AMLODIPINE BESYLATE 5 MG: 5 TABLET ORAL at 08:22

## 2021-06-17 RX ADMIN — INSULIN LISPRO 3 UNITS: 100 INJECTION, SOLUTION INTRAVENOUS; SUBCUTANEOUS at 11:47

## 2021-06-17 RX ADMIN — GABAPENTIN 100 MG: 100 CAPSULE ORAL at 17:48

## 2021-06-17 RX ADMIN — POTASSIUM CHLORIDE 40 MEQ: 1500 TABLET, EXTENDED RELEASE ORAL at 01:36

## 2021-06-17 RX ADMIN — INSULIN LISPRO 2 UNITS: 100 INJECTION, SOLUTION INTRAVENOUS; SUBCUTANEOUS at 17:53

## 2021-06-17 RX ADMIN — THERA TABS 1 TABLET: TAB at 05:44

## 2021-06-17 RX ADMIN — INSULIN LISPRO 5 UNITS: 100 INJECTION, SOLUTION INTRAVENOUS; SUBCUTANEOUS at 17:51

## 2021-06-17 RX ADMIN — LEVOTHYROXINE SODIUM 25 MCG: 0.03 TABLET ORAL at 05:44

## 2021-06-17 RX ADMIN — ENOXAPARIN SODIUM 40 MG: 40 INJECTION SUBCUTANEOUS at 05:44

## 2021-06-17 RX ADMIN — FINASTERIDE 5 MG: 5 TABLET, FILM COATED ORAL at 05:44

## 2021-06-17 RX ADMIN — AMPICILLIN AND SULBACTAM 3 G: 2; 1 INJECTION, POWDER, FOR SOLUTION INTRAVENOUS at 07:27

## 2021-06-17 RX ADMIN — POLYVINYL ALCOHOL 1 DROP: 14 SOLUTION/ DROPS OPHTHALMIC at 17:48

## 2021-06-17 RX ADMIN — POTASSIUM CHLORIDE 40 MEQ: 1500 TABLET, EXTENDED RELEASE ORAL at 11:47

## 2021-06-17 RX ADMIN — INSULIN LISPRO 5 UNITS: 100 INJECTION, SOLUTION INTRAVENOUS; SUBCUTANEOUS at 06:34

## 2021-06-17 RX ADMIN — TRIAMCINOLONE ACETONIDE 1 APPLICATION: 1 CREAM TOPICAL at 17:48

## 2021-06-17 RX ADMIN — INSULIN LISPRO 5 UNITS: 100 INJECTION, SOLUTION INTRAVENOUS; SUBCUTANEOUS at 21:25

## 2021-06-17 RX ADMIN — SIMVASTATIN 10 MG: 10 TABLET, FILM COATED ORAL at 21:19

## 2021-06-17 RX ADMIN — AMPICILLIN AND SULBACTAM 3 G: 2; 1 INJECTION, POWDER, FOR SOLUTION INTRAVENOUS at 13:29

## 2021-06-17 RX ADMIN — TRIAMCINOLONE ACETONIDE 1 APPLICATION: 1 CREAM TOPICAL at 05:45

## 2021-06-17 RX ADMIN — INSULIN LISPRO 5 UNITS: 100 INJECTION, SOLUTION INTRAVENOUS; SUBCUTANEOUS at 11:48

## 2021-06-17 RX ADMIN — AMPICILLIN AND SULBACTAM 3 G: 2; 1 INJECTION, POWDER, FOR SOLUTION INTRAVENOUS at 01:37

## 2021-06-17 RX ADMIN — POTASSIUM CHLORIDE 40 MEQ: 1500 TABLET, EXTENDED RELEASE ORAL at 08:22

## 2021-06-17 RX ADMIN — TAMSULOSIN HYDROCHLORIDE 0.4 MG: 0.4 CAPSULE ORAL at 05:44

## 2021-06-17 ASSESSMENT — COGNITIVE AND FUNCTIONAL STATUS - GENERAL
SUGGESTED CMS G CODE MODIFIER MOBILITY: CK
TOILETING: A LITTLE
CLIMB 3 TO 5 STEPS WITH RAILING: A LOT
DRESSING REGULAR LOWER BODY CLOTHING: A LITTLE
SUGGESTED CMS G CODE MODIFIER DAILY ACTIVITY: CK
PERSONAL GROOMING: A LITTLE
MOVING FROM LYING ON BACK TO SITTING ON SIDE OF FLAT BED: UNABLE
DRESSING REGULAR UPPER BODY CLOTHING: A LITTLE
STANDING UP FROM CHAIR USING ARMS: A LITTLE
MOBILITY SCORE: 17
WALKING IN HOSPITAL ROOM: A LITTLE
EATING MEALS: A LITTLE
DAILY ACTIVITIY SCORE: 17
HELP NEEDED FOR BATHING: A LOT

## 2021-06-17 ASSESSMENT — FIBROSIS 4 INDEX: FIB4 SCORE: 1.83

## 2021-06-17 ASSESSMENT — GAIT ASSESSMENTS
DISTANCE (FEET): 200
GAIT LEVEL OF ASSIST: MINIMAL ASSIST
DEVIATION: STEP TO;DECREASED BASE OF SUPPORT;BRADYKINETIC;SHUFFLED GAIT
DISTANCE (FEET): 15
ASSISTIVE DEVICE: FRONT WHEEL WALKER

## 2021-06-17 NOTE — PROGRESS NOTES
Report received from MACK Ohara. Plan of care discussed at patient's bedside with pt and spouse. Whiteboard has been updated. Pt is resting comfortably in bed and requests to use bathroom. MARIKA Velasquez notified due to pt stating he does not have to go immediately at this time. Safety precautions in place, bed alarm on, and call light within reach.

## 2021-06-17 NOTE — THERAPY
Physical Therapy   Daily Treatment     Patient Name: Julien Dao  Age:  89 y.o., Sex:  male  Medical Record #: [id number]  Today's Date: 6/17/2021     Precautions: (P) Fall Risk    Assessment    Pt is progressing as expected and was able to demonstrate improved ambulation distance from evaluation. Pt able to demonstrate improved activity tolerance and increased tolerance with upright mobility. Pt continues to demonstrate with poor gait mechanics and is a high fall risk at this time. Will continue to follow. Recommend post-acute placement for continued physical therapy services prior to discharge home.       Plan    Continue current treatment plan.    DC Equipment Recommendations: (P) Front-Wheel Walker  Discharge Recommendations: (P) Recommend post-acute placement for additional physical therapy services prior to discharge home    Objective       06/17/21 0920   Precautions   Precautions Fall Risk   Comments dementia    Pain 0 - 10 Group   Therapist Pain Assessment Nurse Notified;0   Cognition    Cognition / Consciousness X   Level of Consciousness Alert   Safety Awareness Impaired   Attention Impaired   Comments cues for redirection and following at times    Sitting Lower Body Exercises   Sitting Lower Body Exercises Yes   Sit to Stand 1 set of 10   Comments with active assist intially    Standing Lower Body Exercises   Standing Lower Body Exercises Yes   Marching 1 set of 10   Neuro-Muscular Treatments   Neuro-Muscular Treatments Anterior weight shift   Comments cues and anterior weight shift during standing mechanics    Other Treatments   Other Treatments Provided Pt provided with cues and demo to bring nose over toes to improve standing mechanics    Neurological Concerns   Neurological Concerns Yes   Comments hx of dementia    Balance   Sitting Balance (Static) Fair   Sitting Balance (Dynamic) Fair   Standing Balance (Static) Fair   Standing Balance (Dynamic) Fair -   Weight Shift Sitting Fair   Weight Shift  Standing Fair   Skilled Intervention Verbal Cuing;Postural Facilitation;Facilitation   Comments reduced LOB frequency; w/fww    Gait Analysis   Gait Level Of Assist Minimal Assist   Assistive Device Front Wheel Walker   Distance (Feet) 200   # of Times Distance was Traveled 1   Deviation Step To;Decreased Base Of Support;Bradykinetic;Shuffled Gait   Weight Bearing Status fwb   Skilled Intervention Verbal Cuing;Facilitation   Comments cues for wider LUCILA and too look down at feet   Bed Mobility    Supine to Sit Supervised   Scooting Supervised   Skilled Intervention Verbal Cuing;Facilitation   Comments HOB elevated and rails up   Functional Mobility   Sit to Stand Minimal Assist   Bed, Chair, Wheelchair Transfer Minimal Assist   Transfer Method Stand Step   Mobility EOB, sit<>stand, ambulation, transfer to chair    Skilled Intervention Verbal Cuing;Sequencing   Comments cues for handplacement and demonstration for appropriate standing mechanics    How much difficulty does the patient currently have...   Turning over in bed (including adjusting bedclothes, sheets and blankets)? 4   Sitting down on and standing up from a chair with arms (e.g., wheelchair, bedside commode, etc.) 1   Moving from lying on back to sitting on the side of the bed? 4   How much help from another person does the patient currently need...   Moving to and from a bed to a chair (including a wheelchair)? 3   Need to walk in a hospital room? 3   Climbing 3-5 steps with a railing? 2   6 clicks Mobility Score 17   Activity Tolerance   Sitting in Chair 10 mins   Sitting Edge of Bed 5 mins    Standing 10 mins   Comments no adverse events noted    Patient / Family Goals    Patient / Family Goal #1 to go home    Short Term Goals    Short Term Goal # 1 pt will go supine<>sit w/hob flat and rails down w/spv in 6tx for safe d/c home   Goal Outcome # 1 Progressing as expected   Short Term Goal # 2 pt will go sit<>stand w/fww w/spv in 6tx for safe d/c home     Goal Outcome # 2 Progressing as expected   Short Term Goal # 3 pt will transfer bed<>chair w/fww w/spv in 6tx for safe d/c home    Goal Outcome # 3 Progressing as expected   Short Term Goal # 4 pt will ambulate for 150ft w/fww w/spv in 6tx for safe d/c home    Goal Outcome # 4 Progressing as expected   Short Term Goal # 5 pt will go up/down 2 steps w/spv in 6tx for safe d/c home    Goal Outcome # 5 Goal not met   Education Group   Role of Physical Therapist Patient Response Patient;Acceptance;Explanation;Demonstration;Verbal Demonstration;Action Demonstration   Anticipated Discharge Equipment and Recommendations   DC Equipment Recommendations Front-Wheel Walker   Discharge Recommendations Recommend post-acute placement for additional physical therapy services prior to discharge home   Interdisciplinary Plan of Care Collaboration   IDT Collaboration with  Nursing   Patient Position at End of Therapy Seated;Chair Alarm On;Call Light within Reach;Tray Table within Reach;Phone within Reach   Collaboration Comments aware of visit and recs    Session Information   Date / Session Number  6/17-2 (2/4, 6/21)

## 2021-06-17 NOTE — PROGRESS NOTES
Hospital Medicine Daily Progress Note    Date of Service  6/17/2021  Chief Complaint  89 y.o. male admitted 6/14/2021 with AMS     Hospital Course  89 y.o. male with past medical history of diabetes mellitus, hypothyroidism, hypertension who presented 6/14/2021 with altered mental status.  He was noted to be hypotensive with blood pressure in 50s by EMS and given 350 cc bolus.  He was reportedly 78% on room air per EMS however was noted to be saturating 95% on room air while in ER.  Patient cannot recall the events that led to his hospitalization.  His wife is at bedside and states she found him down in the kitchen.  Patient states he has been having a good appetite and denies any lightheadedness, dizziness, palpitations, abdominal pain, chest pain, shortness of breath.  Patient's wife says he is always leaking urine. He was noted to have KOSTAS and hypokalemia.     In ER patient was given Unasyn and azithromycin.     Interval Problem Update  6/15/2021  Vitals remained stable  Labs noted with severe hypokalemia.  KCl added  KOSTAS improving     6/16/2021  Vitals remained stable  Blood culture remain negative  Labs noted with hypokalemia.  KCl replaced  We will repeat labs later  Plan to discharge to SNF tomorrow    6/17/2021  Vitals remained stable  Potassium level improved  Accepted to SNF.  Plan to discharge tomorrow in a.m.  Patient need urology follow-up on discharge    Consultants/Specialty  None      Code Status  DNAR/DNI     Disposition  SNF      Review of Systems  Review of Systems   Constitutional: Positive for malaise/fatigue. Negative for fever.   HENT: Negative for hearing loss.    Eyes: Negative for blurred vision.   Respiratory: Negative for cough.    Cardiovascular: Negative for chest pain and palpitations.   Gastrointestinal: Negative for nausea and vomiting.   Genitourinary: Negative for dysuria.   Musculoskeletal: Negative for myalgias.   Skin: Negative for rash.   Neurological: Negative for dizziness.    Endo/Heme/Allergies: Does not bruise/bleed easily.         Physical Exam  Temp:  [36.2 °C (97.2 °F)-37.1 °C (98.7 °F)] 36.5 °C (97.7 °F)  Pulse:  [64-76] 69  Resp:  [17-18] 18  BP: (115-145)/(54-81) 126/67  SpO2:  [92 %-96 %] 94 %     Physical Exam  Constitutional:       General: He is not in acute distress.     Appearance: Normal appearance.   HENT:      Head: Normocephalic and atraumatic.      Right Ear: Tympanic membrane normal.      Left Ear: Tympanic membrane normal.      Nose: Nose normal.      Mouth/Throat:      Mouth: Mucous membranes are moist.   Cardiovascular:      Rate and Rhythm: Normal rate and regular rhythm.      Pulses: Normal pulses.      Heart sounds: Normal heart sounds.   Pulmonary:      Effort: Pulmonary effort is normal. No respiratory distress.      Breath sounds: Normal breath sounds.   Abdominal:      General: Abdomen is flat. There is no distension.      Palpations: Abdomen is soft.   Genitourinary:     Comments: On wilson   Musculoskeletal:      Right lower leg: No edema.      Left lower leg: No edema.   Skin:     General: Skin is warm.   Neurological:      General: No focal deficit present.      Mental Status: He is alert and oriented to person, place, and time. Mental status is at baseline.   Psychiatric:         Mood and Affect: Mood normal.     Fluids    Intake/Output Summary (Last 24 hours) at 6/17/2021 1411  Last data filed at 6/17/2021 1000  Gross per 24 hour   Intake 240 ml   Output 2950 ml   Net -2710 ml       Laboratory  Recent Labs     06/15/21  0417   WBC 10.1   RBC 4.51*   HEMOGLOBIN 14.1   HEMATOCRIT 41.1*   MCV 91.1   MCH 31.3   MCHC 34.3   RDW 41.3   PLATELETCT 247   MPV 11.3     Recent Labs     06/15/21  1142 06/15/21  1142 06/16/21  0431 06/16/21  0431 06/16/21  1342 06/16/21  1850 06/17/21  0248   SODIUM 134*   < > 143  --  137  --  140   POTASSIUM 2.9*   < > 2.8*   < > 2.9* 3.3* 3.4*   CHLORIDE 99   < > 102  --  99  --  103   CO2 24   < > 27  --  25  --  26   GLUCOSE  282*  --  113*  --  289*  --   --    BUN 21  --  14  --  12  --   --    CREATININE 1.00  --  0.88  --  0.97  --   --    CALCIUM 8.4  --  9.0  --  8.5  --   --     < > = values in this interval not displayed.                   Imaging  US-RENAL   Final Result      1.  Mild bilateral hydronephrosis.      2.  Multiple bilateral simple appearing renal cysts. The largest is located adjacent to the lower pole of the left kidney and likely represents an exophytic cyst. No follow-up indicated for simple renal cysts.      DX-CHEST-PORTABLE (1 VIEW)   Final Result      Cardiomegaly.      CT-HEAD W/O   Final Result      1.  No evidence of acute intracranial process.      2.  Cerebral atrophy as well as periventricular chronic small vessel ischemic change.           Assessment/Plan  * KOSTAS (acute kidney injury) ([facility])  Assessment & Plan  KOSTAS improving   Monitor BMP and assess response  Avoid IV contrast/nephrotoxins/NSAIDs  Dose adjust meds for decreased GFR  On wilson now for  Urinary retention    Resolved     Bacteremia  Assessment & Plan  likely contaminated   Repeat blood culture   On unasyn    Hydronephrosis  Assessment & Plan  Us noted with mild hydronephrosis   On wilson   Continue to monitor   Urology follow up as outpatient     Urinary retention  Assessment & Plan  Us kidney noted with Mild bilateral hydronephrosis  Wilson placed   On flomax  Monitor for now   Repeat US in future       AMS (altered mental status)  Assessment & Plan  Resolved     Lactic acidosis  Assessment & Plan  Resolved     Hypotension  Assessment & Plan  Resolved     Hypothyroidism- (present on admission)  Assessment & Plan  Cont synthroid      BPH (benign prostatic hyperplasia)- (present on admission)  Assessment & Plan  Cont finasteride and flomax    Elevated troponin- (present on admission)  Assessment & Plan  Unremarkable     DM (diabetes mellitus) ([facility])- (present on admission)  Assessment & Plan  Uncontrolled with hyperglycemia  Pt apparently  recently stopped metformin  Cont home lantus, ISS< diabetic diet, hypoglycemic protocl    Hypokalemia  Assessment & Plan  Improved          VTE prophylaxis: lovenox

## 2021-06-17 NOTE — PROGRESS NOTES
Telemetry Shift Summary      Rhythm: SR w/ 1st Degree HB w/ BBB  HR Range: 69-79  Ectopy: R PAC  Measurements: .24/.16/.44    Normal Values:  Rhythm: SR  HR Range:   Measurements: 0.12-0.20/ 0.06-0.10/ 0.30-0.52

## 2021-06-17 NOTE — DISCHARGE PLANNING
Anticipated Discharge Disposition: SNF     Action: Pt discussed during IDT rounds. Pt cleared for transfer to SNF.   Pt accepted by [facility], [facility], and [facility].     LSW messaged Jana HUGHES requesting f/u with #1 SNF CHOICE- [facility] for bed availability.     LSW mesaged bedside RNMayda to see if pt is appropriate for wheelchair transport.     Barriers to Discharge: None     Plan: Await SNF bed availability, LSW to continue to follow for d/c needs, LSW to assist as needed     Addendum 1000  LSW informed by Jana HUGHES that pt transport to [facility] confirmed for 1400. LSW messaged Dr. Baker and bedside RNMayda providing transport update.     Addendum 1057  LSW faxed signed IMM to Jana HUGHES.   LSW called pt's spouse, Mercedes to provide update.     Addendum 1106  LSW informed by Jana HUGHES that pt can't transport to [facility] until tomorrow.  Pt was rolled inpatient on 6-15-21 and requires one more midnight stay.     LSW messaged Dr. Baker and bedside RNMayda that pt's transport has been cancelled for today.     SAUL to see if transport can be arranged for tomorrow morning 6/18.

## 2021-06-17 NOTE — PROGRESS NOTES
"This RN notified by MARIKA Tatum that pt is attempting to get out of bed and leave. This RN came to bedside and pt was standing with FWW and MARIKA Tatum stating he needed to \"get his coat and leave.\" Pt educated on his lack of medical clearance and that he would be leaving Against Medical Advice. Pt verbalized understanding. Pt asked to sit at edge of bed while This RN called his wife. Pt was reluctant but was eventually agreeable.    2045: This RN called pt's spouse, Mercedes, and explained pt's current desire to leave AMA. Spouse verbalized understanding of situation and was concerned that pt would do this because he was \"worried about her being alone.\" All questions were answered appropriately. Pt was able to speak with spouse with the use of his room phone. Pt agreed to stay and remain at the hospital after speaking with spouse.   "

## 2021-06-17 NOTE — THERAPY
"Occupational Therapy  Daily Treatment     Patient Name: Julien Dao  Age:  89 y.o., Sex:  male  Medical Record #: [id number]  Today's Date: 6/17/2021     Precautions  Precautions: Fall Risk  Comments: dementia     Assessment    Pt seen for OT tx today, agreeable to therapy. He continues to require cueing and redirection, often would verbalize, \"Now who would pick me up tonight?\" He is progressing physically, now requiring lesser physical assistance from therapist. However, he continues to present a safety/fall risk and will be unsafe to dc home. OT recommend post acute placement prior to dc to community. Will continue to follow while in house.    Plan    Continue current treatment plan.    DC Equipment Recommendations: Unable to determine at this time  Discharge Recommendations: Recommend post-acute placement for additional occupational therapy services prior to discharge home    Subjective    \"Now who would pick me up tonight?\"     Objective       06/17/21 0857   Pain   Pain Scales 0 to 10 Scale    Intervention Declines   Pain 0 - 10 Group   Pain Rating Scale (NPRS) 0   Therapist Pain Assessment Post Activity Pain Same as Prior to Activity   Non Verbal Descriptors   Non Verbal Scale  Calm;Unlabored Breathing   Cognition    Cognition / Consciousness X   Level of Consciousness Alert   Safety Awareness Impaired   Attention Impaired   Comments cues for redirection and safety   Passive ROM Upper Body   Passive ROM Upper Body WDL   Active ROM Upper Body   Active ROM Upper Body  WDL   Strength Upper Body   Upper Body Strength  WDL   Other Treatments   Other Treatments Provided STS exercises x 10 reps, 2 sets each, standing G/H at sinkside, LB dressing in seated, s/d standing balance activities and functional reaching activities   Balance   Sitting Balance (Static) Fair +   Sitting Balance (Dynamic) Fair   Standing Balance (Static) Fair   Standing Balance (Dynamic) Fair -   Weight Shift Sitting Good   Weight Shift Standing " Fair   Skilled Intervention Verbal Cuing;Postural Facilitation   Comments with FWW   Bed Mobility    Comments up in recliner pre and post OT session   Activities of Daily Living   Grooming Standing;Minimal Assist   Upper Body Dressing Minimal Assist   Lower Body Dressing Minimal Assist   How much help from another person does the patient currently need...   Putting on and taking off regular lower body clothing? 3   Bathing (including washing, rinsing, and drying)? 2   Toileting, which includes using a toilet, bedpan, or urinal? 3   Putting on and taking off regular upper body clothing? 3   Taking care of personal grooming such as brushing teeth? 3   Eating meals? 3   6 Clicks Daily Activity Score 17   Functional Mobility   Sit to Stand   (SBA)   Bed, Chair, Wheelchair Transfer Minimal Assist   Toilet Transfers Minimal Assist   Transfer Method Stand Step   Mobility in room with FWW   Distance (Feet) 15   # of Times Distance was Traveled 2   Skilled Intervention Verbal Cuing;Sequencing;Postural Facilitation;Compensatory Strategies   Comments cues required for safety and sequencing   Activity Tolerance   Sitting in Chair 15 mins total   Sitting Edge of Bed 5 mins   Standing 15 mins total   Short Term Goals   Short Term Goal # 1 pt will complete FB dressing with min A   Goal Outcome # 1 Progressing as expected  (goal met today, but will keep for consistency)   Short Term Goal # 2 pt will complete ADL txfs using LRAD at spv level   Goal Outcome # 2 Progressing as expected   Short Term Goal # 3 pt will complete G/H standing sinkside x 10mins at spv level   Goal Outcome # 3 Progressing as expected   Education Group   Education Provided Home Safety;Transfers;Activities of Daily Living   Home Safety Patient Response Patient;Acceptance;Explanation;Reinforcement Needed   Transfers Patient Response Patient;Acceptance;Explanation;Reinforcement Needed   ADL Patient Response Patient;Acceptance;Explanation;Reinforcement Needed    Anticipated Discharge Equipment and Recommendations   DC Equipment Recommendations Unable to determine at this time   Discharge Recommendations Recommend post-acute placement for additional occupational therapy services prior to discharge home   Interdisciplinary Plan of Care Collaboration   IDT Collaboration with  Nursing   Patient Position at End of Therapy Seated;Chair Alarm On;Call Light within Reach;Tray Table within Reach;Phone within Reach   Collaboration Comments RN aware of OT session   Session Information   Date / Session Number  6/17 #1 (2/3, 6/21) LF   Priority 2

## 2021-06-17 NOTE — PROGRESS NOTES
Telemetry Shift Summary    Rhythm: SR, 1AVB, BBB  HR: 60-90  Ectopy: R-PAC, R-PVC    Measurements for strip printed 6/17/2021 at 1355  HR 75  0.26 / 0.12 / 0.38    Normal Values  Rhythm: SR  HR:   Measurements: 0.12-0.20 / 0.06-0.10 / 0.30-0.52

## 2021-06-17 NOTE — DISCHARGE PLANNING
Agency/Facility Name: [facility]  Spoke To: Mikala  Outcome: Has a bed available.        PATRICK Perez notified via Teams.

## 2021-06-17 NOTE — DISCHARGE INSTRUCTIONS
Discharge Instructions    Discharged to [facility] by medical transportation with escort. Discharged via wheelchair, hospital escort: Yes.  Special equipment needed: Not Applicable    Be sure to schedule a follow-up appointment with your primary care doctor or any specialists as instructed.     Discharge Plan:   Diet Plan: Discussed  Activity Level: Discussed  Confirmed Follow up Appointment: Patient to Call and Schedule Appointment  Confirmed Symptoms Management: Discussed  Medication Reconciliation Updated: Yes    I understand that a diet low in cholesterol, fat, and sodium is recommended for good health. Unless I have been given specific instructions below for another diet, I accept this instruction as my diet prescription.   Other diet: As Tolerated    Special Instructions: None    · Is patient discharged on Warfarin / Coumadin?   No     Depression / Suicide Risk    As you are discharged from this [facility] facility, it is important to learn how to keep safe from harming yourself.    Recognize the warning signs:  · Abrupt changes in personality, positive or negative- including increase in energy   · Giving away possessions  · Change in eating patterns- significant weight changes-  positive or negative  · Change in sleeping patterns- unable to sleep or sleeping all the time   · Unwillingness or inability to communicate  · Depression  · Unusual sadness, discouragement and loneliness  · Talk of wanting to die  · Neglect of personal appearance   · Rebelliousness- reckless behavior  · Withdrawal from people/activities they love  · Confusion- inability to concentrate     If you or a loved one observes any of these behaviors or has concerns about self-harm, here's what you can do:  · Talk about it- your feelings and reasons for harming yourself  · Remove any means that you might use to hurt yourself (examples: pills, rope, extension cords, firearm)  · Get professional help from the community (Mental Health,  Substance Abuse, psychological counseling)  · Do not be alone:Call your Safe Contact- someone whom you trust who will be there for you.  · Call your local CRISIS HOTLINE 238-0095 or 163-201-5975  · Call your local Children's Mobile Crisis Response Team [place] (773) 134-1230 or www.Medical Envelope  · Call the toll free National Suicide Prevention Hotlines   · National Suicide Prevention Lifeline 068-793-TAQJ (2539)  · [employer] Line Network 800-SUICIDE (217-5108)        Acute Kidney Injury, Adult    Acute kidney injury is a sudden worsening of kidney function. The kidneys are organs that have several jobs. They filter the blood to remove waste products and extra fluid. They also maintain a healthy balance of minerals and hormones in the body, which helps control blood pressure and keep bones strong. With this condition, your kidneys do not do their jobs as well as they should.  This condition ranges from mild to severe. Over time it may develop into long-lasting (chronic) kidney disease. Early detection and treatment may prevent acute kidney injury from developing into a chronic condition.  What are the causes?  Common causes of this condition include:  · A problem with blood flow to the kidneys. This may be caused by:  ? Low blood pressure (hypotension) or shock.  ? Blood loss.  ? Heart and blood vessel (cardiovascular) disease.  ? Severe burns.  ? Liver disease.  · Direct damage to the kidneys. This may be caused by:  ? Certain medicines.  ? A kidney infection.  ? Poisoning.  ? Being around or in contact with toxic substances.  ? A surgical wound.  ? A hard, direct hit to the kidney area.  · A sudden blockage of urine flow. This may be caused by:  ? Cancer.  ? Kidney stones.  ? An enlarged prostate in males.  What are the signs or symptoms?  Symptoms of this condition may not be obvious until the condition becomes severe. Symptoms of this condition can include:  · Tiredness (lethargy), or difficulty  staying awake.  · Nausea or vomiting.  · Swelling (edema) of the face, legs, ankles, or feet.  · Problems with urination, such as:  ? Abdominal pain, or pain along the side of your stomach (flank).  ? Decreased urine production.  ? Decrease in the force of urine flow.  · Muscle twitches and cramps, especially in the legs.  · Confusion or trouble concentrating.  · Loss of appetite.  · Fever.  How is this diagnosed?  This condition may be diagnosed with tests, including:  · Blood tests.  · Urine tests.  · Imaging tests.  · A test in which a sample of tissue is removed from the kidneys to be examined under a microscope (kidney biopsy).  How is this treated?  Treatment for this condition depends on the cause and how severe the condition is. In mild cases, treatment may not be needed. The kidneys may heal on their own. In more severe cases, treatment will involve:  · Treating the cause of the kidney injury. This may involve changing any medicines you are taking or adjusting your dosage.  · Fluids. You may need specialized IV fluids to balance your body's needs.  · Having a catheter placed to drain urine and prevent blockages.  · Preventing problems from occurring. This may mean avoiding certain medicines or procedures that can cause further injury to the kidneys.  In some cases treatment may also require:  · A procedure to remove toxic wastes from the body (dialysis or continuous renal replacement therapy - CRRT).  · Surgery. This may be done to repair a torn kidney, or to remove the blockage from the urinary system.  Follow these instructions at home:  Medicines  · Take over-the-counter and prescription medicines only as told by your health care provider.  · Do not take any new medicines without your health care provider's approval. Many medicines can worsen your kidney damage.  · Do not take any vitamin and mineral supplements without your health care provider's approval. Many nutritional supplements can worsen your  kidney damage.  Lifestyle  · If your health care provider prescribed changes to your diet, follow them. You may need to decrease the amount of protein you eat.  · Achieve and maintain a healthy weight. If you need help with this, ask your health care provider.  · Start or continue an exercise plan. Try to exercise at least 30 minutes a day, 5 days a week.  · Do not use any tobacco products, such as cigarettes, chewing tobacco, and e-cigarettes. If you need help quitting, ask your health care provider.  General instructions  · Keep track of your blood pressure. Report changes in your blood pressure as told by your health care provider.  · Stay up to date with immunizations. Ask your health care provider which immunizations you need.  · Keep all follow-up visits as told by your health care provider. This is important.  Where to find more information  · American Association of Kidney Patients: www.aakp.org  · National Kidney Foundation: www.kidney.org  · American Kidney Fund: www.akfinc.org  · Life Options Rehabilitation Program:  ? www.lifeoptions.org  ? www.kidneyschool.org  Contact a health care provider if:  · Your symptoms get worse.  · You develop new symptoms.  Get help right away if:  · You develop symptoms of worsening kidney disease, which include:  ? Headaches.  ? Abnormally dark or light skin.  ? Easy bruising.  ? Frequent hiccups.  ? Chest pain.  ? Shortness of breath.  ? End of menstruation in women.  ? Seizures.  ? Confusion or altered mental status.  ? Abdominal or back pain.  ? Itchiness.  · You have a fever.  · Your body is producing less urine.  · You have pain or bleeding when you urinate.  Summary  · Acute kidney injury is a sudden worsening of kidney function.  · Acute kidney injury can be caused by problems with blood flow to the kidneys, direct damage to the kidneys, and sudden blockage of urine flow.  · Symptoms of this condition may not be obvious until it becomes severe. Symptoms may include  edema, lethargy, confusion, nausea or vomiting, and problems passing urine.  · This condition can usually be diagnosed with blood tests, urine tests, and imaging tests. Sometimes a kidney biopsy is done to diagnose this condition.  · Treatment for this condition often involves treating the underlying cause. It is treated with fluids, medicines, dialysis, diet changes, or surgery.  This information is not intended to replace advice given to you by your health care provider. Make sure you discuss any questions you have with your health care provider.  Document Released: 07/02/2012 Document Revised: 11/30/2018 Document Reviewed: 12/08/2017  Elsevier Patient Education © 2020 Elsevier Inc.

## 2021-06-17 NOTE — CARE PLAN
The patient is Stable - Low risk of patient condition declining or worsening    Shift Goals  Clinical Goals: replace potassium  Patient Goals: eat breakfast    Progress made toward(s) clinical / shift goals:  PO potassium given; breakfast received    Problem: Urinary Elimination:  Goal: Ability to achieve and maintain adequate renal perfusion and functioning will improve  Outcome: Not Progressing  Note: Pt requires wilson catheter d/t retention     Problem: Psychosocial  Goal: Patient's ability to verbalize feelings about condition will improve  Outcome: Not Progressing  Note: Pt orientation varies between 2-4; pt can verbalize feelings but is forgetful

## 2021-06-17 NOTE — PROGRESS NOTES
"Pt expressed \"wanting to leave for the night\" and then \"come back at 7:00 am.\" Pt educated that he is not medically cleared and POC reinforced. Pt verbalized understanding. Pt states that he will \"stay for now but that he wants to \"call his wife at 9:00 pm to talk with her.\" This RN agreed and ensured phone was close by. Pt has declines any other needs at this time. Safety precautions in place, call light within reach, and bed alarm on.       "

## 2021-06-17 NOTE — DISCHARGE PLANNING
Agency/Facility Name: [facility]  Spoke To: Mikala  Outcome: Pt can't transport until tomorrow.  Pt was rolled inpatient on 6-15-21.  Needs one more midnight stay.    PATRICK Perez notified via Teams.

## 2021-06-18 VITALS
WEIGHT: 162.7 LBS | OXYGEN SATURATION: 100 % | HEART RATE: 78 BPM | RESPIRATION RATE: 18 BRPM | TEMPERATURE: 97.8 F | BODY MASS INDEX: 25.54 KG/M2 | SYSTOLIC BLOOD PRESSURE: 111 MMHG | DIASTOLIC BLOOD PRESSURE: 62 MMHG | HEIGHT: 67 IN

## 2021-06-18 LAB
GLUCOSE BLD-MCNC: 209 MG/DL (ref 65–99)
GLUCOSE BLD-MCNC: 356 MG/DL (ref 65–99)
POTASSIUM SERPL-SCNC: 3 MMOL/L (ref 3.6–5.5)

## 2021-06-18 PROCEDURE — A9270 NON-COVERED ITEM OR SERVICE: HCPCS | Performed by: HOSPITALIST

## 2021-06-18 PROCEDURE — 82962 GLUCOSE BLOOD TEST: CPT | Mod: 91

## 2021-06-18 PROCEDURE — A9270 NON-COVERED ITEM OR SERVICE: HCPCS | Performed by: STUDENT IN AN ORGANIZED HEALTH CARE EDUCATION/TRAINING PROGRAM

## 2021-06-18 PROCEDURE — 700102 HCHG RX REV CODE 250 W/ 637 OVERRIDE(OP): Performed by: HOSPITALIST

## 2021-06-18 PROCEDURE — 84132 ASSAY OF SERUM POTASSIUM: CPT

## 2021-06-18 PROCEDURE — 700102 HCHG RX REV CODE 250 W/ 637 OVERRIDE(OP): Performed by: STUDENT IN AN ORGANIZED HEALTH CARE EDUCATION/TRAINING PROGRAM

## 2021-06-18 PROCEDURE — 99239 HOSP IP/OBS DSCHRG MGMT >30: CPT | Performed by: STUDENT IN AN ORGANIZED HEALTH CARE EDUCATION/TRAINING PROGRAM

## 2021-06-18 PROCEDURE — 700111 HCHG RX REV CODE 636 W/ 250 OVERRIDE (IP): Performed by: STUDENT IN AN ORGANIZED HEALTH CARE EDUCATION/TRAINING PROGRAM

## 2021-06-18 RX ORDER — POTASSIUM CHLORIDE 20 MEQ/1
20 TABLET, EXTENDED RELEASE ORAL EVERY 4 HOURS
Status: DISCONTINUED | OUTPATIENT
Start: 2021-06-18 | End: 2021-06-18 | Stop reason: HOSPADM

## 2021-06-18 RX ADMIN — ENOXAPARIN SODIUM 40 MG: 40 INJECTION SUBCUTANEOUS at 05:03

## 2021-06-18 RX ADMIN — INSULIN LISPRO 3 UNITS: 100 INJECTION, SOLUTION INTRAVENOUS; SUBCUTANEOUS at 07:00

## 2021-06-18 RX ADMIN — TAMSULOSIN HYDROCHLORIDE 0.4 MG: 0.4 CAPSULE ORAL at 05:03

## 2021-06-18 RX ADMIN — TRIAMCINOLONE ACETONIDE 1 APPLICATION: 1 CREAM TOPICAL at 05:03

## 2021-06-18 RX ADMIN — THERA TABS 1 TABLET: TAB at 05:03

## 2021-06-18 RX ADMIN — INSULIN LISPRO 5 UNITS: 100 INJECTION, SOLUTION INTRAVENOUS; SUBCUTANEOUS at 07:02

## 2021-06-18 RX ADMIN — AMLODIPINE BESYLATE 5 MG: 5 TABLET ORAL at 05:03

## 2021-06-18 RX ADMIN — INSULIN LISPRO 8 UNITS: 100 INJECTION, SOLUTION INTRAVENOUS; SUBCUTANEOUS at 11:45

## 2021-06-18 RX ADMIN — POTASSIUM CHLORIDE 20 MEQ: 1500 TABLET, EXTENDED RELEASE ORAL at 11:44

## 2021-06-18 RX ADMIN — FINASTERIDE 5 MG: 5 TABLET, FILM COATED ORAL at 05:03

## 2021-06-18 RX ADMIN — LEVOTHYROXINE SODIUM 25 MCG: 0.03 TABLET ORAL at 05:03

## 2021-06-18 RX ADMIN — INSULIN LISPRO 5 UNITS: 100 INJECTION, SOLUTION INTRAVENOUS; SUBCUTANEOUS at 11:48

## 2021-06-18 ASSESSMENT — FIBROSIS 4 INDEX: FIB4 SCORE: 1.83

## 2021-06-18 NOTE — DISCHARGE PLANNING
Agency/Facility Name: [facility]  Spoke To: Fidel  Outcome: Fidel stated that pt can transfer to [facility] today and has requested that Case Management arranges transport at anytime. Fidel has requested that the d/c summary is submitted prior to transport    Agency/Facility Name: [facility]  Spoke To: Fidel  Outcome: Informed fidel of [id number] transport time

## 2021-06-18 NOTE — PROGRESS NOTES
Pt became agitated and removed all leads and stickers connected to telemetry box as well as PIV. Telemetry leads reapplied once pt calmed down. Unable to retry for PIV access at this time.

## 2021-06-18 NOTE — PROGRESS NOTES
Telemetry Shift Summary      Rhythm: SR/ST w/ 1st Degree AVB w/ BBB  HR Range:   Ectopy: F PVC, R-O Coup, R-O Big, R-O Trig  Measurements: .22/.16/.40    Normal Values:  Rhythm: SR  HR Range:   Measurements: 0.12-0.20/ 0.06-0.10/ 0.30-0.52

## 2021-06-18 NOTE — PROGRESS NOTES
Orders for pt discharge received. Pt d/c to [facility] with medical transport. Discharge instructions given to pt, IV x 1 removed, tele removed. Pt verbalized understanding of d/c instructions, all questions answered. Pt off unit via wheelchair with transport.    Mercedes (wife) signed d/c papers for pt. Phone number for [facility] provided to Mercedes.

## 2021-06-18 NOTE — DISCHARGE PLANNING
Received Transport Form @ 1001.    Transport is scheduled for 6/18 @1230 going to [facility].    SW and BS RN notified of scheduled transport via Voalte @1010.    **1119  Spoke with Nina @ [facility]. They need to push transport time to [phone]. GALILEA and BS RN notified of updated time via Voalte @1051.

## 2021-06-18 NOTE — PROGRESS NOTES
Telemetry Shift Summary    Rhythm: SR, 1AVB/BBB  HR: 60-80  Ectopy: O-PVC, R-COUP, R-BIG,R-TRIG    Measurements for strip printed 6/18/2021 at 1227  HR 79  0.26 / 0.12 / 0.42    Normal Values  Rhythm: SR  HR:   Measurements: 0.12-0.20 / 0.06-0.10 / 0.30-0.52

## 2021-06-18 NOTE — PROGRESS NOTES
Report received from MACK Ohara. Plan of care discussed at patient's bedside. Whiteboard has been updated. Pt is resting comfortably in bed and declines any further needs at this time. Safety precautions in place, bed alarm/strip alarm on, and call light within reach.

## 2021-06-18 NOTE — DISCHARGE SUMMARY
Discharge Summary    CHIEF COMPLAINT ON ADMISSION  Chief Complaint   Patient presents with   • ALOC   • High Blood Sugar       Reason for Admission  EMS     Admission Date  6/14/2021    CODE STATUS  DNAR/DNI    HPI & HOSPITAL COURSE  89 y.o. male with past medical history of diabetes mellitus, hypothyroidism, hypertension who presented 6/14/2021 with altered mental status.  He was noted to be hypotensive with blood pressure in 50s by EMS and given 350 cc bolus.  He was reportedly 78% on room air per EMS however was noted to be saturating 95% on room air while in ER.  Patient cannot recall the events that led to his hospitalization.  His wife is at bedside and states she found him down in the kitchen.  Patient states he has been having a good appetite and denies any lightheadedness, dizziness, palpitations, abdominal pain, chest pain, shortness of breath.  Patient's wife says he is always leaking urine. He was noted to have KOSTAS and hypokalemia.  Noted to have urinary retention on bladder scan.  Ultrasound revealed mild hydronephrosis.  Sher was placed with improvement in kidney function.  Evaluated by PT/OT recommended postacute placement for further therapy.  Patient is a treated [facility]. During hospital course patient remain  hemodynamically stable ,asymptomatic ,labs remain unremarkable .  Patient will be discharged with close follow up with PCP and urology   Discharge plan was discussed with patient in details .  Patient agreed with discharge plan  and  all questions answered.       Therefore, he is discharged in good and stable condition to skilled nursing facility.    The patient met 2-midnight criteria for an inpatient stay at the time of discharge.    Discharge Date  6/18/2021      FOLLOW UP ITEMS POST DISCHARGE  On Sher for urine retention  Follow-up with urology on 7/15/2021     DISCHARGE DIAGNOSES  Principal Problem:    KOSTAS (acute kidney injury) (HCC) POA: Unknown  Active Problems:    Hypokalemia  POA: Unknown    DM (diabetes mellitus) ([facility]) POA: Yes    Elevated troponin POA: Yes    BPH (benign prostatic hyperplasia) POA: Yes    Hypothyroidism POA: Yes    Hypotension POA: Unknown    Lactic acidosis POA: Unknown    AMS (altered mental status) POA: Unknown    Urinary retention POA: Unknown    Hydronephrosis POA: Unknown    Bacteremia POA: Unknown  Resolved Problems:    * No resolved hospital problems. *      FOLLOW UP  No future appointments.  JOSE PowellPENID  [address]  [address]  [place] [id number] [phone]    In 2 weeks  Call and inform your Dr that you were in the hospital    Angel Guidry M.D.  [address]  [place] [id number] [phone]    On 7/15/2021  please arrive at 0830 am for your appointment      MEDICATIONS ON DISCHARGE     Medication List      CHANGE how you take these medications      Instructions   potassium Chloride ER 20 MEQ Tbcr tablet  What changed: when to take this  Commonly known as: K-TAB   Take 1 Tab by mouth 2 times a day.  Dose: 20 mEq        CONTINUE taking these medications      Instructions   amLODIPine 5 MG Tabs  Commonly known as: NORVASC   Take 1 Tab by mouth every day.  Dose: 5 mg     chlorthalidone 25 MG Tabs  Commonly known as: HYGROTON   Take 1 Tab by mouth every day.  Dose: 25 mg     finasteride 5 MG Tabs  Commonly known as: PROSCAR   Take 1 Tab by mouth every day.  Dose: 5 mg     gabapentin 100 MG Caps  Commonly known as: NEURONTIN   Take 100 mg by mouth every evening.  Dose: 100 mg     insulin glargine 100 UNIT/ML Sopn injection  Generic drug: insulin glargine   Inject 30 Units under the skin every day.  Dose: 30 Units     Insulin Pen Needle 31G X 5 MM Misc   1 Each 2 Times a Day.  Dose: 1 Each     levothyroxine 25 MCG Tabs  Commonly known as: SYNTHROID   Take 1 tablet by mouth Every morning on an empty stomach.  Dose: 25 mcg     metoprolol tartrate 25 MG Tabs  Commonly known as: LOPRESSOR   Take 12.5 mg by mouth every evening. 0.5  tablet = 12.5 mg.  Dose: 12.5 mg     Prostate Tabs   Take 1 tablet by mouth every morning.  Dose: 1 tablet     PX Mens Multivitamins Tabs   Take 1 tablet by mouth every morning.  Dose: 1 tablet     Restasis 0.05 % ophthalmic emulsion  Generic drug: cyclosporin   Administer 1 Drop into both eyes every evening.  Dose: 1 Drop     Retaine CMC 0.5 % Soln  Generic drug: Carboxymethylcellulose Sod PF   Administer 1 Drop into both eyes every evening.  Dose: 1 Drop     simvastatin 10 MG Tabs  Commonly known as: ZOCOR   Take 10 mg by mouth every evening.  Dose: 10 mg     tamsulosin 0.4 MG capsule  Commonly known as: FLOMAX   Take 0.4 mg by mouth every morning.  Dose: 0.4 mg     triamcinolone acetonide 0.1 % Crea  Commonly known as: KENALOG   Apply 1 Application topically 2 times a day. To genital area.  Dose: 1 Application            Allergies  Allergies   Allergen Reactions   • Iodine Anaphylaxis     Pt and pts wife report that it has been so long not sure what happens       DIET  Orders Placed This Encounter   Procedures   • Diet Order Diet: Consistent CHO (Diabetic)     Standing Status:   Standing     Number of Occurrences:   1     Order Specific Question:   Diet:     Answer:   Consistent CHO (Diabetic) [4]       ACTIVITY  As tolerated.  Weight bearing as tolerated        LABORATORY  Lab Results   Component Value Date    SODIUM 140 06/17/2021    POTASSIUM 3.0 (L) 06/18/2021    CHLORIDE 103 06/17/2021    CO2 26 06/17/2021    GLUCOSE 289 (H) 06/16/2021    BUN 12 06/16/2021    CREATININE 0.97 06/16/2021    CREATININE 1.0 12/02/2008        Lab Results   Component Value Date    WBC 10.1 06/15/2021    HEMOGLOBIN 14.1 06/15/2021    HEMATOCRIT 41.1 (L) 06/15/2021    PLATELETCT 247 06/15/2021        Total time of the discharge process exceeds 39  minutes.

## 2021-06-19 LAB
BACTERIA BLD CULT: ABNORMAL
BACTERIA BLD CULT: NORMAL
SIGNIFICANT IND 70042: ABNORMAL
SIGNIFICANT IND 70042: NORMAL
SITE SITE: ABNORMAL
SITE SITE: NORMAL
SOURCE SOURCE: ABNORMAL
SOURCE SOURCE: NORMAL

## 2021-06-20 ENCOUNTER — HOSPITAL ENCOUNTER (EMERGENCY)
Facility: MEDICAL CENTER | Age: 86
DRG: 690 | End: 2021-06-21
Attending: EMERGENCY MEDICINE | Admitting: INTERNAL MEDICINE
Payer: MEDICARE

## 2021-06-20 VITALS
RESPIRATION RATE: 17 BRPM | HEART RATE: 197 BPM | WEIGHT: 165 LBS | BODY MASS INDEX: 25.9 KG/M2 | HEIGHT: 67 IN | DIASTOLIC BLOOD PRESSURE: 81 MMHG | SYSTOLIC BLOOD PRESSURE: 164 MMHG | TEMPERATURE: 99.1 F | OXYGEN SATURATION: 96 %

## 2021-06-20 DIAGNOSIS — N30.01 ACUTE CYSTITIS WITH HEMATURIA: ICD-10-CM

## 2021-06-20 DIAGNOSIS — R45.1 AGITATION: ICD-10-CM

## 2021-06-20 LAB
ANION GAP SERPL CALC-SCNC: 12 MMOL/L (ref 7–16)
APPEARANCE UR: ABNORMAL
BACTERIA #/AREA URNS HPF: ABNORMAL /HPF
BACTERIA BLD CULT: NORMAL
BACTERIA BLD CULT: NORMAL
BASOPHILS # BLD AUTO: 0.5 % (ref 0–1.8)
BASOPHILS # BLD: 0.04 K/UL (ref 0–0.12)
BILIRUB UR QL STRIP.AUTO: NEGATIVE
BLOOD CULTURE HOLD CXBCH: NORMAL
BUN SERPL-MCNC: 21 MG/DL (ref 8–22)
CALCIUM SERPL-MCNC: 9.1 MG/DL (ref 8.5–10.5)
CHLORIDE SERPL-SCNC: 100 MMOL/L (ref 96–112)
CO2 SERPL-SCNC: 25 MMOL/L (ref 20–33)
COLOR UR: YELLOW
CREAT SERPL-MCNC: 1.08 MG/DL (ref 0.5–1.4)
EOSINOPHIL # BLD AUTO: 0.12 K/UL (ref 0–0.51)
EOSINOPHIL NFR BLD: 1.5 % (ref 0–6.9)
EPI CELLS #/AREA URNS HPF: NEGATIVE /HPF
ERYTHROCYTE [DISTWIDTH] IN BLOOD BY AUTOMATED COUNT: 43.8 FL (ref 35.9–50)
GLUCOSE SERPL-MCNC: 343 MG/DL (ref 65–99)
GLUCOSE UR STRIP.AUTO-MCNC: >=1000 MG/DL
HCT VFR BLD AUTO: 39.7 % (ref 42–52)
HGB BLD-MCNC: 13.4 G/DL (ref 14–18)
HYALINE CASTS #/AREA URNS LPF: ABNORMAL /LPF
IMM GRANULOCYTES # BLD AUTO: 0.03 K/UL (ref 0–0.11)
IMM GRANULOCYTES NFR BLD AUTO: 0.4 % (ref 0–0.9)
KETONES UR STRIP.AUTO-MCNC: NEGATIVE MG/DL
LEUKOCYTE ESTERASE UR QL STRIP.AUTO: ABNORMAL
LYMPHOCYTES # BLD AUTO: 2 K/UL (ref 1–4.8)
LYMPHOCYTES NFR BLD: 25.2 % (ref 22–41)
MCH RBC QN AUTO: 30.9 PG (ref 27–33)
MCHC RBC AUTO-ENTMCNC: 33.8 G/DL (ref 33.7–35.3)
MCV RBC AUTO: 91.7 FL (ref 81.4–97.8)
MICRO URNS: ABNORMAL
MONOCYTES # BLD AUTO: 0.81 K/UL (ref 0–0.85)
MONOCYTES NFR BLD AUTO: 10.2 % (ref 0–13.4)
NEUTROPHILS # BLD AUTO: 4.93 K/UL (ref 1.82–7.42)
NEUTROPHILS NFR BLD: 62.2 % (ref 44–72)
NITRITE UR QL STRIP.AUTO: NEGATIVE
NRBC # BLD AUTO: 0 K/UL
NRBC BLD-RTO: 0 /100 WBC
PH UR STRIP.AUTO: 7 [PH] (ref 5–8)
PLATELET # BLD AUTO: 269 K/UL (ref 164–446)
PMV BLD AUTO: 10.9 FL (ref 9–12.9)
POTASSIUM SERPL-SCNC: 3.2 MMOL/L (ref 3.6–5.5)
PROT UR QL STRIP: 30 MG/DL
RBC # BLD AUTO: 4.33 M/UL (ref 4.7–6.1)
RBC # URNS HPF: ABNORMAL /HPF
RBC UR QL AUTO: ABNORMAL
SIGNIFICANT IND 70042: NORMAL
SIGNIFICANT IND 70042: NORMAL
SITE SITE: NORMAL
SITE SITE: NORMAL
SODIUM SERPL-SCNC: 137 MMOL/L (ref 135–145)
SOURCE SOURCE: NORMAL
SOURCE SOURCE: NORMAL
SP GR UR STRIP.AUTO: 1.02
UROBILINOGEN UR STRIP.AUTO-MCNC: 0.2 MG/DL
WBC # BLD AUTO: 7.9 K/UL (ref 4.8–10.8)
WBC #/AREA URNS HPF: ABNORMAL /HPF

## 2021-06-20 PROCEDURE — 700105 HCHG RX REV CODE 258: Performed by: EMERGENCY MEDICINE

## 2021-06-20 PROCEDURE — 87077 CULTURE AEROBIC IDENTIFY: CPT | Mod: 91

## 2021-06-20 PROCEDURE — 81001 URINALYSIS AUTO W/SCOPE: CPT

## 2021-06-20 PROCEDURE — 85025 COMPLETE CBC W/AUTO DIFF WBC: CPT

## 2021-06-20 PROCEDURE — 87086 URINE CULTURE/COLONY COUNT: CPT

## 2021-06-20 PROCEDURE — 93005 ELECTROCARDIOGRAM TRACING: CPT | Mod: XE | Performed by: EMERGENCY MEDICINE

## 2021-06-20 PROCEDURE — 80048 BASIC METABOLIC PNL TOTAL CA: CPT

## 2021-06-20 PROCEDURE — 51702 INSERT TEMP BLADDER CATH: CPT

## 2021-06-20 PROCEDURE — 99285 EMERGENCY DEPT VISIT HI MDM: CPT

## 2021-06-20 PROCEDURE — 87186 SC STD MICRODIL/AGAR DIL: CPT

## 2021-06-20 PROCEDURE — 700102 HCHG RX REV CODE 250 W/ 637 OVERRIDE(OP): Performed by: EMERGENCY MEDICINE

## 2021-06-20 PROCEDURE — A9270 NON-COVERED ITEM OR SERVICE: HCPCS | Performed by: EMERGENCY MEDICINE

## 2021-06-20 PROCEDURE — 303105 HCHG CATHETER EXTRA

## 2021-06-20 RX ORDER — FINASTERIDE 5 MG/1
5 TABLET, FILM COATED ORAL DAILY
Status: DISCONTINUED | OUTPATIENT
Start: 2021-06-21 | End: 2021-06-21 | Stop reason: HOSPADM

## 2021-06-20 RX ORDER — TAMSULOSIN HYDROCHLORIDE 0.4 MG/1
0.4 CAPSULE ORAL EVERY MORNING
Status: DISCONTINUED | OUTPATIENT
Start: 2021-06-21 | End: 2021-06-21 | Stop reason: HOSPADM

## 2021-06-20 RX ORDER — ONDANSETRON 2 MG/ML
4 INJECTION INTRAMUSCULAR; INTRAVENOUS EVERY 4 HOURS PRN
Status: DISCONTINUED | OUTPATIENT
Start: 2021-06-20 | End: 2021-06-20

## 2021-06-20 RX ORDER — CYCLOSPORINE 0.5 MG/ML
1 EMULSION OPHTHALMIC EVERY EVENING
Status: DISCONTINUED | OUTPATIENT
Start: 2021-06-21 | End: 2021-06-20

## 2021-06-20 RX ORDER — SODIUM CHLORIDE 9 MG/ML
1000 INJECTION, SOLUTION INTRAVENOUS ONCE
Status: COMPLETED | OUTPATIENT
Start: 2021-06-20 | End: 2021-06-20

## 2021-06-20 RX ORDER — CEFDINIR 300 MG/1
300 CAPSULE ORAL ONCE
Status: COMPLETED | OUTPATIENT
Start: 2021-06-20 | End: 2021-06-20

## 2021-06-20 RX ORDER — ONDANSETRON 4 MG/1
4 TABLET, ORALLY DISINTEGRATING ORAL EVERY 4 HOURS PRN
Status: DISCONTINUED | OUTPATIENT
Start: 2021-06-20 | End: 2021-06-20

## 2021-06-20 RX ORDER — BISACODYL 10 MG
10 SUPPOSITORY, RECTAL RECTAL
Status: DISCONTINUED | OUTPATIENT
Start: 2021-06-20 | End: 2021-06-21 | Stop reason: HOSPADM

## 2021-06-20 RX ORDER — CARBOXYMETHYLCELLULOSE SODIUM 5 MG/ML
1 SOLUTION/ DROPS OPHTHALMIC EVERY EVENING
Status: DISCONTINUED | OUTPATIENT
Start: 2021-06-21 | End: 2021-06-20

## 2021-06-20 RX ORDER — LABETALOL HYDROCHLORIDE 5 MG/ML
10 INJECTION, SOLUTION INTRAVENOUS EVERY 4 HOURS PRN
Status: DISCONTINUED | OUTPATIENT
Start: 2021-06-20 | End: 2021-06-21 | Stop reason: HOSPADM

## 2021-06-20 RX ORDER — GABAPENTIN 100 MG/1
100 CAPSULE ORAL EVERY EVENING
Status: DISCONTINUED | OUTPATIENT
Start: 2021-06-20 | End: 2021-06-21 | Stop reason: HOSPADM

## 2021-06-20 RX ORDER — SODIUM CHLORIDE 9 MG/ML
INJECTION, SOLUTION INTRAVENOUS CONTINUOUS
Status: DISCONTINUED | OUTPATIENT
Start: 2021-06-20 | End: 2021-06-21 | Stop reason: HOSPADM

## 2021-06-20 RX ORDER — LORAZEPAM 2 MG/ML
1 INJECTION INTRAMUSCULAR
Status: DISCONTINUED | OUTPATIENT
Start: 2021-06-20 | End: 2021-06-21 | Stop reason: HOSPADM

## 2021-06-20 RX ORDER — AMOXICILLIN 250 MG
2 CAPSULE ORAL 2 TIMES DAILY
Status: DISCONTINUED | OUTPATIENT
Start: 2021-06-20 | End: 2021-06-21 | Stop reason: HOSPADM

## 2021-06-20 RX ORDER — AMLODIPINE BESYLATE 5 MG/1
5 TABLET ORAL DAILY
Status: DISCONTINUED | OUTPATIENT
Start: 2021-06-21 | End: 2021-06-21 | Stop reason: HOSPADM

## 2021-06-20 RX ORDER — CEFDINIR 300 MG/1
300 CAPSULE ORAL 2 TIMES DAILY
Qty: 20 CAPSULE | Refills: 0 | Status: SHIPPED | OUTPATIENT
Start: 2021-06-20 | End: 2021-06-21 | Stop reason: SDUPTHER

## 2021-06-20 RX ORDER — POLYETHYLENE GLYCOL 3350 17 G/17G
1 POWDER, FOR SOLUTION ORAL
Status: DISCONTINUED | OUTPATIENT
Start: 2021-06-20 | End: 2021-06-21 | Stop reason: HOSPADM

## 2021-06-20 RX ORDER — ENALAPRILAT 1.25 MG/ML
1.25 INJECTION INTRAVENOUS EVERY 6 HOURS PRN
Status: DISCONTINUED | OUTPATIENT
Start: 2021-06-20 | End: 2021-06-21 | Stop reason: HOSPADM

## 2021-06-20 RX ORDER — CARBOXYMETHYLCELLULOSE SODIUM 5 MG/ML
1-2 SOLUTION/ DROPS OPHTHALMIC
Status: DISCONTINUED | OUTPATIENT
Start: 2021-06-20 | End: 2021-06-21 | Stop reason: HOSPADM

## 2021-06-20 RX ADMIN — CEFDINIR 300 MG: 300 CAPSULE ORAL at 22:25

## 2021-06-20 RX ADMIN — SODIUM CHLORIDE 1000 ML: 9 INJECTION, SOLUTION INTRAVENOUS at 21:37

## 2021-06-20 ASSESSMENT — FIBROSIS 4 INDEX: FIB4 SCORE: 1.83

## 2021-06-21 ENCOUNTER — HOSPITAL ENCOUNTER (EMERGENCY)
Facility: MEDICAL CENTER | Age: 86
End: 2021-06-21
Attending: EMERGENCY MEDICINE
Payer: MEDICARE

## 2021-06-21 VITALS
OXYGEN SATURATION: 95 % | SYSTOLIC BLOOD PRESSURE: 117 MMHG | HEART RATE: 75 BPM | TEMPERATURE: 98.2 F | DIASTOLIC BLOOD PRESSURE: 69 MMHG | RESPIRATION RATE: 18 BRPM

## 2021-06-21 DIAGNOSIS — T83.9XXA PROBLEM WITH FOLEY CATHETER, INITIAL ENCOUNTER (HCC): ICD-10-CM

## 2021-06-21 PROBLEM — N30.01 ACUTE CYSTITIS WITH HEMATURIA: Status: ACTIVE | Noted: 2021-06-21

## 2021-06-21 PROBLEM — G93.40 ACUTE ENCEPHALOPATHY: Status: ACTIVE | Noted: 2021-06-21

## 2021-06-21 PROCEDURE — 99221 1ST HOSP IP/OBS SF/LOW 40: CPT | Mod: AI | Performed by: INTERNAL MEDICINE

## 2021-06-21 PROCEDURE — 51702 INSERT TEMP BLADDER CATH: CPT

## 2021-06-21 PROCEDURE — 303105 HCHG CATHETER EXTRA

## 2021-06-21 PROCEDURE — 99283 EMERGENCY DEPT VISIT LOW MDM: CPT

## 2021-06-21 RX ORDER — CEFDINIR 300 MG/1
300 CAPSULE ORAL 2 TIMES DAILY
Qty: 20 CAPSULE | Refills: 0 | Status: SHIPPED | OUTPATIENT
Start: 2021-06-21 | End: 2021-07-14

## 2021-06-21 RX ORDER — DEXTROSE MONOHYDRATE 25 G/50ML
50 INJECTION, SOLUTION INTRAVENOUS
Status: DISCONTINUED | OUTPATIENT
Start: 2021-06-21 | End: 2021-06-21 | Stop reason: HOSPADM

## 2021-06-21 ASSESSMENT — ENCOUNTER SYMPTOMS
FEVER: 0
PALPITATIONS: 0
NECK PAIN: 0
STRIDOR: 0
BRUISES/BLEEDS EASILY: 0
INSOMNIA: 0
HEADACHES: 0
HEMOPTYSIS: 0
SORE THROAT: 0
MYALGIAS: 0
DOUBLE VISION: 0
WEIGHT LOSS: 0
BLURRED VISION: 0
DIZZINESS: 0
NAUSEA: 0
VOMITING: 0
DEPRESSION: 0
COUGH: 0

## 2021-06-21 NOTE — H&P
Hospital Medicine History & Physical Note    Date of Service  6/21/2021    Primary Care Physician  TABITHA Powell.    Consultants  Discharge planning    Code Status  DNAR/DNI    Chief Complaint  Chief Complaint   Patient presents with   • Agitation       History of Presenting Illness  89 y.o. male who presented 6/20/2021 with agitation at skilled nursing earlier today banging a metallic object under the window. Per the nurse, the patient was becoming aggressive at the facility in which he lives in.  In the emergency department is found to have anemia, hypokalemia, hyperglycemia and pyuria.  He started on empiric antibiotics and referred to the hospitalist for admission.  At bedside patient is pleasant but is insistent to return home he does not want to be discharged back to skilled nursing.  He otherwise denies abdominal pain, fevers, back pain or chest pain.     Review of Systems  Review of Systems   Constitutional: Negative for fever, malaise/fatigue and weight loss.   HENT: Negative for sore throat and tinnitus.    Eyes: Negative for blurred vision and double vision.   Respiratory: Negative for cough, hemoptysis and stridor.    Cardiovascular: Negative for chest pain and palpitations.   Gastrointestinal: Negative for nausea and vomiting.   Genitourinary: Negative for dysuria and urgency.   Musculoskeletal: Negative for myalgias and neck pain.   Skin: Negative for itching and rash.   Neurological: Negative for dizziness and headaches.   Endo/Heme/Allergies: Does not bruise/bleed easily.   Psychiatric/Behavioral: Negative for depression. The patient does not have insomnia.        Past Medical History   has a past medical history of KOSTAS (acute kidney injury) (Former[facility]) (6/14/2021), CAD (coronary artery disease), Delirium (2/21/2020), DM (diabetes mellitus) ([facility]) (1/7/2014), Falls, Hypertension, and UTI (urinary tract infection) (11/4/2018).    Surgical History   has a past surgical history that includes other  cardiac surgery; coronary artery bypass, 3; and janice rectal abscess.     Family History  family history is not on file.     Social History   reports that he quit smoking about 48 years ago. He has never used smokeless tobacco. He reports previous alcohol use. He reports that he does not use drugs.    Allergies  Allergies   Allergen Reactions   • Iodine Anaphylaxis     Pt and pts wife report that it has been so long not sure what happens       Medications  Prior to Admission Medications   Prescriptions Last Dose Informant Patient Reported? Taking?   Insulin Pen Needle 31G X 5 MM Misc  Significant Other No No   Si Each 2 Times a Day.   Multiple Vitamins-Minerals (PX MENS MULTIVITAMINS) Tab  Significant Other Yes No   Sig: Take 1 tablet by mouth every morning.   RETAINE CMC 0.5 % Solution  Significant Other Yes No   Sig: Administer 1 Drop into both eyes every evening.   amLODIPine (NORVASC) 5 MG Tab  Significant Other No No   Sig: Take 1 Tab by mouth every day.   chlorthalidone (HYGROTON) 25 MG Tab  Significant Other No No   Sig: Take 1 Tab by mouth every day.   cyclosporin (RESTASIS) 0.05 % ophthalmic emulsion  Significant Other Yes No   Sig: Administer 1 Drop into both eyes every evening.   finasteride (PROSCAR) 5 MG Tab  Significant Other No No   Sig: Take 1 Tab by mouth every day.   gabapentin (NEURONTIN) 100 MG Cap  Significant Other Yes No   Sig: Take 100 mg by mouth every evening.   insulin glargine (INSULIN GLARGINE) 100 UNIT/ML Solution Pen-injector injection  Significant Other Yes No   Sig: Inject 30 Units under the skin every day.   levothyroxine (SYNTHROID) 25 MCG Tab  Significant Other No No   Sig: Take 1 tablet by mouth Every morning on an empty stomach.   metoprolol tartrate (LOPRESSOR) 25 MG Tab  Significant Other Yes No   Sig: Take 12.5 mg by mouth every evening. 0.5 tablet = 12.5 mg.   potassium chloride ER (K-TAB) 20 MEQ Tab CR tablet  Significant Other No No   Sig: Take 1 Tab by mouth 2 times a  day.   Patient taking differently: Take 20 mEq by mouth every morning.   tamsulosin (FLOMAX) 0.4 MG capsule  Significant Other Yes No   Sig: Take 0.4 mg by mouth every morning.      Facility-Administered Medications: None       Physical Exam  Temp:  [37.3 °C (99.1 °F)] 37.3 °C (99.1 °F)  Pulse:  [] 197  Resp:  [15-30] 17  BP: (130-187)/(69-89) 164/81  SpO2:  [95 %-97 %] 96 %    Physical Exam  Vitals and nursing note reviewed.   Constitutional:       General: He is not in acute distress.     Appearance: Normal appearance. He is normal weight. He is not toxic-appearing.   HENT:      Head: Normocephalic and atraumatic.      Nose: Nose normal. No congestion or rhinorrhea.      Mouth/Throat:      Mouth: Mucous membranes are moist.      Pharynx: Oropharynx is clear.   Eyes:      Extraocular Movements: Extraocular movements intact.      Conjunctiva/sclera: Conjunctivae normal.      Pupils: Pupils are equal, round, and reactive to light.   Neck:      Vascular: No carotid bruit.   Cardiovascular:      Rate and Rhythm: Normal rate and regular rhythm.      Pulses: Normal pulses.      Heart sounds: Normal heart sounds. No murmur heard.   No gallop.    Pulmonary:      Effort: No respiratory distress.      Breath sounds: Normal breath sounds. No wheezing or rales.   Abdominal:      General: Abdomen is flat. Bowel sounds are normal. There is no distension.      Palpations: Abdomen is soft. There is no mass.      Tenderness: There is no abdominal tenderness.      Hernia: No hernia is present.   Musculoskeletal:         General: No tenderness or signs of injury.      Cervical back: Normal range of motion and neck supple. No muscular tenderness.   Lymphadenopathy:      Cervical: No cervical adenopathy.   Skin:     Capillary Refill: Capillary refill takes less than 2 seconds.      Coloration: Skin is not jaundiced or pale.      Findings: No bruising.   Neurological:      General: No focal deficit present.      Mental Status: He  is alert and oriented to person, place, and time. Mental status is at baseline.      Cranial Nerves: No cranial nerve deficit.      Motor: No weakness.      Coordination: Coordination normal.   Psychiatric:         Mood and Affect: Mood normal.         Behavior: Behavior normal.         Thought Content: Thought content normal.         Judgment: Judgment normal.         Laboratory:  Recent Labs     06/20/21 2015   WBC 7.9   RBC 4.33*   HEMOGLOBIN 13.4*   HEMATOCRIT 39.7*   MCV 91.7   MCH 30.9   MCHC 33.8   RDW 43.8   PLATELETCT 269   MPV 10.9     Recent Labs     06/18/21  0228 06/20/21 2015   SODIUM  --  137   POTASSIUM 3.0* 3.2*   CHLORIDE  --  100   CO2  --  25   GLUCOSE  --  343*   BUN  --  21   CREATININE  --  1.08   CALCIUM  --  9.1     Recent Labs     06/20/21 2015   GLUCOSE 343*         No results for input(s): NTPROBNP in the last 72 hours.      No results for input(s): TROPONINT in the last 72 hours.    Imaging:  No orders to display         Assessment/Plan:  I anticipate this patient will require at least two midnights for appropriate medical management, necessitating inpatient admission.    Acute encephalopathy  Assessment & Plan  Possibly delirium secondary to acute illness but apparently at baseline presently.  Supportive care  Follow-up discharge planning consult given patient's refusal to return to skilled nursing and the facilities mutual position    Acute cystitis with hematuria  Assessment & Plan  Empiric Unasyn, follow-up CBC blood and urine culture    DM (diabetes mellitus) (CMS-[facility])- (present on admission)  Assessment & Plan  Regular insulin sliding scale insulin before every meal as needed    HTN (hypertension)- (present on admission)  Assessment & Plan  Continue outpatient Norvasc and Lopressor

## 2021-06-21 NOTE — DISCHARGE PLANNING
Anticipated Discharge Disposition: Home    Action:  ER CM called to home. Wife at pharmacy. Briefly spoke with daughter Chantal. Reviewed that they declined CM visit but provided ER CM number to office for assist none the less and offered Resource of Dementia Support group just started by [place] second Wednesday of the month at 6 pm and the phone number.  Encouraged to follow also with PCP MODE Keyes also for in home resources or help if needed. ( ER CM  Chart reviewed hx: dc from [facility] for aggression but wife has not expressed concerns at home in past admissions)  Barriers to Discharge: None    Plan: DC home with outpt resources as able at this time.

## 2021-06-21 NOTE — LETTER
6/23/2021               Julien Dao  [id number] [place] [address]        Dear Julien (MR#[id number])    This letter is sent in regards to your recent visit to the [facility] Emergency Department on 6/21/2021. During the visit, tests were performed to assist the physician in your medical diagnosis. A review of your tests requires that we notify you of the following:    Your urine culture was POSITIVE for a bacteria called Escherichia coli. The antibiotic prescribed for you (cefdinir) should be active to treat this bacteria. It is important that you continue taking your antibiotic until it is finished.     Please feel free to contact me at the number below if you have any questions or concerns. Thank you for your cooperation in the matter.    Sincerely,  ED Culture Follow-Up Staff  Minerva Kruger, PharmD    [facility], Emergency Department  [address] [place] [id number] [phone] (ED Culture Line)

## 2021-06-21 NOTE — ASSESSMENT & PLAN NOTE
Possibly delirium secondary to acute illness but apparently at baseline presently.  Supportive care  Follow-up discharge planning consult given patient's refusal to return to skilled nursing and the facilities mutual position

## 2021-06-21 NOTE — ED NOTES
Pt is here from a facility. Pt came in with a wilson in place but no bag to collect the urine. Pt had on 2 depends. IV was established and blood was taken. Will ask ERP for fluids to get a urine sample. Pt is A.Ox4 and is upset that he I here. Pt has been pleasant with me. Call light was left with in reach bed in lowest postion and will continue to monitor.

## 2021-06-21 NOTE — DISCHARGE PLANNING
Medical Social Work    MSW received a call from bedside RN that pt is ready to return to [facility].  MSW contacted Chantal at [facility] who states that she is the Director of Nursing and they will NOT accept pt back.  Chantal states that pt was a threat to their staff and residents and they cannot take him back.  Chantal was advised that this is not the proper use of the ER or hospital and she again states that they will not accept pt back.  MSW updated bedside RN who states that per conversation with pt's wife she also cannot take pt back.  RN will update ERP.

## 2021-06-21 NOTE — ED PROVIDER NOTES
ED Provider Note    ED Provider Note    Primary care provider: JONO Powell  Means of arrival: EMS  History obtained from: Medical record    CHIEF COMPLAINT  Chief Complaint   Patient presents with   • Urinary Catheter Problem     Seen at 8:14 AM.   HPI  Julien Dao is a 89 y.o. male who presents to the Emergency Department for a dislodged Sher catheter.  The patient has some dementia and he does not give me an adequate history.  I did contact the wife who was very helpful.  She states that he had the bag hanging on the nightstand when he got up this morning he pulled it and dislodged the Sher catheter from his penis.  He has been cooperative.  She also notes that she was given a antibiotic yesterday but [facility] not to their pharmacy of choice and she would not be able to get it for a few days.  She is asking to have the pharmacy switched.  The spouse states that he had not issue with being combative at [facility] and was discharged from that facility.  He has been more cooperative at home, sometimes hostile but she does not feel unsafe at this time.  She is comfortable taking him back home after the catheter has been replaced and after the prescription has been changed.    REVIEW OF SYSTEMS  See HPI,   Remainder of ROS negative, unreliable due to dementia.     PAST MEDICAL HISTORY   has a past medical history of KOSTAS (acute kidney injury) (Formerly [facility]) (2021), CAD (coronary artery disease), Delirium (2020), DM (diabetes mellitus) (Former[facility]) (2014), Falls, Hypertension, and UTI (urinary tract infection) (2018).    SURGICAL HISTORY   has a past surgical history that includes other cardiac surgery; coronary artery bypass, 3; and janice rectal abscess.    SOCIAL HISTORY  Social History     Tobacco Use   • Smoking status: Former Smoker     Quit date: 10/19/1972     Years since quittin.7   • Smokeless tobacco: Never Used   Vaping Use   • Vaping Use: Never used   Substance Use  Topics   • Alcohol use: Not Currently   • Drug use: No      Social History     Substance and Sexual Activity   Drug Use No       FAMILY HISTORY  No family history on file.    CURRENT MEDICATIONS  Reviewed.  See Encounter Summary.     ALLERGIES  Allergies   Allergen Reactions   • Iodine Anaphylaxis     Pt and pts wife report that it has been so long not sure what happens       PHYSICAL EXAM  VITAL SIGNS: /75   Pulse 76   Temp 36.8 °C (98.2 °F) (Temporal)   Resp 16   SpO2 95%   Constitutional: Awake, alert in no apparent distress.  HENT: Normocephalic, Bilateral external ears normal. Nose normal.   Eyes: Conjunctiva normal, non-icteric, EOMI.    Thorax & Lungs: Easy unlabored respirations, Clear to ascultation bilaterally.  Cardiovascular: Regular rate, Regular rhythm, No murmurs, rubs or gallops. Bilateral pulses symmetrical.   Abdomen:  Soft, nontender, nondistended, normal active bowel sounds.   : Blood at the urethral meatus.  Skin: Visualized skin is  Dry, No erythema, No rash.   Musculoskeletal:   No cyanosis, clubbing or edema. No leg asymmetry.   Neurologic: Alert, Grossly non-focal.   Psychiatric: Normal affect, Normal mood  Lymphatic:  No cervical LAD        RADIOLOGY  No orders to display         COURSE & MEDICAL DECISION MAKING  Pertinent Labs & Imaging studies reviewed. (See chart for details)        8:14 AM - Medical record reviewed, patient with history of dementia, diabetes, urinary retention.  He was admitted 6/14/2021, discharge 6/18/2021 for hypotension, urinary retention, KOSTAS.  He was discharged at that time with a Sher catheter.  He returned to the emergency department yesterday evening for agitation.  Laboratory evaluation was unremarkable.  He did have evidence of a UTI and was treated with antibiotics.      Decision Making:  This is a pleasant 89 y.o. year old male who presents with Sher catheter dislodgment.  The Sher was replaced, initial urine was bloody then clear.  This was  done without difficulty by the nurse in the ER.  I discussed the case with his spouse who is 10 years younger than he is.  She is very able bodied but does occasionally have some concerns about his safety as well as her safety.  She feels comfortable taking him home at this time.  I offered a prescription to help him with sundowning.  I considered a trial of Risperdal for the next few nights to see if this would improve his agitation.  She does not wish to start this, she would prefer to give a trial of the antibiotics first and see if this helps.    She is going to come to the ER to take him home, all questions were answered and they are very comfortable with the plan.    Discharge Medications:  Current Discharge Medication List          The patient was discharged home (see d/c instructions) was told to return immediately for any signs or symptoms listed, or any worsening at all.  The patient verbally agreed to the discharge precautions and follow-up plan which is documented in EPIC.        FINAL IMPRESSION  1. Problem with Sher catheter, initial encounter ([facility])

## 2021-06-21 NOTE — ED NOTES
Spoke with wife and wont take pt back home due to his aggression and [facility] refused pt as well due to his aggression towards staff. Social work spoke to director of nursing and I spoke to his wife. Will notify MD. Pt will need to admitted for a social admit. Pt does not want to go to [place] any ways. Pt has been pleasant with me during this visit.

## 2021-06-21 NOTE — ED NOTES
Wilson was DC from the facility which had no drainage. When new 16FR wilson was placed there was immediate urine return which was sent to the lab. Pts wife is at bedside and NS bolus is infusing.

## 2021-06-21 NOTE — ED NOTES
Pt's wife present.  Both state they feel safe that he goes home. Offered  consult.  However the declined this.  Sher catheter teaching done, second securing placed.  Pt able to walk with assist, has walker at home .  Pt escorted to car, d/c instructions understood by pt and wife.  Pt to call 911 or return for any changes or concerns.

## 2021-06-21 NOTE — DISCHARGE PLANNING
Medical Social Work    MSW received a call from [employer] that pt is in need of a cab voucher to return home safely.  Pt has spoken with ERP and pt's wife is aware that pt is returning home.  Cab voucher (# [id number]) given to staff pt to return home safely.

## 2021-06-21 NOTE — ED NOTES
ERP has spoke with Pt and pts wife. Wife is aware pt will go home. Cab voucher will be provided. Security is looking for pants for pt and then will be DC to a cab.

## 2021-06-21 NOTE — ED TRIAGE NOTES
Chief Complaint   Patient presents with   • Agitation     Patient cyn montes from [facility]. Patient admitted for rehab after a GLF. Patient showing aggressive behavior towards staff. EMS was initially called but patient declined to be evaluated. Patient then found beating on a window with a metal object. Patient states that he is being kept against his will and just wants to go home. Patient refused all interventions from EMS. A&Ox 4.

## 2021-06-21 NOTE — ED PROVIDER NOTES
ER Provider Note     Scribed for Chintan Avalos M.D. by Brittany Mari. 2021, 8:01 PM.    Primary Care Provider: JONO Powell  Means of Arrival: Ambulance   History obtained from: Patient  History limited by: None     CHIEF COMPLAINT  Chief Complaint   Patient presents with    Agitation       HPI  Julien Dao is a 89 y.o. male who presents to the Emergency Department due to agitation earlier today. Per the nurse, the patient was becoming aggressive at the facility in which he lives in. He denies abdominal pain, urinary incontinence, fevers, back pain or chest pain. He currently has a wilson catheter in place without a bag.     REVIEW OF SYSTEMS  Pertinent positives include agitation. Pertinent negatives include no abdominal pain, urinary incontinence, fevers, back pain, or chest pain.  All other systems reviewed and negative.     PAST MEDICAL HISTORY   has a past medical history of KOSTAS (acute kidney injury) ([facility]) (2021), CAD (coronary artery disease), Delirium (2020), DM (diabetes mellitus) ([facility]) (2014), Falls, Hypertension, and UTI (urinary tract infection) (2018).    SURGICAL HISTORY   has a past surgical history that includes other cardiac surgery; coronary artery bypass, 3; and janice rectal abscess.    SOCIAL HISTORY  Social History     Tobacco Use    Smoking status: Former Smoker     Quit date: 10/19/1972     Years since quittin.7    Smokeless tobacco: Never Used   Vaping Use    Vaping Use: Never used   Substance Use Topics    Alcohol use: Not Currently    Drug use: No      Social History     Substance and Sexual Activity   Drug Use No       FAMILY HISTORY  History reviewed. No pertinent family history.    CURRENT MEDICATIONS  Home Medications       Reviewed by Elsy Paiz R.N. (Registered Nurse) on 21 at 1947  Med List Status: Partial     Medication Last Dose Status   amLODIPine (NORVASC) 5 MG Tab  Active   chlorthalidone (HYGROTON) 25 MG Tab  Active  "  cyclosporin (RESTASIS) 0.05 % ophthalmic emulsion  Active   finasteride (PROSCAR) 5 MG Tab  Active   gabapentin (NEURONTIN) 100 MG Cap  Active   insulin glargine (INSULIN GLARGINE) 100 UNIT/ML Solution Pen-injector injection  Active   Insulin Pen Needle 31G X 5 MM Misc  Active   levothyroxine (SYNTHROID) 25 MCG Tab  Active   metoprolol tartrate (LOPRESSOR) 25 MG Tab  Active   Multiple Vitamins-Minerals (PX MENS MULTIVITAMINS) Tab  Active   potassium chloride ER (K-TAB) 20 MEQ Tab CR tablet  Active   RETAINE CMC 0.5 % Solution  Active   simvastatin (ZOCOR) 10 MG Tab  Active   Specialty Vitamins Products (PROSTATE) Tab  Active   tamsulosin (FLOMAX) 0.4 MG capsule  Active   triamcinolone acetonide (KENALOG) 0.1 % Cream  Active                    ALLERGIES  Allergies   Allergen Reactions    Iodine Anaphylaxis     Pt and pts wife report that it has been so long not sure what happens       PHYSICAL EXAM  VITAL SIGNS: /69   Pulse 84   Temp 37.3 °C (99.1 °F) (Temporal)   Resp 20   Ht 1.702 m (5' 7\")   Wt 74.8 kg (165 lb)   SpO2 95%   BMI 25.84 kg/m²      Constitutional: Alert in no apparent distress. Elderly appearing male  HENT: No signs of trauma, Bilateral external ears normal, Nose normal.   Eyes: Pupils are equal and reactive, Conjunctiva normal, Non-icteric.   Neck: Normal range of motion, No tenderness, Supple, No stridor.   Lymphatic: No lymphadenopathy noted.   Cardiovascular: Regular rate and rhythm, no palpable thrill  Thorax & Lungs: No respiratory distress,  No chest tenderness.   Abdomen: Bowel sounds normal, Soft, No tenderness, No masses, No pulsatile masses. No peritoneal signs.  Skin: Warm, Dry, No erythema, No rash.   Back: No bony tenderness, No CVA tenderness.   Extremities: Intact distal pulses, No edema, No tenderness, No cyanosis.  Musculoskeletal: Good range of motion in all major joints. No tenderness to palpation or major deformities noted.   : Sher without a bag   Neurologic: " Alert , Normal motor function, Normal sensory function, No focal deficits noted.   Psychiatric: Affect normal, Judgment normal, Mood normal.       DIAGNOSTIC STUDIES / PROCEDURES    EKG Interpretation:  Interpreted by me    12 Lead EKG interpreted by me to show:  Normal sinus rhythm  Rate 68  Axis: Left axis   Intervals: Right bundle branch block and left ventriclar hypertrophy  Normal T waves  Nonspecific ST changes without apparent ischemia   My impression of this EKG: Does not indicate ischemia or arrhythmia at this time.     LABS  Labs Reviewed   CBC WITH DIFFERENTIAL - Abnormal; Notable for the following components:       Result Value    RBC 4.33 (*)     Hemoglobin 13.4 (*)     Hematocrit 39.7 (*)     All other components within normal limits   BASIC METABOLIC PANEL - Abnormal; Notable for the following components:    Potassium 3.2 (*)     Glucose 343 (*)     All other components within normal limits   URINALYSIS,CULTURE IF INDICATED - Abnormal; Notable for the following components:    Character Cloudy (*)     Glucose >=1000 (*)     Protein 30 (*)     Leukocyte Esterase Moderate (*)     Occult Blood Moderate (*)     All other components within normal limits    Narrative:     Indication for culture:->Acute unexplained altered mental  status ONLY after ruling out other recognized cause   URINE MICROSCOPIC (W/UA) - Abnormal; Notable for the following components:    WBC  (*)     RBC 10-20 (*)     Bacteria Few (*)     Hyaline Cast 6-10 (*)     All other components within normal limits    Narrative:     Indication for culture:->Acute unexplained altered mental  status ONLY after ruling out other recognized cause   ESTIMATED GFR   BLOOD CULTURE,HOLD   URINE CULTURE(NEW)    Narrative:     Indication for culture:->Acute unexplained altered mental  status ONLY after ruling out other recognized cause   CBC WITHOUT DIFFERENTIAL   COMP METABOLIC PANEL   SARS-COV-2, PCR (IN-HOUSE)     All labs reviewed by me.    COURSE  & MEDICAL DECISION MAKING  Pertinent Labs & Imaging studies reviewed. (See chart for details)    This is a 89 y.o. male that presents with being agitated and angry.  At this time we will evaluate the patient for infection as well as electrolyte derangements and assure that he has no arrhythmias.  We will reassess after this..     8:01 PM - Patient seen and examined at bedside. Ordered CBC with diff, Basic metabolic panel, urinalysis culture, and EKG.      9:12PM- Patient will be treated with NS infusion 1000 mL and ordered for urine microscope.     10:07PM- Patient will be treated with Omnicef 300 mg.     10:55 PM - Paged hospitalist.     10:59 PM I discussed the patient's case and the above findings with Dr. Singh (Hospitalist) who agreed to evaluate the patient.     Patient was found to have a urinary tract infection.  We will treat him for this.  We will send him home.  His wife does accept him to come home.  He has no significant anemia.  He will be discharged and strict return precautions and follow-up.    HYDRATION: Based on the patient's presentation of Dehydration the patient was given IV fluids. IV Hydration was used because oral hydration was not adequate alone. Upon recheck following hydration, the patient was improved.      DISPOSITION:  Patient will be hospitalized by Dr. Ruby in guarded condition.        FINAL IMPRESSION  1. Acute cystitis with hematuria    2. Agitation          Brittany ROWLAND (Lucinda), am scribing for, and in the presence of, Chintan Avalos M.D..    Electronically signed by: Brittany Mari (Lucinda), 6/20/2021    Chintan ROWLAND M.D. personally performed the services described in this documentation, as scribed by Brittany Mari in my presence, and it is both accurate and complete.     The note accurately reflects work and decisions made by me.  Chintan Avalos M.D.  6/21/2021  1:18 AM

## 2021-06-21 NOTE — ED NOTES
Pt was give a pair of pants and a cab voucher. Cab was callef and pt was DC with all follow up care. All questions were answered. Pt was placed in waiting room and  staff are aware pt is waiting on a cab. Pt was DC with new wilson in place. Pt is a/ox4 on DC.

## 2021-06-23 NOTE — ED NOTES
ED Positive Culture Follow-up/Notification Note:    Date: 6/23/2021     Patient seen in the ED on 6/21/2021 for dislodgement of Sher catheter. Urinalysis revealed possible UTI so urine culture was performed.  1. Problem with Sher catheter, initial encounter (HCC)       Prescribed at discharge:  Cefdinir 300 mg PO twice daily x10 days    Allergies: Iodine     Vitals:    06/21/21 0813 06/21/21 0814 06/21/21 0945   BP: 119/75 119/75 117/69   Pulse: 76 76 75   Resp:  16 18   Temp:  36.8 °C (98.2 °F)    TempSrc:  Temporal    SpO2: 95% 95% 95%       Final cultures:  Urine Culture (6/20/2021 @ 2131)        Plan:   Appropriate antibiotic therapy prescribed. No changes required based upon culture result.  Sent letter to patient to notify of positive culture result and encourage compliance with prescribed antibiotics.     Minerva Kruger, PharmD  PGY1 Pharmacy Practice Resident

## 2021-07-07 ENCOUNTER — HOSPITAL ENCOUNTER (OUTPATIENT)
Facility: MEDICAL CENTER | Age: 86
End: 2021-07-07
Attending: NURSE PRACTITIONER
Payer: MEDICARE

## 2021-07-07 PROCEDURE — 87086 URINE CULTURE/COLONY COUNT: CPT

## 2021-07-07 PROCEDURE — 87186 SC STD MICRODIL/AGAR DIL: CPT

## 2021-07-07 PROCEDURE — 87077 CULTURE AEROBIC IDENTIFY: CPT | Mod: 91

## 2021-07-09 ENCOUNTER — HOSPITAL ENCOUNTER (OUTPATIENT)
Facility: MEDICAL CENTER | Age: 86
End: 2021-07-09
Attending: NURSE PRACTITIONER
Payer: MEDICARE

## 2021-07-14 ENCOUNTER — APPOINTMENT (OUTPATIENT)
Dept: RADIOLOGY | Facility: MEDICAL CENTER | Age: 86
DRG: 698 | End: 2021-07-14
Attending: EMERGENCY MEDICINE
Payer: MEDICARE

## 2021-07-14 ENCOUNTER — HOSPITAL ENCOUNTER (INPATIENT)
Facility: MEDICAL CENTER | Age: 86
LOS: 6 days | DRG: 698 | End: 2021-07-20
Attending: EMERGENCY MEDICINE | Admitting: STUDENT IN AN ORGANIZED HEALTH CARE EDUCATION/TRAINING PROGRAM
Payer: MEDICARE

## 2021-07-14 DIAGNOSIS — E87.20 LACTIC ACIDOSIS: ICD-10-CM

## 2021-07-14 DIAGNOSIS — N48.89 PENILE EROSION: ICD-10-CM

## 2021-07-14 DIAGNOSIS — E83.42 HYPOMAGNESEMIA: ICD-10-CM

## 2021-07-14 DIAGNOSIS — A41.9 SEPSIS, DUE TO UNSPECIFIED ORGANISM, UNSPECIFIED WHETHER ACUTE ORGAN DYSFUNCTION PRESENT (HCC): ICD-10-CM

## 2021-07-14 DIAGNOSIS — R73.9 HYPERGLYCEMIA: ICD-10-CM

## 2021-07-14 DIAGNOSIS — R79.89 ELEVATED TROPONIN: ICD-10-CM

## 2021-07-14 DIAGNOSIS — N48.1 BALANITIS: ICD-10-CM

## 2021-07-14 DIAGNOSIS — N30.01 ACUTE CYSTITIS WITH HEMATURIA: ICD-10-CM

## 2021-07-14 DIAGNOSIS — N13.30 HYDRONEPHROSIS, UNSPECIFIED HYDRONEPHROSIS TYPE: ICD-10-CM

## 2021-07-14 DIAGNOSIS — N39.0 URINARY TRACT INFECTION ASSOCIATED WITH CATHETERIZATION OF URINARY TRACT, UNSPECIFIED INDWELLING URINARY CATHETER TYPE, SEQUELA: ICD-10-CM

## 2021-07-14 DIAGNOSIS — R65.21 SEPSIS WITH ACUTE ORGAN DYSFUNCTION AND SEPTIC SHOCK, DUE TO UNSPECIFIED ORGANISM, UNSPECIFIED TYPE: ICD-10-CM

## 2021-07-14 DIAGNOSIS — N40.1 BENIGN PROSTATIC HYPERPLASIA WITH URINARY OBSTRUCTION: ICD-10-CM

## 2021-07-14 DIAGNOSIS — R78.81 BACTEREMIA: ICD-10-CM

## 2021-07-14 DIAGNOSIS — T83.511A URINARY TRACT INFECTION ASSOCIATED WITH INDWELLING URETHRAL CATHETER, INITIAL ENCOUNTER (HCC): ICD-10-CM

## 2021-07-14 DIAGNOSIS — G93.40 ACUTE ENCEPHALOPATHY: ICD-10-CM

## 2021-07-14 DIAGNOSIS — N39.0 URINARY TRACT INFECTION ASSOCIATED WITH INDWELLING URETHRAL CATHETER, INITIAL ENCOUNTER (HCC): ICD-10-CM

## 2021-07-14 DIAGNOSIS — Z71.89 ACP (ADVANCE CARE PLANNING): ICD-10-CM

## 2021-07-14 DIAGNOSIS — E87.6 HYPOKALEMIA: ICD-10-CM

## 2021-07-14 DIAGNOSIS — I95.9 HYPOTENSION, UNSPECIFIED HYPOTENSION TYPE: ICD-10-CM

## 2021-07-14 DIAGNOSIS — A41.9 SEPSIS WITH ACUTE ORGAN DYSFUNCTION AND SEPTIC SHOCK, DUE TO UNSPECIFIED ORGANISM, UNSPECIFIED TYPE: ICD-10-CM

## 2021-07-14 DIAGNOSIS — J18.9 PNEUMONIA DUE TO INFECTIOUS ORGANISM, UNSPECIFIED LATERALITY, UNSPECIFIED PART OF LUNG: ICD-10-CM

## 2021-07-14 DIAGNOSIS — E03.9 HYPOTHYROIDISM, UNSPECIFIED TYPE: ICD-10-CM

## 2021-07-14 DIAGNOSIS — R33.9 URINARY RETENTION: ICD-10-CM

## 2021-07-14 DIAGNOSIS — E87.29 HIGH ANION GAP METABOLIC ACIDOSIS: ICD-10-CM

## 2021-07-14 DIAGNOSIS — N13.8 BENIGN PROSTATIC HYPERPLASIA WITH URINARY OBSTRUCTION: ICD-10-CM

## 2021-07-14 DIAGNOSIS — Z63.6 CAREGIVER BURDEN: ICD-10-CM

## 2021-07-14 DIAGNOSIS — R31.0 GROSS HEMATURIA: ICD-10-CM

## 2021-07-14 DIAGNOSIS — Z91.81 HISTORY OF FALL: ICD-10-CM

## 2021-07-14 DIAGNOSIS — I48.91 ATRIAL FIBRILLATION, UNSPECIFIED TYPE (HCC): ICD-10-CM

## 2021-07-14 DIAGNOSIS — T83.511S URINARY TRACT INFECTION ASSOCIATED WITH CATHETERIZATION OF URINARY TRACT, UNSPECIFIED INDWELLING URINARY CATHETER TYPE, SEQUELA: ICD-10-CM

## 2021-07-14 DIAGNOSIS — R41.82 ALTERED MENTAL STATUS, UNSPECIFIED ALTERED MENTAL STATUS TYPE: ICD-10-CM

## 2021-07-14 LAB
ALBUMIN SERPL BCP-MCNC: 3.1 G/DL (ref 3.2–4.9)
ALBUMIN/GLOB SERPL: 0.9 G/DL
ALP SERPL-CCNC: 102 U/L (ref 30–99)
ALT SERPL-CCNC: 7 U/L (ref 2–50)
ANION GAP SERPL CALC-SCNC: 14 MMOL/L (ref 7–16)
ANION GAP SERPL CALC-SCNC: 17 MMOL/L (ref 7–16)
APPEARANCE UR: ABNORMAL
AST SERPL-CCNC: 15 U/L (ref 12–45)
BACTERIA #/AREA URNS HPF: ABNORMAL /HPF
BASOPHILS # BLD AUTO: 0 % (ref 0–1.8)
BASOPHILS # BLD: 0 K/UL (ref 0–0.12)
BILIRUB SERPL-MCNC: 0.8 MG/DL (ref 0.1–1.5)
BILIRUB UR QL STRIP.AUTO: NEGATIVE
BUN SERPL-MCNC: 14 MG/DL (ref 8–22)
BUN SERPL-MCNC: 14 MG/DL (ref 8–22)
CALCIUM SERPL-MCNC: 8.4 MG/DL (ref 8.4–10.2)
CALCIUM SERPL-MCNC: 9 MG/DL (ref 8.4–10.2)
CHLORIDE SERPL-SCNC: 94 MMOL/L (ref 96–112)
CHLORIDE SERPL-SCNC: 94 MMOL/L (ref 96–112)
CO2 SERPL-SCNC: 23 MMOL/L (ref 20–33)
CO2 SERPL-SCNC: 26 MMOL/L (ref 20–33)
COLOR UR: YELLOW
CREAT SERPL-MCNC: 1.01 MG/DL (ref 0.5–1.4)
CREAT SERPL-MCNC: 1.04 MG/DL (ref 0.5–1.4)
EKG IMPRESSION: NORMAL
EOSINOPHIL # BLD AUTO: 0 K/UL (ref 0–0.51)
EOSINOPHIL NFR BLD: 0 % (ref 0–6.9)
EPI CELLS #/AREA URNS HPF: ABNORMAL /HPF
ERYTHROCYTE [DISTWIDTH] IN BLOOD BY AUTOMATED COUNT: 39.3 FL (ref 35.9–50)
FLUAV RNA SPEC QL NAA+PROBE: NEGATIVE
FLUBV RNA SPEC QL NAA+PROBE: NEGATIVE
GLOBULIN SER CALC-MCNC: 3.4 G/DL (ref 1.9–3.5)
GLUCOSE BLD-MCNC: 322 MG/DL (ref 65–99)
GLUCOSE SERPL-MCNC: 317 MG/DL (ref 65–99)
GLUCOSE SERPL-MCNC: 353 MG/DL (ref 65–99)
GLUCOSE UR STRIP.AUTO-MCNC: 500 MG/DL
HCT VFR BLD AUTO: 35.2 % (ref 42–52)
HGB BLD-MCNC: 12.6 G/DL (ref 14–18)
KETONES UR STRIP.AUTO-MCNC: 15 MG/DL
LACTATE BLD-SCNC: 3 MMOL/L (ref 0.5–2)
LACTATE BLD-SCNC: 3.1 MMOL/L (ref 0.5–2)
LACTATE BLD-SCNC: 3.3 MMOL/L (ref 0.5–2)
LEUKOCYTE ESTERASE UR QL STRIP.AUTO: ABNORMAL
LYMPHOCYTES # BLD AUTO: 1.25 K/UL (ref 1–4.8)
LYMPHOCYTES NFR BLD: 8 % (ref 22–41)
MAGNESIUM SERPL-MCNC: 1.1 MG/DL (ref 1.5–2.5)
MAGNESIUM SERPL-MCNC: 1.3 MG/DL (ref 1.5–2.5)
MANUAL DIFF BLD: ABNORMAL
MCH RBC QN AUTO: 30.9 PG (ref 27–33)
MCHC RBC AUTO-ENTMCNC: 35.8 G/DL (ref 33.7–35.3)
MCV RBC AUTO: 86.3 FL (ref 81.4–97.8)
MICRO URNS: ABNORMAL
MONOCYTES # BLD AUTO: 0.16 K/UL (ref 0–0.85)
MONOCYTES NFR BLD AUTO: 1 % (ref 0–13.4)
NEUTROPHILS # BLD AUTO: 14.2 K/UL (ref 1.82–7.42)
NEUTROPHILS NFR BLD: 69 % (ref 44–72)
NEUTS BAND NFR BLD MANUAL: 22 % (ref 0–10)
NITRITE UR QL STRIP.AUTO: POSITIVE
NRBC # BLD AUTO: 0 K/UL
NRBC BLD-RTO: 0 /100 WBC
PH UR STRIP.AUTO: 6 [PH] (ref 5–8)
PHOSPHATE SERPL-MCNC: 2.6 MG/DL (ref 2.5–4.5)
PLATELET # BLD AUTO: 298 K/UL (ref 164–446)
PLATELET BLD QL SMEAR: NORMAL
PMV BLD AUTO: 10.3 FL (ref 9–12.9)
POTASSIUM SERPL-SCNC: 2.2 MMOL/L (ref 3.6–5.5)
POTASSIUM SERPL-SCNC: 3 MMOL/L (ref 3.6–5.5)
PROCALCITONIN SERPL-MCNC: 0.61 NG/ML
PROT SERPL-MCNC: 6.5 G/DL (ref 6–8.2)
PROT UR QL STRIP: 30 MG/DL
RBC # BLD AUTO: 4.08 M/UL (ref 4.7–6.1)
RBC # URNS HPF: >150 /HPF
RBC BLD AUTO: NORMAL
RBC UR QL AUTO: ABNORMAL
RSV RNA SPEC QL NAA+PROBE: NEGATIVE
SARS-COV-2 RNA RESP QL NAA+PROBE: NOTDETECTED
SODIUM SERPL-SCNC: 134 MMOL/L (ref 135–145)
SODIUM SERPL-SCNC: 134 MMOL/L (ref 135–145)
SP GR UR STRIP.AUTO: 1.01
SPECIMEN SOURCE: NORMAL
TROPONIN T SERPL-MCNC: 201 NG/L (ref 6–19)
WBC # BLD AUTO: 15.6 K/UL (ref 4.8–10.8)
WBC #/AREA URNS HPF: ABNORMAL /HPF

## 2021-07-14 PROCEDURE — 82962 GLUCOSE BLOOD TEST: CPT

## 2021-07-14 PROCEDURE — 770020 HCHG ROOM/CARE - TELE (206)

## 2021-07-14 PROCEDURE — 93005 ELECTROCARDIOGRAM TRACING: CPT | Performed by: EMERGENCY MEDICINE

## 2021-07-14 PROCEDURE — C9803 HOPD COVID-19 SPEC COLLECT: HCPCS | Performed by: EMERGENCY MEDICINE

## 2021-07-14 PROCEDURE — 83605 ASSAY OF LACTIC ACID: CPT

## 2021-07-14 PROCEDURE — 99223 1ST HOSP IP/OBS HIGH 75: CPT | Performed by: STUDENT IN AN ORGANIZED HEALTH CARE EDUCATION/TRAINING PROGRAM

## 2021-07-14 PROCEDURE — 700102 HCHG RX REV CODE 250 W/ 637 OVERRIDE(OP): Performed by: STUDENT IN AN ORGANIZED HEALTH CARE EDUCATION/TRAINING PROGRAM

## 2021-07-14 PROCEDURE — 83735 ASSAY OF MAGNESIUM: CPT

## 2021-07-14 PROCEDURE — 87186 SC STD MICRODIL/AGAR DIL: CPT

## 2021-07-14 PROCEDURE — 700111 HCHG RX REV CODE 636 W/ 250 OVERRIDE (IP): Performed by: EMERGENCY MEDICINE

## 2021-07-14 PROCEDURE — 71045 X-RAY EXAM CHEST 1 VIEW: CPT

## 2021-07-14 PROCEDURE — 85007 BL SMEAR W/DIFF WBC COUNT: CPT

## 2021-07-14 PROCEDURE — 0241U HCHG SARS-COV-2 COVID-19 NFCT DS RESP RNA 4 TRGT MIC: CPT

## 2021-07-14 PROCEDURE — 96365 THER/PROPH/DIAG IV INF INIT: CPT

## 2021-07-14 PROCEDURE — 87086 URINE CULTURE/COLONY COUNT: CPT

## 2021-07-14 PROCEDURE — 80053 COMPREHEN METABOLIC PANEL: CPT

## 2021-07-14 PROCEDURE — 85027 COMPLETE CBC AUTOMATED: CPT

## 2021-07-14 PROCEDURE — 700105 HCHG RX REV CODE 258: Performed by: STUDENT IN AN ORGANIZED HEALTH CARE EDUCATION/TRAINING PROGRAM

## 2021-07-14 PROCEDURE — 87077 CULTURE AEROBIC IDENTIFY: CPT

## 2021-07-14 PROCEDURE — 96368 THER/DIAG CONCURRENT INF: CPT

## 2021-07-14 PROCEDURE — A9270 NON-COVERED ITEM OR SERVICE: HCPCS | Performed by: STUDENT IN AN ORGANIZED HEALTH CARE EDUCATION/TRAINING PROGRAM

## 2021-07-14 PROCEDURE — 700105 HCHG RX REV CODE 258: Performed by: EMERGENCY MEDICINE

## 2021-07-14 PROCEDURE — 81001 URINALYSIS AUTO W/SCOPE: CPT

## 2021-07-14 PROCEDURE — 99221 1ST HOSP IP/OBS SF/LOW 40: CPT | Performed by: INTERNAL MEDICINE

## 2021-07-14 PROCEDURE — 80048 BASIC METABOLIC PNL TOTAL CA: CPT

## 2021-07-14 PROCEDURE — 700101 HCHG RX REV CODE 250: Performed by: EMERGENCY MEDICINE

## 2021-07-14 PROCEDURE — 84145 PROCALCITONIN (PCT): CPT

## 2021-07-14 PROCEDURE — 700111 HCHG RX REV CODE 636 W/ 250 OVERRIDE (IP): Performed by: STUDENT IN AN ORGANIZED HEALTH CARE EDUCATION/TRAINING PROGRAM

## 2021-07-14 PROCEDURE — 84100 ASSAY OF PHOSPHORUS: CPT

## 2021-07-14 PROCEDURE — 99285 EMERGENCY DEPT VISIT HI MDM: CPT

## 2021-07-14 PROCEDURE — 84484 ASSAY OF TROPONIN QUANT: CPT

## 2021-07-14 PROCEDURE — 36415 COLL VENOUS BLD VENIPUNCTURE: CPT

## 2021-07-14 PROCEDURE — 87040 BLOOD CULTURE FOR BACTERIA: CPT | Mod: 91

## 2021-07-14 RX ORDER — SODIUM CHLORIDE AND POTASSIUM CHLORIDE 150; 900 MG/100ML; MG/100ML
INJECTION, SOLUTION INTRAVENOUS CONTINUOUS
Status: DISPENSED | OUTPATIENT
Start: 2021-07-14 | End: 2021-07-15

## 2021-07-14 RX ORDER — SODIUM CHLORIDE AND POTASSIUM CHLORIDE 300; 900 MG/100ML; MG/100ML
INJECTION, SOLUTION INTRAVENOUS ONCE
Status: COMPLETED | OUTPATIENT
Start: 2021-07-14 | End: 2021-07-15

## 2021-07-14 RX ORDER — MAGNESIUM SULFATE HEPTAHYDRATE 40 MG/ML
2 INJECTION, SOLUTION INTRAVENOUS ONCE
Status: COMPLETED | OUTPATIENT
Start: 2021-07-14 | End: 2021-07-14

## 2021-07-14 RX ORDER — GABAPENTIN 100 MG/1
100 CAPSULE ORAL 2 TIMES DAILY
Status: DISCONTINUED | OUTPATIENT
Start: 2021-07-14 | End: 2021-07-20 | Stop reason: HOSPADM

## 2021-07-14 RX ORDER — POLYVINYL ALCOHOL 14 MG/ML
1 SOLUTION/ DROPS OPHTHALMIC
Status: DISCONTINUED | OUTPATIENT
Start: 2021-07-14 | End: 2021-07-20 | Stop reason: HOSPADM

## 2021-07-14 RX ORDER — LEVOTHYROXINE SODIUM 0.03 MG/1
25 TABLET ORAL
Status: DISCONTINUED | OUTPATIENT
Start: 2021-07-15 | End: 2021-07-20 | Stop reason: HOSPADM

## 2021-07-14 RX ORDER — AMOXICILLIN 250 MG
2 CAPSULE ORAL 2 TIMES DAILY
Status: DISCONTINUED | OUTPATIENT
Start: 2021-07-15 | End: 2021-07-15

## 2021-07-14 RX ORDER — POLYETHYLENE GLYCOL 3350 17 G/17G
1 POWDER, FOR SOLUTION ORAL
Status: DISCONTINUED | OUTPATIENT
Start: 2021-07-14 | End: 2021-07-15

## 2021-07-14 RX ORDER — SIMVASTATIN 10 MG
10 TABLET ORAL NIGHTLY
COMMUNITY
End: 2022-02-04

## 2021-07-14 RX ORDER — POTASSIUM CHLORIDE 7.45 MG/ML
10 INJECTION INTRAVENOUS
Status: DISPENSED | OUTPATIENT
Start: 2021-07-14 | End: 2021-07-14

## 2021-07-14 RX ORDER — SODIUM CHLORIDE AND POTASSIUM CHLORIDE 300; 900 MG/100ML; MG/100ML
INJECTION, SOLUTION INTRAVENOUS
Status: DISPENSED
Start: 2021-07-14 | End: 2021-07-15

## 2021-07-14 RX ORDER — FINASTERIDE 5 MG/1
5 TABLET, FILM COATED ORAL DAILY
Status: DISCONTINUED | OUTPATIENT
Start: 2021-07-15 | End: 2021-07-20 | Stop reason: HOSPADM

## 2021-07-14 RX ORDER — SODIUM CHLORIDE, SODIUM LACTATE, POTASSIUM CHLORIDE, AND CALCIUM CHLORIDE .6; .31; .03; .02 G/100ML; G/100ML; G/100ML; G/100ML
30 INJECTION, SOLUTION INTRAVENOUS ONCE
Status: COMPLETED | OUTPATIENT
Start: 2021-07-14 | End: 2021-07-14

## 2021-07-14 RX ORDER — HEPARIN SODIUM 5000 [USP'U]/ML
5000 INJECTION, SOLUTION INTRAVENOUS; SUBCUTANEOUS EVERY 8 HOURS
Status: DISCONTINUED | OUTPATIENT
Start: 2021-07-14 | End: 2021-07-14

## 2021-07-14 RX ORDER — POTASSIUM CHLORIDE 7.45 MG/ML
10 INJECTION INTRAVENOUS
Status: COMPLETED | OUTPATIENT
Start: 2021-07-14 | End: 2021-07-15

## 2021-07-14 RX ORDER — CYCLOSPORINE 0.5 MG/ML
1 EMULSION OPHTHALMIC EVERY EVENING
COMMUNITY

## 2021-07-14 RX ORDER — BISACODYL 10 MG
10 SUPPOSITORY, RECTAL RECTAL
Status: DISCONTINUED | OUTPATIENT
Start: 2021-07-14 | End: 2021-07-15

## 2021-07-14 RX ORDER — TAMSULOSIN HYDROCHLORIDE 0.4 MG/1
0.4 CAPSULE ORAL EVERY MORNING
Status: DISCONTINUED | OUTPATIENT
Start: 2021-07-15 | End: 2021-07-20 | Stop reason: HOSPADM

## 2021-07-14 RX ORDER — CEFDINIR 300 MG/1
300 CAPSULE ORAL 2 TIMES DAILY
Status: ON HOLD | COMMUNITY
Start: 2021-07-10 | End: 2021-07-20

## 2021-07-14 RX ORDER — POTASSIUM CHLORIDE 20 MEQ/1
40 TABLET, EXTENDED RELEASE ORAL ONCE
Status: DISCONTINUED | OUTPATIENT
Start: 2021-07-14 | End: 2021-07-14

## 2021-07-14 RX ORDER — POTASSIUM CHLORIDE 20 MEQ/1
40 TABLET, EXTENDED RELEASE ORAL EVERY 4 HOURS
Status: DISPENSED | OUTPATIENT
Start: 2021-07-14 | End: 2021-07-15

## 2021-07-14 RX ADMIN — POTASSIUM CHLORIDE 10 MEQ: 7.46 INJECTION, SOLUTION INTRAVENOUS at 22:09

## 2021-07-14 RX ADMIN — POTASSIUM CHLORIDE 10 MEQ: 7.46 INJECTION, SOLUTION INTRAVENOUS at 20:48

## 2021-07-14 RX ADMIN — CEFEPIME 2 G: 2 INJECTION, POWDER, FOR SOLUTION INTRAVENOUS at 22:17

## 2021-07-14 RX ADMIN — MAGNESIUM SULFATE 2 G: 2 INJECTION INTRAVENOUS at 21:20

## 2021-07-14 RX ADMIN — CEFTRIAXONE SODIUM 2 G: 2 INJECTION, POWDER, FOR SOLUTION INTRAMUSCULAR; INTRAVENOUS at 16:23

## 2021-07-14 RX ADMIN — POTASSIUM CHLORIDE 10 MEQ: 7.46 INJECTION, SOLUTION INTRAVENOUS at 23:23

## 2021-07-14 RX ADMIN — POTASSIUM CHLORIDE 40 MEQ: 1500 TABLET, EXTENDED RELEASE ORAL at 21:06

## 2021-07-14 RX ADMIN — SODIUM CHLORIDE, POTASSIUM CHLORIDE, SODIUM LACTATE AND CALCIUM CHLORIDE 1224 ML: 600; 310; 30; 20 INJECTION, SOLUTION INTRAVENOUS at 16:22

## 2021-07-14 RX ADMIN — POTASSIUM CHLORIDE AND SODIUM CHLORIDE: 900; 300 INJECTION, SOLUTION INTRAVENOUS at 16:43

## 2021-07-14 SDOH — SOCIAL STABILITY - SOCIAL INSECURITY: DEPENDENT RELATIVE NEEDING CARE AT HOME: Z63.6

## 2021-07-14 ASSESSMENT — FIBROSIS 4 INDEX
FIB4 SCORE: 1.68
FIB4 SCORE: 1.75

## 2021-07-14 ASSESSMENT — ENCOUNTER SYMPTOMS
CLAUDICATION: 0
ABDOMINAL PAIN: 0
BRUISES/BLEEDS EASILY: 0
SPUTUM PRODUCTION: 0
PALPITATIONS: 0
WEIGHT LOSS: 1
NAUSEA: 1
FEVER: 0
DIZZINESS: 0
MYALGIAS: 0
COUGH: 1
DIARRHEA: 0
CHILLS: 1
HEMOPTYSIS: 0
BLURRED VISION: 0
SHORTNESS OF BREATH: 0
CONSTIPATION: 0
HEADACHES: 0
FEVER: 1
NAUSEA: 0
ORTHOPNEA: 0
VOMITING: 0

## 2021-07-14 ASSESSMENT — PAIN DESCRIPTION - PAIN TYPE: TYPE: ACUTE PAIN

## 2021-07-14 NOTE — ED NOTES
Lavelle from Lab called with critical result of Trop 201, potassium 2.2 at 1628. Critical lab result read back to Lavelle.   Dr. Stoddard notified of critical lab result at 1628.  Critical lab result read back by Dr. Stoddard .

## 2021-07-14 NOTE — ED PROVIDER NOTES
ED Provider Note    CHIEF COMPLAINT  Chief Complaint   Patient presents with   • Weakness     Pt came in via [facility] from nursing home facility, Hx of chronic UTI for the past year, here today with c/o weakness, nausea for the past few days. Fever at 101.5F for [facility] he was given 1g of tylenol. IV stablished by [facility] and 600mls of LR given, current BP in the ED at 81/48, oxygen in place at 2L, RA oxygen at 88%.        HPI  Julien Dao is a 89 y.o. male who presents by ambulance from a nursing home facility with fevers nausea and weakness for the last 3 days.  The patient had a fever of 101 onREMSA checked him and he was given a gram of Tylenol.  He states that he has had intermittent urinary tract infections over the last year.  He is not currently on antibiotics.  He does have an indwelling Sher catheter.  He denies any abdominal pain but does have some nausea.  He has been hypoxic at 88% on room air and has a dry cough.  The patient states he did not get vaccinated for COVID-19.  Denies any pain in his chest.  He does feel weak and short of breath.    REVIEW OF SYSTEMS  HEENT:  No ear pain, congestion or sore throat   EYES: no discharge redness or vision changes  CARDIAC: no chest pain, palpitations    PULMONARY: no dyspnea, cough or congestion   GI: no vomiting diarrhea or abdominal pain positive nausea  : no dysuria, back pain or hematuria   Neuro: Generalized weakness, no numbness aphasia or headache  Musculoskeletal: no swelling deformity or pain no joint swelling  Endocrine: Positive fevers, sweating, weight loss   SKIN: no rash, erythema or contusions     See history of present illness all other systems are negative    PAST MEDICAL HISTORY  Past Medical History:   Diagnosis Date   • KOSTAS (acute kidney injury) ([facility]) 6/14/2021   • CAD (coronary artery disease)    • Delirium 2/21/2020   • DM (diabetes mellitus) ([facility]) 1/7/2014   • Falls    • Hypertension    • UTI (urinary tract infection) 11/4/2018       FAMILY  HISTORY  History reviewed. No pertinent family history.    SOCIAL HISTORY  Social History     Socioeconomic History   • Marital status:      Spouse name: Not on file   • Number of children: Not on file   • Years of education: Not on file   • Highest education level: Not on file   Occupational History   • Not on file   Tobacco Use   • Smoking status: Former Smoker     Quit date: 10/19/1972     Years since quittin.7   • Smokeless tobacco: Never Used   Vaping Use   • Vaping Use: Never used   Substance and Sexual Activity   • Alcohol use: Not Currently   • Drug use: No   • Sexual activity: Not on file   Other Topics Concern   • Not on file   Social History Narrative   • Not on file     Social Determinants of Health     Financial Resource Strain:    • Difficulty of Paying Living Expenses:    Food Insecurity:    • Worried About Running Out of Food in the Last Year:    • Ran Out of Food in the Last Year:    Transportation Needs:    • Lack of Transportation (Medical):    • Lack of Transportation (Non-Medical):    Physical Activity:    • Days of Exercise per Week:    • Minutes of Exercise per Session:    Stress:    • Feeling of Stress :    Social Connections:    • Frequency of Communication with Friends and Family:    • Frequency of Social Gatherings with Friends and Family:    • Attends [employer] Services:    • Active Member of Clubs or Organizations:    • Attends Club or Organization Meetings:    • Marital Status:    Intimate Partner Violence:    • Fear of Current or Ex-Partner:    • Emotionally Abused:    • Physically Abused:    • Sexually Abused:        SURGICAL HISTORY  Past Surgical History:   Procedure Laterality Date   • CORONARY ARTERY BYPASS, 3     • OTHER CARDIAC SURGERY     • TED RECTAL ABSCESS         CURRENT MEDICATIONS  Home Medications     Reviewed by Anjum Burns (Pharmacy Tech) on 21 at 1603  Med List Status: Complete   Medication Last Dose Status   amLODIPine (NORVASC) 5 MG Tab  "7/13/2021 Active   cefdinir (OMNICEF) 300 MG Cap > 2 days Active   chlorthalidone (HYGROTON) 25 MG Tab 7/13/2021 Active   cyclosporin (RESTASIS) 0.05 % ophthalmic emulsion 7/13/2021 Active   cyclosporin (RESTASIS) 0.05 % ophthalmic emulsion 7/13/2021 Active   finasteride (PROSCAR) 5 MG Tab 7/13/2021 Active   gabapentin (NEURONTIN) 100 MG Cap 7/13/2021 Active   insulin glargine (INSULIN GLARGINE) 100 UNIT/ML Solution Pen-injector injection 7/13/2021 Active   levothyroxine (SYNTHROID) 25 MCG Tab 7/13/2021 Active   metoprolol tartrate (LOPRESSOR) 25 MG Tab > 3 days Active   Multiple Vitamins-Minerals (PX MENS MULTIVITAMINS) Tab 7/13/2021 Active   potassium chloride ER (K-TAB) 20 MEQ Tab CR tablet 7/13/2021 Active   simvastatin (ZOCOR) 10 MG Tab 7/13/2021 Active   tamsulosin (FLOMAX) 0.4 MG capsule 7/13/2021 Active                ALLERGIES  Allergies   Allergen Reactions   • Iodine Anaphylaxis     Pt and pts wife report that it has been so long not sure what happens       PHYSICAL EXAM  VITAL SIGNS: BP (!) 91/61   Pulse 87   Temp 37.3 °C (99.2 °F) (Oral)   Resp 16   Ht 1.702 m (5' 7\")   Wt 74.8 kg (165 lb)   SpO2 98%   BMI 25.84 kg/m²   Constitutional: Well developed, Well nourished, ill-appearing  HEENT: Normocephalic, Atraumatic,  external ears normal, pharynx pink,  Mucous  Membranes moist, No rhinorrhea or mucosal edema  Eyes: PERRL, EOMI, Conjunctiva normal, No discharge.   Neck: Normal range of motion, No tenderness, Supple, No stridor.   Lymphatic: No lymphadenopathy    Cardiovascular: Irregularly irregular rhythm no murmurs rubs or gallops tachycardic.   Thorax & Lungs: Lungs clear to auscultation bilaterally, No respiratory distress, No wheezes, rhales or rhonchi, No chest wall tenderness.   Abdomen: Bowel sounds normal, Soft, non tender, non distended,  No pulsatile masses., no rebound guarding or peritoneal signs.   : Positive Sher catheter draining cloudy urine  Skin: Warm, Dry, No erythema, No " rash, diaphoretic  Back:  No CVA tenderness,  No spinal tenderness, bony crepitance step offs or instability.   Extremities: Equal, intact distal pulses, No cyanosis, clubbing or edema,  No tenderness.   Musculoskeletal: Good range of motion in all major joints. No tenderness to palpation or major deformities noted.  No extremity edema venous cording or Homans' sign  Neurologic: Alert & oriented x 3,   No focal deficits noted.   Psychiatric: Affect normal, Judgment normal, Mood normal.      RADIOLOGY/PROCEDURES  DX-CHEST-PORTABLE (1 VIEW)   Final Result      1.  Ill-defined opacity in the right lower lung, which may represent pneumonia in the appropriate clinical settings.   2.  Stable cardiomegaly.            COURSE & MEDICAL DECISION MAKING  Pertinent Labs & Imaging studies reviewed. (See chart for details)  Differential diagnosis: Sepsis, pyelonephritis, pneumonia, influenza, Covid, cardiac ischemia    Results for orders placed or performed during the hospital encounter of 07/14/21   LACTIC ACID   Result Value Ref Range    Lactic Acid 3.0 (H) 0.5 - 2.0 mmol/L   CBC WITH DIFFERENTIAL   Result Value Ref Range    WBC 15.6 (H) 4.8 - 10.8 K/uL    RBC 4.08 (L) 4.70 - 6.10 M/uL    Hemoglobin 12.6 (L) 14.0 - 18.0 g/dL    Hematocrit 35.2 (L) 42.0 - 52.0 %    MCV 86.3 81.4 - 97.8 fL    MCH 30.9 27.0 - 33.0 pg    MCHC 35.8 (H) 33.7 - 35.3 g/dL    RDW 39.3 35.9 - 50.0 fL    Platelet Count 298 164 - 446 K/uL    MPV 10.3 9.0 - 12.9 fL    Neutrophils-Polys 69.00 44.00 - 72.00 %    Lymphocytes 8.00 (L) 22.00 - 41.00 %    Monocytes 1.00 0.00 - 13.40 %    Eosinophils 0.00 0.00 - 6.90 %    Basophils 0.00 0.00 - 1.80 %    Nucleated RBC 0.00 /100 WBC    Neutrophils (Absolute) 14.20 (H) 1.82 - 7.42 K/uL    Lymphs (Absolute) 1.25 1.00 - 4.80 K/uL    Monos (Absolute) 0.16 0.00 - 0.85 K/uL    Eos (Absolute) 0.00 0.00 - 0.51 K/uL    Baso (Absolute) 0.00 0.00 - 0.12 K/uL    NRBC (Absolute) 0.00 K/uL   COMP METABOLIC PANEL   Result Value  Ref Range    Sodium 134 (L) 135 - 145 mmol/L    Potassium 2.2 (LL) 3.6 - 5.5 mmol/L    Chloride 94 (L) 96 - 112 mmol/L    Co2 23 20 - 33 mmol/L    Anion Gap 17.0 (H) 7.0 - 16.0    Glucose 317 (H) 65 - 99 mg/dL    Bun 14 8 - 22 mg/dL    Creatinine 1.01 0.50 - 1.40 mg/dL    Calcium 9.0 8.4 - 10.2 mg/dL    AST(SGOT) 15 12 - 45 U/L    ALT(SGPT) 7 2 - 50 U/L    Alkaline Phosphatase 102 (H) 30 - 99 U/L    Total Bilirubin 0.8 0.1 - 1.5 mg/dL    Albumin 3.1 (L) 3.2 - 4.9 g/dL    Total Protein 6.5 6.0 - 8.2 g/dL    Globulin 3.4 1.9 - 3.5 g/dL    A-G Ratio 0.9 g/dL   COV-2, FLU A/B, AND RSV BY PCR (2-4 HOURS smsPREPHEID): Collect NP swab in VTM    Specimen: Nasopharyngeal; Respirate   Result Value Ref Range    Influenza virus A RNA Negative Negative    Influenza virus B, PCR Negative Negative    RSV, PCR Negative Negative    SARS-CoV-2 by PCR NotDetected     SARS-CoV-2 Source NP Swab    TROPONIN   Result Value Ref Range    Troponin T 201 (H) 6 - 19 ng/L   LACTIC ACID   Result Value Ref Range    Lactic Acid 3.3 (H) 0.5 - 2.0 mmol/L   URINALYSIS   Result Value Ref Range    Color Yellow     Character Cloudy (A)     Specific Gravity 1.010 <1.035    Ph 6.0 5.0 - 8.0    Glucose 500 (A) Negative mg/dL    Ketones 15 (A) Negative mg/dL    Protein 30 (A) Negative mg/dL    Bilirubin Negative Negative    Nitrite Positive (A) Negative    Leukocyte Esterase Large (A) Negative    Occult Blood Large (A) Negative    Micro Urine Req Microscopic    URINE CULTURE(NEW)    Specimen: Urine   Result Value Ref Range    Significant Indicator NEG     Source UR     Site Ureter     Culture Result -    ESTIMATED GFR   Result Value Ref Range    GFR If African American >60 >60 mL/min/1.73 m 2    GFR If Non African American >60 >60 mL/min/1.73 m 2   URINE MICROSCOPIC (W/UA)   Result Value Ref Range    WBC Packed (A) /hpf    RBC >150 (A) /hpf    Bacteria Moderate (A) None /hpf    Epithelial Cells Rare Few /hpf   MAGNESIUM   Result Value Ref Range    Magnesium 1.1  (L) 1.5 - 2.5 mg/dL   DIFFERENTIAL MANUAL   Result Value Ref Range    Bands-Stabs 22.00 (H) 0.00 - 10.00 %    Manual Diff Status PERFORMED    PLATELET ESTIMATE   Result Value Ref Range    Plt Estimation Normal    MORPHOLOGY   Result Value Ref Range    RBC Morphology Normal    EKG   Result Value Ref Range    Report       [facility] Emergency Dept.    Test Date:  2021  Pt Name:    SAMMY CLIFFORD                 Department: EDSM  MRN:        [id number]                      [facility]  Gender:     Male                         Technician: BETZAIDA  :        [id number]                   Requested By:ASHANTI JOE  Order #:    [id number]                    Reading MD: ASHANTI JOE MD    Measurements  Intervals                                Axis  Rate:       100                          P:  OK:                                      QRS:        -75  QRSD:       174                          T:          18  QT:         428  QTc:        553    Interpretive Statements  ATRIAL FIBRILLATION  RBBB AND LAFB  Compared to ECG 2021 20:33:07  Left anterior fascicular block now present  Ventricular-paced complex(es) or rhythm no longer present  Electronically Signed On 2021 16:40:02 PDT by ASHANTI JOE MD          HYDRATION: Based on the patient's presentation of Sepsis the patient was given IV fluids. IV Hydration was used because oral hydration was not adequate alone. Upon recheck following hydration, the patient was improved.        An IV was started and labs were drawn.  I gave the patient antibiotics and fluids per the sepsis protocol.    4:42 PM  I spoke with the intensivist Dr. Kinney who has seen the patient in the emergency department.  We will check a second lactic acid and if it is normal he can go to telemetry as admission.    5:37 PM the patient's blood pressure and lactate are improved.  He will be admitted to the hospitalist service on telemetry.  If he worsens he will go to the  ICU.    6:10 PM I spoke with the hospitalist who accepts the patient for admission.    Critical Care  Due to the real possibility of a deterioration of this patient's condition required the highest level of my preparedness for sudden emergent intervention. I provided critical care services which included medication orders, frequent reevaluations of the patient's condition and response to treatment, ordering and reviewing test results and discussing the case with various consultants. The critical care time associated with the care of the patient was 40 minutes. Review chart for interventions. This time is exclusive of any other billable procedures.     FINAL IMPRESSION  1. Sepsis with acute organ dysfunction and septic shock, due to unspecified organism, unspecified type (HCC)    2. Urinary tract infection associated with indwelling urethral catheter, initial encounter ([facility])    3. Elevated troponin    4. Hypokalemia    5. Pneumonia due to infectious organism, unspecified laterality, unspecified part of lung           PLAN/DISPOSITION  Admitted in serious condition          Electronically signed by: Lesa Stoddard M.D., 7/14/2021 3:40 PM

## 2021-07-14 NOTE — ED TRIAGE NOTES
.  Chief Complaint   Patient presents with   • Weakness     Pt came in via [facility] from nursing home facility, Hx of chronic UTI for the past year, here today with c/o weakness, nausea for the past few days. Fever at 101.5F for [facility] he was given 1g of tylenol. IV stablished by [facility] and 600mls of LR given, current BP in the ED at 81/48, oxygen in place at 2L, RA oxygen at 88%.      .Pulse 100   Temp 37.3 °C (99.2 °F) (Oral)   Resp 16   Wt 74.8 kg (165 lb)   SpO2 88%

## 2021-07-14 NOTE — ED NOTES
Med rec updated and complete  Allergies reviewed  Pt is not sure the names an strengths of his medications  Called pts wife (Mercedes) @ [phone] to verify all medications.  Per wife reports that pt was told to stop taking his CEFDINIR 300MG about 2 days ago.      No current facility-administered medications on file prior to encounter.     Current Outpatient Medications on File Prior to Encounter   Medication Sig Dispense Refill   • cyclosporin (RESTASIS) 0.05 % ophthalmic emulsion Administer 1 Drop into both eyes every day.     • cefdinir (OMNICEF) 300 MG Cap Take 300 mg by mouth 2 times a day. Pt started on 7/10/2021 for 10 day course     • simvastatin (ZOCOR) 10 MG Tab Take 10 mg by mouth every evening.     • gabapentin (NEURONTIN) 100 MG Cap Take 100 mg by mouth 2 times a day.     • insulin glargine (INSULIN GLARGINE) 100 UNIT/ML Solution Pen-injector injection Inject 35 Units under the skin every evening.     • levothyroxine (SYNTHROID) 25 MCG Tab Take 1 tablet by mouth Every morning on an empty stomach. 30 tablet 3   • amLODIPine (NORVASC) 5 MG Tab Take 1 Tab by mouth every day. 30 Tab 1   • finasteride (PROSCAR) 5 MG Tab Take 1 Tab by mouth every day. 30 Tab 1   • potassium chloride ER (K-TAB) 20 MEQ Tab CR tablet Take 1 Tab by mouth 2 times a day. 60 Tab 1   • metoprolol tartrate (LOPRESSOR) 25 MG Tab Take 12.5 mg by mouth 2 times a day. 0.5 tablet = 12.5 mg.     • cyclosporin (RESTASIS) 0.05 % ophthalmic emulsion Administer 1 Drop into both eyes every evening.     • chlorthalidone (HYGROTON) 25 MG Tab Take 1 Tab by mouth every day. 30 Tab 1   • tamsulosin (FLOMAX) 0.4 MG capsule Take 0.4 mg by mouth every morning.     • Multiple Vitamins-Minerals (PX MENS MULTIVITAMINS) Tab Take 1 tablet by mouth every morning.

## 2021-07-15 LAB
ANION GAP SERPL CALC-SCNC: 15 MMOL/L (ref 7–16)
BUN SERPL-MCNC: 13 MG/DL (ref 8–22)
C DIFF DNA SPEC QL NAA+PROBE: NEGATIVE
C DIFF TOX GENS STL QL NAA+PROBE: NEGATIVE
CALCIUM SERPL-MCNC: 8.6 MG/DL (ref 8.4–10.2)
CHLORIDE SERPL-SCNC: 96 MMOL/L (ref 96–112)
CO2 SERPL-SCNC: 24 MMOL/L (ref 20–33)
CREAT SERPL-MCNC: 1.01 MG/DL (ref 0.5–1.4)
ERYTHROCYTE [DISTWIDTH] IN BLOOD BY AUTOMATED COUNT: 41.4 FL (ref 35.9–50)
GLUCOSE BLD-MCNC: 233 MG/DL (ref 65–99)
GLUCOSE BLD-MCNC: 254 MG/DL (ref 65–99)
GLUCOSE BLD-MCNC: 323 MG/DL (ref 65–99)
GLUCOSE SERPL-MCNC: 301 MG/DL (ref 65–99)
HCT VFR BLD AUTO: 35.2 % (ref 42–52)
HGB BLD-MCNC: 12 G/DL (ref 14–18)
LACTATE BLD-SCNC: 2.4 MMOL/L (ref 0.5–2)
LACTATE BLD-SCNC: 2.9 MMOL/L (ref 0.5–2)
MAGNESIUM SERPL-MCNC: 1.6 MG/DL (ref 1.5–2.5)
MCH RBC QN AUTO: 30.8 PG (ref 27–33)
MCHC RBC AUTO-ENTMCNC: 34.1 G/DL (ref 33.7–35.3)
MCV RBC AUTO: 90.3 FL (ref 81.4–97.8)
PLATELET # BLD AUTO: 282 K/UL (ref 164–446)
PMV BLD AUTO: 9.5 FL (ref 9–12.9)
POTASSIUM SERPL-SCNC: 3.1 MMOL/L (ref 3.6–5.5)
PROCALCITONIN SERPL-MCNC: 1.75 NG/ML
RBC # BLD AUTO: 3.9 M/UL (ref 4.7–6.1)
SODIUM SERPL-SCNC: 135 MMOL/L (ref 135–145)
WBC # BLD AUTO: 19.9 K/UL (ref 4.8–10.8)

## 2021-07-15 PROCEDURE — 85027 COMPLETE CBC AUTOMATED: CPT

## 2021-07-15 PROCEDURE — A9270 NON-COVERED ITEM OR SERVICE: HCPCS | Performed by: HOSPITALIST

## 2021-07-15 PROCEDURE — 700102 HCHG RX REV CODE 250 W/ 637 OVERRIDE(OP): Performed by: STUDENT IN AN ORGANIZED HEALTH CARE EDUCATION/TRAINING PROGRAM

## 2021-07-15 PROCEDURE — 82962 GLUCOSE BLOOD TEST: CPT

## 2021-07-15 PROCEDURE — 700102 HCHG RX REV CODE 250 W/ 637 OVERRIDE(OP): Performed by: HOSPITALIST

## 2021-07-15 PROCEDURE — 83605 ASSAY OF LACTIC ACID: CPT

## 2021-07-15 PROCEDURE — 700105 HCHG RX REV CODE 258: Performed by: STUDENT IN AN ORGANIZED HEALTH CARE EDUCATION/TRAINING PROGRAM

## 2021-07-15 PROCEDURE — 87493 C DIFF AMPLIFIED PROBE: CPT

## 2021-07-15 PROCEDURE — A9270 NON-COVERED ITEM OR SERVICE: HCPCS | Performed by: STUDENT IN AN ORGANIZED HEALTH CARE EDUCATION/TRAINING PROGRAM

## 2021-07-15 PROCEDURE — A9270 NON-COVERED ITEM OR SERVICE: HCPCS | Performed by: INTERNAL MEDICINE

## 2021-07-15 PROCEDURE — 80048 BASIC METABOLIC PNL TOTAL CA: CPT

## 2021-07-15 PROCEDURE — 700111 HCHG RX REV CODE 636 W/ 250 OVERRIDE (IP): Performed by: INTERNAL MEDICINE

## 2021-07-15 PROCEDURE — 84145 PROCALCITONIN (PCT): CPT

## 2021-07-15 PROCEDURE — 94760 N-INVAS EAR/PLS OXIMETRY 1: CPT

## 2021-07-15 PROCEDURE — 770020 HCHG ROOM/CARE - TELE (206)

## 2021-07-15 PROCEDURE — 700111 HCHG RX REV CODE 636 W/ 250 OVERRIDE (IP): Performed by: HOSPITALIST

## 2021-07-15 PROCEDURE — 700111 HCHG RX REV CODE 636 W/ 250 OVERRIDE (IP): Performed by: STUDENT IN AN ORGANIZED HEALTH CARE EDUCATION/TRAINING PROGRAM

## 2021-07-15 PROCEDURE — 99233 SBSQ HOSP IP/OBS HIGH 50: CPT | Performed by: INTERNAL MEDICINE

## 2021-07-15 PROCEDURE — 83735 ASSAY OF MAGNESIUM: CPT

## 2021-07-15 PROCEDURE — 700102 HCHG RX REV CODE 250 W/ 637 OVERRIDE(OP): Performed by: INTERNAL MEDICINE

## 2021-07-15 PROCEDURE — 700101 HCHG RX REV CODE 250: Performed by: STUDENT IN AN ORGANIZED HEALTH CARE EDUCATION/TRAINING PROGRAM

## 2021-07-15 RX ORDER — DEXTROSE MONOHYDRATE 25 G/50ML
50 INJECTION, SOLUTION INTRAVENOUS
Status: DISCONTINUED | OUTPATIENT
Start: 2021-07-15 | End: 2021-07-20 | Stop reason: HOSPADM

## 2021-07-15 RX ORDER — MAGNESIUM SULFATE HEPTAHYDRATE 40 MG/ML
2 INJECTION, SOLUTION INTRAVENOUS ONCE
Status: COMPLETED | OUTPATIENT
Start: 2021-07-15 | End: 2021-07-15

## 2021-07-15 RX ORDER — MAGNESIUM SULFATE 1 G/100ML
1 INJECTION INTRAVENOUS ONCE
Status: COMPLETED | OUTPATIENT
Start: 2021-07-15 | End: 2021-07-15

## 2021-07-15 RX ORDER — POTASSIUM CHLORIDE 20 MEQ/1
40 TABLET, EXTENDED RELEASE ORAL ONCE
Status: COMPLETED | OUTPATIENT
Start: 2021-07-15 | End: 2021-07-15

## 2021-07-15 RX ORDER — ACETAMINOPHEN 325 MG/1
650 TABLET ORAL EVERY 4 HOURS PRN
Status: DISCONTINUED | OUTPATIENT
Start: 2021-07-15 | End: 2021-07-20 | Stop reason: HOSPADM

## 2021-07-15 RX ORDER — POTASSIUM CHLORIDE 20 MEQ/1
20 TABLET, EXTENDED RELEASE ORAL 2 TIMES DAILY
Status: DISCONTINUED | OUTPATIENT
Start: 2021-07-15 | End: 2021-07-16

## 2021-07-15 RX ADMIN — INSULIN HUMAN 7 UNITS: 100 INJECTION, SOLUTION PARENTERAL at 20:58

## 2021-07-15 RX ADMIN — INSULIN HUMAN 4 UNITS: 100 INJECTION, SOLUTION PARENTERAL at 17:50

## 2021-07-15 RX ADMIN — ENOXAPARIN SODIUM 80 MG: 80 INJECTION SUBCUTANEOUS at 17:51

## 2021-07-15 RX ADMIN — MAGNESIUM SULFATE 1 G: 1 INJECTION INTRAVENOUS at 14:23

## 2021-07-15 RX ADMIN — POTASSIUM CHLORIDE AND SODIUM CHLORIDE: 900; 150 INJECTION, SOLUTION INTRAVENOUS at 01:33

## 2021-07-15 RX ADMIN — GABAPENTIN 100 MG: 100 CAPSULE ORAL at 17:51

## 2021-07-15 RX ADMIN — ACETAMINOPHEN 650 MG: 325 TABLET, FILM COATED ORAL at 02:22

## 2021-07-15 RX ADMIN — CEFEPIME 2 G: 2 INJECTION, POWDER, FOR SOLUTION INTRAVENOUS at 23:14

## 2021-07-15 RX ADMIN — GABAPENTIN 100 MG: 100 CAPSULE ORAL at 06:20

## 2021-07-15 RX ADMIN — VANCOMYCIN HYDROCHLORIDE 125 MG: KIT ORAL at 16:29

## 2021-07-15 RX ADMIN — POTASSIUM CHLORIDE 40 MEQ: 1500 TABLET, EXTENDED RELEASE ORAL at 03:12

## 2021-07-15 RX ADMIN — POTASSIUM CHLORIDE 20 MEQ: 1500 TABLET, EXTENDED RELEASE ORAL at 17:51

## 2021-07-15 RX ADMIN — LEVOTHYROXINE SODIUM 25 MCG: 0.03 TABLET ORAL at 06:20

## 2021-07-15 RX ADMIN — FINASTERIDE 5 MG: 5 TABLET, FILM COATED ORAL at 06:19

## 2021-07-15 RX ADMIN — INSULIN HUMAN 10 UNITS: 100 INJECTION, SOLUTION PARENTERAL at 12:21

## 2021-07-15 RX ADMIN — MAGNESIUM SULFATE 2 G: 2 INJECTION INTRAVENOUS at 03:12

## 2021-07-15 RX ADMIN — CEFEPIME 2 G: 2 INJECTION, POWDER, FOR SOLUTION INTRAVENOUS at 12:26

## 2021-07-15 RX ADMIN — ENOXAPARIN SODIUM 80 MG: 80 INJECTION SUBCUTANEOUS at 06:20

## 2021-07-15 RX ADMIN — DOCUSATE SODIUM 50 MG AND SENNOSIDES 8.6 MG 2 TABLET: 8.6; 5 TABLET, FILM COATED ORAL at 06:20

## 2021-07-15 RX ADMIN — VANCOMYCIN HYDROCHLORIDE 125 MG: KIT ORAL at 15:04

## 2021-07-15 RX ADMIN — TAMSULOSIN HYDROCHLORIDE 0.4 MG: 0.4 CAPSULE ORAL at 06:20

## 2021-07-15 ASSESSMENT — ENCOUNTER SYMPTOMS
CHILLS: 1
COUGH: 0
CONSTIPATION: 0
BLURRED VISION: 0
CLAUDICATION: 0
FEVER: 0
SHORTNESS OF BREATH: 0
VOMITING: 0
DIZZINESS: 0
ABDOMINAL PAIN: 0
SPEECH CHANGE: 0
HEARTBURN: 0
DIARRHEA: 0
HEADACHES: 0
WEAKNESS: 1
DEPRESSION: 0
SENSORY CHANGE: 0
INSOMNIA: 0
MYALGIAS: 0
NERVOUS/ANXIOUS: 0
PHOTOPHOBIA: 0

## 2021-07-15 ASSESSMENT — LIFESTYLE VARIABLES
EVER FELT BAD OR GUILTY ABOUT YOUR DRINKING: NO
HAVE PEOPLE ANNOYED YOU BY CRITICIZING YOUR DRINKING: NO
CONSUMPTION TOTAL: NEGATIVE
ALCOHOL_USE: NO
HOW MANY TIMES IN THE PAST YEAR HAVE YOU HAD 5 OR MORE DRINKS IN A DAY: 0
TOTAL SCORE: 0
TOTAL SCORE: 0
HAVE YOU EVER FELT YOU SHOULD CUT DOWN ON YOUR DRINKING: NO
EVER HAD A DRINK FIRST THING IN THE MORNING TO STEADY YOUR NERVES TO GET RID OF A HANGOVER: NO
AVERAGE NUMBER OF DAYS PER WEEK YOU HAVE A DRINK CONTAINING ALCOHOL: 0
TOTAL SCORE: 0
ON A TYPICAL DAY WHEN YOU DRINK ALCOHOL HOW MANY DRINKS DO YOU HAVE: 0

## 2021-07-15 ASSESSMENT — COGNITIVE AND FUNCTIONAL STATUS - GENERAL
TURNING FROM BACK TO SIDE WHILE IN FLAT BAD: A LITTLE
DAILY ACTIVITIY SCORE: 18
MOBILITY SCORE: 19
DRESSING REGULAR UPPER BODY CLOTHING: A LITTLE
EATING MEALS: A LITTLE
HELP NEEDED FOR BATHING: A LITTLE
CLIMB 3 TO 5 STEPS WITH RAILING: A LITTLE
DRESSING REGULAR LOWER BODY CLOTHING: A LITTLE
PERSONAL GROOMING: A LITTLE
STANDING UP FROM CHAIR USING ARMS: A LITTLE
MOVING FROM LYING ON BACK TO SITTING ON SIDE OF FLAT BED: A LITTLE
WALKING IN HOSPITAL ROOM: A LITTLE
SUGGESTED CMS G CODE MODIFIER DAILY ACTIVITY: CK
SUGGESTED CMS G CODE MODIFIER MOBILITY: CK
TOILETING: A LITTLE

## 2021-07-15 ASSESSMENT — PATIENT HEALTH QUESTIONNAIRE - PHQ9
SUM OF ALL RESPONSES TO PHQ9 QUESTIONS 1 AND 2: 0
1. LITTLE INTEREST OR PLEASURE IN DOING THINGS: NOT AT ALL
2. FEELING DOWN, DEPRESSED, IRRITABLE, OR HOPELESS: NOT AT ALL

## 2021-07-15 ASSESSMENT — FIBROSIS 4 INDEX: FIB4 SCORE: 1.85

## 2021-07-15 NOTE — ASSESSMENT & PLAN NOTE
Resolved  Lactic acidosis likely secondary to sepsis   Continue IV fluid - watch for fluid overload

## 2021-07-15 NOTE — PROGRESS NOTES
Telemetry Shift Summary    Rhythm SR/SA, Paced on demand, BBB  HR Range 80s-90s  Ectopy frequent PAC  Measurements 0.20/0.14/0.38        Normal Values  Rhythm SR  HR Range    Measurements 0.12-0.20 / 0.06-0.10  / 0.30-0.52

## 2021-07-15 NOTE — CONSULTS
Critical Care Consultation    Date of consult: 7/14/2021    Referring Physician  Lesa Stoddard M.D.    Reason for Consultation  Hypotension     History of Presenting Illness  89 y.o. male who presented 7/14/2021 with hypotension. Patient with significant history of chronic indwelling wilson cathter who was brought from [facility] for generalized weakness evaluation. Upon presentation patient had a fever of 101, hypotensive with SBP ~70s, lactic acid 3.1, WBC 15.6,  and potassium 2.2. Intensivist consulted for ICU admission.     At the time of my evaluation 1700, patient is awake and following commands. He is lethargic but knows that he is at the hospital. Denies chest pain, fever, abdominal pain, or diarrhea. He is being resuscitated with IVF and LR + 40 mEQ KCL. Current vitals -> 98/61, HR 80s irregular, SpO2 98% on 2 liters NC, and RR ~20.     Code Status  Prior    Review of Systems  Review of Systems   Constitutional: Positive for chills, malaise/fatigue and weight loss. Negative for fever.   Respiratory: Positive for cough. Negative for hemoptysis, sputum production and shortness of breath.    Cardiovascular: Negative for chest pain, palpitations, orthopnea and claudication.   Gastrointestinal: Positive for nausea. Negative for abdominal pain, constipation, diarrhea and vomiting.       Past Medical History   has a past medical history of KOSTAS (acute kidney injury) ([facility]) (6/14/2021), CAD (coronary artery disease), Delirium (2/21/2020), DM (diabetes mellitus) ([facility]) (1/7/2014), Falls, Hypertension, and UTI (urinary tract infection) (11/4/2018). He also has no past medical history of ASTHMA, Cancer (HCC), Congestive heart failure (HCC), COPD, Liver disease, Seizure disorder (HCC), or Stroke (HCC).    Surgical History   has a past surgical history that includes other cardiac surgery; coronary artery bypass, 3; and janice rectal abscess.    Family History  family history is not on file.    Social History   reports that he quit  smoking about 48 years ago. He has never used smokeless tobacco. He reports previous alcohol use. He reports that he does not use drugs.    Medications  Home Medications     Reviewed by Anjum Burns (Pharmacy Tech) on 07/14/21 at 1603  Med List Status: Complete   Medication Last Dose Status   amLODIPine (NORVASC) 5 MG Tab 7/13/2021 Active   cefdinir (OMNICEF) 300 MG Cap > 2 days Active   chlorthalidone (HYGROTON) 25 MG Tab 7/13/2021 Active   cyclosporin (RESTASIS) 0.05 % ophthalmic emulsion 7/13/2021 Active   cyclosporin (RESTASIS) 0.05 % ophthalmic emulsion 7/13/2021 Active   finasteride (PROSCAR) 5 MG Tab 7/13/2021 Active   gabapentin (NEURONTIN) 100 MG Cap 7/13/2021 Active   insulin glargine (INSULIN GLARGINE) 100 UNIT/ML Solution Pen-injector injection 7/13/2021 Active   levothyroxine (SYNTHROID) 25 MCG Tab 7/13/2021 Active   metoprolol tartrate (LOPRESSOR) 25 MG Tab > 3 days Active   Multiple Vitamins-Minerals (PX MENS MULTIVITAMINS) Tab 7/13/2021 Active   potassium chloride ER (K-TAB) 20 MEQ Tab CR tablet 7/13/2021 Active   simvastatin (ZOCOR) 10 MG Tab 7/13/2021 Active   tamsulosin (FLOMAX) 0.4 MG capsule 7/13/2021 Active              No current facility-administered medications for this encounter.     Current Outpatient Medications   Medication Sig Dispense Refill   • cyclosporin (RESTASIS) 0.05 % ophthalmic emulsion Administer 1 Drop into both eyes every day.     • cefdinir (OMNICEF) 300 MG Cap Take 300 mg by mouth 2 times a day. Pt started on 7/10/2021 for 10 day course     • simvastatin (ZOCOR) 10 MG Tab Take 10 mg by mouth every evening.     • gabapentin (NEURONTIN) 100 MG Cap Take 100 mg by mouth 2 times a day.     • insulin glargine (INSULIN GLARGINE) 100 UNIT/ML Solution Pen-injector injection Inject 35 Units under the skin every evening.     • levothyroxine (SYNTHROID) 25 MCG Tab Take 1 tablet by mouth Every morning on an empty stomach. 30 tablet 3   • amLODIPine (NORVASC) 5 MG Tab Take 1  Tab by mouth every day. 30 Tab 1   • finasteride (PROSCAR) 5 MG Tab Take 1 Tab by mouth every day. 30 Tab 1   • potassium chloride ER (K-TAB) 20 MEQ Tab CR tablet Take 1 Tab by mouth 2 times a day. 60 Tab 1   • metoprolol tartrate (LOPRESSOR) 25 MG Tab Take 12.5 mg by mouth 2 times a day. 0.5 tablet = 12.5 mg.     • cyclosporin (RESTASIS) 0.05 % ophthalmic emulsion Administer 1 Drop into both eyes every evening.     • chlorthalidone (HYGROTON) 25 MG Tab Take 1 Tab by mouth every day. 30 Tab 1   • tamsulosin (FLOMAX) 0.4 MG capsule Take 0.4 mg by mouth every morning.     • Multiple Vitamins-Minerals (PX MENS MULTIVITAMINS) Tab Take 1 tablet by mouth every morning.         Allergies  Allergies   Allergen Reactions   • Iodine Anaphylaxis     Pt and pts wife report that it has been so long not sure what happens       Vital Signs last 24 hours  Temp:  [37.3 °C (99.2 °F)] 37.3 °C (99.2 °F)  Pulse:  [] 99  Resp:  [16] 16  BP: ()/(46-59) 100/59  SpO2:  [88 %-97 %] 97 %    Physical Exam  Physical Exam  Constitutional:       Appearance: He is ill-appearing.   HENT:      Head: Normocephalic.      Nose: Nose normal.   Eyes:      Pupils: Pupils are equal, round, and reactive to light.   Cardiovascular:      Rate and Rhythm: Normal rate. Rhythm irregular.      Pulses: Normal pulses.      Heart sounds: Murmur heard.     Pulmonary:      Comments: Decrease BS bilaterally     Abdominal:      General: Abdomen is flat. Bowel sounds are normal.      Palpations: Abdomen is soft.   Musculoskeletal:         General: Normal range of motion.      Cervical back: Normal range of motion.      Comments: Trace of swelling   Skin:     General: Skin is warm.   Neurological:      Mental Status: He is alert and oriented to person, place, and time.      Comments: Moving all four extremities         Fluids  No intake or output data in the 24 hours ending 07/14/21 4795    Laboratory  Recent Results (from the past 48 hour(s))   CBC WITH  DIFFERENTIAL    Collection Time: 07/14/21  3:29 PM   Result Value Ref Range    WBC 15.6 (H) 4.8 - 10.8 K/uL    RBC 4.08 (L) 4.70 - 6.10 M/uL    Hemoglobin 12.6 (L) 14.0 - 18.0 g/dL    Hematocrit 35.2 (L) 42.0 - 52.0 %    MCV 86.3 81.4 - 97.8 fL    MCH 30.9 27.0 - 33.0 pg    MCHC 35.8 (H) 33.7 - 35.3 g/dL    RDW 39.3 35.9 - 50.0 fL    Platelet Count 298 164 - 446 K/uL    MPV 10.3 9.0 - 12.9 fL    Neutrophils-Polys 69.00 44.00 - 72.00 %    Lymphocytes 8.00 (L) 22.00 - 41.00 %    Monocytes 1.00 0.00 - 13.40 %    Eosinophils 0.00 0.00 - 6.90 %    Basophils 0.00 0.00 - 1.80 %    Nucleated RBC 0.00 /100 WBC    Neutrophils (Absolute) 14.20 (H) 1.82 - 7.42 K/uL    Lymphs (Absolute) 1.25 1.00 - 4.80 K/uL    Monos (Absolute) 0.16 0.00 - 0.85 K/uL    Eos (Absolute) 0.00 0.00 - 0.51 K/uL    Baso (Absolute) 0.00 0.00 - 0.12 K/uL    NRBC (Absolute) 0.00 K/uL   COMP METABOLIC PANEL    Collection Time: 07/14/21  3:29 PM   Result Value Ref Range    Sodium 134 (L) 135 - 145 mmol/L    Potassium 2.2 (LL) 3.6 - 5.5 mmol/L    Chloride 94 (L) 96 - 112 mmol/L    Co2 23 20 - 33 mmol/L    Anion Gap 17.0 (H) 7.0 - 16.0    Glucose 317 (H) 65 - 99 mg/dL    Bun 14 8 - 22 mg/dL    Creatinine 1.01 0.50 - 1.40 mg/dL    Calcium 9.0 8.4 - 10.2 mg/dL    AST(SGOT) 15 12 - 45 U/L    ALT(SGPT) 7 2 - 50 U/L    Alkaline Phosphatase 102 (H) 30 - 99 U/L    Total Bilirubin 0.8 0.1 - 1.5 mg/dL    Albumin 3.1 (L) 3.2 - 4.9 g/dL    Total Protein 6.5 6.0 - 8.2 g/dL    Globulin 3.4 1.9 - 3.5 g/dL    A-G Ratio 0.9 g/dL   TROPONIN    Collection Time: 07/14/21  3:29 PM   Result Value Ref Range    Troponin T 201 (H) 6 - 19 ng/L   LACTIC ACID    Collection Time: 07/14/21  3:29 PM   Result Value Ref Range    Lactic Acid 3.3 (H) 0.5 - 2.0 mmol/L   ESTIMATED GFR    Collection Time: 07/14/21  3:29 PM   Result Value Ref Range    GFR If African American >60 >60 mL/min/1.73 m 2    GFR If Non African American >60 >60 mL/min/1.73 m 2   MAGNESIUM    Collection Time: 07/14/21   3:29 PM   Result Value Ref Range    Magnesium 1.1 (L) 1.5 - 2.5 mg/dL   DIFFERENTIAL MANUAL    Collection Time: 07/14/21  3:29 PM   Result Value Ref Range    Bands-Stabs 22.00 (H) 0.00 - 10.00 %    Manual Diff Status PERFORMED    PLATELET ESTIMATE    Collection Time: 07/14/21  3:29 PM   Result Value Ref Range    Plt Estimation Normal    MORPHOLOGY    Collection Time: 07/14/21  3:29 PM   Result Value Ref Range    RBC Morphology Normal    COV-2, FLU A/B, AND RSV BY PCR (2-4 HOURS CEPHEID): Collect NP swab in VTM    Collection Time: 07/14/21  4:00 PM    Specimen: Nasopharyngeal; Respirate   Result Value Ref Range    Influenza virus A RNA Negative Negative    Influenza virus B, PCR Negative Negative    RSV, PCR Negative Negative    SARS-CoV-2 by PCR NotDetected     SARS-CoV-2 Source NP Swab    URINALYSIS    Collection Time: 07/14/21  4:16 PM   Result Value Ref Range    Color Yellow     Character Cloudy (A)     Specific Gravity 1.010 <1.035    Ph 6.0 5.0 - 8.0    Glucose 500 (A) Negative mg/dL    Ketones 15 (A) Negative mg/dL    Protein 30 (A) Negative mg/dL    Bilirubin Negative Negative    Nitrite Positive (A) Negative    Leukocyte Esterase Large (A) Negative    Occult Blood Large (A) Negative    Micro Urine Req Microscopic    URINE CULTURE(NEW)    Collection Time: 07/14/21  4:16 PM    Specimen: Urine   Result Value Ref Range    Significant Indicator NEG     Source UR     Site Ureter     Culture Result -    URINE MICROSCOPIC (W/UA)    Collection Time: 07/14/21  4:16 PM   Result Value Ref Range    WBC Packed (A) /hpf    RBC >150 (A) /hpf    Bacteria Moderate (A) None /hpf    Epithelial Cells Rare Few /hpf   EKG    Collection Time: 07/14/21  4:35 PM   Result Value Ref Range    Report       [facility]    Test Date:  2021-07-14  Pt Name:    SAMMY CLIFFORD                 Department: [facility]  MRN:        [id number]                      Room:       -ROOM 7  Gender:     Male                          Technician: BETZAIDA  :        [id number]                   Requested By:ASHANTI JOE  Order #:    [id number]                    Reading MD: ASHANTI JOE MD    Measurements  Intervals                                Axis  Rate:       100                          P:  AZ:                                      QRS:        -75  QRSD:       174                          T:          18  QT:         428  QTc:        553    Interpretive Statements  ATRIAL FIBRILLATION  RBBB AND LAFB  Compared to ECG 2021 20:33:07  Left anterior fascicular block now present  Ventricular-paced complex(es) or rhythm no longer present  Electronically Signed On 2021 16:40:02 PDT by ASHANTI JOE MD         Imaging  DX-CHEST-PORTABLE (1 VIEW)   Final Result      1.  Ill-defined opacity in the right lower lung, which may represent pneumonia in the appropriate clinical settings.   2.  Stable cardiomegaly.          Assessment/Plan  Sepsis (HCC)  Assessment & Plan  This is Sepsis Present on admission  SIRS criteria identified on my evaluation include: Fever, with temperature greater than 101 deg F and Leukocytosis, with WBC greater than 12,000  Source is UTI   Sepsis protocol initiated  Fluid resuscitation ordered per protocol  IV antibiotics as appropriate for source of sepsis  While organ dysfunction may be noted elsewhere in this problem list or in the chart, degree of organ dysfunction does not meet CMS criteria for severe sepsis    Recommend starting cefepime for hx of recurrent pseudomonas UTI   Cont IVF   MAP goal > 65      Hypotension- (present on admission)  Assessment & Plan  -- Secondary to sepsis and dehydration   -- BP improved with IVF   -- Cont aggressive IVF resuscitation   -- Cont trending lactic acid    -- Cont abx per primary team   --  At this time, patient is considered appropriate for floor based on the currently available data. Current nursing needs assessed and can be met in the requested unit of care. In the  event of a clinical change, please call back with questions and will be happy to reassess patient.?      Discussed patient condition and risk of morbidity and/or mortality with RN, Patient and ERP.

## 2021-07-15 NOTE — DISCHARGE PLANNING
Anticipated Discharge Disposition:   TBD, possibly back to [facility]     Action:   Chart review complete.     Discussed patient's plan of care and plans for discharge during rounds.   Per MD, patient was admitted from a SNF and is on special precautions for Cdiff rule out. Patient is not medically cleared to return to SNF.     RN CM will continue to follow and assist.     Barriers to Discharge:   Medical Clearance    Plan:   Wait for medical clearance  Hospital care management will continue to assist with discharge planning needs.     Care Transition Team Assessment    Information Source  Orientation Level: Oriented to person, Oriented to place, Disoriented to time, Disoriented to situation  Information Given By: Other (Comments)  Who is responsible for making decisions for patient? : Legal next of kin  Name(s) of Primary Decision Maker: Mercedes Olivas    Readmission Evaluation  Is this a readmission?: Yes - unplanned readmission    Elopement Risk  Legal Hold: No  Ambulatory or Self Mobile in Wheelchair: No-Not an Elopement Risk  Disoriented: No  Psychiatric Symptoms: None  History of Wandering: No  Elopement this Admit: No  Vocalizing Wanting to Leave: No  Displays Behaviors, Body Language Wanting to Leave: No-Not at Risk for Elopement  Wanderguard On: No (See Comments)  Personal Belongings: Hospital Clothing Only    Interdisciplinary Discharge Planning  Does Admitting Nurse Feel This Could be a Complex Discharge?: No  Primary Care Physician: TABITHA LANDON  Lives with - Patient's Self Care Capacity: Spouse  Patient or legal guardian wants to designate a caregiver: No  Support Systems: Spouse / Significant Other, Children  Name of Care Facility: [facility]  Do You Take your Prescribed Medications Regularly: Yes  Able to Return to Previous ADL's: Future Time w/Therapy  Mobility Issues: Yes  Prior Services: Skilled Home Health Services ([facility] SNF)  Assistance Needed: Yes  Durable Medical Equipment:  Walker, Home Oxygen    Discharge Preparedness  What is your plan after discharge?: Uncertain - pending medical team collaboration  What are your discharge supports?: Spouse  Prior Functional Level: Needs Assist with Activities of Daily Living    Functional Assesment  Prior Functional Level: Needs Assist with Activities of Daily Living    Finances  Financial Barriers to Discharge: No  Prescription Coverage: Yes    Vision / Hearing Impairment  Vision Impairment : Yes  Right Eye Vision: Wears Glasses  Left Eye Vision: Wears Glasses  Hearing Impairment : No    Advance Directive  Advance Directive?: POLST    Domestic Abuse  Have you ever been the victim of abuse or violence?: No  Physical Abuse or Sexual Abuse: No  Verbal Abuse or Emotional Abuse: No  Possible Abuse/Neglect Reported to:: Not Applicable    Psychological Assessment  History of Substance Abuse: None  History of Psychiatric Problems: No    Discharge Risks or Barriers  Discharge risks or barriers?: Complex medical needs  Patient risk factors: Complex medical needs, Vulnerable adult    Anticipated Discharge Information  Discharge Disposition: Still a Patient (30)

## 2021-07-15 NOTE — ASSESSMENT & PLAN NOTE
UA positive for UTI  Patient was on Sher at nursing home. Sher was exchanged in ED  Continue IV fluid  Continue cefepime x 7 days - discussed briefly with ID for duration recommendation  urine culture positive for pseudomonas  blood culture no growth x 2

## 2021-07-15 NOTE — PROGRESS NOTES
Received report from Lainey GIRON. Patient transferred to [facility] isolation room 308 for C-Diff Rule Out. Patient transferred via hospital bed with all personal belongings.

## 2021-07-15 NOTE — ASSESSMENT & PLAN NOTE
New onset atrial fibrillation, [id number] score  >2  Started on therapeutic Lovenox with transition to eliquis  Rate controlled

## 2021-07-15 NOTE — HOSPITAL COURSE
Julien Dao is a 89 y.o. male with past medical history of diabetes mellitus, hypertension, on indwelling Wilson catheter, CAD who presented on 7/14/2021 from nursing facility fo generalized fatigue associated with fever, dry cough, nausea for last 3 days. He had wilson catheter exchanged in the ED.  EKG showed new onset atrial fibrillation.  He has a history of pseudomonas so cefepime initiated.  He has developed significant diarrhea and rectal tube needed to be placed.  C diff pending but is of high suspicion so I've also started empiric PO vanco.   Lactic acid has trended down to below 3 and IV fluids continued.   Electrolytes being replaced.

## 2021-07-15 NOTE — PROGRESS NOTES
[facility] Medicine Daily Progress Note    Date of Service  7/15/2021    Chief Complaint  Julien Dao is a 89 y.o. male admitted 7/14/2021 with fevers.    Hospital Course  Julien Dao is a 89 y.o. male with past medical history of diabetes mellitus, hypertension, on indwelling Wilson catheter, CAD who presented on 7/14/2021 from nursing facility fo generalized fatigue associated with fever, dry cough, nausea for last 3 days. He had wilson catheter exchanged in the ED.  EKG showed new onset atrial fibrillation.  He has a history of pseudomonas so cefepime initiated.  He has developed significant diarrhea and rectal tube needed to be placed.  C diff pending but is of high suspicion so I've also started empiric PO vanco.   Lactic acid has trended down to below 3 and IV fluids continued.   Electrolytes being replaced.       Interval Problem Update  7/15 Patient states he is cold and wonders when he is having surgery.  I explained to him there is no surgery planned.  K and Magnesium being replaced and patient remains in atrial fibrillation.      I have personally seen and examined the patient at bedside. I discussed the plan of care with patient, bedside RN, charge RN,  and pharmacy.    Consultants/Specialty  none    Code Status  DNAR/DNI    Disposition  Patient is not medically cleared.   Anticipate discharge to to skilled nursing facility.  I have placed the appropriate orders for post-discharge needs.    Review of Systems  Review of Systems   Constitutional: Positive for chills. Negative for fever.   HENT: Negative for congestion.    Eyes: Negative for blurred vision and photophobia.   Respiratory: Negative for cough and shortness of breath.    Cardiovascular: Negative for chest pain, claudication and leg swelling.   Gastrointestinal: Negative for abdominal pain, constipation, diarrhea, heartburn and vomiting.   Genitourinary: Negative for dysuria and hematuria.   Musculoskeletal: Negative for joint pain and  myalgias.   Skin: Negative for itching and rash.   Neurological: Positive for weakness. Negative for dizziness, sensory change, speech change and headaches.   Psychiatric/Behavioral: Negative for depression. The patient is not nervous/anxious and does not have insomnia.         Physical Exam  Temp:  [36.8 °C (98.2 °F)-37.3 °C (99.2 °F)] 36.9 °C (98.4 °F)  Pulse:  [] 85  Resp:  [16-18] 18  BP: ()/(40-71) 123/61  SpO2:  [88 %-99 %] 97 %    Physical Exam  Vitals and nursing note reviewed.   Constitutional:       General: He is not in acute distress.     Appearance: Normal appearance.   HENT:      Head: Normocephalic and atraumatic.   Eyes:      General: No scleral icterus.     Extraocular Movements: Extraocular movements intact.   Cardiovascular:      Rate and Rhythm: Normal rate and regular rhythm.      Pulses: Normal pulses.      Heart sounds: Normal heart sounds. No murmur heard.     Pulmonary:      Effort: Pulmonary effort is normal. No respiratory distress.      Breath sounds: Normal breath sounds. No wheezing, rhonchi or rales.   Abdominal:      General: Abdomen is flat. Bowel sounds are normal. There is no distension.      Palpations: Abdomen is soft.      Tenderness: There is no rebound.   Musculoskeletal:         General: No swelling or tenderness.      Cervical back: Normal range of motion and neck supple.   Lymphadenopathy:      Cervical: No cervical adenopathy.   Skin:     Coloration: Skin is not jaundiced.      Findings: No erythema.   Neurological:      General: No focal deficit present.      Mental Status: He is alert and oriented to person, place, and time. Mental status is at baseline.      Cranial Nerves: No cranial nerve deficit.   Psychiatric:         Mood and Affect: Mood normal.         Behavior: Behavior normal.         Fluids    Intake/Output Summary (Last 24 hours) at 7/15/2021 1405  Last data filed at 7/15/2021 1300  Gross per 24 hour   Intake 3547.75 ml   Output 1600 ml   Net  1947.75 ml       Laboratory  Recent Labs     07/14/21  1529 07/15/21  0152   WBC 15.6* 19.9*   RBC 4.08* 3.90*   HEMOGLOBIN 12.6* 12.0*   HEMATOCRIT 35.2* 35.2*   MCV 86.3 90.3   MCH 30.9 30.8   MCHC 35.8* 34.1   RDW 39.3 41.4   PLATELETCT 298 282   MPV 10.3 9.5     Recent Labs     07/14/21  1529 07/14/21 2129 07/15/21  0152   SODIUM 134* 134* 135   POTASSIUM 2.2* 3.0* 3.1*   CHLORIDE 94* 94* 96   CO2 23 26 24   GLUCOSE 317* 353* 301*   BUN 14 14 13   CREATININE 1.01 1.04 1.01   CALCIUM 9.0 8.4 8.6                   Imaging  DX-CHEST-PORTABLE (1 VIEW)   Final Result      1.  Ill-defined opacity in the right lower lung, which may represent pneumonia in the appropriate clinical settings.   2.  Stable cardiomegaly.           Assessment/Plan  * Sepsis (HCC)  Assessment & Plan  This is Sepsis Present on admission  SIRS criteria identified on my evaluation include: Fever, with temperature greater than 101 deg F,and Leukocytosis, with WBC greater than 12,000  Source is UTI/pneumonia  Sepsis protocol initiated  Fluid resuscitation ordered per protocol  IV antibiotics as appropriate for source of sepsis  While organ dysfunction may be noted elsewhere in this problem list or in the chart, degree of organ dysfunction does not meet CMS criteria for severe sepsis          Pneumonia  Assessment & Plan  Ill-defined opacity in the right lower lung, which may represent pneumonia .  Patient reports of cough.  Continue IV antibiotics  Follow blood cultures/sputum culture    A-fib (HCC)  Assessment & Plan  New onset atrial fibrillation, [id number] score  >2  Likely in setting of sepsis  Started on therapeutic Lovenox with transition to DOAC once stable.  Rate controlled    Hypomagnesemia  Assessment & Plan  Magnesium replete as needed      High anion gap metabolic acidosis  Assessment & Plan  High anion gap metabolic services in setting of sepsis  Continue to trend lactate  Continue IV fluid  Continue on IV antibiotic  Follow-blood  cultures/urine culture    UTI (urinary tract infection)  Assessment & Plan  UA positive for UTI  Patient was on Sher at nursing home. Sher was exchanged in ED  Continue IV fluid  Continue cefepime  Follow urine culture, blood culture    Lactic acidosis- (present on admission)  Assessment & Plan  Lactic acidosis likely secondary to sepsis   Continue IV fluid - watch for fluid overload   Trend lactic acid     Hypotension- (present on admission)  Assessment & Plan  Resolved, as bp improves, restart BB in setting of atrial fibrillation.      Hypokalemia  Assessment & Plan  Labs noted with severe hypokalemia  Continue on oral KCl/IV KCl  Started on NS with KCl  Given 2 g of IV magnesium    Diarrhea- (present on admission)  Assessment & Plan  Odor/frequency suspicious of c diff  Rule out pending  WBC elevated  Empiric vanco PO.         VTE prophylaxis: enoxaparin ppx    I have performed a physical exam and reviewed and updated ROS and Plan today (7/15/2021). In review of yesterday's note (7/14/2021), there are no changes except as documented above.

## 2021-07-15 NOTE — ASSESSMENT & PLAN NOTE
This is Sepsis Present on admission  SIRS criteria identified on my evaluation include: Fever, with temperature greater than 101 deg F and Leukocytosis, with WBC greater than 12,000  Source is UTI   Sepsis protocol initiated  Fluid resuscitation ordered per protocol  IV antibiotics as appropriate for source of sepsis  While organ dysfunction may be noted elsewhere in this problem list or in the chart, degree of organ dysfunction does not meet CMS criteria for severe sepsis    Recommend starting cefepime for hx of recurrent pseudomonas UTI   Cont IVF   MAP goal > 65

## 2021-07-15 NOTE — PROGRESS NOTES
4 Eyes Skin Assessment Completed by Roseanne RN and MACK Paiz.    Head WDL  Ears WDL  Nose WDL  Mouth WDL  Neck WDL  Breast/Chest WDL  Shoulder Blades WDL  Spine WDL  (R) Arm/Elbow/Hand WDL  (L) Arm/Elbow/Hand WDL  Abdomen WDL  Groin Redness  Scrotum/Coccyx/Buttocks WDL blanchable patch of redness on L buttock  (R) Leg WDL  (L) Leg WDL  (R) Heel/Foot/Toe WDL  (L) Heel/Foot/Toe WDL          Devices In Places Tele Box      Interventions In Place N/A    Possible Skin Injury No    Pictures Uploaded Into Epic N/A  Wound Consult Placed N/A  RN Wound Prevention Protocol Ordered No

## 2021-07-15 NOTE — H&P
Hospital Medicine History & Physical Note    Date of Service  7/14/2021    Primary Care Physician  TABITHA Powell.    Consultants  intensivist     Specialist Names:  Dr Kinney    Code Status  DNAR/DNI    Chief Complaint  Chief Complaint   Patient presents with   • Weakness     Pt came in via [facility] from nursing home facility, Hx of chronic UTI for the past year, here today with c/o weakness, nausea for the past few days. Fever at 101.5F for [facility] he was given 1g of tylenol. IV stablished by [facility] and 600mls of LR given, current BP in the ED at 81/48, oxygen in place at 2L, RA oxygen at 88%.        History of Presenting Illness  Julien Dao is a 89 y.o. male with past medical history of diabetes mellitus, hypertension, on indwelling Sher catheter, CAD who presented on 7/14/2021 from nursing facility fo generalized fatigue associated with fever, dry cough, nausea for last 3 days. .denies chest pain , palpation, Shortness of breathe . No change in bowel and bladder habit , no change in weight .  Denies weakness/numbness,visual changes ,headache ,syncope.     On arrival to ED patient remained febrile, hypotensive. Labs remarkable of neutrophil leukocytosis with bandemia 22, hyponatremia, hypomagnesemia, elevated lactate. UA positive for UTI. Chest xray noted with possible right lower lobe infiltrate. Ekg with new onset of A. fib. Sepsis protocol initiated by by ED physician . Evaluated by intensivist  recommended to admit on the floor for further management.        I discussed the plan of care with patient.    Review of Systems  Review of Systems   Constitutional: Positive for chills and fever.   HENT: Negative for hearing loss.    Eyes: Negative for blurred vision.   Respiratory: Positive for cough. Negative for sputum production.    Cardiovascular: Negative for chest pain and palpitations.   Gastrointestinal: Negative for nausea and vomiting.   Genitourinary: Negative for dysuria and urgency.    Musculoskeletal: Negative for myalgias.   Skin: Negative for rash.   Neurological: Negative for dizziness and headaches.   Endo/Heme/Allergies: Does not bruise/bleed easily.       Past Medical History   has a past medical history of KOSTAS (acute kidney injury) ([facility]) (6/14/2021), CAD (coronary artery disease), Delirium (2/21/2020), DM (diabetes mellitus) ([facility]) (1/7/2014), Falls, Hypertension, and UTI (urinary tract infection) (11/4/2018).    Surgical History   has a past surgical history that includes other cardiac surgery; coronary artery bypass, 3; and janice rectal abscess.     Family History  family history is not on file.   Family history reviewed with patient. There is no family history that is pertinent to the chief complaint.     Social History   reports that he quit smoking about 48 years ago. He has never used smokeless tobacco. He reports previous alcohol use. He reports that he does not use drugs.    Allergies  Allergies   Allergen Reactions   • Iodine Anaphylaxis     Pt and pts wife report that it has been so long not sure what happens       Medications  Prior to Admission Medications   Prescriptions Last Dose Informant Patient Reported? Taking?   Multiple Vitamins-Minerals (PX MENS MULTIVITAMINS) Tab 7/13/2021 at 0900 Significant Other Yes No   Sig: Take 1 tablet by mouth every morning.   amLODIPine (NORVASC) 5 MG Tab 7/13/2021 at 0900 Significant Other No No   Sig: Take 1 Tab by mouth every day.   cefdinir (OMNICEF) 300 MG Cap > 2 days at STOPPED Significant Other Yes Yes   Sig: Take 300 mg by mouth 2 times a day. Pt started on 7/10/2021 for 10 day course   chlorthalidone (HYGROTON) 25 MG Tab 7/13/2021 at 0900 Significant Other No No   Sig: Take 1 Tab by mouth every day.   cyclosporin (RESTASIS) 0.05 % ophthalmic emulsion 7/13/2021 at 2100 Significant Other Yes No   Sig: Administer 1 Drop into both eyes every evening.   cyclosporin (RESTASIS) 0.05 % ophthalmic emulsion 7/13/2021 at 0900 Significant Other  Yes Yes   Sig: Administer 1 Drop into both eyes every day.   finasteride (PROSCAR) 5 MG Tab 7/13/2021 at 0900 Significant Other No No   Sig: Take 1 Tab by mouth every day.   gabapentin (NEURONTIN) 100 MG Cap 7/13/2021 at 2100 Significant Other Yes No   Sig: Take 100 mg by mouth 2 times a day.   insulin glargine (INSULIN GLARGINE) 100 UNIT/ML Solution Pen-injector injection 7/13/2021 at 1800 Significant Other Yes No   Sig: Inject 35 Units under the skin every evening.   levothyroxine (SYNTHROID) 25 MCG Tab 7/13/2021 at 0900 Significant Other No No   Sig: Take 1 tablet by mouth Every morning on an empty stomach.   metoprolol tartrate (LOPRESSOR) 25 MG Tab > 3 days at Ran out  Significant Other Yes No   Sig: Take 12.5 mg by mouth 2 times a day. 0.5 tablet = 12.5 mg.   potassium chloride ER (K-TAB) 20 MEQ Tab CR tablet 7/13/2021 at 2100 Significant Other No No   Sig: Take 1 Tab by mouth 2 times a day.   simvastatin (ZOCOR) 10 MG Tab 7/13/2021 at 2100 Significant Other Yes Yes   Sig: Take 10 mg by mouth every evening.   tamsulosin (FLOMAX) 0.4 MG capsule 7/13/2021 at 0900 Significant Other Yes No   Sig: Take 0.4 mg by mouth every morning.      Facility-Administered Medications: None       Physical Exam  Temp:  [37.3 °C (99.2 °F)] 37.3 °C (99.2 °F)  Pulse:  [] 95  Resp:  [16] 16  BP: ()/(40-71) 103/69  SpO2:  [88 %-98 %] 96 %    Physical Exam  Constitutional:       General: He is not in acute distress.     Appearance: Normal appearance.   HENT:      Head: Normocephalic and atraumatic.      Right Ear: Tympanic membrane normal.      Left Ear: Tympanic membrane normal.      Mouth/Throat:      Mouth: Mucous membranes are dry.   Eyes:      Pupils: Pupils are equal, round, and reactive to light.   Cardiovascular:      Rate and Rhythm: Tachycardia present. Rhythm irregular.      Pulses: Normal pulses.      Heart sounds: Normal heart sounds. No murmur heard.     Pulmonary:      Effort: Pulmonary effort is normal. No  respiratory distress.      Breath sounds: Normal breath sounds.   Abdominal:      General: Abdomen is flat. There is no distension.   Musculoskeletal:      Cervical back: Neck supple.      Right lower leg: No edema.      Left lower leg: No edema.   Skin:     General: Skin is warm.      Capillary Refill: Capillary refill takes less than 2 seconds.   Neurological:      General: No focal deficit present.      Mental Status: He is alert. Mental status is at baseline.         Laboratory:  Recent Labs     07/14/21  1529   WBC 15.6*   RBC 4.08*   HEMOGLOBIN 12.6*   HEMATOCRIT 35.2*   MCV 86.3   MCH 30.9   MCHC 35.8*   RDW 39.3   PLATELETCT 298   MPV 10.3     Recent Labs     07/14/21  1529   SODIUM 134*   POTASSIUM 2.2*   CHLORIDE 94*   CO2 23   GLUCOSE 317*   BUN 14   CREATININE 1.01   CALCIUM 9.0     Recent Labs     07/14/21  1529   ALTSGPT 7   ASTSGOT 15   ALKPHOSPHAT 102*   TBILIRUBIN 0.8   GLUCOSE 317*         No results for input(s): NTPROBNP in the last 72 hours.      Recent Labs     07/14/21  1529   TROPONINT 201*       Imaging:  DX-CHEST-PORTABLE (1 VIEW)   Final Result      1.  Ill-defined opacity in the right lower lung, which may represent pneumonia in the appropriate clinical settings.   2.  Stable cardiomegaly.          X-Ray:  I have personally reviewed the images and compared with prior images.  EKG:  I have personally reviewed the images and compared with prior images.         Assessment/Plan:  I anticipate this patient will require at least two midnights for appropriate medical management, necessitating inpatient admission.    * Sepsis (HCC)  Assessment & Plan  This is Sepsis Present on admission  SIRS criteria identified on my evaluation include: Fever, with temperature greater than 101 deg F,and Leukocytosis, with WBC greater than 12,000  Source is UTI/pneumonia  Sepsis protocol initiated  Fluid resuscitation ordered per protocol  IV antibiotics as appropriate for source of sepsis  While organ  dysfunction may be noted elsewhere in this problem list or in the chart, degree of organ dysfunction does not meet CMS criteria for severe sepsis          Pneumonia  Assessment & Plan  Ill-defined opacity in the right lower lung, which may represent pneumonia .  Patient reports of cough.  Continue IV antibiotics  Follow blood cultures/sputum culture    A-fib (HCC)  Assessment & Plan  New onset atrial fibrillation, [id number] score  >2  Likely in setting of sepsis  Started on therapeutic Lovenox    Hypomagnesemia  Assessment & Plan  Magnesium replaced      High anion gap metabolic acidosis  Assessment & Plan  High anion gap metabolic services in setting of sepsis  Continue to trend lactate  Continue IV fluid  Continue on IV antibiotic  Follow-blood cultures/urine culture    UTI (urinary tract infection)  Assessment & Plan  UA positive for UTI  Patient was on Sher at nursing home. Sher was changed in ED  Continue IV fluid  Started on cefepime  Follow urine culture, blood culture    Lactic acidosis- (present on admission)  Assessment & Plan  Lactic acidosis likely secondary to sepsis   Continue IV fluid - watch for fluid overload   Trend lactic acid     Hypokalemia  Assessment & Plan  Labs noted with severe hypokalemia  Continue on oral KCl/IV KCl  Started on NS with KCl  Given 2 g of IV magnesium      VTE prophylaxis: therapeutic anticoagulation with lovenox

## 2021-07-15 NOTE — CARE PLAN
The patient is Stable - Low risk of patient condition declining or worsening    Shift Goals  Clinical Goals: replace potassium  Patient Goals: talk to Mercedes    Progress made toward(s) clinical / shift goals:  currently replacing potassium     Patient is not progressing towards the following goals:        Problem: Fall Risk  Goal: Patient will remain free from falls  Outcome: Progressing   Bed alarm on, strip alarm on, call light within reach.

## 2021-07-15 NOTE — PROGRESS NOTES
Tele strip at 0230 shows A-Fib.      Measurements from am strip were as follows:  AL=-  QRS=0.16  QT=-    Tele Shift Summary:    Rhythm : A fib to SR and paced  Rate : 85  Ectopy : Per CCT [place], pt had no ectopy.     Telemetry monitoring strips placed in pt's chart.

## 2021-07-15 NOTE — ASSESSMENT & PLAN NOTE
This is Sepsis Present on admission  SIRS criteria identified on my evaluation include: Fever, with temperature greater than 101 deg F,and Leukocytosis, with WBC greater than 12,000  Source is UTI/pneumonia  Sepsis protocol initiated  Fluid resuscitation ordered per protocol  IV antibiotics as appropriate for source of sepsis  While organ dysfunction may be noted elsewhere in this problem list or in the chart, degree of organ dysfunction does not meet CMS criteria for severe sepsis

## 2021-07-15 NOTE — ASSESSMENT & PLAN NOTE
Labs noted with severe hypokalemia  Continue on oral KCl/IV KCl  Started on NS with KCl  Given 2 g of IV magnesium

## 2021-07-15 NOTE — ASSESSMENT & PLAN NOTE
-- Secondary to sepsis and dehydration   -- BP improved with IVF   -- Cont aggressive IVF resuscitation   -- Cont trending lactic acid    -- Cont abx per primary team   --  At this time, patient is considered appropriate for floor based on the currently available data. Current nursing needs assessed and can be met in the requested unit of care. In the event of a clinical change, please call back with questions and will be happy to reassess patient.?

## 2021-07-16 LAB
ANION GAP SERPL CALC-SCNC: 12 MMOL/L (ref 7–16)
ANION GAP SERPL CALC-SCNC: 12 MMOL/L (ref 7–16)
BUN SERPL-MCNC: 13 MG/DL (ref 8–22)
BUN SERPL-MCNC: 13 MG/DL (ref 8–22)
CALCIUM SERPL-MCNC: 8.2 MG/DL (ref 8.4–10.2)
CALCIUM SERPL-MCNC: 8.5 MG/DL (ref 8.4–10.2)
CHLORIDE SERPL-SCNC: 96 MMOL/L (ref 96–112)
CHLORIDE SERPL-SCNC: 99 MMOL/L (ref 96–112)
CO2 SERPL-SCNC: 24 MMOL/L (ref 20–33)
CO2 SERPL-SCNC: 26 MMOL/L (ref 20–33)
CREAT SERPL-MCNC: 0.81 MG/DL (ref 0.5–1.4)
CREAT SERPL-MCNC: 0.81 MG/DL (ref 0.5–1.4)
ERYTHROCYTE [DISTWIDTH] IN BLOOD BY AUTOMATED COUNT: 41.7 FL (ref 35.9–50)
GLUCOSE BLD-MCNC: 300 MG/DL (ref 65–99)
GLUCOSE BLD-MCNC: 324 MG/DL (ref 65–99)
GLUCOSE BLD-MCNC: 366 MG/DL (ref 65–99)
GLUCOSE SERPL-MCNC: 138 MG/DL (ref 65–99)
GLUCOSE SERPL-MCNC: 320 MG/DL (ref 65–99)
HCT VFR BLD AUTO: 34.6 % (ref 42–52)
HGB BLD-MCNC: 11.9 G/DL (ref 14–18)
MCH RBC QN AUTO: 30.7 PG (ref 27–33)
MCHC RBC AUTO-ENTMCNC: 34.4 G/DL (ref 33.7–35.3)
MCV RBC AUTO: 89.2 FL (ref 81.4–97.8)
PLATELET # BLD AUTO: 282 K/UL (ref 164–446)
PMV BLD AUTO: 10.3 FL (ref 9–12.9)
POTASSIUM SERPL-SCNC: 2.6 MMOL/L (ref 3.6–5.5)
POTASSIUM SERPL-SCNC: 2.9 MMOL/L (ref 3.6–5.5)
RBC # BLD AUTO: 3.88 M/UL (ref 4.7–6.1)
SODIUM SERPL-SCNC: 134 MMOL/L (ref 135–145)
SODIUM SERPL-SCNC: 135 MMOL/L (ref 135–145)
WBC # BLD AUTO: 20.7 K/UL (ref 4.8–10.8)

## 2021-07-16 PROCEDURE — 82962 GLUCOSE BLOOD TEST: CPT

## 2021-07-16 PROCEDURE — 700102 HCHG RX REV CODE 250 W/ 637 OVERRIDE(OP): Performed by: INTERNAL MEDICINE

## 2021-07-16 PROCEDURE — A9270 NON-COVERED ITEM OR SERVICE: HCPCS | Performed by: STUDENT IN AN ORGANIZED HEALTH CARE EDUCATION/TRAINING PROGRAM

## 2021-07-16 PROCEDURE — 700111 HCHG RX REV CODE 636 W/ 250 OVERRIDE (IP): Performed by: HOSPITALIST

## 2021-07-16 PROCEDURE — 770020 HCHG ROOM/CARE - TELE (206)

## 2021-07-16 PROCEDURE — 94760 N-INVAS EAR/PLS OXIMETRY 1: CPT

## 2021-07-16 PROCEDURE — 700105 HCHG RX REV CODE 258: Performed by: STUDENT IN AN ORGANIZED HEALTH CARE EDUCATION/TRAINING PROGRAM

## 2021-07-16 PROCEDURE — 85027 COMPLETE CBC AUTOMATED: CPT

## 2021-07-16 PROCEDURE — 99233 SBSQ HOSP IP/OBS HIGH 50: CPT | Performed by: INTERNAL MEDICINE

## 2021-07-16 PROCEDURE — A9270 NON-COVERED ITEM OR SERVICE: HCPCS | Performed by: INTERNAL MEDICINE

## 2021-07-16 PROCEDURE — 700111 HCHG RX REV CODE 636 W/ 250 OVERRIDE (IP): Performed by: STUDENT IN AN ORGANIZED HEALTH CARE EDUCATION/TRAINING PROGRAM

## 2021-07-16 PROCEDURE — 80048 BASIC METABOLIC PNL TOTAL CA: CPT

## 2021-07-16 PROCEDURE — 700102 HCHG RX REV CODE 250 W/ 637 OVERRIDE(OP): Performed by: STUDENT IN AN ORGANIZED HEALTH CARE EDUCATION/TRAINING PROGRAM

## 2021-07-16 RX ORDER — POTASSIUM CHLORIDE 7.45 MG/ML
10 INJECTION INTRAVENOUS
Status: COMPLETED | OUTPATIENT
Start: 2021-07-16 | End: 2021-07-16

## 2021-07-16 RX ORDER — POTASSIUM CHLORIDE 20 MEQ/1
40 TABLET, EXTENDED RELEASE ORAL 2 TIMES DAILY
Status: DISCONTINUED | OUTPATIENT
Start: 2021-07-16 | End: 2021-07-20 | Stop reason: HOSPADM

## 2021-07-16 RX ADMIN — CEFEPIME 2 G: 2 INJECTION, POWDER, FOR SOLUTION INTRAVENOUS at 23:01

## 2021-07-16 RX ADMIN — FINASTERIDE 5 MG: 5 TABLET, FILM COATED ORAL at 05:31

## 2021-07-16 RX ADMIN — INSULIN HUMAN 10 UNITS: 100 INJECTION, SOLUTION PARENTERAL at 20:49

## 2021-07-16 RX ADMIN — INSULIN HUMAN 12 UNITS: 100 INJECTION, SOLUTION PARENTERAL at 12:34

## 2021-07-16 RX ADMIN — CEFEPIME 2 G: 2 INJECTION, POWDER, FOR SOLUTION INTRAVENOUS at 12:35

## 2021-07-16 RX ADMIN — POTASSIUM CHLORIDE 10 MEQ: 7.46 INJECTION, SOLUTION INTRAVENOUS at 06:53

## 2021-07-16 RX ADMIN — ENOXAPARIN SODIUM 80 MG: 80 INJECTION SUBCUTANEOUS at 05:31

## 2021-07-16 RX ADMIN — POTASSIUM CHLORIDE 10 MEQ: 7.46 INJECTION, SOLUTION INTRAVENOUS at 08:11

## 2021-07-16 RX ADMIN — INSULIN HUMAN 7 UNITS: 100 INJECTION, SOLUTION PARENTERAL at 17:30

## 2021-07-16 RX ADMIN — POTASSIUM CHLORIDE 20 MEQ: 1500 TABLET, EXTENDED RELEASE ORAL at 05:31

## 2021-07-16 RX ADMIN — GABAPENTIN 100 MG: 100 CAPSULE ORAL at 17:31

## 2021-07-16 RX ADMIN — GABAPENTIN 100 MG: 100 CAPSULE ORAL at 05:31

## 2021-07-16 RX ADMIN — TAMSULOSIN HYDROCHLORIDE 0.4 MG: 0.4 CAPSULE ORAL at 05:31

## 2021-07-16 RX ADMIN — LEVOTHYROXINE SODIUM 25 MCG: 0.03 TABLET ORAL at 05:31

## 2021-07-16 RX ADMIN — POTASSIUM CHLORIDE 40 MEQ: 1500 TABLET, EXTENDED RELEASE ORAL at 17:31

## 2021-07-16 ASSESSMENT — ENCOUNTER SYMPTOMS
HEARTBURN: 0
PHOTOPHOBIA: 0
CLAUDICATION: 0
CONSTIPATION: 0
ABDOMINAL PAIN: 0
BLURRED VISION: 0
COUGH: 0
FEVER: 0
SPEECH CHANGE: 0
SHORTNESS OF BREATH: 0
NERVOUS/ANXIOUS: 1
DIARRHEA: 0
DEPRESSION: 0
HEADACHES: 0
VOMITING: 0
DIZZINESS: 0
MYALGIAS: 0
CHILLS: 0
SENSORY CHANGE: 0
INSOMNIA: 0
WEAKNESS: 1

## 2021-07-16 ASSESSMENT — FIBROSIS 4 INDEX: FIB4 SCORE: 1.85

## 2021-07-16 NOTE — PROGRESS NOTES
[facility] Medicine Daily Progress Note    Date of Service  7/16/2021    Chief Complaint  Julien Dao is a 89 y.o. male admitted 7/14/2021 with fevers.    Hospital Course  Julien Dao is a 89 y.o. male with past medical history of diabetes mellitus, hypertension, on indwelling Wilson catheter, CAD who presented on 7/14/2021 from nursing facility fo generalized fatigue associated with fever, dry cough, nausea for last 3 days. He had wilson catheter exchanged in the ED.  EKG showed new onset atrial fibrillation.  He has a history of pseudomonas so cefepime initiated.  He has developed significant diarrhea and rectal tube needed to be placed.  C diff pending but is of high suspicion so I've also started empiric PO vanco.   Lactic acid has trended down to below 3 and IV fluids continued.   Electrolytes being replaced.       Interval Problem Update  7/15 Patient states he is cold and wonders when he is having surgery.  I explained to him there is no surgery planned.  K and Magnesium being replaced and patient remains in atrial fibrillation.    7/16 Patient much more agitated today, yelling out into the hallway as he wants a phone to call the president.  WBC is about the same but patient is much more awake today.  Based on previous urine culture, will continue cefepime for now.  K was markedly low and received PO and IV replacement - recheck level pending . C diff resulted negative so empiric PO vanco discontinued.     I have personally seen and examined the patient at bedside. I discussed the plan of care with patient, bedside RN, charge RN,  and pharmacy.    Consultants/Specialty  none    Code Status  DNAR/DNI    Disposition  Patient is not medically cleared.   Anticipate discharge to to skilled nursing facility.  I have placed the appropriate orders for post-discharge needs.    Review of Systems  Review of Systems   Constitutional: Negative for chills and fever.   HENT: Negative for congestion.    Eyes:  Negative for blurred vision and photophobia.   Respiratory: Negative for cough and shortness of breath.    Cardiovascular: Negative for chest pain, claudication and leg swelling.   Gastrointestinal: Negative for abdominal pain, constipation, diarrhea, heartburn and vomiting.   Genitourinary: Negative for dysuria and hematuria.   Musculoskeletal: Negative for joint pain and myalgias.   Skin: Negative for itching and rash.   Neurological: Positive for weakness. Negative for dizziness, sensory change, speech change and headaches.   Psychiatric/Behavioral: Negative for depression. The patient is nervous/anxious. The patient does not have insomnia.         Physical Exam  Temp:  [36.9 °C (98.4 °F)-37.8 °C (100 °F)] 37.2 °C (98.9 °F)  Pulse:  [85-93] 91  Resp:  [16-18] 18  BP: (110-126)/(57-69) 126/58  SpO2:  [88 %-97 %] 92 %    Physical Exam  Vitals and nursing note reviewed.   Constitutional:       General: He is not in acute distress.     Appearance: Normal appearance.   HENT:      Head: Normocephalic and atraumatic.   Eyes:      General: No scleral icterus.     Extraocular Movements: Extraocular movements intact.   Cardiovascular:      Rate and Rhythm: Normal rate and regular rhythm.      Pulses: Normal pulses.      Heart sounds: Murmur heard.     Pulmonary:      Effort: Pulmonary effort is normal. No respiratory distress.      Breath sounds: Normal breath sounds. No wheezing, rhonchi or rales.   Abdominal:      General: Abdomen is flat. Bowel sounds are normal. There is no distension.      Palpations: Abdomen is soft.      Tenderness: There is no rebound.   Musculoskeletal:         General: No swelling or tenderness.      Cervical back: Normal range of motion and neck supple.   Lymphadenopathy:      Cervical: No cervical adenopathy.   Skin:     Coloration: Skin is not jaundiced.      Findings: No erythema.   Neurological:      General: No focal deficit present.      Mental Status: He is alert. He is disoriented.       Cranial Nerves: No cranial nerve deficit.      Comments:      Psychiatric:      Comments: Oriented to self only, agitated, wants to call [place] and speak to the president.         Fluids    Intake/Output Summary (Last 24 hours) at 7/16/2021 1142  Last data filed at 7/16/2021 0547  Gross per 24 hour   Intake 1773.75 ml   Output 3400 ml   Net -1626.25 ml       Laboratory  Recent Labs     07/14/21  1529 07/15/21  0152 07/16/21  0327   WBC 15.6* 19.9* 20.7*   RBC 4.08* 3.90* 3.88*   HEMOGLOBIN 12.6* 12.0* 11.9*   HEMATOCRIT 35.2* 35.2* 34.6*   MCV 86.3 90.3 89.2   MCH 30.9 30.8 30.7   MCHC 35.8* 34.1 34.4   RDW 39.3 41.4 41.7   PLATELETCT 298 282 282   MPV 10.3 9.5 10.3     Recent Labs     07/14/21  2129 07/15/21  0152 07/16/21  0327   SODIUM 134* 135 134*   POTASSIUM 3.0* 3.1* 2.6*   CHLORIDE 94* 96 96   CO2 26 24 26   GLUCOSE 353* 301* 138*   BUN 14 13 13   CREATININE 1.04 1.01 0.81   CALCIUM 8.4 8.6 8.5                   Imaging  DX-CHEST-PORTABLE (1 VIEW)   Final Result      1.  Ill-defined opacity in the right lower lung, which may represent pneumonia in the appropriate clinical settings.   2.  Stable cardiomegaly.           Assessment/Plan  * Sepsis ([facility])  Assessment & Plan  This is Sepsis Present on admission  SIRS criteria identified on my evaluation include: Fever, with temperature greater than 101 deg F,and Leukocytosis, with WBC greater than 12,000  Source is UTI/pneumonia  Sepsis protocol initiated  Fluid resuscitation ordered per protocol  IV antibiotics as appropriate for source of sepsis  While organ dysfunction may be noted elsewhere in this problem list or in the chart, degree of organ dysfunction does not meet CMS criteria for severe sepsis          Pneumonia  Assessment & Plan  Ill-defined opacity in the right lower lung, which may represent pneumonia .  Patient reports of cough.  Continue IV antibiotics  Follow blood cultures/sputum culture    A-fib ([facility])  Assessment & Plan  New onset atrial  fibrillation, [id number] score  >2  Likely in setting of sepsis  Started on therapeutic Lovenox with transition to DOAC once stable.  Rate controlled    Hypomagnesemia  Assessment & Plan  Magnesium replete as needed      High anion gap metabolic acidosis  Assessment & Plan  High anion gap metabolic services in setting of sepsis  Continue to trend lactate  Continue IV fluid  Continue on IV antibiotic  Follow-blood cultures/urine culture    UTI (urinary tract infection)  Assessment & Plan  UA positive for UTI  Patient was on Sher at nursing home. Sher was exchanged in ED  Continue IV fluid  Continue cefepime  Follow urine culture, blood culture    Acute encephalopathy- (present on admission)  Assessment & Plan  Secondary to sepsis, uti and likely underlying dementia      Lactic acidosis- (present on admission)  Assessment & Plan  Lactic acidosis likely secondary to sepsis   Continue IV fluid - watch for fluid overload   Trend lactic acid     Hypotension- (present on admission)  Assessment & Plan  Resolved, as bp improves, restart BB in setting of atrial fibrillation.      Hypokalemia  Assessment & Plan  Labs noted with severe hypokalemia  Continue on oral KCl/IV KCl  Started on NS with KCl  Given 2 g of IV magnesium    Diarrhea- (present on admission)  Assessment & Plan  Odor/frequency suspicious of c diff  Rule out pending  WBC elevated  Empiric vanco PO.       VTE prophylaxis: enoxaparin ppx    I have performed a physical exam and reviewed and updated ROS and Plan today (7/16/2021). In review of yesterday's note (7/15/2021), there are no changes except as documented above.

## 2021-07-16 NOTE — PROGRESS NOTES
Telemetry Shift Summary     Rhythm SA with BBB  HR Range 76 - 96  Ectopy O PAC; R BIG; R COUP  Measurements 0.14 / 0.18 / 0.38           Normal Values  Rhythm SR  HR Range    Measurements 0.12-0.20 / 0.06-0.10  / 0.30-0.52

## 2021-07-16 NOTE — DOCUMENTATION QUERY
[facility]                                                                       Query Response Note      PATIENT:               SAMMY CLIFFORD  ACCT #:                  [id number]  MRN:                     [id number]  :                      3/7/1929  ADMIT DATE:       2021 3:05 PM  DISCH DATE:          RESPONDING  PROVIDER #:        [id number]           QUERY TEXT:    Please clarify in documentation the relationship, if any, between the indwelling wilson catheter and the UTI (sepsis).    The patient's Clinical Indicators include:  Sepsis due to UTI is documented in the H&P and Progress Notes  Pt also has an indwelling wilson catheter     Findings:  urine showed packed w/WBC's and mod bacteria and > 150 RBC's -> culture is pending  WBC 15.6 on admission  bands 22.00  procalcitonin 0.61    Treatment:  IVF resus  IV abx    Risk:  Pt age admission for sepsis due to UTI/PNA w/long term indwelling wilson    Thank you,  Yesenia Garzon, RN  Clinical   [facility]  connect via [employer]  [phone]  Options provided:   -- Condition UTI is due to or associated with indwelling wilson catheter   -- Unrelated to each other   -- Unable to determine      Query created by: Yesenia Garzon on 7/15/2021 5:10 PM    RESPONSE TEXT:    Condition UTI is due to or associated with indwelling wilson catheter          Electronically signed by:  TIEN JETT DO 2021 11:37 AM

## 2021-07-16 NOTE — CARE PLAN
The patient is Stable - Low risk of patient condition declining or worsening    Shift Goals  Clinical Goals: Eletrolyte Balance  Patient Goals: Rest and Sleep    Progress made toward(s) clinical / shift goals:  Progressing    Problem: Knowledge Deficit - Standard  Goal: Patient and family/care givers will demonstrate understanding of plan of care, disease process/condition, diagnostic tests and medications  Outcome: Progressing  Note: Patient is A x 2. Reoriented to room. Patient updated on the plan of care. Encouraged to voice feelings and to ask questions. Answered all questions and concerns. Agrees with the plan of care.      Problem: Fall Risk  Goal: Patient will remain free from falls  Outcome: Progressing  Note: Safety precautions in place. Bed alarm ON. Will continue to monitor patient.

## 2021-07-16 NOTE — PROGRESS NOTES
Vancomycin PO discussed with On-Call Hospitalist and Clinical Pharmacist. Discontinued after Negative C. Diff. result.

## 2021-07-16 NOTE — DISCHARGE PLANNING
Anticipated Discharge Disposition: SNF vs Home w/ HH    Action: LSW called pt's spouse, Mercedes, to discuss SNF and d/c planning. Mercedes states she does not want pt to go back to SNF. Per Mercedes, pt has been on service w/ [facility]. Per Mercedes, depending on what the pt needs, she feels she can take care of him at home w/ HH. Per Mercedes, she can take care of his basic needs and the [facility] RN or Urgent Care RN take care of the catheter. Per Mercedes, pt has a walker, shower chair, and safety bars at home. Per Mercedes, pt is seeing Dr. Danis Lehman from [facility] for his primary care. Per Mercedes, she can reach out to her son in [place] if she needs anything. Mercedes also stated she has a neighbor who works who has been helpful. Per Mercedes, pt does not want his daughter helping at the house.     Mercedes gave LSW verbal consent to send referral to [facility] to resume services if/when order is placed.     LSW notified MD that Mercedes does not want pt to go back to SNF.    Barriers to Discharge: POC/GOC    Plan: LSW to provide caregiver list to Mercedes when she comes to visit pt    1358: LSW met w/ pt and Mercedes at bedside. LSW provided Mercedes w/ care giver list.

## 2021-07-16 NOTE — PROGRESS NOTES
Received bedside patient report from MACK Florez. Patient resting comfortably in bed, no complaints at this time. Safety precautions in place. Will continue to monitor.

## 2021-07-17 LAB
ANION GAP SERPL CALC-SCNC: 12 MMOL/L (ref 7–16)
BACTERIA UR CULT: ABNORMAL
BACTERIA UR CULT: ABNORMAL
BUN SERPL-MCNC: 17 MG/DL (ref 8–22)
CALCIUM SERPL-MCNC: 8.5 MG/DL (ref 8.4–10.2)
CHLORIDE SERPL-SCNC: 98 MMOL/L (ref 96–112)
CO2 SERPL-SCNC: 25 MMOL/L (ref 20–33)
CREAT SERPL-MCNC: 0.88 MG/DL (ref 0.5–1.4)
ERYTHROCYTE [DISTWIDTH] IN BLOOD BY AUTOMATED COUNT: 41.6 FL (ref 35.9–50)
GLUCOSE BLD-MCNC: 271 MG/DL (ref 65–99)
GLUCOSE BLD-MCNC: 286 MG/DL (ref 65–99)
GLUCOSE BLD-MCNC: 323 MG/DL (ref 65–99)
GLUCOSE SERPL-MCNC: 104 MG/DL (ref 65–99)
HCT VFR BLD AUTO: 36.7 % (ref 42–52)
HGB BLD-MCNC: 12.6 G/DL (ref 14–18)
MCH RBC QN AUTO: 30.9 PG (ref 27–33)
MCHC RBC AUTO-ENTMCNC: 34.3 G/DL (ref 33.7–35.3)
MCV RBC AUTO: 90 FL (ref 81.4–97.8)
PLATELET # BLD AUTO: 318 K/UL (ref 164–446)
PMV BLD AUTO: 10.3 FL (ref 9–12.9)
POTASSIUM SERPL-SCNC: 2.9 MMOL/L (ref 3.6–5.5)
RBC # BLD AUTO: 4.08 M/UL (ref 4.7–6.1)
SIGNIFICANT IND 70042: ABNORMAL
SITE SITE: ABNORMAL
SODIUM SERPL-SCNC: 135 MMOL/L (ref 135–145)
SOURCE SOURCE: ABNORMAL
WBC # BLD AUTO: 12.8 K/UL (ref 4.8–10.8)

## 2021-07-17 PROCEDURE — 700111 HCHG RX REV CODE 636 W/ 250 OVERRIDE (IP): Performed by: STUDENT IN AN ORGANIZED HEALTH CARE EDUCATION/TRAINING PROGRAM

## 2021-07-17 PROCEDURE — A9270 NON-COVERED ITEM OR SERVICE: HCPCS | Performed by: STUDENT IN AN ORGANIZED HEALTH CARE EDUCATION/TRAINING PROGRAM

## 2021-07-17 PROCEDURE — 770020 HCHG ROOM/CARE - TELE (206)

## 2021-07-17 PROCEDURE — 700102 HCHG RX REV CODE 250 W/ 637 OVERRIDE(OP): Performed by: STUDENT IN AN ORGANIZED HEALTH CARE EDUCATION/TRAINING PROGRAM

## 2021-07-17 PROCEDURE — 82962 GLUCOSE BLOOD TEST: CPT

## 2021-07-17 PROCEDURE — 85027 COMPLETE CBC AUTOMATED: CPT

## 2021-07-17 PROCEDURE — 99232 SBSQ HOSP IP/OBS MODERATE 35: CPT | Performed by: INTERNAL MEDICINE

## 2021-07-17 PROCEDURE — 700102 HCHG RX REV CODE 250 W/ 637 OVERRIDE(OP): Performed by: INTERNAL MEDICINE

## 2021-07-17 PROCEDURE — 94760 N-INVAS EAR/PLS OXIMETRY 1: CPT

## 2021-07-17 PROCEDURE — A9270 NON-COVERED ITEM OR SERVICE: HCPCS | Performed by: INTERNAL MEDICINE

## 2021-07-17 PROCEDURE — 700105 HCHG RX REV CODE 258: Performed by: STUDENT IN AN ORGANIZED HEALTH CARE EDUCATION/TRAINING PROGRAM

## 2021-07-17 PROCEDURE — 80048 BASIC METABOLIC PNL TOTAL CA: CPT

## 2021-07-17 RX ADMIN — CEFEPIME 2 G: 2 INJECTION, POWDER, FOR SOLUTION INTRAVENOUS at 11:48

## 2021-07-17 RX ADMIN — APIXABAN 5 MG: 5 TABLET, FILM COATED ORAL at 17:19

## 2021-07-17 RX ADMIN — INSULIN HUMAN 7 UNITS: 100 INJECTION, SOLUTION PARENTERAL at 17:20

## 2021-07-17 RX ADMIN — POTASSIUM CHLORIDE 40 MEQ: 1500 TABLET, EXTENDED RELEASE ORAL at 17:19

## 2021-07-17 RX ADMIN — LEVOTHYROXINE SODIUM 25 MCG: 0.03 TABLET ORAL at 05:38

## 2021-07-17 RX ADMIN — CEFEPIME 2 G: 2 INJECTION, POWDER, FOR SOLUTION INTRAVENOUS at 22:41

## 2021-07-17 RX ADMIN — TAMSULOSIN HYDROCHLORIDE 0.4 MG: 0.4 CAPSULE ORAL at 05:38

## 2021-07-17 RX ADMIN — INSULIN HUMAN 7 UNITS: 100 INJECTION, SOLUTION PARENTERAL at 11:49

## 2021-07-17 RX ADMIN — GABAPENTIN 100 MG: 100 CAPSULE ORAL at 17:18

## 2021-07-17 RX ADMIN — FINASTERIDE 5 MG: 5 TABLET, FILM COATED ORAL at 05:38

## 2021-07-17 RX ADMIN — GABAPENTIN 100 MG: 100 CAPSULE ORAL at 05:38

## 2021-07-17 RX ADMIN — POTASSIUM CHLORIDE 40 MEQ: 1500 TABLET, EXTENDED RELEASE ORAL at 05:38

## 2021-07-17 RX ADMIN — INSULIN HUMAN 10 UNITS: 100 INJECTION, SOLUTION PARENTERAL at 22:07

## 2021-07-17 ASSESSMENT — ENCOUNTER SYMPTOMS
BLURRED VISION: 0
NAUSEA: 0
VOMITING: 0
WEAKNESS: 1
SENSORY CHANGE: 0
SPEECH CHANGE: 0
HEARTBURN: 0
MYALGIAS: 0
COUGH: 0
INSOMNIA: 0
DIZZINESS: 0
CONSTIPATION: 0
PHOTOPHOBIA: 0
NERVOUS/ANXIOUS: 0
HEADACHES: 0
DEPRESSION: 0
DIARRHEA: 0
FEVER: 0
CLAUDICATION: 0
ABDOMINAL PAIN: 0
CHILLS: 0
SHORTNESS OF BREATH: 0

## 2021-07-17 ASSESSMENT — CHA2DS2 SCORE
AGE 75 OR GREATER: YES
VASCULAR DISEASE: NO
CHA2DS2 VASC SCORE: 4
CHF OR LEFT VENTRICULAR DYSFUNCTION: YES
SEX: MALE
AGE 65 TO 74: NO
HYPERTENSION: YES
PRIOR STROKE OR TIA OR THROMBOEMBOLISM: NO

## 2021-07-17 NOTE — PROGRESS NOTES
Received bedside patient report from MACK Moseley. Patient resting comfortably in bed, no complaints at this time. Safety precautions in place. Will continue to monitor.

## 2021-07-17 NOTE — FLOWSHEET NOTE
07/17/21 0918   Events/Summary/Plan   Events/Summary/Plan pulse ox check   Vital Signs   Pulse 80   Respiration 16   Pulse Oximetry 92 %   $ Pulse Oximetry (Spot Check) Yes   Oxygen   O2 Delivery Device None - Room Air

## 2021-07-17 NOTE — PROGRESS NOTES
Telemetry Shift Summary     Rhythm SR with BBB  HR Range 76 - 78  Ectopy R PVC; F PAC; R COUP; R FRANCO  Measurements 0.20 / 0.14 / 0.40           Normal Values  Rhythm SR  HR Range    Measurements 0.12-0.20 / 0.06-0.10  / 0.30-0.52

## 2021-07-17 NOTE — CARE PLAN
The patient is Stable - Low risk of patient condition declining or worsening    Shift Goals  Clinical Goals: remain comfortable  Patient Goals: rest    Progress made toward(s) clinical / shift goals:  pt sleeping    Patient is not progressing towards the following goals:n/a      Problem: Knowledge Deficit - Standard  Goal: Patient and family/care givers will demonstrate understanding of plan of care, disease process/condition, diagnostic tests and medications  Outcome: Progressing     Problem: Hemodynamics  Goal: Patient's hemodynamics, fluid balance and neurologic status will be stable or improve  Outcome: Progressing     Problem: Fluid Volume  Goal: Fluid volume balance will be maintained  Outcome: Progressing     Problem: Respiratory  Goal: Patient will achieve/maintain optimum respiratory ventilation and gas exchange  Outcome: Progressing     Problem: Physical Regulation  Goal: Diagnostic test results will improve  Outcome: Progressing  Goal: Signs and symptoms of infection will decrease  Outcome: Progressing     Problem: Fall Risk  Goal: Patient will remain free from falls  Outcome: Progressing     Problem: Skin Integrity  Goal: Skin integrity is maintained or improved  Outcome: Progressing

## 2021-07-17 NOTE — CARE PLAN
The patient is Stable - Low risk of patient condition declining or worsening    Shift Goals  Clinical Goals: Electrolyte Balance, Free from Falls and Injury  Patient Goals: Rest and Sleep    Progress made toward(s) clinical / shift goals:  Progressing    Problem: Knowledge Deficit - Standard  Goal: Patient and family/care givers will demonstrate understanding of plan of care, disease process/condition, diagnostic tests and medications  Outcome: Progressing  Note: Patient updated on the plan of care. Encouraged to voice feelings and to ask questions. Answered all questions and concerns. Agrees with the plan of care.      Problem: Fall Risk  Goal: Patient will remain free from falls  Outcome: Progressing  Note: Safety precautions in place. Bed alarm ON. Will continue to monitor patient.

## 2021-07-17 NOTE — PROGRESS NOTES
[facility] Medicine Daily Progress Note    Date of Service  7/17/2021    Chief Complaint  Julien Dao is a 89 y.o. male admitted 7/14/2021 with fevers.    Hospital Course  Julien Dao is a 89 y.o. male with past medical history of diabetes mellitus, hypertension, on indwelling Wilson catheter, CAD who presented on 7/14/2021 from nursing facility fo generalized fatigue associated with fever, dry cough, nausea for last 3 days. He had wilson catheter exchanged in the ED.  EKG showed new onset atrial fibrillation.  He has a history of pseudomonas so cefepime initiated.  He has developed significant diarrhea and rectal tube needed to be placed.  C diff pending but is of high suspicion so I've also started empiric PO vanco.   Lactic acid has trended down to below 3 and IV fluids continued.   Electrolytes being replaced.       Interval Problem Update  7/15 Patient states he is cold and wonders when he is having surgery.  I explained to him there is no surgery planned.  K and Magnesium being replaced and patient remains in atrial fibrillation.    7/16 Patient much more agitated today, yelling out into the hallway as he wants a phone to call the president.  WBC is about the same but patient is much more awake today.  Based on previous urine culture, will continue cefepime for now.  K was markedly low and received PO and IV replacement - recheck level pending . C diff resulted negative so empiric PO vanco discontinued.   7/17 Patient much improved mentally today, on phone with his wife.  WBC dropped significantly overnight.  Pending PT/OT evaluations for home safety.  Awaiting sensitivities for pseudomonas on urine culture.     I have personally seen and examined the patient at bedside. I discussed the plan of care with patient, bedside RN, charge RN,  and pharmacy.    Consultants/Specialty  none    Code Status  DNAR/DNI    Disposition  Patient is not medically cleared.   Anticipate discharge to to home with  organized home healthcare and close outpatient follow-up.  I have placed the appropriate orders for post-discharge needs.    Review of Systems  Review of Systems   Constitutional: Negative for chills and fever.   HENT: Negative for congestion.    Eyes: Negative for blurred vision and photophobia.   Respiratory: Negative for cough and shortness of breath.    Cardiovascular: Negative for chest pain, claudication and leg swelling.   Gastrointestinal: Negative for abdominal pain, constipation, diarrhea, heartburn, nausea and vomiting.   Genitourinary: Negative for dysuria and hematuria.   Musculoskeletal: Negative for joint pain and myalgias.   Skin: Negative for itching and rash.   Neurological: Positive for weakness. Negative for dizziness, sensory change, speech change and headaches.   Psychiatric/Behavioral: Negative for depression. The patient is not nervous/anxious and does not have insomnia.         Physical Exam  Temp:  [36.3 °C (97.4 °F)-36.9 °C (98.5 °F)] 36.3 °C (97.4 °F)  Pulse:  [78-92] 80  Resp:  [16-18] 16  BP: (135-159)/(64-75) 135/66  SpO2:  [92 %-100 %] 92 %    Physical Exam  Vitals and nursing note reviewed.   Constitutional:       General: He is not in acute distress.     Appearance: Normal appearance.   HENT:      Head: Normocephalic and atraumatic.   Eyes:      General: No scleral icterus.     Extraocular Movements: Extraocular movements intact.   Cardiovascular:      Rate and Rhythm: Normal rate and regular rhythm.      Pulses: Normal pulses.      Heart sounds: Murmur heard.     Pulmonary:      Effort: Pulmonary effort is normal. No respiratory distress.      Breath sounds: Normal breath sounds. No wheezing, rhonchi or rales.   Abdominal:      General: Abdomen is flat. Bowel sounds are normal. There is no distension.      Palpations: Abdomen is soft.      Tenderness: There is no rebound.   Musculoskeletal:         General: No swelling or tenderness.      Cervical back: Normal range of motion and  neck supple.   Lymphadenopathy:      Cervical: No cervical adenopathy.   Skin:     Coloration: Skin is not jaundiced.      Findings: No erythema.   Neurological:      General: No focal deficit present.      Mental Status: He is alert and oriented to person, place, and time.      Cranial Nerves: No cranial nerve deficit.         Fluids    Intake/Output Summary (Last 24 hours) at 7/17/2021 1136  Last data filed at 7/17/2021 0900  Gross per 24 hour   Intake 720 ml   Output 2050 ml   Net -1330 ml       Laboratory  Recent Labs     07/15/21  0152 07/16/21  0327 07/17/21  0222   WBC 19.9* 20.7* 12.8*   RBC 3.90* 3.88* 4.08*   HEMOGLOBIN 12.0* 11.9* 12.6*   HEMATOCRIT 35.2* 34.6* 36.7*   MCV 90.3 89.2 90.0   MCH 30.8 30.7 30.9   MCHC 34.1 34.4 34.3   RDW 41.4 41.7 41.6   PLATELETCT 282 282 318   MPV 9.5 10.3 10.3     Recent Labs     07/16/21  0327 07/16/21  1102 07/17/21  0222   SODIUM 134* 135 135   POTASSIUM 2.6* 2.9* 2.9*   CHLORIDE 96 99 98   CO2 26 24 25   GLUCOSE 138* 320* 104*   BUN 13 13 17   CREATININE 0.81 0.81 0.88   CALCIUM 8.5 8.2* 8.5                   Imaging  DX-CHEST-PORTABLE (1 VIEW)   Final Result      1.  Ill-defined opacity in the right lower lung, which may represent pneumonia in the appropriate clinical settings.   2.  Stable cardiomegaly.           Assessment/Plan  * Sepsis ([facility])  Assessment & Plan  This is Sepsis Present on admission  SIRS criteria identified on my evaluation include: Fever, with temperature greater than 101 deg F,and Leukocytosis, with WBC greater than 12,000  Source is UTI/pneumonia  Sepsis protocol initiated  Fluid resuscitation ordered per protocol  IV antibiotics as appropriate for source of sepsis  While organ dysfunction may be noted elsewhere in this problem list or in the chart, degree of organ dysfunction does not meet CMS criteria for severe sepsis          Pneumonia  Assessment & Plan  Ill-defined opacity in the right lower lung, which may represent pneumonia .  Patient  reports of cough.  Continue IV antibiotics  Follow blood cultures/sputum culture    A-fib ([facility])  Assessment & Plan  New onset atrial fibrillation, [id number] score  >2  Started on therapeutic Lovenox with transition to eliquis  Rate controlled    Hypomagnesemia  Assessment & Plan  Magnesium replete as needed      High anion gap metabolic acidosis  Assessment & Plan  High anion gap metabolic services in setting of sepsis  Continue to trend lactate  Continue IV fluid  Continue on IV antibiotic  Follow-blood cultures/urine culture    UTI (urinary tract infection)  Assessment & Plan  UA positive for UTI  Patient was on Sher at nursing home. Sher was exchanged in ED  Continue IV fluid  Continue cefepime  urine culture positive for pseudomonas, sensitivities pending.  blood culture no growth x 2      Acute encephalopathy- (present on admission)  Assessment & Plan  Improving  Secondary to sepsis, uti and likely underlying dementia      Lactic acidosis- (present on admission)  Assessment & Plan  Resolved  Lactic acidosis likely secondary to sepsis   Continue IV fluid - watch for fluid overload       Hypotension- (present on admission)  Assessment & Plan  Resolved, as bp improves, restart BB in setting of atrial fibrillation.      Hypokalemia  Assessment & Plan  Labs noted with severe hypokalemia  Continue on oral KCl/IV KCl  Started on NS with KCl  Given 2 g of IV magnesium    Diarrhea- (present on admission)  Assessment & Plan  Improved, patient removed rectal trumpet  C diff ruled out  WBC elevated  Empiric vanco PO stopped       VTE prophylaxis: enoxaparin ppx    I have performed a physical exam and reviewed and updated ROS and Plan today (7/17/2021). In review of yesterday's note (7/16/2021), there are no changes except as documented above.

## 2021-07-17 NOTE — PROGRESS NOTES
Telemetry Shift Summary     Rhythm: SA/BBB/1dAV  Rate: 80-91  Measurements: 0.20/0.10/0.40  Ectopy (reported by Monitor Tech): fPVC/BIG/COUP     Normal Values  Rhythm: Sinus  HR:   Measurements: 0.12-0.20/0.06-0.10/0.30-0.52

## 2021-07-18 LAB
ANION GAP SERPL CALC-SCNC: 11 MMOL/L (ref 7–16)
BUN SERPL-MCNC: 16 MG/DL (ref 8–22)
CALCIUM SERPL-MCNC: 8.3 MG/DL (ref 8.4–10.2)
CHLORIDE SERPL-SCNC: 102 MMOL/L (ref 96–112)
CO2 SERPL-SCNC: 26 MMOL/L (ref 20–33)
CREAT SERPL-MCNC: 0.78 MG/DL (ref 0.5–1.4)
ERYTHROCYTE [DISTWIDTH] IN BLOOD BY AUTOMATED COUNT: 41.6 FL (ref 35.9–50)
GLUCOSE BLD-MCNC: 170 MG/DL (ref 65–99)
GLUCOSE BLD-MCNC: 229 MG/DL (ref 65–99)
GLUCOSE BLD-MCNC: 268 MG/DL (ref 65–99)
GLUCOSE BLD-MCNC: 319 MG/DL (ref 65–99)
GLUCOSE SERPL-MCNC: 101 MG/DL (ref 65–99)
HCT VFR BLD AUTO: 36.3 % (ref 42–52)
HGB BLD-MCNC: 12.3 G/DL (ref 14–18)
MCH RBC QN AUTO: 30.3 PG (ref 27–33)
MCHC RBC AUTO-ENTMCNC: 33.9 G/DL (ref 33.7–35.3)
MCV RBC AUTO: 89.4 FL (ref 81.4–97.8)
PLATELET # BLD AUTO: 319 K/UL (ref 164–446)
PMV BLD AUTO: 9.7 FL (ref 9–12.9)
POTASSIUM SERPL-SCNC: 3.1 MMOL/L (ref 3.6–5.5)
RBC # BLD AUTO: 4.06 M/UL (ref 4.7–6.1)
SODIUM SERPL-SCNC: 139 MMOL/L (ref 135–145)
WBC # BLD AUTO: 8.2 K/UL (ref 4.8–10.8)

## 2021-07-18 PROCEDURE — 82962 GLUCOSE BLOOD TEST: CPT | Mod: 91

## 2021-07-18 PROCEDURE — 94760 N-INVAS EAR/PLS OXIMETRY 1: CPT

## 2021-07-18 PROCEDURE — 700105 HCHG RX REV CODE 258: Performed by: STUDENT IN AN ORGANIZED HEALTH CARE EDUCATION/TRAINING PROGRAM

## 2021-07-18 PROCEDURE — 700102 HCHG RX REV CODE 250 W/ 637 OVERRIDE(OP): Performed by: HOSPITALIST

## 2021-07-18 PROCEDURE — 700102 HCHG RX REV CODE 250 W/ 637 OVERRIDE(OP): Performed by: STUDENT IN AN ORGANIZED HEALTH CARE EDUCATION/TRAINING PROGRAM

## 2021-07-18 PROCEDURE — 85027 COMPLETE CBC AUTOMATED: CPT

## 2021-07-18 PROCEDURE — A9270 NON-COVERED ITEM OR SERVICE: HCPCS | Performed by: STUDENT IN AN ORGANIZED HEALTH CARE EDUCATION/TRAINING PROGRAM

## 2021-07-18 PROCEDURE — 97116 GAIT TRAINING THERAPY: CPT

## 2021-07-18 PROCEDURE — A9270 NON-COVERED ITEM OR SERVICE: HCPCS | Performed by: HOSPITALIST

## 2021-07-18 PROCEDURE — 770020 HCHG ROOM/CARE - TELE (206)

## 2021-07-18 PROCEDURE — 80048 BASIC METABOLIC PNL TOTAL CA: CPT

## 2021-07-18 PROCEDURE — 97161 PT EVAL LOW COMPLEX 20 MIN: CPT

## 2021-07-18 PROCEDURE — A9270 NON-COVERED ITEM OR SERVICE: HCPCS | Performed by: INTERNAL MEDICINE

## 2021-07-18 PROCEDURE — 700102 HCHG RX REV CODE 250 W/ 637 OVERRIDE(OP): Performed by: INTERNAL MEDICINE

## 2021-07-18 PROCEDURE — 99232 SBSQ HOSP IP/OBS MODERATE 35: CPT | Performed by: INTERNAL MEDICINE

## 2021-07-18 PROCEDURE — 700111 HCHG RX REV CODE 636 W/ 250 OVERRIDE (IP): Performed by: STUDENT IN AN ORGANIZED HEALTH CARE EDUCATION/TRAINING PROGRAM

## 2021-07-18 RX ORDER — CHOLECALCIFEROL (VITAMIN D3) 125 MCG
5 CAPSULE ORAL NIGHTLY
Status: DISCONTINUED | OUTPATIENT
Start: 2021-07-18 | End: 2021-07-20 | Stop reason: HOSPADM

## 2021-07-18 RX ADMIN — CEFEPIME 2 G: 2 INJECTION, POWDER, FOR SOLUTION INTRAVENOUS at 23:16

## 2021-07-18 RX ADMIN — GABAPENTIN 100 MG: 100 CAPSULE ORAL at 17:23

## 2021-07-18 RX ADMIN — FINASTERIDE 5 MG: 5 TABLET, FILM COATED ORAL at 05:34

## 2021-07-18 RX ADMIN — INSULIN HUMAN 3 UNITS: 100 INJECTION, SOLUTION PARENTERAL at 06:26

## 2021-07-18 RX ADMIN — INSULIN HUMAN 4 UNITS: 100 INJECTION, SOLUTION PARENTERAL at 11:33

## 2021-07-18 RX ADMIN — APIXABAN 5 MG: 5 TABLET, FILM COATED ORAL at 17:23

## 2021-07-18 RX ADMIN — Medication 5 MG: at 23:16

## 2021-07-18 RX ADMIN — CEFEPIME 2 G: 2 INJECTION, POWDER, FOR SOLUTION INTRAVENOUS at 11:35

## 2021-07-18 RX ADMIN — POTASSIUM CHLORIDE 40 MEQ: 1500 TABLET, EXTENDED RELEASE ORAL at 17:23

## 2021-07-18 RX ADMIN — GABAPENTIN 100 MG: 100 CAPSULE ORAL at 05:34

## 2021-07-18 RX ADMIN — POTASSIUM CHLORIDE 40 MEQ: 1500 TABLET, EXTENDED RELEASE ORAL at 05:34

## 2021-07-18 RX ADMIN — LEVOTHYROXINE SODIUM 25 MCG: 0.03 TABLET ORAL at 05:35

## 2021-07-18 RX ADMIN — TAMSULOSIN HYDROCHLORIDE 0.4 MG: 0.4 CAPSULE ORAL at 05:34

## 2021-07-18 RX ADMIN — APIXABAN 5 MG: 5 TABLET, FILM COATED ORAL at 05:35

## 2021-07-18 RX ADMIN — INSULIN HUMAN 10 UNITS: 100 INJECTION, SOLUTION PARENTERAL at 20:33

## 2021-07-18 RX ADMIN — INSULIN HUMAN 7 UNITS: 100 INJECTION, SOLUTION PARENTERAL at 17:26

## 2021-07-18 ASSESSMENT — ENCOUNTER SYMPTOMS
NAUSEA: 0
MYALGIAS: 0
DIZZINESS: 0
CHILLS: 0
DEPRESSION: 0
SPEECH CHANGE: 0
DIARRHEA: 0
BLURRED VISION: 0
NERVOUS/ANXIOUS: 0
FEVER: 0
VOMITING: 0
CONSTIPATION: 0
ABDOMINAL PAIN: 0
SENSORY CHANGE: 0
HEADACHES: 0
INSOMNIA: 0
WEAKNESS: 1
SHORTNESS OF BREATH: 0
CLAUDICATION: 0
HEARTBURN: 0
PHOTOPHOBIA: 0
COUGH: 0

## 2021-07-18 ASSESSMENT — COGNITIVE AND FUNCTIONAL STATUS - GENERAL
STANDING UP FROM CHAIR USING ARMS: A LITTLE
CLIMB 3 TO 5 STEPS WITH RAILING: A LOT
SUGGESTED CMS G CODE MODIFIER MOBILITY: CK
WALKING IN HOSPITAL ROOM: A LITTLE
MOVING TO AND FROM BED TO CHAIR: A LITTLE
MOVING FROM LYING ON BACK TO SITTING ON SIDE OF FLAT BED: UNABLE
MOBILITY SCORE: 15
TURNING FROM BACK TO SIDE WHILE IN FLAT BAD: A LITTLE

## 2021-07-18 ASSESSMENT — GAIT ASSESSMENTS
DEVIATION: DECREASED BASE OF SUPPORT;DECREASED HEEL STRIKE;DECREASED TOE OFF;OTHER (COMMENT)
ASSISTIVE DEVICE: FRONT WHEEL WALKER
GAIT LEVEL OF ASSIST: MINIMAL ASSIST
DISTANCE (FEET): 35

## 2021-07-18 ASSESSMENT — FIBROSIS 4 INDEX: FIB4 SCORE: 1.64

## 2021-07-18 NOTE — PROGRESS NOTES
Report received from MACK Watson. Plan of care discussed at patient's bedside. Whiteboard has been updated. Pt is resting comfortably in bed and declines any further needs at this time. Safety precautions in place, bed alarm on, and call light within reach.

## 2021-07-18 NOTE — FLOWSHEET NOTE
07/17/21 1930   Events/Summary/Plan   Events/Summary/Plan Oxygen Check   Vital Signs   Pulse 82   Respiration 18   Pulse Oximetry 92 %   $ Pulse Oximetry (Spot Check) Yes   O2 Alarms Set & Reviewed Yes   Oxygen   O2 (LPM) 0   O2 Delivery Device None - Room Air

## 2021-07-18 NOTE — PROGRESS NOTES
Telemetry Shift Summary     Rhythm: SR  Rate: 71-99  Measurements: 0.20/0.12/0.42  Ectopy (reported by Monitor Tech): First degree AVB, BBB, F PAC, R-O PVC, R trigem, R coup     Normal Values  Rhythm: Sinus  HR:   Measurements: 0.12-0.20/0.06-0.10/0.30-0.52

## 2021-07-18 NOTE — PROGRESS NOTES
Telemetry Shift Summary    Rhythm SR with BBB  HR Range 74-88  Ectopy fPAC, trig, coup, big, r-oPVC  Measurements 0.18/0.14/0.44        Normal Values  Rhythm SR  HR Range    Measurements 0.12-0.20 / 0.06-0.10  / 0.30-0.52

## 2021-07-18 NOTE — PROGRESS NOTES
Telemetry Shift Summary      Rhythm: SR w/ 1st Deg w/ BBB  HR Range: 70-87  Ectopy: F PAC, F PVC, R Big, R Trig, R Coup  Measurements: .24/.14/.44    Normal Values:  Rhythm: SR  HR Range:   Measurements: 0.12-0.20/ 0.06-0.10/ 0.30-0.52

## 2021-07-18 NOTE — CARE PLAN
The patient is Stable - Low risk of patient condition declining or worsening    Shift Goals  Clinical Goals: Administer abx, keep comfortable  Patient Goals: Rest    Progress made toward(s) clinical / shift goals:  Pt remained comfortable and had no acute events overnight. IV antibiotics were administered appropriately as ordered and pt continued to be compliant with POC.      Problem: Knowledge Deficit - Standard  Goal: Patient and family/care givers will demonstrate understanding of plan of care, disease process/condition, diagnostic tests and medications  Outcome: Progressing     Problem: Hemodynamics  Goal: Patient's hemodynamics, fluid balance and neurologic status will be stable or improve  Outcome: Progressing   Pt was intermittently confused throughout shift. This is pt's baseline.      Problem: Fall Risk  Goal: Patient will remain free from falls  Outcome: Progressing

## 2021-07-18 NOTE — PROGRESS NOTES
[facility] Medicine Daily Progress Note    Date of Service  7/18/2021    Chief Complaint  Julien Dao is a 89 y.o. male admitted 7/14/2021 with fevers.    Hospital Course  Julien Dao is a 89 y.o. male with past medical history of diabetes mellitus, hypertension, on indwelling Wilson catheter, CAD who presented on 7/14/2021 from nursing facility fo generalized fatigue associated with fever, dry cough, nausea for last 3 days. He had wilson catheter exchanged in the ED.  EKG showed new onset atrial fibrillation.  He has a history of pseudomonas so cefepime initiated.  He has developed significant diarrhea and rectal tube needed to be placed.  C diff pending but is of high suspicion so I've also started empiric PO vanco.   Lactic acid has trended down to below 3 and IV fluids continued.   Electrolytes being replaced.       Interval Problem Update  7/15 Patient states he is cold and wonders when he is having surgery.  I explained to him there is no surgery planned.  K and Magnesium being replaced and patient remains in atrial fibrillation.    7/16 Patient much more agitated today, yelling out into the hallway as he wants a phone to call the president.  WBC is about the same but patient is much more awake today.  Based on previous urine culture, will continue cefepime for now.  K was markedly low and received PO and IV replacement - recheck level pending . C diff resulted negative so empiric PO vanco discontinued.   7/17 Patient much improved mentally today, on phone with his wife.  WBC dropped significantly overnight.  Pending PT/OT evaluations for home safety.  Awaiting sensitivities for pseudomonas on urine culture.   7/18 Patient tearful and emotional today, he states he's been in the hospital for about 2 years and doesn't ever seem to get better.  I explained the patient's wife the plan of care via phone.  He will complete 7 full days of IV cefepime and plan to dc home with caregivers on Tuesday afternoon.     I have  personally seen and examined the patient at bedside. I discussed the plan of care with patient, bedside RN, charge RN,  and pharmacy.    Consultants/Specialty  none    Code Status  DNAR/DNI    Disposition  Patient is not medically cleared.   Anticipate discharge to to home with organized home healthcare and close outpatient follow-up.  I have placed the appropriate orders for post-discharge needs.    Review of Systems  Review of Systems   Constitutional: Negative for chills and fever.   HENT: Negative for congestion.    Eyes: Negative for blurred vision and photophobia.   Respiratory: Negative for cough and shortness of breath.    Cardiovascular: Negative for chest pain, claudication and leg swelling.   Gastrointestinal: Negative for abdominal pain, constipation, diarrhea, heartburn, nausea and vomiting.   Genitourinary: Negative for dysuria and hematuria.   Musculoskeletal: Negative for joint pain and myalgias.   Skin: Negative for itching and rash.   Neurological: Positive for weakness. Negative for dizziness, sensory change, speech change and headaches.   Psychiatric/Behavioral: Negative for depression. The patient is not nervous/anxious and does not have insomnia.         Physical Exam  Temp:  [36.4 °C (97.5 °F)-36.7 °C (98 °F)] 36.4 °C (97.6 °F)  Pulse:  [66-82] 71  Resp:  [17-18] 18  BP: (130-169)/(68-84) 130/75  SpO2:  [92 %-96 %] 96 %    Physical Exam  Vitals and nursing note reviewed.   Constitutional:       General: He is not in acute distress.     Appearance: Normal appearance.   HENT:      Head: Normocephalic and atraumatic.   Eyes:      General: No scleral icterus.     Extraocular Movements: Extraocular movements intact.   Cardiovascular:      Rate and Rhythm: Normal rate and regular rhythm.      Pulses: Normal pulses.      Heart sounds: Murmur heard.     Pulmonary:      Effort: Pulmonary effort is normal. No respiratory distress.      Breath sounds: Normal breath sounds. No wheezing,  rhonchi or rales.   Abdominal:      General: Abdomen is flat. Bowel sounds are normal. There is no distension.      Palpations: Abdomen is soft.      Tenderness: There is no rebound.   Musculoskeletal:         General: No swelling or tenderness.      Cervical back: Normal range of motion and neck supple.   Lymphadenopathy:      Cervical: No cervical adenopathy.   Skin:     Coloration: Skin is not jaundiced.      Findings: No erythema.   Neurological:      General: No focal deficit present.      Mental Status: He is alert and oriented to person, place, and time.      Cranial Nerves: No cranial nerve deficit.         Fluids    Intake/Output Summary (Last 24 hours) at 7/18/2021 1118  Last data filed at 7/18/2021 0900  Gross per 24 hour   Intake 360 ml   Output 1500 ml   Net -1140 ml       Laboratory  Recent Labs     07/16/21  0327 07/17/21  0222 07/18/21  0048   WBC 20.7* 12.8* 8.2   RBC 3.88* 4.08* 4.06*   HEMOGLOBIN 11.9* 12.6* 12.3*   HEMATOCRIT 34.6* 36.7* 36.3*   MCV 89.2 90.0 89.4   MCH 30.7 30.9 30.3   MCHC 34.4 34.3 33.9   RDW 41.7 41.6 41.6   PLATELETCT 282 318 319   MPV 10.3 10.3 9.7     Recent Labs     07/16/21  1102 07/17/21  0222 07/18/21  0048   SODIUM 135 135 139   POTASSIUM 2.9* 2.9* 3.1*   CHLORIDE 99 98 102   CO2 24 25 26   GLUCOSE 320* 104* 101*   BUN 13 17 16   CREATININE 0.81 0.88 0.78   CALCIUM 8.2* 8.5 8.3*                   Imaging  DX-CHEST-PORTABLE (1 VIEW)   Final Result      1.  Ill-defined opacity in the right lower lung, which may represent pneumonia in the appropriate clinical settings.   2.  Stable cardiomegaly.           Assessment/Plan  * Sepsis (HCC)  Assessment & Plan  This is Sepsis Present on admission  SIRS criteria identified on my evaluation include: Fever, with temperature greater than 101 deg F,and Leukocytosis, with WBC greater than 12,000  Source is UTI/pneumonia  Sepsis protocol initiated  Fluid resuscitation ordered per protocol  IV antibiotics as appropriate for source of  sepsis  While organ dysfunction may be noted elsewhere in this problem list or in the chart, degree of organ dysfunction does not meet CMS criteria for severe sepsis          Pneumonia  Assessment & Plan  Clinically absent        A-fib (HCC)  Assessment & Plan  New onset atrial fibrillation, [id number] score  >2  Started on therapeutic Lovenox with transition to eliquis  Rate controlled    Hypomagnesemia  Assessment & Plan  Magnesium replete as needed      High anion gap metabolic acidosis  Assessment & Plan  High anion gap metabolic services in setting of sepsis  Continue to trend lactate  Continue IV fluid  Continue on IV antibiotic  Follow-blood cultures/urine culture    UTI (urinary tract infection)  Assessment & Plan  UA positive for UTI  Patient was on Sher at nursing home. Sher was exchanged in ED  Continue IV fluid  Continue cefepime  urine culture positive for pseudomonas, sensitive to cefepime - plan on 7 day course and discontinue.  blood culture no growth x 2      Acute encephalopathy- (present on admission)  Assessment & Plan  Improving  Secondary to sepsis, uti and likely underlying dementia      Lactic acidosis- (present on admission)  Assessment & Plan  Resolved  Lactic acidosis likely secondary to sepsis   Continue IV fluid - watch for fluid overload       Hypotension- (present on admission)  Assessment & Plan  Resolved, as bp improves, restart BB in setting of atrial fibrillation.      Hypokalemia  Assessment & Plan  Labs noted with severe hypokalemia  Continue on oral KCl/IV KCl  Started on NS with KCl  Given 2 g of IV magnesium    Diarrhea- (present on admission)  Assessment & Plan  Improved, patient removed rectal trumpet  C diff ruled out  WBC elevated  Empiric vanco PO stopped       VTE prophylaxis: enoxaparin ppx    I have performed a physical exam and reviewed and updated ROS and Plan today (7/18/2021). In review of yesterday's note (7/17/2021), there are no changes except as documented  above.

## 2021-07-18 NOTE — PROGRESS NOTES
Assumed report and patient care from Iona GIRON via bedside report. Patient is resting comfortably in bed with no signs of labored breathing or restlessness. POC discussed. No questions or concerns at this time. Safety measures in place, call light within reach.

## 2021-07-18 NOTE — CARE PLAN
The patient is Stable - Low risk of patient condition declining or worsening    Shift Goals  Clinical Goals: continue IV abx course for 7 days, keep comfortable, reorient  Patient Goals: rest, more clear mentation    Progress made toward(s) clinical / shift goals:  Pt currently compliant with IV antibiotics. New PIV placed during this shift. Wife, Mercedes, at bedside.    Patient is not progressing towards the following goals:    Problem: Knowledge Deficit - Standard  Goal: Patient and family/care givers will demonstrate understanding of plan of care, disease process/condition, diagnostic tests and medications  Outcome: Progressing     Problem: Fall Risk  Goal: Patient will remain free from falls  Outcome: Progressing  Pt displays HIGH fall risk based on RN assessment and fall risk charting tool. Proper fall risk sign placed outside door, treaded socks on, bed alarm ON, bed locked and in lowest position, and call light within reach. Pt educated on fall risk and proper interventions. Safety measures in place at this time.      Problem: Skin Integrity  Goal: Skin integrity is maintained or improved  Outcome: Progressing   Pt mobile in bed, able to turn self side to side.

## 2021-07-18 NOTE — THERAPY
Physical Therapy   Initial Evaluation     Patient Name: Julien Dao  Age:  89 y.o., Sex:  male  Medical Record #: [id number]  Today's Date: 7/18/2021     Precautions: (P) Fall Risk    Assessment  Patient is 89 y.o. male with a diagnosis of sepsis with UTI and PNA.  Other PMH includes DM, HTN, CAD, afib, and Hx of falls. Pt reports he lives with spouse in single level home with 1 step to enter. Pt reports at baseline, he ambulates with FWW at home and performs his own ADLs. He head home health with [facility] prior to most recent hospitalization. Most recently, he has been at SNF following prior hospitalization, but has been very upset by his care and feelings of lack of adequate mobilization while at SNF. Both pt and spouse wanting pt to return home rather than back to SNF. Today, pt was able to come to sitting at EOB with supv. However, with standing and gait, pt with frequent posterior LOB requiring min A to correct. He also sat to chair prematurely requiring mod A for safety and to correct transfer to sitting safely. Based on mobility today, pt is not safe for D/C home due to high risk for falls. He will benefit from further PT in the post-acute setting prior to D/C home. He will continue to benefit from skilled PT while he remains in acute setting. Discussed with RN that pt dislikes his chair alarm, but also that pt acknowledges that he knows he will likely forget to use call button, and recognizes the purpose of the arm, but is still not happy with alarm.     Plan    Recommend Physical Therapy 5 times per week until therapy goals are met for the following treatments:  Bed Mobility, Gait Training, Neuro Re-Education / Balance, Therapeutic Activities and Therapeutic Exercises    DC Equipment Recommendations: (P) Unable to determine at this time  Discharge Recommendations: (P) Recommend post-acute placement for additional physical therapy services prior to discharge home       Subjective    Pt agreeable to work with  PT. States dislike of chair alarm.     Objective       07/18/21 1252   Prior Living Situation   Prior Services   (most recent SNF; prior [facility] home health)   Housing / Facility 1 Story House   Steps Into Home 1   Steps In Home 0   Rail None   Bathroom Set up Walk In Shower;Grab Bars;Shower Chair   Equipment Owned Front-Wheel Walker;Single Point Cane;Tub / Shower Seat;Grab Bar(s) In Tub / Shower;Grab Bar(s) By Toilet   Lives with - Patient's Self Care Capacity Spouse   Comments pt reports at baseline, he is able to amb at home with FWW and perform self care; most recently, he was at SNF and was disappointed in his care   Prior Level of Functional Mobility   Bed Mobility Independent   Transfer Status Independent   Ambulation Independent   Distance Ambulation (Feet)   (household)   Assistive Devices Used Front-Wheel Walker   Stairs Independent   Comments spouse states she knows when he gets up so she can be aware of issues   History of Falls   History of Falls Yes   Date of Last Fall   (unknown)   Cognition    Cognition / Consciousness X   Speech/ Communication Hard of Hearing   Level of Consciousness Alert   Safety Awareness Impaired   Attention Impaired   Comments decreased insight into deficits   Strength Lower Body   Lower Body Strength  X   Gross Strength Generalized Weakness, Equal Bilaterally   Balance Assessment   Sitting Balance (Static) Fair   Sitting Balance (Dynamic) Fair   Standing Balance (Static) Poor +   Standing Balance (Dynamic) Poor   Weight Shift Sitting Fair   Weight Shift Standing Poor   Comments with FWW   Gait Analysis   Gait Level Of Assist Minimal Assist   Assistive Device Front Wheel Walker   Distance (Feet) 35   # of Times Distance was Traveled 1   Deviation Decreased Base Of Support;Decreased Heel Strike;Decreased Toe Off;Other (Comment)  (posterior lean frequently during turning, frequent LOB)   # of Stairs Climbed 0   Weight Bearing Status no restriction   Comments frequent posterior  LOB   Bed Mobility    Supine to Sit Supervised   Scooting Supervised   Functional Mobility   Sit to Stand Minimal Assist   Bed, Chair, Wheelchair Transfer Moderate Assist   Transfer Method Stand Step   Mobility supine->sit EOB->stand->amb->sit in chair   Comments posterior lean in initial standing requriring assistance to find equilibrium; sits prematurely to chair requiring cues for completing alignment prior to sitting   How much difficulty does the patient currently have...   Turning over in bed (including adjusting bedclothes, sheets and blankets)? 3   Sitting down on and standing up from a chair with arms (e.g., wheelchair, bedside commode, etc.) 1   Moving from lying on back to sitting on the side of the bed? 3   How much help from another person does the patient currently need...   Moving to and from a bed to a chair (including a wheelchair)? 3   Need to walk in a hospital room? 3   Climbing 3-5 steps with a railing? 2   6 clicks Mobility Score 15   Activity Tolerance   Sitting in Chair left up in chair   Sitting Edge of Bed 10 minutes   Standing 10 minutes   Comments limited by fatigue   Patient / Family Goals    Patient / Family Goal #1 to go home    Short Term Goals    Short Term Goal # 1 Pt will transfer bed <-> chair with supv with LRAD within 6 visits in order to return home   Short Term Goal # 2 Pt will amb 100' with LRAD and supv within 6 visits in order to return home   Short Term Goal # 3 Pt will perform 1 step with LRAD and supv as required to enter/exit home   Education Group   Education Provided Role of Physical Therapist   Role of Physical Therapist Patient Response Patient;Significant Other;Acceptance;Explanation;Verbal Demonstration   Additional Comments purpose of chair alarm, pt to ask staff to assist for more walks   Problem List    Problems Impaired Bed Mobility;Impaired Transfers;Impaired Ambulation;Functional Strength Deficit;Impaired Balance;Decreased Activity Tolerance;Safety Awareness  Deficits / Cognition   Anticipated Discharge Equipment and Recommendations   DC Equipment Recommendations Unable to determine at this time   Discharge Recommendations Recommend post-acute placement for additional physical therapy services prior to discharge home

## 2021-07-19 LAB
ANION GAP SERPL CALC-SCNC: 12 MMOL/L (ref 7–16)
BACTERIA BLD CULT: NORMAL
BACTERIA BLD CULT: NORMAL
BUN SERPL-MCNC: 19 MG/DL (ref 8–22)
CALCIUM SERPL-MCNC: 8.5 MG/DL (ref 8.4–10.2)
CHLORIDE SERPL-SCNC: 104 MMOL/L (ref 96–112)
CO2 SERPL-SCNC: 22 MMOL/L (ref 20–33)
CREAT SERPL-MCNC: 0.81 MG/DL (ref 0.5–1.4)
ERYTHROCYTE [DISTWIDTH] IN BLOOD BY AUTOMATED COUNT: 43.8 FL (ref 35.9–50)
GLUCOSE BLD-MCNC: 213 MG/DL (ref 65–99)
GLUCOSE BLD-MCNC: 236 MG/DL (ref 65–99)
GLUCOSE BLD-MCNC: 290 MG/DL (ref 65–99)
GLUCOSE SERPL-MCNC: 230 MG/DL (ref 65–99)
HCT VFR BLD AUTO: 42.1 % (ref 42–52)
HGB BLD-MCNC: 13.7 G/DL (ref 14–18)
MCH RBC QN AUTO: 30.6 PG (ref 27–33)
MCHC RBC AUTO-ENTMCNC: 32.5 G/DL (ref 33.7–35.3)
MCV RBC AUTO: 94 FL (ref 81.4–97.8)
PLATELET # BLD AUTO: 289 K/UL (ref 164–446)
PMV BLD AUTO: 10.8 FL (ref 9–12.9)
POTASSIUM SERPL-SCNC: 4.5 MMOL/L (ref 3.6–5.5)
RBC # BLD AUTO: 4.48 M/UL (ref 4.7–6.1)
SIGNIFICANT IND 70042: NORMAL
SIGNIFICANT IND 70042: NORMAL
SITE SITE: NORMAL
SITE SITE: NORMAL
SODIUM SERPL-SCNC: 138 MMOL/L (ref 135–145)
SOURCE SOURCE: NORMAL
SOURCE SOURCE: NORMAL
WBC # BLD AUTO: 9.9 K/UL (ref 4.8–10.8)

## 2021-07-19 PROCEDURE — 82962 GLUCOSE BLOOD TEST: CPT | Mod: 91

## 2021-07-19 PROCEDURE — A9270 NON-COVERED ITEM OR SERVICE: HCPCS | Performed by: HOSPITALIST

## 2021-07-19 PROCEDURE — A9270 NON-COVERED ITEM OR SERVICE: HCPCS | Performed by: STUDENT IN AN ORGANIZED HEALTH CARE EDUCATION/TRAINING PROGRAM

## 2021-07-19 PROCEDURE — A9270 NON-COVERED ITEM OR SERVICE: HCPCS | Performed by: INTERNAL MEDICINE

## 2021-07-19 PROCEDURE — 700102 HCHG RX REV CODE 250 W/ 637 OVERRIDE(OP): Performed by: HOSPITALIST

## 2021-07-19 PROCEDURE — 700102 HCHG RX REV CODE 250 W/ 637 OVERRIDE(OP): Performed by: INTERNAL MEDICINE

## 2021-07-19 PROCEDURE — 700111 HCHG RX REV CODE 636 W/ 250 OVERRIDE (IP): Performed by: STUDENT IN AN ORGANIZED HEALTH CARE EDUCATION/TRAINING PROGRAM

## 2021-07-19 PROCEDURE — 85027 COMPLETE CBC AUTOMATED: CPT

## 2021-07-19 PROCEDURE — 99232 SBSQ HOSP IP/OBS MODERATE 35: CPT | Performed by: INTERNAL MEDICINE

## 2021-07-19 PROCEDURE — 700105 HCHG RX REV CODE 258: Performed by: STUDENT IN AN ORGANIZED HEALTH CARE EDUCATION/TRAINING PROGRAM

## 2021-07-19 PROCEDURE — 700102 HCHG RX REV CODE 250 W/ 637 OVERRIDE(OP): Performed by: STUDENT IN AN ORGANIZED HEALTH CARE EDUCATION/TRAINING PROGRAM

## 2021-07-19 PROCEDURE — 94760 N-INVAS EAR/PLS OXIMETRY 1: CPT

## 2021-07-19 PROCEDURE — 80048 BASIC METABOLIC PNL TOTAL CA: CPT

## 2021-07-19 PROCEDURE — 770020 HCHG ROOM/CARE - TELE (206)

## 2021-07-19 PROCEDURE — 97165 OT EVAL LOW COMPLEX 30 MIN: CPT

## 2021-07-19 RX ADMIN — CEFEPIME 2 G: 2 INJECTION, POWDER, FOR SOLUTION INTRAVENOUS at 11:02

## 2021-07-19 RX ADMIN — FINASTERIDE 5 MG: 5 TABLET, FILM COATED ORAL at 06:20

## 2021-07-19 RX ADMIN — INSULIN HUMAN 7 UNITS: 100 INJECTION, SOLUTION PARENTERAL at 11:09

## 2021-07-19 RX ADMIN — INSULIN HUMAN 4 UNITS: 100 INJECTION, SOLUTION PARENTERAL at 06:26

## 2021-07-19 RX ADMIN — INSULIN HUMAN 4 UNITS: 100 INJECTION, SOLUTION PARENTERAL at 17:09

## 2021-07-19 RX ADMIN — Medication 5 MG: at 23:17

## 2021-07-19 RX ADMIN — GABAPENTIN 100 MG: 100 CAPSULE ORAL at 17:08

## 2021-07-19 RX ADMIN — GABAPENTIN 100 MG: 100 CAPSULE ORAL at 06:20

## 2021-07-19 RX ADMIN — APIXABAN 5 MG: 5 TABLET, FILM COATED ORAL at 17:08

## 2021-07-19 RX ADMIN — INSULIN HUMAN 7 UNITS: 100 INJECTION, SOLUTION PARENTERAL at 23:13

## 2021-07-19 RX ADMIN — CEFEPIME 2 G: 2 INJECTION, POWDER, FOR SOLUTION INTRAVENOUS at 23:20

## 2021-07-19 RX ADMIN — APIXABAN 5 MG: 5 TABLET, FILM COATED ORAL at 06:20

## 2021-07-19 RX ADMIN — TAMSULOSIN HYDROCHLORIDE 0.4 MG: 0.4 CAPSULE ORAL at 06:20

## 2021-07-19 RX ADMIN — LEVOTHYROXINE SODIUM 25 MCG: 0.03 TABLET ORAL at 06:20

## 2021-07-19 RX ADMIN — POTASSIUM CHLORIDE 40 MEQ: 1500 TABLET, EXTENDED RELEASE ORAL at 06:20

## 2021-07-19 ASSESSMENT — ENCOUNTER SYMPTOMS
DIZZINESS: 0
ABDOMINAL PAIN: 0
COUGH: 0
INSOMNIA: 0
NAUSEA: 0
DEPRESSION: 0
VOMITING: 0
CONSTIPATION: 0
SENSORY CHANGE: 0
HEADACHES: 0
FEVER: 0
MYALGIAS: 0
SPEECH CHANGE: 0
HEARTBURN: 0
WEAKNESS: 1
CHILLS: 0
CLAUDICATION: 0
PHOTOPHOBIA: 0
DIARRHEA: 0
BLURRED VISION: 0
NERVOUS/ANXIOUS: 0
SHORTNESS OF BREATH: 0

## 2021-07-19 ASSESSMENT — COGNITIVE AND FUNCTIONAL STATUS - GENERAL
DRESSING REGULAR LOWER BODY CLOTHING: A LOT
TOILETING: A LOT
PERSONAL GROOMING: A LITTLE
SUGGESTED CMS G CODE MODIFIER DAILY ACTIVITY: CK
DAILY ACTIVITIY SCORE: 16
HELP NEEDED FOR BATHING: A LOT
DRESSING REGULAR UPPER BODY CLOTHING: A LITTLE

## 2021-07-19 ASSESSMENT — PAIN DESCRIPTION - PAIN TYPE
TYPE: ACUTE PAIN
TYPE: ACUTE PAIN

## 2021-07-19 ASSESSMENT — ACTIVITIES OF DAILY LIVING (ADL): TOILETING: REQUIRES ASSIST

## 2021-07-19 NOTE — THERAPY
Occupational Therapy   Initial Evaluation     Patient Name: Julien Dao  Age:  89 y.o., Sex:  male  Medical Record #: [id number]  Today's Date: 7/19/2021     Precautions  Precautions: (P) Fall Risk  Comments: posterior lean    Assessment  Patient is 89 y.o. male admitted with fevers; diagnosis of UTI, PNA. Hx of DM, HTN, CAD, Falls. A&Ox2; confusion present during session. Quite tearful re: wife passing (wife Mercedes will visit this pm). Follows 1-step commands. MAx encouragement for activity. ModA with sup to sit, seated grooming with Shorty. Wilson in place (baseline). Donns socks with ModA. STS with Shorty,fww; posterior lean. Pt declines transfer to chair (agreeable at start of session). Pt is a high risk for falls; will require 24/7 at home; pt and wife decline snf at this time. Wife has seccured 24/7 Sup per RN. No further acute skilled OT needs; rec HH OT upon dc.            Plan  Recommend Occupational Therapy for Evaluation only  DC Equipment Recommendations: (P) None  Discharge Recommendations: (P) Recommend home health for continued occupational therapy services (24/7 caregiver to be at home per RN)      07/19/21 0959   Prior Living Situation   Prior Services Intermittent Physical Support for ADL Per Family  (snf then HH recently)   Housing / Facility 1 Story House   Bathroom Set up Walk In Shower;Grab Bars;Shower Chair   Equipment Owned Front-Wheel Walker;Tub / Shower Seat;Single Point Cane;Grab Bar(s) In Tub / Shower;Grab Bar(s) By Toilet;Hand Held Shower   Lives with - Patient's Self Care Capacity Spouse   Comments Pt at snf recently; states will not return. Fall hx. Pt states needed assistance with ADL's PTA.    Prior Level of ADL Function   Self Feeding Independent   Grooming / Hygiene Independent   Bathing Requires Assist   Dressing Requires Assist   Toileting Requires Assist   Comments wilson at baseline   Prior Level of IADL Function   Medication Management Requires Assist   Laundry Requires Assist    Kitchen Mobility Requires Assist   Finances Requires Assist   Home Management Requires Assist   Shopping Requires Assist   Prior Level Of Mobility Supervision Without Device in Home   Driving / Transportation Relatives / Others Provide Transportation   Occupation (Pre-Hospital Vocational) Not Employed   Leisure Interests Unable To Determine At This Time   Cognition    Cognition / Consciousness X   Speech/ Communication Hard of Hearing   Level of Consciousness Alert   Safety Awareness Impaired   Attention Impaired   Comments Ox2. confusion; talks about his wife visiting but then states she has passed. Emotional labile.     Strength Upper Body   Comments generalized weakness   Balance Assessment   Sitting Balance (Static) Fair   Sitting Balance (Dynamic) Fair -   Standing Balance (Static) Poor +   Standing Balance (Dynamic) Poor   Weight Shift Sitting Poor   Weight Shift Standing Poor   Bed Mobility    Supine to Sit Moderate Assist   Sit to Supine Minimal Assist   Scooting Minimal Assist   ADL Assessment   Eating Supervision   Grooming Minimal Assist;Seated   Upper Body Dressing Minimal Assist   Lower Body Dressing Moderate Assist  (socks)   Toileting Maximal Assist   Functional Mobility   Sit to Stand Moderate Assist   Activity Tolerance   Comments posterior lean with fww; once standing pt declines [facility]

## 2021-07-19 NOTE — FACE TO FACE
Face to Face Supporting Documentation - Home Health    The encounter with this patient was in whole or in part the primary reason for home health admission.    Date of encounter:   Patient:                    MRN:                       YOB: 2021  Julien Dao  [id number]  3/7/1929     Home health to see patient for:  Skilled Nursing care for assessment, interventions & education, Physical Therapy evaluation and treatment and Occupational therapy evaluation and treatment    Skilled need for:  New Onset Medical Diagnosis pseudomonas UTI    Skilled nursing interventions to include:  Comment: .    Homebound status evidenced by:  Needs the assistance of another person in order to leave the home. Leaving home requires a considerable and taxing effort. There is a normal inability to leave the home.    Community Physician to provide follow up care: JONO Powell     Optional Interventions? No      I certify the face to face encounter for this home health care referral meets the CMS requirements and the encounter/clinical assessment with the patient was, in whole, or in part, for the medical condition(s) listed above, which is the primary reason for home health care. Based on my clinical findings: the service(s) are medically necessary, support the need for home health care, and the homebound criteria are met.  I certify that this patient has had a face to face encounter by myself.  Flaca Ashby D.O. - NPI: [id number]

## 2021-07-19 NOTE — PROGRESS NOTES
Pt has been intermittently confused. He is disoriented to situation and/or place. Pt is restless and states that he cannot sleep and that he is always like this. Pt has attempted to remove tele box and successfully removed stat-lock for catheter. New stat lock was placed and MD notified. New orders were put into place. Med given per MAR.

## 2021-07-19 NOTE — PROGRESS NOTES
Report given to Moraima GIRON via bedside report. Patient handed off in stable condition. Safety measures in place. Call light within reach. Care relinquished.

## 2021-07-19 NOTE — DISCHARGE PLANNING
Anticipated Discharge Disposition: Home w/ [facility] HH    Action: Discussed pt in IDT rounds. Per MD and bedside RN, pt will need 24/7 care giving. LSW called pt's spouse, Mercedes [phone], to discuss care giving support. Per Mercedes, pt's daughter and neighbor can come help at anytime. Pt will also be utilizing HH services. LSW explained that pt is a max assist and needs 24/7 care giving. Mercedes expressed verbal understanding that pt will likely need more help now than before pt was admitted. Per Mercedes, she also has the list of care giver services that she can call if she finds she needs more help.    Per Mercedes, she can pick him up when he is medically cleared. LSW requested Mercedes bring his stationary walker with her when she comes to pick the pt up.    Barriers to Discharge: None    Plan: LSW to follow and assist as needed w/ discharge.

## 2021-07-19 NOTE — PROGRESS NOTES
Telemetry Shift Summary     Rhythm: SR  Rate: 82-94  Measurements: 0.22/.12/0.36  Ectopy (reported by Monitor Tech): O-F PVC, O-F PAC, R coup, R big, R trig, first degree AVB, BBB      Normal Values  Rhythm: Sinus  HR:   Measurements: 0.12-0.20/0.06-0.10/0.30-0.52

## 2021-07-19 NOTE — PROGRESS NOTES
Monitor Summary     Rhythm:SR/ VPOD 67-88  Measurements: 0.24/0.14/0.36  ECTOPIES: fPAC,oPVC, rCOUP, 1st degree BBB        Normal Values  Rhythm SR  HR Range    Measurements 0.12-0.20 / 0.06-0.10  / 0.30-0.52

## 2021-07-19 NOTE — PROGRESS NOTES
Assumed report and patient care from Moraima RN via bedside report. Patient is resting comfortably in bed with no signs of labored breathing or restlessness. POC discussed. No questions or concerns at this time. Safety measures in place, call light within reach.

## 2021-07-19 NOTE — CARE PLAN
The patient is Stable - Low risk of patient condition declining or worsening    Shift Goals  Clinical Goals: Saftey, reorient  Patient Goals: rest, comfort    Progress made toward(s) clinical / shift goals:      Problem: Fall Risk  Goal: Patient will remain free from falls  7/18/2021 2307 by Moraima Garcia R.N.  Outcome: Progressing   Pt has remained free of falls, bed and alarm and strip alarm are in place. Pt does continue to try and get out of bed and has been restless and agitated at times. All safety precautions in place.     Patient is not progressing towards the following goals:        Problem: Hemodynamics  Goal: Patient's hemodynamics, fluid balance and neurologic status will be stable or improve  7/18/2021 2326 by JONATHAN Acevedo.N.  Outcome: Not Progressing     Pt is intermittently confused and requires re-orientation. Pt is sometimes disoriented to place or situation.

## 2021-07-19 NOTE — PROGRESS NOTES
[facility] Medicine Daily Progress Note    Date of Service  7/19/2021    Chief Complaint  Julien Dao is a 89 y.o. male admitted 7/14/2021 with fevers.    Hospital Course  Julien Dao is a 89 y.o. male with past medical history of diabetes mellitus, hypertension, on indwelling Wilson catheter, CAD who presented on 7/14/2021 from nursing facility fo generalized fatigue associated with fever, dry cough, nausea for last 3 days. He had wilson catheter exchanged in the ED.  EKG showed new onset atrial fibrillation.  He has a history of pseudomonas so cefepime initiated.  He has developed significant diarrhea and rectal tube needed to be placed.  C diff pending but is of high suspicion so I've also started empiric PO vanco.   Lactic acid has trended down to below 3 and IV fluids continued.   Electrolytes being replaced.       Interval Problem Update  7/15 Patient states he is cold and wonders when he is having surgery.  I explained to him there is no surgery planned.  K and Magnesium being replaced and patient remains in atrial fibrillation.    7/16 Patient much more agitated today, yelling out into the hallway as he wants a phone to call the president.  WBC is about the same but patient is much more awake today.  Based on previous urine culture, will continue cefepime for now.  K was markedly low and received PO and IV replacement - recheck level pending . C diff resulted negative so empiric PO vanco discontinued.   7/17 Patient much improved mentally today, on phone with his wife.  WBC dropped significantly overnight.  Pending PT/OT evaluations for home safety.  Awaiting sensitivities for pseudomonas on urine culture.   7/18 Patient tearful and emotional today, he states he's been in the hospital for about 2 years and doesn't ever seem to get better.  I explained the patient's wife the plan of care via phone.  He will complete 7 full days of IV cefepime and plan to dc home with caregivers on Tuesday afternoon.   7/19  Patient tearful again this am but cooperative with care.  He completes cefepime tomorrow and is appropriate for discharge after this am dose.  Wife is arranging home caregivers for discharge and I've ordered home health.  Therapy evaluated patient again today and patient is requiring 24h supervision.    I have personally seen and examined the patient at bedside. I discussed the plan of care with patient, bedside RN, charge RN,  and pharmacy.    Consultants/Specialty  none    Code Status  DNAR/DNI    Disposition  Patient is not medically cleared.   Anticipate discharge to to home with organized home healthcare and close outpatient follow-up. 24h caregivers needed.  I have placed the appropriate orders for post-discharge needs.    Review of Systems  Review of Systems   Constitutional: Negative for chills and fever.   HENT: Negative for congestion.    Eyes: Negative for blurred vision and photophobia.   Respiratory: Negative for cough and shortness of breath.    Cardiovascular: Negative for chest pain, claudication and leg swelling.   Gastrointestinal: Negative for abdominal pain, constipation, diarrhea, heartburn, nausea and vomiting.   Genitourinary: Negative for dysuria and hematuria.   Musculoskeletal: Negative for joint pain and myalgias.   Skin: Negative for itching and rash.   Neurological: Positive for weakness. Negative for dizziness, sensory change, speech change and headaches.   Psychiatric/Behavioral: Negative for depression. The patient is not nervous/anxious and does not have insomnia.         Physical Exam  Temp:  [36.3 °C (97.4 °F)-36.8 °C (98.3 °F)] 36.3 °C (97.4 °F)  Pulse:  [50-80] 66  Resp:  [18-20] 20  BP: (124-150)/(56-77) 137/58  SpO2:  [93 %-97 %] 97 %    Physical Exam  Vitals and nursing note reviewed.   Constitutional:       General: He is not in acute distress.     Appearance: Normal appearance.   HENT:      Head: Normocephalic and atraumatic.   Eyes:      General: No scleral  icterus.     Extraocular Movements: Extraocular movements intact.   Cardiovascular:      Rate and Rhythm: Normal rate and regular rhythm.      Pulses: Normal pulses.      Heart sounds: Murmur heard.     Pulmonary:      Effort: Pulmonary effort is normal. No respiratory distress.      Breath sounds: Normal breath sounds. No wheezing, rhonchi or rales.   Abdominal:      General: Abdomen is flat. Bowel sounds are normal. There is no distension.      Palpations: Abdomen is soft.      Tenderness: There is no rebound.   Musculoskeletal:         General: No swelling or tenderness.      Cervical back: Normal range of motion and neck supple.   Lymphadenopathy:      Cervical: No cervical adenopathy.   Skin:     Coloration: Skin is not jaundiced.      Findings: No erythema.   Neurological:      General: No focal deficit present.      Mental Status: He is alert and oriented to person, place, and time.      Cranial Nerves: No cranial nerve deficit.         Fluids    Intake/Output Summary (Last 24 hours) at 7/19/2021 1119  Last data filed at 7/19/2021 1100  Gross per 24 hour   Intake 480 ml   Output 2000 ml   Net -1520 ml       Laboratory  Recent Labs     07/17/21 0222 07/18/21  0048 07/19/21  0414   WBC 12.8* 8.2 9.9   RBC 4.08* 4.06* 4.48*   HEMOGLOBIN 12.6* 12.3* 13.7*   HEMATOCRIT 36.7* 36.3* 42.1   MCV 90.0 89.4 94.0   MCH 30.9 30.3 30.6   MCHC 34.3 33.9 32.5*   RDW 41.6 41.6 43.8   PLATELETCT 318 319 289   MPV 10.3 9.7 10.8     Recent Labs     07/17/21 0222 07/18/21  0048 07/19/21  0414   SODIUM 135 139 138   POTASSIUM 2.9* 3.1* 4.5   CHLORIDE 98 102 104   CO2 25 26 22   GLUCOSE 104* 101* 230*   BUN 17 16 19   CREATININE 0.88 0.78 0.81   CALCIUM 8.5 8.3* 8.5                   Imaging  DX-CHEST-PORTABLE (1 VIEW)   Final Result      1.  Ill-defined opacity in the right lower lung, which may represent pneumonia in the appropriate clinical settings.   2.  Stable cardiomegaly.           Assessment/Plan  * Sepsis  ([facility])  Assessment & Plan  This is Sepsis Present on admission  SIRS criteria identified on my evaluation include: Fever, with temperature greater than 101 deg F,and Leukocytosis, with WBC greater than 12,000  Source is UTI/pneumonia  Sepsis protocol initiated  Fluid resuscitation ordered per protocol  IV antibiotics as appropriate for source of sepsis  While organ dysfunction may be noted elsewhere in this problem list or in the chart, degree of organ dysfunction does not meet CMS criteria for severe sepsis          Pneumonia  Assessment & Plan  Clinically absent        A-fib ([facility])  Assessment & Plan  New onset atrial fibrillation, [id number] score  >2  Started on therapeutic Lovenox with transition to eliquis  Rate controlled    Hypomagnesemia  Assessment & Plan  Magnesium replete as needed      High anion gap metabolic acidosis  Assessment & Plan  Resolved  High anion gap metabolic services in setting of sepsis      UTI (urinary tract infection)  Assessment & Plan  UA positive for UTI  Patient was on Sher at nursing home. Sher was exchanged in ED  Continue IV fluid  Continue cefepime x 7 days - discussed briefly with ID for duration recommendation  urine culture positive for pseudomonas  blood culture no growth x 2      Acute encephalopathy- (present on admission)  Assessment & Plan  Improving  Secondary to sepsis, uti and likely underlying dementia      Lactic acidosis- (present on admission)  Assessment & Plan  Resolved  Lactic acidosis likely secondary to sepsis   Continue IV fluid - watch for fluid overload       Hypotension- (present on admission)  Assessment & Plan  Resolved,       Hypokalemia  Assessment & Plan  Labs noted with severe hypokalemia  Continue on oral KCl/IV KCl  Started on NS with KCl  Given 2 g of IV magnesium    Diarrhea- (present on admission)  Assessment & Plan  Resolved  C diff ruled out         VTE prophylaxis: enoxaparin ppx    I have performed a physical exam and reviewed and  updated ROS and Plan today (7/19/2021). In review of yesterday's note (7/18/2021), there are no changes except as documented above.

## 2021-07-19 NOTE — CARE PLAN
The patient is Stable - Low risk of patient condition declining or worsening    Shift Goals  Clinical Goals: reorient patient, continue wilson care, IV abx  Patient Goals: rest, comfort    Progress made toward(s) clinical / shift goals:  Discharge home tomorrow with home health and family. IV abx will be discontinued after afternoon dose tomorrow.    Patient is not progressing towards the following goals: N/A    Problem: Knowledge Deficit - Standard  Goal: Patient and family/care givers will demonstrate understanding of plan of care, disease process/condition, diagnostic tests and medications  Outcome: Progressing     Problem: Hemodynamics  Goal: Patient's hemodynamics, fluid balance and neurologic status will be stable or improve  Outcome: Progressing     Problem: Fluid Volume  Goal: Fluid volume balance will be maintained  Outcome: Progressing     Problem: Fall Risk  Goal: Patient will remain free from falls  Outcome: Progressing     Problem: Skin Integrity  Goal: Skin integrity is maintained or improved  Outcome: Progressing

## 2021-07-19 NOTE — DISCHARGE PLANNING
Received Choice form at 6641  Agency/Facility Name: [facility]  Referral sent per Choice form @ [phone]

## 2021-07-20 ENCOUNTER — PATIENT OUTREACH (OUTPATIENT)
Dept: HEALTH INFORMATION MANAGEMENT | Facility: OTHER | Age: 86
End: 2021-07-20

## 2021-07-20 VITALS
RESPIRATION RATE: 18 BRPM | SYSTOLIC BLOOD PRESSURE: 129 MMHG | BODY MASS INDEX: 24.91 KG/M2 | HEIGHT: 67 IN | WEIGHT: 158.73 LBS | TEMPERATURE: 99 F | HEART RATE: 74 BPM | DIASTOLIC BLOOD PRESSURE: 64 MMHG | OXYGEN SATURATION: 94 %

## 2021-07-20 PROBLEM — G93.40 ACUTE ENCEPHALOPATHY: Status: RESOLVED | Noted: 2021-06-21 | Resolved: 2021-07-20

## 2021-07-20 PROBLEM — E87.20 LACTIC ACIDOSIS: Status: RESOLVED | Noted: 2021-06-14 | Resolved: 2021-07-20

## 2021-07-20 PROBLEM — A41.9 SEPSIS (HCC): Status: RESOLVED | Noted: 2021-07-14 | Resolved: 2021-07-20

## 2021-07-20 PROBLEM — I95.9 HYPOTENSION: Status: RESOLVED | Noted: 2021-06-14 | Resolved: 2021-07-20

## 2021-07-20 LAB
ANION GAP SERPL CALC-SCNC: 12 MMOL/L (ref 7–16)
BUN SERPL-MCNC: 16 MG/DL (ref 8–22)
CALCIUM SERPL-MCNC: 8.5 MG/DL (ref 8.4–10.2)
CHLORIDE SERPL-SCNC: 101 MMOL/L (ref 96–112)
CO2 SERPL-SCNC: 22 MMOL/L (ref 20–33)
CREAT SERPL-MCNC: 0.65 MG/DL (ref 0.5–1.4)
ERYTHROCYTE [DISTWIDTH] IN BLOOD BY AUTOMATED COUNT: 42 FL (ref 35.9–50)
GLUCOSE BLD-MCNC: 255 MG/DL (ref 65–99)
GLUCOSE BLD-MCNC: 260 MG/DL (ref 65–99)
GLUCOSE BLD-MCNC: 261 MG/DL (ref 65–99)
GLUCOSE SERPL-MCNC: 244 MG/DL (ref 65–99)
HCT VFR BLD AUTO: 41.1 % (ref 42–52)
HGB BLD-MCNC: 13.7 G/DL (ref 14–18)
MAGNESIUM SERPL-MCNC: 1.7 MG/DL (ref 1.5–2.5)
MCH RBC QN AUTO: 30.7 PG (ref 27–33)
MCHC RBC AUTO-ENTMCNC: 33.3 G/DL (ref 33.7–35.3)
MCV RBC AUTO: 92.2 FL (ref 81.4–97.8)
PLATELET # BLD AUTO: 331 K/UL (ref 164–446)
PMV BLD AUTO: 9.9 FL (ref 9–12.9)
POTASSIUM SERPL-SCNC: 3.4 MMOL/L (ref 3.6–5.5)
RBC # BLD AUTO: 4.46 M/UL (ref 4.7–6.1)
SODIUM SERPL-SCNC: 135 MMOL/L (ref 135–145)
WBC # BLD AUTO: 10.2 K/UL (ref 4.8–10.8)

## 2021-07-20 PROCEDURE — 700111 HCHG RX REV CODE 636 W/ 250 OVERRIDE (IP): Performed by: STUDENT IN AN ORGANIZED HEALTH CARE EDUCATION/TRAINING PROGRAM

## 2021-07-20 PROCEDURE — 700102 HCHG RX REV CODE 250 W/ 637 OVERRIDE(OP): Performed by: STUDENT IN AN ORGANIZED HEALTH CARE EDUCATION/TRAINING PROGRAM

## 2021-07-20 PROCEDURE — A9270 NON-COVERED ITEM OR SERVICE: HCPCS | Performed by: FAMILY MEDICINE

## 2021-07-20 PROCEDURE — 700102 HCHG RX REV CODE 250 W/ 637 OVERRIDE(OP): Performed by: INTERNAL MEDICINE

## 2021-07-20 PROCEDURE — A9270 NON-COVERED ITEM OR SERVICE: HCPCS | Performed by: STUDENT IN AN ORGANIZED HEALTH CARE EDUCATION/TRAINING PROGRAM

## 2021-07-20 PROCEDURE — 94760 N-INVAS EAR/PLS OXIMETRY 1: CPT

## 2021-07-20 PROCEDURE — 700102 HCHG RX REV CODE 250 W/ 637 OVERRIDE(OP): Performed by: FAMILY MEDICINE

## 2021-07-20 PROCEDURE — 80048 BASIC METABOLIC PNL TOTAL CA: CPT

## 2021-07-20 PROCEDURE — 99239 HOSP IP/OBS DSCHRG MGMT >30: CPT | Performed by: FAMILY MEDICINE

## 2021-07-20 PROCEDURE — 85027 COMPLETE CBC AUTOMATED: CPT

## 2021-07-20 PROCEDURE — 82962 GLUCOSE BLOOD TEST: CPT | Mod: 91

## 2021-07-20 PROCEDURE — A9270 NON-COVERED ITEM OR SERVICE: HCPCS | Performed by: INTERNAL MEDICINE

## 2021-07-20 PROCEDURE — 700105 HCHG RX REV CODE 258: Performed by: STUDENT IN AN ORGANIZED HEALTH CARE EDUCATION/TRAINING PROGRAM

## 2021-07-20 PROCEDURE — 83735 ASSAY OF MAGNESIUM: CPT

## 2021-07-20 RX ORDER — POTASSIUM CHLORIDE 20 MEQ/1
40 TABLET, EXTENDED RELEASE ORAL 2 TIMES DAILY
Qty: 60 TABLET | Refills: 11 | Status: ON HOLD | OUTPATIENT
Start: 2021-07-20 | End: 2021-10-21

## 2021-07-20 RX ORDER — POTASSIUM CHLORIDE 20 MEQ/1
20 TABLET, EXTENDED RELEASE ORAL ONCE
Status: COMPLETED | OUTPATIENT
Start: 2021-07-20 | End: 2021-07-20

## 2021-07-20 RX ADMIN — FINASTERIDE 5 MG: 5 TABLET, FILM COATED ORAL at 06:09

## 2021-07-20 RX ADMIN — INSULIN HUMAN 3 UNITS: 100 INJECTION, SOLUTION PARENTERAL at 11:52

## 2021-07-20 RX ADMIN — POTASSIUM CHLORIDE 40 MEQ: 1500 TABLET, EXTENDED RELEASE ORAL at 06:09

## 2021-07-20 RX ADMIN — POTASSIUM CHLORIDE 20 MEQ: 1500 TABLET, EXTENDED RELEASE ORAL at 08:27

## 2021-07-20 RX ADMIN — APIXABAN 5 MG: 5 TABLET, FILM COATED ORAL at 06:09

## 2021-07-20 RX ADMIN — INSULIN HUMAN 7 UNITS: 100 INJECTION, SOLUTION PARENTERAL at 06:06

## 2021-07-20 RX ADMIN — TAMSULOSIN HYDROCHLORIDE 0.4 MG: 0.4 CAPSULE ORAL at 06:09

## 2021-07-20 RX ADMIN — CEFEPIME 2 G: 2 INJECTION, POWDER, FOR SOLUTION INTRAVENOUS at 11:58

## 2021-07-20 RX ADMIN — LEVOTHYROXINE SODIUM 25 MCG: 0.03 TABLET ORAL at 06:09

## 2021-07-20 RX ADMIN — GABAPENTIN 100 MG: 100 CAPSULE ORAL at 06:09

## 2021-07-20 ASSESSMENT — PAIN DESCRIPTION - PAIN TYPE: TYPE: ACUTE PAIN

## 2021-07-20 NOTE — CARE PLAN
Problem: Knowledge Deficit - Standard  Goal: Patient and family/care givers will demonstrate understanding of plan of care, disease process/condition, diagnostic tests and medications  Outcome: Progressing     Problem: Hemodynamics  Goal: Patient's hemodynamics, fluid balance and neurologic status will be stable or improve  Outcome: Progressing     Problem: Fluid Volume  Goal: Fluid volume balance will be maintained  Outcome: Progressing     Problem: Urinary - Renal Perfusion  Goal: Ability to achieve and maintain adequate renal perfusion and functioning will improve  Outcome: Progressing     Problem: Respiratory  Goal: Patient will achieve/maintain optimum respiratory ventilation and gas exchange  Outcome: Progressing     Problem: Fall Risk  Goal: Patient will remain free from falls  Outcome: Met     Problem: Skin Integrity  Goal: Skin integrity is maintained or improved  Outcome: Met   The patient is Stable - Low risk of patient condition declining or worsening    Shift Goals  Clinical Goals: reorient pt, wilson care, abx, D/C  Patient Goals: rest, abx, D/C    Progress made toward(s) clinical / shift goals:  D/c home after 1100 dose of abx    Patient is not progressing towards the following goals: n/a

## 2021-07-20 NOTE — DISCHARGE PLANNING
Received Transport Form @ 4917  Spoke to Brian @ [facility].    Transport is scheduled for 7/20 @1500 going home.    BS RN and RN CM notified of scheduled transport via Voalte @[phone].

## 2021-07-20 NOTE — DISCHARGE PLANNING
Anticipated Discharge Disposition: Home w/ [facility].    Action: LSW received phone call from Danielle with [facility]. Per Danielle, pt's spouse, Mercedes, is not comfortable bringing pt home and getting him into the house. Danielle provided call back number [phone] and offered her assistance if needed.    Barriers to Discharge: Safe d/c plan    Plan: LSW to call Mercedes to discuss concerns.    1105: LSW received VM from Mercedes requesting this LSW call her.    1230: LSW called Mercedes back to discuss concerns. Per Mercedes, her only concern is getting pt into the house. LSW informed her that transport can be set up so pt can be transferred into the house. MACK Singh to assist w/ transport paperwork.    1232: LSW called Danielle back to let her know that [facility] would be set up for the pt.    1350: LSW received call from Mercedes asking for an update on transport time. LSW informed by MACK Singh that [facility] transport is set up for 1500. LSW notified bedside RN and MD.     1456: LSW called Mercedes to deliver IMM. Mercedes expressed understanding and gave verbal consent on 7/20/2021 at 1456. LSW faxed IMM to [facility].

## 2021-07-20 NOTE — PROGRESS NOTES
Report received from Desiree GIRON. Plan of care discussed. Patient resting comfortably in bed. Safety precautions in place.

## 2021-07-20 NOTE — DISCHARGE SUMMARY
Discharge Summary    CHIEF COMPLAINT ON ADMISSION  Chief Complaint   Patient presents with   • Weakness     Pt came in via [facility] from nursing home facility, Hx of chronic UTI for the past year, here today with c/o weakness, nausea for the past few days. Fever at 101.5F for [facility] he was given 1g of tylenol. IV stablished by [facility] and 600mls of LR given, current BP in the ED at 81/48, oxygen in place at 2L, RA oxygen at 88%.        Reason for Admission  EMS     Admission Date  7/14/2021    CODE STATUS  DNAR/DNI    HPI & HOSPITAL COURSE    Julien Dao is a 89 y.o. male with past medical history of diabetes mellitus, hypertension, on indwelling Wilson catheter, CAD who presented on 7/14/2021 from nursing facility fo generalized fatigue associated with fever, dry cough, nausea for last 3 days. He had wilson catheter exchanged in the ED.  EKG showed afib of which he has a known history.    He was found to have a pseudomonas UTI for which he received a 7 day course of Cefepime, and he is afebrile and with a normal white count. He developed diarrhea on the antibiotics but is C diff negative and had no diarrhea overnight. I spoke with his wife and it turns out he was only replacing his wilson once every three or four months - I advised her that this was the source of his frequent infections and that he needs to change it out at least monthly.  We also discussed his stroke risk with his afib but she declines anticoagulation at this time and would prefer to discuss with his Cardiologist given his incidences of hematuria while on Xarelto in the past.  The patient was recommended to discharge back to SNF but his wife wishes to take him home with home health and family support. We have set up home health for him.  We have also advised pt's wife to hold his Chlorthalidone until his blood pressures demonstrate stability at home, and to keep a log book for his PCP.      Therefore, he is discharged in fair and stable condition to home  with organized home healthcare and close outpatient follow-up.    The patient met 2-midnight criteria for an inpatient stay at the time of discharge.    Discharge Date  7/20/21    FOLLOW UP ITEMS POST DISCHARGE  None    DISCHARGE DIAGNOSES  Principal Problem (Resolved):    Sepsis (HCC) POA: Unknown  Active Problems:    UTI (urinary tract infection) POA: Unknown    High anion gap metabolic acidosis POA: Unknown    Hypomagnesemia POA: Unknown    A-fib (HCC) POA: Unknown    Pneumonia POA: Unknown  Resolved Problems:    Diarrhea POA: Yes    Hypotension POA: Yes    Lactic acidosis POA: Yes    Acute encephalopathy POA: Yes      FOLLOW UP  No future appointments.  Dr Figueroa - Cardiology     In 1 week      Angela Keyes, A.PLuis MiguelNLuis Miguel  [address] [facility]  [address]  [facility] [address] [phone]    In 2 weeks      [facility] [place]     In 2 weeks        MEDICATIONS ON DISCHARGE     Medication List      START taking these medications      Instructions   potassium chloride SA 20 MEQ Tbcr  Commonly known as: Kdur   Take 2 Tablets by mouth 2 times a day.  Dose: 40 mEq        CONTINUE taking these medications      Instructions   amLODIPine 5 MG Tabs  Commonly known as: NORVASC   Take 1 Tab by mouth every day.  Dose: 5 mg     finasteride 5 MG Tabs  Commonly known as: PROSCAR   Take 1 Tab by mouth every day.  Dose: 5 mg     gabapentin 100 MG Caps  Commonly known as: NEURONTIN   Take 100 mg by mouth 2 times a day.  Dose: 100 mg     insulin glargine 100 UNIT/ML Sopn injection  Generic drug: insulin glargine   Inject 35 Units under the skin every evening.  Dose: 35 Units     levothyroxine 25 MCG Tabs  Commonly known as: SYNTHROID   Take 1 tablet by mouth Every morning on an empty stomach.  Dose: 25 mcg     metoprolol tartrate 25 MG Tabs  Commonly known as: LOPRESSOR   Take 0.5 Tablets by mouth 2 times a day for 14 days. 0.5 tablet = 12.5 mg.  Dose: 12.5 mg     PX Mens Multivitamins Tabs   Take 1 tablet by mouth every morning.  Dose: 1  tablet     * Restasis 0.05 % ophthalmic emulsion  Generic drug: cyclosporin   Administer 1 Drop into both eyes every evening.  Dose: 1 Drop     * Restasis 0.05 % ophthalmic emulsion  Generic drug: cyclosporin   Administer 1 Drop into both eyes every day.  Dose: 1 Drop     simvastatin 10 MG Tabs  Commonly known as: ZOCOR   Take 10 mg by mouth every evening.  Dose: 10 mg     tamsulosin 0.4 MG capsule  Commonly known as: FLOMAX   Take 0.4 mg by mouth every morning.  Dose: 0.4 mg         * This list has 2 medication(s) that are the same as other medications prescribed for you. Read the directions carefully, and ask your doctor or other care provider to review them with you.            STOP taking these medications    cefdinir 300 MG Caps  Commonly known as: OMNICEF     chlorthalidone 25 MG Tabs  Commonly known as: HYGROTON     potassium Chloride ER 20 MEQ Tbcr tablet  Commonly known as: K-TAB            Allergies  Allergies   Allergen Reactions   • Iodine Anaphylaxis     Pt and pts wife report that it has been so long not sure what happens       DIET  Orders Placed This Encounter   Procedures   • Diet Order Diet: Consistent CHO (Diabetic)     Standing Status:   Standing     Number of Occurrences:   1     Order Specific Question:   Diet:     Answer:   Consistent CHO (Diabetic) [4]       ACTIVITY  As tolerated.  Weight bearing as tolerated    CONSULTATIONS  None    PROCEDURES  None    LABORATORY  Lab Results   Component Value Date    SODIUM 135 07/20/2021    POTASSIUM 3.4 (L) 07/20/2021    CHLORIDE 101 07/20/2021    CO2 22 07/20/2021    GLUCOSE 244 (H) 07/20/2021    BUN 16 07/20/2021    CREATININE 0.65 07/20/2021    CREATININE 1.0 12/02/2008        Lab Results   Component Value Date    WBC 10.2 07/20/2021    HEMOGLOBIN 13.7 (L) 07/20/2021    HEMATOCRIT 41.1 (L) 07/20/2021    PLATELETCT 331 07/20/2021        Total time of the discharge process exceeds 34 minutes.

## 2021-07-20 NOTE — FLOWSHEET NOTE
07/19/21 2053   Events/Summary/Plan   Events/Summary/Plan O2 Check   Vital Signs   Pulse 87   Respiration 18   Pulse Oximetry 96 %   $ Pulse Oximetry (Spot Check) Yes   O2 Alarms Set & Reviewed Yes   Oxygen   O2 (LPM) 0   O2 Delivery Device Room air w/o2 available

## 2021-07-20 NOTE — DISCHARGE PLANNING
Anticipated Discharge Disposition:   Home with [facility]      Action:     RN CM asked by LSW to assist with transfer home. Transfer home via [facility] would be best due to medicare insurance. Also, patient's spouse stated that she is going to need help getting patient into the home.     1320: Transport forms faxed to [facility] requesting transport home at 1500. LSW notified.     Transport confirmed for 1500. LSW notified.     Barriers to Discharge:   None     Plan:   Wait for transport   Hospital care management will continue to assist with discharge planning needs.

## 2021-07-20 NOTE — PROGRESS NOTES
Telemetry Shift Summary    Rhythm SR-Vpaced  HR Range 73-88   Ectopy fPAC, oPVC, rCoup, rbig  Measurement .28/.14/.36    Normal Values  Rhythm SR  HR Range   Measurement 0.12-.020 / 0.06-0.10 / 0.30-0.52

## 2021-07-20 NOTE — DISCHARGE INSTRUCTIONS
Diet: Diet Order Diet: Consistent CHO (Diabetic)  Activity: As tolerated  Follow Up:Dr Figueroa in one week, Urology and PCP in two weeks   Disposition:Home with home health  Diagnosis: Sepsis (HCC)    Follow up with your Primary Care Provider JONO Powell as scheduled or sooner if your symptoms persist or worsen.  Return to Emergency Room for severe chest pain, shortness of breath, signs of a stroke, or any other emergencies.      Please check your blood pressure every day and keep a log book to give to your primary care doctor - if your blood pressures are consistently more than 140/80 for three to four days, you can resume your home Chlorthalidone. If your blood pressure is more than 180/110 or less than 100/50 please contact a physician immediately.    Discharge Instructions    Discharged to home by ambulance with escort. Discharged via ambulance, hospital escort: Yes.  Special equipment needed: Not Applicable    Be sure to schedule a follow-up appointment with your primary care doctor or any specialists as instructed.     Discharge Plan:   Diet Plan: Discussed  Activity Level: Discussed  Confirmed Follow up Appointment: Patient to Call and Schedule Appointment  Confirmed Symptoms Management: Discussed  Medication Reconciliation Updated: Yes    I understand that a diet low in cholesterol, fat, and sodium is recommended for good health. Unless I have been given specific instructions below for another diet, I accept this instruction as my diet prescription.   Other diet: Diabetic Diet    Special Instructions: None    · Is patient discharged on Warfarin / Coumadin?   No     Depression / Suicide Risk    As you are discharged from this [facility] facility, it is important to learn how to keep safe from harming yourself.    Recognize the warning signs:  · Abrupt changes in personality, positive or negative- including increase in energy   · Giving away possessions  · Change in eating patterns- significant  weight changes-  positive or negative  · Change in sleeping patterns- unable to sleep or sleeping all the time   · Unwillingness or inability to communicate  · Depression  · Unusual sadness, discouragement and loneliness  · Talk of wanting to die  · Neglect of personal appearance   · Rebelliousness- reckless behavior  · Withdrawal from people/activities they love  · Confusion- inability to concentrate     If you or a loved one observes any of these behaviors or has concerns about self-harm, here's what you can do:  · Talk about it- your feelings and reasons for harming yourself  · Remove any means that you might use to hurt yourself (examples: pills, rope, extension cords, firearm)  · Get professional help from the community (Mental Health, Substance Abuse, psychological counseling)  · Do not be alone:Call your Safe Contact- someone whom you trust who will be there for you.  · Call your local CRISIS HOTLINE 066-3437 or 404-889-3045  · Call your local Children's Mobile Crisis Response Team [place] (252) 947-9032 or www.3dim  · Call the toll free National Suicide Prevention Hotlines   · National Suicide Prevention Lifeline 058-996-IZUT (4790)  · National Hope Line Network 800-SUICIDE (975-4700)

## 2021-07-20 NOTE — PROGRESS NOTES
Report given to Desiree GIRON via bedside report. Patient handed off in stable condition. Safety measures in place. Call light within reach. Care relinquished.

## 2021-07-20 NOTE — PROGRESS NOTES
Telemetry Shift Summary      Rhythm: SR w/ VPOD w/ 1st Deg AV, w/ BBB  HR Range: 74-96  Ectopy: O PVC, F PAC  Measurements: .22/.14/.40    Normal Values:  Rhythm: SR  HR Range:   Measurements: 0.12-0.20/ 0.06-0.10/ 0.30-0.52

## 2021-08-07 ENCOUNTER — APPOINTMENT (OUTPATIENT)
Dept: RADIOLOGY | Facility: MEDICAL CENTER | Age: 86
DRG: 690 | End: 2021-08-07
Attending: EMERGENCY MEDICINE
Payer: MEDICARE

## 2021-08-07 ENCOUNTER — HOSPITAL ENCOUNTER (INPATIENT)
Facility: MEDICAL CENTER | Age: 86
LOS: 4 days | DRG: 690 | End: 2021-08-12
Attending: EMERGENCY MEDICINE | Admitting: INTERNAL MEDICINE
Payer: MEDICARE

## 2021-08-07 DIAGNOSIS — E87.6 HYPOKALEMIA: ICD-10-CM

## 2021-08-07 DIAGNOSIS — N40.1 BENIGN PROSTATIC HYPERPLASIA WITH URINARY RETENTION: ICD-10-CM

## 2021-08-07 DIAGNOSIS — N30.01 ACUTE CYSTITIS WITH HEMATURIA: ICD-10-CM

## 2021-08-07 DIAGNOSIS — Z97.8 FOLEY CATHETER IN PLACE: ICD-10-CM

## 2021-08-07 DIAGNOSIS — N39.0 RECURRENT UTI (URINARY TRACT INFECTION): ICD-10-CM

## 2021-08-07 DIAGNOSIS — N39.0 COMPLICATED URINARY TRACT INFECTION: ICD-10-CM

## 2021-08-07 DIAGNOSIS — R53.1 GENERALIZED WEAKNESS: ICD-10-CM

## 2021-08-07 DIAGNOSIS — E86.0 DEHYDRATION: ICD-10-CM

## 2021-08-07 DIAGNOSIS — R33.8 BENIGN PROSTATIC HYPERPLASIA WITH URINARY RETENTION: ICD-10-CM

## 2021-08-07 DIAGNOSIS — R53.83 OTHER FATIGUE: ICD-10-CM

## 2021-08-07 LAB
ALBUMIN SERPL BCP-MCNC: 3.6 G/DL (ref 3.2–4.9)
ALBUMIN/GLOB SERPL: 1.1 G/DL
ALP SERPL-CCNC: 84 U/L (ref 30–99)
ALT SERPL-CCNC: 11 U/L (ref 2–50)
ANION GAP SERPL CALC-SCNC: 12 MMOL/L (ref 7–16)
APPEARANCE UR: ABNORMAL
AST SERPL-CCNC: 13 U/L (ref 12–45)
BACTERIA #/AREA URNS HPF: ABNORMAL /HPF
BASOPHILS # BLD AUTO: 0.5 % (ref 0–1.8)
BASOPHILS # BLD: 0.05 K/UL (ref 0–0.12)
BILIRUB SERPL-MCNC: 0.4 MG/DL (ref 0.1–1.5)
BILIRUB UR QL STRIP.AUTO: NEGATIVE
BUN SERPL-MCNC: 8 MG/DL (ref 8–22)
CALCIUM SERPL-MCNC: 8.8 MG/DL (ref 8.4–10.2)
CHLORIDE SERPL-SCNC: 97 MMOL/L (ref 96–112)
CK SERPL-CCNC: 133 U/L (ref 0–154)
CO2 SERPL-SCNC: 26 MMOL/L (ref 20–33)
COLOR UR: YELLOW
CREAT SERPL-MCNC: 0.73 MG/DL (ref 0.5–1.4)
EOSINOPHIL # BLD AUTO: 0.13 K/UL (ref 0–0.51)
EOSINOPHIL NFR BLD: 1.2 % (ref 0–6.9)
EPI CELLS #/AREA URNS HPF: ABNORMAL /HPF
ERYTHROCYTE [DISTWIDTH] IN BLOOD BY AUTOMATED COUNT: 43 FL (ref 35.9–50)
FLUAV RNA SPEC QL NAA+PROBE: NEGATIVE
FLUBV RNA SPEC QL NAA+PROBE: NEGATIVE
GLOBULIN SER CALC-MCNC: 3.3 G/DL (ref 1.9–3.5)
GLUCOSE SERPL-MCNC: 191 MG/DL (ref 65–99)
GLUCOSE UR STRIP.AUTO-MCNC: 250 MG/DL
HCT VFR BLD AUTO: 35.2 % (ref 42–52)
HGB BLD-MCNC: 12 G/DL (ref 14–18)
IMM GRANULOCYTES # BLD AUTO: 0.05 K/UL (ref 0–0.11)
IMM GRANULOCYTES NFR BLD AUTO: 0.5 % (ref 0–0.9)
KETONES UR STRIP.AUTO-MCNC: NEGATIVE MG/DL
LACTATE BLD-SCNC: 0.9 MMOL/L (ref 0.5–2)
LACTATE BLD-SCNC: 3.1 MMOL/L (ref 0.5–2)
LEUKOCYTE ESTERASE UR QL STRIP.AUTO: ABNORMAL
LYMPHOCYTES # BLD AUTO: 1.68 K/UL (ref 1–4.8)
LYMPHOCYTES NFR BLD: 16 % (ref 22–41)
MAGNESIUM SERPL-MCNC: 1.6 MG/DL (ref 1.5–2.5)
MCH RBC QN AUTO: 30.3 PG (ref 27–33)
MCHC RBC AUTO-ENTMCNC: 34.1 G/DL (ref 33.7–35.3)
MCV RBC AUTO: 88.9 FL (ref 81.4–97.8)
MICRO URNS: ABNORMAL
MONOCYTES # BLD AUTO: 0.75 K/UL (ref 0–0.85)
MONOCYTES NFR BLD AUTO: 7.1 % (ref 0–13.4)
NEUTROPHILS # BLD AUTO: 7.83 K/UL (ref 1.82–7.42)
NEUTROPHILS NFR BLD: 74.7 % (ref 44–72)
NITRITE UR QL STRIP.AUTO: NEGATIVE
NRBC # BLD AUTO: 0 K/UL
NRBC BLD-RTO: 0 /100 WBC
PH UR STRIP.AUTO: 7 [PH] (ref 5–8)
PLATELET # BLD AUTO: 273 K/UL (ref 164–446)
PMV BLD AUTO: 10 FL (ref 9–12.9)
POTASSIUM SERPL-SCNC: 2.8 MMOL/L (ref 3.6–5.5)
PROT SERPL-MCNC: 6.9 G/DL (ref 6–8.2)
PROT UR QL STRIP: NEGATIVE MG/DL
RBC # BLD AUTO: 3.96 M/UL (ref 4.7–6.1)
RBC # URNS HPF: ABNORMAL /HPF
RBC UR QL AUTO: ABNORMAL
RSV RNA SPEC QL NAA+PROBE: NEGATIVE
SARS-COV-2 RNA RESP QL NAA+PROBE: NOTDETECTED
SODIUM SERPL-SCNC: 135 MMOL/L (ref 135–145)
SP GR UR STRIP.AUTO: 1.01
SPECIMEN SOURCE: NORMAL
TRANS CELLS URNS QL MICRO: ABNORMAL /HPF
WBC # BLD AUTO: 10.5 K/UL (ref 4.8–10.8)
WBC #/AREA URNS HPF: ABNORMAL /HPF

## 2021-08-07 PROCEDURE — 700111 HCHG RX REV CODE 636 W/ 250 OVERRIDE (IP): Performed by: EMERGENCY MEDICINE

## 2021-08-07 PROCEDURE — 51702 INSERT TEMP BLADDER CATH: CPT

## 2021-08-07 PROCEDURE — 85025 COMPLETE CBC W/AUTO DIFF WBC: CPT

## 2021-08-07 PROCEDURE — 80053 COMPREHEN METABOLIC PANEL: CPT

## 2021-08-07 PROCEDURE — 87040 BLOOD CULTURE FOR BACTERIA: CPT

## 2021-08-07 PROCEDURE — 83735 ASSAY OF MAGNESIUM: CPT

## 2021-08-07 PROCEDURE — 0241U HCHG SARS-COV-2 COVID-19 NFCT DS RESP RNA 4 TRGT MIC: CPT

## 2021-08-07 PROCEDURE — 71045 X-RAY EXAM CHEST 1 VIEW: CPT

## 2021-08-07 PROCEDURE — 83605 ASSAY OF LACTIC ACID: CPT | Mod: 91

## 2021-08-07 PROCEDURE — 87086 URINE CULTURE/COLONY COUNT: CPT

## 2021-08-07 PROCEDURE — 93005 ELECTROCARDIOGRAM TRACING: CPT | Performed by: EMERGENCY MEDICINE

## 2021-08-07 PROCEDURE — 96365 THER/PROPH/DIAG IV INF INIT: CPT

## 2021-08-07 PROCEDURE — 82550 ASSAY OF CK (CPK): CPT

## 2021-08-07 PROCEDURE — 36415 COLL VENOUS BLD VENIPUNCTURE: CPT

## 2021-08-07 PROCEDURE — 96375 TX/PRO/DX INJ NEW DRUG ADDON: CPT

## 2021-08-07 PROCEDURE — 99285 EMERGENCY DEPT VISIT HI MDM: CPT

## 2021-08-07 PROCEDURE — 700105 HCHG RX REV CODE 258: Performed by: EMERGENCY MEDICINE

## 2021-08-07 PROCEDURE — C9803 HOPD COVID-19 SPEC COLLECT: HCPCS | Performed by: EMERGENCY MEDICINE

## 2021-08-07 PROCEDURE — 81001 URINALYSIS AUTO W/SCOPE: CPT

## 2021-08-07 PROCEDURE — 303105 HCHG CATHETER EXTRA

## 2021-08-07 RX ORDER — SODIUM CHLORIDE, SODIUM LACTATE, POTASSIUM CHLORIDE, CALCIUM CHLORIDE 600; 310; 30; 20 MG/100ML; MG/100ML; MG/100ML; MG/100ML
1000 INJECTION, SOLUTION INTRAVENOUS ONCE
Status: COMPLETED | OUTPATIENT
Start: 2021-08-07 | End: 2021-08-08

## 2021-08-07 RX ORDER — SODIUM CHLORIDE 9 MG/ML
1000 INJECTION, SOLUTION INTRAVENOUS ONCE
Status: COMPLETED | OUTPATIENT
Start: 2021-08-07 | End: 2021-08-08

## 2021-08-07 RX ORDER — POTASSIUM CHLORIDE 7.45 MG/ML
10 INJECTION INTRAVENOUS ONCE
Status: COMPLETED | OUTPATIENT
Start: 2021-08-07 | End: 2021-08-07

## 2021-08-07 RX ADMIN — CEFEPIME 2 G: 2 INJECTION, POWDER, FOR SOLUTION INTRAVENOUS at 20:55

## 2021-08-07 RX ADMIN — POTASSIUM CHLORIDE 10 MEQ: 7.46 INJECTION, SOLUTION INTRAVENOUS at 22:04

## 2021-08-07 RX ADMIN — SODIUM CHLORIDE 1000 ML: 9 INJECTION, SOLUTION INTRAVENOUS at 20:56

## 2021-08-07 ASSESSMENT — FIBROSIS 4 INDEX: FIB4 SCORE: 1.91

## 2021-08-08 LAB
EKG IMPRESSION: NORMAL
EKG IMPRESSION: NORMAL
GLUCOSE BLD-MCNC: 103 MG/DL (ref 65–99)
GLUCOSE BLD-MCNC: 153 MG/DL (ref 65–99)
GLUCOSE BLD-MCNC: 174 MG/DL (ref 65–99)
GLUCOSE BLD-MCNC: 261 MG/DL (ref 65–99)
LACTATE BLD-SCNC: 2.2 MMOL/L (ref 0.5–2)

## 2021-08-08 PROCEDURE — 96367 TX/PROPH/DG ADDL SEQ IV INF: CPT

## 2021-08-08 PROCEDURE — 96372 THER/PROPH/DIAG INJ SC/IM: CPT

## 2021-08-08 PROCEDURE — 83605 ASSAY OF LACTIC ACID: CPT

## 2021-08-08 PROCEDURE — 96366 THER/PROPH/DIAG IV INF ADDON: CPT

## 2021-08-08 PROCEDURE — 700105 HCHG RX REV CODE 258: Performed by: EMERGENCY MEDICINE

## 2021-08-08 PROCEDURE — 700111 HCHG RX REV CODE 636 W/ 250 OVERRIDE (IP): Performed by: INTERNAL MEDICINE

## 2021-08-08 PROCEDURE — 700102 HCHG RX REV CODE 250 W/ 637 OVERRIDE(OP): Performed by: INTERNAL MEDICINE

## 2021-08-08 PROCEDURE — 99233 SBSQ HOSP IP/OBS HIGH 50: CPT | Performed by: INTERNAL MEDICINE

## 2021-08-08 PROCEDURE — 74176 CT ABD & PELVIS W/O CONTRAST: CPT | Mod: MG

## 2021-08-08 PROCEDURE — 36415 COLL VENOUS BLD VENIPUNCTURE: CPT

## 2021-08-08 PROCEDURE — 99220 PR INITIAL OBSERVATION CARE,LEVL III: CPT | Performed by: INTERNAL MEDICINE

## 2021-08-08 PROCEDURE — 94760 N-INVAS EAR/PLS OXIMETRY 1: CPT

## 2021-08-08 PROCEDURE — A9270 NON-COVERED ITEM OR SERVICE: HCPCS | Performed by: INTERNAL MEDICINE

## 2021-08-08 PROCEDURE — 700105 HCHG RX REV CODE 258: Performed by: INTERNAL MEDICINE

## 2021-08-08 PROCEDURE — 770006 HCHG ROOM/CARE - MED/SURG/GYN SEMI*

## 2021-08-08 PROCEDURE — 82962 GLUCOSE BLOOD TEST: CPT

## 2021-08-08 RX ORDER — ACETAMINOPHEN 325 MG/1
650 TABLET ORAL EVERY 6 HOURS PRN
Status: DISCONTINUED | OUTPATIENT
Start: 2021-08-08 | End: 2021-08-12 | Stop reason: HOSPADM

## 2021-08-08 RX ORDER — TAMSULOSIN HYDROCHLORIDE 0.4 MG/1
0.4 CAPSULE ORAL EVERY MORNING
Status: DISCONTINUED | OUTPATIENT
Start: 2021-08-08 | End: 2021-08-12 | Stop reason: HOSPADM

## 2021-08-08 RX ORDER — NYSTATIN 100000 [USP'U]/G
1 POWDER TOPICAL DAILY
Status: ON HOLD | COMMUNITY
End: 2021-10-21

## 2021-08-08 RX ORDER — SIMVASTATIN 10 MG
10 TABLET ORAL NIGHTLY
Status: DISCONTINUED | OUTPATIENT
Start: 2021-08-08 | End: 2021-08-12 | Stop reason: HOSPADM

## 2021-08-08 RX ORDER — ENALAPRILAT 1.25 MG/ML
1.25 INJECTION INTRAVENOUS EVERY 6 HOURS PRN
Status: DISCONTINUED | OUTPATIENT
Start: 2021-08-08 | End: 2021-08-12 | Stop reason: HOSPADM

## 2021-08-08 RX ORDER — POTASSIUM CHLORIDE 20 MEQ/1
40 TABLET, EXTENDED RELEASE ORAL 2 TIMES DAILY
Status: COMPLETED | OUTPATIENT
Start: 2021-08-08 | End: 2021-08-08

## 2021-08-08 RX ORDER — ONDANSETRON 4 MG/1
4 TABLET, ORALLY DISINTEGRATING ORAL EVERY 4 HOURS PRN
Status: DISCONTINUED | OUTPATIENT
Start: 2021-08-08 | End: 2021-08-12 | Stop reason: HOSPADM

## 2021-08-08 RX ORDER — AMOXICILLIN 250 MG
2 CAPSULE ORAL 2 TIMES DAILY
Status: DISCONTINUED | OUTPATIENT
Start: 2021-08-08 | End: 2021-08-12 | Stop reason: HOSPADM

## 2021-08-08 RX ORDER — FINASTERIDE 5 MG/1
5 TABLET, FILM COATED ORAL DAILY
Status: DISCONTINUED | OUTPATIENT
Start: 2021-08-08 | End: 2021-08-12 | Stop reason: HOSPADM

## 2021-08-08 RX ORDER — LEVOTHYROXINE SODIUM 0.03 MG/1
25 TABLET ORAL
Status: DISCONTINUED | OUTPATIENT
Start: 2021-08-08 | End: 2021-08-12 | Stop reason: HOSPADM

## 2021-08-08 RX ORDER — NYSTATIN 100000 U/G
1 CREAM TOPICAL 2 TIMES DAILY
Status: ON HOLD | COMMUNITY
End: 2021-08-11

## 2021-08-08 RX ORDER — LABETALOL HYDROCHLORIDE 5 MG/ML
10 INJECTION, SOLUTION INTRAVENOUS EVERY 4 HOURS PRN
Status: DISCONTINUED | OUTPATIENT
Start: 2021-08-08 | End: 2021-08-12 | Stop reason: HOSPADM

## 2021-08-08 RX ORDER — ONDANSETRON 2 MG/ML
4 INJECTION INTRAMUSCULAR; INTRAVENOUS EVERY 4 HOURS PRN
Status: DISCONTINUED | OUTPATIENT
Start: 2021-08-08 | End: 2021-08-12 | Stop reason: HOSPADM

## 2021-08-08 RX ORDER — BISACODYL 10 MG
10 SUPPOSITORY, RECTAL RECTAL
Status: DISCONTINUED | OUTPATIENT
Start: 2021-08-08 | End: 2021-08-12 | Stop reason: HOSPADM

## 2021-08-08 RX ORDER — PHENAZOPYRIDINE HYDROCHLORIDE 200 MG/1
200 TABLET, FILM COATED ORAL 3 TIMES DAILY
COMMUNITY
End: 2021-08-20

## 2021-08-08 RX ORDER — GABAPENTIN 100 MG/1
100 CAPSULE ORAL 2 TIMES DAILY
Status: DISCONTINUED | OUTPATIENT
Start: 2021-08-08 | End: 2021-08-12 | Stop reason: HOSPADM

## 2021-08-08 RX ORDER — AMLODIPINE BESYLATE 5 MG/1
5 TABLET ORAL DAILY
Status: DISCONTINUED | OUTPATIENT
Start: 2021-08-08 | End: 2021-08-12 | Stop reason: HOSPADM

## 2021-08-08 RX ORDER — POLYETHYLENE GLYCOL 3350 17 G/17G
1 POWDER, FOR SOLUTION ORAL
Status: DISCONTINUED | OUTPATIENT
Start: 2021-08-08 | End: 2021-08-12 | Stop reason: HOSPADM

## 2021-08-08 RX ORDER — CYCLOSPORINE 0.5 MG/ML
1 EMULSION OPHTHALMIC EVERY EVENING
Status: DISCONTINUED | OUTPATIENT
Start: 2021-08-08 | End: 2021-08-08

## 2021-08-08 RX ORDER — POTASSIUM CHLORIDE 20 MEQ/1
40 TABLET, EXTENDED RELEASE ORAL 2 TIMES DAILY
Status: DISCONTINUED | OUTPATIENT
Start: 2021-08-08 | End: 2021-08-08

## 2021-08-08 RX ORDER — POLYVINYL ALCOHOL 14 MG/ML
1 SOLUTION/ DROPS OPHTHALMIC
Status: DISCONTINUED | OUTPATIENT
Start: 2021-08-08 | End: 2021-08-12 | Stop reason: HOSPADM

## 2021-08-08 RX ORDER — MAGNESIUM SULFATE HEPTAHYDRATE 40 MG/ML
2 INJECTION, SOLUTION INTRAVENOUS ONCE
Status: COMPLETED | OUTPATIENT
Start: 2021-08-08 | End: 2021-08-08

## 2021-08-08 RX ORDER — DEXTROSE MONOHYDRATE 25 G/50ML
50 INJECTION, SOLUTION INTRAVENOUS
Status: DISCONTINUED | OUTPATIENT
Start: 2021-08-08 | End: 2021-08-12 | Stop reason: HOSPADM

## 2021-08-08 RX ORDER — CARBOXYMETHYLCELLULOSE SODIUM 5 MG/ML
1 SOLUTION/ DROPS OPHTHALMIC
Status: DISCONTINUED | OUTPATIENT
Start: 2021-08-08 | End: 2021-08-08

## 2021-08-08 RX ADMIN — POTASSIUM CHLORIDE 40 MEQ: 1500 TABLET, EXTENDED RELEASE ORAL at 17:27

## 2021-08-08 RX ADMIN — SIMVASTATIN 10 MG: 10 TABLET, FILM COATED ORAL at 20:30

## 2021-08-08 RX ADMIN — AMLODIPINE BESYLATE 5 MG: 5 TABLET ORAL at 06:26

## 2021-08-08 RX ADMIN — SIMVASTATIN 10 MG: 10 TABLET, FILM COATED ORAL at 02:59

## 2021-08-08 RX ADMIN — LEVOTHYROXINE SODIUM 25 MCG: 0.03 TABLET ORAL at 06:26

## 2021-08-08 RX ADMIN — DOCUSATE SODIUM 50 MG AND SENNOSIDES 8.6 MG 2 TABLET: 8.6; 5 TABLET, FILM COATED ORAL at 06:26

## 2021-08-08 RX ADMIN — INSULIN HUMAN 2 UNITS: 100 INJECTION, SOLUTION PARENTERAL at 11:39

## 2021-08-08 RX ADMIN — GABAPENTIN 100 MG: 100 CAPSULE ORAL at 17:27

## 2021-08-08 RX ADMIN — POTASSIUM CHLORIDE 40 MEQ: 1500 TABLET, EXTENDED RELEASE ORAL at 06:26

## 2021-08-08 RX ADMIN — MAGNESIUM SULFATE HEPTAHYDRATE 2 G: 40 INJECTION, SOLUTION INTRAVENOUS at 02:40

## 2021-08-08 RX ADMIN — INSULIN HUMAN 2 UNITS: 100 INJECTION, SOLUTION PARENTERAL at 16:18

## 2021-08-08 RX ADMIN — CEFEPIME 2 G: 2 INJECTION, POWDER, FOR SOLUTION INTRAVENOUS at 20:30

## 2021-08-08 RX ADMIN — TAMSULOSIN HYDROCHLORIDE 0.4 MG: 0.4 CAPSULE ORAL at 06:26

## 2021-08-08 RX ADMIN — FINASTERIDE 5 MG: 5 TABLET, FILM COATED ORAL at 06:26

## 2021-08-08 RX ADMIN — SODIUM CHLORIDE, POTASSIUM CHLORIDE, SODIUM LACTATE AND CALCIUM CHLORIDE 1000 ML: 600; 310; 30; 20 INJECTION, SOLUTION INTRAVENOUS at 00:02

## 2021-08-08 RX ADMIN — DOCUSATE SODIUM 50 MG AND SENNOSIDES 8.6 MG 2 TABLET: 8.6; 5 TABLET, FILM COATED ORAL at 17:26

## 2021-08-08 RX ADMIN — CEFEPIME 2 G: 2 INJECTION, POWDER, FOR SOLUTION INTRAVENOUS at 09:18

## 2021-08-08 RX ADMIN — INSULIN HUMAN 5 UNITS: 100 INJECTION, SOLUTION PARENTERAL at 20:37

## 2021-08-08 RX ADMIN — ENOXAPARIN SODIUM 40 MG: 40 INJECTION SUBCUTANEOUS at 06:26

## 2021-08-08 RX ADMIN — GABAPENTIN 100 MG: 100 CAPSULE ORAL at 06:26

## 2021-08-08 ASSESSMENT — PATIENT HEALTH QUESTIONNAIRE - PHQ9
2. FEELING DOWN, DEPRESSED, IRRITABLE, OR HOPELESS: SEVERAL DAYS
7. TROUBLE CONCENTRATING ON THINGS, SUCH AS READING THE NEWSPAPER OR WATCHING TELEVISION: NOT AT ALL
3. TROUBLE FALLING OR STAYING ASLEEP OR SLEEPING TOO MUCH: MORE THAN HALF THE DAYS
8. MOVING OR SPEAKING SO SLOWLY THAT OTHER PEOPLE COULD HAVE NOTICED. OR THE OPPOSITE, BEING SO FIGETY OR RESTLESS THAT YOU HAVE BEEN MOVING AROUND A LOT MORE THAN USUAL: NOT AT ALL
1. LITTLE INTEREST OR PLEASURE IN DOING THINGS: SEVERAL DAYS
SUM OF ALL RESPONSES TO PHQ QUESTIONS 1-9: 6
4. FEELING TIRED OR HAVING LITTLE ENERGY: MORE THAN HALF THE DAYS
6. FEELING BAD ABOUT YOURSELF - OR THAT YOU ARE A FAILURE OR HAVE LET YOURSELF OR YOUR FAMILY DOWN: NOT AL ALL
5. POOR APPETITE OR OVEREATING: NOT AT ALL
9. THOUGHTS THAT YOU WOULD BE BETTER OFF DEAD, OR OF HURTING YOURSELF: NOT AT ALL
SUM OF ALL RESPONSES TO PHQ9 QUESTIONS 1 AND 2: 2

## 2021-08-08 ASSESSMENT — COGNITIVE AND FUNCTIONAL STATUS - GENERAL
MOVING TO AND FROM BED TO CHAIR: A LITTLE
STANDING UP FROM CHAIR USING ARMS: A LITTLE
SUGGESTED CMS G CODE MODIFIER MOBILITY: CK
DRESSING REGULAR UPPER BODY CLOTHING: A LITTLE
EATING MEALS: A LITTLE
HELP NEEDED FOR BATHING: A LITTLE
MOBILITY SCORE: 16
DRESSING REGULAR LOWER BODY CLOTHING: A LOT
SUGGESTED CMS G CODE MODIFIER DAILY ACTIVITY: CK
MOVING FROM LYING ON BACK TO SITTING ON SIDE OF FLAT BED: A LITTLE
PERSONAL GROOMING: A LITTLE
TOILETING: A LITTLE
TURNING FROM BACK TO SIDE WHILE IN FLAT BAD: A LOT
CLIMB 3 TO 5 STEPS WITH RAILING: A LOT
WALKING IN HOSPITAL ROOM: A LITTLE
DAILY ACTIVITIY SCORE: 17

## 2021-08-08 ASSESSMENT — ENCOUNTER SYMPTOMS
MYALGIAS: 0
STRIDOR: 0
FEVER: 0
LOSS OF CONSCIOUSNESS: 0
DIZZINESS: 0
FALLS: 0
NAUSEA: 0
TINGLING: 0
CHILLS: 0
SHORTNESS OF BREATH: 0
SPUTUM PRODUCTION: 0
COUGH: 0
DEPRESSION: 0
HEADACHES: 0
VOMITING: 0
DIARRHEA: 0
PALPITATIONS: 0
ABDOMINAL PAIN: 0
CONSTIPATION: 0

## 2021-08-08 ASSESSMENT — LIFESTYLE VARIABLES
ON A TYPICAL DAY WHEN YOU DRINK ALCOHOL HOW MANY DRINKS DO YOU HAVE: 0
ALCOHOL_USE: NO
EVER HAD A DRINK FIRST THING IN THE MORNING TO STEADY YOUR NERVES TO GET RID OF A HANGOVER: NO
TOTAL SCORE: 0
HAVE PEOPLE ANNOYED YOU BY CRITICIZING YOUR DRINKING: NO
HAVE YOU EVER FELT YOU SHOULD CUT DOWN ON YOUR DRINKING: NO
AVERAGE NUMBER OF DAYS PER WEEK YOU HAVE A DRINK CONTAINING ALCOHOL: 0
TOTAL SCORE: 0
CONSUMPTION TOTAL: NEGATIVE
TOTAL SCORE: 0
EVER FELT BAD OR GUILTY ABOUT YOUR DRINKING: NO
HOW MANY TIMES IN THE PAST YEAR HAVE YOU HAD 5 OR MORE DRINKS IN A DAY: 0

## 2021-08-08 ASSESSMENT — PAIN DESCRIPTION - PAIN TYPE: TYPE: ACUTE PAIN;CHRONIC PAIN

## 2021-08-08 ASSESSMENT — FIBROSIS 4 INDEX: FIB4 SCORE: 1.32

## 2021-08-08 NOTE — PROGRESS NOTES
Report received from Aguila GIRON. Plan of care discussed. Patient resting comfortably in bed. Safety precautions in place.

## 2021-08-08 NOTE — ED NOTES
Patient continued to ask for food, updated patient unable to eat at this time.  Awaiting CT scan patient aware

## 2021-08-08 NOTE — ED NOTES
Patient declines to have CT scan at this time, reminded patient that in order to see if he can eat we will need to get the CT scan done patient agreed CT aware

## 2021-08-08 NOTE — H&P
[facility] Medicine History & Physical Note    Date of Service  8/8/2021    Primary Care Physician  TABITHA Powell.    Consultants  None    Specialist Names: None    Code Status  DNAR/DNI    Chief Complaint  Chief Complaint   Patient presents with   • Fever   • Elevated Glucose       History of Presenting Illness  Julien Dao is a 89 y.o. male who presented 8/7/2021 with fatigue.  Patient states he began feeling less himself approximately 3 weeks ago.  His biggest complaint is fatigue.  He states does have a home health nurse who told him he needed to come to the emergency department today.  He states she has also been noticing elevated sugars recently.  He does have a history of urinary tract infections.  He does have a chronic indwelling Sher due to urinary retention.  I did discuss the case including labs and imaging with the ER physician.    I discussed the plan of care with patient.    Review of Systems  Review of Systems   Constitutional: Positive for malaise/fatigue. Negative for chills and fever.   HENT: Negative for congestion.    Respiratory: Negative for cough, sputum production, shortness of breath and stridor.    Cardiovascular: Negative for chest pain, palpitations and leg swelling.   Gastrointestinal: Negative for abdominal pain, constipation, diarrhea, nausea and vomiting.   Genitourinary: Negative for dysuria and urgency.   Musculoskeletal: Negative for falls and myalgias.   Neurological: Negative for dizziness, tingling, loss of consciousness and headaches.   Psychiatric/Behavioral: Negative for depression and suicidal ideas.   All other systems reviewed and are negative.      Past Medical History   has a past medical history of KOSTAS (acute kidney injury) ([facility]) (6/14/2021), CAD (coronary artery disease), Delirium (2/21/2020), DM (diabetes mellitus) ([facility]) (1/7/2014), Falls, Hypertension, and UTI (urinary tract infection) (11/4/2018).    Surgical History   has a past surgical history that  includes other cardiac surgery; coronary artery bypass, 3; and janice rectal abscess.     Family History  family history is not on file.   Family history reviewed with patient. There is no family history that is pertinent to the chief complaint.     Social History   reports that he quit smoking about 48 years ago. He has never used smokeless tobacco. He reports previous alcohol use. He reports that he does not use drugs.    Allergies  Allergies   Allergen Reactions   • Iodine Anaphylaxis     Pt and pts wife report that it has been so long not sure what happens       Medications  Prior to Admission Medications   Prescriptions Last Dose Informant Patient Reported? Taking?   Multiple Vitamins-Minerals (PX MENS MULTIVITAMINS) Tab  Significant Other Yes No   Sig: Take 1 tablet by mouth every morning.   amLODIPine (NORVASC) 5 MG Tab  Significant Other No No   Sig: Take 1 Tab by mouth every day.   cyclosporin (RESTASIS) 0.05 % ophthalmic emulsion  Significant Other Yes No   Sig: Administer 1 Drop into both eyes every evening.   cyclosporin (RESTASIS) 0.05 % ophthalmic emulsion  Significant Other Yes No   Sig: Administer 1 Drop into both eyes every day.   finasteride (PROSCAR) 5 MG Tab  Significant Other No No   Sig: Take 1 Tab by mouth every day.   gabapentin (NEURONTIN) 100 MG Cap  Significant Other Yes No   Sig: Take 100 mg by mouth 2 times a day.   insulin glargine (INSULIN GLARGINE) 100 UNIT/ML Solution Pen-injector injection  Significant Other Yes No   Sig: Inject 35 Units under the skin every evening.   levothyroxine (SYNTHROID) 25 MCG Tab  Significant Other No No   Sig: Take 1 tablet by mouth Every morning on an empty stomach.   potassium chloride SA (KDUR) 20 MEQ Tab CR   No No   Sig: Take 2 Tablets by mouth 2 times a day.   simvastatin (ZOCOR) 10 MG Tab  Significant Other Yes No   Sig: Take 10 mg by mouth every evening.   tamsulosin (FLOMAX) 0.4 MG capsule  Significant Other Yes No   Sig: Take 0.4 mg by mouth every  morning.      Facility-Administered Medications: None       Physical Exam  Temp:  [36.6 °C (97.8 °F)-37.3 °C (99.2 °F)] 36.6 °C (97.8 °F)  Pulse:  [83-86] 86  Resp:  [18-20] 20  BP: (142-153)/(63-78) 142/63  SpO2:  [92 %-94 %] 92 %    Physical Exam  Vitals and nursing note reviewed.   Constitutional:       General: He is not in acute distress.     Appearance: He is well-developed. He is ill-appearing. He is not toxic-appearing or diaphoretic.   HENT:      Head: Normocephalic and atraumatic.      Right Ear: External ear normal.      Left Ear: External ear normal.      Nose: Nose normal. No congestion or rhinorrhea.      Mouth/Throat:      Mouth: Mucous membranes are moist.      Pharynx: No oropharyngeal exudate.   Eyes:      General:         Right eye: No discharge.         Left eye: No discharge.      Extraocular Movements: Extraocular movements intact.   Neck:      Trachea: No tracheal deviation.   Cardiovascular:      Rate and Rhythm: Normal rate. Rhythm irregularly irregular.      Heart sounds: No murmur heard.   No friction rub. No gallop.    Pulmonary:      Effort: Pulmonary effort is normal. No respiratory distress.      Breath sounds: Normal breath sounds. No stridor. No wheezing or rales.   Chest:      Chest wall: No tenderness.   Abdominal:      General: Bowel sounds are normal. There is no distension.      Palpations: Abdomen is soft.      Tenderness: There is no abdominal tenderness.   Musculoskeletal:         General: No tenderness. Normal range of motion.      Cervical back: Normal range of motion and neck supple. No edema or erythema.      Right lower leg: No edema.      Left lower leg: No edema.   Lymphadenopathy:      Cervical: No cervical adenopathy.   Skin:     General: Skin is warm and dry.      Coloration: Skin is not jaundiced.      Findings: No erythema or rash.   Neurological:      General: No focal deficit present.      Mental Status: He is alert and oriented to person, place, and time.       Cranial Nerves: No cranial nerve deficit.   Psychiatric:         Mood and Affect: Mood normal.         Behavior: Behavior normal.         Thought Content: Thought content normal.         Judgment: Judgment normal.         Laboratory:  Recent Labs     08/07/21 2015   WBC 10.5   RBC 3.96*   HEMOGLOBIN 12.0*   HEMATOCRIT 35.2*   MCV 88.9   MCH 30.3   MCHC 34.1   RDW 43.0   PLATELETCT 273   MPV 10.0     Recent Labs     08/07/21 2015   SODIUM 135   POTASSIUM 2.8*   CHLORIDE 97   CO2 26   GLUCOSE 191*   BUN 8   CREATININE 0.73   CALCIUM 8.8     Recent Labs     08/07/21 2015   ALTSGPT 11   ASTSGOT 13   ALKPHOSPHAT 84   TBILIRUBIN 0.4   GLUCOSE 191*         No results for input(s): NTPROBNP in the last 72 hours.      No results for input(s): TROPONINT in the last 72 hours.    Imaging:  CT-RENAL COLIC EVALUATION(A/P W/O)   Final Result         1.  No evidence of urolithiasis.      2.  Mild prominence of the renal collecting system and ureters though this has decreased since previous CT scan.      3.  Diffuse urinary bladder wall thickening with urinary bladder decompressed by Sher catheter.      4.  Atherosclerotic disease.      5.  Diverticulosis without diverticulitis.      6.  Renal cysts.      DX-CHEST-PORTABLE (1 VIEW)   Final Result      No acute cardiac or pulmonary abnormalities are identified.          EKG:  I have personally reviewed the images and compared with prior images.    Assessment/Plan:  I anticipate this patient is appropriate for observation status at this time.    UTI (urinary tract infection)- (present on admission)  Assessment & Plan  -Last month he did have a Pseudomonas UTI, somewhat difficult to treat Pseudomonas  -Sensitive at that time to cefepime, start cefepime  -Not causing sepsis    A-fib (HCC)- (present on admission)  Assessment & Plan  -Rate controlled  -Patient is not fully anticoagulated, will not be started now    Hypothyroidism- (present on admission)  Assessment & Plan  -Continue  Synthroid at 25 mcg daily  -Most recent TSH was approximately 3 months ago and was elevated 8.51  -Normal free T4 at that time at 1.12    BPH (benign prostatic hyperplasia)- (present on admission)  Assessment & Plan  -Continue home Proscar and Flomax  -Does have a chronic indwelling Sher    Urinary retention- (present on admission)  Assessment & Plan  -With chronic indwelling Sher    Diabetes mellitus with hyperglycemia (HCC)- (present on admission)  Assessment & Plan  -Start insulin sliding scale  -Adjust as needed    HTN (hypertension)- (present on admission)  Assessment & Plan  -Continue home amlodipine  -Start as needed enalapril and labetalol  -Adjust as needed      VTE prophylaxis: enoxaparin ppx

## 2021-08-08 NOTE — HOSPITAL COURSE
89M PMH KOSTAS, CAD, DMT2, HTN, recurrent UTI    Admitted 8/7/21 for fever and hyperglycemia. Found to have a repeat UTI, history of Pseudomonas UTI last month. Started on IV Cefepime.  Lactic acid 3.1. COVID-19 screening negative.

## 2021-08-08 NOTE — ED NOTES
Mercedes patients wife can be reached at [phone]  She also reports his catheter was put in at the urologist's office on the 28th of July S/P Urolift in which he was unable to urinate

## 2021-08-08 NOTE — PROGRESS NOTES
4 Eyes Skin Assessment Completed by MACK Sheehan and MACK Machuca.    Head Scab  Ears Redness and Blanching  Nose WDL  Mouth WDL  Neck WDL  Breast/Chest Bruising  Shoulder Blades Blanching  Spine WDL  (R) Arm/Elbow/Hand Bruising and Scab  (L) Arm/Elbow/Hand Bruising and Scab  Abdomen Dusky  Groin Redness and Blanching White Discharge from urethral   Scrotum/Coccyx/Buttocks Blanching  (R) Leg Redness, Scab and Bruising  (L) Leg Blanching, Scab and Bruising  (R) Heel/Foot/Toe Blanching Flaky, dusky, cool  (L) Heel/Foot/Toe Blanching Flaky, Dusky, cool          Devices In Places N/A    Interventions In Place Pillows and Pressure Redistribution Mattress    Possible Skin Injury No    Pictures Uploaded Into Epic N/A  Wound Consult Placed N/A  RN Wound Prevention Protocol Ordered No

## 2021-08-08 NOTE — ASSESSMENT & PLAN NOTE
-Rate controlled  -Patient is not fully anticoagulated, will not be started now.  Patient elderly age high risk for bleeds.

## 2021-08-08 NOTE — CARE PLAN
The patient is Stable - Low risk of patient condition declining or worsening    Shift Goals  Clinical Goals: abx  Patient Goals: rest, go home    Progress made toward(s) clinical / shift goals:  Patient currently getting antibiotics. Reoriented patient several times to his surrounding. Safety precautions in place. Bed low and locked. Call light within reach. Bed alarm on.     Patient is not progressing towards the following goals:      Problem: Knowledge Deficit - Standard  Goal: Patient and family/care givers will demonstrate understanding of plan of care, disease process/condition, diagnostic tests and medications  Outcome: Not Progressing  Note: Patient still showing signs of AMS. States he does not understand why he is getting antibiotics.

## 2021-08-08 NOTE — CARE PLAN
The patient is Watcher - Medium risk of patient condition declining or worsening    Shift Goals  Clinical Goals: Manage electrolytes and asseess for signs and symtoms of infection.   Patient Goals: Sleep    Progress made toward(s) clinical / shift goals:  Electrolytes are being monitored. Monitoring for signs and symptoms of infection.     Patient is not progressing towards the following goals:    Problem: Knowledge Deficit - Standard  Goal: Patient and family/care givers will demonstrate understanding of plan of care, disease process/condition, diagnostic tests and medications  Outcome: Progressing   Patient demonstrated verbal understanding of education provided. Reinforcement needed.   Problem: Fall Risk  Goal: Patient will remain free from falls  Outcome: Progressing   Patient has a abed alarm and strip alarm in place.

## 2021-08-08 NOTE — ASSESSMENT & PLAN NOTE
-Continue Synthroid at 25 mcg daily  -Most recent TSH was approximately 3 months ago and was elevated 8.51  -Normal free T4 at that time at 1.12

## 2021-08-08 NOTE — ED PROVIDER NOTES
ED Provider Note    CHIEF COMPLAINT  Chief Complaint   Patient presents with   • Fever   • Elevated Glucose       [facility]    Primary care provider: JONO Powell  Means of arrival: EMS  History obtained from: Patient  History limited by: Nothing    Julien Dao is a 89 y.o. male who presents with concern for hyperglycemia.  Sugars been climbing over the last several weeks according to the patient's home health nurse by report.  Today he became more fatigued, they were worried this could be another urine infection which the patient has had many prior episodes of.  He does have a Sher catheter in place for chronic urinary retention.  He is fairly complaint free other than fatigue.  Denies any fevers or chills.  No falls or injuries.  No cough or chest pain or dyspnea.  No abdominal pain or flank pain.  He does have a Sher catheter that is been in place for an unknown amount of time patient cannot recall when it was last changed.  No extremity numbness weakness or tingling.  No aggravating or alleviating factors noted.  Symptoms have been worsening apparently over the last several weeks.    REVIEW OF SYSTEMS  Constitutional: Negative for fever or chills.  Positive for fatigue.  HENT: Negative for rhinorrhea or sore throat.    Respiratory: Negative for cough or shortness of breath.    Cardiovascular: Negative for chest pain or palpitations.   Gastrointestinal: Negative for nausea, vomiting, or abdominal pain.   Genitourinary: Positive for Sher catheter in place, no hematuria.  Musculoskeletal: Negative for back pain or joint pain.   Skin: Negative for itching or rash.   Neurological: Negative for sensory or motor changes.   See HPI for further details. All other systems are negative.     PAST MEDICAL HISTORY   has a past medical history of KOSTAS (acute kidney injury) ([facility]) (6/14/2021), CAD (coronary artery disease), Delirium (2/21/2020), DM (diabetes mellitus) ([facility]) (1/7/2014), Falls, Hypertension, and UTI  "(urinary tract infection) (2018).    PAST FAMILY HISTORY  History reviewed. No pertinent family history.    SOCIAL HISTORY  Social History     Tobacco Use   • Smoking status: Former Smoker     Quit date: 10/19/1972     Years since quittin.8   • Smokeless tobacco: Never Used   Vaping Use   • Vaping Use: Never used   Substance and Sexual Activity   • Alcohol use: Not Currently   • Drug use: No   • Sexual activity: Not on file       SURGICAL HISTORY   has a past surgical history that includes other cardiac surgery; coronary artery bypass, 3; and janice rectal abscess.    CURRENT MEDICATIONS  Home Medications     Reviewed by Aguila Shaw R.N. (Registered Nurse) on 21 at 0232  Med List Status: Complete   Medication Last Dose Status   amLODIPine (NORVASC) 5 MG Tab 2021 Active   cyclosporin (RESTASIS) 0.05 % ophthalmic emulsion 2021 Active   cyclosporin (RESTASIS) 0.05 % ophthalmic emulsion 2021 Active   finasteride (PROSCAR) 5 MG Tab 2021 Active   gabapentin (NEURONTIN) 100 MG Cap 2021 Active   insulin glargine (INSULIN GLARGINE) 100 UNIT/ML Solution Pen-injector injection 2021 Active   levothyroxine (SYNTHROID) 25 MCG Tab 2021 Active   Multiple Vitamins-Minerals (PX MENS MULTIVITAMINS) Tab 2021 Active   potassium chloride SA (KDUR) 20 MEQ Tab CR 2021 Active   simvastatin (ZOCOR) 10 MG Tab 2021 Active   tamsulosin (FLOMAX) 0.4 MG capsule 2021 Active                ALLERGIES  Allergies   Allergen Reactions   • Iodine Anaphylaxis     Pt and pts wife report that it has been so long not sure what happens       PHYSICAL EXAM  VITAL SIGNS: /60   Pulse 66   Temp 36.8 °C (98.3 °F) (Oral)   Resp 16   Ht 1.702 m (5' 7\")   Wt 70.5 kg (155 lb 6.8 oz)   SpO2 96%   BMI 24.34 kg/m²    Pulse ox interpretation: On room air, I interpret this pulse ox as normal.  Constitutional: Chronically ill-appearing, sitting up.  HEENT: Normocephalic, atraumatic. Posterior " pharynx clear, mucous membranes dry.  Eyes:  EOMI. Normal sclerae.  Neck: Supple, nontender.  Chest/Pulmonary: Diminished to ausculation bilaterally, no wheezes or rhonchi.  Cardiovascular: Irregularly irregular rhythm, subtle systolic murmur.   Abdomen: Soft, nontender; no rebound, guarding, or masses.  Back: No CVA or midline tenderness.   Musculoskeletal: No deformity or edema.  Neuro: Alert, no focal weakness.  Psych: Flat affect but cooperative.  Skin: Pale, warm, dry.      DIAGNOSTIC STUDIES / PROCEDURES    LABS & EKG  Results for orders placed or performed during the hospital encounter of 08/07/21   CBC WITH DIFFERENTIAL   Result Value Ref Range    WBC 10.5 4.8 - 10.8 K/uL    RBC 3.96 (L) 4.70 - 6.10 M/uL    Hemoglobin 12.0 (L) 14.0 - 18.0 g/dL    Hematocrit 35.2 (L) 42.0 - 52.0 %    MCV 88.9 81.4 - 97.8 fL    MCH 30.3 27.0 - 33.0 pg    MCHC 34.1 33.7 - 35.3 g/dL    RDW 43.0 35.9 - 50.0 fL    Platelet Count 273 164 - 446 K/uL    MPV 10.0 9.0 - 12.9 fL    Neutrophils-Polys 74.70 (H) 44.00 - 72.00 %    Lymphocytes 16.00 (L) 22.00 - 41.00 %    Monocytes 7.10 0.00 - 13.40 %    Eosinophils 1.20 0.00 - 6.90 %    Basophils 0.50 0.00 - 1.80 %    Immature Granulocytes 0.50 0.00 - 0.90 %    Nucleated RBC 0.00 /100 WBC    Neutrophils (Absolute) 7.83 (H) 1.82 - 7.42 K/uL    Lymphs (Absolute) 1.68 1.00 - 4.80 K/uL    Monos (Absolute) 0.75 0.00 - 0.85 K/uL    Eos (Absolute) 0.13 0.00 - 0.51 K/uL    Baso (Absolute) 0.05 0.00 - 0.12 K/uL    Immature Granulocytes (abs) 0.05 0.00 - 0.11 K/uL    NRBC (Absolute) 0.00 K/uL   COMP METABOLIC PANEL   Result Value Ref Range    Sodium 135 135 - 145 mmol/L    Potassium 2.8 (L) 3.6 - 5.5 mmol/L    Chloride 97 96 - 112 mmol/L    Co2 26 20 - 33 mmol/L    Anion Gap 12.0 7.0 - 16.0    Glucose 191 (H) 65 - 99 mg/dL    Bun 8 8 - 22 mg/dL    Creatinine 0.73 0.50 - 1.40 mg/dL    Calcium 8.8 8.4 - 10.2 mg/dL    AST(SGOT) 13 12 - 45 U/L    ALT(SGPT) 11 2 - 50 U/L    Alkaline Phosphatase 84 30 -  99 U/L    Total Bilirubin 0.4 0.1 - 1.5 mg/dL    Albumin 3.6 3.2 - 4.9 g/dL    Total Protein 6.9 6.0 - 8.2 g/dL    Globulin 3.3 1.9 - 3.5 g/dL    A-G Ratio 1.1 g/dL   LACTIC ACID   Result Value Ref Range    Lactic Acid 0.9 0.5 - 2.0 mmol/L   LACTIC ACID   Result Value Ref Range    Lactic Acid 2.2 (H) 0.5 - 2.0 mmol/L   MAGNESIUM   Result Value Ref Range    Magnesium 1.6 1.5 - 2.5 mg/dL   COV-2, FLU A/B, AND RSV BY PCR (2-4 HOURS CEPHEID): Collect NP swab in VTM    Specimen: Nasopharyngeal; Respirate   Result Value Ref Range    Influenza virus A RNA Negative Negative    Influenza virus B, PCR Negative Negative    RSV, PCR Negative Negative    SARS-CoV-2 by PCR NotDetected     SARS-CoV-2 Source NP Swab    CREATINE KINASE   Result Value Ref Range    CPK Total 133 0 - 154 U/L   LACTIC ACID   Result Value Ref Range    Lactic Acid 3.1 (H) 0.5 - 2.0 mmol/L   URINALYSIS   Result Value Ref Range    Color Yellow     Character Hazy (A)     Specific Gravity 1.010 <1.035    Ph 7.0 5.0 - 8.0    Glucose 250 (A) Negative mg/dL    Ketones Negative Negative mg/dL    Protein Negative Negative mg/dL    Bilirubin Negative Negative    Nitrite Negative Negative    Leukocyte Esterase Moderate (A) Negative    Occult Blood Moderate (A) Negative    Micro Urine Req Microscopic    URINE CULTURE(NEW)    Specimen: Urine   Result Value Ref Range    Significant Indicator NEG     Source UR     Site -     Culture Result -    ESTIMATED GFR   Result Value Ref Range    GFR If African American >60 >60 mL/min/1.73 m 2    GFR If Non African American >60 >60 mL/min/1.73 m 2   URINE MICROSCOPIC (W/UA)   Result Value Ref Range    WBC 20-50 (A) /hpf    RBC 10-20 (A) /hpf    Bacteria Few (A) None /hpf    Epithelial Cells Rare Few /hpf    Trans Epithelial Cells Rare /hpf   EKG   Result Value Ref Range    Report       [facility] Emergency Dept.    Test Date:  2021-08-07  Pt Name:    SAMMY CLIFFORD                 Department: [facility]  MRN:         [id number]                      Room:       3310  Gender:     Male                         Technician: BORIS  :        [id number]                   Requested By:TITO ROWE  Order #:    [id number]                    Reading MD: Tito Rowe MD    Measurements  Intervals                                Axis  Rate:       78                           P:          211  NC:         164                          QRS:        267  QRSD:       164                          T:          -8  QT:         420  QTc:        479    Interpretive Statements  SINUS OR ECTOPIC ATRIAL RHYTHM  CONSIDER DEXTROCARDIA  Compared to ECG 2021 16:35:40  Ectopic atrial rhythm now present  Atrial fibrillation no longer present  Left anterior fascicular block no longer present  Right bundle-branch block no longer present  No STEMI  Electronically Signed On 20 4:03:48 PDT by Tito Rowe MD           RADIOLOGY  CT-RENAL COLIC EVALUATION(A/P W/O)   Final Result         1.  No evidence of urolithiasis.      2.  Mild prominence of the renal collecting system and ureters though this has decreased since previous CT scan.      3.  Diffuse urinary bladder wall thickening with urinary bladder decompressed by Sher catheter.      4.  Atherosclerotic disease.      5.  Diverticulosis without diverticulitis.      6.  Renal cysts.      DX-CHEST-PORTABLE (1 VIEW)   Final Result      No acute cardiac or pulmonary abnormalities are identified.          COURSE & MEDICAL DECISION MAKING    This is a 89 y.o. male who presents with fatigue, not feeling well, elevated blood sugars at home per home nurse.    Differential Diagnosis includes but is not limited to:  Sepsis, DKA, dehydration, infection    ED Course:  89-year-old male with above complaints.  Given age and elevated blood sugars prior episodes of this of apparently been due to UTIs I will screen for infection and sepsis.    Work-up today mostly reassuring aside from a significant  hypokalemia to 2.8.  That might explain why the patient overall does not feel well.  He does have markers of infection, pharmacist reviewed past cultures he has had Pseudomonas grew out in the past.  As such this is a high risk infection, he had some pus coming out of his meatus at the site of Sher catheter placement this was replaced with a new catheter prior to urinalysis obtained today.  I will treat with cefepime after discussion with pharmacist, and replete potassium.  Nothing surgical on scan, but given age and all of these combination of problems I think he needs to be treated in the hospital.  Hospitalist Dr. Luu will kindly admit for further work-up and treatment.    Medications   magnesium sulfate IVPB premix 2 g (2 g Intravenous New Bag 8/8/21 0240)   potassium chloride SA (Kdur) tablet 40 mEq (has no administration in time range)   amLODIPine (NORVASC) tablet 5 mg (has no administration in time range)   finasteride (PROSCAR) tablet 5 mg (has no administration in time range)   gabapentin (NEURONTIN) capsule 100 mg (has no administration in time range)   levothyroxine (SYNTHROID) tablet 25 mcg (has no administration in time range)   simvastatin (ZOCOR) tablet 10 mg (10 mg Oral Given 8/8/21 0259)   tamsulosin (FLOMAX) capsule 0.4 mg (has no administration in time range)   senna-docusate (PERICOLACE or SENOKOT S) 8.6-50 MG per tablet 2 tablet (has no administration in time range)     And   polyethylene glycol/lytes (MIRALAX) PACKET 1 Packet (has no administration in time range)     And   magnesium hydroxide (MILK OF MAGNESIA) suspension 30 mL (has no administration in time range)     And   bisacodyl (DULCOLAX) suppository 10 mg (has no administration in time range)   enoxaparin (LOVENOX) inj 40 mg (has no administration in time range)   acetaminophen (Tylenol) tablet 650 mg (has no administration in time range)   enalaprilat (VASOTEC) injection 1.25 mg (has no administration in time range)   labetalol  (NORMODYNE/TRANDATE) injection 10 mg (has no administration in time range)   ondansetron (ZOFRAN) syringe/vial injection 4 mg (has no administration in time range)   ondansetron (ZOFRAN ODT) dispertab 4 mg (has no administration in time range)   insulin regular (HumuLIN R,NovoLIN R) injection (has no administration in time range)     And   glucose 4 g chewable tablet 16 g (has no administration in time range)     And   dextrose 50% (D50W) injection 50 mL (has no administration in time range)   cefepime (Maxipime) 2 g in  mL IVPB (has no administration in time range)   artificial tears ophthalmic solution 1 Drop (has no administration in time range)   insulin glargine (Semglee) injection (has no administration in time range)   NS infusion 1,000 mL (0 mL Intravenous Stopped 8/8/21 0002)   cefepime (Maxipime) 2 g in  mL IVPB (0 g Intravenous Stopped 8/7/21 2125)   potassium chloride (KCL) ivpb 10 mEq (10 mEq Intravenous Refused 8/7/21 2304)   lactated ringers infusion (BOLUS) (0 mL Intravenous Continue to Floor 8/8/21 0136)       FINAL IMPRESSION  1. Complicated urinary tract infection    2. Hypokalemia    3. Dehydration    4. Other fatigue    5. Benign prostatic hyperplasia with urinary retention    6. Sher catheter in place        -ADMIT-      Pertinent Labs & Imaging studies reviewed and verified by myself, as well as nursing notes and the patient's past medical, family, and social histories (See chart for details).    Portions of this record were made with voice recognition software.  Despite my review, spelling/grammar/context errors may still remain.  Interpretation of this chart should be taken in this context.    Electronically signed by Tito Leyva M.D. on 8/8/2021 at 4:06 AM.

## 2021-08-08 NOTE — ED NOTES
Patient upset over IV infusing of Potassium reports it is painful infusion stopped offered to change IV patient declines will update Dr. Leyva

## 2021-08-08 NOTE — ASSESSMENT & PLAN NOTE
Reviewed CT scan, no evidence of renal stones compromising urinary system.  No report of pyelonephritis.  -Last month he did have a Pseudomonas UTI, somewhat difficult to treat Pseudomonas  -Sensitive at that time to cefepime, continue cefepime  -Not causing sepsis

## 2021-08-08 NOTE — DISCHARGE PLANNING
Anticipated Discharge Disposition:   TBD, SNF or home with HH     Action:   Chart review complete.     Discussed patient's plan of care and plans for discharge during rounds. Per MD, patient was admitted for dementia and a UTI. This patient was recently admitted and was discharge home with [facility] as spouse refused SNF. MD to speak with patient's spouse. PT/OT to possibly be ordered for discharge dispo.     RN CM will continue to follow.     Barriers to Discharge:   Medical Clearance  Possible placement or HH order     Plan:   Hospital care management will continue to assist with discharge planning needs.

## 2021-08-08 NOTE — PROGRESS NOTES
Med rec updated and complete  Allergies reviewed  Pt is not sure what medication he is taking.  Called pts wife (Mercedes) @ [phone] went over all medication  Pts wife is not sure when he uses his eye drops, he is to use them every day, but pt forgets to do them.  Pts wife thinks he used his eye drops on 8/5/2021.  Pts wife reports no antibiotics in the last 30 days.    No current facility-administered medications on file prior to encounter.     Current Outpatient Medications on File Prior to Encounter   Medication Sig Dispense Refill   • phenazopyridine (PYRIDIUM) 200 MG Tab Take 200 mg by mouth in the morning, at noon, and at bedtime. Pt started 7/28/2021 for 3 day course     • metoprolol tartrate (LOPRESSOR) 25 MG Tab Take 12.5 mg by mouth 2 times a day.     • nystatin/triamcinolone (MYCOLOG) 862420-1.1 UNIT/GM-% Cream Apply 1 Application topically 2 times a day. Apply's on left leg     • nystatin (MYCOSTATIN) powder Apply 1 Application topically 3 times a day. Apply's on penis     • nystatin (MYCOSTATIN) 576870 UNIT/GM Cream topical cream Apply 1 Application topically 2 times a day. Apply's on penis     • Polyethyl Glycol-Propyl Glycol (SYSTANE OP) Administer 1 Drop into affected eye(s) every day.     • Carboxymethylcellulose Sodium (RETAINE CMC OP) Administer 1 Drop into both eyes every day.     • potassium chloride SA (KDUR) 20 MEQ Tab CR Take 2 Tablets by mouth 2 times a day. 60 tablet 11   • cyclosporin (RESTASIS) 0.05 % ophthalmic emulsion Administer 1 Drop into both eyes every day.     • simvastatin (ZOCOR) 10 MG Tab Take 10 mg by mouth every evening.     • gabapentin (NEURONTIN) 100 MG Cap Take 100 mg by mouth 2 times a day.     • insulin glargine (INSULIN GLARGINE) 100 UNIT/ML Solution Pen-injector injection Inject 38 Units under the skin every evening.     • levothyroxine (SYNTHROID) 25 MCG Tab Take 1 tablet by mouth Every morning on an empty stomach. 30 tablet 3   • amLODIPine (NORVASC) 5 MG Tab Take 1  Tab by mouth every day. 30 Tab 1   • finasteride (PROSCAR) 5 MG Tab Take 1 Tab by mouth every day. 30 Tab 1   • tamsulosin (FLOMAX) 0.4 MG capsule Take 0.4 mg by mouth every morning.     • Multiple Vitamins-Minerals (PX MENS MULTIVITAMINS) Tab Take 1 tablet by mouth every morning.

## 2021-08-08 NOTE — ED TRIAGE NOTES
Patient brought in by simon from home with reports of elevated glucose over the past 2 weeks.  Patient reports he just feels tired and weak.  Patient denies body aches or fever, does have a catheter in that is draining cludy yellow urine. FSBS for simon was 262.  Patients wife reports patient has decreased mentation over the past couple of days as well. GCS 15 A &Ox4

## 2021-08-08 NOTE — ASSESSMENT & PLAN NOTE
Last A1c 16  Insulin sliding scale.  Continue glargine 20 units  Glucose checks before meals and at bedtime  Diabetic diet

## 2021-08-08 NOTE — PROGRESS NOTES
Hospital Medicine Daily Progress Note    Date of Service  8/8/2021    Chief Complaint  Julien Dao is a 89 y.o. male admitted 8/7/2021 with fever at home    Hospital Course  89M PMH KOSTAS, CAD, DMT2, HTN, recurrent UTI    Admitted 8/7/21 for fever and hyperglycemia. Found to have a repeat UTI, history of Pseudomonas UTI last month. Started on IV Cefepime.  Lactic acid 3.1. COVID-19 screening negative.    Interval Problem Update  Spoke to patient at bedside.  Patient appeared to be acute on chronic dementia.  Patient is being treated currently for Pseudomonas UTI.  Patient has chronic Sher which is the source.  Patient unable to give very good specifics on interview.  Patient has remained afebrile.  Called spouse Mercedes Olivas, no answer.  I have personally seen and examined the patient at bedside. I discussed the plan of care with patient, bedside RN, charge RN,  and pharmacy.    Consultants/Specialty  None    Code Status  DNAR/DNI    Disposition  Patient is not medically cleared.   Anticipate discharge to to home with organized home healthcare and close outpatient follow-up.  I have placed the appropriate orders for post-discharge needs.    Review of Systems  Review of Systems   Unable to perform ROS: Mental acuity      Physical Exam  Temp:  [36.6 °C (97.8 °F)-37.3 °C (99.2 °F)] 36.7 °C (98 °F)  Pulse:  [66-86] 75  Resp:  [16-20] 18  BP: (132-153)/() 132/50  SpO2:  [92 %-96 %] 92 %    Physical Exam  Vitals and nursing note reviewed.   Constitutional:       General: He is not in acute distress.     Appearance: He is ill-appearing.      Comments: Frail appearing male   Cardiovascular:      Rate and Rhythm: Normal rate.      Pulses: Normal pulses.      Heart sounds: Normal heart sounds.   Pulmonary:      Effort: Pulmonary effort is normal. No respiratory distress.      Breath sounds: Normal breath sounds.   Abdominal:      General: Abdomen is flat. Bowel sounds are normal. There is no distension.       Palpations: Abdomen is soft.   Genitourinary:     Comments: Suprapubic catheter in place  Musculoskeletal:         General: No swelling.      Comments: Sarcopenic  Minimally moving in bed   Skin:     General: Skin is warm.      Capillary Refill: Capillary refill takes less than 2 seconds.      Coloration: Skin is not jaundiced or pale.   Neurological:      Mental Status: He is alert. Mental status is at baseline. He is disoriented.      Motor: Weakness present.   Psychiatric:      Comments: Irritable mood, unclear thought content and judgment         Fluids    Intake/Output Summary (Last 24 hours) at 8/8/2021 1208  Last data filed at 8/8/2021 1000  Gross per 24 hour   Intake 240 ml   Output 3000 ml   Net -2760 ml       Laboratory  Recent Labs     08/07/21 2015   WBC 10.5   RBC 3.96*   HEMOGLOBIN 12.0*   HEMATOCRIT 35.2*   MCV 88.9   MCH 30.3   MCHC 34.1   RDW 43.0   PLATELETCT 273   MPV 10.0     Recent Labs     08/07/21 2015   SODIUM 135   POTASSIUM 2.8*   CHLORIDE 97   CO2 26   GLUCOSE 191*   BUN 8   CREATININE 0.73   CALCIUM 8.8                   Imaging  CT-RENAL COLIC EVALUATION(A/P W/O)   Final Result         1.  No evidence of urolithiasis.      2.  Mild prominence of the renal collecting system and ureters though this has decreased since previous CT scan.      3.  Diffuse urinary bladder wall thickening with urinary bladder decompressed by Sher catheter.      4.  Atherosclerotic disease.      5.  Diverticulosis without diverticulitis.      6.  Renal cysts.      DX-CHEST-PORTABLE (1 VIEW)   Final Result      No acute cardiac or pulmonary abnormalities are identified.           Assessment/Plan  UTI (urinary tract infection)- (present on admission)  Assessment & Plan  Reviewed CT scan, no evidence of renal stones compromising urinary system.  No report of pyelonephritis.  -Last month he did have a Pseudomonas UTI, somewhat difficult to treat Pseudomonas  -Sensitive at that time to cefepime, start  cefepime  -Not causing sepsis    Urinary retention- (present on admission)  Assessment & Plan  -With chronic indwelling Sher, presented with Sher on admission  Cause of Pseudomonas UTI    A-fib (HCC)- (present on admission)  Assessment & Plan  -Rate controlled  -Patient is not fully anticoagulated, will not be started now.  Patient elderly age high risk for bleeds.    Hypothyroidism- (present on admission)  Assessment & Plan  -Continue Synthroid at 25 mcg daily  -Most recent TSH was approximately 3 months ago and was elevated 8.51  -Normal free T4 at that time at 1.12    BPH (benign prostatic hyperplasia)- (present on admission)  Assessment & Plan  -Continue home Proscar and Flomax  -Does have a chronic indwelling Sher    Diabetes mellitus with hyperglycemia (HCC)- (present on admission)  Assessment & Plan  -Start insulin sliding scale  -Adjust as needed    HTN (hypertension)- (present on admission)  Assessment & Plan  -Continue home amlodipine  -Start as needed enalapril and labetalol  -Adjust as needed     VTE prophylaxis: enoxaparin ppx    I have performed a physical exam and reviewed and updated ROS and Plan today (8/8/2021). In review of yesterday's note (8/7/2021), there are no changes except as documented above.

## 2021-08-08 NOTE — ED NOTES
Patient back from CT scan.  Reports IV site in RAC is painful and sore area is noted to be swollen and tender to touch and pulled out slightly.  IV removed new IV started.  Awaiting CT results

## 2021-08-09 PROBLEM — Z75.8 DISCHARGE PLANNING ISSUES: Status: ACTIVE | Noted: 2021-08-09

## 2021-08-09 PROBLEM — Z02.9 DISCHARGE PLANNING ISSUES: Status: ACTIVE | Noted: 2021-08-09

## 2021-08-09 LAB
ANION GAP SERPL CALC-SCNC: 11 MMOL/L (ref 7–16)
BUN SERPL-MCNC: 9 MG/DL (ref 8–22)
CALCIUM SERPL-MCNC: 8.6 MG/DL (ref 8.4–10.2)
CHLORIDE SERPL-SCNC: 103 MMOL/L (ref 96–112)
CO2 SERPL-SCNC: 26 MMOL/L (ref 20–33)
CREAT SERPL-MCNC: 0.66 MG/DL (ref 0.5–1.4)
ERYTHROCYTE [DISTWIDTH] IN BLOOD BY AUTOMATED COUNT: 43.3 FL (ref 35.9–50)
GLUCOSE BLD-MCNC: 160 MG/DL (ref 65–99)
GLUCOSE BLD-MCNC: 170 MG/DL (ref 65–99)
GLUCOSE BLD-MCNC: 246 MG/DL (ref 65–99)
GLUCOSE BLD-MCNC: 301 MG/DL (ref 65–99)
GLUCOSE SERPL-MCNC: 145 MG/DL (ref 65–99)
HCT VFR BLD AUTO: 39.5 % (ref 42–52)
HGB BLD-MCNC: 13.3 G/DL (ref 14–18)
MCH RBC QN AUTO: 30.2 PG (ref 27–33)
MCHC RBC AUTO-ENTMCNC: 33.7 G/DL (ref 33.7–35.3)
MCV RBC AUTO: 89.6 FL (ref 81.4–97.8)
PLATELET # BLD AUTO: 291 K/UL (ref 164–446)
PMV BLD AUTO: 10 FL (ref 9–12.9)
POTASSIUM SERPL-SCNC: 3 MMOL/L (ref 3.6–5.5)
RBC # BLD AUTO: 4.41 M/UL (ref 4.7–6.1)
SODIUM SERPL-SCNC: 140 MMOL/L (ref 135–145)
WBC # BLD AUTO: 7.7 K/UL (ref 4.8–10.8)

## 2021-08-09 PROCEDURE — 82962 GLUCOSE BLOOD TEST: CPT | Mod: 91

## 2021-08-09 PROCEDURE — 99233 SBSQ HOSP IP/OBS HIGH 50: CPT | Performed by: INTERNAL MEDICINE

## 2021-08-09 PROCEDURE — 700102 HCHG RX REV CODE 250 W/ 637 OVERRIDE(OP): Performed by: INTERNAL MEDICINE

## 2021-08-09 PROCEDURE — 700105 HCHG RX REV CODE 258: Performed by: INTERNAL MEDICINE

## 2021-08-09 PROCEDURE — 97162 PT EVAL MOD COMPLEX 30 MIN: CPT

## 2021-08-09 PROCEDURE — 80048 BASIC METABOLIC PNL TOTAL CA: CPT

## 2021-08-09 PROCEDURE — 36415 COLL VENOUS BLD VENIPUNCTURE: CPT

## 2021-08-09 PROCEDURE — A9270 NON-COVERED ITEM OR SERVICE: HCPCS | Performed by: INTERNAL MEDICINE

## 2021-08-09 PROCEDURE — 85027 COMPLETE CBC AUTOMATED: CPT

## 2021-08-09 PROCEDURE — 700111 HCHG RX REV CODE 636 W/ 250 OVERRIDE (IP): Performed by: INTERNAL MEDICINE

## 2021-08-09 PROCEDURE — 99358 PROLONG SERVICE W/O CONTACT: CPT | Performed by: INTERNAL MEDICINE

## 2021-08-09 PROCEDURE — 770006 HCHG ROOM/CARE - MED/SURG/GYN SEMI*

## 2021-08-09 RX ADMIN — LEVOTHYROXINE SODIUM 25 MCG: 0.03 TABLET ORAL at 05:58

## 2021-08-09 RX ADMIN — INSULIN GLARGINE 20 UNITS: 100 INJECTION, SOLUTION SUBCUTANEOUS at 08:38

## 2021-08-09 RX ADMIN — INSULIN HUMAN 2 UNITS: 100 INJECTION, SOLUTION PARENTERAL at 05:58

## 2021-08-09 RX ADMIN — DOCUSATE SODIUM 50 MG AND SENNOSIDES 8.6 MG 2 TABLET: 8.6; 5 TABLET, FILM COATED ORAL at 05:58

## 2021-08-09 RX ADMIN — ENOXAPARIN SODIUM 40 MG: 40 INJECTION SUBCUTANEOUS at 05:58

## 2021-08-09 RX ADMIN — GABAPENTIN 100 MG: 100 CAPSULE ORAL at 18:00

## 2021-08-09 RX ADMIN — CEFEPIME 2 G: 2 INJECTION, POWDER, FOR SOLUTION INTRAVENOUS at 22:38

## 2021-08-09 RX ADMIN — AMLODIPINE BESYLATE 5 MG: 5 TABLET ORAL at 05:59

## 2021-08-09 RX ADMIN — GABAPENTIN 100 MG: 100 CAPSULE ORAL at 05:59

## 2021-08-09 RX ADMIN — FINASTERIDE 5 MG: 5 TABLET, FILM COATED ORAL at 05:59

## 2021-08-09 RX ADMIN — TAMSULOSIN HYDROCHLORIDE 0.4 MG: 0.4 CAPSULE ORAL at 05:58

## 2021-08-09 RX ADMIN — INSULIN HUMAN 6 UNITS: 100 INJECTION, SOLUTION PARENTERAL at 22:26

## 2021-08-09 RX ADMIN — CEFEPIME 2 G: 2 INJECTION, POWDER, FOR SOLUTION INTRAVENOUS at 08:37

## 2021-08-09 RX ADMIN — SIMVASTATIN 10 MG: 10 TABLET, FILM COATED ORAL at 22:26

## 2021-08-09 RX ADMIN — INSULIN HUMAN 3 UNITS: 100 INJECTION, SOLUTION PARENTERAL at 12:06

## 2021-08-09 ASSESSMENT — COGNITIVE AND FUNCTIONAL STATUS - GENERAL
MOBILITY SCORE: 19
MOVING FROM LYING ON BACK TO SITTING ON SIDE OF FLAT BED: A LITTLE
WALKING IN HOSPITAL ROOM: A LITTLE
STANDING UP FROM CHAIR USING ARMS: A LITTLE
SUGGESTED CMS G CODE MODIFIER MOBILITY: CK
CLIMB 3 TO 5 STEPS WITH RAILING: A LOT

## 2021-08-09 ASSESSMENT — GAIT ASSESSMENTS
ASSISTIVE DEVICE: FRONT WHEEL WALKER
DEVIATION: STEP TO;DECREASED BASE OF SUPPORT;BRADYKINETIC;SHUFFLED GAIT
GAIT LEVEL OF ASSIST: MINIMAL ASSIST
DISTANCE (FEET): 50

## 2021-08-09 ASSESSMENT — PAIN DESCRIPTION - PAIN TYPE: TYPE: ACUTE PAIN

## 2021-08-09 NOTE — DISCHARGE PLANNING
Anticipated Discharge Disposition: Home with HH    Action: LSW completed chart review. Per chart review, MD has put in HH order and face to face.     LSW called pt's spouse, Mercedes. Per Mercedes, she would like to go back to using [facility].     LSW faxed HH choice for [facility] per verbal consent from pt's spouse.    Barriers to Discharge: None    Plan: LSW to follow up with HH referral.    1543: Per Epic, [facility] accepted. LSW called Mercedes to notify her that [facility] accepted and deliver the IMM. Mercedes gave consent for the IMM. Mercedes requested a copy be placed in the pt's chart for d/c.      Care Transition Team Assessment  From 6/2021     Information Source  Orientation Level: Oriented to place, Oriented to situation, Oriented to person   Information Given By: Patient, Spouse  Informant's Name: Julien/Mercedes  Who is responsible for making decisions for patient? : Patient     Elopement Risk  Legal Hold: No     Interdisciplinary Discharge Planning  Primary Care Physician: Dr Lehman (Needs new one Manjinder Keyes No more Dominik with HH)  Lives with - Patient's Self Care Capacity: Significant Other  Patient or legal guardian wants to designate a caregiver: No  Support Systems: Spouse / Significant Other  Housing / Facility: [address]  Do You Take your Prescribed Medications Regularly: Yes  Able to Return to Previous ADL's: Yes  Mobility Issues: Yes  Prior Services: Skilled Home Health Services ([facility])  Assistance Needed: Yes  Durable Medical Equipment: Home Oxygen, Walker (concentrator unknown provider doesnt use)     Discharge Preparedness  What is your plan after discharge?: Home with help  What are your discharge supports?: Spouse  Prior Functional Level: Uses Walker     Functional Assesment  Prior Functional Level: Uses Walker     Finances  Prescription Coverage: Yes ([place] script /[facility])     Vision / Hearing Impairment  Vision Impairment : Yes  Right Eye Vision: Impaired, Wears  Glasses  Left Eye Vision: Impaired, Wears Glasses  Hearing Impairment : No       Domestic Abuse  Have you ever been the victim of abuse or violence?: No  Physical Abuse or Sexual Abuse: No  Verbal Abuse or Emotional Abuse: No  Possible Abuse/Neglect Reported to:: Not Applicable     Psychological Assessment  History of Substance Abuse: None           Anticipated Discharge Information  Discharge Disposition: Still a Patient (30)

## 2021-08-09 NOTE — ASSESSMENT & PLAN NOTE
Spent additional time on patient's case today. Called wife.  She stated patient July 28th saw [facility], urolift done was to help with suprapubic catheter but did not work as prostate is too large. Patient had a rash on leg treated for fungal rash.  Home health was seeing patient at home. Cardiac appt 8/18/21.  New direction mobile medicine. Patient has days he will constantly talk without stopping. Baseline, patient knows what is going on.  Intermittent 2-3 days of the week patient starts to get confused, his sign of infection.  She denied any dementia. Patient takes about missing one of his daughters, his other one he doesn't get along with.  Patient does not sleep well, gets up every hour.  Patient forgets to take his bag and the catheter gets pulled and gets reinserted.  Unsure if patient has touched the catheter and doesn't remain clean, poor hygiene at home.  NO SNF REQUESTED BY WIFE.  Spoke to her about patient's treatment course.  Reviewed history and other notes.  Start time 1350 stop time 1422.

## 2021-08-09 NOTE — DISCHARGE PLANNING
Received Choice form at 4110  Agency/Facility Name: [facility]   Referral sent per Choice form @ 3253

## 2021-08-09 NOTE — CARE PLAN
The patient is Stable - Low risk of patient condition declining or worsening    Shift Goals  Clinical Goals: patient safety  Patient Goals: rest, go home    Progress made toward(s) clinical / shift goals:  progressing    Problem: Knowledge Deficit - Standard  Goal: Patient and family/care givers will demonstrate understanding of plan of care, disease process/condition, diagnostic tests and medications  Outcome: Progressing     Problem: Skin Integrity  Goal: Skin integrity is maintained or improved  Outcome: Progressing     Problem: Provide Safe Environment  Goal: Suicide environmental safety, protocols, policies, and practices will be implemented  Outcome: Progressing     Problem: Fall Risk  Goal: Patient will remain free from falls  Outcome: Progressing       Patient is not progressing towards the following goals:

## 2021-08-09 NOTE — PROGRESS NOTES
Received report from Davion. Patient resting comfortably in bed with no signs of distress. He denies any needs at this time. Bed in the lowest locked position, call light in reach.

## 2021-08-09 NOTE — PROGRESS NOTES
[facility] Medicine Daily Progress Note    Date of Service  8/9/2021    Chief Complaint  Julien Dao is a 89 y.o. male admitted 8/7/2021 with fever at home    Hospital Course  89M PMH [facility], CAD, DMT2, HTN, recurrent UTI    Admitted 8/7/21 for fever and hyperglycemia. Found to have a repeat UTI, history of Pseudomonas UTI last month. Started on IV Cefepime.  Lactic acid 3.1. COVID-19 screening negative.    Interval Problem Update  Spoke to patient at bedside. Unable to give good information.  Patient is being treated currently for Pseudomonas UTI.  Patient has chronic Sher which is the source.  Patient has remained afebrile.    Called wife Mercedes Olivas at 1350PM.  She stated patient July 28th saw [facility], urolift done, saw rash on leg treated for fungal rash.  Home health was seeing patient at home. Cardiac appt 8/18/21.  New direction mobile medicine. Patient has days he will constantly talk without stopping. Baseline, patient knows what is going on.  Intermittent 2-3 days of the week patient starts to get confused, his sign of infection.  She denied any dementia. Patient takes about missing one of his daughters, his other one he doesn't get along with.  Patient does not sleep well, gets up every hour.  Patient forgets to take his bag and the catheter gets pulled and gets reinserted.  Unsure if patient has touched the catheter and doesn't remain clean, poor hygiene at home.  NO SNF REQUESTED BY WIFE.  I have personally seen and examined the patient at bedside. I discussed the plan of care with patient, family, bedside RN, charge RN,  and pharmacy.    Consultants/Specialty  None    Code Status  DNAR/DNI    Disposition  Patient is not medically cleared.   Anticipate discharge to to home with organized home healthcare and close outpatient follow-up.  NO SNF REQUESTED BY WIFE.    Patient has [facility] PRIOR TO ADMISSION.  I have placed the appropriate orders for post-discharge  needs.    Review of Systems  Review of Systems   Unable to perform ROS: Mental acuity      Physical Exam  Temp:  [36.9 °C (98.4 °F)] 36.9 °C (98.4 °F)  Pulse:  [65-74] 65  Resp:  [16-18] 18  BP: (108-126)/(71-73) 108/73  SpO2:  [98 %] 98 %    Physical Exam  Vitals and nursing note reviewed.   Constitutional:       General: He is not in acute distress.     Appearance: He is ill-appearing.      Comments: Frail appearing male   Cardiovascular:      Rate and Rhythm: Normal rate.      Pulses: Normal pulses.      Heart sounds: Normal heart sounds.   Pulmonary:      Effort: Pulmonary effort is normal. No respiratory distress.      Breath sounds: Normal breath sounds.   Abdominal:      General: Abdomen is flat. Bowel sounds are normal. There is no distension.      Palpations: Abdomen is soft.   Genitourinary:     Comments: Suprapubic catheter in place  Musculoskeletal:         General: No swelling.      Comments: Sarcopenic  Minimally moving in bed   Skin:     General: Skin is warm.      Capillary Refill: Capillary refill takes less than 2 seconds.      Coloration: Skin is not jaundiced or pale.   Neurological:      Mental Status: He is alert. Mental status is at baseline. He is disoriented.      Motor: Weakness present.   Psychiatric:      Comments: Irritable mood, unclear thought content and judgment         Fluids    Intake/Output Summary (Last 24 hours) at 8/9/2021 1422  Last data filed at 8/8/2021 1823  Gross per 24 hour   Intake --   Output 1300 ml   Net -1300 ml       Laboratory  Recent Labs     08/07/21 2015 08/09/21  0411   WBC 10.5 7.7   RBC 3.96* 4.41*   HEMOGLOBIN 12.0* 13.3*   HEMATOCRIT 35.2* 39.5*   MCV 88.9 89.6   MCH 30.3 30.2   MCHC 34.1 33.7   RDW 43.0 43.3   PLATELETCT 273 291   MPV 10.0 10.0     Recent Labs     08/07/21 2015 08/09/21  0411   SODIUM 135 140   POTASSIUM 2.8* 3.0*   CHLORIDE 97 103   CO2 26 26   GLUCOSE 191* 145*   BUN 8 9   CREATININE 0.73 0.66   CALCIUM 8.8 8.6                    Imaging  CT-RENAL COLIC EVALUATION(A/P W/O)   Final Result         1.  No evidence of urolithiasis.      2.  Mild prominence of the renal collecting system and ureters though this has decreased since previous CT scan.      3.  Diffuse urinary bladder wall thickening with urinary bladder decompressed by Sher catheter.      4.  Atherosclerotic disease.      5.  Diverticulosis without diverticulitis.      6.  Renal cysts.      DX-CHEST-PORTABLE (1 VIEW)   Final Result      No acute cardiac or pulmonary abnormalities are identified.           Assessment/Plan  UTI (urinary tract infection)- (present on admission)  Assessment & Plan  Reviewed CT scan, no evidence of renal stones compromising urinary system.  No report of pyelonephritis.  -Last month he did have a Pseudomonas UTI, somewhat difficult to treat Pseudomonas  -Sensitive at that time to cefepime, continue cefepime  -Not causing sepsis    Urinary retention- (present on admission)  Assessment & Plan  -With chronic indwelling Sher, presented with Sher on admission  Cause of Pseudomonas UTI    Discharge planning issues  Assessment & Plan  Spent additional time on patient's case today. Called wife.  She stated patient July 28th saw [facility], urolift done was to help with suprapubic catheter but did not work as prostate is too large. Patient had a rash on leg treated for fungal rash.  Home health was seeing patient at home. Cardiac appt 8/18/21.  New direction mobile medicine. Patient has days he will constantly talk without stopping. Baseline, patient knows what is going on.  Intermittent 2-3 days of the week patient starts to get confused, his sign of infection.  She denied any dementia. Patient takes about missing one of his daughters, his other one he doesn't get along with.  Patient does not sleep well, gets up every hour.  Patient forgets to take his bag and the catheter gets pulled and gets reinserted.  Unsure if patient has touched the catheter  and doesn't remain clean, poor hygiene at home.  NO SNF REQUESTED BY WIFE.  Spoke to her about patient's treatment course.  Reviewed history and other notes.  Start time 1350 stop time 1422.    A-fib (HCC)- (present on admission)  Assessment & Plan  -Rate controlled  -Patient is not fully anticoagulated, will not be started now.  Patient elderly age high risk for bleeds.    Hypothyroidism- (present on admission)  Assessment & Plan  -Continue Synthroid at 25 mcg daily  -Most recent TSH was approximately 3 months ago and was elevated 8.51  -Normal free T4 at that time at 1.12    BPH (benign prostatic hyperplasia)- (present on admission)  Assessment & Plan  -Continue home Proscar and Flomax  -Does have a chronic indwelling Sher    Diabetes mellitus with hyperglycemia (HCC)- (present on admission)  Assessment & Plan  -Start insulin sliding scale  -Adjust as needed    HTN (hypertension)- (present on admission)  Assessment & Plan  -Continue home amlodipine  -Start as needed enalapril and labetalol  -Adjust as needed     VTE prophylaxis: enoxaparin ppx    I have performed a physical exam and reviewed and updated ROS and Plan today (8/9/2021). In review of yesterday's note (8/8/2021), there are no changes except as documented above.

## 2021-08-09 NOTE — THERAPY
Physical Therapy   Initial Evaluation     Patient Name: Julien Dao  Age:  89 y.o., Sex:  male  Medical Record #: [id number]  Today's Date: 8/9/2021     Precautions: (P) Fall Risk    Assessment  Patient is 89 y.o. male who was recently admitted for UTI, weakness, and fever. Pt presented to PT with impaired balance, impaired coordination, weakness, poor safety awareness, impaired cognition, and dec activity tolerance. These impairments are effecting the patients ability to demonstrate safe upright functional mobility with sit<>stands, transfers, and ambulation. Pt is currently at a high fall risk and requires Min A for all upright mobility. Pt is primarily limited by behavior and confusion at this time. Pt demonstrates with significant LOB with standing with posterior lean and requires therapist correction to maintain upright posture. Pt was able to ambulate for about 50 ft in the room, however, was limited in distance due to safety concerns and confusion. With current functional mobility pt would benefit from post acute therapy prior to d/c home given current objective findings and high risk for falls. However, per medical chart the wife is refusing SNF. If patient is to decline SNF and go home, pt will require Min A for all upright mobility from family or other caregiver services at all times. Will also recommend HH therapy if patient is to go home. Will continue to follow while in house for therapy services.     Plan    Recommend Physical Therapy 3 times per week until therapy goals are met for the following treatments:  Bed Mobility, Community Re-integration, Equipment, Gait Training, Manual Therapy, Neuro Re-Education / Balance, Self Care/Home Evaluation, Stair Training, Therapeutic Activities and Therapeutic Exercises    DC Equipment Recommendations: (P) Front-Wheel Walker  Discharge Recommendations: (P) Other - (see assessment in bold )     Objective       08/09/21 1510   Initial Contact Note    Initial Contact  "Note Order Received and Verified, Physical Therapy Evaluation in Progress with Full Report to Follow.   Precautions   Precautions Fall Risk   Comments confusion   Pain 0 - 10 Group   Therapist Pain Assessment Nurse Notified;0   Prior Living Situation   Prior Services Unable To Determine At This Time   Housing / Facility Unable To Determine At This Time   Comments pt unable to provide prior history due to confusion   Prior Level of Functional Mobility   Bed Mobility Unable To Determine At This Time   Transfer Status Unable To Determine At This Time   Comments unable to provide prior mobliity due to confusion and poor historian    Cognition    Cognition / Consciousness X   Level of Consciousness Alert   Ability To Follow Commands 1 Step   Safety Awareness Impaired;Impulsive   Attention Impaired   Comments poor following and easily distracted; very tangential during session; pt typically is focused on making \"bets\". Requires frequent redirection and encouragment ot focus on tasks at hand   Passive ROM Lower Body   Passive ROM Lower Body WDL   Active ROM Lower Body    Active ROM Lower Body  WDL   Strength Lower Body   Lower Body Strength  X   Gross Strength Generalized Weakness, Equal Bilaterally   Comments presents with gross strength of 4/5 in BLE; functional for standing and short distance ambulation    Sensation Lower Body   Lower Extremity Sensation   Not Tested   Lower Body Muscle Tone   Lower Body Muscle Tone  WDL   Neurological Concerns   Neurological Concerns Yes   Comments given confusion    Coordination Lower Body    Coordination Lower Body  X   Comments presents with poor precise muscle control and narrow LUCILA    Balance Assessment   Sitting Balance (Static) Fair   Sitting Balance (Dynamic) Fair -   Standing Balance (Static) Fair -   Standing Balance (Dynamic) Poor +   Weight Shift Sitting Fair   Weight Shift Standing Poor   Comments w/fww; presented with multiple LOB posteriorly, requires close guarding  "   Gait Analysis   Gait Level Of Assist Minimal Assist   Assistive Device Front Wheel Walker   Distance (Feet) 50   # of Times Distance was Traveled 1   Deviation Step To;Decreased Base Of Support;Bradykinetic;Shuffled Gait   Weight Bearing Status fwb   Comments limited in distance due to confusion and concerns with saftey with upright mobility    Bed Mobility    Supine to Sit Supervised   Sit to Supine Supervised   Scooting Supervised   Comments HOB elevated and rails up    Functional Mobility   Sit to Stand Minimal Assist   Bed, Chair, Wheelchair Transfer Minimal Assist   Transfer Method Stand Step   Mobility EOB, sit<>stand, ambulation within room, back to bed    Comments cues for FWW use and handplacement; cues to lean forward and bend knees upon standing    How much difficulty does the patient currently have...   Turning over in bed (including adjusting bedclothes, sheets and blankets)? 4   Sitting down on and standing up from a chair with arms (e.g., wheelchair, bedside commode, etc.) 3   Moving from lying on back to sitting on the side of the bed? 4   How much help from another person does the patient currently need...   Moving to and from a bed to a chair (including a wheelchair)? 3   Need to walk in a hospital room? 3   Climbing 3-5 steps with a railing? 2   6 clicks Mobility Score 19   Activity Tolerance   Sitting in Chair NT   Sitting Edge of Bed 10 mins   Standing 5 mins   Comments limited due to behavior    Edema / Skin Assessment   Edema / Skin  Not Assessed   Patient / Family Goals    Patient / Family Goal #1 none stated    Short Term Goals    Short Term Goal # 1 pt will go sit<>stand w/fww w/spv in 6tx for safe d/c home    Short Term Goal # 2 pt will transfer bed<>chair w/fww w/spv in 6tx for safe d/c home   Short Term Goal # 3 pt will ambulate for 150ft w/fww w/spv in 6tx for safe d/c home    Education Group   Education Provided Role of Physical Therapist   Role of Physical Therapist Patient Response  Patient;Acceptance;Explanation;Demonstration;Verbal Demonstration;Action Demonstration   Problem List    Problems Impaired Transfers;Impaired Ambulation;Functional Strength Deficit;Impaired Balance;Impaired Coordination;Decreased Activity Tolerance;Safety Awareness Deficits / Cognition   Anticipated Discharge Equipment and Recommendations   DC Equipment Recommendations Front-Wheel Walker   Discharge Recommendations Other -  (see assessment in bold )   Interdisciplinary Plan of Care Collaboration   IDT Collaboration with  Nursing   Patient Position at End of Therapy In Bed;Call Light within Reach;Tray Table within Reach;Phone within Reach;Bed Alarm On   Collaboration Comments aware of visit and recs    Session Information   Date / Session Number  8/9-1 (1/3, 8/15)

## 2021-08-09 NOTE — FACE TO FACE
Face to Face Supporting Documentation - Home Health    The encounter with this patient was in whole or in part the primary reason for home health admission.    Date of encounter:   Patient:                    MRN:                       YOB: 2021  Julien Dao  [id number]  3/7/1929     Home health to see patient for:  Skilled Nursing care for assessment, interventions & education, Home health aide, Physical Therapy evaluation and treatment and Occupational therapy evaluation and treatment    Skilled need for:  Exacerbation of Chronic Disease State acute on chronic UTI from chronic suprapubic catheter and Medication Management chronic conditions    Skilled nursing interventions to include:  Comment: medication management    Homebound status evidenced by:  Need the aid of supportive devices such as crutches, canes, wheelchairs or walkers, Require the use of special transportation or Needs the assistance of another person in order to leave the home. Leaving home requires a considerable and taxing effort. There is a normal inability to leave the home.    Community Physician to provide follow up care: Danis Lehman M.D.     Optional Interventions? No      I certify the face to face encounter for this home health care referral meets the CMS requirements and the encounter/clinical assessment with the patient was, in whole, or in part, for the medical condition(s) listed above, which is the primary reason for home health care. Based on my clinical findings: the service(s) are medically necessary, support the need for home health care, and the homebound criteria are met.  I certify that this patient has had a face to face encounter by myself.  Rudolph Mancera M.D. - NPI: [id number]

## 2021-08-10 LAB
BACTERIA UR CULT: NORMAL
GLUCOSE BLD-MCNC: 208 MG/DL (ref 65–99)
GLUCOSE BLD-MCNC: 249 MG/DL (ref 65–99)
SIGNIFICANT IND 70042: NORMAL
SITE SITE: NORMAL
SOURCE SOURCE: NORMAL

## 2021-08-10 PROCEDURE — 770001 HCHG ROOM/CARE - MED/SURG/GYN PRIV*

## 2021-08-10 PROCEDURE — 82962 GLUCOSE BLOOD TEST: CPT

## 2021-08-10 PROCEDURE — 700102 HCHG RX REV CODE 250 W/ 637 OVERRIDE(OP): Performed by: INTERNAL MEDICINE

## 2021-08-10 PROCEDURE — 700111 HCHG RX REV CODE 636 W/ 250 OVERRIDE (IP): Performed by: INTERNAL MEDICINE

## 2021-08-10 PROCEDURE — 99232 SBSQ HOSP IP/OBS MODERATE 35: CPT | Performed by: STUDENT IN AN ORGANIZED HEALTH CARE EDUCATION/TRAINING PROGRAM

## 2021-08-10 PROCEDURE — 700105 HCHG RX REV CODE 258: Performed by: INTERNAL MEDICINE

## 2021-08-10 PROCEDURE — 94760 N-INVAS EAR/PLS OXIMETRY 1: CPT

## 2021-08-10 PROCEDURE — A9270 NON-COVERED ITEM OR SERVICE: HCPCS | Performed by: INTERNAL MEDICINE

## 2021-08-10 RX ADMIN — GABAPENTIN 100 MG: 100 CAPSULE ORAL at 05:15

## 2021-08-10 RX ADMIN — AMLODIPINE BESYLATE 5 MG: 5 TABLET ORAL at 05:15

## 2021-08-10 RX ADMIN — LEVOTHYROXINE SODIUM 25 MCG: 0.03 TABLET ORAL at 05:15

## 2021-08-10 RX ADMIN — INSULIN HUMAN 3 UNITS: 100 INJECTION, SOLUTION PARENTERAL at 05:32

## 2021-08-10 RX ADMIN — INSULIN HUMAN 5 UNITS: 100 INJECTION, SOLUTION PARENTERAL at 20:52

## 2021-08-10 RX ADMIN — ENOXAPARIN SODIUM 40 MG: 40 INJECTION SUBCUTANEOUS at 05:15

## 2021-08-10 RX ADMIN — INSULIN HUMAN 3 UNITS: 100 INJECTION, SOLUTION PARENTERAL at 11:52

## 2021-08-10 RX ADMIN — FINASTERIDE 5 MG: 5 TABLET, FILM COATED ORAL at 05:16

## 2021-08-10 RX ADMIN — CEFEPIME 2 G: 2 INJECTION, POWDER, FOR SOLUTION INTRAVENOUS at 20:42

## 2021-08-10 RX ADMIN — GABAPENTIN 100 MG: 100 CAPSULE ORAL at 17:30

## 2021-08-10 RX ADMIN — CEFEPIME 2 G: 2 INJECTION, POWDER, FOR SOLUTION INTRAVENOUS at 07:56

## 2021-08-10 RX ADMIN — SIMVASTATIN 10 MG: 10 TABLET, FILM COATED ORAL at 20:42

## 2021-08-10 RX ADMIN — TAMSULOSIN HYDROCHLORIDE 0.4 MG: 0.4 CAPSULE ORAL at 05:15

## 2021-08-10 RX ADMIN — INSULIN GLARGINE 20 UNITS: 100 INJECTION, SOLUTION SUBCUTANEOUS at 07:48

## 2021-08-10 ASSESSMENT — COGNITIVE AND FUNCTIONAL STATUS - GENERAL
PERSONAL GROOMING: A LITTLE
STANDING UP FROM CHAIR USING ARMS: A LITTLE
DRESSING REGULAR UPPER BODY CLOTHING: A LITTLE
WALKING IN HOSPITAL ROOM: A LITTLE
MOBILITY SCORE: 19
SUGGESTED CMS G CODE MODIFIER DAILY ACTIVITY: CK
DRESSING REGULAR LOWER BODY CLOTHING: A LOT
SUGGESTED CMS G CODE MODIFIER MOBILITY: CK
DAILY ACTIVITIY SCORE: 17
CLIMB 3 TO 5 STEPS WITH RAILING: A LOT
TOILETING: A LITTLE
EATING MEALS: A LITTLE
MOVING FROM LYING ON BACK TO SITTING ON SIDE OF FLAT BED: A LITTLE
HELP NEEDED FOR BATHING: A LITTLE

## 2021-08-10 ASSESSMENT — ENCOUNTER SYMPTOMS
CONSTIPATION: 0
ABDOMINAL PAIN: 0
DIARRHEA: 0
VOMITING: 0
SHORTNESS OF BREATH: 0
NAUSEA: 0
CHILLS: 0
COUGH: 0
FEVER: 0

## 2021-08-10 ASSESSMENT — PAIN DESCRIPTION - PAIN TYPE
TYPE: ACUTE PAIN
TYPE: ACUTE PAIN

## 2021-08-10 NOTE — PROGRESS NOTES
Hospital Medicine Daily Progress Note    Date of Service  8/10/2021    Chief Complaint  Julien Dao is a 89 y.o. male admitted 8/7/2021 with fever at home    Hospital Course  89M PMH KOST[facility], CAD, DMT2, HTN, recurrent UTI    Admitted 8/7/21 for fever and hyperglycemia. Found to have a repeat UTI, history of Pseudomonas UTI last month. Started on IV Cefepime.  Lactic acid 3.1. COVID-19 screening negative.    Interval Problem Update  Spoke to patient at bedside. Unable to give good information.  Patient is being treated currently for Pseudomonas UTI.  Patient has chronic Sher which is the source.  Patient has remained afebrile.    Called wife Mercedes Olivas at 1350PM.  She stated patient July 28th saw [facility], urolift done, saw rash on leg treated for fungal rash.  Home health was seeing patient at home. Cardiac appt 8/18/21.  [facility] mobile medicine. Patient has days he will constantly talk without stopping. Baseline, patient knows what is going on.  Intermittent 2-3 days of the week patient starts to get confused, his sign of infection.  She denied any dementia. Patient takes about missing one of his daughters, his other one he doesn't get along with.  Patient does not sleep well, gets up every hour.  Patient forgets to take his bag and the catheter gets pulled and gets reinserted.  Unsure if patient has touched the catheter and doesn't remain clean, poor hygiene at home.    8/10: Patient sitting in bed.  Has mild episodes of confusion but otherwise is awake and alert.  He appears to be improving with the antibiotics.  Anticipate he will discharge tomorrow.  T-max 97.9, /59.  Home health arranged.  NO SNF REQUESTED BY WIFE.  I have personally seen and examined the patient at bedside. I discussed the plan of care with patient, family, bedside RN, charge RN,  and pharmacy.    Consultants/Specialty  None    Code Status  DNAR/DNI    Disposition  Patient is not medically cleared.   Anticipate  discharge to to home with organized home healthcare and close outpatient follow-up.  NO SNF REQUESTED BY WIFE.    Patient has [facility] PRIOR TO ADMISSION.  I have placed the appropriate orders for post-discharge needs.    Review of Systems  Review of Systems   Constitutional: Negative for chills and fever.   Respiratory: Negative for cough and shortness of breath.    Cardiovascular: Negative for chest pain.   Gastrointestinal: Negative for abdominal pain, constipation, diarrhea, nausea and vomiting.   Genitourinary: Positive for dysuria.   All other systems reviewed and are negative.     Physical Exam  Temp:  [36.5 °C (97.7 °F)-36.6 °C (97.9 °F)] 36.5 °C (97.7 °F)  Pulse:  [83-94] 86  Resp:  [18] 18  BP: (125-162)/(59-74) 137/59  SpO2:  [90 %-97 %] 90 %    Physical Exam  Vitals and nursing note reviewed.   Constitutional:       General: He is not in acute distress.     Appearance: He is not ill-appearing.      Comments: Frail appearing male   HENT:      Head: Normocephalic and atraumatic.   Eyes:      General: No scleral icterus.        Right eye: No discharge.         Left eye: No discharge.   Cardiovascular:      Rate and Rhythm: Normal rate.      Pulses: Normal pulses.      Heart sounds: Normal heart sounds.   Pulmonary:      Effort: Pulmonary effort is normal. No respiratory distress.      Breath sounds: Normal breath sounds.   Abdominal:      General: Abdomen is flat. Bowel sounds are normal. There is no distension.      Palpations: Abdomen is soft.      Tenderness: There is no abdominal tenderness.   Genitourinary:     Comments: Suprapubic catheter in place  Musculoskeletal:         General: No swelling or tenderness.      Comments: Sarcopenic  Minimally moving in bed   Skin:     General: Skin is warm.      Capillary Refill: Capillary refill takes less than 2 seconds.      Coloration: Skin is not jaundiced or pale.   Neurological:      Mental Status: He is alert. Mental status is at baseline. He is  disoriented.      Motor: Weakness present.   Psychiatric:         Mood and Affect: Mood normal.         Behavior: Behavior normal.         Fluids    Intake/Output Summary (Last 24 hours) at 8/10/2021 1659  Last data filed at 8/10/2021 0800  Gross per 24 hour   Intake 140 ml   Output 3800 ml   Net -3660 ml       Laboratory  Recent Labs     08/07/21 2015 08/09/21  0411   WBC 10.5 7.7   RBC 3.96* 4.41*   HEMOGLOBIN 12.0* 13.3*   HEMATOCRIT 35.2* 39.5*   MCV 88.9 89.6   MCH 30.3 30.2   MCHC 34.1 33.7   RDW 43.0 43.3   PLATELETCT 273 291   MPV 10.0 10.0     Recent Labs     08/07/21 2015 08/09/21  0411   SODIUM 135 140   POTASSIUM 2.8* 3.0*   CHLORIDE 97 103   CO2 26 26   GLUCOSE 191* 145*   BUN 8 9   CREATININE 0.73 0.66   CALCIUM 8.8 8.6                   Imaging  CT-RENAL COLIC EVALUATION(A/P W/O)   Final Result         1.  No evidence of urolithiasis.      2.  Mild prominence of the renal collecting system and ureters though this has decreased since previous CT scan.      3.  Diffuse urinary bladder wall thickening with urinary bladder decompressed by Sher catheter.      4.  Atherosclerotic disease.      5.  Diverticulosis without diverticulitis.      6.  Renal cysts.      DX-CHEST-PORTABLE (1 VIEW)   Final Result      No acute cardiac or pulmonary abnormalities are identified.           Assessment/Plan  Discharge planning issues  Assessment & Plan  Spent additional time on patient's case today. Called wife.  She stated patient July 28th saw [facility], urolift done was to help with suprapubic catheter but did not work as prostate is too large. Patient had a rash on leg treated for fungal rash.  Home health was seeing patient at home. Cardiac appt 8/18/21.  [facility]. Patient has days he will constantly talk without stopping. Baseline, patient knows what is going on.  Intermittent 2-3 days of the week patient starts to get confused, his sign of infection.  She denied any dementia. Patient  takes about missing one of his daughters, his other one he doesn't get along with.  Patient does not sleep well, gets up every hour.  Patient forgets to take his bag and the catheter gets pulled and gets reinserted.  Unsure if patient has touched the catheter and doesn't remain clean, poor hygiene at home.  NO SNF REQUESTED BY WIFE.  Spoke to her about patient's treatment course.  Reviewed history and other notes.  Start time 1350 stop time 1422.    A-fib (HCC)- (present on admission)  Assessment & Plan  -Rate controlled  -Patient is not fully anticoagulated, will not be started now.  Patient elderly age high risk for bleeds.    UTI (urinary tract infection)- (present on admission)  Assessment & Plan  Reviewed CT scan, no evidence of renal stones compromising urinary system.  No report of pyelonephritis.  -Last month he did have a Pseudomonas UTI, somewhat difficult to treat Pseudomonas  -Sensitive at that time to cefepime, continue cefepime  -Not causing sepsis    Hypothyroidism- (present on admission)  Assessment & Plan  -Continue Synthroid at 25 mcg daily  -Most recent TSH was approximately 3 months ago and was elevated 8.51  -Normal free T4 at that time at 1.12    BPH (benign prostatic hyperplasia)- (present on admission)  Assessment & Plan  -Continue home Proscar and Flomax  -Does have a chronic indwelling Sher    Urinary retention- (present on admission)  Assessment & Plan  -With chronic indwelling Sher, presented with Sher on admission  Cause of Pseudomonas UTI    Diabetes mellitus with hyperglycemia (HCC)- (present on admission)  Assessment & Plan  Last A1c 16  Insulin sliding scale.  Continue glargine 20 units  Glucose checks before meals and at bedtime  Diabetic diet    HTN (hypertension)- (present on admission)  Assessment & Plan  -Continue home amlodipine  -Start as needed enalapril and labetalol  -Adjust as needed     VTE prophylaxis: enoxaparin ppx    I have performed a physical exam and reviewed  and updated ROS and Plan today (8/10/2021). In review of yesterday's note (8/9/2021), there are no changes except as documented above.

## 2021-08-10 NOTE — CARE PLAN
The patient is Stable - Low risk of patient condition declining or worsening    Shift Goals  Clinical Goals: pt safety  Patient Goals: rest, go home    Progress made toward(s) clinical / shift goals:  no falls    Patient is not progressing towards the following goals:

## 2021-08-10 NOTE — CARE PLAN
The patient is Stable - Low risk of patient condition declining or worsening    Shift Goals  Clinical Goals: patient safety  Patient Goals: be discharged     Progress made toward(s) clinical / shift goals:  yes    Patient is not progressing towards the following goals:

## 2021-08-10 NOTE — CARE PLAN
The patient is Watcher - Medium risk of patient condition declining or worsening    Shift Goals  Clinical Goals: patient safety  Patient Goals: rest, go home    Progress made toward(s) clinical / shift goals:  yes    Patient is not progressing towards the following goals:

## 2021-08-11 ENCOUNTER — PATIENT OUTREACH (OUTPATIENT)
Dept: HEALTH INFORMATION MANAGEMENT | Facility: OTHER | Age: 86
End: 2021-08-11

## 2021-08-11 LAB
ANION GAP SERPL CALC-SCNC: 13 MMOL/L (ref 7–16)
BASOPHILS # BLD AUTO: 0.8 % (ref 0–1.8)
BASOPHILS # BLD: 0.07 K/UL (ref 0–0.12)
BUN SERPL-MCNC: 12 MG/DL (ref 8–22)
CALCIUM SERPL-MCNC: 8.5 MG/DL (ref 8.4–10.2)
CHLORIDE SERPL-SCNC: 100 MMOL/L (ref 96–112)
CO2 SERPL-SCNC: 23 MMOL/L (ref 20–33)
CREAT SERPL-MCNC: 0.66 MG/DL (ref 0.5–1.4)
EOSINOPHIL # BLD AUTO: 0.26 K/UL (ref 0–0.51)
EOSINOPHIL NFR BLD: 3 % (ref 0–6.9)
ERYTHROCYTE [DISTWIDTH] IN BLOOD BY AUTOMATED COUNT: 42.5 FL (ref 35.9–50)
GLUCOSE BLD-MCNC: 182 MG/DL (ref 65–99)
GLUCOSE BLD-MCNC: 232 MG/DL (ref 65–99)
GLUCOSE BLD-MCNC: 274 MG/DL (ref 65–99)
GLUCOSE SERPL-MCNC: 125 MG/DL (ref 65–99)
HCT VFR BLD AUTO: 36.6 % (ref 42–52)
HGB BLD-MCNC: 12.3 G/DL (ref 14–18)
IMM GRANULOCYTES # BLD AUTO: 0.1 K/UL (ref 0–0.11)
IMM GRANULOCYTES NFR BLD AUTO: 1.2 % (ref 0–0.9)
LYMPHOCYTES # BLD AUTO: 1.99 K/UL (ref 1–4.8)
LYMPHOCYTES NFR BLD: 22.9 % (ref 22–41)
MCH RBC QN AUTO: 29.6 PG (ref 27–33)
MCHC RBC AUTO-ENTMCNC: 33.6 G/DL (ref 33.7–35.3)
MCV RBC AUTO: 88.2 FL (ref 81.4–97.8)
MONOCYTES # BLD AUTO: 0.78 K/UL (ref 0–0.85)
MONOCYTES NFR BLD AUTO: 9 % (ref 0–13.4)
NEUTROPHILS # BLD AUTO: 5.49 K/UL (ref 1.82–7.42)
NEUTROPHILS NFR BLD: 63.1 % (ref 44–72)
NRBC # BLD AUTO: 0 K/UL
NRBC BLD-RTO: 0 /100 WBC
PLATELET # BLD AUTO: 335 K/UL (ref 164–446)
PMV BLD AUTO: 10.2 FL (ref 9–12.9)
POTASSIUM SERPL-SCNC: 2.9 MMOL/L (ref 3.6–5.5)
RBC # BLD AUTO: 4.15 M/UL (ref 4.7–6.1)
SODIUM SERPL-SCNC: 136 MMOL/L (ref 135–145)
WBC # BLD AUTO: 8.7 K/UL (ref 4.8–10.8)

## 2021-08-11 PROCEDURE — 97166 OT EVAL MOD COMPLEX 45 MIN: CPT

## 2021-08-11 PROCEDURE — 97530 THERAPEUTIC ACTIVITIES: CPT

## 2021-08-11 PROCEDURE — 700102 HCHG RX REV CODE 250 W/ 637 OVERRIDE(OP): Performed by: STUDENT IN AN ORGANIZED HEALTH CARE EDUCATION/TRAINING PROGRAM

## 2021-08-11 PROCEDURE — 97116 GAIT TRAINING THERAPY: CPT

## 2021-08-11 PROCEDURE — A9270 NON-COVERED ITEM OR SERVICE: HCPCS | Performed by: STUDENT IN AN ORGANIZED HEALTH CARE EDUCATION/TRAINING PROGRAM

## 2021-08-11 PROCEDURE — 700102 HCHG RX REV CODE 250 W/ 637 OVERRIDE(OP): Performed by: INTERNAL MEDICINE

## 2021-08-11 PROCEDURE — 700101 HCHG RX REV CODE 250: Performed by: STUDENT IN AN ORGANIZED HEALTH CARE EDUCATION/TRAINING PROGRAM

## 2021-08-11 PROCEDURE — 82962 GLUCOSE BLOOD TEST: CPT

## 2021-08-11 PROCEDURE — 94760 N-INVAS EAR/PLS OXIMETRY 1: CPT

## 2021-08-11 PROCEDURE — 85025 COMPLETE CBC W/AUTO DIFF WBC: CPT

## 2021-08-11 PROCEDURE — 770001 HCHG ROOM/CARE - MED/SURG/GYN PRIV*

## 2021-08-11 PROCEDURE — 99232 SBSQ HOSP IP/OBS MODERATE 35: CPT | Performed by: STUDENT IN AN ORGANIZED HEALTH CARE EDUCATION/TRAINING PROGRAM

## 2021-08-11 PROCEDURE — A9270 NON-COVERED ITEM OR SERVICE: HCPCS | Performed by: INTERNAL MEDICINE

## 2021-08-11 PROCEDURE — 36415 COLL VENOUS BLD VENIPUNCTURE: CPT

## 2021-08-11 PROCEDURE — 80048 BASIC METABOLIC PNL TOTAL CA: CPT

## 2021-08-11 PROCEDURE — 700111 HCHG RX REV CODE 636 W/ 250 OVERRIDE (IP): Performed by: INTERNAL MEDICINE

## 2021-08-11 PROCEDURE — 700105 HCHG RX REV CODE 258: Performed by: INTERNAL MEDICINE

## 2021-08-11 RX ORDER — POTASSIUM CHLORIDE 20 MEQ/1
40 TABLET, EXTENDED RELEASE ORAL ONCE
Status: COMPLETED | OUTPATIENT
Start: 2021-08-11 | End: 2021-08-11

## 2021-08-11 RX ORDER — GRANULES FOR ORAL 3 G/1
3 POWDER ORAL ONCE
Qty: 1 EACH | Refills: 0 | Status: SHIPPED | OUTPATIENT
Start: 2021-08-14 | End: 2021-08-14

## 2021-08-11 RX ORDER — GRANULES FOR ORAL 3 G/1
3 POWDER ORAL ONCE
Status: COMPLETED | OUTPATIENT
Start: 2021-08-11 | End: 2021-08-11

## 2021-08-11 RX ADMIN — SIMVASTATIN 10 MG: 10 TABLET, FILM COATED ORAL at 20:01

## 2021-08-11 RX ADMIN — FINASTERIDE 5 MG: 5 TABLET, FILM COATED ORAL at 06:22

## 2021-08-11 RX ADMIN — DOCUSATE SODIUM 50 MG AND SENNOSIDES 8.6 MG 2 TABLET: 8.6; 5 TABLET, FILM COATED ORAL at 06:21

## 2021-08-11 RX ADMIN — ENOXAPARIN SODIUM 40 MG: 40 INJECTION SUBCUTANEOUS at 06:22

## 2021-08-11 RX ADMIN — LEVOTHYROXINE SODIUM 25 MCG: 0.03 TABLET ORAL at 06:22

## 2021-08-11 RX ADMIN — INSULIN GLARGINE 20 UNITS: 100 INJECTION, SOLUTION SUBCUTANEOUS at 08:11

## 2021-08-11 RX ADMIN — POTASSIUM CHLORIDE 40 MEQ: 1500 TABLET, EXTENDED RELEASE ORAL at 11:47

## 2021-08-11 RX ADMIN — POTASSIUM CHLORIDE 40 MEQ: 1500 TABLET, EXTENDED RELEASE ORAL at 08:26

## 2021-08-11 RX ADMIN — CEFEPIME 2 G: 2 INJECTION, POWDER, FOR SOLUTION INTRAVENOUS at 08:16

## 2021-08-11 RX ADMIN — AMLODIPINE BESYLATE 5 MG: 5 TABLET ORAL at 06:22

## 2021-08-11 RX ADMIN — INSULIN HUMAN 2 UNITS: 100 INJECTION, SOLUTION PARENTERAL at 19:59

## 2021-08-11 RX ADMIN — INSULIN HUMAN 3 UNITS: 100 INJECTION, SOLUTION PARENTERAL at 11:42

## 2021-08-11 RX ADMIN — GRANULES FOR ORAL SOLUTION 3 G: 3 POWDER ORAL at 11:47

## 2021-08-11 RX ADMIN — GABAPENTIN 100 MG: 100 CAPSULE ORAL at 06:22

## 2021-08-11 RX ADMIN — GABAPENTIN 100 MG: 100 CAPSULE ORAL at 18:00

## 2021-08-11 RX ADMIN — INSULIN HUMAN 2 UNITS: 100 INJECTION, SOLUTION PARENTERAL at 06:23

## 2021-08-11 RX ADMIN — TAMSULOSIN HYDROCHLORIDE 0.4 MG: 0.4 CAPSULE ORAL at 06:22

## 2021-08-11 ASSESSMENT — COGNITIVE AND FUNCTIONAL STATUS - GENERAL
MOBILITY SCORE: 21
EATING MEALS: A LITTLE
SUGGESTED CMS G CODE MODIFIER MOBILITY: CJ
MOVING FROM LYING ON BACK TO SITTING ON SIDE OF FLAT BED: A LITTLE
DRESSING REGULAR LOWER BODY CLOTHING: A LOT
SUGGESTED CMS G CODE MODIFIER DAILY ACTIVITY: CK
CLIMB 3 TO 5 STEPS WITH RAILING: A LITTLE
TOILETING: A LOT
STANDING UP FROM CHAIR USING ARMS: A LITTLE
PERSONAL GROOMING: A LITTLE
DRESSING REGULAR UPPER BODY CLOTHING: A LITTLE
HELP NEEDED FOR BATHING: A LOT
DAILY ACTIVITIY SCORE: 15

## 2021-08-11 ASSESSMENT — GAIT ASSESSMENTS
GAIT LEVEL OF ASSIST: SUPERVISED
DISTANCE (FEET): 200
ASSISTIVE DEVICE: FRONT WHEEL WALKER
DISTANCE (FEET): 20
DEVIATION: STEP TO

## 2021-08-11 ASSESSMENT — PAIN DESCRIPTION - PAIN TYPE
TYPE: ACUTE PAIN
TYPE: ACUTE PAIN

## 2021-08-11 ASSESSMENT — ACTIVITIES OF DAILY LIVING (ADL): TOILETING: INDEPENDENT

## 2021-08-11 NOTE — PROGRESS NOTES
Assumed care of pt at 1915. Received report from Alejandrina GIRON. Pt resting in bed. Pt able to answer orientation questions correctly but occasionally rambles sentences that do not make sense. Pt stated no pain at this time. Pt resting in bed watching tv. Sher care in place draining clear yellow urine. Catheter care completed. No other needs at this time. Call light in reach, bed in lowest position.

## 2021-08-11 NOTE — DISCHARGE PLANNING
Anticipated Discharge Disposition: Home    Action: LSW received message from bedside RN that pt will need medical transport home. LSW faxed transport forms to [facility].    Barriers to Discharge: None    Plan: LSW to follow up with transport time.

## 2021-08-11 NOTE — DISCHARGE SUMMARY
Discharge Summary    CHIEF COMPLAINT ON ADMISSION  Chief Complaint   Patient presents with   • Fever   • Elevated Glucose       Reason for Admission  EMS     Admission Date  8/7/2021    CODE STATUS  DNAR/DNI    HPI & HOSPITAL COURSE    89M PMH [facility], CAD, DMT2, HTN, recurrent UTI    Admitted 8/7/21 for fever and hyperglycemia. Found to have a repeat UTI, history of Pseudomonas UTI last month. Started on IV Cefepime.  Lactic acid 3.1. COVID-19 screening negative.      Spoke to patient at bedside. Unable to give good information.  Patient is being treated currently for Pseudomonas UTI.  Patient has chronic Sher which is the source.  Patient has remained afebrile.     Called wife Mercedes Olivas at 1350PM.  She stated patient July 28th saw [facility] [place], urolift done, saw rash on leg treated for fungal rash.  Home health was seeing patient at home. Cardiac appt 8/18/21.  [facility]. Patient has days he will constantly talk without stopping. Baseline, patient knows what is going on.  Intermittent 2-3 days of the week patient starts to get confused, his sign of infection.  She denied any dementia. Patient takes about missing one of his daughters, his other one he doesn't get along with.  Patient does not sleep well, gets up every hour.  Patient forgets to take his bag and the catheter gets pulled and gets reinserted.  Unsure if patient has touched the catheter and doesn't remain clean, poor hygiene at home.     8/10: Patient sitting in bed.  Has mild episodes of confusion but otherwise is awake and alert.  He appears to be improving with the antibiotics.  Anticipate he will discharge tomorrow.  T-max 97.9, /59.  Home health arranged.    8/11: Patient was seen examined at bedside.  Case discussed with patient and patient's wife.  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  Discussed with the wife that the UTI has been treated and he will be completing antibiotics by p.o. at home.  Wife understands  but is noticeably concerned that he has had frequent UTIs.  I discussed with her that the patient would likely benefit from a infectious disease visit as an outpatient.  Patient was given a dose of fosfomycin inpatient and will be given another dose and 3 days.  T-max 97.9, /62.  Hypokalemia treated with potassium chloride for 2 doses.    Therefore, he is discharged in good and stable condition to home with close outpatient follow-up.    The patient met 2-midnight criteria for an inpatient stay at the time of discharge.    Discharge Date  8/11/2021    FOLLOW UP ITEMS POST DISCHARGE  Urinary tract infection  Urinary retention with chronic indwelling Sher catheter    DISCHARGE DIAGNOSES  Active Problems:    HTN (hypertension) POA: Yes    Diabetes mellitus with hyperglycemia (HCC) POA: Yes    Urinary retention POA: Yes      Overview: IMO load March 2020    BPH (benign prostatic hyperplasia) POA: Yes    Hypothyroidism POA: Yes    UTI (urinary tract infection) POA: Yes    A-fib (HCC) POA: Yes    Discharge planning issues POA: No  Resolved Problems:    * No resolved hospital problems. *      FOLLOW UP  No future appointments.  Danis Lehman M.D.  [address] [facility] [address]  [phone]      Please call and schedule a hospital follow up appointment with your primary care provider as needed. Thank you.      MEDICATIONS ON DISCHARGE     Medication List      START taking these medications      Instructions   fosfomycin 3 GM Pack  Start taking on: August 14, 2021  Commonly known as: MONUROL   Take 3 g by mouth one time for 1 dose.  Dose: 3 g        CHANGE how you take these medications      Instructions   nystatin powder  What changed: Another medication with the same name was removed. Continue taking this medication, and follow the directions you see here.  Commonly known as: MYCOSTATIN   Apply 1 Application topically 3 times a day. Apply's on penis  Dose: 1 Application        CONTINUE taking these  medications      Instructions   amLODIPine 5 MG Tabs  Commonly known as: NORVASC   Take 1 Tab by mouth every day.  Dose: 5 mg     finasteride 5 MG Tabs  Commonly known as: PROSCAR   Take 1 Tab by mouth every day.  Dose: 5 mg     gabapentin 100 MG Caps  Commonly known as: NEURONTIN   Take 100 mg by mouth 2 times a day.  Dose: 100 mg     insulin glargine 100 UNIT/ML Sopn injection  Generic drug: insulin glargine   Inject 38 Units under the skin every evening.  Dose: 38 Units     levothyroxine 25 MCG Tabs  Commonly known as: SYNTHROID   Take 1 tablet by mouth Every morning on an empty stomach.  Dose: 25 mcg     metoprolol tartrate 25 MG Tabs  Commonly known as: LOPRESSOR   Take 12.5 mg by mouth 2 times a day.  Dose: 12.5 mg     nystatin/triamcinolone 683585-6.1 UNIT/GM-% Crea  Commonly known as: MYCOLOG   Apply 1 Application topically 2 times a day. Apply's on left leg  Dose: 1 Application     phenazopyridine 200 MG Tabs  Commonly known as: PYRIDIUM   Take 200 mg by mouth in the morning, at noon, and at bedtime. Pt started 7/28/2021 for 3 day course  Dose: 200 mg     potassium chloride SA 20 MEQ Tbcr  Commonly known as: Kdur   Take 2 Tablets by mouth 2 times a day.  Dose: 40 mEq     PX Mens Multivitamins Tabs   Take 1 tablet by mouth every morning.  Dose: 1 Tablet     Restasis 0.05 % ophthalmic emulsion  Generic drug: cyclosporin   Administer 1 Drop into both eyes every day.  Dose: 1 Drop     RETAINE CMC OP   Administer 1 Drop into both eyes every day.  Dose: 1 Drop     simvastatin 10 MG Tabs  Commonly known as: ZOCOR   Take 10 mg by mouth every evening.  Dose: 10 mg     SYSTANE OP   Administer 1 Drop into affected eye(s) every day.  Dose: 1 Drop     tamsulosin 0.4 MG capsule  Commonly known as: FLOMAX   Take 0.4 mg by mouth every morning.  Dose: 0.4 mg            Allergies  Allergies   Allergen Reactions   • Iodine Anaphylaxis     Pt and pts wife report that it has been so long not sure what happens       DIET  Orders  Placed This Encounter   Procedures   • Diet Order Diet: Consistent CHO (Diabetic)     Standing Status:   Standing     Number of Occurrences:   1     Order Specific Question:   Diet:     Answer:   Consistent CHO (Diabetic) [4]       ACTIVITY  As tolerated.  Weight bearing as tolerated    CONSULTATIONS  None    PROCEDURES  None    LABORATORY  Lab Results   Component Value Date    SODIUM 136 08/11/2021    POTASSIUM 2.9 (L) 08/11/2021    CHLORIDE 100 08/11/2021    CO2 23 08/11/2021    GLUCOSE 125 (H) 08/11/2021    BUN 12 08/11/2021    CREATININE 0.66 08/11/2021    CREATININE 1.0 12/02/2008        Lab Results   Component Value Date    WBC 8.7 08/11/2021    HEMOGLOBIN 12.3 (L) 08/11/2021    HEMATOCRIT 36.6 (L) 08/11/2021    PLATELETCT 335 08/11/2021      Instructions:  The patient was instructed to return to the ER in the event of worsening symptoms. I have counseled the patient on the importance of compliance and the patient has agreed to proceed with all medical recommendations and follow up plan indicated above.   The patient understands that all medications come with benefits and risks. Risks may include permanent injury or death and these risks can be minimized with close reassessment and monitoring.    Total time of the discharge process exceeds 34 minutes.

## 2021-08-11 NOTE — THERAPY
Physical Therapy   Daily Treatment     Patient Name: Julien Dao  Age:  89 y.o., Sex:  male  Medical Record #: [id number]  Today's Date: 8/11/2021     Precautions: Fall Risk    Assessment    Pt making nice improvement gait and transfers improving Pt will need supervision at home to be safe     08/11/21 1300   Total Time Spent   Total Time Spent (Mins) 30   Charge Group   PT Gait Training 1   PT Therapeutic Activities 1   Cognition    Cognition / Consciousness WDL   Level of Consciousness Alert   Balance   Sitting Balance (Static) Fair +   Sitting Balance (Dynamic) Fair   Standing Balance (Static) Fair   Standing Balance (Dynamic) Fair   Weight Shift Sitting Fair   Weight Shift Standing Fair   Gait Analysis   Gait Level Of Assist Supervised   Assistive Device Front Wheel Walker   Distance (Feet) 200   # of Times Distance was Traveled 1   Deviation Step To   Weight Bearing Status full   Bed Mobility    Supine to Sit Supervised   Sit to Supine Supervised   Scooting Supervised   Functional Mobility   Sit to Stand Minimal Assist   Bed, Chair, Wheelchair Transfer Minimal Assist   Transfer Method Stand Step   How much difficulty does the patient currently have...   Turning over in bed (including adjusting bedclothes, sheets and blankets)? 4   Sitting down on and standing up from a chair with arms (e.g., wheelchair, bedside commode, etc.) 3   Moving from lying on back to sitting on the side of the bed? 4   How much help from another person does the patient currently need...   Moving to and from a bed to a chair (including a wheelchair)? 3   Need to walk in a hospital room? 4   Climbing 3-5 steps with a railing? 3   6 clicks Mobility Score 21   Activity Tolerance   Sitting Edge of Bed 10   Standing 10   Short Term Goals    Short Term Goal # 1 pt will go sit<>stand w/fww w/spv in 6tx for safe d/c home    Goal Outcome # 1 Progressing as expected   Short Term Goal # 2 pt will transfer bed<>chair w/fww w/spv in 6tx for safe d/c  home   Goal Outcome # 2 Progressing as expected   Short Term Goal # 3 pt will ambulate for 150ft w/fww w/spv in 6tx for safe d/c home    Goal Outcome # 3 Progressing as expected   Interdisciplinary Plan of Care Collaboration   IDT Collaboration with  Nursing   Session Information   Date / Session Number  8/11-2 2/3 8/13       Plan    Continue current treatment plan.    DC Equipment Recommendations: Front-Wheel Walker  Discharge Recommendations: Other - (see assessment in bold )

## 2021-08-11 NOTE — PROGRESS NOTES
"Pt called RN and CNA into room around 0300 this morning, when asked what he needs pt said \"I need help with this little ronan\" and pointed at his genitals, pt was naked in his bed and holding his penis in his hand. This RN asked if the pt was in any pain and he said \"it always hurts\". Sher site is clean dry and intact with no redness on observation. This RN asked pt if his Sher catheter was causing him discomfort and pt said \"no, it just needs some loving\". This RN told the pt that we cannot do that here and that it's inappropriate. Pt responded \"well you can but you won't.\" This RN and CNA offered pt a warm blanket to help him sleep but the pt refused. Pt then covered himself back up with his blanket and said \"well thanks for nothing\". This RN told pt to try and get some sleep as it's late at night and he hasn't slept at all yet. Bed locked and in lowest position. Bed alarm on.  "

## 2021-08-11 NOTE — THERAPY
Occupational Therapy   Initial Evaluation     Patient Name: Julien Dao  Age:  89 y.o., Sex:  male  Medical Record #: [id number]  Today's Date: 8/11/2021     Precautions  Precautions: Fall Risk  Comments: confusion    Assessment  Patient is 89 y.o. male with a diagnosis of fatigue, confusion, chronic UTI with long term catheter use.  Was recently hospitalized with d/c home with HH, advised to return to ER by HH nurse.  Pt demos impulsivity and intermittent confusion.  He is unsafe with FWW use and needs close supervision with cues for safety.  He tends to fall backwards in standing, and loses balance changing directions.  He refused most self care tasks with OT and was hyperfocused on being able to have marital relations with his spouse.  Pt demo's decreased insight to his limitations, will need close supervision with ADl's especially bathing, dressing and toileting, and all IADL's.  Would recommend close supervision with sit to stand, and cues for walker use.  He did tend to walk better the more he was moving.  At this time, pt appears questionably safe for home.  Unclear if spouse is physically capable of providing enough assist/supervision at home, but she is also refusing SNF.  Pt would benefit from HH therapy and nursing at a minimum, but would be safer with more intense therapy in SNF setting.  No goals written for OT in this setting as RN informed OT that pt's discharge has already bet determined at conclusion of OT eval.  If pt were to stay, he would benefit from continued therapy.   Patient will not be actively followed for occupational therapy services at this time, however may be seen if requested by physician for 1 more visit within 30 days to address any discharge or equipment needs.       Plan    Recommend Occupational Therapy for Evaluation only       Discharge Recommendations: Recommend home health for continued occupational therapy services (vs SNF if spouse cannot provide close supervison with ADL's)        Prior Living Situation   Prior Services Skilled Home Health Services   Housing / Facility 1 Story House   Bathroom Set up Walk In Shower;Shower Chair   Equipment Owned Front-Wheel Walker;Tub / Shower Seat;Grab Bar(s) In Tub / Shower   Lives with - Patient's Self Care Capacity Spouse   Comments Pt reports spouse able to care for self, manages cooking and laundry, and drives.  Pt reports that Normally he does not require assist but he believes that spouse would help him a bit if needed   Prior Level of ADL Function   Self Feeding Independent   Grooming / Hygiene Independent   Bathing Independent   Dressing Independent   Toileting Independent   Comments infor per pt who is questionable historian   Prior Level of IADL Function   Medication Management Requires Assist   Laundry Requires Assist   Kitchen Mobility Requires Assist   Finances Unable To Determine At This Time   Home Management Unable To Determine At This Time   Shopping Requires Assist   Prior Level Of Mobility Independent With Device in Home   Driving / Transportation Relatives / Others Provide Transportation   Occupation (Pre-Hospital Vocational) Retired Due To Age   Leisure Interests Unable To Determine At This Time   Precautions   Precautions Fall Risk   Vitals   O2 Delivery Device None - Room Air   Cognition    Cognition / Consciousness WDL   Level of Consciousness Alert   Ability To Follow Commands 1 Step   Safety Awareness Impaired;Impulsive   Attention Impaired   Comments with a catheter in.  Pt referred to MD for this question.  Pt questionable historian, unclear if he has good insight into his capabilities at home.  Refused grooming, putting on own socks or toileting demo during therapy   Active ROM Upper Body   Active ROM Upper Body  WDL   Dominant Hand Right   Strength Upper Body   Upper Body Strength  WDL   Balance Assessment   Sitting Balance (Static) Fair +   Sitting Balance (Dynamic) Fair   Standing Balance (Static) Fair   Standing  Balance (Dynamic) Fair   Weight Shift Sitting Fair   Weight Shift Standing Fair   Comments pt initially with LOB posteriorly upon standing and then also with direction changes.  Needs close guarding when first standing and cues for safe use of FWW   Bed Mobility    Supine to Sit Supervised   Sit to Supine Supervised   Scooting Supervised   Comments HOB elevated, rails up   ADL Assessment   Grooming   (refused)   Upper Body Dressing Minimal Assist   Lower Body Dressing   (refused)   Toileting   (has wilson, refused toilet transfer demo)   Functional Mobility   Sit to Stand Minimal Assist   Bed, Chair, Wheelchair Transfer Minimal Assist   Tub / Shower Transfers Unable to Participate   Transfer Method Stand Step   Mobility up from EOB, out into alcantara, back to room   Distance (Feet) 20   # of Times Distance was Traveled 2   Visual Perception   Visual Perception  WDL   Activity Tolerance   Sitting in Chair 5 min for linen change on bed   Sitting Edge of Bed 5 min   Standing 10-12   Comments tolerating more mobility today compared to previous notes     Anticipated Discharge Equipment and Recommendations   Discharge Recommendations Recommend home health for continued occupational therapy services  (vs SNF if spouse cannot provide close supervison with ADL's)

## 2021-08-11 NOTE — CARE PLAN
The patient is Stable - Low risk of patient condition declining or worsening    Shift Goals  Clinical Goals: free from falls, watch for signs of infection  Patient Goals: be discharged     Progress made toward(s) clinical / shift goals:  Pt free from falls. Bed alarm in place. Pt educated to call for assistance. This RN sat at nursing station next to pt room as pt was attempting to get out of bed multiple times. No signs of further infection, no fever noted throughout the night.

## 2021-08-12 VITALS
TEMPERATURE: 97 F | OXYGEN SATURATION: 98 % | SYSTOLIC BLOOD PRESSURE: 148 MMHG | HEIGHT: 67 IN | WEIGHT: 155.42 LBS | BODY MASS INDEX: 24.39 KG/M2 | DIASTOLIC BLOOD PRESSURE: 69 MMHG | RESPIRATION RATE: 18 BRPM | HEART RATE: 76 BPM

## 2021-08-12 LAB
BACTERIA BLD CULT: NORMAL
BACTERIA BLD CULT: NORMAL
GLUCOSE BLD-MCNC: 149 MG/DL (ref 65–99)
GLUCOSE BLD-MCNC: 171 MG/DL (ref 65–99)
GLUCOSE BLD-MCNC: 188 MG/DL (ref 65–99)
SIGNIFICANT IND 70042: NORMAL
SIGNIFICANT IND 70042: NORMAL
SITE SITE: NORMAL
SITE SITE: NORMAL
SOURCE SOURCE: NORMAL
SOURCE SOURCE: NORMAL

## 2021-08-12 PROCEDURE — 99239 HOSP IP/OBS DSCHRG MGMT >30: CPT | Performed by: STUDENT IN AN ORGANIZED HEALTH CARE EDUCATION/TRAINING PROGRAM

## 2021-08-12 PROCEDURE — 700102 HCHG RX REV CODE 250 W/ 637 OVERRIDE(OP): Performed by: INTERNAL MEDICINE

## 2021-08-12 PROCEDURE — 700111 HCHG RX REV CODE 636 W/ 250 OVERRIDE (IP): Performed by: INTERNAL MEDICINE

## 2021-08-12 PROCEDURE — A9270 NON-COVERED ITEM OR SERVICE: HCPCS | Performed by: INTERNAL MEDICINE

## 2021-08-12 PROCEDURE — 82962 GLUCOSE BLOOD TEST: CPT | Mod: 91

## 2021-08-12 RX ADMIN — INSULIN GLARGINE 20 UNITS: 100 INJECTION, SOLUTION SUBCUTANEOUS at 07:39

## 2021-08-12 RX ADMIN — FINASTERIDE 5 MG: 5 TABLET, FILM COATED ORAL at 06:22

## 2021-08-12 RX ADMIN — INSULIN HUMAN 2 UNITS: 100 INJECTION, SOLUTION PARENTERAL at 11:21

## 2021-08-12 RX ADMIN — ENOXAPARIN SODIUM 40 MG: 40 INJECTION SUBCUTANEOUS at 06:21

## 2021-08-12 RX ADMIN — LEVOTHYROXINE SODIUM 25 MCG: 0.03 TABLET ORAL at 06:22

## 2021-08-12 RX ADMIN — TAMSULOSIN HYDROCHLORIDE 0.4 MG: 0.4 CAPSULE ORAL at 06:22

## 2021-08-12 RX ADMIN — GABAPENTIN 100 MG: 100 CAPSULE ORAL at 06:22

## 2021-08-12 RX ADMIN — AMLODIPINE BESYLATE 5 MG: 5 TABLET ORAL at 06:22

## 2021-08-12 ASSESSMENT — ENCOUNTER SYMPTOMS
VOMITING: 0
ABDOMINAL PAIN: 0
FEVER: 0
DIARRHEA: 0
SHORTNESS OF BREATH: 0
COUGH: 0
CONSTIPATION: 0
NAUSEA: 0
CHILLS: 0

## 2021-08-12 NOTE — CARE PLAN
The patient is Stable - Low risk of patient condition declining or worsening    Shift Goals  Clinical Goals: Pt will remain freee from falls  Patient Goals: rest    Progress made toward(s) clinical / shift goals:  Pt has remained free from falls, Bed alarm on, fall precautions in place.     Patient is not progressing towards the following goals: Pt continues to get out of bed without calling, reorienting needed.       Problem: Pain - Standard  Goal: Alleviation of pain or a reduction in pain to the patient’s comfort goal  Outcome: Progressing

## 2021-08-12 NOTE — DISCHARGE INSTRUCTIONS
Discharge Instructions    Discharged to home by car with relative. Discharged via wheelchair, hospital escort: Yes.  Special equipment needed: Not Applicable    Be sure to schedule a follow-up appointment with your primary care doctor or any specialists as instructed.     Discharge Plan:   Diet Plan: Discussed  Activity Level: Discussed  Confirmed Follow up Appointment: Patient to Call and Schedule Appointment  Medication Reconciliation Updated: Yes    I understand that a diet low in cholesterol, fat, and sodium is recommended for good health. Unless I have been given specific instructions below for another diet, I accept this instruction as my diet prescription.   Other diet: diabetic      Special Instructions: None    · Is patient discharged on Warfarin / Coumadin?   No     Depression / Suicide Risk    As you are discharged from this [facility] facility, it is important to learn how to keep safe from harming yourself.    Recognize the warning signs:  · Abrupt changes in personality, positive or negative- including increase in energy   · Giving away possessions  · Change in eating patterns- significant weight changes-  positive or negative  · Change in sleeping patterns- unable to sleep or sleeping all the time   · Unwillingness or inability to communicate  · Depression  · Unusual sadness, discouragement and loneliness  · Talk of wanting to die  · Neglect of personal appearance   · Rebelliousness- reckless behavior  · Withdrawal from people/activities they love  · Confusion- inability to concentrate     If you or a loved one observes any of these behaviors or has concerns about self-harm, here's what you can do:  · Talk about it- your feelings and reasons for harming yourself  · Remove any means that you might use to hurt yourself (examples: pills, rope, extension cords, firearm)  · Get professional help from the community (Mental Health, Substance Abuse, psychological counseling)  · Do not be alone:Call your  Safe Contact- someone whom you trust who will be there for you.  · Call your local CRISIS HOTLINE 006-8593 or 389-472-6730  · Call your local Children's Mobile Crisis Response Team [place] (104) 925-6138 or www.[employer]  · Call the toll free National Suicide Prevention Hotlines   · National Suicide Prevention Lifeline 228-483-MOPK (3272)  · [facility] 800-SUICIDE (591-2666)        Urinary Tract Infection, Adult  A urinary tract infection (UTI) is an infection of any part of the urinary tract. The urinary tract includes:  · The kidneys.  · The ureters.  · The bladder.  · The urethra.  These organs make, store, and get rid of pee (urine) in the body.  What are the causes?  This is caused by germs (bacteria) in your genital area. These germs grow and cause swelling (inflammation) of your urinary tract.  What increases the risk?  You are more likely to develop this condition if:  · You have a small, thin tube (catheter) to drain pee.  · You cannot control when you pee or poop (incontinence).  · You are female, and:  ? You use these methods to prevent pregnancy:  ? A medicine that kills sperm (spermicide).  ? A device that blocks sperm (diaphragm).  ? You have low levels of a female hormone (estrogen).  ? You are pregnant.  · You have genes that add to your risk.  · You are sexually active.  · You take antibiotic medicines.  · You have trouble peeing because of:  ? A prostate that is bigger than normal, if you are male.  ? A blockage in the part of your body that drains pee from the bladder (urethra).  ? A kidney stone.  ? A nerve condition that affects your bladder (neurogenic bladder).  ? Not getting enough to drink.  ? Not peeing often enough.  · You have other conditions, such as:  ? Diabetes.  ? A weak disease-fighting system (immune system).  ? Sickle cell disease.  ? Gout.  ? Injury of the spine.  What are the signs or symptoms?  Symptoms of this condition include:  · Needing to pee  right away (urgently).  · Peeing often.  · Peeing small amounts often.  · Pain or burning when peeing.  · Blood in the pee.  · Pee that smells bad or not like normal.  · Trouble peeing.  · Pee that is cloudy.  · Fluid coming from the vagina, if you are female.  · Pain in the belly or lower back.  Other symptoms include:  · Throwing up (vomiting).  · No urge to eat.  · Feeling mixed up (confused).  · Being tired and grouchy (irritable).  · A fever.  · Watery poop (diarrhea).  How is this treated?  This condition may be treated with:  · Antibiotic medicine.  · Other medicines.  · Drinking enough water.  Follow these instructions at home:    Medicines  · Take over-the-counter and prescription medicines only as told by your doctor.  · If you were prescribed an antibiotic medicine, take it as told by your doctor. Do not stop taking it even if you start to feel better.  General instructions  · Make sure you:  ? Pee until your bladder is empty.  ? Do not hold pee for a long time.  ? Empty your bladder after sex.  ? Wipe from front to back after pooping if you are a female. Use each tissue one time when you wipe.  · Drink enough fluid to keep your pee pale yellow.  · Keep all follow-up visits as told by your doctor. This is important.  Contact a doctor if:  · You do not get better after 1-2 days.  · Your symptoms go away and then come back.  Get help right away if:  · You have very bad back pain.  · You have very bad pain in your lower belly.  · You have a fever.  · You are sick to your stomach (nauseous).  · You are throwing up.  Summary  · A urinary tract infection (UTI) is an infection of any part of the urinary tract.  · This condition is caused by germs in your genital area.  · There are many risk factors for a UTI. These include having a small, thin tube to drain pee and not being able to control when you pee or poop.  · Treatment includes antibiotic medicines for germs.  · Drink enough fluid to keep your pee pale  yellow.  This information is not intended to replace advice given to you by your health care provider. Make sure you discuss any questions you have with your health care provider.  Document Released: 06/05/2009 Document Revised: 12/05/2019 Document Reviewed: 06/27/2019  Elsevier Patient Education © 2020 [employer]

## 2021-08-12 NOTE — DISCHARGE PLANNING
Received Transport Form @ 9:50  Spoke to Etelvina Gerard  Transport is scheduled for 08/12/2021 @12:00 going to Home  Notified care team via Voalte of scheduled discharge.

## 2021-08-12 NOTE — PROGRESS NOTES
[facility] Daily Progress Note    Date of Service  8/12/2021    Chief Complaint  Julien Dao is a 89 y.o. male admitted 8/7/2021 with fever at home    Hospital Course  89M PMH [facility], CAD, DMT2, HTN, recurrent UTI    Admitted 8/7/21 for fever and hyperglycemia. Found to have a repeat UTI, history of Pseudomonas UTI last month. Started on IV Cefepime.  Lactic acid 3.1. COVID-19 screening negative.    Interval Problem Update  Spoke to patient at bedside. Unable to give good information.  Patient is being treated currently for Pseudomonas UTI.  Patient has chronic Sher which is the source.  Patient has remained afebrile.    Called wife Mercedes Olivas at 1350PM.  She stated patient July 28th saw [facility] [place], urolift done, saw rash on leg treated for fungal rash.  Home health was seeing patient at home. Cardiac appt 8/18/21.  [facility]. Patient has days he will constantly talk without stopping. Baseline, patient knows what is going on.  Intermittent 2-3 days of the week patient starts to get confused, his sign of infection.  She denied any dementia. Patient takes about missing one of his daughters, his other one he doesn't get along with.  Patient does not sleep well, gets up every hour.  Patient forgets to take his bag and the catheter gets pulled and gets reinserted.  Unsure if patient has touched the catheter and doesn't remain clean, poor hygiene at home.    8/10: Patient sitting in bed.  Has mild episodes of confusion but otherwise is awake and alert.  He appears to be improving with the antibiotics.  Anticipate he will discharge tomorrow.  T-max 97.9, /59.  Home health arranged.  NO SNF REQUESTED BY WIFE.    8/11: Patient seen examined at bedside.  Plan of care was discussed with the wife she is agreeable to take the patient home.  However transportation could not be arranged today so patient had to discharge possibly tomorrow.  Patient has no complaints at this time and would like  to go home.  Vitals are stable.  Spoke with wife on the phone for approximately 10 minutes    I have personally seen and examined the patient at bedside. I discussed the plan of care with patient, family, bedside RN, charge RN,  and pharmacy.    Consultants/Specialty  None    Code Status  DNAR/DNI    Disposition  Patient is not medically cleared.   Anticipate discharge to to home with organized home healthcare and close outpatient follow-up.  NO SNF REQUESTED BY WIFE.    Patient has [facility] PRIOR TO ADMISSION.  I have placed the appropriate orders for post-discharge needs.    Review of Systems  Review of Systems   Constitutional: Negative for chills and fever.   Respiratory: Negative for cough and shortness of breath.    Cardiovascular: Negative for chest pain.   Gastrointestinal: Negative for abdominal pain, constipation, diarrhea, nausea and vomiting.   Genitourinary: Positive for dysuria.   All other systems reviewed and are negative.     Physical Exam  Temp:  [36.6 °C (97.9 °F)] 36.6 °C (97.9 °F)  Pulse:  [] 78  Resp:  [18] 18  BP: (125-158)/(61-73) 158/73  SpO2:  [95 %-98 %] 98 %    Physical Exam  Vitals and nursing note reviewed.   Constitutional:       General: He is not in acute distress.     Appearance: He is not ill-appearing.      Comments: Frail appearing male   HENT:      Head: Normocephalic and atraumatic.   Eyes:      General: No scleral icterus.        Right eye: No discharge.         Left eye: No discharge.   Cardiovascular:      Rate and Rhythm: Normal rate.      Pulses: Normal pulses.      Heart sounds: Normal heart sounds.   Pulmonary:      Effort: Pulmonary effort is normal. No respiratory distress.      Breath sounds: Normal breath sounds.   Abdominal:      General: Abdomen is flat. Bowel sounds are normal. There is no distension.      Palpations: Abdomen is soft.      Tenderness: There is no abdominal tenderness.   Genitourinary:     Comments: Suprapubic catheter  in place  Musculoskeletal:         General: No swelling or tenderness.      Comments: Sarcopenic  Minimally moving in bed   Skin:     General: Skin is warm.      Capillary Refill: Capillary refill takes less than 2 seconds.      Coloration: Skin is not jaundiced or pale.   Neurological:      Mental Status: He is alert. Mental status is at baseline. He is disoriented.      Motor: Weakness present.   Psychiatric:         Mood and Affect: Mood normal.         Behavior: Behavior normal.         Fluids    Intake/Output Summary (Last 24 hours) at 8/12/2021 0849  Last data filed at 8/12/2021 0200  Gross per 24 hour   Intake --   Output 1200 ml   Net -1200 ml       Laboratory  Recent Labs     08/11/21  0238   WBC 8.7   RBC 4.15*   HEMOGLOBIN 12.3*   HEMATOCRIT 36.6*   MCV 88.2   MCH 29.6   MCHC 33.6*   RDW 42.5   PLATELETCT 335   MPV 10.2     Recent Labs     08/11/21  0238   SODIUM 136   POTASSIUM 2.9*   CHLORIDE 100   CO2 23   GLUCOSE 125*   BUN 12   CREATININE 0.66   CALCIUM 8.5                   Imaging  CT-RENAL COLIC EVALUATION(A/P W/O)   Final Result         1.  No evidence of urolithiasis.      2.  Mild prominence of the renal collecting system and ureters though this has decreased since previous CT scan.      3.  Diffuse urinary bladder wall thickening with urinary bladder decompressed by Sher catheter.      4.  Atherosclerotic disease.      5.  Diverticulosis without diverticulitis.      6.  Renal cysts.      DX-CHEST-PORTABLE (1 VIEW)   Final Result      No acute cardiac or pulmonary abnormalities are identified.           Assessment/Plan  Discharge planning issues  Assessment & Plan  Spent additional time on patient's case today. Called wife.  She stated patient July 28th saw [facility] [place], urolift done was to help with suprapubic catheter but did not work as prostate is too large. Patient had a rash on leg treated for fungal rash.  Home health was seeing patient at home. Cardiac appt 8/18/21.  [facility]. Patient has days he will constantly talk without stopping. Baseline, patient knows what is going on.  Intermittent 2-3 days of the week patient starts to get confused, his sign of infection.  She denied any dementia. Patient takes about missing one of his daughters, his other one he doesn't get along with.  Patient does not sleep well, gets up every hour.  Patient forgets to take his bag and the catheter gets pulled and gets reinserted.  Unsure if patient has touched the catheter and doesn't remain clean, poor hygiene at home.  NO SNF REQUESTED BY WIFE.  Spoke to her about patient's treatment course.  Reviewed history and other notes.  Start time 1350 stop time 1422.    A-fib (HCC)- (present on admission)  Assessment & Plan  -Rate controlled  -Patient is not fully anticoagulated, will not be started now.  Patient elderly age high risk for bleeds.    UTI (urinary tract infection)- (present on admission)  Assessment & Plan  Reviewed CT scan, no evidence of renal stones compromising urinary system.  No report of pyelonephritis.  -Last month he did have a Pseudomonas UTI, somewhat difficult to treat Pseudomonas  -Sensitive at that time to cefepime, continue cefepime  -Not causing sepsis    Hypothyroidism- (present on admission)  Assessment & Plan  -Continue Synthroid at 25 mcg daily  -Most recent TSH was approximately 3 months ago and was elevated 8.51  -Normal free T4 at that time at 1.12    BPH (benign prostatic hyperplasia)- (present on admission)  Assessment & Plan  -Continue home Proscar and Flomax  -Does have a chronic indwelling Sher    Urinary retention- (present on admission)  Assessment & Plan  -With chronic indwelling Sher, presented with Sher on admission  Cause of Pseudomonas UTI    Diabetes mellitus with hyperglycemia (HCC)- (present on admission)  Assessment & Plan  Last A1c 16  Insulin sliding scale.  Continue glargine 20 units  Glucose checks before meals and at bedtime  Diabetic  diet    HTN (hypertension)- (present on admission)  Assessment & Plan  -Continue home amlodipine  -Start as needed enalapril and labetalol  -Adjust as needed     VTE prophylaxis: enoxaparin ppx    I have performed a physical exam and reviewed and updated ROS and Plan today (8/12/2021). In review of yesterday's note (8/11/2021), there are no changes except as documented above.

## 2021-08-12 NOTE — DISCHARGE SUMMARY
Discharge Summary    CHIEF COMPLAINT ON ADMISSION  Chief Complaint   Patient presents with   • Fever   • Elevated Glucose       Reason for Admission  EMS     Admission Date  8/7/2021    CODE STATUS  DNAR/DNI    HPI & HOSPITAL COURSE  Julien Dao is a 89 y.o. male who presented 8/7/2021 with fatigue.  Patient states he began feeling less himself approximately 3 weeks ago.  His biggest complaint is fatigue.  He states does have a home health nurse who told him he needed to come to the emergency department today.  He states she has also been noticing elevated sugars recently.  He does have a history of urinary tract infections.  He does have a chronic indwelling Sher due to urinary retention.  I did discuss the case including labs and imaging with the ER physician.    8/9: Spoke to patient at bedside. Unable to give good information.  Patient is being treated currently for Pseudomonas UTI.  Patient has chronic Sher which is the source.  Patient has remained afebrile.     Called wife Mercedes Olivas at 1350PM.  She stated patient July 28th saw [facility], urolift done, saw rash on leg treated for fungal rash.  Home health was seeing patient at home. Cardiac appt 8/18/21.  [facility]. Patient has days he will constantly talk without stopping. Baseline, patient knows what is going on.  Intermittent 2-3 days of the week patient starts to get confused, his sign of infection.  She denied any dementia. Patient takes about missing one of his daughters, his other one he doesn't get along with.  Patient does not sleep well, gets up every hour.  Patient forgets to take his bag and the catheter gets pulled and gets reinserted.  Unsure if patient has touched the catheter and doesn't remain clean, poor hygiene at home.     8/10: Patient sitting in bed.  Has mild episodes of confusion but otherwise is awake and alert.  He appears to be improving with the antibiotics.  Anticipate he will discharge tomorrow.   T-max 97.9, /59.  Home health arranged.  NO SNF REQUESTED BY WIFE.     8/11: Patient seen examined at bedside.  Plan of care was discussed with the wife she is agreeable to take the patient home.  However transportation could not be arranged today so patient had to discharge possibly tomorrow.  Patient has no complaints at this time and would like to go home.  Vitals are stable.    8/12: Patient seen examined at bedside.  He is at his baseline.  He has no complaints today anxious to go home.  He is vital signs are stable.  Patient completed antibiotic treatment but will be given additional dose of fosfomycin in 2 days since patient has had multiple UTIs.    Therefore, he is discharged in good and stable condition to home with organized home healthcare and close outpatient follow-up.    The patient met 2-midnight criteria for an inpatient stay at the time of discharge.    Discharge Date  8/12/2021    FOLLOW UP ITEMS POST DISCHARGE  UTI  Chronic indwelling Sher catheter-follow with urology    DISCHARGE DIAGNOSES  Active Problems:    HTN (hypertension) POA: Yes    Diabetes mellitus with hyperglycemia (HCC) POA: Yes    Urinary retention POA: Yes      Overview: IMO load March 2020    BPH (benign prostatic hyperplasia) POA: Yes    Hypothyroidism POA: Yes    UTI (urinary tract infection) POA: Yes    A-fib (HCC) POA: Yes    Discharge planning issues POA: No  Resolved Problems:    * No resolved hospital problems. *      FOLLOW UP  No future appointments.  Danis Lehman M.D.  [address] [facility] [address]  [phone]      Please call and schedule a hospital follow up appointment with your primary care provider as needed. Thank you.      MEDICATIONS ON DISCHARGE     Medication List      START taking these medications      Instructions   fosfomycin 3 GM Pack  Start taking on: August 14, 2021  Commonly known as: MONUROL   Take 3 g by mouth one time for 1 dose.  Dose: 3 g        CHANGE how you take these  medications      Instructions   nystatin powder  What changed: Another medication with the same name was removed. Continue taking this medication, and follow the directions you see here.  Commonly known as: MYCOSTATIN   Apply 1 Application topically 3 times a day. Apply's on penis  Dose: 1 Application        CONTINUE taking these medications      Instructions   amLODIPine 5 MG Tabs  Commonly known as: NORVASC   Take 1 Tab by mouth every day.  Dose: 5 mg     finasteride 5 MG Tabs  Commonly known as: PROSCAR   Take 1 Tab by mouth every day.  Dose: 5 mg     gabapentin 100 MG Caps  Commonly known as: NEURONTIN   Take 100 mg by mouth 2 times a day.  Dose: 100 mg     insulin glargine 100 UNIT/ML Sopn injection  Generic drug: insulin glargine   Inject 38 Units under the skin every evening.  Dose: 38 Units     levothyroxine 25 MCG Tabs  Commonly known as: SYNTHROID   Take 1 tablet by mouth Every morning on an empty stomach.  Dose: 25 mcg     metoprolol tartrate 25 MG Tabs  Commonly known as: LOPRESSOR   Take 12.5 mg by mouth 2 times a day.  Dose: 12.5 mg     nystatin/triamcinolone 326901-4.1 UNIT/GM-% Crea  Commonly known as: MYCOLOG   Apply 1 Application topically 2 times a day. Apply's on left leg  Dose: 1 Application     phenazopyridine 200 MG Tabs  Commonly known as: PYRIDIUM   Take 200 mg by mouth in the morning, at noon, and at bedtime. Pt started 7/28/2021 for 3 day course  Dose: 200 mg     potassium chloride SA 20 MEQ Tbcr  Commonly known as: Kdur   Take 2 Tablets by mouth 2 times a day.  Dose: 40 mEq     PX Mens Multivitamins Tabs   Take 1 tablet by mouth every morning.  Dose: 1 Tablet     Restasis 0.05 % ophthalmic emulsion  Generic drug: cyclosporin   Administer 1 Drop into both eyes every day.  Dose: 1 Drop     RETAINE [facility]MC OP   Administer 1 Drop into both eyes every day.  Dose: 1 Drop     simvastatin 10 MG Tabs  Commonly known as: ZOCOR   Take 10 mg by mouth every evening.  Dose: 10 mg     SYSTANE OP    Administer 1 Drop into affected eye(s) every day.  Dose: 1 Drop     tamsulosin 0.4 MG capsule  Commonly known as: FLOMAX   Take 0.4 mg by mouth every morning.  Dose: 0.4 mg            Allergies  Allergies   Allergen Reactions   • Iodine Anaphylaxis     Pt and pts wife report that it has been so long not sure what happens       DIET  Orders Placed This Encounter   Procedures   • Diet Order Diet: Consistent CHO (Diabetic)     Standing Status:   Standing     Number of Occurrences:   1     Order Specific Question:   Diet:     Answer:   Consistent CHO (Diabetic) [4]       ACTIVITY  As tolerated.  Weight bearing as tolerated    CONSULTATIONS  None    PROCEDURES  None    LABORATORY  Lab Results   Component Value Date    SODIUM 136 08/11/2021    POTASSIUM 2.9 (L) 08/11/2021    CHLORIDE 100 08/11/2021    CO2 23 08/11/2021    GLUCOSE 125 (H) 08/11/2021    BUN 12 08/11/2021    CREATININE 0.66 08/11/2021    CREATININE 1.0 12/02/2008        Lab Results   Component Value Date    WBC 8.7 08/11/2021    HEMOGLOBIN 12.3 (L) 08/11/2021    HEMATOCRIT 36.6 (L) 08/11/2021    PLATELETCT 335 08/11/2021      Instructions:  The patient was instructed to return to the ER in the event of worsening symptoms. I have counseled the patient on the importance of compliance and the patient has agreed to proceed with all medical recommendations and follow up plan indicated above.   The patient understands that all medications come with benefits and risks. Risks may include permanent injury or death and these risks can be minimized with close reassessment and monitoring.    Total time of the discharge process exceeds 48 minutes.

## 2021-08-12 NOTE — PROGRESS NOTES
Pt removed wilson.     New wilson placed by this RN using sterile technique. Bed alarm on. Fall band placed on Right wrist. Yellow socks on. Bed locked in lowest position.  Call light in reach. Rounding in place

## 2021-08-12 NOTE — DISCHARGE PLANNING
Anticipated Discharge Disposition: Home    Action: LSW messaged [facility] coordinator, regarding the transfer. Per [facility], they did not receive a fax for pt.     LSW re-faxed facesheet and transport forms to [facility].    Barriers to Discharge: None    Plan: LSW to follow up with transfer time.    0950: LSW received call from pt's spouse, Mercedes. LSW notified Mercedes that this LSW is re-sending transport forms and pt will transfer today. Per Mercedes, there is 1 step to get onto the porch and 1 step to get from porch into house. LSW requested a 1200 transfer time.    1013: Transfer time confirmed for 1200 via [facility] to home.

## 2021-08-12 NOTE — CARE PLAN
Problem: Knowledge Deficit - Standard  Goal: Patient and family/care givers will demonstrate understanding of plan of care, disease process/condition, diagnostic tests and medications  Note: Pt updated on POC for today     Problem: Pain - Standard  Goal: Alleviation of pain or a reduction in pain to the patient’s comfort goal  Note: Repositioned for comfort.   The patient is Stable - Low risk of patient condition declining or worsening    Shift Goals  Clinical Goals: Pt will remain freee from falls  Patient Goals: rest    Progress made toward(s) clinical / shift goals:  pt denies pain at this time    Patient is not progressing towards the following goals:

## 2021-08-12 NOTE — PROGRESS NOTES
Received report from day shift RN, Pt pulling at [place]. Pt educated. Pt laying sideways in the bed and moving side to side. Pt denies needs. Call light in reach, bed locked in lowest position. Bed alarm on, yellow socks on. Rounding in place

## 2021-08-16 ENCOUNTER — HOSPITAL ENCOUNTER (EMERGENCY)
Facility: MEDICAL CENTER | Age: 86
End: 2021-08-17
Attending: EMERGENCY MEDICINE
Payer: MEDICARE

## 2021-08-16 DIAGNOSIS — N39.0 URINARY TRACT INFECTION WITH HEMATURIA, SITE UNSPECIFIED: ICD-10-CM

## 2021-08-16 DIAGNOSIS — T83.9XXA PROBLEM WITH FOLEY CATHETER, INITIAL ENCOUNTER (HCC): ICD-10-CM

## 2021-08-16 DIAGNOSIS — R31.9 URINARY TRACT INFECTION WITH HEMATURIA, SITE UNSPECIFIED: ICD-10-CM

## 2021-08-16 LAB
APPEARANCE UR: ABNORMAL
BACTERIA #/AREA URNS HPF: ABNORMAL /HPF
BILIRUB UR QL STRIP.AUTO: NEGATIVE
COLOR UR: YELLOW
EPI CELLS #/AREA URNS HPF: ABNORMAL /HPF
GLUCOSE UR STRIP.AUTO-MCNC: >=1000 MG/DL
KETONES UR STRIP.AUTO-MCNC: NEGATIVE MG/DL
LEUKOCYTE ESTERASE UR QL STRIP.AUTO: ABNORMAL
MICRO URNS: ABNORMAL
MUCOUS THREADS #/AREA URNS HPF: ABNORMAL /HPF
NITRITE UR QL STRIP.AUTO: NEGATIVE
PH UR STRIP.AUTO: 6 [PH] (ref 5–8)
PROT UR QL STRIP: NEGATIVE MG/DL
RBC # URNS HPF: ABNORMAL /HPF
RBC UR QL AUTO: ABNORMAL
SP GR UR STRIP.AUTO: 1.01
UNIDENT CRYS URNS QL MICRO: ABNORMAL /HPF
WBC #/AREA URNS HPF: ABNORMAL /HPF

## 2021-08-16 PROCEDURE — 51702 INSERT TEMP BLADDER CATH: CPT

## 2021-08-16 PROCEDURE — 700101 HCHG RX REV CODE 250: Performed by: EMERGENCY MEDICINE

## 2021-08-16 PROCEDURE — 303105 HCHG CATHETER EXTRA

## 2021-08-16 PROCEDURE — 81001 URINALYSIS AUTO W/SCOPE: CPT

## 2021-08-16 PROCEDURE — 99284 EMERGENCY DEPT VISIT MOD MDM: CPT

## 2021-08-16 RX ORDER — GRANULES FOR ORAL 3 G/1
3 POWDER ORAL ONCE
Status: COMPLETED | OUTPATIENT
Start: 2021-08-16 | End: 2021-08-16

## 2021-08-16 RX ADMIN — GRANULES FOR ORAL SOLUTION 3 G: 3 POWDER ORAL at 23:32

## 2021-08-17 VITALS
OXYGEN SATURATION: 93 % | DIASTOLIC BLOOD PRESSURE: 82 MMHG | HEART RATE: 86 BPM | RESPIRATION RATE: 18 BRPM | TEMPERATURE: 97.9 F | SYSTOLIC BLOOD PRESSURE: 151 MMHG

## 2021-08-17 NOTE — ED PROVIDER NOTES
CHIEF COMPLAINT  Chief Complaint   Patient presents with   • Urinary Catheter Problem     pts wilson catheter became dislodged.        HPI  Julien Dao is a 89 y.o. male who presents tonight via ambulance from his home where he currently lives with his wife after his Wilson catheter came out.  He had some blood around his penis.  He has no complaints of pain.  He has had no fever or chills.    REVIEW OF SYSTEMS  See HPI for further details. All other system reviews are negative.    PAST MEDICAL HISTORY  Past Medical History:   Diagnosis Date   • KOSTAS (acute kidney injury) ([facility]) 2021   • CAD (coronary artery disease)    • Delirium 2020   • DM (diabetes mellitus) ([facility]) 2014   • Falls    • Hypertension    • UTI (urinary tract infection) 2018       FAMILY HISTORY  No family history on file.    SOCIAL HISTORY  Social History     Socioeconomic History   • Marital status:      Spouse name: Not on file   • Number of children: Not on file   • Years of education: Not on file   • Highest education level: Not on file   Occupational History   • Not on file   Tobacco Use   • Smoking status: Former Smoker     Quit date: 10/19/1972     Years since quittin.8   • Smokeless tobacco: Never Used   Vaping Use   • Vaping Use: Never used   Substance and Sexual Activity   • Alcohol use: Not Currently   • Drug use: No   • Sexual activity: Not on file   Other Topics Concern   • Not on file   Social History Narrative   • Not on file     Social Determinants of Health     Financial Resource Strain:    • Difficulty of Paying Living Expenses:    Food Insecurity:    • Worried About Running Out of Food in the Last Year:    • Ran Out of Food in the Last Year:    Transportation Needs:    • Lack of Transportation (Medical):    • Lack of Transportation (Non-Medical):    Physical Activity:    • Days of Exercise per Week:    • Minutes of Exercise per Session:    Stress:    • Feeling of Stress :    Social Connections:    •  Frequency of Communication with Friends and Family:    • Frequency of Social Gatherings with Friends and Family:    • Attends J[employer] Services:    • Active Member of Clubs or Organizations:    • Attends Club or Organization Meetings:    • Marital Status:    Intimate Partner Violence:    • Fear of Current or Ex-Partner:    • Emotionally Abused:    • Physically Abused:    • Sexually Abused:        SURGICAL HISTORY  Past Surgical History:   Procedure Laterality Date   • CORONARY ARTERY BYPASS, 3     • OTHER CARDIAC SURGERY     • TED RECTAL ABSCESS         CURRENT MEDICATIONS  See nurses notes    ALLERGIES  Allergies   Allergen Reactions   • Iodine Anaphylaxis     Pt and pts wife report that it has been so long not sure what happens       PHYSICAL EXAM  VITAL SIGNS: /63   Pulse 79   Temp 36.6 °C (97.8 °F)   Resp 18   SpO2 93%     Constitutional: Patient is well developed, well nourished in mild distress.   HENT: Normocephalic,  Oropharynx moist without erythema or exudates  Eyes: PERRL, EOMI  Neck: Supple   Cardiovascular: Normal heart rate and rhythm. No murmur  Thorax & Lungs: Clear and equal breath sounds with good excursion. No respiratory distress, no rhonchi, wheezing or rales.  Abdomen: Bowel sounds normal in all four quadrants. Soft,nontender   Skin: Warm, Dry   Genitalia: Blood around the penile urethra.  Extremities: Peripheral pulses 4/4 No edema, No tenderness  Musculoskeletal: Normal range of motion in all major joints.  Neurologic: Alert & oriented x 3, Normal motor function, Normal sensory function  Psychiatric: Affect normal, Judgment normal, Mood normal.     Results for orders placed or performed during the hospital encounter of 08/16/21   URINALYSIS (UA)    Specimen: Urine   Result Value Ref Range    Color Yellow     Character Hazy (A)     Specific Gravity 1.015 <1.035    Ph 6.0 5.0 - 8.0    Glucose >=1000 (A) Negative mg/dL    Ketones Negative Negative mg/dL    Protein Negative Negative  mg/dL    Bilirubin Negative Negative    Nitrite Negative Negative    Leukocyte Esterase Moderate (A) Negative    Occult Blood Large (A) Negative    Micro Urine Req Microscopic    URINE MICROSCOPIC (W/UA)   Result Value Ref Range    WBC  (A) /hpf    RBC 10-20 (A) /hpf    Bacteria Few (A) None /hpf    Epithelial Cells Rare Few /hpf    Mucous Threads Few /hpf    Urine Crystals Few Amorphous /hpf         COURSE & MEDICAL DECISION MAKING  Pertinent Labs & Imaging studies reviewed. (See chart for details)  Sher catheter was replaced and there was good urine flow.  His urinalysis was sent to the lab which reveals greater than 100 red cells and 10-20 white cells.  I spoke with pharmacy who recommended that we give the patient fosfomycin because he has .  He will be treated with the antibiotics and sent home via ambulance to follow-up with his primary care doctor in 1 week for urine recheck.  He is discharged in stable and improved condition.    FINAL IMPRESSION  1.  Sher catheter displacement with replacement  2.  Urinary tract infection  3.         Electronically signed by: Rhea León D.O., 8/16/2021 11:05 PMED Provider Note

## 2021-08-17 NOTE — DISCHARGE INSTRUCTIONS
Make sure you are drinking plenty of fluids cranberry juice for the next 3 days.  Follow-up with your doctor for urine recheck in 1 week and return if problems

## 2021-08-17 NOTE — ED NOTES
Transport arrived at bedside to transport patient home. Patient's wife Mercedes called for ETA per her request. Patient able to stand and ambulate approx 3 feet from ED gurney to wheelchair. Urethral catheter remains in place. PIV removed. AVS reviewed with patient and signed by patient.

## 2021-08-17 NOTE — ED NOTES
Patient medicated with PO antibiotic. Patient updated on plan of care and that we are awaiting transportation. Safety precautions are in place including gurney locked and in lowest position, call light within reach.

## 2021-08-17 NOTE — ED NOTES
16 Fr urethral catheter inserted. UA sample collected and sent to lab. Adequate drainage to standard drainage bag.

## 2021-08-17 NOTE — ED NOTES
Discharge instructions reviewed with patient. AVS signed by patient. Awaiting transport home. Patient updated on plan of care.     No new prescriptions. Patient understands need for follow-up appointment with healthcare team and to return to ED for worsening symptoms. All questions answered at this time. Patient in stable condition with no signs of distress. Patient agreeable to discharge instructions.

## 2021-08-20 ENCOUNTER — APPOINTMENT (OUTPATIENT)
Dept: RADIOLOGY | Facility: MEDICAL CENTER | Age: 86
End: 2021-08-20
Attending: EMERGENCY MEDICINE
Payer: MEDICARE

## 2021-08-20 ENCOUNTER — HOSPITAL ENCOUNTER (OUTPATIENT)
Facility: MEDICAL CENTER | Age: 86
End: 2021-08-23
Attending: EMERGENCY MEDICINE | Admitting: HOSPITALIST
Payer: MEDICARE

## 2021-08-20 DIAGNOSIS — N39.0 URINARY TRACT INFECTION ASSOCIATED WITH INDWELLING URETHRAL CATHETER, INITIAL ENCOUNTER (HCC): ICD-10-CM

## 2021-08-20 DIAGNOSIS — T83.511A URINARY TRACT INFECTION ASSOCIATED WITH INDWELLING URETHRAL CATHETER, INITIAL ENCOUNTER (HCC): ICD-10-CM

## 2021-08-20 DIAGNOSIS — R29.6 MULTIPLE FALLS: ICD-10-CM

## 2021-08-20 DIAGNOSIS — S70.02XA CONTUSION OF LEFT HIP, INITIAL ENCOUNTER: ICD-10-CM

## 2021-08-20 DIAGNOSIS — R29.6 RECURRENT FALLS: ICD-10-CM

## 2021-08-20 LAB
ALBUMIN SERPL BCP-MCNC: 3.5 G/DL (ref 3.2–4.9)
ALBUMIN/GLOB SERPL: 0.9 G/DL
ALP SERPL-CCNC: 85 U/L (ref 30–99)
ALT SERPL-CCNC: 11 U/L (ref 2–50)
ANION GAP SERPL CALC-SCNC: 14 MMOL/L (ref 7–16)
APPEARANCE UR: ABNORMAL
AST SERPL-CCNC: 11 U/L (ref 12–45)
BACTERIA #/AREA URNS HPF: ABNORMAL /HPF
BASOPHILS # BLD AUTO: 0.4 % (ref 0–1.8)
BASOPHILS # BLD: 0.05 K/UL (ref 0–0.12)
BILIRUB SERPL-MCNC: 0.5 MG/DL (ref 0.1–1.5)
BILIRUB UR QL STRIP.AUTO: NEGATIVE
BUN SERPL-MCNC: 16 MG/DL (ref 8–22)
CALCIUM SERPL-MCNC: 9.3 MG/DL (ref 8.4–10.2)
CHLORIDE SERPL-SCNC: 98 MMOL/L (ref 96–112)
CO2 SERPL-SCNC: 25 MMOL/L (ref 20–33)
COLOR UR: ABNORMAL
CREAT SERPL-MCNC: 1.12 MG/DL (ref 0.5–1.4)
EOSINOPHIL # BLD AUTO: 0.03 K/UL (ref 0–0.51)
EOSINOPHIL NFR BLD: 0.3 % (ref 0–6.9)
ERYTHROCYTE [DISTWIDTH] IN BLOOD BY AUTOMATED COUNT: 46 FL (ref 35.9–50)
GLOBULIN SER CALC-MCNC: 3.8 G/DL (ref 1.9–3.5)
GLUCOSE BLD-MCNC: 158 MG/DL (ref 65–99)
GLUCOSE BLD-MCNC: 271 MG/DL (ref 65–99)
GLUCOSE SERPL-MCNC: 191 MG/DL (ref 65–99)
GLUCOSE UR STRIP.AUTO-MCNC: 500 MG/DL
HCT VFR BLD AUTO: 33.6 % (ref 42–52)
HGB BLD-MCNC: 11 G/DL (ref 14–18)
IMM GRANULOCYTES # BLD AUTO: 0.07 K/UL (ref 0–0.11)
IMM GRANULOCYTES NFR BLD AUTO: 0.6 % (ref 0–0.9)
KETONES UR STRIP.AUTO-MCNC: NEGATIVE MG/DL
LEUKOCYTE ESTERASE UR QL STRIP.AUTO: ABNORMAL
LYMPHOCYTES # BLD AUTO: 1.86 K/UL (ref 1–4.8)
LYMPHOCYTES NFR BLD: 16.4 % (ref 22–41)
MCH RBC QN AUTO: 29.9 PG (ref 27–33)
MCHC RBC AUTO-ENTMCNC: 32.7 G/DL (ref 33.7–35.3)
MCV RBC AUTO: 91.3 FL (ref 81.4–97.8)
MICRO URNS: ABNORMAL
MONOCYTES # BLD AUTO: 0.97 K/UL (ref 0–0.85)
MONOCYTES NFR BLD AUTO: 8.6 % (ref 0–13.4)
MUCOUS THREADS #/AREA URNS HPF: ABNORMAL /HPF
NEUTROPHILS # BLD AUTO: 8.35 K/UL (ref 1.82–7.42)
NEUTROPHILS NFR BLD: 73.7 % (ref 44–72)
NITRITE UR QL STRIP.AUTO: NEGATIVE
NRBC # BLD AUTO: 0 K/UL
NRBC BLD-RTO: 0 /100 WBC
PH UR STRIP.AUTO: 7 [PH] (ref 5–8)
PLATELET # BLD AUTO: 272 K/UL (ref 164–446)
PMV BLD AUTO: 9.8 FL (ref 9–12.9)
POTASSIUM SERPL-SCNC: 3.5 MMOL/L (ref 3.6–5.5)
PROT SERPL-MCNC: 7.3 G/DL (ref 6–8.2)
PROT UR QL STRIP: 30 MG/DL
RBC # BLD AUTO: 3.68 M/UL (ref 4.7–6.1)
RBC # URNS HPF: ABNORMAL /HPF
RBC UR QL AUTO: ABNORMAL
SODIUM SERPL-SCNC: 137 MMOL/L (ref 135–145)
SP GR UR STRIP.AUTO: 1.01
UNIDENT CRYS URNS QL MICRO: ABNORMAL /HPF
WBC # BLD AUTO: 11.3 K/UL (ref 4.8–10.8)
WBC #/AREA URNS HPF: ABNORMAL /HPF
YEAST #/AREA URNS HPF: ABNORMAL /HPF

## 2021-08-20 PROCEDURE — 96374 THER/PROPH/DIAG INJ IV PUSH: CPT

## 2021-08-20 PROCEDURE — 99220 PR INITIAL OBSERVATION CARE,LEVL III: CPT | Performed by: HOSPITALIST

## 2021-08-20 PROCEDURE — 87186 SC STD MICRODIL/AGAR DIL: CPT

## 2021-08-20 PROCEDURE — 81001 URINALYSIS AUTO W/SCOPE: CPT

## 2021-08-20 PROCEDURE — 96366 THER/PROPH/DIAG IV INF ADDON: CPT | Mod: XU

## 2021-08-20 PROCEDURE — 82962 GLUCOSE BLOOD TEST: CPT | Mod: 91

## 2021-08-20 PROCEDURE — G0378 HOSPITAL OBSERVATION PER HR: HCPCS

## 2021-08-20 PROCEDURE — A9270 NON-COVERED ITEM OR SERVICE: HCPCS | Performed by: HOSPITALIST

## 2021-08-20 PROCEDURE — 96375 TX/PRO/DX INJ NEW DRUG ADDON: CPT

## 2021-08-20 PROCEDURE — 85025 COMPLETE CBC W/AUTO DIFF WBC: CPT

## 2021-08-20 PROCEDURE — 700111 HCHG RX REV CODE 636 W/ 250 OVERRIDE (IP): Performed by: HOSPITALIST

## 2021-08-20 PROCEDURE — 71045 X-RAY EXAM CHEST 1 VIEW: CPT

## 2021-08-20 PROCEDURE — 99285 EMERGENCY DEPT VISIT HI MDM: CPT

## 2021-08-20 PROCEDURE — 87086 URINE CULTURE/COLONY COUNT: CPT

## 2021-08-20 PROCEDURE — 94760 N-INVAS EAR/PLS OXIMETRY 1: CPT

## 2021-08-20 PROCEDURE — 700102 HCHG RX REV CODE 250 W/ 637 OVERRIDE(OP): Performed by: HOSPITALIST

## 2021-08-20 PROCEDURE — 303105 HCHG CATHETER EXTRA

## 2021-08-20 PROCEDURE — 87077 CULTURE AEROBIC IDENTIFY: CPT

## 2021-08-20 PROCEDURE — 96365 THER/PROPH/DIAG IV INF INIT: CPT

## 2021-08-20 PROCEDURE — 72170 X-RAY EXAM OF PELVIS: CPT

## 2021-08-20 PROCEDURE — 70450 CT HEAD/BRAIN W/O DYE: CPT | Mod: ME

## 2021-08-20 PROCEDURE — 51702 INSERT TEMP BLADDER CATH: CPT

## 2021-08-20 PROCEDURE — 700111 HCHG RX REV CODE 636 W/ 250 OVERRIDE (IP): Performed by: EMERGENCY MEDICINE

## 2021-08-20 PROCEDURE — 96372 THER/PROPH/DIAG INJ SC/IM: CPT

## 2021-08-20 PROCEDURE — 73552 X-RAY EXAM OF FEMUR 2/>: CPT | Mod: LT

## 2021-08-20 PROCEDURE — 700105 HCHG RX REV CODE 258: Performed by: HOSPITALIST

## 2021-08-20 PROCEDURE — 80053 COMPREHEN METABOLIC PANEL: CPT

## 2021-08-20 RX ORDER — CEFTRIAXONE 2 G/1
2 INJECTION, POWDER, FOR SOLUTION INTRAMUSCULAR; INTRAVENOUS ONCE
Status: COMPLETED | OUTPATIENT
Start: 2021-08-20 | End: 2021-08-20

## 2021-08-20 RX ORDER — ONDANSETRON 4 MG/1
4 TABLET, ORALLY DISINTEGRATING ORAL EVERY 4 HOURS PRN
Status: DISCONTINUED | OUTPATIENT
Start: 2021-08-20 | End: 2021-08-23 | Stop reason: HOSPADM

## 2021-08-20 RX ORDER — ACETAMINOPHEN 325 MG/1
650 TABLET ORAL EVERY 6 HOURS PRN
Status: DISCONTINUED | OUTPATIENT
Start: 2021-08-20 | End: 2021-08-23 | Stop reason: HOSPADM

## 2021-08-20 RX ORDER — HEPARIN SODIUM 5000 [USP'U]/ML
5000 INJECTION, SOLUTION INTRAVENOUS; SUBCUTANEOUS EVERY 8 HOURS
Status: DISCONTINUED | OUTPATIENT
Start: 2021-08-21 | End: 2021-08-23 | Stop reason: HOSPADM

## 2021-08-20 RX ORDER — POLYVINYL ALCOHOL 14 MG/ML
1 SOLUTION/ DROPS OPHTHALMIC
Status: DISCONTINUED | OUTPATIENT
Start: 2021-08-20 | End: 2021-08-23 | Stop reason: HOSPADM

## 2021-08-20 RX ORDER — CYCLOSPORINE 0.5 MG/ML
1 EMULSION OPHTHALMIC DAILY
Status: DISCONTINUED | OUTPATIENT
Start: 2021-08-20 | End: 2021-08-20

## 2021-08-20 RX ORDER — BISACODYL 10 MG
10 SUPPOSITORY, RECTAL RECTAL
Status: DISCONTINUED | OUTPATIENT
Start: 2021-08-20 | End: 2021-08-23 | Stop reason: HOSPADM

## 2021-08-20 RX ORDER — FINASTERIDE 5 MG/1
5 TABLET, FILM COATED ORAL DAILY
Status: DISCONTINUED | OUTPATIENT
Start: 2021-08-21 | End: 2021-08-21

## 2021-08-20 RX ORDER — ONDANSETRON 2 MG/ML
4 INJECTION INTRAMUSCULAR; INTRAVENOUS ONCE
Status: COMPLETED | OUTPATIENT
Start: 2021-08-20 | End: 2021-08-20

## 2021-08-20 RX ORDER — MORPHINE SULFATE 4 MG/ML
4 INJECTION, SOLUTION INTRAMUSCULAR; INTRAVENOUS ONCE
Status: COMPLETED | OUTPATIENT
Start: 2021-08-20 | End: 2021-08-20

## 2021-08-20 RX ORDER — POLYETHYLENE GLYCOL 3350 17 G/17G
1 POWDER, FOR SOLUTION ORAL
Status: DISCONTINUED | OUTPATIENT
Start: 2021-08-20 | End: 2021-08-23 | Stop reason: HOSPADM

## 2021-08-20 RX ORDER — ONDANSETRON 2 MG/ML
4 INJECTION INTRAMUSCULAR; INTRAVENOUS EVERY 4 HOURS PRN
Status: DISCONTINUED | OUTPATIENT
Start: 2021-08-20 | End: 2021-08-23 | Stop reason: HOSPADM

## 2021-08-20 RX ORDER — LEVOTHYROXINE SODIUM 0.05 MG/1
25 TABLET ORAL
Status: DISCONTINUED | OUTPATIENT
Start: 2021-08-21 | End: 2021-08-23 | Stop reason: HOSPADM

## 2021-08-20 RX ORDER — CYCLOSPORINE 0.5 MG/ML
1 EMULSION OPHTHALMIC DAILY
Status: DISCONTINUED | OUTPATIENT
Start: 2021-08-21 | End: 2021-08-20

## 2021-08-20 RX ORDER — AMOXICILLIN 250 MG
2 CAPSULE ORAL 2 TIMES DAILY
Status: DISCONTINUED | OUTPATIENT
Start: 2021-08-20 | End: 2021-08-23 | Stop reason: HOSPADM

## 2021-08-20 RX ORDER — GABAPENTIN 100 MG/1
100 CAPSULE ORAL 2 TIMES DAILY
Status: DISCONTINUED | OUTPATIENT
Start: 2021-08-20 | End: 2021-08-23 | Stop reason: HOSPADM

## 2021-08-20 RX ORDER — SODIUM CHLORIDE 9 MG/ML
INJECTION, SOLUTION INTRAVENOUS CONTINUOUS
Status: DISCONTINUED | OUTPATIENT
Start: 2021-08-20 | End: 2021-08-23 | Stop reason: HOSPADM

## 2021-08-20 RX ORDER — DEXTROSE MONOHYDRATE 25 G/50ML
50 INJECTION, SOLUTION INTRAVENOUS
Status: DISCONTINUED | OUTPATIENT
Start: 2021-08-20 | End: 2021-08-23 | Stop reason: HOSPADM

## 2021-08-20 RX ADMIN — INSULIN GLARGINE 10 UNITS: 100 INJECTION, SOLUTION SUBCUTANEOUS at 17:56

## 2021-08-20 RX ADMIN — CEFTRIAXONE SODIUM 2 G: 2 INJECTION, POWDER, FOR SOLUTION INTRAMUSCULAR; INTRAVENOUS at 15:47

## 2021-08-20 RX ADMIN — PIPERACILLIN AND TAZOBACTAM 4.5 G: 4; .5 INJECTION, POWDER, LYOPHILIZED, FOR SOLUTION INTRAVENOUS; PARENTERAL at 20:36

## 2021-08-20 RX ADMIN — INSULIN HUMAN 2 UNITS: 100 INJECTION, SOLUTION PARENTERAL at 17:54

## 2021-08-20 RX ADMIN — METOPROLOL TARTRATE 12.5 MG: 25 TABLET, FILM COATED ORAL at 17:49

## 2021-08-20 RX ADMIN — INSULIN HUMAN 5 UNITS: 100 INJECTION, SOLUTION PARENTERAL at 20:33

## 2021-08-20 RX ADMIN — PIPERACILLIN AND TAZOBACTAM 4.5 G: 4; .5 INJECTION, POWDER, LYOPHILIZED, FOR SOLUTION INTRAVENOUS; PARENTERAL at 17:48

## 2021-08-20 RX ADMIN — MORPHINE SULFATE 4 MG: 4 INJECTION INTRAVENOUS at 14:37

## 2021-08-20 RX ADMIN — ONDANSETRON 4 MG: 2 INJECTION INTRAMUSCULAR; INTRAVENOUS at 14:37

## 2021-08-20 RX ADMIN — SODIUM CHLORIDE: 9 INJECTION, SOLUTION INTRAVENOUS at 17:25

## 2021-08-20 ASSESSMENT — COGNITIVE AND FUNCTIONAL STATUS - GENERAL
MOBILITY SCORE: 15
WALKING IN HOSPITAL ROOM: A LOT
MOVING FROM LYING ON BACK TO SITTING ON SIDE OF FLAT BED: A LITTLE
MOVING TO AND FROM BED TO CHAIR: A LITTLE
HELP NEEDED FOR BATHING: A LITTLE
PERSONAL GROOMING: A LITTLE
CLIMB 3 TO 5 STEPS WITH RAILING: A LOT
DRESSING REGULAR UPPER BODY CLOTHING: A LITTLE
SUGGESTED CMS G CODE MODIFIER DAILY ACTIVITY: CK
TURNING FROM BACK TO SIDE WHILE IN FLAT BAD: A LITTLE
DAILY ACTIVITIY SCORE: 19
DRESSING REGULAR LOWER BODY CLOTHING: A LITTLE
TOILETING: A LITTLE
SUGGESTED CMS G CODE MODIFIER MOBILITY: CK
STANDING UP FROM CHAIR USING ARMS: A LOT

## 2021-08-20 ASSESSMENT — PATIENT HEALTH QUESTIONNAIRE - PHQ9
SUM OF ALL RESPONSES TO PHQ QUESTIONS 1-9: 4
7. TROUBLE CONCENTRATING ON THINGS, SUCH AS READING THE NEWSPAPER OR WATCHING TELEVISION: NOT AT ALL
6. FEELING BAD ABOUT YOURSELF - OR THAT YOU ARE A FAILURE OR HAVE LET YOURSELF OR YOUR FAMILY DOWN: NOT AL ALL
2. FEELING DOWN, DEPRESSED, IRRITABLE, OR HOPELESS: SEVERAL DAYS
8. MOVING OR SPEAKING SO SLOWLY THAT OTHER PEOPLE COULD HAVE NOTICED. OR THE OPPOSITE, BEING SO FIGETY OR RESTLESS THAT YOU HAVE BEEN MOVING AROUND A LOT MORE THAN USUAL: NOT AT ALL
5. POOR APPETITE OR OVEREATING: NOT AT ALL
1. LITTLE INTEREST OR PLEASURE IN DOING THINGS: SEVERAL DAYS
9. THOUGHTS THAT YOU WOULD BE BETTER OFF DEAD, OR OF HURTING YOURSELF: NOT AT ALL
3. TROUBLE FALLING OR STAYING ASLEEP OR SLEEPING TOO MUCH: SEVERAL DAYS
SUM OF ALL RESPONSES TO PHQ9 QUESTIONS 1 AND 2: 2
4. FEELING TIRED OR HAVING LITTLE ENERGY: SEVERAL DAYS

## 2021-08-20 ASSESSMENT — LIFESTYLE VARIABLES
ON A TYPICAL DAY WHEN YOU DRINK ALCOHOL HOW MANY DRINKS DO YOU HAVE: 0
ALCOHOL_USE: NO
TOTAL SCORE: 0
EVER HAD A DRINK FIRST THING IN THE MORNING TO STEADY YOUR NERVES TO GET RID OF A HANGOVER: NO
HAVE PEOPLE ANNOYED YOU BY CRITICIZING YOUR DRINKING: NO
TOTAL SCORE: 0
HAVE YOU EVER FELT YOU SHOULD CUT DOWN ON YOUR DRINKING: NO
HOW MANY TIMES IN THE PAST YEAR HAVE YOU HAD 5 OR MORE DRINKS IN A DAY: 0
EVER FELT BAD OR GUILTY ABOUT YOUR DRINKING: NO
AVERAGE NUMBER OF DAYS PER WEEK YOU HAVE A DRINK CONTAINING ALCOHOL: 0
CONSUMPTION TOTAL: NEGATIVE
TOTAL SCORE: 0

## 2021-08-20 ASSESSMENT — ENCOUNTER SYMPTOMS
PALPITATIONS: 0
SORE THROAT: 0
DOUBLE VISION: 0
FEVER: 0
SHORTNESS OF BREATH: 0
BLURRED VISION: 0
NAUSEA: 0
DIZZINESS: 0
ABDOMINAL PAIN: 0
BACK PAIN: 0
HEARTBURN: 0
CHILLS: 0
LOSS OF CONSCIOUSNESS: 0
FALLS: 1
SPUTUM PRODUCTION: 0
HEADACHES: 0
COUGH: 0

## 2021-08-20 ASSESSMENT — PAIN DESCRIPTION - PAIN TYPE
TYPE: ACUTE PAIN

## 2021-08-20 ASSESSMENT — FIBROSIS 4 INDEX: FIB4 SCORE: 1.08

## 2021-08-20 NOTE — ASSESSMENT & PLAN NOTE
On 38U insulin at home, unsure of diet and intake  Lantus 10U with ISS, increase insulin as tolerated

## 2021-08-20 NOTE — ED TRIAGE NOTES
"Chief Complaint   Patient presents with   • GLF     slipped on water at home, hit his hip on the counter, no LOC   • Hip Pain     L hip, 3/10 pain     /80   Pulse 73   Ht 1.702 m (5' 7\")   Wt 70.5 kg (155 lb 6.8 oz)   SpO2 95%   BMI 24.34 kg/m²       Pt brought in via care flight team after GLF at home after slipping in some water. Pt states he hit his L hip on the counter then fell to the ground. States he hit his head but denies LOC and recall the entire event. Deines pain to any area of the body other than his hip. Chronic wilson in place.   "

## 2021-08-20 NOTE — ED PROVIDER NOTES
ED Provider Note    Scribed for Lesa Stoddard M.D. by Yessy Bernardo. 2021  2:38 PM    Primary care provider: Danis Lehman M.D.  Means of arrival: EMS  History obtained from: Patient  History limited by: None    CHIEF COMPLAINT  Chief Complaint   Patient presents with    GLF     slipped on water at home, hit his hip on the counter, no LOC    Hip Pain     L hip, 3/10 pain       HPI  Julien Dao is a 89 y.o. male who presents to the Emergency Department via EMS for moderate left hip pain following a ground level fall that occurred earlier today. Per the patient, he was walking through his kitchen using his walker when he slipped on a puddle of water and fell; he did not hit his head or lose consciousness after the event.The patient reports additional symptoms of weakness, and denies chest pain or abdominal pain. No other exacerbating or alleviating factors were noted. He also denies alcohol use or cigarette use. He is on blood thinners. He lives with his wife at home.     REVIEW OF SYSTEMS  CARDIAC: no chest pain  GI: no abdominal pain   Neuro: Weakness, no head trauma or loss of consciousness  Musculoskeletal: left hip pain  See history of present illness. All other systems are negative. C.    PAST MEDICAL HISTORY   has a past medical history of KOSTAS (acute kidney injury) ([facility]) (2021), CAD (coronary artery disease), Delirium (2020), DM (diabetes mellitus) ([facility]) (2014), Falls, Hypertension, and UTI (urinary tract infection) (2018).    SURGICAL HISTORY   has a past surgical history that includes other cardiac surgery; coronary artery bypass, 3; and janice rectal abscess.    SOCIAL HISTORY  Social History     Tobacco Use    Smoking status: Former Smoker     Quit date: 10/19/1972     Years since quittin.8    Smokeless tobacco: Never Used   Vaping Use    Vaping Use: Never used   Substance Use Topics    Alcohol use: Not Currently    Drug use: No      Social History     Substance and Sexual  "Activity   Drug Use No       FAMILY HISTORY  None pertinent    CURRENT MEDICATIONS  Current Outpatient Medications   Medication Instructions    amLODIPine (NORVASC) 5 mg, Oral, DAILY    Carboxymethylcellulose Sodium (RETAINE [facility] OP) 1 Drop, Both Eyes, DAILY    cyclosporin (RESTASIS) 0.05 % ophthalmic emulsion 1 Drop, Both Eyes, DAILY    finasteride (PROSCAR) 5 mg, Oral, DAILY    gabapentin (NEURONTIN) 100 mg, Oral, 2 TIMES DAILY    insulin glargine 38 Units, Subcutaneous, EVERY EVENING    levothyroxine (SYNTHROID) 25 mcg, Oral, EACH MORNING ON EMPTY STOMACH    metoprolol tartrate (LOPRESSOR) 12.5 mg, Oral, 2 TIMES DAILY    Multiple Vitamins-Minerals (PX MENS MULTIVITAMINS) Tab 1 Tablet, Oral, EVERY MORNING    nystatin (MYCOSTATIN) powder 1 Application, Topical, 3 TIMES DAILY, Apply's on penis    nystatin/triamcinolone (MYCOLOG) 080721-5.1 UNIT/GM-% Cream 1 Application, Topical, 2 TIMES DAILY, Apply's on left leg    phenazopyridine (PYRIDIUM) 200 mg, Oral, 3 times daily, Pt started 7/28/2021 for 3 day course    Polyethyl Glycol-Propyl Glycol (SYSTANE OP) 1 Drop, Ophthalmic, DAILY    potassium chloride SA (KDUR) 20 MEQ Tab CR 40 mEq, Oral, 2 TIMES DAILY    simvastatin (ZOCOR) 10 mg, Oral, NIGHTLY    tamsulosin (FLOMAX) 0.4 mg, Oral, EVERY MORNING     ALLERGIES  Allergies   Allergen Reactions    Iodine Anaphylaxis     Pt and pts wife report that it has been so long not sure what happens       PHYSICAL EXAM  VITAL SIGNS: /80   Pulse 73   Ht 1.702 m (5' 7\")   Wt 70.5 kg (155 lb 6.8 oz)   SpO2 95%   BMI 24.34 kg/m²     Constitutional: Well developed, Well nourished, No acute distress, Non-toxic appearance.   HEENT: Normocephalic, Atraumatic, No scalp hematoma, external ears normal, pharynx pink,  Mucous  Membranes moist, No rhinorrhea or mucosal edema  Eyes: PERRL, EOMI, Conjunctiva normal, No discharge.   Neck: Normal range of motion, No tenderness, Supple, No stridor.   Lymphatic: No lymphadenopathy  "   Cardiovascular: Regular Rate, Irregularly irregular Rhythm, No murmurs,  rubs, or gallops.   Thorax & Lungs: Lungs clear to auscultation bilaterally, No respiratory distress, No wheezes, rhales or rhonchi, No chest wall tenderness.   Abdomen: Bowel sounds normal, Soft, non tender, non distended,  No pulsatile masses., no rebound guarding or peritoneal signs, indwelling wilson,   Skin: Warm, Dry, No erythema, No rash,   Back:  No CVA tenderness,  No spinal tenderness, bony crepitance, step offs, or instability.   Neurologic: Alert & oriented clear speech no focal deficits  Extremities: Able to move his hips and his legs, Neurovascularly intact distally, Equal, intact distal pulses, No cyanosis, clubbing or edema,  No tenderness.   Musculoskeletal: Good range of motion in all major joints. No tenderness to palpation or major deformities noted.       DIAGNOSTIC STUDIES / PROCEDURES    LABS  Results for orders placed or performed during the hospital encounter of 08/20/21   CBC w/ Differential   Result Value Ref Range    WBC 11.3 (H) 4.8 - 10.8 K/uL    RBC 3.68 (L) 4.70 - 6.10 M/uL    Hemoglobin 11.0 (L) 14.0 - 18.0 g/dL    Hematocrit 33.6 (L) 42.0 - 52.0 %    MCV 91.3 81.4 - 97.8 fL    MCH 29.9 27.0 - 33.0 pg    MCHC 32.7 (L) 33.7 - 35.3 g/dL    RDW 46.0 35.9 - 50.0 fL    Platelet Count 272 164 - 446 K/uL    MPV 9.8 9.0 - 12.9 fL    Neutrophils-Polys 73.70 (H) 44.00 - 72.00 %    Lymphocytes 16.40 (L) 22.00 - 41.00 %    Monocytes 8.60 0.00 - 13.40 %    Eosinophils 0.30 0.00 - 6.90 %    Basophils 0.40 0.00 - 1.80 %    Immature Granulocytes 0.60 0.00 - 0.90 %    Nucleated RBC 0.00 /100 WBC    Neutrophils (Absolute) 8.35 (H) 1.82 - 7.42 K/uL    Lymphs (Absolute) 1.86 1.00 - 4.80 K/uL    Monos (Absolute) 0.97 (H) 0.00 - 0.85 K/uL    Eos (Absolute) 0.03 0.00 - 0.51 K/uL    Baso (Absolute) 0.05 0.00 - 0.12 K/uL    Immature Granulocytes (abs) 0.07 0.00 - 0.11 K/uL    NRBC (Absolute) 0.00 K/uL   Complete Metabolic Panel (CMP)    Result Value Ref Range    Sodium 137 135 - 145 mmol/L    Potassium 3.5 (L) 3.6 - 5.5 mmol/L    Chloride 98 96 - 112 mmol/L    Co2 25 20 - 33 mmol/L    Anion Gap 14.0 7.0 - 16.0    Glucose 191 (H) 65 - 99 mg/dL    Bun 16 8 - 22 mg/dL    Creatinine 1.12 0.50 - 1.40 mg/dL    Calcium 9.3 8.4 - 10.2 mg/dL    AST(SGOT) 11 (L) 12 - 45 U/L    ALT(SGPT) 11 2 - 50 U/L    Alkaline Phosphatase 85 30 - 99 U/L    Total Bilirubin 0.5 0.1 - 1.5 mg/dL    Albumin 3.5 3.2 - 4.9 g/dL    Total Protein 7.3 6.0 - 8.2 g/dL    Globulin 3.8 (H) 1.9 - 3.5 g/dL    A-G Ratio 0.9 g/dL   URINALYSIS,CULTURE IF INDICATED    Specimen: Urine, Cath   Result Value Ref Range    Color Dark Yellow     Character Cloudy (A)     Specific Gravity 1.015 <1.035    Ph 7.0 5.0 - 8.0    Glucose 500 (A) Negative mg/dL    Ketones Negative Negative mg/dL    Protein 30 (A) Negative mg/dL    Bilirubin Negative Negative    Nitrite Negative Negative    Leukocyte Esterase Moderate (A) Negative    Occult Blood Large (A) Negative    Micro Urine Req Microscopic    URINE MICROSCOPIC (W/UA)   Result Value Ref Range    -150 (A) /hpf    RBC  (A) /hpf    Bacteria Moderate (A) None /hpf    Mucous Threads Few /hpf    Urine Crystals Few Amorphous /hpf    Yeast Cells Few (A) None /hpf   ESTIMATED GFR   Result Value Ref Range    GFR If African American >60 >60 mL/min/1.73 m 2    GFR If Non African American >60 >60 mL/min/1.73 m 2   URINE CULTURE(NEW)    Specimen: Urine   Result Value Ref Range    Significant Indicator NEG     Source UR     Site -     Culture Result -      All labs reviewed by me.    RADIOLOGY  DX-FEMUR-2+ LEFT   Final Result      No radiographic evidence of acute traumatic injury.      DX-CHEST-LIMITED (1 VIEW)   Final Result      Cardiomegaly.      DX-PELVIS-1 OR 2 VIEWS   Final Result      No evidence of fracture or dislocation.      CT-HEAD W/O   Final Result         1. No acute intracranial findings. No evidence of acute intracranial hemorrhage or  mass lesion.      2. White matter lucencies most consistent with chronic small vessel ischemic change.                 The radiologist's interpretation of all radiological studies have been reviewed by me.    COURSE & MEDICAL DECISION MAKING  Nursing notes, VS, PMSFHx reviewed in chart.    2:38 PM Patient seen and examined at bedside. Patient will be treated with morphine 4 mg and Zofran 4 mg. Ordered DX-pelvis, DX-femur left, DX-chest, CT-head w/o, UA culture if indicated, CBC w/diff, CMP, Prothrombin (PT/INR), and APTT to evaluate his symptoms. The differential diagnoses include but are not limited to: hip contusion, deconditioning, UTI, hip fracture, ICH    3:41 PM Patient treated with Rocephin 2 g.     3:45 PM I discussed the patient's case and the above findings with Dr. Tim (Hospitalist) who agreed to hospitalize the patient    3:49 PM Patient re-evaluated at bedside. Discussed the patient's condition and treatment plan, including my plans for admission given patient's labs are indicative of a UTI. Patient's labs and radiology results discussed. The patient understood and is in agreement. At this time, the patient was given the opportunity to ask questions.       DISPOSITION:  Patient will be hospitalized by Dr. Tim in guarded condition.    FINAL IMPRESSION  1. Urinary tract infection associated with indwelling urethral catheter, initial encounter ([facility])    2. Multiple falls    3. Contusion of left hip, initial encounter          Yessy ROWLAND (Scribjim), am scribing for, and in the presence of, Lesa Stoddard M.D..    Electronically signed by: Yessy Bernardo (Scribe), 8/20/2021    Lesa ROWLAND M.D. personally performed the services described in this documentation, as scribed by Yessy Bernardo in my presence, and it is both accurate and complete.    C    The note accurately reflects work and decisions made by me.  Lesa Stoddard M.D.  8/20/2021  5:38 PM

## 2021-08-20 NOTE — H&P
Hospital Medicine History & Physical Note    Date of Service  8/20/2021    Primary Care Physician  Danis Lehman M.D.    Consultants  none    Specialist Names: NA    Code Status  DNAR/DNI    Chief Complaint  Chief Complaint   Patient presents with   • GLF     slipped on water at home, hit his hip on the counter, no LOC   • Hip Pain     L hip, 3/10 pain       History of Presenting Illness  Julien Dao is a 89 y.o. male with past medical history of CAD, diabetes mellitus, hypertension, recurrent UTI, chronic Sher catheter who presented 8/20/2021 with recurrent falls.  Patient has had 3 falls while using his walker today resulting in left hip pain.  He denies any lightheadedness, dizziness, fever, chills.  He states his appetite has been good and he has otherwise has been feeling in good health.  He denies any suprapubic pain or any urine discomfort that he states he usually has when he has a UTI.    In ER, patient noted to have elevated WBC with dirty UA and started on Rocephin.  Pelvic and hip imaging did not reveal any fracture or dislocation.  I discussed with ER nurse to change out Sher catheter.    I discussed the plan of care with patient and ERP.    Review of Systems  Review of Systems   Constitutional: Negative for chills and fever.   HENT: Negative for congestion, hearing loss and sore throat.    Eyes: Negative for blurred vision and double vision.   Respiratory: Negative for cough, sputum production and shortness of breath.    Cardiovascular: Negative for chest pain and palpitations.   Gastrointestinal: Negative for abdominal pain, heartburn and nausea.   Genitourinary: Negative for dysuria and urgency.   Musculoskeletal: Positive for falls and joint pain. Negative for back pain.   Skin: Negative for itching and rash.   Neurological: Negative for dizziness, loss of consciousness and headaches.   All other systems reviewed and are negative.      Past Medical History   has a past medical history of KOSTAS  (acute kidney injury) ([facility]) (6/14/2021), CAD (coronary artery disease), Delirium (2/21/2020), DM (diabetes mellitus) ([facility]) (1/7/2014), Falls, Hypertension, and UTI (urinary tract infection) (11/4/2018).    Surgical History   has a past surgical history that includes other cardiac surgery; coronary artery bypass, 3; and janice rectal abscess.     Family History  family history is not on file.   Family history reviewed with patient. There is no family history that is pertinent to the chief complaint.     Social History   reports that he quit smoking about 48 years ago. He has never used smokeless tobacco. He reports previous alcohol use. He reports that he does not use drugs.    Allergies  Allergies   Allergen Reactions   • Iodine Anaphylaxis     Pt and pts wife report that it has been so long not sure what happens       Medications  Prior to Admission Medications   Prescriptions Last Dose Informant Patient Reported? Taking?   Carboxymethylcellulose Sodium (RETAINE [facility] OP)  Significant Other Yes No   Sig: Administer 1 Drop into both eyes every day.   Multiple Vitamins-Minerals (PX MENS MULTIVITAMINS) Tab  Significant Other Yes No   Sig: Take 1 tablet by mouth every morning.   Polyethyl Glycol-Propyl Glycol (SYSTANE OP)  Significant Other Yes No   Sig: Administer 1 Drop into affected eye(s) every day.   amLODIPine (NORVASC) 5 MG Tab  Significant Other No No   Sig: Take 1 Tab by mouth every day.   cyclosporin (RESTASIS) 0.05 % ophthalmic emulsion  Significant Other Yes No   Sig: Administer 1 Drop into both eyes every day.   finasteride (PROSCAR) 5 MG Tab  Significant Other No No   Sig: Take 1 Tab by mouth every day.   gabapentin (NEURONTIN) 100 MG Cap  Significant Other Yes No   Sig: Take 100 mg by mouth 2 times a day.   insulin glargine (INSULIN GLARGINE) 100 UNIT/ML Solution Pen-injector injection  Significant Other Yes No   Sig: Inject 38 Units under the skin every evening.   levothyroxine (SYNTHROID) 25 MCG Tab   Significant Other No No   Sig: Take 1 tablet by mouth Every morning on an empty stomach.   metoprolol tartrate (LOPRESSOR) 25 MG Tab  Significant Other Yes No   Sig: Take 12.5 mg by mouth 2 times a day.   nystatin (MYCOSTATIN) powder  Significant Other Yes No   Sig: Apply 1 Application topically 3 times a day. Apply's on penis   nystatin/triamcinolone (MYCOLOG) 829650-2.1 UNIT/GM-% Cream  Significant Other Yes No   Sig: Apply 1 Application topically 2 times a day. Apply's on left leg   phenazopyridine (PYRIDIUM) 200 MG Tab  Significant Other Yes No   Sig: Take 200 mg by mouth in the morning, at noon, and at bedtime. Pt started 7/28/2021 for 3 day course   potassium chloride SA (KDUR) 20 MEQ Tab CR  Significant Other No No   Sig: Take 2 Tablets by mouth 2 times a day.   simvastatin (ZOCOR) 10 MG Tab  Significant Other Yes No   Sig: Take 10 mg by mouth every evening.   tamsulosin (FLOMAX) 0.4 MG capsule  Significant Other Yes No   Sig: Take 0.4 mg by mouth every morning.      Facility-Administered Medications: None       Physical Exam  Pulse:  [72-89] 84  BP: (120-160)/(56-80) 120/56  SpO2:  [86 %-98 %] 98 %    Physical Exam  Vitals and nursing note reviewed.   Constitutional:       Appearance: Normal appearance.   HENT:      Head: Normocephalic and atraumatic.      Right Ear: External ear normal.      Left Ear: External ear normal.      Nose: Nose normal.      Mouth/Throat:      Mouth: Mucous membranes are moist.      Pharynx: Oropharynx is clear.   Eyes:      Extraocular Movements: Extraocular movements intact.      Pupils: Pupils are equal, round, and reactive to light.   Cardiovascular:      Rate and Rhythm: Normal rate and regular rhythm.      Heart sounds: No murmur heard.     Pulmonary:      Effort: Pulmonary effort is normal. No respiratory distress.      Breath sounds: Normal breath sounds.   Abdominal:      General: Abdomen is flat. Bowel sounds are normal. There is no distension.      Palpations: Abdomen is  soft.      Tenderness: There is no abdominal tenderness.   Musculoskeletal:      Cervical back: Normal range of motion and neck supple.      Right lower leg: No edema.      Left lower leg: No edema.   Skin:     General: Skin is warm and dry.   Neurological:      General: No focal deficit present.      Mental Status: He is alert and oriented to person, place, and time.   Psychiatric:         Mood and Affect: Mood normal.         Behavior: Behavior normal.         Laboratory:  Recent Labs     08/20/21  1433   WBC 11.3*   RBC 3.68*   HEMOGLOBIN 11.0*   HEMATOCRIT 33.6*   MCV 91.3   MCH 29.9   MCHC 32.7*   RDW 46.0   PLATELETCT 272   MPV 9.8     Recent Labs     08/20/21  1433   SODIUM 137   POTASSIUM 3.5*   CHLORIDE 98   CO2 25   GLUCOSE 191*   BUN 16   CREATININE 1.12   CALCIUM 9.3     Recent Labs     08/20/21  1433   ALTSGPT 11   ASTSGOT 11*   ALKPHOSPHAT 85   TBILIRUBIN 0.5   GLUCOSE 191*         No results for input(s): NTPROBNP in the last 72 hours.      No results for input(s): TROPONINT in the last 72 hours.    Imaging:  DX-FEMUR-2+ LEFT   Final Result      No radiographic evidence of acute traumatic injury.      DX-CHEST-LIMITED (1 VIEW)   Final Result      Cardiomegaly.      DX-PELVIS-1 OR 2 VIEWS   Final Result      No evidence of fracture or dislocation.      CT-HEAD W/O   Final Result         1. No acute intracranial findings. No evidence of acute intracranial hemorrhage or mass lesion.      2. White matter lucencies most consistent with chronic small vessel ischemic change.                   X-Ray:  I have personally reviewed the images and compared with prior images.    Assessment/Plan:  I anticipate this patient is appropriate for observation status at this time.    * Acute cystitis with hematuria- (present on admission)  Assessment & Plan  H/o pseudomonal UTI  Started on Zosyn empirically  Follow UCx      Recurrent falls  Assessment & Plan  While using walker  PT/OT    KOSTAS (acute kidney injury) (HCC)-  (present on admission)  Assessment & Plan  Baseline creatinine of .70  Started on IVF  Repeat BMP in AM  Avoid nephrotoxins    BPH (benign prostatic hyperplasia)- (present on admission)  Assessment & Plan  On finasteride, flomax with chronic wilson  I question usefulness of flomax and finasteride in setting of chronic wilson placement    Diabetes mellitus with hyperglycemia (HCC)- (present on admission)  Assessment & Plan  On 38U insulin at home, unsure of diet and intake  Lantus 10U with ISS, increase insulin as tolerated    HTN (hypertension)- (present on admission)  Assessment & Plan  Hold amlodipine  Cont metoprolol      VTE prophylaxis: heparin ppx

## 2021-08-20 NOTE — ASSESSMENT & PLAN NOTE
On finasteride, flomax with chronic wilson  I question usefulness of flomax and finasteride in setting of chronic wilson placement- I d/w patient. Discontinued both.

## 2021-08-20 NOTE — ED NOTES
Pharmacy Medication Reconciliation    ~Medication Reconciliation partially completed per previous visit. Med rec trying to reach pt spouse (Mercedes) to verify current medications

## 2021-08-20 NOTE — ED NOTES
Spoke with Pt Wife Mercedes, updated her on current POC per pt request. No other needs at this time. Will continue to update Mercedes as care continues.

## 2021-08-20 NOTE — ED NOTES
Pt informed by MD of the need to be admitted to the hospital. PT acknowledged and asked us to inform his wife. Pts wife Mercedes informed of the need current POC and need for admission. Will update her with a room number when one if found. No other need at this time.

## 2021-08-20 NOTE — ASSESSMENT & PLAN NOTE
H/o pseudomonal UTI  Started on Zosyn empirically  Follow UCx  Sher changed in ER  Urine still cloudy

## 2021-08-21 LAB
ANION GAP SERPL CALC-SCNC: 11 MMOL/L (ref 7–16)
BUN SERPL-MCNC: 15 MG/DL (ref 8–22)
CALCIUM SERPL-MCNC: 8.6 MG/DL (ref 8.4–10.2)
CHLORIDE SERPL-SCNC: 101 MMOL/L (ref 96–112)
CO2 SERPL-SCNC: 24 MMOL/L (ref 20–33)
CREAT SERPL-MCNC: 0.93 MG/DL (ref 0.5–1.4)
ERYTHROCYTE [DISTWIDTH] IN BLOOD BY AUTOMATED COUNT: 47.1 FL (ref 35.9–50)
GLUCOSE BLD-MCNC: 165 MG/DL (ref 65–99)
GLUCOSE BLD-MCNC: 343 MG/DL (ref 65–99)
GLUCOSE SERPL-MCNC: 156 MG/DL (ref 65–99)
HCT VFR BLD AUTO: 38 % (ref 42–52)
HGB BLD-MCNC: 12.2 G/DL (ref 14–18)
MCH RBC QN AUTO: 29.7 PG (ref 27–33)
MCHC RBC AUTO-ENTMCNC: 32.1 G/DL (ref 33.7–35.3)
MCV RBC AUTO: 92.5 FL (ref 81.4–97.8)
PLATELET # BLD AUTO: 277 K/UL (ref 164–446)
PMV BLD AUTO: 10.3 FL (ref 9–12.9)
POTASSIUM SERPL-SCNC: 3.6 MMOL/L (ref 3.6–5.5)
RBC # BLD AUTO: 4.11 M/UL (ref 4.7–6.1)
SODIUM SERPL-SCNC: 136 MMOL/L (ref 135–145)
WBC # BLD AUTO: 11.4 K/UL (ref 4.8–10.8)

## 2021-08-21 PROCEDURE — 97162 PT EVAL MOD COMPLEX 30 MIN: CPT

## 2021-08-21 PROCEDURE — 96372 THER/PROPH/DIAG INJ SC/IM: CPT

## 2021-08-21 PROCEDURE — 94760 N-INVAS EAR/PLS OXIMETRY 1: CPT

## 2021-08-21 PROCEDURE — 99225 PR SUBSEQUENT OBSERVATION CARE,LEVEL II: CPT | Performed by: HOSPITALIST

## 2021-08-21 PROCEDURE — 82962 GLUCOSE BLOOD TEST: CPT | Mod: 91

## 2021-08-21 PROCEDURE — 700102 HCHG RX REV CODE 250 W/ 637 OVERRIDE(OP): Performed by: HOSPITALIST

## 2021-08-21 PROCEDURE — G0378 HOSPITAL OBSERVATION PER HR: HCPCS

## 2021-08-21 PROCEDURE — 700105 HCHG RX REV CODE 258: Performed by: HOSPITALIST

## 2021-08-21 PROCEDURE — 36415 COLL VENOUS BLD VENIPUNCTURE: CPT

## 2021-08-21 PROCEDURE — 97165 OT EVAL LOW COMPLEX 30 MIN: CPT

## 2021-08-21 PROCEDURE — 96366 THER/PROPH/DIAG IV INF ADDON: CPT

## 2021-08-21 PROCEDURE — A9270 NON-COVERED ITEM OR SERVICE: HCPCS | Performed by: HOSPITALIST

## 2021-08-21 PROCEDURE — 700111 HCHG RX REV CODE 636 W/ 250 OVERRIDE (IP): Performed by: HOSPITALIST

## 2021-08-21 PROCEDURE — 80048 BASIC METABOLIC PNL TOTAL CA: CPT

## 2021-08-21 PROCEDURE — 85027 COMPLETE CBC AUTOMATED: CPT

## 2021-08-21 RX ORDER — HALOPERIDOL 5 MG/ML
1 INJECTION INTRAMUSCULAR EVERY 4 HOURS PRN
Status: DISCONTINUED | OUTPATIENT
Start: 2021-08-21 | End: 2021-08-23 | Stop reason: HOSPADM

## 2021-08-21 RX ADMIN — PIPERACILLIN AND TAZOBACTAM 4.5 G: 4; .5 INJECTION, POWDER, LYOPHILIZED, FOR SOLUTION INTRAVENOUS; PARENTERAL at 14:28

## 2021-08-21 RX ADMIN — FINASTERIDE 5 MG: 5 TABLET, FILM COATED ORAL at 06:36

## 2021-08-21 RX ADMIN — INSULIN HUMAN 2 UNITS: 100 INJECTION, SOLUTION PARENTERAL at 06:37

## 2021-08-21 RX ADMIN — GABAPENTIN 100 MG: 100 CAPSULE ORAL at 06:36

## 2021-08-21 RX ADMIN — HEPARIN SODIUM 5000 UNITS: 5000 INJECTION, SOLUTION INTRAVENOUS; SUBCUTANEOUS at 21:22

## 2021-08-21 RX ADMIN — DOCUSATE SODIUM 50 MG AND SENNOSIDES 8.6 MG 2 TABLET: 8.6; 5 TABLET, FILM COATED ORAL at 19:37

## 2021-08-21 RX ADMIN — SODIUM CHLORIDE: 9 INJECTION, SOLUTION INTRAVENOUS at 06:49

## 2021-08-21 RX ADMIN — LEVOTHYROXINE SODIUM 25 MCG: 0.05 TABLET ORAL at 06:36

## 2021-08-21 RX ADMIN — INSULIN HUMAN 6 UNITS: 100 INJECTION, SOLUTION PARENTERAL at 20:23

## 2021-08-21 RX ADMIN — HEPARIN SODIUM 5000 UNITS: 5000 INJECTION, SOLUTION INTRAVENOUS; SUBCUTANEOUS at 06:36

## 2021-08-21 RX ADMIN — GABAPENTIN 100 MG: 100 CAPSULE ORAL at 19:37

## 2021-08-21 RX ADMIN — METOPROLOL TARTRATE 12.5 MG: 25 TABLET, FILM COATED ORAL at 06:37

## 2021-08-21 RX ADMIN — PIPERACILLIN AND TAZOBACTAM 4.5 G: 4; .5 INJECTION, POWDER, LYOPHILIZED, FOR SOLUTION INTRAVENOUS; PARENTERAL at 06:35

## 2021-08-21 RX ADMIN — METOPROLOL TARTRATE 12.5 MG: 25 TABLET, FILM COATED ORAL at 19:37

## 2021-08-21 RX ADMIN — DOCUSATE SODIUM 50 MG AND SENNOSIDES 8.6 MG 2 TABLET: 8.6; 5 TABLET, FILM COATED ORAL at 06:36

## 2021-08-21 RX ADMIN — PIPERACILLIN AND TAZOBACTAM 4.5 G: 4; .5 INJECTION, POWDER, LYOPHILIZED, FOR SOLUTION INTRAVENOUS; PARENTERAL at 20:17

## 2021-08-21 ASSESSMENT — PATIENT HEALTH QUESTIONNAIRE - PHQ9
9. THOUGHTS THAT YOU WOULD BE BETTER OFF DEAD, OR OF HURTING YOURSELF: NEARLY EVERY DAY
3. TROUBLE FALLING OR STAYING ASLEEP OR SLEEPING TOO MUCH: NEARLY EVERY DAY
6. FEELING BAD ABOUT YOURSELF - OR THAT YOU ARE A FAILURE OR HAVE LET YOURSELF OR YOUR FAMILY DOWN: NEARLY EVERY DAY
7. TROUBLE CONCENTRATING ON THINGS, SUCH AS READING THE NEWSPAPER OR WATCHING TELEVISION: NEARLY EVERY DAY
8. MOVING OR SPEAKING SO SLOWLY THAT OTHER PEOPLE COULD HAVE NOTICED. OR THE OPPOSITE, BEING SO FIGETY OR RESTLESS THAT YOU HAVE BEEN MOVING AROUND A LOT MORE THAN USUAL: NEARLY EVERY DAY
4. FEELING TIRED OR HAVING LITTLE ENERGY: NEARLY EVERY DAY
5. POOR APPETITE OR OVEREATING: NEARLY EVERY DAY

## 2021-08-21 ASSESSMENT — COGNITIVE AND FUNCTIONAL STATUS - GENERAL
DAILY ACTIVITIY SCORE: 16
SUGGESTED CMS G CODE MODIFIER DAILY ACTIVITY: CK
DRESSING REGULAR LOWER BODY CLOTHING: A LOT
MOVING FROM LYING ON BACK TO SITTING ON SIDE OF FLAT BED: A LITTLE
DRESSING REGULAR UPPER BODY CLOTHING: A LITTLE
CLIMB 3 TO 5 STEPS WITH RAILING: A LITTLE
SUGGESTED CMS G CODE MODIFIER MOBILITY: CJ
HELP NEEDED FOR BATHING: A LOT
PERSONAL GROOMING: A LITTLE
TOILETING: A LOT
MOBILITY SCORE: 22

## 2021-08-21 ASSESSMENT — GAIT ASSESSMENTS
DEVIATION: STEP TO
GAIT LEVEL OF ASSIST: SUPERVISED
ASSISTIVE DEVICE: FRONT WHEEL WALKER
DISTANCE (FEET): 200

## 2021-08-21 ASSESSMENT — ACTIVITIES OF DAILY LIVING (ADL): TOILETING: REQUIRES ASSIST

## 2021-08-21 ASSESSMENT — ENCOUNTER SYMPTOMS
PALPITATIONS: 0
VOMITING: 0
HEADACHES: 0
DIZZINESS: 0
COUGH: 0
FEVER: 0
NAUSEA: 0
CHILLS: 0
ABDOMINAL PAIN: 0
SHORTNESS OF BREATH: 0

## 2021-08-21 ASSESSMENT — PAIN DESCRIPTION - PAIN TYPE: TYPE: ACUTE PAIN

## 2021-08-21 NOTE — PROGRESS NOTES
Received report from day shift RN, assumed care of pt. Pt currently alert and oriented. Assessment completed. Pt reports pain 3/10 to hip but denies need for pain medication. Sher observed and draining cloudy, yellow urine. Pt updated on plan of care for the night. Bed is locked and in lowest position. Bed alarm is on. Call light and personal belongings are within reach. Pt educated to call for assistance.    0 = understands/communicates without difficulty

## 2021-08-21 NOTE — PROGRESS NOTES
4 Eyes Skin Assessment Completed by MACK Ríos and MACK Reyes.    Head WDL  Ears WDL  Nose WDL  Mouth WDL  Neck WDL  Breast/Chest Scar  Shoulder Blades WDL  Spine WDL  (R) Arm/Elbow/Hand Bruising  (L) Arm/Elbow/Hand Bruising  Abdomen WDL  Groin Redness and Rash  Scrotum/Coccyx/Buttocks Redness  (R) Leg Scab  (L) Leg Scab  (R) Heel/Foot/Toe Calloused, overgrown toenails   (L) Heel/Foot/Toe Calloused, overgrown toenails           Devices In Places Sher      Interventions In Place Pillows and Pressure Redistribution Mattress    Possible Skin Injury No    Pictures Uploaded Into Epic Yes  Wound Consult Placed Yes  RN Wound Prevention Protocol Ordered Yes

## 2021-08-21 NOTE — FACE TO FACE
Face to Face Supporting Documentation - Home Health    The encounter with this patient was in whole or in part the primary reason for home health admission.    Date of encounter:   Patient:                    MRN:                       YOB: 2021  Julien Dao  [id number]  3/7/1929     Home health to see patient for:  Physical Therapy evaluation and treatment and Occupational therapy evaluation and treatment    Skilled need for:  Exacerbation of Chronic Disease State debility    Skilled nursing interventions to include:  Comment: as above    Homebound status evidenced by:  Need the aid of supportive devices such as crutches, canes, wheelchairs or walkers. Leaving home requires a considerable and taxing effort. There is a normal inability to leave the home.    Community Physician to provide follow up care: Danis Lehman M.D.     Optional Interventions? No      I certify the face to face encounter for this home health care referral meets the CMS requirements and the encounter/clinical assessment with the patient was, in whole, or in part, for the medical condition(s) listed above, which is the primary reason for home health care. Based on my clinical findings: the service(s) are medically necessary, support the need for home health care, and the homebound criteria are met.  I certify that this patient has had a face to face encounter by myself.  Avelina Tim M.D. - NPI: [id number]

## 2021-08-21 NOTE — THERAPY
Occupational Therapy   Initial Evaluation     Patient Name: Julien Dao  Age:  89 y.o., Sex:  male  Medical Record #: [id number]  Today's Date: 8/21/2021     Precautions  Precautions: (P) Fall Risk    Assessment  Patient is 89 y.o. male admitted with fever, weakness, elevated glucose x2 wks. UTI. Pt presents A&Ox1-2, pleasantly confused. Impulsive. Emotionally labile. Performs sup to sit with Sup, grooming at EOB with Shorty. Socks donned with MaxA. STS and 4 side steps with FWW with Shorty,cues. BTB with Shorty. Lives with spouse, had recently been dc'd from hospital with HH. Pt is not at his baseline level. Fall risk. OT will follow while in house.          Plan  Recommend Occupational Therapy 3 times per week until therapy goals are met for the following treatments:  Neuro Re-Education / Balance, Self Care/Activities of Daily Living and Therapeutic Activities.    DC Equipment Recommendations: (P) Unable to determine at this time  Discharge Recommendations: (P) Recommend post-acute placement for additional occupational therapy services prior to discharge home vs home with HH and if spouse able to provide assistance/Sup     08/21/21 0947   Prior Living Situation   Prior Services Skilled Home Health Services   Housing / Facility [facility]   Bathroom Set up Walk In Shower;Shower Chair   Equipment Owned Front-Wheel Walker;Tub / Shower Seat;Grab Bar(s) In Tub / Shower   Lives with - Patient's Self Care Capacity Spouse   Comments Fall hx. Recent hosp admit; dc'd home with HH. Pt states spouse assists with LB dressing.     Prior Level of ADL Function   Self Feeding Independent   Grooming / Hygiene Independent   Bathing Unable To Determine At This Time   Dressing Requires Assist   Toileting Requires Assist   Prior Level of IADL Function   Medication Management Requires Assist   Laundry Requires Assist   Kitchen Mobility Requires Assist   Finances Requires Assist   Home Management Requires Assist   Shopping Requires Assist    Prior Level Of Mobility Supervision With Device in Home   Driving / Transportation Relatives / Others Provide Transportation   Occupation (Pre-Hospital Vocational) Retired Due To Age   Leisure Interests Unable To Determine At This Time   Precautions   Precautions Fall Risk   Vitals   O2 Delivery Device None - Room Air   Pain 0 - 10 Group   Therapist Pain Assessment Nurse Notified;0   Cognition    Cognition / Consciousness X   Level of Consciousness Alert   Ability To Follow Commands 1 Step   Safety Awareness Impaired;Impulsive   Attention Impaired   Comments Ox1-2; confusion noted.    Passive ROM Upper Body   Passive ROM Upper Body WDL   Active ROM Upper Body   Active ROM Upper Body  WDL   Dominant Hand Right   Strength Upper Body   Upper Body Strength  WDL   Sensation Upper Body   Upper Extremity Sensation  Not Tested   Upper Body Muscle Tone   Upper Body Muscle Tone  WDL   Coordination Upper Body   Coordination WDL   Balance Assessment   Sitting Balance (Static) Poor   Sitting Balance (Dynamic) Poor -   Standing Balance (Static) Fair   Standing Balance (Dynamic) Fair -   Weight Shift Sitting Poor   Weight Shift Standing Fair   Bed Mobility    Supine to Sit Supervised   Sit to Supine Minimal Assist   ADL Assessment   Eating Supervision   Grooming Minimal Assist;Seated   Upper Body Dressing Minimal Assist   Lower Body Dressing Maximal Assist   Toileting Total Assist   Comments steve (chronic)   How much help from another person does the patient currently need...   Putting on and taking off regular lower body clothing? 2   Bathing (including washing, rinsing, and drying)? 2   Toileting, which includes using a toilet, bedpan, or urinal? 2   Putting on and taking off regular upper body clothing? 3   Taking care of personal grooming such as brushing teeth? 3   Eating meals? 4   6 Clicks Daily Activity Score 16   Functional Mobility   Sit to Stand Minimal Assist   Bed, Chair, Wheelchair Transfer Minimal Assist    Transfer Method Stand Step   Comments pt having more difficulty with sitting balance than standing   Visual Perception   Visual Perception  WDL   Activity Tolerance   Sitting Edge of Bed 15   Standing 4   Patient / Family Goals   Patient / Family Goal #1 not stated   Short Term Goals   Short Term Goal # 1 pt will perform seated grooming with Sup, good sit bal, within 5 days      Short Term Goal # 2 pt will perform UB dressing with Sup within 5 days   Short Term Goal # 3 pt will perform ADL transfer with Sup within 5 days

## 2021-08-21 NOTE — HOSPITAL COURSE
Julien Dao is a 89 y.o. male with past medical history of CAD, diabetes mellitus, hypertension, recurrent UTI, chronic Sher catheter who presented 8/20/2021 with recurrent falls.  X-rays done did not show any fracture.  Patient was however found to have UTI and started empirically on Zosyn given his prior history of Pseudomonas.  Patient worked with PT/OT and SNF/home health was recommended however patient and his wife are adamant about not going to SNF.

## 2021-08-21 NOTE — THERAPY
Physical Therapy   Initial Evaluation     Patient Name: Julien Dao  Age:  89 y.o., Sex:  male  Medical Record #: [id number]  Today's Date: 8/21/2021     Precautions: (P) Fall Risk    Assessment  Patient is 89 y.o. male with a diagnosis of UTI.Pt lives at home with wife was admitted following a fall at home.He was recently in  and was D/C home refused SNF.Acute PT to improve sit to stand,balance and stairs     Plan    Recommend Physical Therapy 4 times per week until therapy goals are met for the following treatments:  Gait Training, Neuro Re-Education / Balance, Stair Training, Therapeutic Activities and Therapeutic Exercises      08/21/21 1300   Total Time Spent   Total Time Spent (Mins) 30   Charge Group   PT Evaluation PT Evaluation Mod   Initial Contact Note    Initial Contact Note Order Received and Verified, Physical Therapy Evaluation in Progress with Full Report to Follow.   Precautions   Precautions Fall Risk   Pain 0 - 10 Group   Therapist Pain Assessment 1   Prior Living Situation   Prior Services Skilled Home Health Services   Housing / Facility 1 [address]   Steps Into Home 1   Steps In Home 0   Equipment Owned Front-Wheel Walker   Lives with - Patient's Self Care Capacity Spouse   Prior Level of Functional Mobility   Bed Mobility Independent   Transfer Status Independent   Ambulation Required Assist   Distance Ambulation (Feet)   (household)   Assistive Devices Used Front-Wheel Walker   Stairs Independent   History of Falls   History of Falls Yes   Passive ROM Lower Body   Passive ROM Lower Body WDL   Active ROM Lower Body    Active ROM Lower Body  WDL   Strength Lower Body   Lower Body Strength  X   Comments general weakness   Balance Assessment   Sitting Balance (Static) Fair +   Sitting Balance (Dynamic) Fair   Standing Balance (Static) Fair   Standing Balance (Dynamic) Fair   Weight Shift Sitting Good   Weight Shift Standing Good   Gait Analysis   Gait Level Of Assist Supervised   Assistive  Device Front Wheel Walker   Distance (Feet) 200   # of Times Distance was Traveled 1   Deviation Step To   Weight Bearing Status full   Bed Mobility    Supine to Sit Modified Independent   Sit to Supine Modified Independent   Scooting Modified Independent   Functional Mobility   Sit to Stand Minimal Assist   Bed, Chair, Wheelchair Transfer Supervised   Transfer Method Stand Step   How much difficulty does the patient currently have...   Turning over in bed (including adjusting bedclothes, sheets and blankets)? 4   Sitting down on and standing up from a chair with arms (e.g., wheelchair, bedside commode, etc.) 3   Moving from lying on back to sitting on the side of the bed? 4   How much help from another person does the patient currently need...   Moving to and from a bed to a chair (including a wheelchair)? 4   Need to walk in a hospital room? 4   Climbing 3-5 steps with a railing? 3   6 clicks Mobility Score 22   Activity Tolerance   Sitting Edge of Bed 10   Standing 15   Patient / Family Goals    Patient / Family Goal #1 Home   Short Term Goals    Short Term Goal # 1 Pt will be I with sit to  4 V so can go home   Short Term Goal # 2 Pt to be S x 1 stair in 4 V so can get into house   Problem List    Problems Impaired Transfers;Functional Strength Deficit;Decreased Activity Tolerance;Impaired Balance   Anticipated Discharge Equipment and Recommendations   Discharge Recommendations Recommend home health for continued physical therapy services   Interdisciplinary Plan of Care Collaboration   IDT Collaboration with  Nursing   Session Information   Date / Session Number  8/21-1 1/4 8/28          Discharge Recommendations: (P) Recommend home health for continued physical therapy services

## 2021-08-21 NOTE — CARE PLAN
The patient is Stable - Low risk of patient condition declining or worsening    Shift Goals  Clinical Goals: Patient will remain free from falls    Progress made toward(s) clinical / shift goals:    Problem: Fall Risk  Goal: Patient will remain free from falls  Outcome: Progressing  Note: Fall precautions in place.

## 2021-08-21 NOTE — CARE PLAN
Problem: Fall Risk  Goal: Patient will remain free from falls  Outcome: Progressing  Note: Bed alarm in place     The patient is Stable - Low risk of patient condition declining or worsening    Shift Goals  Clinical Goals: Remain free from falls  Patient Goals: Ambulate halls    Progress made toward(s) clinical / shift goals:  Bed alarm in place

## 2021-08-21 NOTE — CARE PLAN
The patient is Stable - Low risk of patient condition declining or worsening    Shift Goals  Clinical Goals: Remain free from injury or falls  Patient Goals: Rest    Progress made toward(s) clinical / shift goals:  Pt remained free of injury or falls this shift. Safety precautions in place and bed alarm is on. Pt progressing on other goals.     Problem: Knowledge Deficit - Standard  Goal: Patient and family/care givers will demonstrate understanding of plan of care, disease process/condition, diagnostic tests and medications  Outcome: Progressing     Problem: Fall Risk  Goal: Patient will remain free from falls  Outcome: Progressing     Problem: Skin Integrity  Goal: Skin integrity is maintained or improved  Outcome: Progressing     Problem: Pain - Standard  Goal: Alleviation of pain or a reduction in pain to the patient’s comfort goal  Outcome: Progressing       Patient is not progressing towards the following goals:

## 2021-08-21 NOTE — PROGRESS NOTES
Hospital Medicine Daily Progress Note    Date of Service  8/21/2021    Chief Complaint  Julien Dao is a 89 y.o. male admitted 8/20/2021 with recurrent falls    Hospital Course  Julien Dao is a 89 y.o. male with past medical history of CAD, diabetes mellitus, hypertension, recurrent UTI, chronic Wilson catheter who presented 8/20/2021 with recurrent falls.  X-rays done did not show any fracture.  Patient was however found to have UTI and started empirically on Zosyn given his prior history of Pseudomonas.  Patient worked with PT/OT and SNF/home health was recommended however patient and his wife are adamant about not going to SNF.      Interval Problem Update  CAPRI overnight  Pt irritable  Refusing SNF, HH orders placed  Urine in wilson bag remains very cloudy. It was changed in ER yesterday     I have personally seen and examined the patient at bedside. I discussed the plan of care with patient and bedside RN.    Consultants/Specialty  none    Code Status  DNAR/DNI    Disposition  Patient is not medically cleared.   Anticipate discharge to to home with organized home healthcare and close outpatient follow-up.  I have placed the appropriate orders for post-discharge needs.    Review of Systems  Review of Systems   Constitutional: Negative for chills and fever.   Respiratory: Negative for cough and shortness of breath.    Cardiovascular: Negative for chest pain and palpitations.   Gastrointestinal: Negative for abdominal pain, nausea and vomiting.   Genitourinary: Negative for dysuria and urgency.   Neurological: Negative for dizziness and headaches.   All other systems reviewed and are negative.       Physical Exam  Temp:  [36.7 °C (98 °F)-37.7 °C (99.9 °F)] 36.7 °C (98 °F)  Pulse:  [] 115  Resp:  [16-20] 20  BP: (112-135)/(56-69) 118/56  SpO2:  [89 %-98 %] 89 %    Physical Exam  Vitals and nursing note reviewed.   Constitutional:       Appearance: Normal appearance.   Cardiovascular:      Rate and Rhythm:  Normal rate and regular rhythm.      Heart sounds: No murmur heard.     Pulmonary:      Effort: Pulmonary effort is normal. No respiratory distress.      Breath sounds: Normal breath sounds.   Genitourinary:     Comments: Urine in wilson bag is cloudy  Neurological:      General: No focal deficit present.      Mental Status: He is alert and oriented to person, place, and time.         Fluids    Intake/Output Summary (Last 24 hours) at 8/21/2021 1532  Last data filed at 8/21/2021 0550  Gross per 24 hour   Intake 1203.33 ml   Output 1700 ml   Net -496.67 ml       Laboratory  Recent Labs     08/20/21  1433 08/21/21  0415   WBC 11.3* 11.4*   RBC 3.68* 4.11*   HEMOGLOBIN 11.0* 12.2*   HEMATOCRIT 33.6* 38.0*   MCV 91.3 92.5   MCH 29.9 29.7   MCHC 32.7* 32.1*   RDW 46.0 47.1   PLATELETCT 272 277   MPV 9.8 10.3     Recent Labs     08/20/21  1433 08/21/21  0415   SODIUM 137 136   POTASSIUM 3.5* 3.6   CHLORIDE 98 101   CO2 25 24   GLUCOSE 191* 156*   BUN 16 15   CREATININE 1.12 0.93   CALCIUM 9.3 8.6                   Imaging  DX-FEMUR-2+ LEFT   Final Result      No radiographic evidence of acute traumatic injury.      DX-CHEST-LIMITED (1 VIEW)   Final Result      Cardiomegaly.      DX-PELVIS-1 OR 2 VIEWS   Final Result      No evidence of fracture or dislocation.      CT-HEAD W/O   Final Result         1. No acute intracranial findings. No evidence of acute intracranial hemorrhage or mass lesion.      2. White matter lucencies most consistent with chronic small vessel ischemic change.                    Assessment/Plan  * Acute cystitis with hematuria- (present on admission)  Assessment & Plan  H/o pseudomonal UTI  Started on Zosyn empirically  Follow UCx  Wilson changed in ER  Urine still cloudy      Recurrent falls  Assessment & Plan  While using walker  PT/OT    KOSTAS (acute kidney injury) (HCC)- (present on admission)  Assessment & Plan  Baseline creatinine of .70  Started on IVF, improving  Repeat BMP in AM  Avoid  nephrotoxins    BPH (benign prostatic hyperplasia)- (present on admission)  Assessment & Plan  On finasteride, flomax with chronic wilson  I question usefulness of flomax and finasteride in setting of chronic wilson placement- I d/w patient. Discontinued both.    Diabetes mellitus with hyperglycemia (HCC)- (present on admission)  Assessment & Plan  On 38U insulin at home, unsure of diet and intake  Lantus 10U with ISS, increase insulin as tolerated    HTN (hypertension)- (present on admission)  Assessment & Plan  Hold amlodipine  Cont metoprolol       VTE prophylaxis: heparin ppx    I have performed a physical exam and reviewed and updated ROS and Plan today (8/21/2021). In review of yesterday's note (8/20/2021), there are no changes except as documented above.

## 2021-08-21 NOTE — PROGRESS NOTES
Patient arrived to floor via patient transport. Patient alert but tired. Sher in place. Bed in lowest, locked position. Contact precautions in place. Safety precautions in place. Call light and belongings in place. Patient denies further needs at this time.

## 2021-08-22 LAB
ANION GAP SERPL CALC-SCNC: 13 MMOL/L (ref 7–16)
BUN SERPL-MCNC: 12 MG/DL (ref 8–22)
CALCIUM SERPL-MCNC: 8.4 MG/DL (ref 8.4–10.2)
CHLORIDE SERPL-SCNC: 102 MMOL/L (ref 96–112)
CO2 SERPL-SCNC: 22 MMOL/L (ref 20–33)
CREAT SERPL-MCNC: 0.74 MG/DL (ref 0.5–1.4)
GLUCOSE BLD-MCNC: 213 MG/DL (ref 65–99)
GLUCOSE BLD-MCNC: 247 MG/DL (ref 65–99)
GLUCOSE BLD-MCNC: 249 MG/DL (ref 65–99)
GLUCOSE BLD-MCNC: 325 MG/DL (ref 65–99)
GLUCOSE SERPL-MCNC: 274 MG/DL (ref 65–99)
POTASSIUM SERPL-SCNC: 3.2 MMOL/L (ref 3.6–5.5)
SODIUM SERPL-SCNC: 137 MMOL/L (ref 135–145)

## 2021-08-22 PROCEDURE — 700102 HCHG RX REV CODE 250 W/ 637 OVERRIDE(OP): Performed by: HOSPITALIST

## 2021-08-22 PROCEDURE — 82962 GLUCOSE BLOOD TEST: CPT

## 2021-08-22 PROCEDURE — 93005 ELECTROCARDIOGRAM TRACING: CPT | Performed by: HOSPITALIST

## 2021-08-22 PROCEDURE — 99225 PR SUBSEQUENT OBSERVATION CARE,LEVEL II: CPT | Performed by: HOSPITALIST

## 2021-08-22 PROCEDURE — 96366 THER/PROPH/DIAG IV INF ADDON: CPT

## 2021-08-22 PROCEDURE — 36415 COLL VENOUS BLD VENIPUNCTURE: CPT

## 2021-08-22 PROCEDURE — A9270 NON-COVERED ITEM OR SERVICE: HCPCS | Performed by: HOSPITALIST

## 2021-08-22 PROCEDURE — 700111 HCHG RX REV CODE 636 W/ 250 OVERRIDE (IP): Performed by: HOSPITALIST

## 2021-08-22 PROCEDURE — 80048 BASIC METABOLIC PNL TOTAL CA: CPT

## 2021-08-22 PROCEDURE — 96372 THER/PROPH/DIAG INJ SC/IM: CPT

## 2021-08-22 PROCEDURE — 94760 N-INVAS EAR/PLS OXIMETRY 1: CPT

## 2021-08-22 PROCEDURE — 700105 HCHG RX REV CODE 258: Performed by: HOSPITALIST

## 2021-08-22 PROCEDURE — G0378 HOSPITAL OBSERVATION PER HR: HCPCS

## 2021-08-22 RX ORDER — LEVOFLOXACIN 500 MG/1
500 TABLET, FILM COATED ORAL DAILY
Qty: 3 TABLET | Refills: 0 | Status: CANCELLED | OUTPATIENT
Start: 2021-08-22

## 2021-08-22 RX ORDER — POTASSIUM CHLORIDE 20 MEQ/1
40 TABLET, EXTENDED RELEASE ORAL ONCE
Status: COMPLETED | OUTPATIENT
Start: 2021-08-22 | End: 2021-08-22

## 2021-08-22 RX ADMIN — GABAPENTIN 100 MG: 100 CAPSULE ORAL at 17:14

## 2021-08-22 RX ADMIN — METOPROLOL TARTRATE 12.5 MG: 25 TABLET, FILM COATED ORAL at 05:21

## 2021-08-22 RX ADMIN — DOCUSATE SODIUM 50 MG AND SENNOSIDES 8.6 MG 2 TABLET: 8.6; 5 TABLET, FILM COATED ORAL at 05:20

## 2021-08-22 RX ADMIN — PIPERACILLIN AND TAZOBACTAM 4.5 G: 4; .5 INJECTION, POWDER, LYOPHILIZED, FOR SOLUTION INTRAVENOUS; PARENTERAL at 05:19

## 2021-08-22 RX ADMIN — HEPARIN SODIUM 5000 UNITS: 5000 INJECTION, SOLUTION INTRAVENOUS; SUBCUTANEOUS at 13:23

## 2021-08-22 RX ADMIN — METOPROLOL TARTRATE 12.5 MG: 25 TABLET, FILM COATED ORAL at 17:14

## 2021-08-22 RX ADMIN — POTASSIUM CHLORIDE 40 MEQ: 1500 TABLET, EXTENDED RELEASE ORAL at 16:43

## 2021-08-22 RX ADMIN — HEPARIN SODIUM 5000 UNITS: 5000 INJECTION, SOLUTION INTRAVENOUS; SUBCUTANEOUS at 21:06

## 2021-08-22 RX ADMIN — LEVOTHYROXINE SODIUM 25 MCG: 0.05 TABLET ORAL at 05:20

## 2021-08-22 RX ADMIN — INSULIN HUMAN 6 UNITS: 100 INJECTION, SOLUTION PARENTERAL at 17:15

## 2021-08-22 RX ADMIN — HEPARIN SODIUM 5000 UNITS: 5000 INJECTION, SOLUTION INTRAVENOUS; SUBCUTANEOUS at 05:20

## 2021-08-22 RX ADMIN — DOCUSATE SODIUM 50 MG AND SENNOSIDES 8.6 MG 2 TABLET: 8.6; 5 TABLET, FILM COATED ORAL at 17:14

## 2021-08-22 RX ADMIN — GABAPENTIN 100 MG: 100 CAPSULE ORAL at 05:21

## 2021-08-22 RX ADMIN — PIPERACILLIN AND TAZOBACTAM 4.5 G: 4; .5 INJECTION, POWDER, LYOPHILIZED, FOR SOLUTION INTRAVENOUS; PARENTERAL at 23:16

## 2021-08-22 RX ADMIN — INSULIN HUMAN 3 UNITS: 100 INJECTION, SOLUTION PARENTERAL at 11:18

## 2021-08-22 RX ADMIN — SODIUM CHLORIDE: 9 INJECTION, SOLUTION INTRAVENOUS at 13:23

## 2021-08-22 RX ADMIN — PIPERACILLIN AND TAZOBACTAM 4.5 G: 4; .5 INJECTION, POWDER, LYOPHILIZED, FOR SOLUTION INTRAVENOUS; PARENTERAL at 13:21

## 2021-08-22 RX ADMIN — INSULIN HUMAN 3 UNITS: 100 INJECTION, SOLUTION PARENTERAL at 05:19

## 2021-08-22 RX ADMIN — INSULIN HUMAN 3 UNITS: 100 INJECTION, SOLUTION PARENTERAL at 20:51

## 2021-08-22 ASSESSMENT — PAIN DESCRIPTION - PAIN TYPE
TYPE: ACUTE PAIN

## 2021-08-22 NOTE — PROGRESS NOTES
Received report from day shift RN, assumed care of pt. Pt currently awake and alert. Assessment completed. Pt denies any pain or nausea at this time. Sher in place and draining well. Pt updated on plan of care for the night. Safety precautions in place, bed alarm is on. Call light and belongings are in place.

## 2021-08-22 NOTE — CARE PLAN
The patient is Stable - Low risk of patient condition declining or worsening    Shift Goals  Clinical Goals: Pain control  Patient Goals: Up to chair    Progress made toward(s) clinical / shift goals:  Pain controlled at comfortable level during this shift. Pt was up to chair this shift.     Patient is not progressing towards the following goals: N/A      Problem: Knowledge Deficit - Standard  Goal: Patient and family/care givers will demonstrate understanding of plan of care, disease process/condition, diagnostic tests and medications  Outcome: Progressing     Problem: Fall Risk  Goal: Patient will remain free from falls  Outcome: Progressing     Problem: Skin Integrity  Goal: Skin integrity is maintained or improved  Outcome: Progressing     Problem: Pain - Standard  Goal: Alleviation of pain or a reduction in pain to the patient’s comfort goal  Outcome: Progressing

## 2021-08-22 NOTE — DISCHARGE PLANNING
Anticipated Discharge Disposition: Home w/ HH    Action: Called pt's wife, Mercedes [phone] to discuss discharge plan. Discussed therapy recommendations, SNF vs HH. Mercedes states that she would prefer to bring pt home with HH. Pt is already on service with [facility]. Mercedes states pt uses a FWW walker at home and she provides assistance as needed. She states pt's daughter is also involved and helps as much as she can on her days off. Spouse states she would like to keep pt home as long as possible. She is going to look into getting more support at home either through HH or private caretakers. She admits pt is requiring more care and she will eventually need more help. She states she left pt alone for the first time when he fell and she now realizes pt can't be left alone. She states pt is adamant about not going to a SNF. Pt had a bad experience at [facility]. Discussed that there are other SNF options in [facility], however spouse states she has heard bad things about other area SNF's. Spouse states they would like to resume service with [facility] and pt will need transportation home at d/c.   Choice form completed and faxed to [facility]    Barriers to Discharge: None    Plan: Care coordination will continue to follow and assist with discharge planning.     Care Transition Team Assessment    Information Source  Orientation Level: Disoriented to time, Oriented to place, Oriented to situation, Oriented to person  Information Given By: Spouse      Elopement Risk  Legal Hold: No  Ambulatory or Self Mobile in Wheelchair: Yes  Disoriented: Time-At Risk for Elopement  Psychiatric Symptoms: Impulsivity-at Risk for Elopement  History of Wandering: No  Elopement this Admit: No  Vocalizing Wanting to Leave: No  Displays Behaviors, Body Language Wanting to Leave: No-Not at Risk for Elopement  Elopement Risk: Not at Risk for Elopement  Wanderguard On: Unavailable  Personal Belongings: Hospital Clothing Only    Interdisciplinary  Discharge Planning  Lives with - Patient's Self Care Capacity: Spouse  Patient or legal guardian wants to designate a caregiver: No  Housing / Facility: [address]  Prior Services: Skilled Home Health Services    Discharge Preparedness  What is your plan after discharge?: Home health care  What are your discharge supports?: Spouse  Prior Functional Level: Needs Assist with Activities of Daily Living, Needs Assist with Medication Management, Uses Walker  Difficulity with ADLs: Bathing, Dressing, Walking  Difficulity with IADLs: Cooking, Driving, Keeping track of finances, Laundry, Managing medication, Shopping    Functional Assesment  Prior Functional Level: Needs Assist with Activities of Daily Living, Needs Assist with Medication Management, Uses Walker    Finances  Financial Barriers to Discharge: No  Prescription Coverage: Yes    Vision / Hearing Impairment  Vision Impairment : Yes  Right Eye Vision: Impaired, Wears Glasses  Left Eye Vision: Impaired, Wears Glasses  Hearing Impairment: Both Ears, Hearing Device Not Available    Domestic Abuse  Have you ever been the victim of abuse or violence?: No  Physical Abuse or Sexual Abuse: No  Verbal Abuse or Emotional Abuse: No  Possible Abuse/Neglect Reported to:: Not Applicable    Psychological Assessment  History of Substance Abuse: None  History of Psychiatric Problems: No  Non-compliant with Treatment: No  Newly Diagnosed Illness: No    Discharge Risks or Barriers  Discharge risks or barriers?: No  Patient risk factors: Readmission    Anticipated Discharge Information  Discharge Disposition: D/T to home under A care in anticipation of covered skilled care (06)

## 2021-08-22 NOTE — DISCHARGE PLANNING
Received Choice form at [phone]  Agency/Facility Name: [facility]  Referral sent per Choice form @ [phone]

## 2021-08-22 NOTE — CARE PLAN
The patient is Stable - Low risk of patient condition declining or worsening    Shift Goals  Clinical Goals: remain free of injury or falls   Patient Goals: ambulate in hallway, remain free of injury or falls    Progress made toward(s) clinical / shift goals:  Safety precautions are in place and bed alarm is on. Pt to remain free of falls or injury. Progressing on other goals.   Problem: Knowledge Deficit - Standard  Goal: Patient and family/care givers will demonstrate understanding of plan of care, disease process/condition, diagnostic tests and medications  Outcome: Progressing     Problem: Fall Risk  Goal: Patient will remain free from falls  Outcome: Progressing     Problem: Skin Integrity  Goal: Skin integrity is maintained or improved  Outcome: Progressing     Problem: Pain - Standard  Goal: Alleviation of pain or a reduction in pain to the patient’s comfort goal  Outcome: Progressing       Patient is not progressing towards the following goals:

## 2021-08-23 ENCOUNTER — PATIENT OUTREACH (OUTPATIENT)
Dept: HEALTH INFORMATION MANAGEMENT | Facility: OTHER | Age: 86
End: 2021-08-23

## 2021-08-23 VITALS
OXYGEN SATURATION: 96 % | RESPIRATION RATE: 18 BRPM | WEIGHT: 155.42 LBS | HEART RATE: 77 BPM | BODY MASS INDEX: 24.39 KG/M2 | TEMPERATURE: 97.8 F | DIASTOLIC BLOOD PRESSURE: 89 MMHG | HEIGHT: 67 IN | SYSTOLIC BLOOD PRESSURE: 154 MMHG

## 2021-08-23 LAB
BACTERIA UR CULT: ABNORMAL
BACTERIA UR CULT: ABNORMAL
EKG IMPRESSION: NORMAL
GLUCOSE BLD-MCNC: 144 MG/DL (ref 65–99)
GLUCOSE BLD-MCNC: 249 MG/DL (ref 65–99)
SIGNIFICANT IND 70042: ABNORMAL
SITE SITE: ABNORMAL
SOURCE SOURCE: ABNORMAL

## 2021-08-23 PROCEDURE — 700102 HCHG RX REV CODE 250 W/ 637 OVERRIDE(OP): Performed by: HOSPITALIST

## 2021-08-23 PROCEDURE — G0378 HOSPITAL OBSERVATION PER HR: HCPCS

## 2021-08-23 PROCEDURE — 700105 HCHG RX REV CODE 258: Performed by: HOSPITALIST

## 2021-08-23 PROCEDURE — 93010 ELECTROCARDIOGRAM REPORT: CPT | Performed by: INTERNAL MEDICINE

## 2021-08-23 PROCEDURE — 82962 GLUCOSE BLOOD TEST: CPT

## 2021-08-23 PROCEDURE — 96372 THER/PROPH/DIAG INJ SC/IM: CPT

## 2021-08-23 PROCEDURE — 94760 N-INVAS EAR/PLS OXIMETRY 1: CPT

## 2021-08-23 PROCEDURE — 700111 HCHG RX REV CODE 636 W/ 250 OVERRIDE (IP): Performed by: HOSPITALIST

## 2021-08-23 PROCEDURE — 96366 THER/PROPH/DIAG IV INF ADDON: CPT

## 2021-08-23 PROCEDURE — A9270 NON-COVERED ITEM OR SERVICE: HCPCS | Performed by: HOSPITALIST

## 2021-08-23 PROCEDURE — 99217 PR OBSERVATION CARE DISCHARGE: CPT | Performed by: HOSPITALIST

## 2021-08-23 RX ORDER — AMOXICILLIN AND CLAVULANATE POTASSIUM 875; 125 MG/1; MG/1
1 TABLET, FILM COATED ORAL 2 TIMES DAILY
Qty: 4 TABLET | Refills: 0 | Status: SHIPPED | OUTPATIENT
Start: 2021-08-23 | End: 2021-08-23

## 2021-08-23 RX ORDER — NYSTATIN 100000 [USP'U]/G
POWDER TOPICAL 2 TIMES DAILY
Status: DISCONTINUED | OUTPATIENT
Start: 2021-08-23 | End: 2021-08-23 | Stop reason: HOSPADM

## 2021-08-23 RX ORDER — LEVOFLOXACIN 500 MG/1
500 TABLET, FILM COATED ORAL DAILY
Qty: 3 TABLET | Refills: 0 | Status: SHIPPED | OUTPATIENT
Start: 2021-08-23 | End: 2021-08-26

## 2021-08-23 RX ADMIN — PIPERACILLIN AND TAZOBACTAM 4.5 G: 4; .5 INJECTION, POWDER, LYOPHILIZED, FOR SOLUTION INTRAVENOUS; PARENTERAL at 05:14

## 2021-08-23 RX ADMIN — HEPARIN SODIUM 5000 UNITS: 5000 INJECTION, SOLUTION INTRAVENOUS; SUBCUTANEOUS at 05:14

## 2021-08-23 RX ADMIN — GABAPENTIN 100 MG: 100 CAPSULE ORAL at 05:14

## 2021-08-23 RX ADMIN — DOCUSATE SODIUM 50 MG AND SENNOSIDES 8.6 MG 2 TABLET: 8.6; 5 TABLET, FILM COATED ORAL at 05:14

## 2021-08-23 RX ADMIN — METOPROLOL TARTRATE 12.5 MG: 25 TABLET, FILM COATED ORAL at 05:14

## 2021-08-23 RX ADMIN — NYSTATIN: 100000 POWDER TOPICAL at 08:00

## 2021-08-23 RX ADMIN — PIPERACILLIN AND TAZOBACTAM 4.5 G: 4; .5 INJECTION, POWDER, LYOPHILIZED, FOR SOLUTION INTRAVENOUS; PARENTERAL at 12:11

## 2021-08-23 RX ADMIN — INSULIN HUMAN 3 UNITS: 100 INJECTION, SOLUTION PARENTERAL at 12:14

## 2021-08-23 RX ADMIN — LEVOTHYROXINE SODIUM 25 MCG: 0.05 TABLET ORAL at 05:14

## 2021-08-23 ASSESSMENT — ENCOUNTER SYMPTOMS
HEADACHES: 0
PALPITATIONS: 0
VOMITING: 0
SHORTNESS OF BREATH: 0
ABDOMINAL PAIN: 0
DIZZINESS: 0
FEVER: 0
NAUSEA: 0
CHILLS: 0
COUGH: 0

## 2021-08-23 ASSESSMENT — PAIN DESCRIPTION - PAIN TYPE: TYPE: ACUTE PAIN

## 2021-08-23 NOTE — DISCHARGE SUMMARY
Discharge Summary    CHIEF COMPLAINT ON ADMISSION  Chief Complaint   Patient presents with   • GLF     slipped on water at home, hit his hip on the counter, no LOC   • Hip Pain     L hip, 3/10 pain       Reason for Admission  EMS     Admission Date  8/20/2021    CODE STATUS  DNAR/DNI    HPI & HOSPITAL COURSE  Julien Dao is a 89 y.o. male with past medical history of CAD, diabetes mellitus, hypertension, recurrent UTI, chronic Sher catheter who presented 8/20/2021 with recurrent falls.  X-rays done did not show any fracture.  Patient was however found to have UTI and started empirically on Zosyn given his prior history of Pseudomonas.  Patient worked with PT/OT and SNF/home health was recommended however patient and his wife are adamant about not going to SNF. UCx grew pseudomonas and pt discharged on levaquin.      Therefore, he is discharged in fair and stable condition to home with organized home healthcare and close outpatient follow-up.      Discharge Date  8/23/21    FOLLOW UP ITEMS POST DISCHARGE  none    DISCHARGE DIAGNOSES  Principal Problem:    Acute cystitis with hematuria POA: Yes  Active Problems:    HTN (hypertension) POA: Yes    Diabetes mellitus with hyperglycemia (HCC) POA: Yes    BPH (benign prostatic hyperplasia) POA: Yes    KOSTAS (acute kidney injury) (HCC) POA: Yes    Recurrent falls POA: Unknown  Resolved Problems:    * No resolved hospital problems. *      FOLLOW UP  No future appointments.  Danis Lehman M.D.  [address] [facility] [id number] [phone]    Call  Please call to schedule a hospital follow up appointment with your primary care provider. Thank you.      MEDICATIONS ON DISCHARGE     Medication List      START taking these medications      Instructions   levoFLOXacin 500 MG tablet  Commonly known as: LEVAQUIN   Take 1 Tablet by mouth every day for 3 days.  Dose: 500 mg        CONTINUE taking these medications      Instructions   amLODIPine 5 MG Tabs  Commonly known  as: NORVASC   Take 1 Tab by mouth every day.  Dose: 5 mg     gabapentin 100 MG Caps  Commonly known as: NEURONTIN   Take 100 mg by mouth 2 times a day.  Dose: 100 mg     insulin glargine 100 UNIT/ML Sopn injection  Generic drug: insulin glargine   Inject 38 Units under the skin every evening.  Dose: 38 Units     levothyroxine 25 MCG Tabs  Commonly known as: SYNTHROID   Take 1 tablet by mouth Every morning on an empty stomach.  Dose: 25 mcg     metoprolol tartrate 25 MG Tabs  Commonly known as: LOPRESSOR   Take 12.5 mg by mouth 2 times a day.  Dose: 12.5 mg     nystatin powder  Commonly known as: MYCOSTATIN   Apply 1 Application topically 3 times a day. Apply's on penis  Dose: 1 Application     nystatin/triamcinolone 951190-9.1 UNIT/GM-% Crea  Commonly known as: MYCOLOG   Apply 1 Application topically 2 times a day. Apply's on left leg  Dose: 1 Application     potassium chloride SA 20 MEQ Tbcr  Commonly known as: Kdur   Take 2 Tablets by mouth 2 times a day.  Dose: 40 mEq     PX Mens Multivitamins Tabs   Take 1 tablet by mouth every morning.  Dose: 1 Tablet     Restasis 0.05 % ophthalmic emulsion  Generic drug: cyclosporin   Administer 1 Drop into both eyes every day.  Dose: 1 Drop     RETAINE CMC OP   Administer 1 Drop into both eyes every day.  Dose: 1 Drop     simvastatin 10 MG Tabs  Commonly known as: ZOCOR   Take 10 mg by mouth every evening.  Dose: 10 mg     SYSTANE OP   Administer 1 Drop into affected eye(s) every day.  Dose: 1 Drop        STOP taking these medications    finasteride 5 MG Tabs  Commonly known as: PROSCAR     tamsulosin 0.4 MG capsule  Commonly known as: FLOMAX            Allergies  Allergies   Allergen Reactions   • Iodine Anaphylaxis     Pt and pts wife report that it has been so long not sure what happens       DIET  Orders Placed This Encounter   Procedures   • Diet Order Diet: Consistent CHO (Diabetic)     Standing Status:   Standing     Number of Occurrences:   1     Order Specific  Question:   Diet:     Answer:   Consistent CHO (Diabetic) [4]       ACTIVITY  As tolerated.  Weight bearing as tolerated    CONSULTATIONS  mpme    PROCEDURES  none    LABORATORY  Lab Results   Component Value Date    SODIUM 137 08/22/2021    POTASSIUM 3.2 (L) 08/22/2021    CHLORIDE 102 08/22/2021    CO2 22 08/22/2021    GLUCOSE 274 (H) 08/22/2021    BUN 12 08/22/2021    CREATININE 0.74 08/22/2021    CREATININE 1.0 12/02/2008        Lab Results   Component Value Date    WBC 11.4 (H) 08/21/2021    HEMOGLOBIN 12.2 (L) 08/21/2021    HEMATOCRIT 38.0 (L) 08/21/2021    PLATELETCT 277 08/21/2021        Total time of the discharge process exceeds 33 minutes.

## 2021-08-23 NOTE — DISCHARGE INSTRUCTIONS
Discharge Instructions    Discharged to home by ambulance with escort. Discharged via ambulance, hospital escort: Yes.  Special equipment needed: Not Applicable    Be sure to schedule a follow-up appointment with your primary care doctor or any specialists as instructed.     Discharge Plan:   Diet Plan: Discussed  Activity Level: Discussed  Confirmed Follow up Appointment: Patient to Call and Schedule Appointment  Confirmed Symptoms Management: Discussed  Medication Reconciliation Updated: Yes    I understand that a diet low in cholesterol, fat, and sodium is recommended for good health. Unless I have been given specific instructions below for another diet, I accept this instruction as my diet prescription.   Other diet: diabetic    Special Instructions: None    · Is patient discharged on Warfarin / Coumadin?   No     Depression / Suicide Risk    As you are discharged from this [facility] facility, it is important to learn how to keep safe from harming yourself.    Recognize the warning signs:  · Abrupt changes in personality, positive or negative- including increase in energy   · Giving away possessions  · Change in eating patterns- significant weight changes-  positive or negative  · Change in sleeping patterns- unable to sleep or sleeping all the time   · Unwillingness or inability to communicate  · Depression  · Unusual sadness, discouragement and loneliness  · Talk of wanting to die  · Neglect of personal appearance   · Rebelliousness- reckless behavior  · Withdrawal from people/activities they love  · Confusion- inability to concentrate     If you or a loved one observes any of these behaviors or has concerns about self-harm, here's what you can do:  · Talk about it- your feelings and reasons for harming yourself  · Remove any means that you might use to hurt yourself (examples: pills, rope, extension cords, firearm)  · Get professional help from the community (Mental Health, Substance Abuse,  psychological counseling)  · Do not be alone:Call your Safe Contact- someone whom you trust who will be there for you.  · Call your local CRISIS HOTLINE 144-8012 or 611-830-7715  · Call your local Children's Mobile Crisis Response Team [place] (103) 974-7333 or www.Sc[employer]be  · Call the toll free National Suicide Prevention Hotlines   · National Suicide Prevention Lifeline 188-213-LKKO (1071)  · National Hope Line Network 800-SUICIDE (657-5561)

## 2021-08-23 NOTE — CARE PLAN
The patient is Stable - Low risk of patient condition declining or worsening    Shift Goals  Clinical Goals: Rest, remain free of injury or falls.   Patient Goals: Rest     Progress made toward(s) clinical / shift goals:  Pt was able to rest throughout the night and remain free of injury or falls.     Patient is not progressing towards the following goals:

## 2021-08-23 NOTE — PROGRESS NOTES
Patient was discharged home in stable condition via [facility] ambulance. Pt leaving with wilson intact due to retention. Spoke to wife Mercedes on phone and advised her of discharge and DC instructions. She stated understanding.

## 2021-08-23 NOTE — DISCHARGE PLANNING
Anticipated Discharge Disposition: Home with [facility]    Action: LSW completed chart review. Pt previously on service with [facility]. Per chart review, pt will need medical transport home.    1147: LSW notified by bedside RN Alejandrina that pt's d/c order is in. LSW asked Alejandrina what time pt's spouse would be home. LSW asked if 1400 was an okay time.    1220: Per Alejandrina, 1400 is okay. LSW faxed transport forms to [facility].    1242: [facility] confirmed transport home at 1400 via [facility]. LSW notified Alejandrina and MD.    Barriers to Discharge: None    Plan: LSW to follow and assist as needed.     none

## 2021-08-23 NOTE — PROGRESS NOTES
Hospital Medicine Daily Progress Note    Date of Service  8/23/2021    Chief Complaint  Julien Dao is a 89 y.o. male admitted 8/20/2021 with recurrent falls    Hospital Course  Julien Dao is a 89 y.o. male with past medical history of CAD, diabetes mellitus, hypertension, recurrent UTI, chronic Sher catheter who presented 8/20/2021 with recurrent falls.  X-rays done did not show any fracture.  Patient was however found to have UTI and started empirically on Zosyn given his prior history of Pseudomonas.  Patient worked with PT/OT and SNF/home health was recommended however patient and his wife are adamant about not going to SNF.      Interval Problem Update  CAPRI overnight  Pt to discharge today  No new complaints    I have personally seen and examined the patient at bedside. I discussed the plan of care with patient and bedside RN.    Consultants/Specialty  none    Code Status  DNAR/DNI    Disposition  Patient is not medically cleared.   Anticipate discharge to to home with organized home healthcare and close outpatient follow-up.  I have placed the appropriate orders for post-discharge needs.    Review of Systems  Review of Systems   Constitutional: Negative for chills and fever.   Respiratory: Negative for cough and shortness of breath.    Cardiovascular: Negative for chest pain and palpitations.   Gastrointestinal: Negative for abdominal pain, nausea and vomiting.   Genitourinary: Negative for dysuria and urgency.   Neurological: Negative for dizziness and headaches.   All other systems reviewed and are negative.       Physical Exam  Temp:  [36.5 °C (97.7 °F)-37.1 °C (98.7 °F)] 36.6 °C (97.8 °F)  Pulse:  [72-76] 74  Resp:  [14-20] 15  BP: (154-179)/(69-93) 154/89  SpO2:  [95 %-97 %] 96 %    Physical Exam  Vitals and nursing note reviewed.   Constitutional:       Appearance: Normal appearance.   Cardiovascular:      Rate and Rhythm: Normal rate and regular rhythm.      Heart sounds: No murmur heard.      Pulmonary:      Effort: Pulmonary effort is normal. No respiratory distress.      Breath sounds: Normal breath sounds.   Genitourinary:     Comments: Urine in wilson bag is cloudy  Neurological:      General: No focal deficit present.      Mental Status: He is alert and oriented to person, place, and time.         Fluids    Intake/Output Summary (Last 24 hours) at 8/23/2021 1122  Last data filed at 8/23/2021 1000  Gross per 24 hour   Intake 1080 ml   Output 3700 ml   Net -2620 ml       Laboratory  Recent Labs     08/20/21  1433 08/21/21  0415   WBC 11.3* 11.4*   RBC 3.68* 4.11*   HEMOGLOBIN 11.0* 12.2*   HEMATOCRIT 33.6* 38.0*   MCV 91.3 92.5   MCH 29.9 29.7   MCHC 32.7* 32.1*   RDW 46.0 47.1   PLATELETCT 272 277   MPV 9.8 10.3     Recent Labs     08/20/21  1433 08/21/21  0415 08/22/21  0452   SODIUM 137 136 137   POTASSIUM 3.5* 3.6 3.2*   CHLORIDE 98 101 102   CO2 25 24 22   GLUCOSE 191* 156* 274*   BUN 16 15 12   CREATININE 1.12 0.93 0.74   CALCIUM 9.3 8.6 8.4                   Imaging  DX-FEMUR-2+ LEFT   Final Result      No radiographic evidence of acute traumatic injury.      DX-CHEST-LIMITED (1 VIEW)   Final Result      Cardiomegaly.      DX-PELVIS-1 OR 2 VIEWS   Final Result      No evidence of fracture or dislocation.      CT-HEAD W/O   Final Result         1. No acute intracranial findings. No evidence of acute intracranial hemorrhage or mass lesion.      2. White matter lucencies most consistent with chronic small vessel ischemic change.                    Assessment/Plan  * Acute cystitis with hematuria- (present on admission)  Assessment & Plan  H/o pseudomonal UTI  Started on Zosyn empirically  Follow UCx  Wilson changed in ER  Urine still cloudy      Recurrent falls  Assessment & Plan  While using walker  PT/OT    KOSTAS (acute kidney injury) (HCC)- (present on admission)  Assessment & Plan  Baseline creatinine of .70  Started on IVF, improving  Repeat BMP in AM  Avoid nephrotoxins    BPH (benign  prostatic hyperplasia)- (present on admission)  Assessment & Plan  On finasteride, flomax with chronic wilson  I question usefulness of flomax and finasteride in setting of chronic wilson placement- I d/w patient. Discontinued both.    Diabetes mellitus with hyperglycemia (HCC)- (present on admission)  Assessment & Plan  On 38U insulin at home, unsure of diet and intake  Lantus 10U with ISS, increase insulin as tolerated    HTN (hypertension)- (present on admission)  Assessment & Plan  Hold amlodipine  Cont metoprolol       VTE prophylaxis: heparin ppx    I have performed a physical exam and reviewed and updated ROS and Plan today (8/23/2021). In review of yesterday's note (8/22/2021), there are no changes except as documented above.

## 2021-08-23 NOTE — WOUND TEAM
Wound consult received regarding patient left groin. Patient left groin with MASD present and what appears to be a yeast-like rash. Ordered Nystatin powder antifungal therapy and interdry cloth to be applied. No need for wound team follow up at this time, orders placed, please call for questions or concerns.    Moisture Associated Skin Damage 08/23/21 Groin (Active)   Wound Image   08/22/21 0830   NEXT Weekly Photo (Inpatient Only) 08/30/21 08/22/21 0830   Drainage Amount None 08/22/21 0830   Periwound Assessment Red;Pink 08/22/21 0830   IAD Cleansing Foam Cleanser/Washcloth 08/22/21 0830   Periwound Protectant Antifungal Therapy 08/22/21 0830

## 2021-08-23 NOTE — PROGRESS NOTES
Received report from day shift RN, assumed care of pt. Pt currently awake and alert. Assessment completed. Pt upset about not being discharged today and expressing wanting to go home. IV infiltrated, removed. Pt currently refusing a new IV for abx, will attempt again soon. Answered any questions. Safety precautions in place and bed alarm is on. Call light and belongings within reach.

## 2021-08-26 ENCOUNTER — APPOINTMENT (OUTPATIENT)
Dept: RADIOLOGY | Facility: MEDICAL CENTER | Age: 86
End: 2021-08-26
Attending: EMERGENCY MEDICINE
Payer: MEDICARE

## 2021-08-26 ENCOUNTER — HOSPITAL ENCOUNTER (EMERGENCY)
Facility: MEDICAL CENTER | Age: 86
End: 2021-08-26
Attending: EMERGENCY MEDICINE
Payer: MEDICARE

## 2021-08-26 VITALS
RESPIRATION RATE: 18 BRPM | HEART RATE: 75 BPM | TEMPERATURE: 97.8 F | OXYGEN SATURATION: 94 % | SYSTOLIC BLOOD PRESSURE: 159 MMHG | DIASTOLIC BLOOD PRESSURE: 93 MMHG

## 2021-08-26 DIAGNOSIS — R10.84 GENERALIZED ABDOMINAL PAIN: ICD-10-CM

## 2021-08-26 DIAGNOSIS — N32.89 DISTENDED BLADDER: ICD-10-CM

## 2021-08-26 DIAGNOSIS — N13.30 HYDRONEPHROSIS, UNSPECIFIED HYDRONEPHROSIS TYPE: ICD-10-CM

## 2021-08-26 DIAGNOSIS — T83.9XXA PROBLEM WITH FOLEY CATHETER, INITIAL ENCOUNTER (HCC): ICD-10-CM

## 2021-08-26 LAB
ALBUMIN SERPL BCP-MCNC: 3.2 G/DL (ref 3.2–4.9)
ALBUMIN/GLOB SERPL: 1.1 G/DL
ALP SERPL-CCNC: 80 U/L (ref 30–99)
ALT SERPL-CCNC: 11 U/L (ref 2–50)
ANION GAP SERPL CALC-SCNC: 12 MMOL/L (ref 7–16)
AST SERPL-CCNC: 14 U/L (ref 12–45)
BASOPHILS # BLD AUTO: 0.5 % (ref 0–1.8)
BASOPHILS # BLD: 0.04 K/UL (ref 0–0.12)
BILIRUB SERPL-MCNC: 0.2 MG/DL (ref 0.1–1.5)
BUN SERPL-MCNC: 11 MG/DL (ref 8–22)
CALCIUM SERPL-MCNC: 8.8 MG/DL (ref 8.4–10.2)
CHLORIDE SERPL-SCNC: 101 MMOL/L (ref 96–112)
CO2 SERPL-SCNC: 26 MMOL/L (ref 20–33)
CREAT SERPL-MCNC: 0.83 MG/DL (ref 0.5–1.4)
EOSINOPHIL # BLD AUTO: 0.32 K/UL (ref 0–0.51)
EOSINOPHIL NFR BLD: 3.9 % (ref 0–6.9)
ERYTHROCYTE [DISTWIDTH] IN BLOOD BY AUTOMATED COUNT: 43.9 FL (ref 35.9–50)
GLOBULIN SER CALC-MCNC: 2.9 G/DL (ref 1.9–3.5)
GLUCOSE SERPL-MCNC: 231 MG/DL (ref 65–99)
HCT VFR BLD AUTO: 34.6 % (ref 42–52)
HGB BLD-MCNC: 11.7 G/DL (ref 14–18)
IMM GRANULOCYTES # BLD AUTO: 0.07 K/UL (ref 0–0.11)
IMM GRANULOCYTES NFR BLD AUTO: 0.9 % (ref 0–0.9)
LIPASE SERPL-CCNC: 45 U/L (ref 7–58)
LYMPHOCYTES # BLD AUTO: 1.86 K/UL (ref 1–4.8)
LYMPHOCYTES NFR BLD: 22.6 % (ref 22–41)
MCH RBC QN AUTO: 29.6 PG (ref 27–33)
MCHC RBC AUTO-ENTMCNC: 33.8 G/DL (ref 33.7–35.3)
MCV RBC AUTO: 87.6 FL (ref 81.4–97.8)
MONOCYTES # BLD AUTO: 0.52 K/UL (ref 0–0.85)
MONOCYTES NFR BLD AUTO: 6.3 % (ref 0–13.4)
NEUTROPHILS # BLD AUTO: 5.42 K/UL (ref 1.82–7.42)
NEUTROPHILS NFR BLD: 65.8 % (ref 44–72)
NRBC # BLD AUTO: 0 K/UL
NRBC BLD-RTO: 0 /100 WBC
PLATELET # BLD AUTO: 340 K/UL (ref 164–446)
PMV BLD AUTO: 9.6 FL (ref 9–12.9)
POTASSIUM SERPL-SCNC: 3 MMOL/L (ref 3.6–5.5)
PROT SERPL-MCNC: 6.1 G/DL (ref 6–8.2)
RBC # BLD AUTO: 3.95 M/UL (ref 4.7–6.1)
SODIUM SERPL-SCNC: 139 MMOL/L (ref 135–145)
WBC # BLD AUTO: 8.2 K/UL (ref 4.8–10.8)

## 2021-08-26 PROCEDURE — 80053 COMPREHEN METABOLIC PANEL: CPT

## 2021-08-26 PROCEDURE — 83690 ASSAY OF LIPASE: CPT

## 2021-08-26 PROCEDURE — 85025 COMPLETE CBC W/AUTO DIFF WBC: CPT

## 2021-08-26 PROCEDURE — 74176 CT ABD & PELVIS W/O CONTRAST: CPT | Mod: ME

## 2021-08-26 PROCEDURE — 99285 EMERGENCY DEPT VISIT HI MDM: CPT

## 2021-08-26 PROCEDURE — 700111 HCHG RX REV CODE 636 W/ 250 OVERRIDE (IP): Performed by: EMERGENCY MEDICINE

## 2021-08-26 PROCEDURE — 96374 THER/PROPH/DIAG INJ IV PUSH: CPT

## 2021-08-26 RX ADMIN — FENTANYL CITRATE 50 MCG: 50 INJECTION, SOLUTION INTRAMUSCULAR; INTRAVENOUS at 18:47

## 2021-08-26 ASSESSMENT — PAIN DESCRIPTION - PAIN TYPE: TYPE: ACUTE PAIN

## 2021-08-27 NOTE — ED NOTES
Spoke with patient's wife and provided update on plan of care  Wife requesting a call back after more results have come back

## 2021-08-27 NOTE — ED NOTES
Sher catheter balloon deflated and advanced  Patient reported immediate relief  Urine return noted

## 2021-08-27 NOTE — ED NOTES
Call made to provide update on patient's condition and plan to discharge home  Requesting GMT to get patient home

## 2021-08-27 NOTE — ED NOTES
Pt screaming for nurse repeatedly. Pt states that he will be leaving in an hour no matter what and that we are on a time clock. Pt was refusing IV and blood work at first but after education was agreeable. Pt states that he is just in severe pain and no one care. ERP placed orders for pain medication that was administered. This nurse offered to reposition pt, but pt refused.

## 2021-08-27 NOTE — ED TRIAGE NOTES
Chief Complaint   Patient presents with   • Abdominal Pain     for 1 and a half days, around umbilicus. Recent diagnosis with UTI, unsure if he wa taking meds.

## 2021-08-27 NOTE — ED PROVIDER NOTES
ED Provider Note    Scribed for Alvarado Spicer M.D. by Amy Wong. 2021  6:24 PM    Primary care provider: Danis Lehman M.D.  Means of arrival: EMS  History obtained from: Patient  History limited by: None    CHIEF COMPLAINT  Chief Complaint   Patient presents with    Abdominal Pain     for 1 and a half days, around umbilicus. Recent diagnosis with UTI, unsure if he wa taking meds.        HPI  Julien Dao is a 89 y.o. male who presents to the Emergency Department with waxing and waning abdominal pain onset 1.5 days ago. He describes the pain as a sharp pain around his umbilicus. He states he passed some gas which seemed to alleviate his pain. He denies fever, cough, nausea, vomiting, constipation. He denies any previous abdominal surgeries. He states he has been in and out of the hospital for over 2 years now for a variety of ailments.     REVIEW OF SYSTEMS  Pertinent positives include abdominal pain. Pertinent negatives include no fever, cough, nausea, vomiting, constipation.  All other systems reviewed and negative.    PAST MEDICAL HISTORY   has a past medical history of KOSTAS (acute kidney injury) ([facility]) (2021), CAD (coronary artery disease), Delirium (2020), DM (diabetes mellitus) ([facility]) (2014), Falls, Hypertension, and UTI (urinary tract infection) (2018).    SURGICAL HISTORY   has a past surgical history that includes other cardiac surgery; coronary artery bypass, 3; and janice rectal abscess.    SOCIAL HISTORY  Social History     Tobacco Use    Smoking status: Former Smoker     Quit date: 10/19/1972     Years since quittin.8    Smokeless tobacco: Never Used   Vaping Use    Vaping Use: Never used   Substance Use Topics    Alcohol use: Not Currently    Drug use: No      Social History     Substance and Sexual Activity   Drug Use No       FAMILY HISTORY  No pertinent family history.    CURRENT MEDICATIONS  Current Outpatient Medications   Medication Instructions    amLODIPine  (NORVASC) 5 mg, Oral, DAILY    Carboxymethylcellulose Sodium (RETAINE CMC OP) 1 Drop, Both Eyes, DAILY    cyclosporin (RESTASIS) 0.05 % ophthalmic emulsion 1 Drop, Both Eyes, DAILY    gabapentin (NEURONTIN) 100 mg, Oral, 2 TIMES DAILY    insulin glargine 38 Units, Subcutaneous, EVERY EVENING    levoFLOXacin (LEVAQUIN) 500 mg, Oral, DAILY    levothyroxine (SYNTHROID) 25 mcg, Oral, EACH MORNING ON EMPTY STOMACH    metoprolol tartrate (LOPRESSOR) 12.5 mg, Oral, 2 TIMES DAILY    Multiple Vitamins-Minerals (PX MENS MULTIVITAMINS) Tab 1 Tablet, Oral, EVERY MORNING    nystatin (MYCOSTATIN) powder 1 Application, Topical, 3 TIMES DAILY, Apply's on penis    nystatin/triamcinolone (MYCOLOG) 127765-2.1 UNIT/GM-% Cream 1 Application, Topical, 2 TIMES DAILY, Apply's on left leg    Polyethyl Glycol-Propyl Glycol (SYSTANE OP) 1 Drop, Ophthalmic, DAILY    potassium chloride SA (KDUR) 20 MEQ Tab CR 40 mEq, Oral, 2 TIMES DAILY    simvastatin (ZOCOR) 10 mg, Oral, NIGHTLY       ALLERGIES  Allergies   Allergen Reactions    Iodine Anaphylaxis     Pt and pts wife report that it has been so long not sure what happens       PHYSICAL EXAM  VITAL SIGNS: /84   Pulse 78   Temp 36.7 °C (98.1 °F) (Temporal)   Resp 18   SpO2 94%     Constitutional: Well developed, Well nourished, in mild distress, Non-toxic appearance.   HENT: Normocephalic, Atraumatic, Bilateral external ears normal, Oropharynx moist, No oral exudates.   Eyes: PERRLA, EOMI, Conjunctiva normal, No discharge.   Neck: No tenderness, Supple, No stridor.   Lymphatic: No lymphadenopathy noted.   Cardiovascular: Normal heart rate, Normal rhythm.   Thorax & Lungs: Clear to auscultation bilaterally, No respiratory distress, No wheezing, No crackles.   Abdomen: Soft, Mild diffuse abdominal tenderness, No masses, No pulsatile masses.   Skin: Warm, Dry, No erythema, No rash.   Extremities:, No edema No cyanosis.   Musculoskeletal: No tenderness to palpation or major deformities  noted.  Intact distal pulses  Neurologic: Awake, alert. Moves all extremities spontaneously.  Psychiatric: Affect normal, Judgment normal, Mood normal.     LABS  Results for orders placed or performed during the hospital encounter of 08/26/21   CBC WITH DIFFERENTIAL   Result Value Ref Range    WBC 8.2 4.8 - 10.8 K/uL    RBC 3.95 (L) 4.70 - 6.10 M/uL    Hemoglobin 11.7 (L) 14.0 - 18.0 g/dL    Hematocrit 34.6 (L) 42.0 - 52.0 %    MCV 87.6 81.4 - 97.8 fL    MCH 29.6 27.0 - 33.0 pg    MCHC 33.8 33.7 - 35.3 g/dL    RDW 43.9 35.9 - 50.0 fL    Platelet Count 340 164 - 446 K/uL    MPV 9.6 9.0 - 12.9 fL    Neutrophils-Polys 65.80 44.00 - 72.00 %    Lymphocytes 22.60 22.00 - 41.00 %    Monocytes 6.30 0.00 - 13.40 %    Eosinophils 3.90 0.00 - 6.90 %    Basophils 0.50 0.00 - 1.80 %    Immature Granulocytes 0.90 0.00 - 0.90 %    Nucleated RBC 0.00 /100 WBC    Neutrophils (Absolute) 5.42 1.82 - 7.42 K/uL    Lymphs (Absolute) 1.86 1.00 - 4.80 K/uL    Monos (Absolute) 0.52 0.00 - 0.85 K/uL    Eos (Absolute) 0.32 0.00 - 0.51 K/uL    Baso (Absolute) 0.04 0.00 - 0.12 K/uL    Immature Granulocytes (abs) 0.07 0.00 - 0.11 K/uL    NRBC (Absolute) 0.00 K/uL   COMP METABOLIC PANEL   Result Value Ref Range    Sodium 139 135 - 145 mmol/L    Potassium 3.0 (L) 3.6 - 5.5 mmol/L    Chloride 101 96 - 112 mmol/L    Co2 26 20 - 33 mmol/L    Anion Gap 12.0 7.0 - 16.0    Glucose 231 (H) 65 - 99 mg/dL    Bun 11 8 - 22 mg/dL    Creatinine 0.83 0.50 - 1.40 mg/dL    Calcium 8.8 8.4 - 10.2 mg/dL    AST(SGOT) 14 12 - 45 U/L    ALT(SGPT) 11 2 - 50 U/L    Alkaline Phosphatase 80 30 - 99 U/L    Total Bilirubin 0.2 0.1 - 1.5 mg/dL    Albumin 3.2 3.2 - 4.9 g/dL    Total Protein 6.1 6.0 - 8.2 g/dL    Globulin 2.9 1.9 - 3.5 g/dL    A-G Ratio 1.1 g/dL   LIPASE   Result Value Ref Range    Lipase 45 7 - 58 U/L   ESTIMATED GFR   Result Value Ref Range    GFR If African American >60 >60 mL/min/1.73 m 2    GFR If Non African American >60 >60 mL/min/1.73 m 2        RADIOLOGY  CT-ABDOMEN-PELVIS W/O   Final Result      1.  Distended urinary bladder likely related to the Sher catheter having been withdrawn into the penis and the balloon located within the penile urethra.      2.  Stable moderate bilateral hydronephrosis without evidence of ureteral stone likely related to chronic urinary bladder outlet obstruction.      3.   Gallstones.      4.  Diverticulosis.      5.  Constipation.      6.  This was discussed with POLO ALVARADO at 7:28 PM on 8/26/2021.        The radiologist's interpretation of all radiological studies have been reviewed by me.    COURSE & MEDICAL DECISION MAKING  Pertinent Labs & Imaging studies reviewed. (See chart for details)    I reviewed the patient's medical records which showed the patient was admitted recently after he sustained a fall. He was experiencing hip pain. He was additionally admitted recently for a UTI caused by pseudomonas. He refused going to a skilled nursing facility.    6:24 PM - Patient seen and examined at bedside. Ordered CT-Abdomen-Pelvis, CBC with diff, CMP, and Lipase to evaluate his symptoms. The differential diagnoses include but are not limited to: intraabdominal infection, hydronephrosis, kidney stone,     6:41 PM - Ordered Estimated GFR.     6:44 PM - Treated patient with fentanyl 50 mcg.     7:35 PM - Patient was reevaluated at bedside. Discussed lab and radiology results with the patient and informed them that his Sher catheter is not in the right position which is likely causing bladder spasms leading to pain. I discussed the plan to change his catheter. Patient verbalizes understanding and agreement to this plan of care.     8:03 PM - Patient was reevaluated at bedside. I informed him his catheter was placed correctly. I also discussed the plan for discharge at this time. We will call an ambulance to take him home. Patient verbalizes understanding and agreement to this plan of care.     Decision  Making:  Patient with abdominal pain of uncertain etiology, CT scan shows the patient's Sher catheter balloon is in the penis, will be repositioned it, laboratory tests otherwise were unremarkable, the patient's pain is much better, the patient will be discharged home, return with worsening symptoms.    HTN/IDDM FOLLOW UP:  The patient is referred to a primary physician for blood pressure management, diabetic screening, and for all other preventive health concerns    The patient will return for new or worsening symptoms and is stable at the time of discharge.    The patient is referred to a primary physician for blood pressure management, diabetic screening, and for all other preventative health concerns.    DISPOSITION:  Patient will be discharged home in stable condition.    FOLLOW UP:  [facility], Emergency Dept  [address]  [place] [id number] [phone]    If symptoms worsen      OUTPATIENT MEDICATIONS:  Discharge Medication List as of 8/26/2021  8:18 PM          FINAL IMPRESSION  1. Generalized abdominal pain    2. Distended bladder    3. Hydronephrosis, unspecified hydronephrosis type    4. Problem with Sher catheter, initial encounter (HCC)          Amy ROWLAND (Scribe), am scribing for, and in the presence of, Alvarado Spicer M.D..    Electronically signed by: Amy Wong (Lucinda), 8/26/2021    Alvarado ROWLAND M.D. personally performed the services described in this documentation, as scribed by Amy Wong in my presence, and it is both accurate and complete.    The note accurately reflects work and decisions made by me.  Alvarado Spicer M.D.  8/27/2021  12:21 AM

## 2021-09-27 ENCOUNTER — APPOINTMENT (OUTPATIENT)
Dept: RADIOLOGY | Facility: MEDICAL CENTER | Age: 86
End: 2021-09-27
Attending: EMERGENCY MEDICINE
Payer: MEDICARE

## 2021-09-27 ENCOUNTER — HOSPITAL ENCOUNTER (EMERGENCY)
Facility: MEDICAL CENTER | Age: 86
End: 2021-09-27
Attending: EMERGENCY MEDICINE
Payer: MEDICARE

## 2021-09-27 VITALS
DIASTOLIC BLOOD PRESSURE: 81 MMHG | BODY MASS INDEX: 25.11 KG/M2 | HEART RATE: 78 BPM | SYSTOLIC BLOOD PRESSURE: 150 MMHG | TEMPERATURE: 97.3 F | WEIGHT: 160 LBS | HEIGHT: 67 IN | OXYGEN SATURATION: 95 % | RESPIRATION RATE: 16 BRPM

## 2021-09-27 DIAGNOSIS — S09.90XA CLOSED HEAD INJURY, INITIAL ENCOUNTER: ICD-10-CM

## 2021-09-27 LAB
ALBUMIN SERPL BCP-MCNC: 3.7 G/DL (ref 3.2–4.9)
ALBUMIN/GLOB SERPL: 1.2 G/DL
ALP SERPL-CCNC: 84 U/L (ref 30–99)
ALT SERPL-CCNC: 14 U/L (ref 2–50)
ANION GAP SERPL CALC-SCNC: 13 MMOL/L (ref 7–16)
AST SERPL-CCNC: 16 U/L (ref 12–45)
BASOPHILS # BLD AUTO: 0.7 % (ref 0–1.8)
BASOPHILS # BLD: 0.06 K/UL (ref 0–0.12)
BILIRUB SERPL-MCNC: 0.3 MG/DL (ref 0.1–1.5)
BUN SERPL-MCNC: 12 MG/DL (ref 8–22)
CALCIUM SERPL-MCNC: 9.5 MG/DL (ref 8.4–10.2)
CHLORIDE SERPL-SCNC: 101 MMOL/L (ref 96–112)
CO2 SERPL-SCNC: 25 MMOL/L (ref 20–33)
CREAT SERPL-MCNC: 0.75 MG/DL (ref 0.5–1.4)
EOSINOPHIL # BLD AUTO: 0.12 K/UL (ref 0–0.51)
EOSINOPHIL NFR BLD: 1.4 % (ref 0–6.9)
ERYTHROCYTE [DISTWIDTH] IN BLOOD BY AUTOMATED COUNT: 48.1 FL (ref 35.9–50)
GLOBULIN SER CALC-MCNC: 3.2 G/DL (ref 1.9–3.5)
GLUCOSE SERPL-MCNC: 215 MG/DL (ref 65–99)
HCT VFR BLD AUTO: 43.1 % (ref 42–52)
HGB BLD-MCNC: 13.9 G/DL (ref 14–18)
IMM GRANULOCYTES # BLD AUTO: 0.06 K/UL (ref 0–0.11)
IMM GRANULOCYTES NFR BLD AUTO: 0.7 % (ref 0–0.9)
LYMPHOCYTES # BLD AUTO: 1.82 K/UL (ref 1–4.8)
LYMPHOCYTES NFR BLD: 21.2 % (ref 22–41)
MCH RBC QN AUTO: 29 PG (ref 27–33)
MCHC RBC AUTO-ENTMCNC: 32.3 G/DL (ref 33.7–35.3)
MCV RBC AUTO: 90 FL (ref 81.4–97.8)
MONOCYTES # BLD AUTO: 0.67 K/UL (ref 0–0.85)
MONOCYTES NFR BLD AUTO: 7.8 % (ref 0–13.4)
NEUTROPHILS # BLD AUTO: 5.86 K/UL (ref 1.82–7.42)
NEUTROPHILS NFR BLD: 68.2 % (ref 44–72)
NRBC # BLD AUTO: 0 K/UL
NRBC BLD-RTO: 0 /100 WBC
PLATELET # BLD AUTO: 273 K/UL (ref 164–446)
PMV BLD AUTO: 10.4 FL (ref 9–12.9)
POTASSIUM SERPL-SCNC: 3.2 MMOL/L (ref 3.6–5.5)
PROT SERPL-MCNC: 6.9 G/DL (ref 6–8.2)
RBC # BLD AUTO: 4.79 M/UL (ref 4.7–6.1)
SODIUM SERPL-SCNC: 139 MMOL/L (ref 135–145)
WBC # BLD AUTO: 8.6 K/UL (ref 4.8–10.8)

## 2021-09-27 PROCEDURE — 71045 X-RAY EXAM CHEST 1 VIEW: CPT

## 2021-09-27 PROCEDURE — 80053 COMPREHEN METABOLIC PANEL: CPT

## 2021-09-27 PROCEDURE — 99284 EMERGENCY DEPT VISIT MOD MDM: CPT | Mod: 25

## 2021-09-27 PROCEDURE — 70450 CT HEAD/BRAIN W/O DYE: CPT | Mod: ME

## 2021-09-27 PROCEDURE — 85025 COMPLETE CBC W/AUTO DIFF WBC: CPT

## 2021-09-27 RX ORDER — TAMSULOSIN HYDROCHLORIDE 0.4 MG/1
0.8 CAPSULE ORAL EVERY EVENING
Status: SHIPPED | COMMUNITY
End: 2022-02-04

## 2021-09-27 RX ORDER — FINASTERIDE 5 MG/1
5 TABLET, FILM COATED ORAL EVERY MORNING
Status: SHIPPED | COMMUNITY
End: 2022-02-04

## 2021-09-27 RX ORDER — FUROSEMIDE 20 MG/1
20 TABLET ORAL EVERY MORNING
Status: ON HOLD | COMMUNITY
End: 2021-10-25

## 2021-09-27 ASSESSMENT — FIBROSIS 4 INDEX: FIB4 SCORE: 1.14

## 2021-09-27 NOTE — ED NOTES
"Spoke with spouse Mercedes, who states she \"needs help getting him up over the lawn and usually [facility] or [facility] helps us\". [place] contacted at this time to help arrange transport.   "

## 2021-09-27 NOTE — ED NOTES
Med rec updated and complete  Allergies reviewed, pts wife is not sure what happen. Per historical history   Pt is not sure what medications he is taking, called his wife (Mercedes) @ [phone] to verify all pts medications   Pts wife reports no antibiotics in the last 30 days.    No current facility-administered medications on file prior to encounter.     Current Outpatient Medications on File Prior to Encounter   Medication Sig Dispense Refill   • finasteride (PROSCAR) 5 MG Tab Take 5 mg by mouth every day.     • tamsulosin (FLOMAX) 0.4 MG capsule Take 0.4 mg by mouth every day.     • furosemide (LASIX) 20 MG Tab Take 20 mg by mouth every day.     • metoprolol tartrate (LOPRESSOR) 25 MG Tab Take 12.5 mg by mouth 2 times a day.     • nystatin (MYCOSTATIN) powder Apply 1 Application topically every day. Apply's on penis      • Carboxymethylcellulose Sodium (RETAINE CMC OP) Administer 1 Drop into both eyes 1 time a day as needed (For dry eye).     • potassium chloride SA (KDUR) 20 MEQ Tab CR Take 2 Tablets by mouth 2 times a day. 60 tablet 11   • cyclosporin (RESTASIS) 0.05 % ophthalmic emulsion Administer 1 Drop into both eyes 2 times a day as needed. Indications: Drying and Inflammation of Cornea and Conjunctiva of Eyes     • simvastatin (ZOCOR) 10 MG Tab Take 10 mg by mouth every evening.     • gabapentin (NEURONTIN) 100 MG Cap Take 100 mg by mouth 2 times a day.     • insulin glargine (INSULIN GLARGINE) 100 UNIT/ML Solution Pen-injector injection Inject 35 Units under the skin every evening.     • levothyroxine (SYNTHROID) 25 MCG Tab Take 1 tablet by mouth Every morning on an empty stomach. (Patient taking differently: Take 25 mcg by mouth every evening.) 30 tablet 3   • amLODIPine (NORVASC) 5 MG Tab Take 1 Tab by mouth every day. 30 Tab 1

## 2021-09-27 NOTE — ED PROVIDER NOTES
"ED Provider Note    CHIEF COMPLAINT  Chief Complaint   Patient presents with   • Fall Less than 10 Feet     EMS reports pt. with GLF. Pt. reports pain to R posterior shoulderblade, and head pain; denies other complaints at this time. Pt. reports \"I felt dizzy and I tried to stop my fall\". EMS reports pt. fell backwards into night stand. Pt. denies LOC or blood thinner use. Denies neck pain. Denies c-spine pain to palp.       HPI  Julien Dao is a 89 y.o. male who presents for evaluation of potential head injury.  The patient was brought in by ambulance.  He apparently lost his balance and hit the back of his head on the nightstand.  He does not use any blood thinners.  He denies any neck or back pain.  No report of loss of consciousness.  The patient is a very poor historian.  The patient denies any pain or injury to the upper or lower extremities.  Currently no numbness weakness tingling loss of speech control.    REVIEW OF SYSTEMS  See HPI for further details.  No high fevers chills night sweats weight loss all other systems are negative.     PAST MEDICAL HISTORY  Past Medical History:   Diagnosis Date   • KOSTAS (acute kidney injury) ([facility]) 2021   • Delirium 2020   • UTI (urinary tract infection) 2018   • DM (diabetes mellitus) ([facility]) 2014   • CAD (coronary artery disease)    • Falls    • Hypertension        FAMILY HISTORY  Noncontributory    SOCIAL HISTORY  Social History     Socioeconomic History   • Marital status:      Spouse name: Not on file   • Number of children: Not on file   • Years of education: Not on file   • Highest education level: Not on file   Occupational History   • Not on file   Tobacco Use   • Smoking status: Former Smoker     Quit date: 10/19/1972     Years since quittin.9   • Smokeless tobacco: Never Used   Vaping Use   • Vaping Use: Never used   Substance and Sexual Activity   • Alcohol use: Not Currently   • Drug use: No   • Sexual activity: Not on file   Other " Topics Concern   • Not on file   Social History Narrative   • Not on file     Social Determinants of Health     Financial Resource Strain:    • Difficulty of Paying Living Expenses:    Food Insecurity:    • Worried About Running Out of Food in the Last Year:    • Ran Out of Food in the Last Year:    Transportation Needs:    • Lack of Transportation (Medical):    • Lack of Transportation (Non-Medical):    Physical Activity:    • Days of Exercise per Week:    • Minutes of Exercise per Session:    Stress:    • Feeling of Stress :    Social Connections:    • Frequency of Communication with Friends and Family:    • Frequency of Social Gatherings with Friends and Family:    • Attends Spiritism Services:    • Active Member of Clubs or Organizations:    • Attends Club or Organization Meetings:    • Marital Status:    Intimate Partner Violence:    • Fear of Current or Ex-Partner:    • Emotionally Abused:    • Physically Abused:    • Sexually Abused:        SURGICAL HISTORY  Past Surgical History:   Procedure Laterality Date   • CORONARY ARTERY BYPASS, 3     • OTHER CARDIAC SURGERY     • TED RECTAL ABSCESS         CURRENT MEDICATIONS  Home Medications     Reviewed by Anjum Burns (Pharmacy Tech) on 09/27/21 at 1527  Med List Status: Complete   Medication Last Dose Status   amLODIPine (NORVASC) 5 MG Tab 9/26/2021 Active   Carboxymethylcellulose Sodium (RETAINE CMC OP) > 1 week Active   cyclosporin (RESTASIS) 0.05 % ophthalmic emulsion > 1 week Active   finasteride (PROSCAR) 5 MG Tab 9/26/2021 Active   furosemide (LASIX) 20 MG Tab 9/26/2021 Active   gabapentin (NEURONTIN) 100 MG Cap 9/26/2021 Active   insulin glargine (INSULIN GLARGINE) 100 UNIT/ML Solution Pen-injector injection 9/26/2021 Active   levothyroxine (SYNTHROID) 25 MCG Tab 9/27/2021 Active   metoprolol tartrate (LOPRESSOR) 25 MG Tab 9/26/2021 Active   nystatin (MYCOSTATIN) powder > 2 days Active   potassium chloride SA (KDUR) 20 MEQ Tab CR 9/26/2021  Active   simvastatin (ZOCOR) 10 MG Tab 9/26/2021 Active   tamsulosin (FLOMAX) 0.4 MG capsule 9/26/2021 Active            No current facility-administered medications for this encounter.    Current Outpatient Medications:   •  finasteride (PROSCAR) 5 MG Tab, Take 5 mg by mouth every day., Disp: , Rfl:   •  tamsulosin (FLOMAX) 0.4 MG capsule, Take 0.4 mg by mouth every day., Disp: , Rfl:   •  furosemide (LASIX) 20 MG Tab, Take 20 mg by mouth every day., Disp: , Rfl:   •  metoprolol tartrate (LOPRESSOR) 25 MG Tab, Take 12.5 mg by mouth 2 times a day., Disp: , Rfl:   •  nystatin (MYCOSTATIN) powder, Apply 1 Application topically every day. Apply's on penis , Disp: , Rfl:   •  Carboxymethylcellulose Sodium (RETAINE CMC OP), Administer 1 Drop into both eyes 1 time a day as needed (For dry eye)., Disp: , Rfl:   •  potassium chloride SA (KDUR) 20 MEQ Tab CR, Take 2 Tablets by mouth 2 times a day., Disp: 60 tablet, Rfl: 11  •  cyclosporin (RESTASIS) 0.05 % ophthalmic emulsion, Administer 1 Drop into both eyes 2 times a day as needed. Indications: Drying and Inflammation of Cornea and Conjunctiva of Eyes, Disp: , Rfl:   •  simvastatin (ZOCOR) 10 MG Tab, Take 10 mg by mouth every evening., Disp: , Rfl:   •  gabapentin (NEURONTIN) 100 MG Cap, Take 100 mg by mouth 2 times a day., Disp: , Rfl:   •  insulin glargine (INSULIN GLARGINE) 100 UNIT/ML Solution Pen-injector injection, Inject 35 Units under the skin every evening., Disp: , Rfl:   •  levothyroxine (SYNTHROID) 25 MCG Tab, Take 1 tablet by mouth Every morning on an empty stomach. (Patient taking differently: Take 25 mcg by mouth every evening.), Disp: 30 tablet, Rfl: 3  •  amLODIPine (NORVASC) 5 MG Tab, Take 1 Tab by mouth every day., Disp: 30 Tab, Rfl: 1      ALLERGIES  Allergies   Allergen Reactions   • Iodine Anaphylaxis     Pt and pts wife report that it has been so long not sure what happens       PHYSICAL EXAM  VITAL SIGNS: /70   Pulse 80   Temp 36.8 °C (98.3  "°F) (Temporal)   Resp 18   Ht 1.702 m (5' 7\")   Wt 72.6 kg (160 lb)   SpO2 95%   BMI 25.06 kg/m²       Constitutional: Well developed, Well nourished, No acute distress, Non-toxic appearance.   HENT: Possible small hematoma noted to the occiput no hemotympanum negative mercado sign, Bilateral external ears normal, Oropharynx moist, No oral exudates, Nose normal.   Eyes: PERRLA, EOMI, Conjunctiva normal, No discharge.   Neck: Normal range of motion, No tenderness, Supple, No stridor.   Cardiovascular: Normal heart rate, Normal rhythm, No murmurs, No rubs, No gallops.   Thorax & Lungs: Normal breath sounds, No respiratory distress, No wheezing, No chest tenderness.   Abdomen: Bowel sounds normal, Soft, No tenderness, No masses, No pulsatile masses.   Skin: Warm, Dry, No erythema, No rash.   Extremities: Intact distal pulses, No edema, No tenderness, No cyanosis, No clubbing.   Musculoskeletal: Good range of motion in all major joints. No tenderness to palpation or major deformities noted.   Neurologic: Cranial nerves II through XII grossly intact.  Patient is somewhat repetitive and appears to be chronic we confused but no evidence of focal neurological deficit  Psychiatric: Affect normal, Judgment normal, Mood normal.     CT-HEAD W/O   Final Result      1.  Cerebral atrophy and chronic microvascular ischemic type changes.   2.  No acute intracranial abnormality.      DX-CHEST-PORTABLE (1 VIEW)   Final Result      No radiographic evidence of acute traumatic or cardiopulmonary process.          Results for orders placed or performed during the hospital encounter of 09/27/21   CBC WITH DIFFERENTIAL   Result Value Ref Range    WBC 8.6 4.8 - 10.8 K/uL    RBC 4.79 4.70 - 6.10 M/uL    Hemoglobin 13.9 (L) 14.0 - 18.0 g/dL    Hematocrit 43.1 42.0 - 52.0 %    MCV 90.0 81.4 - 97.8 fL    MCH 29.0 27.0 - 33.0 pg    MCHC 32.3 (L) 33.7 - 35.3 g/dL    RDW 48.1 35.9 - 50.0 fL    Platelet Count 273 164 - 446 K/uL    MPV 10.4 9.0 - " 12.9 fL    Neutrophils-Polys 68.20 44.00 - 72.00 %    Lymphocytes 21.20 (L) 22.00 - 41.00 %    Monocytes 7.80 0.00 - 13.40 %    Eosinophils 1.40 0.00 - 6.90 %    Basophils 0.70 0.00 - 1.80 %    Immature Granulocytes 0.70 0.00 - 0.90 %    Nucleated RBC 0.00 /100 WBC    Neutrophils (Absolute) 5.86 1.82 - 7.42 K/uL    Lymphs (Absolute) 1.82 1.00 - 4.80 K/uL    Monos (Absolute) 0.67 0.00 - 0.85 K/uL    Eos (Absolute) 0.12 0.00 - 0.51 K/uL    Baso (Absolute) 0.06 0.00 - 0.12 K/uL    Immature Granulocytes (abs) 0.06 0.00 - 0.11 K/uL    NRBC (Absolute) 0.00 K/uL   Comp Metabolic Panel   Result Value Ref Range    Sodium 139 135 - 145 mmol/L    Potassium 3.2 (L) 3.6 - 5.5 mmol/L    Chloride 101 96 - 112 mmol/L    Co2 25 20 - 33 mmol/L    Anion Gap 13.0 7.0 - 16.0    Glucose 215 (H) 65 - 99 mg/dL    Bun 12 8 - 22 mg/dL    Creatinine 0.75 0.50 - 1.40 mg/dL    Calcium 9.5 8.4 - 10.2 mg/dL    AST(SGOT) 16 12 - 45 U/L    ALT(SGPT) 14 2 - 50 U/L    Alkaline Phosphatase 84 30 - 99 U/L    Total Bilirubin 0.3 0.1 - 1.5 mg/dL    Albumin 3.7 3.2 - 4.9 g/dL    Total Protein 6.9 6.0 - 8.2 g/dL    Globulin 3.2 1.9 - 3.5 g/dL    A-G Ratio 1.2 g/dL   ESTIMATED GFR   Result Value Ref Range    GFR If African American >60 >60 mL/min/1.73 m 2    GFR If Non African American >60 >60 mL/min/1.73 m 2       COURSE & MEDICAL DECISION MAKING  Pertinent Labs & Imaging studies reviewed. (See chart for details)  I reviewed the patient's records.  Here he had a CBC metabolic panel which were unremarkable other than very mild hyperglycemia without acidosis.  CT scan of the head did not demonstrate any acute process and chest x-ray was normal as well.  The patient appears to be at his baseline.  We will work on getting him discharged back to his care facility    FINAL IMPRESSION  1.  Minor head injury         Electronically signed by: Davion Hartley M.D., 9/27/2021 2:27 PM

## 2021-09-27 NOTE — Clinical Note
Pt. Verbalizes understanding of discharge instructions. Wheeled to lobby with wheelchair. Pt. Alert/awake in NAD. Ambulated approx 20ft with walker and steady gait prior to discharge. Pt. spouse to drive pt. Home per patient. Discharged to lobby to await  ride and triage staff aware of pt. Need for assistance to car.

## 2021-10-08 ENCOUNTER — HOSPITAL ENCOUNTER (EMERGENCY)
Facility: MEDICAL CENTER | Age: 86
End: 2021-10-08
Attending: EMERGENCY MEDICINE
Payer: MEDICARE

## 2021-10-08 VITALS
TEMPERATURE: 97 F | RESPIRATION RATE: 18 BRPM | HEART RATE: 73 BPM | SYSTOLIC BLOOD PRESSURE: 205 MMHG | WEIGHT: 158.73 LBS | OXYGEN SATURATION: 97 % | HEIGHT: 67 IN | BODY MASS INDEX: 24.91 KG/M2 | DIASTOLIC BLOOD PRESSURE: 101 MMHG

## 2021-10-08 DIAGNOSIS — T83.9XXA PROBLEM WITH FOLEY CATHETER, INITIAL ENCOUNTER (HCC): ICD-10-CM

## 2021-10-08 PROCEDURE — 306637 HCHG MISC ORTHO ITEM RC 0274

## 2021-10-08 PROCEDURE — 51702 INSERT TEMP BLADDER CATH: CPT

## 2021-10-08 PROCEDURE — 303105 HCHG CATHETER EXTRA

## 2021-10-08 PROCEDURE — 99283 EMERGENCY DEPT VISIT LOW MDM: CPT

## 2021-10-08 PROCEDURE — 51703 INSERT BLADDER CATH COMPLEX: CPT

## 2021-10-08 ASSESSMENT — FIBROSIS 4 INDEX: FIB4 SCORE: 1.44

## 2021-10-08 NOTE — ED PROVIDER NOTES
"ED Provider Note    ED Provider Note    Primary care provider: Danis Lehman M.D.  Means of arrival: EMS  History obtained from: Patient    CHIEF COMPLAINT  Chief Complaint   Patient presents with   • Urinary Catheter Problem     Catheter is cut in half and protruding from penis but unable to remove due to resistance. Sent by Home Health     Seen at 10:52 AM.   HPI  Julien Dao is a 89 y.o. male who presents to the Emergency Department Sher catheter malfunction.  The patient is not the best historian.  He has a history of some dementia.  He denies any abdominal pain, he is not exactly sure what happened to the Sher catheter.    REVIEW OF SYSTEMS  See HPI,   Remainder of ROS due to dementia.    PAST MEDICAL HISTORY   has a past medical history of KOSTAS (acute kidney injury) ([facility]) (2021), CAD (coronary artery disease), Delirium (2020), DM (diabetes mellitus) ([facility]) (2014), Falls, Hypertension, and UTI (urinary tract infection) (2018).    SURGICAL HISTORY   has a past surgical history that includes other cardiac surgery; coronary artery bypass, 3; and janice rectal abscess.    SOCIAL HISTORY  Social History     Tobacco Use   • Smoking status: Former Smoker     Quit date: 10/19/1972     Years since quittin.0   • Smokeless tobacco: Never Used   Vaping Use   • Vaping Use: Never used   Substance Use Topics   • Alcohol use: Not Currently   • Drug use: No      Social History     Substance and Sexual Activity   Drug Use No       FAMILY HISTORY  No family history on file.    CURRENT MEDICATIONS  Reviewed.  See Encounter Summary.     ALLERGIES  Allergies   Allergen Reactions   • Iodine Anaphylaxis     Pt and pts wife report that it has been so long not sure what happens       PHYSICAL EXAM  VITAL SIGNS: BP (!) 178/86   Pulse 66   Temp 36.1 °C (97 °F) (Temporal)   Resp 18   Ht 1.702 m (5' 7\")   Wt 72 kg (158 lb 11.7 oz)   SpO2 96%   BMI 24.86 kg/m²   Constitutional: Awake, alert in no apparent " distress.  HENT: Normocephalic, Bilateral external ears normal. Nose normal.   Eyes: Conjunctiva normal, non-icteric, EOMI.    Thorax & Lungs: Easy unlabored respirations, Clear to ascultation bilaterally.  Cardiovascular: Regular rate, Regular rhythm, No murmurs, rubs or gallops. Bilateral pulses symmetrical.   Abdomen:  Soft, nontender, nondistended, normal active bowel sounds.   : Circumcised, Sher catheter terminates about 15 cm from the urethral meatus and appears to have been cut.  Skin: Visualized skin is  Dry, No erythema, No rash.   Musculoskeletal:   No cyanosis, clubbing or edema. No leg asymmetry.   Neurologic: Alert, Grossly non-focal.   Psychiatric: Normal affect, Normal mood  Lymphatic:  No cervical LAD        10:52 AM - Medical record reviewed, history of chronic indwelling Sher.  Patient seen and examined at bedside.    Procedure note: Initially I took a 10 cc syringe with a 14-gauge IV tubing and placed it into the lumen that leads to the Sher balloon, with negative pressure I was able to aspirate a few cc of water but was unable to remove the damaged Sher catheter.  After careful examination of the Sher tubing, there appears to be a manufacturing defect proximal to where the tubing was cut.  I then used scissors to remove this manufacturing defect, following this the balloon spontaneously deflated and the Sher was able to be removed without difficulty.    Decision Making:  This is a pleasant 89 y.o. year old male who presents with Sher catheter malfunction.  It is not entirely clear who cut the Sher tubing but initially was not able to be removed after this.  Presumably the nursing facility cut the tube after they were having difficulty deflating the Sher balloon.  See above procedure, I was able to deflate the balloon successfully and the catheter was removed in its entirety and then replaced.  This was tolerated well without complications and the patient is now stable for discharge.   As he does not have any symptoms of sepsis, I do not feel that a urinalysis will be beneficial.  If he has any bacteria in the urine this would likely be a colonization and not require additional antibiotics at this time.    Discharge Medications:  New Prescriptions    No medications on file       The patient was discharged home (see d/c instructions) was told to return immediately for any signs or symptoms listed, or any worsening at all.  The patient verbally agreed to the discharge precautions and follow-up plan which is documented in EPIC.    {  FINAL IMPRESSION  1. Problem with Sher catheter, initial encounter ([facility])

## 2021-10-08 NOTE — ED TRIAGE NOTES
Chief Complaint   Patient presents with   • Urinary Catheter Problem     Catheter is cut in half and protruding from penis but unable to remove due to resistance. Sent by [facility]

## 2021-10-16 ENCOUNTER — HOSPITAL ENCOUNTER (OUTPATIENT)
Facility: MEDICAL CENTER | Age: 86
End: 2021-10-16
Attending: NURSE PRACTITIONER
Payer: MEDICARE

## 2021-10-16 PROCEDURE — 87077 CULTURE AEROBIC IDENTIFY: CPT

## 2021-10-16 PROCEDURE — 87186 SC STD MICRODIL/AGAR DIL: CPT

## 2021-10-16 PROCEDURE — 87086 URINE CULTURE/COLONY COUNT: CPT

## 2021-10-18 ENCOUNTER — HOSPITAL ENCOUNTER (OUTPATIENT)
Facility: MEDICAL CENTER | Age: 86
End: 2021-10-18
Attending: FAMILY MEDICINE
Payer: MEDICARE

## 2021-10-18 PROCEDURE — 87186 SC STD MICRODIL/AGAR DIL: CPT

## 2021-10-18 PROCEDURE — 87086 URINE CULTURE/COLONY COUNT: CPT

## 2021-10-21 ENCOUNTER — APPOINTMENT (OUTPATIENT)
Dept: RADIOLOGY | Facility: MEDICAL CENTER | Age: 86
DRG: 641 | End: 2021-10-21
Attending: EMERGENCY MEDICINE
Payer: MEDICARE

## 2021-10-21 ENCOUNTER — HOSPITAL ENCOUNTER (INPATIENT)
Facility: MEDICAL CENTER | Age: 86
LOS: 3 days | DRG: 641 | End: 2021-10-25
Attending: EMERGENCY MEDICINE | Admitting: INTERNAL MEDICINE
Payer: MEDICARE

## 2021-10-21 DIAGNOSIS — Z02.9 DISCHARGE PLANNING ISSUES: ICD-10-CM

## 2021-10-21 DIAGNOSIS — R33.9 URINARY RETENTION: ICD-10-CM

## 2021-10-21 DIAGNOSIS — E87.5 HYPERKALEMIA: ICD-10-CM

## 2021-10-21 DIAGNOSIS — R29.6 FREQUENT FALLS: ICD-10-CM

## 2021-10-21 DIAGNOSIS — N40.1 BENIGN PROSTATIC HYPERPLASIA WITH URINARY OBSTRUCTION: ICD-10-CM

## 2021-10-21 DIAGNOSIS — T83.511A URINARY TRACT INFECTION ASSOCIATED WITH INDWELLING URETHRAL CATHETER, INITIAL ENCOUNTER (HCC): ICD-10-CM

## 2021-10-21 DIAGNOSIS — R29.6 RECURRENT FALLS: ICD-10-CM

## 2021-10-21 DIAGNOSIS — E86.0 DEHYDRATION: ICD-10-CM

## 2021-10-21 DIAGNOSIS — Z63.6 CAREGIVER BURDEN: ICD-10-CM

## 2021-10-21 DIAGNOSIS — N13.8 BENIGN PROSTATIC HYPERPLASIA WITH URINARY OBSTRUCTION: ICD-10-CM

## 2021-10-21 DIAGNOSIS — E03.9 ACQUIRED HYPOTHYROIDISM: ICD-10-CM

## 2021-10-21 DIAGNOSIS — Z66 DNR (DO NOT RESUSCITATE): ICD-10-CM

## 2021-10-21 DIAGNOSIS — N39.0 URINARY TRACT INFECTION ASSOCIATED WITH INDWELLING URETHRAL CATHETER, INITIAL ENCOUNTER (HCC): ICD-10-CM

## 2021-10-21 DIAGNOSIS — E11.65 TYPE 2 DIABETES MELLITUS WITH HYPERGLYCEMIA, WITH LONG-TERM CURRENT USE OF INSULIN (HCC): ICD-10-CM

## 2021-10-21 DIAGNOSIS — E78.5 DYSLIPIDEMIA, GOAL LDL BELOW 70: ICD-10-CM

## 2021-10-21 DIAGNOSIS — E87.6 HYPOKALEMIA: ICD-10-CM

## 2021-10-21 DIAGNOSIS — I48.91 ATRIAL FIBRILLATION, UNSPECIFIED TYPE (HCC): ICD-10-CM

## 2021-10-21 DIAGNOSIS — R79.89 ELEVATED TROPONIN: ICD-10-CM

## 2021-10-21 DIAGNOSIS — Z79.4 TYPE 2 DIABETES MELLITUS WITH HYPERGLYCEMIA, WITH LONG-TERM CURRENT USE OF INSULIN (HCC): ICD-10-CM

## 2021-10-21 LAB
ALBUMIN SERPL BCP-MCNC: 4.2 G/DL (ref 3.2–4.9)
ALBUMIN/GLOB SERPL: 1.4 G/DL
ALP SERPL-CCNC: 86 U/L (ref 30–99)
ALT SERPL-CCNC: 10 U/L (ref 2–50)
ANION GAP SERPL CALC-SCNC: 12 MMOL/L (ref 7–16)
APPEARANCE UR: ABNORMAL
AST SERPL-CCNC: 12 U/L (ref 12–45)
BACTERIA #/AREA URNS HPF: ABNORMAL /HPF
BACTERIA UR CULT: ABNORMAL
BACTERIA UR CULT: ABNORMAL
BASOPHILS # BLD AUTO: 0.7 % (ref 0–1.8)
BASOPHILS # BLD: 0.06 K/UL (ref 0–0.12)
BILIRUB SERPL-MCNC: 0.5 MG/DL (ref 0.1–1.5)
BILIRUB UR QL STRIP.AUTO: NEGATIVE
BUN SERPL-MCNC: 14 MG/DL (ref 8–22)
CALCIUM SERPL-MCNC: 9.4 MG/DL (ref 8.4–10.2)
CHLORIDE SERPL-SCNC: 96 MMOL/L (ref 96–112)
CO2 SERPL-SCNC: 27 MMOL/L (ref 20–33)
COLOR UR: YELLOW
CREAT SERPL-MCNC: 1.07 MG/DL (ref 0.5–1.4)
EKG IMPRESSION: NORMAL
EOSINOPHIL # BLD AUTO: 0.13 K/UL (ref 0–0.51)
EOSINOPHIL NFR BLD: 1.5 % (ref 0–6.9)
EPI CELLS #/AREA URNS HPF: NEGATIVE /HPF
ERYTHROCYTE [DISTWIDTH] IN BLOOD BY AUTOMATED COUNT: 47.2 FL (ref 35.9–50)
GLOBULIN SER CALC-MCNC: 3 G/DL (ref 1.9–3.5)
GLUCOSE BLD-MCNC: 192 MG/DL (ref 65–99)
GLUCOSE SERPL-MCNC: 262 MG/DL (ref 65–99)
GLUCOSE UR STRIP.AUTO-MCNC: 100 MG/DL
HCT VFR BLD AUTO: 42.2 % (ref 42–52)
HGB BLD-MCNC: 13.7 G/DL (ref 14–18)
HYALINE CASTS #/AREA URNS LPF: ABNORMAL /LPF
IMM GRANULOCYTES # BLD AUTO: 0.04 K/UL (ref 0–0.11)
IMM GRANULOCYTES NFR BLD AUTO: 0.5 % (ref 0–0.9)
KETONES UR STRIP.AUTO-MCNC: NEGATIVE MG/DL
LACTATE BLD-SCNC: 2.1 MMOL/L (ref 0.5–2)
LEUKOCYTE ESTERASE UR QL STRIP.AUTO: ABNORMAL
LYMPHOCYTES # BLD AUTO: 1.58 K/UL (ref 1–4.8)
LYMPHOCYTES NFR BLD: 18.6 % (ref 22–41)
MAGNESIUM SERPL-MCNC: 2.1 MG/DL (ref 1.5–2.5)
MCH RBC QN AUTO: 28.5 PG (ref 27–33)
MCHC RBC AUTO-ENTMCNC: 32.5 G/DL (ref 33.7–35.3)
MCV RBC AUTO: 87.7 FL (ref 81.4–97.8)
MICRO URNS: ABNORMAL
MONOCYTES # BLD AUTO: 0.63 K/UL (ref 0–0.85)
MONOCYTES NFR BLD AUTO: 7.4 % (ref 0–13.4)
NEUTROPHILS # BLD AUTO: 6.07 K/UL (ref 1.82–7.42)
NEUTROPHILS NFR BLD: 71.3 % (ref 44–72)
NITRITE UR QL STRIP.AUTO: NEGATIVE
NRBC # BLD AUTO: 0 K/UL
NRBC BLD-RTO: 0 /100 WBC
PH UR STRIP.AUTO: 6.5 [PH] (ref 5–8)
PLATELET # BLD AUTO: 261 K/UL (ref 164–446)
PMV BLD AUTO: 10.1 FL (ref 9–12.9)
POTASSIUM SERPL-SCNC: 2.7 MMOL/L (ref 3.6–5.5)
POTASSIUM SERPL-SCNC: 2.9 MMOL/L (ref 3.6–5.5)
PROT SERPL-MCNC: 7.2 G/DL (ref 6–8.2)
PROT UR QL STRIP: NEGATIVE MG/DL
RBC # BLD AUTO: 4.81 M/UL (ref 4.7–6.1)
RBC # URNS HPF: ABNORMAL /HPF
RBC UR QL AUTO: ABNORMAL
SIGNIFICANT IND 70042: ABNORMAL
SITE SITE: ABNORMAL
SODIUM SERPL-SCNC: 135 MMOL/L (ref 135–145)
SOURCE SOURCE: ABNORMAL
SP GR UR STRIP.AUTO: 1.01
TROPONIN T SERPL-MCNC: 40 NG/L (ref 6–19)
TROPONIN T SERPL-MCNC: 42 NG/L (ref 6–19)
TROPONIN T SERPL-MCNC: 48 NG/L (ref 6–19)
WBC # BLD AUTO: 8.5 K/UL (ref 4.8–10.8)
WBC #/AREA URNS HPF: ABNORMAL /HPF

## 2021-10-21 PROCEDURE — 700101 HCHG RX REV CODE 250: Performed by: INTERNAL MEDICINE

## 2021-10-21 PROCEDURE — G0378 HOSPITAL OBSERVATION PER HR: HCPCS

## 2021-10-21 PROCEDURE — 700111 HCHG RX REV CODE 636 W/ 250 OVERRIDE (IP): Performed by: INTERNAL MEDICINE

## 2021-10-21 PROCEDURE — 99220 PR INITIAL OBSERVATION CARE,LEVL III: CPT | Performed by: INTERNAL MEDICINE

## 2021-10-21 PROCEDURE — 83605 ASSAY OF LACTIC ACID: CPT

## 2021-10-21 PROCEDURE — 96372 THER/PROPH/DIAG INJ SC/IM: CPT

## 2021-10-21 PROCEDURE — A9270 NON-COVERED ITEM OR SERVICE: HCPCS | Performed by: INTERNAL MEDICINE

## 2021-10-21 PROCEDURE — 87077 CULTURE AEROBIC IDENTIFY: CPT

## 2021-10-21 PROCEDURE — 700102 HCHG RX REV CODE 250 W/ 637 OVERRIDE(OP): Performed by: INTERNAL MEDICINE

## 2021-10-21 PROCEDURE — 99285 EMERGENCY DEPT VISIT HI MDM: CPT

## 2021-10-21 PROCEDURE — 87086 URINE CULTURE/COLONY COUNT: CPT

## 2021-10-21 PROCEDURE — 81001 URINALYSIS AUTO W/SCOPE: CPT

## 2021-10-21 PROCEDURE — 83735 ASSAY OF MAGNESIUM: CPT

## 2021-10-21 PROCEDURE — 82962 GLUCOSE BLOOD TEST: CPT

## 2021-10-21 PROCEDURE — 85025 COMPLETE CBC W/AUTO DIFF WBC: CPT

## 2021-10-21 PROCEDURE — 84132 ASSAY OF SERUM POTASSIUM: CPT

## 2021-10-21 PROCEDURE — 84484 ASSAY OF TROPONIN QUANT: CPT

## 2021-10-21 PROCEDURE — 71045 X-RAY EXAM CHEST 1 VIEW: CPT

## 2021-10-21 PROCEDURE — 93005 ELECTROCARDIOGRAM TRACING: CPT | Performed by: EMERGENCY MEDICINE

## 2021-10-21 PROCEDURE — 80053 COMPREHEN METABOLIC PANEL: CPT

## 2021-10-21 RX ORDER — BISACODYL 10 MG
10 SUPPOSITORY, RECTAL RECTAL
Status: DISCONTINUED | OUTPATIENT
Start: 2021-10-21 | End: 2021-10-25 | Stop reason: HOSPADM

## 2021-10-21 RX ORDER — TAMSULOSIN HYDROCHLORIDE 0.4 MG/1
0.4 CAPSULE ORAL DAILY
Status: DISCONTINUED | OUTPATIENT
Start: 2021-10-22 | End: 2021-10-22

## 2021-10-21 RX ORDER — SODIUM CHLORIDE AND POTASSIUM CHLORIDE 150; 900 MG/100ML; MG/100ML
INJECTION, SOLUTION INTRAVENOUS CONTINUOUS
Status: DISCONTINUED | OUTPATIENT
Start: 2021-10-21 | End: 2021-10-22

## 2021-10-21 RX ORDER — POLYETHYLENE GLYCOL 3350 17 G/17G
1 POWDER, FOR SOLUTION ORAL
Status: DISCONTINUED | OUTPATIENT
Start: 2021-10-21 | End: 2021-10-25 | Stop reason: HOSPADM

## 2021-10-21 RX ORDER — CHLORTHALIDONE 25 MG/1
25 TABLET ORAL EVERY MORNING
Status: ON HOLD | COMMUNITY
End: 2021-10-25

## 2021-10-21 RX ORDER — ACETAMINOPHEN 325 MG/1
650 TABLET ORAL EVERY 6 HOURS PRN
Status: DISCONTINUED | OUTPATIENT
Start: 2021-10-21 | End: 2021-10-25 | Stop reason: HOSPADM

## 2021-10-21 RX ORDER — LEVOTHYROXINE SODIUM 0.03 MG/1
25 TABLET ORAL EVERY EVENING
Status: DISCONTINUED | OUTPATIENT
Start: 2021-10-21 | End: 2021-10-25 | Stop reason: HOSPADM

## 2021-10-21 RX ORDER — AMOXICILLIN 250 MG
2 CAPSULE ORAL 2 TIMES DAILY
Status: DISCONTINUED | OUTPATIENT
Start: 2021-10-21 | End: 2021-10-25 | Stop reason: HOSPADM

## 2021-10-21 RX ORDER — ONDANSETRON 4 MG/1
4 TABLET, ORALLY DISINTEGRATING ORAL EVERY 4 HOURS PRN
Status: DISCONTINUED | OUTPATIENT
Start: 2021-10-21 | End: 2021-10-25 | Stop reason: HOSPADM

## 2021-10-21 RX ORDER — ONDANSETRON 2 MG/ML
4 INJECTION INTRAMUSCULAR; INTRAVENOUS EVERY 4 HOURS PRN
Status: DISCONTINUED | OUTPATIENT
Start: 2021-10-21 | End: 2021-10-25 | Stop reason: HOSPADM

## 2021-10-21 RX ORDER — POTASSIUM CHLORIDE 20 MEQ/1
20 TABLET, EXTENDED RELEASE ORAL 2 TIMES DAILY
Status: ON HOLD | COMMUNITY
End: 2021-10-25

## 2021-10-21 RX ORDER — AMLODIPINE BESYLATE 5 MG/1
5 TABLET ORAL DAILY
Status: DISCONTINUED | OUTPATIENT
Start: 2021-10-22 | End: 2021-10-25 | Stop reason: HOSPADM

## 2021-10-21 RX ORDER — SIMVASTATIN 10 MG
10 TABLET ORAL NIGHTLY
Status: DISCONTINUED | OUTPATIENT
Start: 2021-10-21 | End: 2021-10-25 | Stop reason: HOSPADM

## 2021-10-21 RX ORDER — POTASSIUM CHLORIDE 20 MEQ/1
40 TABLET, EXTENDED RELEASE ORAL 2 TIMES DAILY
Status: COMPLETED | OUTPATIENT
Start: 2021-10-21 | End: 2021-10-22

## 2021-10-21 RX ORDER — FINASTERIDE 5 MG/1
5 TABLET, FILM COATED ORAL DAILY
Status: DISCONTINUED | OUTPATIENT
Start: 2021-10-22 | End: 2021-10-25 | Stop reason: HOSPADM

## 2021-10-21 RX ADMIN — POTASSIUM CHLORIDE AND SODIUM CHLORIDE: 900; 150 INJECTION, SOLUTION INTRAVENOUS at 17:49

## 2021-10-21 RX ADMIN — INSULIN GLARGINE 10 UNITS: 100 INJECTION, SOLUTION SUBCUTANEOUS at 17:51

## 2021-10-21 RX ADMIN — METOPROLOL TARTRATE 12.5 MG: 25 TABLET, FILM COATED ORAL at 17:55

## 2021-10-21 RX ADMIN — SENNOSIDES AND DOCUSATE SODIUM 2 TABLET: 50; 8.6 TABLET ORAL at 17:47

## 2021-10-21 RX ADMIN — SIMVASTATIN 10 MG: 10 TABLET, FILM COATED ORAL at 21:07

## 2021-10-21 RX ADMIN — LEVOTHYROXINE SODIUM 25 MCG: 0.03 TABLET ORAL at 17:47

## 2021-10-21 RX ADMIN — POTASSIUM CHLORIDE 40 MEQ: 1500 TABLET, EXTENDED RELEASE ORAL at 17:47

## 2021-10-21 RX ADMIN — ENOXAPARIN SODIUM 40 MG: 40 INJECTION SUBCUTANEOUS at 17:49

## 2021-10-21 SDOH — SOCIAL STABILITY - SOCIAL INSECURITY: DEPENDENT RELATIVE NEEDING CARE AT HOME: Z63.6

## 2021-10-21 ASSESSMENT — ENCOUNTER SYMPTOMS
DIARRHEA: 0
DOUBLE VISION: 0
FEVER: 0
NAUSEA: 0
VOMITING: 0
SPUTUM PRODUCTION: 0
HEADACHES: 0
COUGH: 1
MYALGIAS: 0
DIZZINESS: 0
SHORTNESS OF BREATH: 0
MEMORY LOSS: 0

## 2021-10-21 ASSESSMENT — COGNITIVE AND FUNCTIONAL STATUS - GENERAL
CLIMB 3 TO 5 STEPS WITH RAILING: A LOT
TOILETING: A LITTLE
SUGGESTED CMS G CODE MODIFIER DAILY ACTIVITY: CK
DAILY ACTIVITIY SCORE: 18
TURNING FROM BACK TO SIDE WHILE IN FLAT BAD: A LITTLE
DRESSING REGULAR LOWER BODY CLOTHING: A LOT
MOVING TO AND FROM BED TO CHAIR: A LOT
DRESSING REGULAR UPPER BODY CLOTHING: A LITTLE
MOBILITY SCORE: 13
HELP NEEDED FOR BATHING: A LOT
WALKING IN HOSPITAL ROOM: A LOT
STANDING UP FROM CHAIR USING ARMS: A LOT
MOVING FROM LYING ON BACK TO SITTING ON SIDE OF FLAT BED: A LOT
SUGGESTED CMS G CODE MODIFIER MOBILITY: CL

## 2021-10-21 ASSESSMENT — LIFESTYLE VARIABLES
TOTAL SCORE: 0
HAVE PEOPLE ANNOYED YOU BY CRITICIZING YOUR DRINKING: NO
CONSUMPTION TOTAL: NEGATIVE
TOTAL SCORE: 0
AVERAGE NUMBER OF DAYS PER WEEK YOU HAVE A DRINK CONTAINING ALCOHOL: 0
HAVE YOU EVER FELT YOU SHOULD CUT DOWN ON YOUR DRINKING: NO
HOW MANY TIMES IN THE PAST YEAR HAVE YOU HAD 5 OR MORE DRINKS IN A DAY: 0
ON A TYPICAL DAY WHEN YOU DRINK ALCOHOL HOW MANY DRINKS DO YOU HAVE: 0
ALCOHOL_USE: YES
TOTAL SCORE: 0
EVER FELT BAD OR GUILTY ABOUT YOUR DRINKING: NO
EVER HAD A DRINK FIRST THING IN THE MORNING TO STEADY YOUR NERVES TO GET RID OF A HANGOVER: NO

## 2021-10-21 ASSESSMENT — FIBROSIS 4 INDEX
FIB4 SCORE: 1.44
FIB4 SCORE: 1.34
FIB4 SCORE: 1.34

## 2021-10-21 NOTE — ASSESSMENT & PLAN NOTE
Frequent falls, multiple hospitalizations for this  Has refused SNF in the past  PTOT recommend postacute placement, however patient has refused in the past and adamantly refuses now.  Therefore home health has been ordered.  Likely patient will be discharged without a Sher catheter this time, successfully removed.  HH PT/OT/RN/aide    Lives with wife, also debilitated.

## 2021-10-21 NOTE — ASSESSMENT & PLAN NOTE
Chronic wilson cath  UA has positive WBCs but he is asymptomatic and not septic.  RN states ER RN took UA from home catheter.  wilson cath was replaced upon admission 10/21.  Will not start antibiotics at this time, repeat a UA from new catheter.  10/22:   Plan to trial removing Wilson catheter with bladder scans to see if can successfully keep out upon discharge to home.  Added proscar 5mg po daily.  10/23:    Wilson catheter successfully removed.  Bladder scans negative.

## 2021-10-21 NOTE — ED NOTES
Call to [facility] tele to give report to RN for ADMIT. RN will call ED back when RN is ready for report.

## 2021-10-21 NOTE — ED NOTES
Mahi from Lab called with critical result of K+ at 2.7. Critical lab result read back to Mahi.   Dr. Beauchamp notified of critical lab result at 1301.  Critical lab result read back by Dr. Beauchamp.

## 2021-10-21 NOTE — PROGRESS NOTES
Pharmacy Medication Reconciliation      ~Medication reconciliation updated and complete per pt spouse (Mercedes) over the phone @ [phone]  ~No oral ABX within the last 30 days  ~Patient home pharmacy:[facility] [place]

## 2021-10-21 NOTE — PROGRESS NOTES
Pt arrived to unit via gurney. Ambulated from gurney to bed, slideboard assist. Tele monitor applied, vitals taken. Pt assessed. A&O x4. Admit profile and med rec complete. Discussed POC with pt, including meds and diet. Welcome folder provided and discussed. Communication board filled out. Questions and concerns addressed, verbalized understanding. Fall precautions in place. Pt demonstrates ability to use call light appropriately. Pt left in lowest position. Bed locked and low, yes alarm on.

## 2021-10-21 NOTE — ASSESSMENT & PLAN NOTE
Poorly controlled, last A1c 16%  Continue on lantus 10U and ISS, adjust as needed  DM diet   [As Noted in HPI] : as noted in HPI [SOB on Exertion] : shortness of breath during exertion [Negative] : Heme/Lymph [Wheezing] : no wheezing [Cough] : no cough

## 2021-10-21 NOTE — H&P
Hospital Medicine History & Physical Note    Date of Service  10/21/2021    Primary Care Physician  Danis Lehman M.D.    Consultants  None    Code Status  DNR    Chief Complaint  Chief Complaint   Patient presents with   • Fall       History of Presenting Illness  Julien Dao is a 89 y.o. male who presented 10/21/2021 with CAD, DM, HTN, chronic wilson, recurrent UTIs who presented to the ED for falls. He has multiple hospitalizations for the past 2 years.   He says he was reaching for his fridge when the handle slipped and and he fell. He denies LOC or hitting his head. He currently denies any pain. He says his wife called EMS, he was able to get up and walk with his walker. He denied any dizziness, fever and chills, chest pain, SOB, abd pain, N/V/D. He has chronic issues with constipation and mild dry cough. He says he eats very well, always hungry.   In the ED his K was low at 2.7. UA was positive for WBCs. He denies dysuria, he doesn't know when his wilson cath was last changed.    I discussed the plan of care with patient.    Review of Systems  Review of Systems   Constitutional: Negative for fever.   Eyes: Negative for double vision.   Respiratory: Positive for cough. Negative for sputum production and shortness of breath.    Cardiovascular: Negative for chest pain.   Gastrointestinal: Negative for diarrhea, nausea and vomiting.   Genitourinary: Negative for dysuria, hematuria and urgency.   Musculoskeletal: Negative for myalgias.   Neurological: Negative for dizziness and headaches.   Psychiatric/Behavioral: Negative for memory loss.   All other systems reviewed and are negative.      Past Medical History   has a past medical history of KOSTAS (acute kidney injury) ([facility]) (6/14/2021), CAD (coronary artery disease), Delirium (2/21/2020), DM (diabetes mellitus) ([facility]) (1/7/2014), Falls, Hypertension, and UTI (urinary tract infection) (11/4/2018).    Surgical History   has a past surgical history that includes  other cardiac surgery; coronary artery bypass, 3; and janice rectal abscess.     Family History  Dad - cancer, unknown type  Mom - CVA    Social History   reports that he quit smoking about 49 years ago. He has never used smokeless tobacco. He reports previous alcohol use. He reports that he does not use drugs.    Allergies  Allergies   Allergen Reactions   • Iodine Anaphylaxis     Pt and pts wife report that it has been so long not sure what happens       Medications  Prior to Admission Medications   Prescriptions Last Dose Informant Patient Reported? Taking?   Carboxymethylcellulose Sodium (RETAINE [facility] OP)  Significant Other Yes No   Sig: Administer 1 Drop into both eyes 1 time a day as needed (For dry eye).   amLODIPine (NORVASC) 5 MG Tab  Significant Other No No   Sig: Take 1 Tab by mouth every day.   cyclosporin (RESTASIS) 0.05 % ophthalmic emulsion  Significant Other Yes No   Sig: Administer 1 Drop into both eyes 2 times a day as needed. Indications: Drying and Inflammation of Cornea and Conjunctiva of Eyes   finasteride (PROSCAR) 5 MG Tab  Significant Other Yes No   Sig: Take 5 mg by mouth every day.   furosemide (LASIX) 20 MG Tab  Significant Other Yes No   Sig: Take 20 mg by mouth every day.   gabapentin (NEURONTIN) 100 MG Cap  Significant Other Yes No   Sig: Take 100 mg by mouth 2 times a day.   insulin glargine (INSULIN GLARGINE) 100 UNIT/ML Solution Pen-injector injection  Significant Other Yes No   Sig: Inject 35 Units under the skin every evening.   levothyroxine (SYNTHROID) 25 MCG Tab  Significant Other No No   Sig: Take 1 tablet by mouth Every morning on an empty stomach.   Patient taking differently: Take 25 mcg by mouth every evening.   metoprolol tartrate (LOPRESSOR) 25 MG Tab  Significant Other Yes No   Sig: Take 12.5 mg by mouth 2 times a day.   nystatin (MYCOSTATIN) powder  Significant Other Yes No   Sig: Apply 1 Application topically every day. Apply's on penis    potassium chloride SA (KDUR) 20  MEQ Tab CR  Significant Other No No   Sig: Take 2 Tablets by mouth 2 times a day.   simvastatin (ZOCOR) 10 MG Tab  Significant Other Yes No   Sig: Take 10 mg by mouth every evening.   tamsulosin (FLOMAX) 0.4 MG capsule  Significant Other Yes No   Sig: Take 0.4 mg by mouth every day.      Facility-Administered Medications: None       Physical Exam  Temp:  [36.4 °C (97.5 °F)] 36.4 °C (97.5 °F)  Pulse:  [70-74] 70  Resp:  [18] 18  BP: (154-167)/() 163/81  SpO2:  [95 %-97 %] 97 %  Blood Pressure : (!) 166/71   Temperature: 36.4 °C (97.5 °F)   Pulse: 70   Respiration: 18   Pulse Oximetry: 96 %       Physical Exam  Vitals and nursing note reviewed.   Constitutional:       General: He is not in acute distress.     Appearance: He is not toxic-appearing.   HENT:      Head: Normocephalic.      Mouth/Throat:      Mouth: Mucous membranes are moist.   Eyes:      General:         Right eye: No discharge.         Left eye: No discharge.   Cardiovascular:      Rate and Rhythm: Normal rate and regular rhythm.   Pulmonary:      Effort: Pulmonary effort is normal. No respiratory distress.      Breath sounds: No wheezing or rales.   Abdominal:      Palpations: Abdomen is soft.      Tenderness: There is no abdominal tenderness. There is no guarding or rebound.   Musculoskeletal:         General: No swelling.      Cervical back: Neck supple.      Comments: No sacral tenderness   Skin:     General: Skin is warm and dry.   Neurological:      General: No focal deficit present.      Mental Status: He is alert and oriented to person, place, and time.      Comments: Able to recall last 2 presidents         Laboratory:  Recent Labs     10/21/21  1135   WBC 8.5   RBC 4.81   HEMOGLOBIN 13.7*   HEMATOCRIT 42.2   MCV 87.7   MCH 28.5   MCHC 32.5*   RDW 47.2   PLATELETCT 261   MPV 10.1     Recent Labs     10/21/21  1135   SODIUM 135   POTASSIUM 2.7*   CHLORIDE 96   CO2 27   GLUCOSE 262*   BUN 14   CREATININE 1.07   CALCIUM 9.4     Recent Labs      10/21/21  1135   ALTSGPT 10   ASTSGOT 12   ALKPHOSPHAT 86   TBILIRUBIN 0.5   GLUCOSE 262*         No results for input(s): NTPROBNP in the last 72 hours.      Recent Labs     10/21/21  1135   TROPONINT 40*       Imaging:  DX-CHEST-PORTABLE (1 VIEW)   Final Result         1. No acute cardiopulmonary abnormalities are identified.        DX-CHEST-PORTABLE (1 VIEW)   Final Result         1. No acute cardiopulmonary abnormalities are identified.            Assessment/Plan:  I anticipate this patient is appropriate for observation status at this time.    * Hypokalemia- (present on admission)  Assessment & Plan  Telemetry given severe low level  Replete with PO and IV, recheck level today and tomorrow  Monitor Mg level    Generalized weakness- (present on admission)  Assessment & Plan  Frequent falls, multiple hospitalizations for this  Has refused SNF in the past  PTOT to eval    Urinary retention- (present on admission)  Assessment & Plan  Chronic wilson cath  UA has positive WBCs but he is asymptomatic and not septic  Will culture urine for now, and exchange wilson cath  Will not start antibiotics at this time    BPH (benign prostatic hyperplasia)- (present on admission)  Assessment & Plan  Continue flomax and finasteride    Troponin I above reference range  Assessment & Plan  Denies chest pain, ACS not suspected  Trend troponin    Diabetes mellitus with hyperglycemia (HCC)- (present on admission)  Assessment & Plan  Poorly controlled, last A1c 16%  Continue on lantus 10U and ISS, adjust as needed  DM diet    HTN (hypertension)- (present on admission)  Assessment & Plan  Hypertensive mildly  Continue metop and norvasc  Holding lasix    CAD (coronary artery disease) - CABG Nov. 2004- (present on admission)  Assessment & Plan  Denies chest pain      VTE prophylaxis: enoxaparin ppx

## 2021-10-21 NOTE — ASSESSMENT & PLAN NOTE
Monitor Mg level 1.9 adequate.  2/2 lasix home med use, plan to dc altogether since EF 55%, mild biatrial enlargement and elevated RVP on last echo.  May change to spironolactone low dose daily instead.  10/23: Magnesium replaced 2 g IV.  Potassium needing IV replacement level 2.7.  Continue oral replacement.  10/24: Potassium level 3.7.  Continue 40 mEq daily.  Can DC telemetry.  Sinus rhythm on monitor.

## 2021-10-21 NOTE — ED TRIAGE NOTES
Pt to ed by simon from home.  Lives with his wife.  Pt c/o multiple falls lately. Denies hitting his head or neck  Pain.  Pt is on blood thinners.  No complaints at this time.    Sent her because wife concerned about falls unable to help him to needed extend

## 2021-10-22 LAB
ANION GAP SERPL CALC-SCNC: 13 MMOL/L (ref 7–16)
ANION GAP SERPL CALC-SCNC: 21 MMOL/L (ref 7–16)
APPEARANCE UR: CLEAR
BACTERIA #/AREA URNS HPF: ABNORMAL /HPF
BASOPHILS # BLD AUTO: 0.6 % (ref 0–1.8)
BASOPHILS # BLD: 0.06 K/UL (ref 0–0.12)
BILIRUB UR QL STRIP.AUTO: NEGATIVE
BUN SERPL-MCNC: 12 MG/DL (ref 8–22)
BUN SERPL-MCNC: 13 MG/DL (ref 8–22)
CALCIUM SERPL-MCNC: 8.7 MG/DL (ref 8.4–10.2)
CALCIUM SERPL-MCNC: 8.8 MG/DL (ref 8.4–10.2)
CHLORIDE SERPL-SCNC: 101 MMOL/L (ref 96–112)
CHLORIDE SERPL-SCNC: 103 MMOL/L (ref 96–112)
CO2 SERPL-SCNC: 16 MMOL/L (ref 20–33)
CO2 SERPL-SCNC: 25 MMOL/L (ref 20–33)
COLOR UR: YELLOW
CREAT SERPL-MCNC: 0.67 MG/DL (ref 0.5–1.4)
CREAT SERPL-MCNC: 0.99 MG/DL (ref 0.5–1.4)
EOSINOPHIL # BLD AUTO: 0.1 K/UL (ref 0–0.51)
EOSINOPHIL NFR BLD: 1.1 % (ref 0–6.9)
EPI CELLS #/AREA URNS HPF: ABNORMAL /HPF
ERYTHROCYTE [DISTWIDTH] IN BLOOD BY AUTOMATED COUNT: 47.7 FL (ref 35.9–50)
GLUCOSE SERPL-MCNC: 141 MG/DL (ref 65–99)
GLUCOSE SERPL-MCNC: 328 MG/DL (ref 65–99)
GLUCOSE UR STRIP.AUTO-MCNC: 500 MG/DL
HCT VFR BLD AUTO: 43.3 % (ref 42–52)
HGB BLD-MCNC: 14 G/DL (ref 14–18)
IMM GRANULOCYTES # BLD AUTO: 0.03 K/UL (ref 0–0.11)
IMM GRANULOCYTES NFR BLD AUTO: 0.3 % (ref 0–0.9)
KETONES UR STRIP.AUTO-MCNC: NEGATIVE MG/DL
LEUKOCYTE ESTERASE UR QL STRIP.AUTO: ABNORMAL
LYMPHOCYTES # BLD AUTO: 1.86 K/UL (ref 1–4.8)
LYMPHOCYTES NFR BLD: 20 % (ref 22–41)
MAGNESIUM SERPL-MCNC: 1.9 MG/DL (ref 1.5–2.5)
MCH RBC QN AUTO: 28.3 PG (ref 27–33)
MCHC RBC AUTO-ENTMCNC: 32.3 G/DL (ref 33.7–35.3)
MCV RBC AUTO: 87.7 FL (ref 81.4–97.8)
MICRO URNS: ABNORMAL
MONOCYTES # BLD AUTO: 0.73 K/UL (ref 0–0.85)
MONOCYTES NFR BLD AUTO: 7.9 % (ref 0–13.4)
MUCOUS THREADS #/AREA URNS HPF: ABNORMAL /HPF
NEUTROPHILS # BLD AUTO: 6.51 K/UL (ref 1.82–7.42)
NEUTROPHILS NFR BLD: 70.1 % (ref 44–72)
NITRITE UR QL STRIP.AUTO: NEGATIVE
NRBC # BLD AUTO: 0 K/UL
NRBC BLD-RTO: 0 /100 WBC
PH UR STRIP.AUTO: 7 [PH] (ref 5–8)
PLATELET # BLD AUTO: 291 K/UL (ref 164–446)
PMV BLD AUTO: 10.5 FL (ref 9–12.9)
POTASSIUM SERPL-SCNC: 2.9 MMOL/L (ref 3.6–5.5)
POTASSIUM SERPL-SCNC: 3.6 MMOL/L (ref 3.6–5.5)
PROT UR QL STRIP: NEGATIVE MG/DL
RBC # BLD AUTO: 4.94 M/UL (ref 4.7–6.1)
RBC # URNS HPF: ABNORMAL /HPF
RBC UR QL AUTO: ABNORMAL
SODIUM SERPL-SCNC: 138 MMOL/L (ref 135–145)
SODIUM SERPL-SCNC: 141 MMOL/L (ref 135–145)
SP GR UR STRIP.AUTO: 1.01
WBC # BLD AUTO: 9.3 K/UL (ref 4.8–10.8)
WBC #/AREA URNS HPF: ABNORMAL /HPF

## 2021-10-22 PROCEDURE — 96365 THER/PROPH/DIAG IV INF INIT: CPT

## 2021-10-22 PROCEDURE — 700102 HCHG RX REV CODE 250 W/ 637 OVERRIDE(OP): Performed by: INTERNAL MEDICINE

## 2021-10-22 PROCEDURE — 97166 OT EVAL MOD COMPLEX 45 MIN: CPT

## 2021-10-22 PROCEDURE — A9270 NON-COVERED ITEM OR SERVICE: HCPCS | Performed by: INTERNAL MEDICINE

## 2021-10-22 PROCEDURE — 94760 N-INVAS EAR/PLS OXIMETRY 1: CPT

## 2021-10-22 PROCEDURE — 96372 THER/PROPH/DIAG INJ SC/IM: CPT

## 2021-10-22 PROCEDURE — 80048 BASIC METABOLIC PNL TOTAL CA: CPT

## 2021-10-22 PROCEDURE — 99232 SBSQ HOSP IP/OBS MODERATE 35: CPT | Performed by: HOSPITALIST

## 2021-10-22 PROCEDURE — 81001 URINALYSIS AUTO W/SCOPE: CPT

## 2021-10-22 PROCEDURE — 700101 HCHG RX REV CODE 250: Performed by: INTERNAL MEDICINE

## 2021-10-22 PROCEDURE — 97535 SELF CARE MNGMENT TRAINING: CPT

## 2021-10-22 PROCEDURE — 96366 THER/PROPH/DIAG IV INF ADDON: CPT

## 2021-10-22 PROCEDURE — 700111 HCHG RX REV CODE 636 W/ 250 OVERRIDE (IP): Performed by: HOSPITALIST

## 2021-10-22 PROCEDURE — 770020 HCHG ROOM/CARE - TELE (206)

## 2021-10-22 PROCEDURE — 83735 ASSAY OF MAGNESIUM: CPT

## 2021-10-22 PROCEDURE — 97162 PT EVAL MOD COMPLEX 30 MIN: CPT

## 2021-10-22 PROCEDURE — 700111 HCHG RX REV CODE 636 W/ 250 OVERRIDE (IP): Performed by: INTERNAL MEDICINE

## 2021-10-22 PROCEDURE — 85025 COMPLETE CBC W/AUTO DIFF WBC: CPT

## 2021-10-22 RX ORDER — POTASSIUM CHLORIDE 7.45 MG/ML
10 INJECTION INTRAVENOUS
Status: COMPLETED | OUTPATIENT
Start: 2021-10-22 | End: 2021-10-22

## 2021-10-22 RX ORDER — TAMSULOSIN HYDROCHLORIDE 0.4 MG/1
0.8 CAPSULE ORAL DAILY
Status: DISCONTINUED | OUTPATIENT
Start: 2021-10-23 | End: 2021-10-25 | Stop reason: HOSPADM

## 2021-10-22 RX ADMIN — AMLODIPINE BESYLATE 5 MG: 5 TABLET ORAL at 05:19

## 2021-10-22 RX ADMIN — INSULIN GLARGINE 10 UNITS: 100 INJECTION, SOLUTION SUBCUTANEOUS at 18:11

## 2021-10-22 RX ADMIN — POTASSIUM CHLORIDE 10 MEQ: 10 INJECTION, SOLUTION INTRAVENOUS at 10:11

## 2021-10-22 RX ADMIN — POTASSIUM CHLORIDE 10 MEQ: 10 INJECTION, SOLUTION INTRAVENOUS at 11:51

## 2021-10-22 RX ADMIN — ENOXAPARIN SODIUM 40 MG: 40 INJECTION SUBCUTANEOUS at 05:20

## 2021-10-22 RX ADMIN — POTASSIUM CHLORIDE 40 MEQ: 1500 TABLET, EXTENDED RELEASE ORAL at 05:18

## 2021-10-22 RX ADMIN — METOPROLOL TARTRATE 12.5 MG: 25 TABLET, FILM COATED ORAL at 04:28

## 2021-10-22 RX ADMIN — SIMVASTATIN 10 MG: 10 TABLET, FILM COATED ORAL at 20:08

## 2021-10-22 RX ADMIN — FINASTERIDE 5 MG: 5 TABLET, FILM COATED ORAL at 05:19

## 2021-10-22 RX ADMIN — TAMSULOSIN HYDROCHLORIDE 0.4 MG: 0.4 CAPSULE ORAL at 05:18

## 2021-10-22 RX ADMIN — METOPROLOL TARTRATE 12.5 MG: 25 TABLET, FILM COATED ORAL at 16:33

## 2021-10-22 RX ADMIN — POTASSIUM CHLORIDE 10 MEQ: 10 INJECTION, SOLUTION INTRAVENOUS at 08:22

## 2021-10-22 RX ADMIN — LEVOTHYROXINE SODIUM 25 MCG: 0.03 TABLET ORAL at 18:10

## 2021-10-22 RX ADMIN — SENNOSIDES AND DOCUSATE SODIUM 2 TABLET: 50; 8.6 TABLET ORAL at 18:10

## 2021-10-22 RX ADMIN — POTASSIUM CHLORIDE AND SODIUM CHLORIDE: 900; 150 INJECTION, SOLUTION INTRAVENOUS at 06:41

## 2021-10-22 RX ADMIN — POTASSIUM CHLORIDE 10 MEQ: 10 INJECTION, SOLUTION INTRAVENOUS at 12:56

## 2021-10-22 ASSESSMENT — ENCOUNTER SYMPTOMS
WHEEZING: 0
ABDOMINAL PAIN: 0
CONSTIPATION: 0
FEVER: 0
CLAUDICATION: 0
NAUSEA: 0
NECK PAIN: 0
DEPRESSION: 0
DIZZINESS: 0
SENSORY CHANGE: 0
EYE DISCHARGE: 0
SPEECH CHANGE: 0
BACK PAIN: 0
DIAPHORESIS: 0
DIARRHEA: 0
HEADACHES: 0
LOSS OF CONSCIOUSNESS: 0
FALLS: 1
VOMITING: 0
EYE PAIN: 0
CHILLS: 0
SHORTNESS OF BREATH: 0
FOCAL WEAKNESS: 0
MYALGIAS: 0
PALPITATIONS: 0
COUGH: 0
SPUTUM PRODUCTION: 0
BRUISES/BLEEDS EASILY: 0
HEMOPTYSIS: 0
SORE THROAT: 0
WEAKNESS: 1

## 2021-10-22 ASSESSMENT — ACTIVITIES OF DAILY LIVING (ADL): TOILETING: INDEPENDENT

## 2021-10-22 ASSESSMENT — GAIT ASSESSMENTS
GAIT LEVEL OF ASSIST: MINIMAL ASSIST
DEVIATION: DECREASED BASE OF SUPPORT;OTHER (COMMENT)
ASSISTIVE DEVICE: FRONT WHEEL WALKER
DISTANCE (FEET): 300

## 2021-10-22 ASSESSMENT — COGNITIVE AND FUNCTIONAL STATUS - GENERAL
TURNING FROM BACK TO SIDE WHILE IN FLAT BAD: A LITTLE
STANDING UP FROM CHAIR USING ARMS: A LITTLE
MOBILITY SCORE: 15
DAILY ACTIVITIY SCORE: 18
SUGGESTED CMS G CODE MODIFIER MOBILITY: CK
MOVING TO AND FROM BED TO CHAIR: A LITTLE
SUGGESTED CMS G CODE MODIFIER DAILY ACTIVITY: CK
MOVING FROM LYING ON BACK TO SITTING ON SIDE OF FLAT BED: UNABLE
WALKING IN HOSPITAL ROOM: A LITTLE
DRESSING REGULAR UPPER BODY CLOTHING: A LITTLE
DRESSING REGULAR LOWER BODY CLOTHING: A LITTLE
HELP NEEDED FOR BATHING: A LOT
CLIMB 3 TO 5 STEPS WITH RAILING: A LOT
PERSONAL GROOMING: A LITTLE
TOILETING: A LITTLE

## 2021-10-22 ASSESSMENT — LIFESTYLE VARIABLES: SUBSTANCE_ABUSE: 0

## 2021-10-22 ASSESSMENT — PAIN DESCRIPTION - PAIN TYPE: TYPE: ACUTE PAIN;CHRONIC PAIN

## 2021-10-22 NOTE — PROGRESS NOTES
[facility] Medicine Daily Progress Note    Date of Service  10/22/2021    Chief Complaint  Julien Dao is a 89 y.o. male admitted 10/21/2021 with hypokalemia, poor historian, unaware of his home medications.    Hospital Course   Julien Dao is a 89 y.o. male who presented 10/21/2021 with CAD, DM, HTN, chronic wilson, recurrent UTIs who presented to the ED for falls. He has multiple hospitalizations for the past 2 years.   He says he was reaching for his fridge when the handle slipped and and he fell. He denies LOC or hitting his head. He currently denies any pain. He says his wife called EMS, he was able to get up and walk with his walker. He denied any dizziness, fever and chills, chest pain, SOB, abd pain, N/V/D. He has chronic issues with constipation and mild dry cough. He says he eats very well, always hungry.   In the ED his K was low at 2.7. Patient has lasix listed as a home medication.  No indications since EF 55%.   Patient denies dysuria, he doesn't know when his wilson cath was last changed.    Interval Problem Update  10/22:  Patient awake cooperative.  When asked about home Lasix he does not know if he takes it.  He does not appear to know any of his home medications.  However the Lasix medication is likely the reason for his severe hypokalemia.  I reviewed his most recent echocardiogram from 2019 with a normal EF 55% there is biatrial enlargement and RVP of 40.  He shows no signs of shortness of breath or leg edema.  The patient may do well without Lasix.  Also it is unclear if patient has been trialed off of his Wilson catheter since starting Flomax.  We could try removing with bladder scans to see if we can successfully keep out Wilson catheter and thus preventing multiple readmissions for UTI.  Troponin is negative heart monitor sinus rhythm chest x-ray negative.  Magnesium 1.9.  I have ordered for K riders for potassium of 2.9.  Also it is noted that the UA was taken from his old dirty catheter a new  Sher catheter has been replaced and we will resend a new UA.  Again no evidence of dysuria fever chills or infection.    I have personally seen and examined the patient at bedside. I discussed the plan of care with patient, bedside RN, charge RN and .    Consultants/Specialty      Code Status  DNAR/DNI    Disposition  Patient is not medically cleared.   Anticipate discharge to to home with organized home healthcare and close outpatient follow-up.  I have placed the appropriate orders for post-discharge needs.    Review of Systems  Review of Systems   Constitutional: Positive for malaise/fatigue. Negative for chills, diaphoresis and fever.   HENT: Negative for congestion and sore throat.    Eyes: Negative for pain and discharge.   Respiratory: Negative for cough, hemoptysis, sputum production, shortness of breath and wheezing.    Cardiovascular: Negative for chest pain, palpitations, claudication and leg swelling.   Gastrointestinal: Negative for abdominal pain, constipation, diarrhea, melena, nausea and vomiting.   Genitourinary: Negative for dysuria, frequency and urgency.   Musculoskeletal: Positive for falls. Negative for back pain, joint pain, myalgias and neck pain.   Skin: Negative for itching and rash.   Neurological: Positive for weakness. Negative for dizziness, sensory change, speech change, focal weakness, loss of consciousness and headaches.   Endo/Heme/Allergies: Does not bruise/bleed easily.   Psychiatric/Behavioral: Negative for depression, substance abuse and suicidal ideas.        Physical Exam  Temp:  [36.3 °C (97.3 °F)-36.8 °C (98.3 °F)] 36.3 °C (97.3 °F)  Pulse:  [66-84] 66  Resp:  [18] 18  BP: (123-156)/(61-95) 129/69  SpO2:  [91 %-99 %] 97 %    Physical Exam  Constitutional:       General: He is not in acute distress.     Appearance: He is not diaphoretic.      Comments: Thin, cachectic   HENT:      Head: Normocephalic and atraumatic.      Mouth/Throat:      Pharynx: No  oropharyngeal exudate.   Eyes:      General: No scleral icterus.        Right eye: No discharge.         Left eye: No discharge.      Conjunctiva/sclera: Conjunctivae normal.      Pupils: Pupils are equal, round, and reactive to light.   Neck:      Thyroid: No thyromegaly.      Vascular: No JVD.      Trachea: No tracheal deviation.   Cardiovascular:      Rate and Rhythm: Normal rate and regular rhythm.      Heart sounds: Normal heart sounds. No murmur heard.   No friction rub. No gallop.    Pulmonary:      Effort: Pulmonary effort is normal. No respiratory distress.      Breath sounds: Normal breath sounds. No wheezing or rales.   Chest:      Chest wall: No tenderness.   Abdominal:      General: Bowel sounds are normal. There is no distension.      Palpations: Abdomen is soft. There is no mass.      Tenderness: There is no abdominal tenderness. There is no guarding or rebound.   Genitourinary:     Comments: Sher catheter with clear urine in Sher bag  Musculoskeletal:         General: Tenderness present. Normal range of motion.      Cervical back: Normal range of motion and neck supple.   Lymphadenopathy:      Cervical: No cervical adenopathy.   Skin:     General: Skin is warm and dry.      Findings: No erythema or rash.   Neurological:      Mental Status: He is alert and oriented to person, place, and time.      Cranial Nerves: No cranial nerve deficit.      Motor: No abnormal muscle tone.   Psychiatric:         Behavior: Behavior normal.         Thought Content: Thought content normal.         Judgment: Judgment normal.         Fluids    Intake/Output Summary (Last 24 hours) at 10/22/2021 1443  Last data filed at 10/22/2021 1116  Gross per 24 hour   Intake 471.25 ml   Output 4550 ml   Net -4078.75 ml       Laboratory  Recent Labs     10/21/21  1135 10/22/21  0442   WBC 8.5 9.3   RBC 4.81 4.94   HEMOGLOBIN 13.7* 14.0   HEMATOCRIT 42.2 43.3   MCV 87.7 87.7   MCH 28.5 28.3   MCHC 32.5* 32.3*   RDW 47.2 47.7    PLATELETCT 261 291   MPV 10.1 10.5     Recent Labs     10/21/21  1135 10/21/21  2229 10/22/21  0442   SODIUM 135  --  141   POTASSIUM 2.7* 2.9* 2.9*   CHLORIDE 96  --  103   CO2 27  --  25   GLUCOSE 262*  --  141*   BUN 14  --  12   CREATININE 1.07  --  0.67   CALCIUM 9.4  --  8.8                   Imaging  DX-CHEST-PORTABLE (1 VIEW)   Final Result         1. No acute cardiopulmonary abnormalities are identified.           Assessment/Plan  * Hypokalemia- (present on admission)  Assessment & Plan  Telemetry given severe low level  Monitor Mg level 1.9 adequate.  2/2 lasix home med use, plan to dc altogether since EF 55%, mild biatrial enlargement and elevated RVP on last echo.  May change to spironolactone low dose daily instead.    BPH (benign prostatic hyperplasia)- (present on admission)  Assessment & Plan  Continue flomax and finasteride    Urinary retention- (present on admission)  Assessment & Plan  Chronic wilson cath  UA has positive WBCs but he is asymptomatic and not septic.  RN states ER RN took UA from home catheter.  wilson cath was replaced upon admission 10/21.  Will not start antibiotics at this time, repeat a UA from new catheter.  10/22:   Plan to trial removing Wilson catheter with bladder scans to see if can successfully keep out upon discharge to home.  Added proscar 5mg po daily.    Generalized weakness- (present on admission)  Assessment & Plan  Frequent falls, multiple hospitalizations for this  Has refused SNF in the past  PTOT to [facility] PT/OT/RN/aide/Wilson care ordered.  Lives with wife, also debilitated.    Troponin I above reference range  Assessment & Plan  Denies chest pain, ACS not suspected  Troponin flat x 2, negative.    Diabetes mellitus with hyperglycemia (HCC)- (present on admission)  Assessment & Plan  Poorly controlled, last A1c 16%  Continue on lantus 10U and ISS, adjust as needed  DM diet    HTN (hypertension)- (present on admission)  Assessment & Plan  Hypertensive  mildly  Continue metop and norvasc  dc'd lasix    CAD (coronary artery disease) - CABG Nov. 2004- (present on admission)  Assessment & Plan  Denies chest pain       VTE prophylaxis: enoxaparin ppx    I have performed a physical exam and reviewed and updated ROS and Plan today (10/22/2021). In review of yesterday's note (10/21/2021), there are no changes except as documented above.

## 2021-10-22 NOTE — THERAPY
"Physical Therapy   Initial Evaluation     Patient Name: Julien Dao  Age:  89 y.o., Sex:  male  Medical Record #: [id number]  Today's Date: 10/22/2021     Precautions  Precautions: Fall Risk    Assessment  Patient is 89 y.o. male presenting with frequent falls at home; PMH significant for DM, HTN, CAD, chronic wilson, recurrent UTIs. Pt mobilized as detailed below in chart, demonstrating limited activity tolerance, weakness, and significantly limited safety awareness (see subjective below). Pt requiring Jany for mobility at this time due to high fall risk and needing frequent cueing for safe mobility. Pt will benefit from post acute rehab placement prior to returning home to prevent continued falls and re-admissions. May be appropriate for cog eval from speech therapy as well. Will continue to follow while in house.       Plan    Recommend Physical Therapy 3 times per week until therapy goals are met for the following treatments:  Bed Mobility, Equipment, Gait Training, Manual Therapy, Neuro Re-Education / Balance, Self Care/Home Evaluation, Sensory Integration Techniques, Stair Training, Therapeutic Activities and Therapeutic Exercises    DC Equipment Recommendations: Unable to determine at this time  Discharge Recommendations: Recommend post-acute placement for additional physical therapy services prior to discharge home       Subjective    When asked if patient falls at home he responds: \"Yes I do, here's some advice you can tell your patients. Don't  one place for longer than 2 minutes. And if you start to lose your balance backwards, just let it happen.\"     Objective       10/22/21 1022   Prior Living Situation   Prior Services Intermittent Physical Support for ADL Per Service;Skilled Home Health Services  (bath aide and HHPT/OT)   Housing / Facility 1 Story House   Steps Into Home 1   Steps In Home 0   Bathroom Set up Walk In Shower;Shower Chair   Equipment Owned Front-Wheel Walker;Tub / Shower Seat;Grab " "Bar(s) In Tub / Shower;Grab Bar(s) By Toilet   Lives with - Patient's Self Care Capacity Spouse   Prior Level of Functional Mobility   Bed Mobility Independent   Transfer Status Independent   Ambulation Independent   Distance Ambulation (Feet)   (household)   Assistive Devices Used Front-Wheel Walker   Stairs Independent   History of Falls   History of Falls Yes   Date of Last Fall   (reports multiple falls at home)   Cognition    Cognition / Consciousness X   Level of Consciousness Alert   Safety Awareness Impaired;Impulsive   New Learning Impaired   Attention Impaired   Comments pt with limited safety awarness with mobility, resistant to education/teachings    Passive ROM Lower Body   Passive ROM Lower Body WDL   Active ROM Lower Body    Active ROM Lower Body  WDL   Strength Lower Body   Lower Body Strength  WDL   Sensation Lower Body   Lower Extremity Sensation   WDL   Lower Body Muscle Tone   Lower Body Muscle Tone  WDL   Neurological Concerns   Neurological Concerns No   Coordination Lower Body    Coordination Lower Body  WDL   Balance Assessment   Sitting Balance (Static) Fair +   Sitting Balance (Dynamic) Fair   Standing Balance (Static) Fair -   Standing Balance (Dynamic) Fair -   Weight Shift Sitting Fair   Weight Shift Standing Fair   Comments w/ FWW   Gait Analysis   Gait Level Of Assist Minimal Assist   Assistive Device Front Wheel Walker   Distance (Feet) 300   # of Times Distance was Traveled 1   Deviation Decreased Base Of Support;Other (Comment)  (scissoring )   # of Stairs Climbed 0   Level of Assist with Stairs Refused   Weight Bearing Status FWB   Comments pt with very narrow LUCILA with gait and did not correct with cueing; refusing to trial stairs as they \"aren't like mine at home\" per pt    Bed Mobility    Supine to Sit Minimal Assist   Sit to Supine   (up in chair )   Scooting Minimal Assist   Comments without bed features   Functional Mobility   Sit to Stand Minimal Assist   Bed, Chair, " Wheelchair Transfer Minimal Assist   Toilet Transfers Minimal Assist   Comments w/ FWW   Activity Tolerance   Sitting in Chair up in chair post session    Sitting Edge of Bed 15mins   Standing 15mins   Patient / Family Goals    Patient / Family Goal #1 To go home.   Short Term Goals    Short Term Goal # 1 Pt will transiton supine <> sit without bed features and spv within 6 visits in order to improve functional mobility.    Short Term Goal # 2 Pt will transition sit <> stand with FWW and spv within 6 visits in order to improve functional mobility.    Short Term Goal # 3 Pt will ambulate 150ft with FWW and spv within 6 visits in order to improve functional mobility.    Short Term Goal # 4 Pt will ascend/descend 1 step with spv within 6 visits in order to enter his home.    Education Group   Education Provided Role of Physical Therapist;Gait Training   Role of Physical Therapist Patient Response Patient;Acceptance;Explanation;Demonstration;Verbal Demonstration;Action Demonstration   Gait Training Patient Response Patient;Acceptance;Explanation;Demonstration;Verbal Demonstration;Action Demonstration;Reinforcement Needed   Problem List    Problems Impaired Bed Mobility;Impaired Transfers;Impaired Ambulation;Impaired Balance;Decreased Activity Tolerance;Safety Awareness Deficits / Cognition

## 2021-10-22 NOTE — THERAPY
Occupational Therapy   Initial Evaluation     Patient Name: Julien Dao  Age:  89 y.o., Sex:  male  Medical Record #: [id number]  Today's Date: 10/22/2021     Precautions: (P) Fall Risk    Assessment  Patient is 89 y.o. male who presented to the ED with falls and endorses multiple falls at home. Pt presented today with impaired insight, confusion, poor safety awareness, impulsivity, poor balance, and decreased activity tolerance impacting safety with self cares and mobility. Pt required min A for dressing, min A for toielting, and min A for G/H with max verbal cues throughout for safety concerns. Recommend SLP cog eval d/t observed cognitive concerns during self cares. Pt poor historian but reported he has a bath aide and occasional HH services. Due to concern with pt reoccurring falls and safety with self cares with limited assist available from his elderly wife, recommend post acute placement for progression with self cares and mobility. Will follow for skilled OT services.     Plan    Recommend Occupational Therapy 3 times per week until therapy goals are met for the following treatments:  Adaptive Equipment, Cognitive Skill Development, Neuro Re-Education / Balance, Self Care/Activities of Daily Living, Therapeutic Activities and Therapeutic Exercises.    DC Equipment Recommendations: (P) Unable to determine at this time  Discharge Recommendations: (P) Recommend post-acute placement for additional occupational therapy services prior to discharge home        10/22/21 1020   Initial Contact Note    Initial Contact Note Order Received and Verified, Occupational Therapy Evaluation in Progress with Full Report to Follow.   Prior Living Situation   Prior Services Intermittent Physical Support for ADL Per Service;Skilled Home Health Services  (bath aide)   Housing / Facility [address]   Bathroom Set up Walk In Shower;Shower Chair   Equipment Owned Front-Wheel Walker;Tub / Shower Seat;Grab Bar(s) In Tub / Shower;Grab  Bar(s) By Toilet;Bed Side Commode   Lives with - Patient's Self Care Capacity Spouse   Comments Pt poor historian but reported has a bath aide assist for showering and HH services   Prior Level of ADL Function   Self Feeding Independent   Grooming / Hygiene Independent   Bathing Requires Assist   Dressing Independent   Toileting Independent   Prior Level of IADL Function   Medication Management Requires Assist   Laundry Requires Assist   Kitchen Mobility Requires Assist   Finances Requires Assist   Home Management Requires Assist   Shopping Requires Assist   Prior Level Of Mobility   (unclear)   History of Falls   History of Falls Yes   Date of Last Fall   (endorses multiple falls at home)   Precautions   Precautions Fall Risk   Pain   Pain Scales 0 to 10 Scale    Intervention Declines   Cognition    Cognition / Consciousness X   Level of Consciousness Alert   Safety Awareness Impaired;Impulsive   New Learning Impaired   Attention Impaired   Comments Poor safety awareness and insight into deficits, required cues throughout for safety concerns during self cares and mobility   Strength Upper Body   Upper Body Strength  WDL   Sensation Upper Body   Upper Extremity Sensation  WDL   Balance Assessment   Sitting Balance (Static) Fair   Sitting Balance (Dynamic) Fair   Standing Balance (Static) Fair -   Standing Balance (Dynamic) Fair -   Weight Shift Sitting Fair   Weight Shift Standing Fair   Comments with FWW   Bed Mobility    Supine to Sit Minimal Assist   Sit to Supine Supervised   Scooting Minimal Assist   Comments flat bed   ADL Assessment   Grooming Minimal Assist;Seated   Upper Body Dressing Minimal Assist   Lower Body Dressing Minimal Assist   Toileting Minimal Assist   Comments max cues for safety   How much help from another person does the patient currently need...   Putting on and taking off regular lower body clothing? 3   Bathing (including washing, rinsing, and drying)? 2   Toileting, which includes using  a toilet, bedpan, or urinal? 3   Putting on and taking off regular upper body clothing? 3   Taking care of personal grooming such as brushing teeth? 3   Eating meals? 4   6 Clicks Daily Activity Score 18   Functional Mobility   Sit to Stand   (CGA for safety)   Bed, Chair, Wheelchair Transfer Supervised   Toilet Transfers Minimal Assist   Transfer Method Stand Step   Mobility room and bathroom distances with FWW   Activity Tolerance   Sitting in Chair up in chair post session   Sitting Edge of Bed 15 min   Standing 12 min   Patient / Family Goals   Patient / Family Goal #1 to talk to my wife!   Short Term Goals   Short Term Goal # 1 Pt will demo FB dressing supervision without vc   Short Term Goal # 2 Pt will complete toielting supervision without vc   Short Term Goal # 3 Pt will tolerate 15 min standing G/H supervsion   Education Group   Education Provided Transfers;Activities of Daily Living   Role of Occupational Therapist Patient Response Patient;Acceptance;Explanation   Transfers Patient Response Patient;Acceptance;Explanation   ADL Patient Response Patient;Acceptance;Explanation;Reinforcement Needed   Problem List   Problem List Decreased Active Daily Living Skills;Decreased Homemaking Skills;Decreased Functional Mobility;Safety Awareness Deficits / Cognition;Impaired Cognitive Function;Impaired Postural Control / Balance   Anticipated Discharge Equipment and Recommendations   DC Equipment Recommendations Unable to determine at this time   Discharge Recommendations Recommend post-acute placement for additional occupational therapy services prior to discharge home   Interdisciplinary Plan of Care Collaboration   IDT Collaboration with  Nursing;Physical Therapist   Patient Position at End of Therapy Seated;Chair Alarm On;Call Light within Reach;Tray Table within Reach;Phone within Reach   Collaboration Comments OT findings and recs   Session Information   Date / Session Number  10/22 1 (1/3, 10/28)   Priority 2

## 2021-10-22 NOTE — FACE TO FACE
Face to Face Supporting Documentation - Home Health    The encounter with this patient was in whole or in part the primary reason for home health admission.    Date of encounter:   Patient:                    MRN:                       YOB: 2021  Julien Dao  [id number]  3/7/1929     Home health to see patient for:  Skilled Nursing care for assessment, interventions & education, Registered dietitian consult, Medical social work consult, Home health aide, Physical Therapy evaluation and treatment and Occupational therapy evaluation and treatment    Skilled need for:  Exacerbation of Chronic Disease State multiple admissions to hospital, failure to thrive, refuses SNF.    Skilled nursing interventions to include:  Comment: needs home safety, Sher catheter care maintenance medication compliance    Homebound status evidenced by:  Need the aid of supportive devices such as crutches, canes, wheelchairs or walkers. Leaving home requires a considerable and taxing effort. There is a normal inability to leave the home.    Community Physician to provide follow up care: Danis Lehman M.D.     Optional Interventions? No      I certify the face to face encounter for this home health care referral meets the CMS requirements and the encounter/clinical assessment with the patient was, in whole, or in part, for the medical condition(s) listed above, which is the primary reason for home health care. Based on my clinical findings: the service(s) are medically necessary, support the need for home health care, and the homebound criteria are met.  I certify that this patient has had a face to face encounter by myself.  Ingris Isidro M.D. - NPI: [id number]

## 2021-10-22 NOTE — DISCHARGE PLANNING
Received Choice form at [id number]  Agency/Facility Name: [facility]   Referral sent per Choice form @ 3654

## 2021-10-22 NOTE — PROGRESS NOTES
Pt arrived to unit via gurney. Ambulated from gurney to bed, slideboard assist. Tele monitor applied, vitals taken. Pt assessed. A&O x4. Admit profile and med rec complete. Discussed POC with pt, including meds. Welcome folder provided and discussed. Communication board filled out. Questions and concerns addressed, verbalized understanding. Fall precautions in place. Pt demonstrates ability to use call light appropriately. Pt left in lowest position. Bed locked and low, yes alarm on.

## 2021-10-22 NOTE — PROGRESS NOTES
Telemetry Shift Summary    Rhythm SR, VPOD  HR Range 60s  Ectopy R PVC, R bigem, ST dep  Measurements 0.24/0.14/0.44        Normal Values  Rhythm SR  HR Range    Measurements 0.12-0.20 / 0.06-0.10  / 0.30-0.52

## 2021-10-22 NOTE — DISCHARGE PLANNING
Anticipated Discharge Disposition:   Home with [facility]     Action:   Chart review complete.     Discussed patient's plan of care and plans for discharge during rounds. Per MD, patient is not medically cleared but will need HH. RN PRANEETH to call patient's spouse for choice.     1500: Order in place for HH. RN PRANEETH called patient's spouse, Mercedes. Per Mercedes, the patient was on service with [place]. Consent obtained for referral to be sent back to [facility].     Barriers to Discharge:   HH acceptance   Medical Clearance    Plan:   Hospital care management will continue to assist with discharge planning needs.

## 2021-10-22 NOTE — PROGRESS NOTES
Telemetry Shift Summary     Rhythm SR 1st degree AVB w BBB  HR Range 67-91  Ectopy rtrig, opac, opvc, rcoup, rbig  Measurements 0.22/0.14/0.40           Normal Values  Rhythm SR  HR Range    Measurements 0.12-0.20 / 0.06-0.10  / 0.30-0.52

## 2021-10-22 NOTE — CARE PLAN
The patient is Watcher - Medium risk of patient condition declining or worsening    Shift Goals  Clinical Goals: monitor for worsening s/s of infection  Patient Goals: sleep    Progress made toward(s) clinical / shift goals:  No new or worsening signs and symptoms of infection have been noted this shift.     Patient is not progressing towards the following goals:      Problem: Communication  Goal: The ability to communicate needs accurately and effectively will improve  Outcome: Progressing     Problem: Infection - Standard  Goal: Patient will remain free from infection  Outcome: Progressing

## 2021-10-22 NOTE — PROGRESS NOTES
4 Eyes Skin Assessment Completed by MACK Watson and  MACK Justice.    Head WDL  Ears WDL  Nose WDL  Mouth WDL  Neck WDL  Breast/Chest WDL  Shoulder Blades WDL  Spine Redness, right upper quadrant of back  (R) Arm/Elbow/Hand WDL  (L) Arm/Elbow/Hand WDL  Abdomen WDL  Groin Blanching  Scrotum/Coccyx/Buttocks Redness  (R) Leg WDL  (L) Leg WDL  (R) Heel/Foot/Toe Blanching  (L) Heel/Foot/Toe Blanching          Devices In Places Tele Box      Interventions In Place Pillows    Possible Skin Injury No    Pictures Uploaded Into Epic N/A  Wound Consult Placed N/A  RN Wound Prevention Protocol Ordered No        Some signs and symptoms of clots include: pain or tenderness in arm or leg, swelling in arm or leg, changes in skin color, or area is warm to touch, shortness or breath, trouble breathing.  Numbness or weakness especially on 1 side of the body, sudden trouble speaking or swallowing, sudden trouble seeing, sudden confusion, dizzy spells or headache.  If you have these please call 911 or seek medical care immediately.    Take an extra half tab either today or tomorrow.

## 2021-10-23 ENCOUNTER — APPOINTMENT (OUTPATIENT)
Dept: RADIOLOGY | Facility: MEDICAL CENTER | Age: 86
DRG: 641 | End: 2021-10-23
Attending: HOSPITALIST
Payer: MEDICARE

## 2021-10-23 LAB
ANION GAP SERPL CALC-SCNC: 12 MMOL/L (ref 7–16)
ANION GAP SERPL CALC-SCNC: 14 MMOL/L (ref 7–16)
BACTERIA UR CULT: ABNORMAL
BACTERIA UR CULT: ABNORMAL
BASOPHILS # BLD AUTO: 0.5 % (ref 0–1.8)
BASOPHILS # BLD: 0.05 K/UL (ref 0–0.12)
BUN SERPL-MCNC: 15 MG/DL (ref 8–22)
BUN SERPL-MCNC: 9 MG/DL (ref 8–22)
CALCIUM SERPL-MCNC: 8.9 MG/DL (ref 8.4–10.2)
CALCIUM SERPL-MCNC: 9.1 MG/DL (ref 8.4–10.2)
CHLORIDE SERPL-SCNC: 100 MMOL/L (ref 96–112)
CHLORIDE SERPL-SCNC: 104 MMOL/L (ref 96–112)
CO2 SERPL-SCNC: 23 MMOL/L (ref 20–33)
CO2 SERPL-SCNC: 25 MMOL/L (ref 20–33)
CREAT SERPL-MCNC: 0.65 MG/DL (ref 0.5–1.4)
CREAT SERPL-MCNC: 0.92 MG/DL (ref 0.5–1.4)
EOSINOPHIL # BLD AUTO: 0.16 K/UL (ref 0–0.51)
EOSINOPHIL NFR BLD: 1.7 % (ref 0–6.9)
ERYTHROCYTE [DISTWIDTH] IN BLOOD BY AUTOMATED COUNT: 45.5 FL (ref 35.9–50)
GLUCOSE SERPL-MCNC: 132 MG/DL (ref 65–99)
GLUCOSE SERPL-MCNC: 194 MG/DL (ref 65–99)
HCT VFR BLD AUTO: 45.1 % (ref 42–52)
HGB BLD-MCNC: 14.9 G/DL (ref 14–18)
IMM GRANULOCYTES # BLD AUTO: 0.03 K/UL (ref 0–0.11)
IMM GRANULOCYTES NFR BLD AUTO: 0.3 % (ref 0–0.9)
LYMPHOCYTES # BLD AUTO: 1.83 K/UL (ref 1–4.8)
LYMPHOCYTES NFR BLD: 19.3 % (ref 22–41)
MCH RBC QN AUTO: 28.5 PG (ref 27–33)
MCHC RBC AUTO-ENTMCNC: 33 G/DL (ref 33.7–35.3)
MCV RBC AUTO: 86.4 FL (ref 81.4–97.8)
MONOCYTES # BLD AUTO: 0.79 K/UL (ref 0–0.85)
MONOCYTES NFR BLD AUTO: 8.3 % (ref 0–13.4)
NEUTROPHILS # BLD AUTO: 6.62 K/UL (ref 1.82–7.42)
NEUTROPHILS NFR BLD: 69.9 % (ref 44–72)
NRBC # BLD AUTO: 0 K/UL
NRBC BLD-RTO: 0 /100 WBC
PLATELET # BLD AUTO: 261 K/UL (ref 164–446)
PMV BLD AUTO: 10.1 FL (ref 9–12.9)
POTASSIUM SERPL-SCNC: 2.7 MMOL/L (ref 3.6–5.5)
POTASSIUM SERPL-SCNC: 2.7 MMOL/L (ref 3.6–5.5)
RBC # BLD AUTO: 5.22 M/UL (ref 4.7–6.1)
SIGNIFICANT IND 70042: ABNORMAL
SITE SITE: ABNORMAL
SODIUM SERPL-SCNC: 137 MMOL/L (ref 135–145)
SODIUM SERPL-SCNC: 141 MMOL/L (ref 135–145)
SOURCE SOURCE: ABNORMAL
WBC # BLD AUTO: 9.5 K/UL (ref 4.8–10.8)

## 2021-10-23 PROCEDURE — 85025 COMPLETE CBC W/AUTO DIFF WBC: CPT

## 2021-10-23 PROCEDURE — 700102 HCHG RX REV CODE 250 W/ 637 OVERRIDE(OP): Performed by: INTERNAL MEDICINE

## 2021-10-23 PROCEDURE — 94760 N-INVAS EAR/PLS OXIMETRY 1: CPT

## 2021-10-23 PROCEDURE — 700111 HCHG RX REV CODE 636 W/ 250 OVERRIDE (IP): Performed by: INTERNAL MEDICINE

## 2021-10-23 PROCEDURE — 99232 SBSQ HOSP IP/OBS MODERATE 35: CPT | Performed by: HOSPITALIST

## 2021-10-23 PROCEDURE — 700102 HCHG RX REV CODE 250 W/ 637 OVERRIDE(OP): Performed by: HOSPITALIST

## 2021-10-23 PROCEDURE — 770020 HCHG ROOM/CARE - TELE (206)

## 2021-10-23 PROCEDURE — A9270 NON-COVERED ITEM OR SERVICE: HCPCS | Performed by: INTERNAL MEDICINE

## 2021-10-23 PROCEDURE — 80048 BASIC METABOLIC PNL TOTAL CA: CPT | Mod: 91

## 2021-10-23 PROCEDURE — 700111 HCHG RX REV CODE 636 W/ 250 OVERRIDE (IP): Performed by: HOSPITALIST

## 2021-10-23 PROCEDURE — A9270 NON-COVERED ITEM OR SERVICE: HCPCS | Performed by: HOSPITALIST

## 2021-10-23 RX ORDER — POTASSIUM CHLORIDE 7.45 MG/ML
10 INJECTION INTRAVENOUS
Status: DISPENSED | OUTPATIENT
Start: 2021-10-23 | End: 2021-10-23

## 2021-10-23 RX ORDER — POTASSIUM CHLORIDE 20 MEQ/1
20 TABLET, EXTENDED RELEASE ORAL DAILY
Status: DISCONTINUED | OUTPATIENT
Start: 2021-10-23 | End: 2021-10-23

## 2021-10-23 RX ORDER — POTASSIUM CHLORIDE 20 MEQ/1
40 TABLET, EXTENDED RELEASE ORAL 3 TIMES DAILY
Status: DISCONTINUED | OUTPATIENT
Start: 2021-10-23 | End: 2021-10-24

## 2021-10-23 RX ORDER — NITROFURANTOIN 25; 75 MG/1; MG/1
100 CAPSULE ORAL 2 TIMES DAILY WITH MEALS
Status: DISCONTINUED | OUTPATIENT
Start: 2021-10-23 | End: 2021-10-23

## 2021-10-23 RX ORDER — MAGNESIUM SULFATE HEPTAHYDRATE 40 MG/ML
2 INJECTION, SOLUTION INTRAVENOUS ONCE
Status: COMPLETED | OUTPATIENT
Start: 2021-10-23 | End: 2021-10-23

## 2021-10-23 RX ORDER — POTASSIUM CHLORIDE 20 MEQ/1
40 TABLET, EXTENDED RELEASE ORAL 2 TIMES DAILY
Status: DISCONTINUED | OUTPATIENT
Start: 2021-10-23 | End: 2021-10-23

## 2021-10-23 RX ORDER — SULFAMETHOXAZOLE AND TRIMETHOPRIM 800; 160 MG/1; MG/1
1 TABLET ORAL EVERY 12 HOURS
Status: DISCONTINUED | OUTPATIENT
Start: 2021-10-23 | End: 2021-10-25 | Stop reason: HOSPADM

## 2021-10-23 RX ADMIN — METOPROLOL TARTRATE 12.5 MG: 25 TABLET, FILM COATED ORAL at 16:54

## 2021-10-23 RX ADMIN — SENNOSIDES AND DOCUSATE SODIUM 2 TABLET: 50; 8.6 TABLET ORAL at 17:11

## 2021-10-23 RX ADMIN — MAGNESIUM SULFATE 2 G: 2 INJECTION INTRAVENOUS at 10:14

## 2021-10-23 RX ADMIN — POTASSIUM CHLORIDE 40 MEQ: 1500 TABLET, EXTENDED RELEASE ORAL at 10:14

## 2021-10-23 RX ADMIN — SIMVASTATIN 10 MG: 10 TABLET, FILM COATED ORAL at 21:30

## 2021-10-23 RX ADMIN — INSULIN GLARGINE 10 UNITS: 100 INJECTION, SOLUTION SUBCUTANEOUS at 17:08

## 2021-10-23 RX ADMIN — POTASSIUM CHLORIDE 10 MEQ: 10 INJECTION, SOLUTION INTRAVENOUS at 21:30

## 2021-10-23 RX ADMIN — AMLODIPINE BESYLATE 5 MG: 5 TABLET ORAL at 05:06

## 2021-10-23 RX ADMIN — POTASSIUM CHLORIDE 10 MEQ: 10 INJECTION, SOLUTION INTRAVENOUS at 20:09

## 2021-10-23 RX ADMIN — ENOXAPARIN SODIUM 40 MG: 40 INJECTION SUBCUTANEOUS at 05:07

## 2021-10-23 RX ADMIN — POTASSIUM CHLORIDE 10 MEQ: 10 INJECTION, SOLUTION INTRAVENOUS at 23:08

## 2021-10-23 RX ADMIN — METOPROLOL TARTRATE 12.5 MG: 25 TABLET, FILM COATED ORAL at 05:07

## 2021-10-23 RX ADMIN — SULFAMETHOXAZOLE AND TRIMETHOPRIM 1 TABLET: 800; 160 TABLET ORAL at 17:11

## 2021-10-23 RX ADMIN — TAMSULOSIN HYDROCHLORIDE 0.8 MG: 0.4 CAPSULE ORAL at 05:06

## 2021-10-23 RX ADMIN — FINASTERIDE 5 MG: 5 TABLET, FILM COATED ORAL at 05:06

## 2021-10-23 RX ADMIN — POTASSIUM CHLORIDE 40 MEQ: 1500 TABLET, EXTENDED RELEASE ORAL at 17:12

## 2021-10-23 RX ADMIN — LEVOTHYROXINE SODIUM 25 MCG: 0.03 TABLET ORAL at 17:12

## 2021-10-23 ASSESSMENT — ENCOUNTER SYMPTOMS
BRUISES/BLEEDS EASILY: 0
CONSTIPATION: 0
ABDOMINAL PAIN: 0
SORE THROAT: 0
FOCAL WEAKNESS: 0
EYE DISCHARGE: 0
NAUSEA: 0
HEMOPTYSIS: 0
SPUTUM PRODUCTION: 0
HEADACHES: 0
FEVER: 0
DIARRHEA: 0
CHILLS: 0
SHORTNESS OF BREATH: 0
DIZZINESS: 0
LOSS OF CONSCIOUSNESS: 0
EYE PAIN: 0
VOMITING: 0
WHEEZING: 0
COUGH: 0
SENSORY CHANGE: 0
DIAPHORESIS: 0
MYALGIAS: 0
NECK PAIN: 0
BACK PAIN: 0
SPEECH CHANGE: 0
DEPRESSION: 0
FALLS: 1
WEAKNESS: 1
CLAUDICATION: 0
PALPITATIONS: 0

## 2021-10-23 ASSESSMENT — PAIN DESCRIPTION - PAIN TYPE: TYPE: ACUTE PAIN

## 2021-10-23 ASSESSMENT — LIFESTYLE VARIABLES: SUBSTANCE_ABUSE: 0

## 2021-10-23 NOTE — PROGRESS NOTES
Received bedside patient report from MACK Garcia. Patient resting comfortably in bed, no complaints at this time. Safety precautions in place. Will continue to monitor.

## 2021-10-23 NOTE — CARE PLAN
The patient is Stable - Low risk of patient condition declining or worsening    Shift Goals  Clinical Goals: Free from falls and injury  Patient Goals: Rest and sleep    Progress made toward(s) clinical / shift goals:    Problem: Fall Risk  Goal: Patient will remain free from falls  Outcome: Progressing  Note: Safety precautions in place. Bed alarm ON. Will continue to monitor patient.

## 2021-10-23 NOTE — PROGRESS NOTES
Per On-Call Hospitalist, patient may be off tele while attempting to sleep to reduce agitation.

## 2021-10-23 NOTE — PROGRESS NOTES
[facility] Medicine Daily Progress Note    Date of Service  10/23/2021    Chief Complaint  Julien Dao is a 89 y.o. male admitted 10/21/2021 with hypokalemia, poor historian, unaware of his home medications.    Hospital Course   Julien Dao is a 89 y.o. male who presented 10/21/2021 with CAD, DM, HTN, chronic wilson, recurrent UTIs who presented to the ED for falls. He has multiple hospitalizations for the past 2 years.   He says he was reaching for his fridge when the handle slipped and and he fell. He denies LOC or hitting his head. He currently denies any pain. He says his wife called EMS, he was able to get up and walk with his walker. He denied any dizziness, fever and chills, chest pain, SOB, abd pain, N/V/D. He has chronic issues with constipation and mild dry cough. He says he eats very well, always hungry.   In the ED his K was low at 2.7. Patient has lasix listed as a home medication.  No indications since EF 55%.   Patient denies dysuria, he doesn't know when his wilson cath was last changed.    Interval Problem Update  10/22:  Patient awake cooperative.  When asked about home Lasix he does not know if he takes it.  He does not appear to know any of his home medications.  However the Lasix medication is likely the reason for his severe hypokalemia.  I reviewed his most recent echocardiogram from 2019 with a normal EF 55% there is biatrial enlargement and RVP of 40.  He shows no signs of shortness of breath or leg edema.  The patient may do well without Lasix.  Also it is unclear if patient has been trialed off of his Wilson catheter since starting Flomax.  We could try removing with bladder scans to see if we can successfully keep out Wilson catheter and thus preventing multiple readmissions for UTI.  Troponin is negative heart monitor sinus rhythm chest x-ray negative.  Magnesium 1.9.  I have ordered for K riders for potassium of 2.9.  Also it is noted that the UA was taken from his old dirty catheter a new  Sher catheter has been replaced and we will resend a new UA.  Again no evidence of dysuria fever chills or infection.  10/23: Patient sleeping, comfortable on exam. He has no complaints. His potassium did drop from 3.6-2.7 again. No GI losses. I have replaced magnesium 2 g IV x1 and potassium oral 40 3 times daily. Urine culture showing staph epidermidis. Treatment with Macrobid 100 mg twice daily for 5 days. Spoke with bedside RN will attempt to remove Sher catheter to see if we can prevent further infections. This seems to be a point of repeat hospitalizations for patient.    I have personally seen and examined the patient at bedside. I discussed the plan of care with patient, bedside RN, charge RN and .    Consultants/Specialty      Code Status  DNAR/DNI    Disposition  Patient is not medically cleared.   Anticipate discharge to to home with organized home healthcare and close outpatient follow-up.  I have placed the appropriate orders for post-discharge needs.    Review of Systems  Review of Systems   Constitutional: Positive for malaise/fatigue. Negative for chills, diaphoresis and fever.   HENT: Negative for congestion and sore throat.    Eyes: Negative for pain and discharge.   Respiratory: Negative for cough, hemoptysis, sputum production, shortness of breath and wheezing.    Cardiovascular: Negative for chest pain, palpitations, claudication and leg swelling.   Gastrointestinal: Negative for abdominal pain, constipation, diarrhea, melena, nausea and vomiting.   Genitourinary: Negative for dysuria, frequency and urgency.   Musculoskeletal: Positive for falls. Negative for back pain, joint pain, myalgias and neck pain.   Skin: Negative for itching and rash.   Neurological: Positive for weakness. Negative for dizziness, sensory change, speech change, focal weakness, loss of consciousness and headaches.   Endo/Heme/Allergies: Does not bruise/bleed easily.   Psychiatric/Behavioral: Negative for  depression, substance abuse and suicidal ideas.        Physical Exam  Temp:  [36.3 °C (97.4 °F)-36.6 °C (97.9 °F)] 36.3 °C (97.4 °F)  Pulse:  [] 85  Resp:  [16-18] 18  BP: (128-175)/(55-99) 128/99  SpO2:  [92 %-100 %] 97 %    Physical Exam  Constitutional:       General: He is not in acute distress.     Appearance: He is not diaphoretic.      Comments: Thin, cachectic   HENT:      Head: Normocephalic and atraumatic.      Mouth/Throat:      Pharynx: No oropharyngeal exudate.   Eyes:      General: No scleral icterus.        Right eye: No discharge.         Left eye: No discharge.      Conjunctiva/sclera: Conjunctivae normal.      Pupils: Pupils are equal, round, and reactive to light.   Neck:      Thyroid: No thyromegaly.      Vascular: No JVD.      Trachea: No tracheal deviation.   Cardiovascular:      Rate and Rhythm: Normal rate and regular rhythm.      Heart sounds: Normal heart sounds. No murmur heard.   No friction rub. No gallop.    Pulmonary:      Effort: Pulmonary effort is normal. No respiratory distress.      Breath sounds: Normal breath sounds. No wheezing or rales.   Chest:      Chest wall: No tenderness.   Abdominal:      General: Bowel sounds are normal. There is no distension.      Palpations: Abdomen is soft. There is no mass.      Tenderness: There is no abdominal tenderness. There is no guarding or rebound.   Genitourinary:     Comments: Sher catheter with clear urine in Sher bag  Musculoskeletal:         General: Tenderness present. Normal range of motion.      Cervical back: Normal range of motion and neck supple.   Lymphadenopathy:      Cervical: No cervical adenopathy.   Skin:     General: Skin is warm and dry.      Findings: No erythema or rash.   Neurological:      Mental Status: He is alert and oriented to person, place, and time.      Cranial Nerves: No cranial nerve deficit.      Motor: No abnormal muscle tone.   Psychiatric:         Behavior: Behavior normal.         Thought  Content: Thought content normal.         Judgment: Judgment normal.         Fluids    Intake/Output Summary (Last 24 hours) at 10/23/2021 1316  Last data filed at 10/23/2021 0311  Gross per 24 hour   Intake 120 ml   Output 3350 ml   Net -3230 ml       Laboratory  Recent Labs     10/21/21  1135 10/22/21  0442 10/23/21  0332   WBC 8.5 9.3 9.5   RBC 4.81 4.94 5.22   HEMOGLOBIN 13.7* 14.0 14.9   HEMATOCRIT 42.2 43.3 45.1   MCV 87.7 87.7 86.4   MCH 28.5 28.3 28.5   MCHC 32.5* 32.3* 33.0*   RDW 47.2 47.7 45.5   PLATELETCT 261 291 261   MPV 10.1 10.5 10.1     Recent Labs     10/22/21  0442 10/22/21  1431 10/23/21  0332   SODIUM 141 138 141   POTASSIUM 2.9* 3.6 2.7*   CHLORIDE 103 101 104   CO2 25 16* 23   GLUCOSE 141* 328* 132*   BUN 12 13 9   CREATININE 0.67 0.99 0.65   CALCIUM 8.8 8.7 8.9                   Imaging  DX-CHEST-PORTABLE (1 VIEW)   Final Result         1. No acute cardiopulmonary abnormalities are identified.           Assessment/Plan  * Hypokalemia- (present on admission)  Assessment & Plan  Telemetry given severe low level  Monitor Mg level 1.9 adequate.  2/2 lasix home med use, plan to dc altogether since EF 55%, mild biatrial enlargement and elevated RVP on last echo.  May change to spironolactone low dose daily instead.    BPH (benign prostatic hyperplasia)- (present on admission)  Assessment & Plan  Continue flomax and finasteride    Urinary retention- (present on admission)  Assessment & Plan  Chronic wilson cath  UA has positive WBCs but he is asymptomatic and not septic.  RN states ER RN took UA from home catheter.  wilson cath was replaced upon admission 10/21.  Will not start antibiotics at this time, repeat a UA from new catheter.  10/22:   Plan to trial removing Wilson catheter with bladder scans to see if can successfully keep out upon discharge to home.  Added proscar 5mg po daily.    Generalized weakness- (present on admission)  Assessment & Plan  Frequent falls, multiple hospitalizations for  this  Has refused SNF in the past  PTOT to eval   PT/OT/RN/aide/Sher care ordered.  Lives with wife, also debilitated.    Troponin I above reference range- (present on admission)  Assessment & Plan  Denies chest pain, ACS not suspected  Troponin flat x 2, negative.    Diabetes mellitus with hyperglycemia (HCC)- (present on admission)  Assessment & Plan  Poorly controlled, last A1c 16%  Continue on lantus 10U and ISS, adjust as needed  DM diet    HTN (hypertension)- (present on admission)  Assessment & Plan  Hypertensive mildly  Continue metop and norvasc  dc'd lasix    CAD (coronary artery disease) - CABG Nov. 2004- (present on admission)  Assessment & Plan  Denies chest pain       VTE prophylaxis: enoxaparin ppx    I have performed a physical exam and reviewed and updated ROS and Plan today (10/23/2021). In review of yesterday's note (10/22/2021), there are no changes except as documented above.

## 2021-10-24 LAB
ANION GAP SERPL CALC-SCNC: 11 MMOL/L (ref 7–16)
BUN SERPL-MCNC: 14 MG/DL (ref 8–22)
CALCIUM SERPL-MCNC: 8.9 MG/DL (ref 8.4–10.2)
CHLORIDE SERPL-SCNC: 99 MMOL/L (ref 96–112)
CO2 SERPL-SCNC: 24 MMOL/L (ref 20–33)
CREAT SERPL-MCNC: 0.9 MG/DL (ref 0.5–1.4)
GLUCOSE BLD-MCNC: 246 MG/DL (ref 65–99)
GLUCOSE SERPL-MCNC: 351 MG/DL (ref 65–99)
MAGNESIUM SERPL-MCNC: 2.2 MG/DL (ref 1.5–2.5)
POTASSIUM SERPL-SCNC: 3.7 MMOL/L (ref 3.6–5.5)
SODIUM SERPL-SCNC: 134 MMOL/L (ref 135–145)

## 2021-10-24 PROCEDURE — 700111 HCHG RX REV CODE 636 W/ 250 OVERRIDE (IP): Performed by: INTERNAL MEDICINE

## 2021-10-24 PROCEDURE — 770006 HCHG ROOM/CARE - MED/SURG/GYN SEMI*

## 2021-10-24 PROCEDURE — 80048 BASIC METABOLIC PNL TOTAL CA: CPT

## 2021-10-24 PROCEDURE — 700102 HCHG RX REV CODE 250 W/ 637 OVERRIDE(OP): Performed by: HOSPITALIST

## 2021-10-24 PROCEDURE — 700102 HCHG RX REV CODE 250 W/ 637 OVERRIDE(OP): Performed by: INTERNAL MEDICINE

## 2021-10-24 PROCEDURE — 82962 GLUCOSE BLOOD TEST: CPT

## 2021-10-24 PROCEDURE — A9270 NON-COVERED ITEM OR SERVICE: HCPCS | Performed by: HOSPITALIST

## 2021-10-24 PROCEDURE — 83735 ASSAY OF MAGNESIUM: CPT

## 2021-10-24 PROCEDURE — 99232 SBSQ HOSP IP/OBS MODERATE 35: CPT | Performed by: HOSPITALIST

## 2021-10-24 PROCEDURE — A9270 NON-COVERED ITEM OR SERVICE: HCPCS | Performed by: INTERNAL MEDICINE

## 2021-10-24 RX ORDER — POTASSIUM CHLORIDE 20 MEQ/1
40 TABLET, EXTENDED RELEASE ORAL DAILY
Status: DISCONTINUED | OUTPATIENT
Start: 2021-10-25 | End: 2021-10-25

## 2021-10-24 RX ORDER — POTASSIUM CHLORIDE 7.45 MG/ML
10 INJECTION INTRAVENOUS
Status: COMPLETED | OUTPATIENT
Start: 2021-10-24 | End: 2021-10-24

## 2021-10-24 RX ADMIN — AMLODIPINE BESYLATE 5 MG: 5 TABLET ORAL at 05:29

## 2021-10-24 RX ADMIN — POTASSIUM CHLORIDE 10 MEQ: 10 INJECTION, SOLUTION INTRAVENOUS at 02:47

## 2021-10-24 RX ADMIN — LEVOTHYROXINE SODIUM 25 MCG: 0.03 TABLET ORAL at 17:08

## 2021-10-24 RX ADMIN — TAMSULOSIN HYDROCHLORIDE 0.8 MG: 0.4 CAPSULE ORAL at 05:31

## 2021-10-24 RX ADMIN — SENNOSIDES AND DOCUSATE SODIUM 2 TABLET: 50; 8.6 TABLET ORAL at 17:08

## 2021-10-24 RX ADMIN — SIMVASTATIN 10 MG: 10 TABLET, FILM COATED ORAL at 20:46

## 2021-10-24 RX ADMIN — POTASSIUM CHLORIDE 10 MEQ: 10 INJECTION, SOLUTION INTRAVENOUS at 04:05

## 2021-10-24 RX ADMIN — INSULIN GLARGINE 10 UNITS: 100 INJECTION, SOLUTION SUBCUTANEOUS at 16:56

## 2021-10-24 RX ADMIN — SULFAMETHOXAZOLE AND TRIMETHOPRIM 1 TABLET: 800; 160 TABLET ORAL at 05:30

## 2021-10-24 RX ADMIN — POTASSIUM CHLORIDE 10 MEQ: 10 INJECTION, SOLUTION INTRAVENOUS at 05:37

## 2021-10-24 RX ADMIN — FINASTERIDE 5 MG: 5 TABLET, FILM COATED ORAL at 05:31

## 2021-10-24 RX ADMIN — METOPROLOL TARTRATE 12.5 MG: 25 TABLET, FILM COATED ORAL at 16:57

## 2021-10-24 RX ADMIN — POTASSIUM CHLORIDE 40 MEQ: 1500 TABLET, EXTENDED RELEASE ORAL at 05:29

## 2021-10-24 RX ADMIN — POTASSIUM CHLORIDE 10 MEQ: 10 INJECTION, SOLUTION INTRAVENOUS at 00:44

## 2021-10-24 RX ADMIN — METOPROLOL TARTRATE 12.5 MG: 25 TABLET, FILM COATED ORAL at 04:08

## 2021-10-24 RX ADMIN — SULFAMETHOXAZOLE AND TRIMETHOPRIM 1 TABLET: 800; 160 TABLET ORAL at 17:08

## 2021-10-24 RX ADMIN — ENOXAPARIN SODIUM 40 MG: 40 INJECTION SUBCUTANEOUS at 05:32

## 2021-10-24 ASSESSMENT — ENCOUNTER SYMPTOMS
DEPRESSION: 0
NECK PAIN: 0
SENSORY CHANGE: 0
CHILLS: 0
WHEEZING: 0
DIAPHORESIS: 0
DIARRHEA: 0
FOCAL WEAKNESS: 0
BRUISES/BLEEDS EASILY: 0
PALPITATIONS: 0
FALLS: 1
MYALGIAS: 0
COUGH: 0
LOSS OF CONSCIOUSNESS: 0
NAUSEA: 0
ABDOMINAL PAIN: 0
DIZZINESS: 0
SPEECH CHANGE: 0
SORE THROAT: 0
HEMOPTYSIS: 0
EYE DISCHARGE: 0
HEADACHES: 0
VOMITING: 0
BACK PAIN: 0
CLAUDICATION: 0
FEVER: 0
EYE PAIN: 0
SPUTUM PRODUCTION: 0
CONSTIPATION: 0
SHORTNESS OF BREATH: 0
WEAKNESS: 0

## 2021-10-24 ASSESSMENT — PAIN DESCRIPTION - PAIN TYPE: TYPE: ACUTE PAIN

## 2021-10-24 ASSESSMENT — LIFESTYLE VARIABLES: SUBSTANCE_ABUSE: 0

## 2021-10-24 NOTE — ASSESSMENT & PLAN NOTE
Urine culture with staph epidermidis.  Sher catheter has been removed 10/23.  Treatment with Bactrim DS one tab p.o. twice daily for 5 days.

## 2021-10-24 NOTE — CARE PLAN
The patient is Stable - Low risk of patient condition declining or worsening    Shift Goals  Clinical Goals: Replace potassium and monitor electrolytes   Patient Goals: rest and sleep    Progress made toward(s) clinical / shift goals:  Administering potassium IV piggy back per MAR. Monitor electrolytes and replace as needed. Cluster care and limit disruptions to allow patient to sleep and rest.      Problem: Knowledge Deficit - Standard  Goal: Patient and family/care givers will demonstrate understanding of plan of care, disease process/condition, diagnostic tests and medications  Outcome: Progressing     Problem: Skin Integrity  Goal: Skin integrity is maintained or improved  Outcome: Progressing     Problem: Fall Risk  Goal: Patient will remain free from falls  Outcome: Progressing       Patient is not progressing towards the following goals:N/A

## 2021-10-24 NOTE — CARE PLAN
The patient is Stable - Low risk of patient condition declining or worsening    Shift Goals  Clinical Goals: Replace potassium and monitor electrolytes   Patient Goals: rest and sleep    Progress made toward(s) clinical / shift goals:        Problem: Knowledge Deficit - Standard  Goal: Patient and family/care givers will demonstrate understanding of plan of care, disease process/condition, diagnostic tests and medications  Outcome: Progressing  Pt confused education provided prn and reorientation provided frequently.     Problem: Fall Risk  Goal: Patient will remain free from falls  Outcome: Progressing  Reorientation provided frequently to patient. Bed alarm on due to impulsiveness.        Patient is not progressing towards the following goals:

## 2021-10-24 NOTE — PROGRESS NOTES
[facility] Medicine Daily Progress Note    Date of Service  10/24/2021    Chief Complaint  Julien Dao is a 89 y.o. male admitted 10/21/2021 with hypokalemia, poor historian, unaware of his home medications.    Hospital Course   Julien Dao is a 89 y.o. male who presented 10/21/2021 with CAD, DM, HTN, chronic wilson, recurrent UTIs who presented to the ED for falls. He has multiple hospitalizations for the past 2 years.   He says he was reaching for his fridge when the handle slipped and and he fell. He denies LOC or hitting his head. He currently denies any pain. He says his wife called EMS, he was able to get up and walk with his walker. He denied any dizziness, fever and chills, chest pain, SOB, abd pain, N/V/D. He has chronic issues with constipation and mild dry cough. He says he eats very well, always hungry.   In the ED his K was low at 2.7. Patient has lasix listed as a home medication.  No indications since EF 55%.   Patient denies dysuria, he doesn't know when his wilson cath was last changed.    Interval Problem Update  10/22:  Patient awake cooperative.  When asked about home Lasix he does not know if he takes it.  He does not appear to know any of his home medications.  However the Lasix medication is likely the reason for his severe hypokalemia.  I reviewed his most recent echocardiogram from 2019 with a normal EF 55% there is biatrial enlargement and RVP of 40.  He shows no signs of shortness of breath or leg edema.  The patient may do well without Lasix.  Also it is unclear if patient has been trialed off of his Wilson catheter since starting Flomax.  We could try removing with bladder scans to see if we can successfully keep out Wilson catheter and thus preventing multiple readmissions for UTI.  Troponin is negative heart monitor sinus rhythm chest x-ray negative.  Magnesium 1.9.  I have ordered for K riders for potassium of 2.9.  Also it is noted that the UA was taken from his old dirty catheter a new  Sher catheter has been replaced and we will resend a new UA.  Again no evidence of dysuria fever chills or infection.  10/23: Patient sleeping, comfortable on exam. He has no complaints. His potassium did drop from 3.6-2.7 again. No GI losses. I have replaced magnesium 2 g IV x1 and potassium oral 40 3 times daily. Urine culture showing staph epidermidis. Treatment with Macrobid 100 mg twice daily for 5 days. Spoke with bedside RN will attempt to remove Sher catheter to see if we can prevent further infections. This seems to be a point of repeat hospitalizations for patient.  10/24: Patient is tolerating his Sher catheter removal.  Bladder scans have been negative since removal yesterday.  Patient is much more alert he remains somewhat confused but this is his baseline.  Remains sinus rhythm 70-78 okay to transfer to medical unit.  Potassium has been replaced currently at 3.7 magnesium 2.2 we will repeat tomorrow a.m. to ensure that it is stabilized.    I have personally seen and examined the patient at bedside. I discussed the plan of care with patient, bedside RN, charge RN and .    Consultants/Specialty  None    Code Status  DNAR/DNI    Disposition  Patient is not medically cleared.   Anticipate discharge to to home with organized home healthcare and close outpatient follow-up.  I have placed the appropriate orders for post-discharge needs.    Review of Systems  Review of Systems   Constitutional: Negative for chills, diaphoresis, fever and malaise/fatigue.   HENT: Negative for congestion and sore throat.    Eyes: Negative for pain and discharge.   Respiratory: Negative for cough, hemoptysis, sputum production, shortness of breath and wheezing.    Cardiovascular: Negative for chest pain, palpitations, claudication and leg swelling.   Gastrointestinal: Negative for abdominal pain, constipation, diarrhea, melena, nausea and vomiting.   Genitourinary: Negative for dysuria, frequency and urgency.    Musculoskeletal: Positive for falls. Negative for back pain, joint pain, myalgias and neck pain.   Skin: Negative for itching and rash.   Neurological: Negative for dizziness, sensory change, speech change, focal weakness, loss of consciousness, weakness and headaches.   Endo/Heme/Allergies: Does not bruise/bleed easily.   Psychiatric/Behavioral: Negative for depression, substance abuse and suicidal ideas.        Physical Exam  Temp:  [36.4 °C (97.5 °F)-37 °C (98.6 °F)] 36.6 °C (97.8 °F)  Pulse:  [66-77] 73  Resp:  [18] 18  BP: (132-158)/(58-92) 141/67  SpO2:  [94 %-97 %] 95 %    Physical Exam  Constitutional:       General: He is not in acute distress.     Appearance: He is not diaphoretic.      Comments: Thin, cachectic   HENT:      Head: Normocephalic and atraumatic.      Mouth/Throat:      Pharynx: No oropharyngeal exudate.   Eyes:      General: No scleral icterus.        Right eye: No discharge.         Left eye: No discharge.      Conjunctiva/sclera: Conjunctivae normal.      Pupils: Pupils are equal, round, and reactive to light.   Neck:      Thyroid: No thyromegaly.      Vascular: No JVD.      Trachea: No tracheal deviation.   Cardiovascular:      Rate and Rhythm: Normal rate and regular rhythm.      Heart sounds: Normal heart sounds. No murmur heard.   No friction rub. No gallop.    Pulmonary:      Effort: Pulmonary effort is normal. No respiratory distress.      Breath sounds: Normal breath sounds. No wheezing or rales.   Chest:      Chest wall: No tenderness.   Abdominal:      General: Bowel sounds are normal. There is no distension.      Palpations: Abdomen is soft. There is no mass.      Tenderness: There is no abdominal tenderness. There is no guarding or rebound.   Musculoskeletal:         General: No tenderness. Normal range of motion.      Cervical back: Normal range of motion and neck supple.   Lymphadenopathy:      Cervical: No cervical adenopathy.   Skin:     General: Skin is warm and dry.       Findings: No erythema or rash.   Neurological:      Mental Status: He is alert and oriented to person, place, and time.      Cranial Nerves: No cranial nerve deficit.      Motor: No abnormal muscle tone.   Psychiatric:         Behavior: Behavior normal.         Fluids  No intake or output data in the 24 hours ending 10/24/21 1310    Laboratory  Recent Labs     10/22/21  0442 10/23/21  0332   WBC 9.3 9.5   RBC 4.94 5.22   HEMOGLOBIN 14.0 14.9   HEMATOCRIT 43.3 45.1   MCV 87.7 86.4   MCH 28.3 28.5   MCHC 32.3* 33.0*   RDW 47.7 45.5   PLATELETCT 291 261   MPV 10.5 10.1     Recent Labs     10/23/21  0332 10/23/21  1659 10/24/21  0148   SODIUM 141 137 134*   POTASSIUM 2.7* 2.7* 3.7   CHLORIDE 104 100 99   CO2 23 25 24   GLUCOSE 132* 194* 351*   BUN 9 15 14   CREATININE 0.65 0.92 0.90   CALCIUM 8.9 9.1 8.9                   Imaging  DX-CHEST-PORTABLE (1 VIEW)   Final Result         1. No acute cardiopulmonary abnormalities are identified.           Assessment/Plan  * Hypokalemia- (present on admission)  Assessment & Plan    Monitor Mg level 1.9 adequate.  2/2 lasix home med use, plan to dc altogether since EF 55%, mild biatrial enlargement and elevated RVP on last echo.  May change to spironolactone low dose daily instead.  10/23: Magnesium replaced 2 g IV.  Potassium needing IV replacement level 2.7.  Continue oral replacement.  10/24: Potassium level 3.7.  Continue 40 mEq daily.  Can DC telemetry.  Sinus rhythm on monitor.    UTI (urinary tract infection)- (present on admission)  Assessment & Plan  Urine culture with staph epidermidis.  Wilson catheter has been removed 10/23.  Treatment with Bactrim DS one tab p.o. twice daily for 5 days.      BPH (benign prostatic hyperplasia)- (present on admission)  Assessment & Plan  Continue flomax and finasteride  10/23: Removal of Wilson catheter with normal bladder scans.    Urinary retention- (present on admission)  Assessment & Plan  Chronic wilson cath  UA has positive WBCs but  he is asymptomatic and not septic.  RN states ER RN took UA from home catheter.  wilson cath was replaced upon admission 10/21.  Will not start antibiotics at this time, repeat a UA from new catheter.  10/22:   Plan to trial removing Wilson catheter with bladder scans to see if can successfully keep out upon discharge to home.  Added proscar 5mg po daily.  10/23:    Wilson catheter successfully removed.  Bladder scans negative.    Generalized weakness- (present on admission)  Assessment & Plan  Frequent falls, multiple hospitalizations for this  Has refused SNF in the past  PTOT recommend postacute placement, however patient has refused in the past and adamantly refuses now.  Therefore home health has been ordered.  Likely patient will be discharged without a Wilson catheter this time, successfully removed.  HH PT/OT/RN/aide    Lives with wife, also debilitated.    Troponin I above reference range- (present on admission)  Assessment & Plan  Denies chest pain, ACS not suspected  Troponin flat x 2, negative.    Diabetes mellitus with hyperglycemia (HCC)- (present on admission)  Assessment & Plan  Poorly controlled, last A1c 16%  Continue on lantus 10U and ISS, adjust as needed  DM diet    HTN (hypertension)- (present on admission)  Assessment & Plan  Hypertensive mildly  Continue metop and norvasc  dc'd lasix    CAD (coronary artery disease) - CABG Nov. 2004- (present on admission)  Assessment & Plan  Denies chest pain       VTE prophylaxis: enoxaparin ppx    I have performed a physical exam and reviewed and updated ROS and Plan today (10/24/2021). In review of yesterday's note (10/23/2021), there are no changes except as documented above.

## 2021-10-25 VITALS
RESPIRATION RATE: 15 BRPM | HEART RATE: 67 BPM | HEIGHT: 67 IN | BODY MASS INDEX: 25.61 KG/M2 | DIASTOLIC BLOOD PRESSURE: 51 MMHG | WEIGHT: 163.14 LBS | OXYGEN SATURATION: 94 % | TEMPERATURE: 97.5 F | SYSTOLIC BLOOD PRESSURE: 105 MMHG

## 2021-10-25 PROBLEM — R33.9 RETENTION OF URINE: Status: RESOLVED | Noted: 2018-11-04 | Resolved: 2021-10-25

## 2021-10-25 PROBLEM — R53.1 GENERALIZED WEAKNESS: Status: RESOLVED | Noted: 2018-11-04 | Resolved: 2021-10-25

## 2021-10-25 PROBLEM — R79.89 TROPONIN I ABOVE REFERENCE RANGE: Status: RESOLVED | Noted: 2017-11-17 | Resolved: 2021-10-25

## 2021-10-25 PROBLEM — Z95.0 PACEMAKER: Status: ACTIVE | Noted: 2021-10-25

## 2021-10-25 LAB
ANION GAP SERPL CALC-SCNC: 14 MMOL/L (ref 7–16)
BUN SERPL-MCNC: 13 MG/DL (ref 8–22)
CALCIUM SERPL-MCNC: 9 MG/DL (ref 8.4–10.2)
CHLORIDE SERPL-SCNC: 103 MMOL/L (ref 96–112)
CO2 SERPL-SCNC: 19 MMOL/L (ref 20–33)
CREAT SERPL-MCNC: 0.89 MG/DL (ref 0.5–1.4)
GLUCOSE SERPL-MCNC: 187 MG/DL (ref 65–99)
POTASSIUM SERPL-SCNC: 5.1 MMOL/L (ref 3.6–5.5)
SODIUM SERPL-SCNC: 136 MMOL/L (ref 135–145)

## 2021-10-25 PROCEDURE — 700111 HCHG RX REV CODE 636 W/ 250 OVERRIDE (IP): Performed by: INTERNAL MEDICINE

## 2021-10-25 PROCEDURE — A9270 NON-COVERED ITEM OR SERVICE: HCPCS | Performed by: INTERNAL MEDICINE

## 2021-10-25 PROCEDURE — A9270 NON-COVERED ITEM OR SERVICE: HCPCS | Performed by: HOSPITALIST

## 2021-10-25 PROCEDURE — 99239 HOSP IP/OBS DSCHRG MGMT >30: CPT | Performed by: HOSPITALIST

## 2021-10-25 PROCEDURE — 700102 HCHG RX REV CODE 250 W/ 637 OVERRIDE(OP): Performed by: HOSPITALIST

## 2021-10-25 PROCEDURE — 700102 HCHG RX REV CODE 250 W/ 637 OVERRIDE(OP): Performed by: INTERNAL MEDICINE

## 2021-10-25 PROCEDURE — 80048 BASIC METABOLIC PNL TOTAL CA: CPT

## 2021-10-25 RX ORDER — HALOPERIDOL 5 MG/ML
1 INJECTION INTRAMUSCULAR EVERY 4 HOURS PRN
Status: DISCONTINUED | OUTPATIENT
Start: 2021-10-25 | End: 2021-10-25 | Stop reason: HOSPADM

## 2021-10-25 RX ORDER — SULFAMETHOXAZOLE AND TRIMETHOPRIM 800; 160 MG/1; MG/1
1 TABLET ORAL EVERY 12 HOURS
Qty: 6 TABLET | Refills: 0 | Status: SHIPPED | OUTPATIENT
Start: 2021-10-25 | End: 2021-10-28

## 2021-10-25 RX ADMIN — ENOXAPARIN SODIUM 40 MG: 40 INJECTION SUBCUTANEOUS at 05:15

## 2021-10-25 RX ADMIN — FINASTERIDE 5 MG: 5 TABLET, FILM COATED ORAL at 05:11

## 2021-10-25 RX ADMIN — AMLODIPINE BESYLATE 5 MG: 5 TABLET ORAL at 05:12

## 2021-10-25 RX ADMIN — SULFAMETHOXAZOLE AND TRIMETHOPRIM 1 TABLET: 800; 160 TABLET ORAL at 05:11

## 2021-10-25 RX ADMIN — POTASSIUM CHLORIDE 40 MEQ: 1500 TABLET, EXTENDED RELEASE ORAL at 05:12

## 2021-10-25 RX ADMIN — TAMSULOSIN HYDROCHLORIDE 0.8 MG: 0.4 CAPSULE ORAL at 05:11

## 2021-10-25 RX ADMIN — METOPROLOL TARTRATE 12.5 MG: 25 TABLET, FILM COATED ORAL at 05:11

## 2021-10-25 NOTE — PROGRESS NOTES
Telemetry Shift Summary     Rhythm:SR and V-Pacing   HR range:68-87  Ectopy:O PVC, F PAC, R Trip, R Coup  Measurements: 0.22/0.18/0.38     Normal measurements  Rhythm: SR  HR range:   Measurements: 0.12-0.20/0.06-0.10/0.30-0.52     Tele strips reviewed and placed in chart.

## 2021-10-25 NOTE — PROGRESS NOTES
Pt confused during shift and frequently agitated with staff. Pt Impulsive as well, bed alarm in place. Pt on tele monitor, but dc'd during shift. Tolerating room air. Good appetite and oral intake. Incontinent of urine.

## 2021-10-25 NOTE — DISCHARGE INSTRUCTIONS
Discharge Instructions    Discharged to home by ambulance with escort. Discharged via ambulance, hospital escort: Yes.  Special equipment needed: Not Applicable    Be sure to schedule a follow-up appointment with your primary care doctor or any specialists as instructed.     Discharge Plan:   Diet Plan: Discussed  Activity Level: Discussed  Confirmed Follow up Appointment: Patient to Call and Schedule Appointment  Confirmed Symptoms Management: Discussed  Medication Reconciliation Updated: Yes  Influenza Vaccine Indication: Patient Refuses    I understand that a diet low in cholesterol, fat, and sodium is recommended for good health. Unless I have been given specific instructions below for another diet, I accept this instruction as my diet prescription.   Other diet: Heart healthy    Special Instructions: None    · Is patient discharged on Warfarin / Coumadin?   No     Depression / Suicide Risk    As you are discharged from this [facility] facility, it is important to learn how to keep safe from harming yourself.    Recognize the warning signs:  · Abrupt changes in personality, positive or negative- including increase in energy   · Giving away possessions  · Change in eating patterns- significant weight changes-  positive or negative  · Change in sleeping patterns- unable to sleep or sleeping all the time   · Unwillingness or inability to communicate  · Depression  · Unusual sadness, discouragement and loneliness  · Talk of wanting to die  · Neglect of personal appearance   · Rebelliousness- reckless behavior  · Withdrawal from people/activities they love  · Confusion- inability to concentrate     If you or a loved one observes any of these behaviors or has concerns about self-harm, here's what you can do:  · Talk about it- your feelings and reasons for harming yourself  · Remove any means that you might use to hurt yourself (examples: pills, rope, extension cords, firearm)  · Get professional help from the  community (Mental Health, Substance Abuse, psychological counseling)  · Do not be alone:Call your Safe Contact- someone whom you trust who will be there for you.  · Call your local CRISIS HOTLINE 136-3749 or 423-926-8512  · Call your local Children's Mobile Crisis Response Team [place] (118) 720-8805 or www.[facility]  · Call the toll free National Suicide Prevention Hotlines   · National Suicide Prevention Lifeline 605-625-JJAE (3950)  · National Hope Line Network 800-SUICIDE (392-5309)

## 2021-10-25 NOTE — PROGRESS NOTES
Dr. Luu notified that patient is repeatedly jumping out of bed and is anxious. Patient can be agitated at time. Dr. Luu to add PRN for agitation/anxiety.

## 2021-10-25 NOTE — CARE PLAN
The patient is Stable - Low risk of patient condition declining or worsening    Shift Goals  Clinical Goals: Monitor electrolytes  Patient Goals: Discharge    Progress made toward(s) clinical / shift goals:  Monitor electrolytes and replace as needed. Work with interdisciplinary team for discharge.       Problem: Knowledge Deficit - Standard  Goal: Patient and family/care givers will demonstrate understanding of plan of care, disease process/condition, diagnostic tests and medications  Outcome: Progressing     Problem: Fall Risk  Goal: Patient will remain free from falls  Outcome: Progressing        Patient is not progressing towards the following goals:N/A

## 2021-10-25 NOTE — DISCHARGE PLANNING
DC Transport Scheduled    Received request at: 1010    Transport Company Scheduled:   Spoke with Bárbara at [facility] to schedule transport.    Scheduled Date: 10/25/2021  Scheduled Time: 1130    Destination: home    Notified care team of scheduled transport via [employer].

## 2021-10-25 NOTE — PROGRESS NOTES
Pt DC'd to home via [facility]. Pt confused, would not sign paperwork. All belongings at bedside sent with pt. Facesheet and AVS provided to EMS to give to pt's spouse. Pt escorted off unit by EMS without incident.

## 2021-10-25 NOTE — DISCHARGE SUMMARY
Discharge Summary    CHIEF COMPLAINT ON ADMISSION  Chief Complaint   Patient presents with   • Fall       Reason for Admission  Hypokalemia     Admission Date  10/21/2021    CODE STATUS  DNAR/DNI    HPI & HOSPITAL COURSE  This is a 89 y.o. male here with severe hypokalemia from lasix use.   Julien Dao is a 89 y.o. male who presented 10/21/2021 with CAD, DM, HTN, chronic wilson, recurrent UTIs who presented to the ED for falls. He has multiple hospitalizations for the past 2 years.   He says he was reaching for his fridge when the handle slipped and and he fell. He denies LOC or hitting his head. He currently denies any pain. He says his wife called EMS, he was able to get up and walk with his walker. He denied any dizziness, fever and chills, chest pain, SOB, abd pain, N/V/D. He has chronic issues with constipation and mild dry cough. He says he eats very well, always hungry.   In the ED his K was low at 2.7. Patient has lasix listed as a home medication.  No indications since EF 55%.   Patient denies dysuria, he doesn't know when his wilson cath was last changed.     Interval Problem Update  10/22:  Patient awake cooperative.  When asked about home Lasix he does not know if he takes it.  He does not appear to know any of his home medications.  However the Lasix medication is likely the reason for his severe hypokalemia.  I reviewed his most recent echocardiogram from 2019 with a normal EF 55% there is biatrial enlargement and RVP of 40.  He shows no signs of shortness of breath or leg edema.  The patient may do well without Lasix.  Also it is unclear if patient has been trialed off of his Wilson catheter since starting Flomax.  We could try removing with bladder scans to see if we can successfully keep out Wilson catheter and thus preventing multiple readmissions for UTI.  Troponin is negative heart monitor sinus rhythm chest x-ray negative.  Magnesium 1.9.  I have ordered for K riders for potassium of 2.9.  Also it  is noted that the UA was taken from his old dirty catheter a new Sher catheter has been replaced and we will resend a new UA.  Again no evidence of dysuria fever chills or infection.  10/23: Patient sleeping, comfortable on exam. He has no complaints. His potassium did drop from 3.6-2.7 again. No GI losses. I have replaced magnesium 2 g IV x1 and potassium oral 40 3 times daily. Urine culture showing staph epidermidis. Treatment with Macrobid 100 mg twice daily for 5 days. Spoke with bedside RN will attempt to remove Sher catheter to see if we can prevent further infections. This seems to be a point of repeat hospitalizations for patient.  10/24: Patient is tolerating his Sher catheter removal.  Bladder scans have been negative since removal yesterday.  Patient is much more alert he remains somewhat confused but this is his baseline.  Remains sinus rhythm 70-78 okay to transfer to medical unit.  Potassium has been replaced currently at 3.7 magnesium 2.2 we will repeat tomorrow a.m. to ensure that it is stabilized.    In summary:  Patient remained without acute urinary retention s/p Sher catheter removal on 10/23.  Finish Bactrim x 5 days for staph epidermidis associated with Sher catheter.  He remained on RA, review of last echo EF 55%, unable to determine diastolic function, mild dilated right atrium.  I have stopped patient's lasix and chlorthalidone thinking this could have been leading to increase in falls.  If he becomes SOB, start spironolactone, low dose to preserve his K.  His K was 5.1 at discharge.  He is at risk for sending home, but  RN/PT/OT/health aide ordered since patient adamantly refuses placement.  Lives with wife.  He is aware of the removal of the Sher and states it had been in place for 2 years.  He is very grateful to have had it removed.  Hopefully, this will prevent future infections and repeat hospitalizations.    Therefore, he is discharged in good and stable condition to home with  organized home healthcare and close outpatient follow-up.    The patient met 2-midnight criteria for an inpatient stay at the time of discharge.    Discharge Date  10/25/21    FOLLOW UP ITEMS POST DISCHARGE  Follow up with PCP in 1 week for recheck.  Successful removal of Sher catheter this hospitalization.  Important to remain compliant with BPH meds.      DISCHARGE DIAGNOSES  Principal Problem (Resolved):    Hypokalemia POA: Yes  Active Problems:    CAD (coronary artery disease) - CABG Nov. 2004 POA: Yes    HTN (hypertension) POA: Yes    Diabetes mellitus with hyperglycemia (HCC) POA: Yes    BPH (benign prostatic hyperplasia) POA: Yes    DNR (do not resuscitate) POA: Yes    UTI (urinary tract infection) POA: Yes    Discharge planning issues POA: Yes    Recurrent falls POA: Yes    Pacemaker POA: Yes  Resolved Problems:    Troponin I above reference range POA: Yes    Generalized weakness POA: Yes    Urinary retention POA: Yes      Overview: IMO load March 2020      FOLLOW UP  No future appointments.  No follow-up provider specified.    MEDICATIONS ON DISCHARGE     Medication List      START taking these medications      Instructions   sulfamethoxazole-trimethoprim 800-160 MG tablet  Commonly known as: BACTRIM DS   Take 1 Tablet by mouth every 12 hours for 3 days.  Dose: 1 Tablet        CONTINUE taking these medications      Instructions   amLODIPine 5 MG Tabs  Commonly known as: NORVASC   Take 1 Tab by mouth every day.  Dose: 5 mg     finasteride 5 MG Tabs  Commonly known as: PROSCAR   Take 5 mg by mouth every morning.  Dose: 5 mg     insulin glargine 100 UNIT/ML Sopn injection  Generic drug: insulin glargine   Inject 35 Units under the skin every evening.  Dose: 35 Units     levothyroxine 25 MCG Tabs  Commonly known as: SYNTHROID   Take 1 tablet by mouth Every morning on an empty stomach.  Dose: 25 mcg     metoprolol tartrate 25 MG Tabs  Commonly known as: LOPRESSOR   Take 12.5 mg by mouth 2 times a day.  Dose:  12.5 mg     PROSTATE PO   Take 2 Tablets by mouth every morning.  Dose: 2 Tablet     Restasis 0.05 % ophthalmic emulsion  Generic drug: cyclosporin   Administer 1 Drop into both eyes every evening. Indications: Drying and Inflammation of Cornea and Conjunctiva of Eyes  Dose: 1 Drop     RETAINE CMC OP   Administer 1 Drop into both eyes every evening.  Dose: 1 Drop     simvastatin 10 MG Tabs  Commonly known as: ZOCOR   Take 10 mg by mouth every evening.  Dose: 10 mg     tamsulosin 0.4 MG capsule  Commonly known as: FLOMAX   Take 0.8 mg by mouth every evening.  Dose: 0.8 mg        STOP taking these medications    chlorthalidone 25 MG Tabs  Commonly known as: HYGROTON     furosemide 20 MG Tabs  Commonly known as: LASIX     gabapentin 100 MG Caps  Commonly known as: NEURONTIN     potassium chloride SA 20 MEQ Tbcr  Commonly known as: Kdur            Allergies  Allergies   Allergen Reactions   • Iodine Anaphylaxis     Pt and pts wife report that it has been so long not sure what happens       DIET  Orders Placed This Encounter   Procedures   • Diet Order Diet: Consistent CHO (Diabetic)     Standing Status:   Standing     Number of Occurrences:   1     Order Specific Question:   Diet:     Answer:   Consistent CHO (Diabetic) [4]       ACTIVITY  As tolerated.  Weight bearing as tolerated    CONSULTATIONS  none    PROCEDURES  Transthoracic  Echo Report        Echocardiography Laboratory     CONCLUSIONS  Compared to the images of the prior study done 10/19/2012 - there is no   significant change.  Diastolic dysfunction may have progressed,   previously grade II, but unable to grade on current exam.   Normal left ventricular systolic function.  Left ventricular ejection fraction is visually estimated to be 55%.  Reduced right ventricular systolic function.  Mild mitral regurgitation.  Mild tricuspid regurgitation.  Estimated right ventricular systolic pressure  is 40 mmHg.  Biatrial enlargement.     SAMMY CLIFFORD  Exam Date:          2020                      09:05  Exam Location:     Inpatient  Priority:          Routine     Ordering Physician:        JOSEFINA CASTILLO  Referring Physician:  Sonographer:               Leigh Laura RDCS     Age:    89     Gender:    M  MRN:    [id number]  :    1929  BSA:    1.99   Ht (in):    67     Wt (lb):    192  Exam Type:     Complete     Indications:     Shortness of breath  ICD Codes:       R06.02     CPT Codes:       80613     BP:   140    /   72     HR:   65  Technical Quality:       Fair     MEASUREMENTS  (Male / Female) Normal Values  2D ECHO  LV Diastolic Diameter PLAX        5.2 cm                4.2 - 5.9 / 3.9 - 5.3   cm  LV Systolic Diameter PLAX         3.7 cm                2.1 - 4.0 cm  IVS Diastolic Thickness           0.9 cm                  LVPW Diastolic Thickness          0.98 cm                 LVOT Diameter                     1.8 cm                  Estimated LV Ejection Fraction    55 %                    LV Ejection Fraction MOD BP       62.1 %                >= 55  %  LV Ejection Fraction MOD 4C       57.2 %                  LV Ejection Fraction MOD 2C       67.5 %                  IVC Diameter                      2.8 cm                     DOPPLER  AV Peak Velocity                  1.4 m/s                 AV Peak Gradient                  8.1 mmHg                AV Mean Gradient                  4.6 mmHg                LVOT Peak Velocity                0.74 m/s                AV Area Cont Eq vti               1.4 cm???                 MV Velocity Time Integral         26.8 cm                 Mitral E Point Velocity           1.3 m/s                 Mitral E to A Ratio               715                     MV Deceleration Time              155 ms                  TR Peak Velocity                  225 cm/s                PV Peak Velocity                  0.87 m/s                PV Peak Gradient                  3.1 mmHg                 RVOT Peak Velocity                0.37 m/s                MV E' Velocity                    9 cm/s                     * Indicates values subject to auto-interpretation  LV EF:  55    %     FINDINGS  Left Ventricle  Normal left ventricular chamber size. Normal left ventricular wall   thickness. Normal left ventricular systolic function. Left ventricular   ejection fraction is visually estimated to be 55%. Normal regional wall   motion. Diastolic function is difficult to assess.     Right Ventricle  Normal right ventricular size. Reduced right ventricular systolic   function.     Right Atrium  Enlarged right atrium. Dilated inferior vena cava without inspiratory   collapse.     Left Atrium  Moderately dilated left atrium. Left atrial volume index is 46 mL/sq m.     Mitral Valve  Mitral annular calcification. No mitral stenosis. Mild mitral   regurgitation.     Aortic Valve  Tricuspid aortic valve. Aortic sclerosis without stenosis. No aortic   insufficiency.     Tricuspid Valve  Structurally normal tricuspid valve. No tricuspid stenosis. Mild   tricuspid regurgitation. Right atrial pressure is estimated to be 15   mmHg. Estimated right ventricular systolic pressure  is 40 mmHg.     Pulmonic Valve  Structurally normal pulmonic valve. No pulmonic stenosis. Trace   pulmonic insufficiency.     Pericardium  No pericardial effusion seen.     Aorta  Ascending aorta is borderline dilated with a diameter of 3.8 cm.                                Yair Durbin MD    LABORATORY  Lab Results   Component Value Date    SODIUM 136 10/25/2021    POTASSIUM 5.1 10/25/2021    CHLORIDE 103 10/25/2021    CO2 19 (L) 10/25/2021    GLUCOSE 187 (H) 10/25/2021    BUN 13 10/25/2021    CREATININE 0.89 10/25/2021    CREATININE 1.0 12/02/2008        Lab Results   Component Value Date    WBC 9.5 10/23/2021    HEMOGLOBIN 14.9 10/23/2021    HEMATOCRIT 45.1 10/23/2021    PLATELETCT 261 10/23/2021        Total time of the discharge process  exceeds 40 minutes.

## 2021-10-25 NOTE — DISCHARGE PLANNING
Anticipated Discharge Disposition:   Home with Lg  services     Action:   Chart review complete.    Per MD, patient is medically cleared to discharge to home with . Patient has been accepted back into service with [facility].     0830: RN PRANEETH sent bedside RN a message saying that if any assistance is needed to reach out. Patient may need transport home. RN CM to call patient's spouse for IMM and possible transport need. Patient is currently A&Ox1.     0950: RN PRANEETH called patient's spouse, Mercedes. She states that the patient will need transport because she is unable to get him into the house. IMM given. RN CM to arrange transport home for around [id number].     1010: Transport forms faxed to [employer].     1029: Transport home via [facility] confirmed for 1130.     Barriers to Discharge:   None    Plan:   Wait for transport home   Hospital care management will continue to assist with discharge planning needs.     Care Transition Team Assessment    Information Source  Orientation Level: Oriented to person, Disoriented to place, Disoriented to time, Disoriented to situation  Information Given By: Other (Comments)  Who is responsible for making decisions for patient? : Legal next of kin  Name(s) of Primary Decision Maker: Mercedes Dao    Readmission Evaluation  Is this a readmission?: Yes - unplanned readmission    Elopement Risk  Legal Hold: No  Ambulatory or Self Mobile in Wheelchair: No-Not an Elopement Risk  Elopement Risk: Not at Risk for Elopement    Interdisciplinary Discharge Planning  Does Admitting Nurse Feel This Could be a Complex Discharge?: No  Primary Care Physician: KALPESH JOAQUIN M.D.  Lives with - Patient's Self Care Capacity: Spouse  Patient or legal guardian wants to designate a caregiver: No  Housing / Facility: [address]  Do You Take your Prescribed Medications Regularly: Yes  Able to Return to Previous ADL's: Future Time w/Therapy  Mobility Issues: Yes  Prior Services: Skilled Home Health  Services ([facility])  Patient Prefers to be Discharged to:: home  Assistance Needed: Yes    Discharge Preparedness  What is your plan after discharge?: Home health care  What are your discharge supports?: Child, Spouse  Prior Functional Level: Ambulatory    Functional Assesment  Prior Functional Level: Ambulatory    Finances  Financial Barriers to Discharge: No  Prescription Coverage: Yes    Vision / Hearing Impairment  Vision Impairment : Yes  Right Eye Vision: Wears Glasses  Left Eye Vision: Wears Glasses  Hearing Impairment : Yes  Hearing Impairment: Both Ears    Advance Directive  Advance Directive?: POLST    Domestic Abuse  Have you ever been the victim of abuse or violence?: No  Physical Abuse or Sexual Abuse: No  Verbal Abuse or Emotional Abuse: No  Possible Abuse/Neglect Reported to:: Not Applicable    Discharge Risks or Barriers  Discharge risks or barriers?: Complex medical needs  Patient risk factors: Complex medical needs, Vulnerable adult    Anticipated Discharge Information  Discharge Disposition: D/T to home under care of home health w/planned hosp IP readmit (15)

## 2021-10-26 NOTE — DOCUMENTATION QUERY
[facility]                                                                       Query Response Note      PATIENT:               SAMMY CLIFFORD  ACCT #:                  [id number]  MRN:                     [id number]  :                      3/7/1929  ADMIT DATE:       10/21/2021 11:15 AM  DISCH DATE:        10/25/2021 11:40 AM  RESPONDING  PROVIDER #:        [id number]           QUERY TEXT:    Please clarify in documentation the relationship, if any, between the wilson catheter and UTI    The patient's Clinical Indicators include:  Pt admitted with history of urinary retention w/chronic wilson which was removed on 10/23    urinalysis showed WBC 10-20 w/few bacteria (asymptomatic)  urine cult + staphylococcus epidermidis     treated with Macrobid 100mg x 5 d  wilson removed    Risk:  Pt age and risk for developing sepsis    Thank you,  HAILEY Aquino, RN  Clinical    [facility] via [employer]  [phone]  [email or web address]  Options provided:   -- Condition UTI is due to or associated with wilson catheter   -- Unrelated to each other   -- Unable to determine      Query created by: Yesenia Garzon on 10/25/2021 11:20 AM    RESPONSE TEXT:    Condition UTI is due to or associated with wilson catheter          Electronically signed by:  ELMIRA TREVINO MD 10/25/2021 7:41 PM

## 2021-12-15 ENCOUNTER — HOSPITAL ENCOUNTER (OUTPATIENT)
Facility: MEDICAL CENTER | Age: 86
End: 2021-12-15
Attending: PHYSICIAN ASSISTANT
Payer: MEDICARE

## 2021-12-15 PROCEDURE — 87086 URINE CULTURE/COLONY COUNT: CPT

## 2021-12-19 LAB
BACTERIA UR CULT: NORMAL
SIGNIFICANT IND 70042: NORMAL
SITE SITE: NORMAL
SOURCE SOURCE: NORMAL

## 2021-12-22 ENCOUNTER — HOSPITAL ENCOUNTER (OUTPATIENT)
Dept: RADIOLOGY | Facility: MEDICAL CENTER | Age: 86
End: 2021-12-22
Attending: PHYSICIAN ASSISTANT
Payer: MEDICARE

## 2021-12-22 DIAGNOSIS — R60.9 SWELLING: ICD-10-CM

## 2021-12-22 DIAGNOSIS — N50.89 OTHER SPECIFIED DISORDERS OF THE MALE GENITAL ORGANS: ICD-10-CM

## 2021-12-22 PROCEDURE — 76870 US EXAM SCROTUM: CPT

## 2021-12-23 ENCOUNTER — HOSPITAL ENCOUNTER (EMERGENCY)
Facility: MEDICAL CENTER | Age: 86
End: 2021-12-23
Attending: EMERGENCY MEDICINE
Payer: MEDICARE

## 2021-12-23 VITALS
OXYGEN SATURATION: 94 % | WEIGHT: 163.14 LBS | HEIGHT: 67 IN | HEART RATE: 68 BPM | BODY MASS INDEX: 25.61 KG/M2 | DIASTOLIC BLOOD PRESSURE: 82 MMHG | RESPIRATION RATE: 18 BRPM | SYSTOLIC BLOOD PRESSURE: 158 MMHG | TEMPERATURE: 98.4 F

## 2021-12-23 DIAGNOSIS — T83.511A URINARY TRACT INFECTION ASSOCIATED WITH INDWELLING URETHRAL CATHETER, INITIAL ENCOUNTER (HCC): ICD-10-CM

## 2021-12-23 DIAGNOSIS — I10 HYPERTENSION, UNSPECIFIED TYPE: ICD-10-CM

## 2021-12-23 DIAGNOSIS — T83.9XXA PROBLEM WITH FOLEY CATHETER, INITIAL ENCOUNTER (HCC): ICD-10-CM

## 2021-12-23 DIAGNOSIS — N39.0 URINARY TRACT INFECTION ASSOCIATED WITH INDWELLING URETHRAL CATHETER, INITIAL ENCOUNTER (HCC): ICD-10-CM

## 2021-12-23 LAB
APPEARANCE UR: ABNORMAL
BACTERIA #/AREA URNS HPF: ABNORMAL /HPF
BILIRUB UR QL STRIP.AUTO: NEGATIVE
COLOR UR: YELLOW
EPI CELLS #/AREA URNS HPF: ABNORMAL /HPF
GLUCOSE UR STRIP.AUTO-MCNC: 100 MG/DL
KETONES UR STRIP.AUTO-MCNC: NEGATIVE MG/DL
LEUKOCYTE ESTERASE UR QL STRIP.AUTO: ABNORMAL
MICRO URNS: ABNORMAL
NITRITE UR QL STRIP.AUTO: NEGATIVE
PH UR STRIP.AUTO: 6.5 [PH] (ref 5–8)
PROT UR QL STRIP: NEGATIVE MG/DL
RBC # URNS HPF: ABNORMAL /HPF
RBC UR QL AUTO: ABNORMAL
SP GR UR STRIP.AUTO: <=1.005
WBC #/AREA URNS HPF: ABNORMAL /HPF

## 2021-12-23 PROCEDURE — 700102 HCHG RX REV CODE 250 W/ 637 OVERRIDE(OP): Performed by: EMERGENCY MEDICINE

## 2021-12-23 PROCEDURE — 51702 INSERT TEMP BLADDER CATH: CPT

## 2021-12-23 PROCEDURE — 99284 EMERGENCY DEPT VISIT MOD MDM: CPT

## 2021-12-23 PROCEDURE — A9270 NON-COVERED ITEM OR SERVICE: HCPCS | Performed by: EMERGENCY MEDICINE

## 2021-12-23 PROCEDURE — 81001 URINALYSIS AUTO W/SCOPE: CPT

## 2021-12-23 PROCEDURE — 303105 HCHG CATHETER EXTRA

## 2021-12-23 RX ORDER — NITROFURANTOIN MACROCRYSTALS 100 MG/1
100 CAPSULE ORAL 2 TIMES DAILY
Qty: 20 CAPSULE | Refills: 0 | Status: SHIPPED | OUTPATIENT
Start: 2021-12-23 | End: 2022-01-02

## 2021-12-23 RX ORDER — CEPHALEXIN 250 MG/1
500 CAPSULE ORAL ONCE
Status: DISCONTINUED | OUTPATIENT
Start: 2021-12-23 | End: 2021-12-23 | Stop reason: ALTCHOICE

## 2021-12-23 RX ORDER — NITROFURANTOIN 25; 75 MG/1; MG/1
100 CAPSULE ORAL ONCE
Status: COMPLETED | OUTPATIENT
Start: 2021-12-23 | End: 2021-12-23

## 2021-12-23 RX ORDER — AMLODIPINE BESYLATE 5 MG/1
5 TABLET ORAL ONCE
Status: COMPLETED | OUTPATIENT
Start: 2021-12-23 | End: 2021-12-23

## 2021-12-23 RX ORDER — NITROFURANTOIN MACROCRYSTALS 50 MG/1
100 CAPSULE ORAL ONCE
Status: DISCONTINUED | OUTPATIENT
Start: 2021-12-23 | End: 2021-12-23

## 2021-12-23 RX ADMIN — AMLODIPINE BESYLATE 5 MG: 5 TABLET ORAL at 06:54

## 2021-12-23 RX ADMIN — METOPROLOL TARTRATE 12.5 MG: 25 TABLET, FILM COATED ORAL at 06:55

## 2021-12-23 RX ADMIN — NITROFURANTOIN (MONOHYDRATE/MACROCRYSTALS) 100 MG: 75; 25 CAPSULE ORAL at 06:55

## 2021-12-23 ASSESSMENT — FIBROSIS 4 INDEX: FIB4 SCORE: 1.34

## 2021-12-23 ASSESSMENT — PAIN DESCRIPTION - PAIN TYPE: TYPE: ACUTE PAIN
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
## 2021-12-23 NOTE — DISCHARGE INSTRUCTIONS
Take the antibiotics until completely gone.  Increase fluids especially water and cranberry juice.  Follow-up with your primary care provider for urine recheck in 1 week  Return to the ER sooner if elevated fever, persistent vomiting.

## 2021-12-23 NOTE — ED NOTES
Pt's wife arrived to drive pt home. Pt received d/c instr, pt verbalized understanding. Pt amb to lobby w [facility].

## 2021-12-23 NOTE — ED NOTES
New 16 Fr wilson catheter insert with clear yellow urine output. 10 cc balloon. Statlock placed on R thigh. Educated pt on wilson care. Urine sent to lab for UA

## 2021-12-28 ENCOUNTER — APPOINTMENT (OUTPATIENT)
Dept: RADIOLOGY | Facility: MEDICAL CENTER | Age: 86
DRG: 699 | End: 2021-12-28
Attending: EMERGENCY MEDICINE
Payer: MEDICARE

## 2021-12-28 ENCOUNTER — HOSPITAL ENCOUNTER (INPATIENT)
Facility: MEDICAL CENTER | Age: 86
LOS: 5 days | DRG: 699 | End: 2022-01-04
Attending: EMERGENCY MEDICINE | Admitting: STUDENT IN AN ORGANIZED HEALTH CARE EDUCATION/TRAINING PROGRAM
Payer: MEDICARE

## 2021-12-28 DIAGNOSIS — N39.0 ACUTE UTI: ICD-10-CM

## 2021-12-28 DIAGNOSIS — I10 BENIGN ESSENTIAL HTN: ICD-10-CM

## 2021-12-28 DIAGNOSIS — N13.8 BENIGN PROSTATIC HYPERPLASIA WITH URINARY OBSTRUCTION: ICD-10-CM

## 2021-12-28 DIAGNOSIS — E87.6 HYPOKALEMIA: ICD-10-CM

## 2021-12-28 DIAGNOSIS — I50.32 CHRONIC HEART FAILURE WITH PRESERVED EJECTION FRACTION (HCC): ICD-10-CM

## 2021-12-28 DIAGNOSIS — R55 SYNCOPE AND COLLAPSE: ICD-10-CM

## 2021-12-28 DIAGNOSIS — E11.69 TYPE 2 DIABETES MELLITUS WITH OTHER SPECIFIED COMPLICATION, WITH LONG-TERM CURRENT USE OF INSULIN (HCC): ICD-10-CM

## 2021-12-28 DIAGNOSIS — Z79.4 TYPE 2 DIABETES MELLITUS WITH OTHER SPECIFIED COMPLICATION, WITH LONG-TERM CURRENT USE OF INSULIN (HCC): ICD-10-CM

## 2021-12-28 DIAGNOSIS — N40.1 BENIGN PROSTATIC HYPERPLASIA WITH URINARY OBSTRUCTION: ICD-10-CM

## 2021-12-28 PROBLEM — E11.9 DM2 (DIABETES MELLITUS, TYPE 2) (HCC): Status: ACTIVE | Noted: 2021-12-28

## 2021-12-28 PROBLEM — R53.81 DEBILITY: Status: ACTIVE | Noted: 2021-12-28

## 2021-12-28 PROBLEM — E78.5 HLD (HYPERLIPIDEMIA): Status: ACTIVE | Noted: 2021-12-28

## 2021-12-28 LAB
ABO GROUP BLD: NORMAL
ALBUMIN SERPL BCP-MCNC: 3.4 G/DL (ref 3.2–4.9)
ALBUMIN/GLOB SERPL: 1 G/DL
ALP SERPL-CCNC: 82 U/L (ref 30–99)
ALT SERPL-CCNC: 11 U/L (ref 2–50)
ANION GAP SERPL CALC-SCNC: 12 MMOL/L (ref 7–16)
APPEARANCE UR: ABNORMAL
APTT PPP: 27.8 SEC (ref 24.7–36)
AST SERPL-CCNC: 19 U/L (ref 12–45)
BACTERIA #/AREA URNS HPF: ABNORMAL /HPF
BILIRUB SERPL-MCNC: 0.8 MG/DL (ref 0.1–1.5)
BILIRUB UR QL STRIP.AUTO: NEGATIVE
BLD GP AB SCN SERPL QL: NORMAL
BUN SERPL-MCNC: 21 MG/DL (ref 8–22)
CALCIUM SERPL-MCNC: 8.9 MG/DL (ref 8.5–10.5)
CHLORIDE SERPL-SCNC: 98 MMOL/L (ref 96–112)
CO2 SERPL-SCNC: 24 MMOL/L (ref 20–33)
COLOR UR: YELLOW
CREAT SERPL-MCNC: 1.03 MG/DL (ref 0.5–1.4)
EKG IMPRESSION: NORMAL
EPI CELLS #/AREA URNS HPF: NEGATIVE /HPF
ERYTHROCYTE [DISTWIDTH] IN BLOOD BY AUTOMATED COUNT: 45.6 FL (ref 35.9–50)
EST. AVERAGE GLUCOSE BLD GHB EST-MCNC: 272 MG/DL
ETHANOL BLD-MCNC: <10.1 MG/DL (ref 0–10)
GLOBULIN SER CALC-MCNC: 3.3 G/DL (ref 1.9–3.5)
GLUCOSE BLD-MCNC: 268 MG/DL (ref 65–99)
GLUCOSE SERPL-MCNC: 341 MG/DL (ref 65–99)
GLUCOSE UR STRIP.AUTO-MCNC: 250 MG/DL
HBA1C MFR BLD: 11.1 % (ref 4–5.6)
HCT VFR BLD AUTO: 45.3 % (ref 42–52)
HGB BLD-MCNC: 15.5 G/DL (ref 14–18)
HYALINE CASTS #/AREA URNS LPF: ABNORMAL /LPF
INR PPP: 1.03 (ref 0.87–1.13)
KETONES UR STRIP.AUTO-MCNC: NEGATIVE MG/DL
LEUKOCYTE ESTERASE UR QL STRIP.AUTO: ABNORMAL
MAGNESIUM SERPL-MCNC: 2 MG/DL (ref 1.5–2.5)
MCH RBC QN AUTO: 29.3 PG (ref 27–33)
MCHC RBC AUTO-ENTMCNC: 34.2 G/DL (ref 33.7–35.3)
MCV RBC AUTO: 85.6 FL (ref 81.4–97.8)
MICRO URNS: ABNORMAL
NITRITE UR QL STRIP.AUTO: POSITIVE
PH UR STRIP.AUTO: 6 [PH] (ref 5–8)
PLATELET # BLD AUTO: 147 K/UL (ref 164–446)
PMV BLD AUTO: 12.5 FL (ref 9–12.9)
POTASSIUM SERPL-SCNC: 3.4 MMOL/L (ref 3.6–5.5)
PROT SERPL-MCNC: 6.7 G/DL (ref 6–8.2)
PROT UR QL STRIP: 30 MG/DL
PROTHROMBIN TIME: 13.2 SEC (ref 12–14.6)
RBC # BLD AUTO: 5.29 M/UL (ref 4.7–6.1)
RBC # URNS HPF: ABNORMAL /HPF
RBC UR QL AUTO: ABNORMAL
RH BLD: NORMAL
SODIUM SERPL-SCNC: 134 MMOL/L (ref 135–145)
SP GR UR STRIP.AUTO: 1.01
UROBILINOGEN UR STRIP.AUTO-MCNC: 0.2 MG/DL
WBC # BLD AUTO: 8.9 K/UL (ref 4.8–10.8)
WBC #/AREA URNS HPF: ABNORMAL /HPF

## 2021-12-28 PROCEDURE — 83036 HEMOGLOBIN GLYCOSYLATED A1C: CPT

## 2021-12-28 PROCEDURE — 303105 HCHG CATHETER EXTRA

## 2021-12-28 PROCEDURE — 70450 CT HEAD/BRAIN W/O DYE: CPT | Mod: MC

## 2021-12-28 PROCEDURE — 96372 THER/PROPH/DIAG INJ SC/IM: CPT

## 2021-12-28 PROCEDURE — G0378 HOSPITAL OBSERVATION PER HR: HCPCS

## 2021-12-28 PROCEDURE — 81001 URINALYSIS AUTO W/SCOPE: CPT

## 2021-12-28 PROCEDURE — 85610 PROTHROMBIN TIME: CPT

## 2021-12-28 PROCEDURE — 96375 TX/PRO/DX INJ NEW DRUG ADDON: CPT

## 2021-12-28 PROCEDURE — 87086 URINE CULTURE/COLONY COUNT: CPT

## 2021-12-28 PROCEDURE — 72125 CT NECK SPINE W/O DYE: CPT | Mod: MC

## 2021-12-28 PROCEDURE — 87186 SC STD MICRODIL/AGAR DIL: CPT

## 2021-12-28 PROCEDURE — 700111 HCHG RX REV CODE 636 W/ 250 OVERRIDE (IP): Performed by: EMERGENCY MEDICINE

## 2021-12-28 PROCEDURE — 72170 X-RAY EXAM OF PELVIS: CPT

## 2021-12-28 PROCEDURE — 86850 RBC ANTIBODY SCREEN: CPT

## 2021-12-28 PROCEDURE — 85730 THROMBOPLASTIN TIME PARTIAL: CPT

## 2021-12-28 PROCEDURE — 305948 HCHG GREEN TRAUMA ACT PRE-NOTIFY NO CC

## 2021-12-28 PROCEDURE — 80053 COMPREHEN METABOLIC PANEL: CPT

## 2021-12-28 PROCEDURE — 82962 GLUCOSE BLOOD TEST: CPT

## 2021-12-28 PROCEDURE — 83735 ASSAY OF MAGNESIUM: CPT

## 2021-12-28 PROCEDURE — 700102 HCHG RX REV CODE 250 W/ 637 OVERRIDE(OP): Performed by: STUDENT IN AN ORGANIZED HEALTH CARE EDUCATION/TRAINING PROGRAM

## 2021-12-28 PROCEDURE — 86901 BLOOD TYPING SEROLOGIC RH(D): CPT

## 2021-12-28 PROCEDURE — 86900 BLOOD TYPING SEROLOGIC ABO: CPT

## 2021-12-28 PROCEDURE — 51702 INSERT TEMP BLADDER CATH: CPT

## 2021-12-28 PROCEDURE — 36415 COLL VENOUS BLD VENIPUNCTURE: CPT

## 2021-12-28 PROCEDURE — 82077 ASSAY SPEC XCP UR&BREATH IA: CPT

## 2021-12-28 PROCEDURE — 93005 ELECTROCARDIOGRAM TRACING: CPT

## 2021-12-28 PROCEDURE — 99285 EMERGENCY DEPT VISIT HI MDM: CPT

## 2021-12-28 PROCEDURE — 99220 PR INITIAL OBSERVATION CARE,LEVL III: CPT | Performed by: STUDENT IN AN ORGANIZED HEALTH CARE EDUCATION/TRAINING PROGRAM

## 2021-12-28 PROCEDURE — 87077 CULTURE AEROBIC IDENTIFY: CPT

## 2021-12-28 PROCEDURE — 700105 HCHG RX REV CODE 258: Performed by: EMERGENCY MEDICINE

## 2021-12-28 PROCEDURE — 71045 X-RAY EXAM CHEST 1 VIEW: CPT

## 2021-12-28 PROCEDURE — 85027 COMPLETE CBC AUTOMATED: CPT

## 2021-12-28 RX ORDER — ONDANSETRON 2 MG/ML
4 INJECTION INTRAMUSCULAR; INTRAVENOUS EVERY 4 HOURS PRN
Status: DISCONTINUED | OUTPATIENT
Start: 2021-12-28 | End: 2022-01-04 | Stop reason: HOSPADM

## 2021-12-28 RX ORDER — POTASSIUM CHLORIDE 750 MG/1
10 TABLET, EXTENDED RELEASE ORAL DAILY
Status: ON HOLD | COMMUNITY
End: 2022-01-03 | Stop reason: SDUPTHER

## 2021-12-28 RX ORDER — FINASTERIDE 5 MG/1
5 TABLET, FILM COATED ORAL DAILY
Status: ON HOLD | COMMUNITY
End: 2022-01-03

## 2021-12-28 RX ORDER — SIMVASTATIN 10 MG
10 TABLET ORAL NIGHTLY
Status: DISCONTINUED | OUTPATIENT
Start: 2021-12-29 | End: 2022-01-04 | Stop reason: HOSPADM

## 2021-12-28 RX ORDER — LEVOTHYROXINE SODIUM 0.03 MG/1
25 TABLET ORAL
COMMUNITY

## 2021-12-28 RX ORDER — POTASSIUM CHLORIDE 20 MEQ/1
10 TABLET, EXTENDED RELEASE ORAL DAILY
Status: DISCONTINUED | OUTPATIENT
Start: 2021-12-29 | End: 2021-12-30

## 2021-12-28 RX ORDER — LEVOTHYROXINE SODIUM 0.03 MG/1
25 TABLET ORAL
Status: DISCONTINUED | OUTPATIENT
Start: 2021-12-29 | End: 2022-01-04 | Stop reason: HOSPADM

## 2021-12-28 RX ORDER — ACETAMINOPHEN 325 MG/1
650 TABLET ORAL EVERY 6 HOURS PRN
Status: DISCONTINUED | OUTPATIENT
Start: 2021-12-28 | End: 2022-01-04 | Stop reason: HOSPADM

## 2021-12-28 RX ORDER — POLYETHYLENE GLYCOL 3350 17 G/17G
1 POWDER, FOR SOLUTION ORAL
Status: DISCONTINUED | OUTPATIENT
Start: 2021-12-28 | End: 2022-01-03

## 2021-12-28 RX ORDER — DEXTROSE MONOHYDRATE 25 G/50ML
50 INJECTION, SOLUTION INTRAVENOUS
Status: DISCONTINUED | OUTPATIENT
Start: 2021-12-28 | End: 2021-12-31

## 2021-12-28 RX ORDER — FUROSEMIDE 20 MG/1
20 TABLET ORAL 2 TIMES DAILY
Status: ON HOLD | COMMUNITY
End: 2022-01-03

## 2021-12-28 RX ORDER — HYDRALAZINE HYDROCHLORIDE 20 MG/ML
10 INJECTION INTRAMUSCULAR; INTRAVENOUS EVERY 4 HOURS PRN
Status: DISCONTINUED | OUTPATIENT
Start: 2021-12-28 | End: 2022-01-04 | Stop reason: HOSPADM

## 2021-12-28 RX ORDER — AMOXICILLIN 250 MG
2 CAPSULE ORAL 2 TIMES DAILY
Status: DISCONTINUED | OUTPATIENT
Start: 2021-12-28 | End: 2022-01-03

## 2021-12-28 RX ORDER — SODIUM CHLORIDE, SODIUM LACTATE, POTASSIUM CHLORIDE, CALCIUM CHLORIDE 600; 310; 30; 20 MG/100ML; MG/100ML; MG/100ML; MG/100ML
1000 INJECTION, SOLUTION INTRAVENOUS ONCE
Status: COMPLETED | OUTPATIENT
Start: 2021-12-28 | End: 2021-12-28

## 2021-12-28 RX ORDER — BISACODYL 10 MG
10 SUPPOSITORY, RECTAL RECTAL
Status: DISCONTINUED | OUTPATIENT
Start: 2021-12-28 | End: 2022-01-03

## 2021-12-28 RX ORDER — CEFTRIAXONE 2 G/1
2000 INJECTION, POWDER, FOR SOLUTION INTRAMUSCULAR; INTRAVENOUS DAILY
Status: DISCONTINUED | OUTPATIENT
Start: 2021-12-29 | End: 2021-12-29

## 2021-12-28 RX ORDER — CEFTRIAXONE 2 G/1
2 INJECTION, POWDER, FOR SOLUTION INTRAMUSCULAR; INTRAVENOUS ONCE
Status: COMPLETED | OUTPATIENT
Start: 2021-12-28 | End: 2021-12-28

## 2021-12-28 RX ORDER — TAMSULOSIN HYDROCHLORIDE 0.4 MG/1
0.8 CAPSULE ORAL EVERY EVENING
Status: ON HOLD | COMMUNITY
End: 2022-01-03

## 2021-12-28 RX ORDER — AMLODIPINE BESYLATE 5 MG/1
5 TABLET ORAL DAILY
COMMUNITY

## 2021-12-28 RX ORDER — CHLORTHALIDONE 25 MG/1
25 TABLET ORAL DAILY
Status: ON HOLD | COMMUNITY
End: 2022-01-03

## 2021-12-28 RX ORDER — SIMVASTATIN 10 MG
10 TABLET ORAL NIGHTLY
COMMUNITY

## 2021-12-28 RX ORDER — INSULIN GLARGINE 100 [IU]/ML
35 INJECTION, SOLUTION SUBCUTANEOUS EVERY EVENING
Status: ON HOLD | COMMUNITY
End: 2022-01-03 | Stop reason: SDUPTHER

## 2021-12-28 RX ORDER — HYPROMELLOSE/PF 0.3 %
1 DROPS OPHTHALMIC (EYE)
COMMUNITY

## 2021-12-28 RX ORDER — FUROSEMIDE 40 MG/1
20 TABLET ORAL
Status: DISCONTINUED | OUTPATIENT
Start: 2021-12-29 | End: 2021-12-29

## 2021-12-28 RX ORDER — GABAPENTIN 100 MG/1
100 CAPSULE ORAL 2 TIMES DAILY
COMMUNITY

## 2021-12-28 RX ORDER — ONDANSETRON 4 MG/1
4 TABLET, ORALLY DISINTEGRATING ORAL EVERY 4 HOURS PRN
Status: DISCONTINUED | OUTPATIENT
Start: 2021-12-28 | End: 2022-01-04 | Stop reason: HOSPADM

## 2021-12-28 RX ORDER — SODIUM CHLORIDE, SODIUM LACTATE, POTASSIUM CHLORIDE, CALCIUM CHLORIDE 600; 310; 30; 20 MG/100ML; MG/100ML; MG/100ML; MG/100ML
INJECTION, SOLUTION INTRAVENOUS CONTINUOUS
Status: DISCONTINUED | OUTPATIENT
Start: 2021-12-28 | End: 2021-12-31

## 2021-12-28 RX ADMIN — SODIUM CHLORIDE, POTASSIUM CHLORIDE, SODIUM LACTATE AND CALCIUM CHLORIDE 1000 ML: 600; 310; 30; 20 INJECTION, SOLUTION INTRAVENOUS at 16:20

## 2021-12-28 RX ADMIN — CEFTRIAXONE SODIUM 2 G: 2 INJECTION, POWDER, FOR SOLUTION INTRAMUSCULAR; INTRAVENOUS at 17:46

## 2021-12-28 RX ADMIN — INSULIN HUMAN 7 UNITS: 100 INJECTION, SOLUTION PARENTERAL at 21:47

## 2021-12-28 RX ADMIN — INSULIN GLARGINE 35 UNITS: 100 INJECTION, SOLUTION SUBCUTANEOUS at 21:49

## 2021-12-28 ASSESSMENT — ENCOUNTER SYMPTOMS
LOSS OF CONSCIOUSNESS: 1
FALLS: 1
WEAKNESS: 1

## 2021-12-28 NOTE — ED PROVIDER NOTES
ED Provider Note    CHIEF COMPLAINT  Chief Complaint   Patient presents with   • Trauma Green       [facility]  Baljit Gross-Zachariah is a 89 y.o. male who presents with a chief complaint of ground-level fall with reported 20-minute loss of consciousness that happened prior to arrival.  Patient is largely unable to provide any information about the incident.  Per EMS, the patient fell, hit his head, and lost consciousness.  Family was able to move him to a chair and called EMS.  When they arrived he was responsive to noxious stimulus but his GCS improved to 14 with loud voice as he does appear to be very hard of hearing.  He did not have any complaints but reported being treated currently for a urinary tract infection.    REVIEW OF SYSTEMS  See HPI for further details.  Ground-level fall.  Loss of consciousness.  UTI.  All other systems are negative.     PAST MEDICAL HISTORY       SOCIAL HISTORY  Social History     Tobacco Use   • Smoking status: Not on file   • Smokeless tobacco: Not on file   Substance and Sexual Activity   • Alcohol use: Not on file   • Drug use: Not on file   • Sexual activity: Not on file       SURGICAL HISTORY  patient denies any surgical history    CURRENT MEDICATIONS  Home Medications    **Home medications have not yet been reviewed for this encounter**         ALLERGIES  Not on File    PHYSICAL EXAM  VITAL SIGNS: /77   Pulse 66   Resp 16   SpO2 94%    Pulse ox interpretation: I interpret this pulse ox as normal.  Constitutional: Alert in no apparent distress.  HENT: Superficial laceration over posterior occiput, Bilateral external ears normal, bilateral tympanic membranes are obscured by cerumen nose normal.  Dry mucous membranes.  No obvious malocclusion.  Midface is stable.  Eyes: Pupils are equal and reactive, Conjunctiva normal, Non-icteric.   Neck: Normal range of motion, No tenderness, Supple, No stridor.   Lymphatic: No lymphadenopathy noted.   Cardiovascular: Regular rate and rhythm,  no murmurs. Pulses symmetrical.  Thorax & Lungs: Normal breath sounds, No respiratory distress, No wheezing, No chest tenderness.  Well-healed midline surgical incision.  Abdomen: Bowel sounds normal, Soft, No tenderness, No masses, No pulsatile masses. No peritoneal signs.  : Sher catheter in place.  No surrounding urethral erythema.  Skin: Warm, Dry, No erythema, No rash.   Back: No bony tenderness or step-offs..   Extremities: Intact distal pulses, No edema, No tenderness, No cyanosis.  Musculoskeletal: Good range of motion in all major joints. No tenderness to palpation or major deformities noted.   Neurologic: Alert, very hard of hearing, oriented x2, normal motor function, Normal sensory function, No focal deficits noted.   Psychiatric: Affect normal, Judgment normal, Mood normal.     DIAGNOSTIC STUDIES / PROCEDURES    LABS  Results for orders placed or performed during the hospital encounter of 12/28/21   CBC WITHOUT DIFFERENTIAL   Result Value Ref Range    WBC 8.9 4.8 - 10.8 K/uL    RBC 5.29 4.70 - 6.10 M/uL    Hemoglobin 15.5 14.0 - 18.0 g/dL    Hematocrit 45.3 42.0 - 52.0 %    MCV 85.6 81.4 - 97.8 fL    MCH 29.3 27.0 - 33.0 pg    MCHC 34.2 33.7 - 35.3 g/dL    RDW 45.6 35.9 - 50.0 fL    Platelet Count 147 (L) 164 - 446 K/uL    MPV 12.5 9.0 - 12.9 fL   Prothrombin Time   Result Value Ref Range    PT 13.2 12.0 - 14.6 sec    INR 1.03 0.87 - 1.13   APTT   Result Value Ref Range    APTT 27.8 24.7 - 36.0 sec   COD - Adult (Type and Screen)   Result Value Ref Range    ABO Grouping Only A     Rh Grouping Only POS     Antibody Screen-Cod NEG    DIAGNOSTIC ALCOHOL   Result Value Ref Range    Diagnostic Alcohol <10.1 0.0 - 10.0 mg/dL   Comp Metabolic Panel   Result Value Ref Range    Sodium 134 (L) 135 - 145 mmol/L    Potassium 3.4 (L) 3.6 - 5.5 mmol/L    Chloride 98 96 - 112 mmol/L    Co2 24 20 - 33 mmol/L    Anion Gap 12.0 7.0 - 16.0    Glucose 341 (H) 65 - 99 mg/dL    Bun 21 8 - 22 mg/dL    Creatinine 1.03 0.50  - 1.40 mg/dL    Calcium 8.9 8.5 - 10.5 mg/dL    AST(SGOT) 19 12 - 45 U/L    ALT(SGPT) 11 2 - 50 U/L    Alkaline Phosphatase 82 30 - 99 U/L    Total Bilirubin 0.8 0.1 - 1.5 mg/dL    Albumin 3.4 3.2 - 4.9 g/dL    Total Protein 6.7 6.0 - 8.2 g/dL    Globulin 3.3 1.9 - 3.5 g/dL    A-G Ratio 1.0 g/dL   URINALYSIS    Specimen: Urine, Cath   Result Value Ref Range    Color Yellow     Character Turbid (A)     Specific Gravity 1.011 <1.035    Ph 6.0 5.0 - 8.0    Glucose 250 (A) Negative mg/dL    Ketones Negative Negative mg/dL    Protein 30 (A) Negative mg/dL    Bilirubin Negative Negative    Urobilinogen, Urine 0.2 Negative    Nitrite Positive (A) Negative    Leukocyte Esterase Large (A) Negative    Occult Blood Moderate (A) Negative    Micro Urine Req Microscopic    URINE MICROSCOPIC (W/UA)   Result Value Ref Range    WBC Packed (A) /hpf    RBC 2-5 (A) /hpf    Bacteria Many (A) None /hpf    Epithelial Cells Negative /hpf    Hyaline Cast 0-2 /lpf   ESTIMATED GFR   Result Value Ref Range    GFR If African American >60 >60 mL/min/1.73 m 2    GFR If Non African American >60 >60 mL/min/1.73 m 2   EKG (NOW)   Result Value Ref Range    Report       [facility] Emergency Dept.    Test Date:  2021  Pt Name:    KERVIN HENDERSON            Department: ER  MRN:        [id number]                      Room:       TRAUMA - EXAM 2  Gender:     Male                         Technician: [id number]  :        [id number]                   Requested By:BRIDGER SÁNCHEZ  Order #:    [id number]                    Reading MD:    Measurements  Intervals                                Axis  Rate:       69                           P:          0  MN:         229                          QRS:        -79  QRSD:       174                          T:          56  QT:         464  QTc:        497    Interpretive Statements  VENTRICULAR-PACED COMPLEXES  NO FURTHER RHYTHM ANALYSIS ATTEMPTED DUE TO PACED RHYTHM  FIRST DEGREE AV BLOCK  RBBB  AND LAFB  No previous ECG available for comparison       RADIOLOGY  CT-HEAD W/O   Final Result      1.  Diffuse atrophy and white matter changes.   2.  No acute intracranial hemorrhage or territorial infarct.            CT-CSPINE WITHOUT PLUS RECONS   Final Result      1.  Multilevel degenerative changes.   2.  Grade 1 anterolisthesis of C3 on C4, C4 on C5 and C5 of C6 is likely degenerative.   3.  Atherosclerotic plaque in the aorta and carotid arteries bilaterally.   4.  Emphysematous changes.      DX-PELVIS-1 OR 2 VIEWS   Final Result      1.  No fracture or dislocation is seen.   2.  Mild degenerative changes of the hips, right greater than left.   3.  Atherosclerotic plaque.      DX-CHEST-LIMITED (1 VIEW)   Final Result      1.  Cardiomegaly.   2.  Atherosclerotic plaque.          COURSE & MEDICAL DECISION MAKING  Pertinent Labs & Imaging studies reviewed. (See chart for details)  This is a 89-year-old male who is here as a prehospital triaged trauma green after he fell earlier today and hit his head with a reported 20-minute loss of consciousness.  He arrives afebrile with normal vital signs.  He appears dehydrated with dry mucous membranes but nontoxic.  He has a GCS of 14.  He is oriented to himself and place but not year.  He is moving all 4 extremities and denies any pain anywhere.  He did undergo an expeditious primary and secondary survey that did not demonstrate any acute abnormalities.  He was sent to the CT scanner to rule out intracranial abnormality or traumatic injury to the cervical spine.    Thankfully these did not reveal acute abnormality.    I was able to speak with the patient's wife who will gave me additional information.  She notes that he has been weak for the past 2 weeks.  He has an indwelling Sher catheter and has noticed some sediment in the catheter and that the urine is cloudy.  He does have frequent urinary tract infections and she suspects that he has one presently but is not  "currently being treated for 1.  Today, while ambulating with his walker, he \"crumbled\" onto the floor and hit his head on a ceramic statue next to their door.  He did not lose consciousness at that time but laid on the ground for between 5 and 10 minutes until a neighbor could come over to help him out.  He was then able to stand and walk with assistance.  He asked for a glass of water and then his wife noted that it appeared he went to sleep.  He was unresponsive for approximately 30 minutes.  Her home health and physical therapy team came for his scheduled appointment and they were unable to arouse him.  EMS was contacted and he was brought to the ER.  She denies any fevers, vomiting, diarrhea, or constipation.  He has not been vaccinated against Covid and has been eating well.    CBC is without leukocytosis or anemia.  He has normal coags.  Metabolic panel demonstrates some mild electrolyte abnormalities with hyponatremia to 134 and hypokalemia to 3.4.  His blood glucose is elevated to 341.  He has a history of insulin-dependent diabetes mellitus.  He has normal renal and liver function.  Diagnostic alcohol is negative.  Urinalysis does suggest infection with positive nitrites, large leukocyte esterase, and packed white blood cells with many bacteria.  He was given a dose of Rocephin.  At this time he is unfit for discharge home as he is very weak and significantly deconditioned.  He is at risk for additional falls and will require additional work-up and management.  His wife is comfortable with this plan.    Patient was discussed with the hospitalist and he was subsequently admitted in guarded condition.    HYDRATION  HYDRATION: Based on the patient's presentation of Dehydration and Hypotension the patient was given IV fluids. IV Hydration was used because oral hydration was not adequate alone. Upon recheck following hydration, the patient was Improved.    FINAL IMPRESSION  1.  UTI  2.  Ground-level " fall    Electronically signed by: Jarrod Victor M.D., 12/28/2021 2:49 PM

## 2021-12-28 NOTE — ED TRIAGE NOTES
"Chief Complaint   Patient presents with   • Trauma Green     89 yo male to ED for above. Patient had MGLF today +head trauma +LOC -thinners. Patient reports patient was unconscious for around 20 minutes. Patient also reports fall x3days ago. Patient reports current UTI being treated.  Patient has hematoma to posterior head. C/o head pain.   Patient to CT and then 37H    VSS    /77   Pulse 66   Temp 36.6 °C (97.9 °F) (Temporal)   Resp 16   Ht 1.702 m (5' 7\")   Wt 73.9 kg (163 lb)   SpO2 94%   BMI 25.53 kg/m²     "

## 2021-12-29 LAB
ABO + RH BLD: NORMAL
ALBUMIN SERPL BCP-MCNC: 3.3 G/DL (ref 3.2–4.9)
ALBUMIN/GLOB SERPL: 0.9 G/DL
ALP SERPL-CCNC: 82 U/L (ref 30–99)
ALT SERPL-CCNC: 12 U/L (ref 2–50)
ANION GAP SERPL CALC-SCNC: 10 MMOL/L (ref 7–16)
AST SERPL-CCNC: 10 U/L (ref 12–45)
BASOPHILS # BLD AUTO: 0.6 % (ref 0–1.8)
BASOPHILS # BLD: 0.05 K/UL (ref 0–0.12)
BILIRUB SERPL-MCNC: 0.5 MG/DL (ref 0.1–1.5)
BUN SERPL-MCNC: 17 MG/DL (ref 8–22)
CALCIUM SERPL-MCNC: 9.2 MG/DL (ref 8.5–10.5)
CHLORIDE SERPL-SCNC: 105 MMOL/L (ref 96–112)
CHOLEST SERPL-MCNC: 162 MG/DL (ref 100–199)
CO2 SERPL-SCNC: 28 MMOL/L (ref 20–33)
CREAT SERPL-MCNC: 0.84 MG/DL (ref 0.5–1.4)
EOSINOPHIL # BLD AUTO: 0.13 K/UL (ref 0–0.51)
EOSINOPHIL NFR BLD: 1.6 % (ref 0–6.9)
ERYTHROCYTE [DISTWIDTH] IN BLOOD BY AUTOMATED COUNT: 44.6 FL (ref 35.9–50)
GLOBULIN SER CALC-MCNC: 3.5 G/DL (ref 1.9–3.5)
GLUCOSE BLD-MCNC: 104 MG/DL (ref 65–99)
GLUCOSE BLD-MCNC: 110 MG/DL (ref 65–99)
GLUCOSE BLD-MCNC: 167 MG/DL (ref 65–99)
GLUCOSE BLD-MCNC: 227 MG/DL (ref 65–99)
GLUCOSE BLD-MCNC: 99 MG/DL (ref 65–99)
GLUCOSE SERPL-MCNC: 84 MG/DL (ref 65–99)
HCT VFR BLD AUTO: 40.4 % (ref 42–52)
HDLC SERPL-MCNC: 31 MG/DL
HGB BLD-MCNC: 13.8 G/DL (ref 14–18)
IMM GRANULOCYTES # BLD AUTO: 0.03 K/UL (ref 0–0.11)
IMM GRANULOCYTES NFR BLD AUTO: 0.4 % (ref 0–0.9)
LDLC SERPL CALC-MCNC: 105 MG/DL
LYMPHOCYTES # BLD AUTO: 1.72 K/UL (ref 1–4.8)
LYMPHOCYTES NFR BLD: 21.2 % (ref 22–41)
MCH RBC QN AUTO: 29.4 PG (ref 27–33)
MCHC RBC AUTO-ENTMCNC: 34.2 G/DL (ref 33.7–35.3)
MCV RBC AUTO: 86 FL (ref 81.4–97.8)
MONOCYTES # BLD AUTO: 0.8 K/UL (ref 0–0.85)
MONOCYTES NFR BLD AUTO: 9.9 % (ref 0–13.4)
NEUTROPHILS # BLD AUTO: 5.37 K/UL (ref 1.82–7.42)
NEUTROPHILS NFR BLD: 66.3 % (ref 44–72)
NRBC # BLD AUTO: 0 K/UL
NRBC BLD-RTO: 0 /100 WBC
PLATELET # BLD AUTO: 306 K/UL (ref 164–446)
PMV BLD AUTO: 10 FL (ref 9–12.9)
POTASSIUM SERPL-SCNC: 2.5 MMOL/L (ref 3.6–5.5)
POTASSIUM SERPL-SCNC: 2.7 MMOL/L (ref 3.6–5.5)
PROT SERPL-MCNC: 6.8 G/DL (ref 6–8.2)
RBC # BLD AUTO: 4.7 M/UL (ref 4.7–6.1)
SARS-COV-2 RNA RESP QL NAA+PROBE: NOTDETECTED
SODIUM SERPL-SCNC: 143 MMOL/L (ref 135–145)
SPECIMEN SOURCE: NORMAL
TRIGL SERPL-MCNC: 128 MG/DL (ref 0–149)
TSH SERPL DL<=0.005 MIU/L-ACNC: 4.68 UIU/ML (ref 0.38–5.33)
WBC # BLD AUTO: 8.1 K/UL (ref 4.8–10.8)

## 2021-12-29 PROCEDURE — 700102 HCHG RX REV CODE 250 W/ 637 OVERRIDE(OP): Performed by: STUDENT IN AN ORGANIZED HEALTH CARE EDUCATION/TRAINING PROGRAM

## 2021-12-29 PROCEDURE — 84443 ASSAY THYROID STIM HORMONE: CPT

## 2021-12-29 PROCEDURE — G0378 HOSPITAL OBSERVATION PER HR: HCPCS

## 2021-12-29 PROCEDURE — 82962 GLUCOSE BLOOD TEST: CPT | Mod: 91

## 2021-12-29 PROCEDURE — A9270 NON-COVERED ITEM OR SERVICE: HCPCS | Performed by: INTERNAL MEDICINE

## 2021-12-29 PROCEDURE — A9270 NON-COVERED ITEM OR SERVICE: HCPCS | Performed by: STUDENT IN AN ORGANIZED HEALTH CARE EDUCATION/TRAINING PROGRAM

## 2021-12-29 PROCEDURE — 700105 HCHG RX REV CODE 258: Performed by: INTERNAL MEDICINE

## 2021-12-29 PROCEDURE — 99226 PR SUBSEQUENT OBSERVATION CARE,LEVEL III: CPT | Performed by: INTERNAL MEDICINE

## 2021-12-29 PROCEDURE — 84132 ASSAY OF SERUM POTASSIUM: CPT

## 2021-12-29 PROCEDURE — 96372 THER/PROPH/DIAG INJ SC/IM: CPT

## 2021-12-29 PROCEDURE — 96365 THER/PROPH/DIAG IV INF INIT: CPT

## 2021-12-29 PROCEDURE — 80053 COMPREHEN METABOLIC PANEL: CPT

## 2021-12-29 PROCEDURE — 96366 THER/PROPH/DIAG IV INF ADDON: CPT

## 2021-12-29 PROCEDURE — U0003 INFECTIOUS AGENT DETECTION BY NUCLEIC ACID (DNA OR RNA); SEVERE ACUTE RESPIRATORY SYNDROME CORONAVIRUS 2 (SARS-COV-2) (CORONAVIRUS DISEASE [COVID-19]), AMPLIFIED PROBE TECHNIQUE, MAKING USE OF HIGH THROUGHPUT TECHNOLOGIES AS DESCRIBED BY CMS-2020-01-R: HCPCS

## 2021-12-29 PROCEDURE — 700111 HCHG RX REV CODE 636 W/ 250 OVERRIDE (IP): Performed by: INTERNAL MEDICINE

## 2021-12-29 PROCEDURE — 96368 THER/DIAG CONCURRENT INF: CPT

## 2021-12-29 PROCEDURE — 92610 EVALUATE SWALLOWING FUNCTION: CPT

## 2021-12-29 PROCEDURE — 85025 COMPLETE CBC W/AUTO DIFF WBC: CPT

## 2021-12-29 PROCEDURE — U0005 INFEC AGEN DETEC AMPLI PROBE: HCPCS

## 2021-12-29 PROCEDURE — 700102 HCHG RX REV CODE 250 W/ 637 OVERRIDE(OP): Performed by: INTERNAL MEDICINE

## 2021-12-29 PROCEDURE — 80061 LIPID PANEL: CPT

## 2021-12-29 PROCEDURE — 700105 HCHG RX REV CODE 258: Performed by: STUDENT IN AN ORGANIZED HEALTH CARE EDUCATION/TRAINING PROGRAM

## 2021-12-29 RX ORDER — POTASSIUM CHLORIDE 20 MEQ/1
40 TABLET, EXTENDED RELEASE ORAL ONCE
Status: COMPLETED | OUTPATIENT
Start: 2021-12-29 | End: 2021-12-29

## 2021-12-29 RX ORDER — AMLODIPINE BESYLATE 5 MG/1
5 TABLET ORAL DAILY
Status: DISCONTINUED | OUTPATIENT
Start: 2021-12-29 | End: 2021-12-30

## 2021-12-29 RX ORDER — GABAPENTIN 100 MG/1
100 CAPSULE ORAL 2 TIMES DAILY
Status: DISCONTINUED | OUTPATIENT
Start: 2021-12-29 | End: 2022-01-04 | Stop reason: HOSPADM

## 2021-12-29 RX ORDER — FINASTERIDE 5 MG/1
5 TABLET, FILM COATED ORAL DAILY
Status: DISCONTINUED | OUTPATIENT
Start: 2021-12-29 | End: 2021-12-31

## 2021-12-29 RX ORDER — POTASSIUM CHLORIDE 7.45 MG/ML
10 INJECTION INTRAVENOUS
Status: COMPLETED | OUTPATIENT
Start: 2021-12-30 | End: 2021-12-30

## 2021-12-29 RX ORDER — HALOPERIDOL 5 MG/ML
2-5 INJECTION INTRAMUSCULAR EVERY 4 HOURS PRN
Status: ACTIVE | OUTPATIENT
Start: 2021-12-29 | End: 2021-12-30

## 2021-12-29 RX ORDER — POTASSIUM CHLORIDE 7.45 MG/ML
10 INJECTION INTRAVENOUS
Status: COMPLETED | OUTPATIENT
Start: 2021-12-29 | End: 2021-12-29

## 2021-12-29 RX ADMIN — METOPROLOL TARTRATE 12.5 MG: 25 TABLET, FILM COATED ORAL at 06:00

## 2021-12-29 RX ADMIN — CEFTRIAXONE SODIUM 2 G: 2 INJECTION, POWDER, FOR SOLUTION INTRAMUSCULAR; INTRAVENOUS at 09:57

## 2021-12-29 RX ADMIN — POTASSIUM CHLORIDE 10 MEQ: 20 TABLET, EXTENDED RELEASE ORAL at 06:00

## 2021-12-29 RX ADMIN — SIMVASTATIN 10 MG: 10 TABLET, FILM COATED ORAL at 21:40

## 2021-12-29 RX ADMIN — POTASSIUM CHLORIDE 10 MEQ: 7.46 INJECTION, SOLUTION INTRAVENOUS at 09:00

## 2021-12-29 RX ADMIN — DOCUSATE SODIUM 50 MG AND SENNOSIDES 8.6 MG 2 TABLET: 8.6; 5 TABLET, FILM COATED ORAL at 06:46

## 2021-12-29 RX ADMIN — POTASSIUM CHLORIDE 10 MEQ: 7.46 INJECTION, SOLUTION INTRAVENOUS at 10:02

## 2021-12-29 RX ADMIN — POTASSIUM CHLORIDE 10 MEQ: 7.46 INJECTION, SOLUTION INTRAVENOUS at 11:00

## 2021-12-29 RX ADMIN — FINASTERIDE 5 MG: 5 TABLET, FILM COATED ORAL at 13:34

## 2021-12-29 RX ADMIN — SODIUM CHLORIDE, POTASSIUM CHLORIDE, SODIUM LACTATE AND CALCIUM CHLORIDE: 600; 310; 30; 20 INJECTION, SOLUTION INTRAVENOUS at 00:00

## 2021-12-29 RX ADMIN — FUROSEMIDE 20 MG: 40 TABLET ORAL at 06:00

## 2021-12-29 RX ADMIN — INSULIN HUMAN 3 UNITS: 100 INJECTION, SOLUTION PARENTERAL at 13:34

## 2021-12-29 RX ADMIN — LEVOTHYROXINE SODIUM 25 MCG: 0.03 TABLET ORAL at 06:47

## 2021-12-29 RX ADMIN — INSULIN GLARGINE 35 UNITS: 100 INJECTION, SOLUTION SUBCUTANEOUS at 17:12

## 2021-12-29 RX ADMIN — GABAPENTIN 100 MG: 100 CAPSULE ORAL at 17:13

## 2021-12-29 RX ADMIN — POTASSIUM CHLORIDE 40 MEQ: 20 TABLET, EXTENDED RELEASE ORAL at 23:30

## 2021-12-29 RX ADMIN — POTASSIUM CHLORIDE 10 MEQ: 7.46 INJECTION, SOLUTION INTRAVENOUS at 08:00

## 2021-12-29 RX ADMIN — INSULIN HUMAN 4 UNITS: 100 INJECTION, SOLUTION PARENTERAL at 17:12

## 2021-12-29 ASSESSMENT — LIFESTYLE VARIABLES
DOES PATIENT WANT TO STOP DRINKING: NO
HAVE PEOPLE ANNOYED YOU BY CRITICIZING YOUR DRINKING: NO
CONSUMPTION TOTAL: NEGATIVE
EVER FELT BAD OR GUILTY ABOUT YOUR DRINKING: NO
TOTAL SCORE: 0
ON A TYPICAL DAY WHEN YOU DRINK ALCOHOL HOW MANY DRINKS DO YOU HAVE: 0
ALCOHOL_USE: NO
HAVE YOU EVER FELT YOU SHOULD CUT DOWN ON YOUR DRINKING: NO
AVERAGE NUMBER OF DAYS PER WEEK YOU HAVE A DRINK CONTAINING ALCOHOL: 0
TOTAL SCORE: 0
EVER HAD A DRINK FIRST THING IN THE MORNING TO STEADY YOUR NERVES TO GET RID OF A HANGOVER: NO
HOW MANY TIMES IN THE PAST YEAR HAVE YOU HAD 5 OR MORE DRINKS IN A DAY: 0
TOTAL SCORE: 0

## 2021-12-29 ASSESSMENT — ENCOUNTER SYMPTOMS
PALPITATIONS: 0
NAUSEA: 0
VOMITING: 0
SHORTNESS OF BREATH: 0
LOSS OF CONSCIOUSNESS: 1
HEARTBURN: 0
ORTHOPNEA: 0
FALLS: 1
WEAKNESS: 1
COUGH: 0

## 2021-12-29 ASSESSMENT — COGNITIVE AND FUNCTIONAL STATUS - GENERAL
DAILY ACTIVITIY SCORE: 19
MOVING TO AND FROM BED TO CHAIR: A LITTLE
SUGGESTED CMS G CODE MODIFIER DAILY ACTIVITY: CK
WALKING IN HOSPITAL ROOM: A LOT
MOBILITY SCORE: 14
DRESSING REGULAR LOWER BODY CLOTHING: A LITTLE
TURNING FROM BACK TO SIDE WHILE IN FLAT BAD: A LITTLE
TOILETING: A LITTLE
CLIMB 3 TO 5 STEPS WITH RAILING: A LOT
MOVING FROM LYING ON BACK TO SITTING ON SIDE OF FLAT BED: A LOT
HELP NEEDED FOR BATHING: A LOT
DRESSING REGULAR UPPER BODY CLOTHING: A LITTLE
STANDING UP FROM CHAIR USING ARMS: A LOT
SUGGESTED CMS G CODE MODIFIER MOBILITY: CL

## 2021-12-29 ASSESSMENT — FIBROSIS 4 INDEX: FIB4 SCORE: 0.87

## 2021-12-29 ASSESSMENT — PAIN DESCRIPTION - PAIN TYPE: TYPE: ACUTE PAIN

## 2021-12-29 NOTE — CARE PLAN
The patient is Stable - Low risk of patient condition declining or worsening    Shift Goals  Clinical Goals: remain free from falls    Progress made toward(s) clinical / shift goals:  patient has not had a fall during this shift.    Patient is not progressing towards the following goals:      Problem: Knowledge Deficit - Standard  Goal: Patient and family/care givers will demonstrate understanding of plan of care, disease process/condition, diagnostic tests and medications  Outcome: Not Progressing

## 2021-12-29 NOTE — PROGRESS NOTES
4 Eyes Skin Assessment Completed by Luh RN and Niraj RN.    Head Scab mid posterior head  Ears WDL  Nose WDL  Mouth WDL  Neck WDL  Breast/Chest WDL  Shoulder Blades WDL  Spine WDL  (R) Arm/Elbow/Hand Redness and Blanching  (L) Arm/Elbow/Hand Redness and Blanching  Abdomen WDL  Groin WDL  Scrotum/Coccyx/Buttocks Redness, Blanching and Scab  (R) Leg WDL  (L) Leg WDL  (R) Heel/Foot/Toe Redness and Blanching  (L) Heel/Foot/Toe Redness and Blanching          Devices In Places Tele Box, Blood Pressure Cuff, Pulse Ox and Sher      Interventions In Place Pillows    Possible Skin Injury No    Pictures Uploaded Into Epic N/A  Wound Consult Placed N/A  RN Wound Prevention Protocol Ordered No

## 2021-12-29 NOTE — ASSESSMENT & PLAN NOTE
Hypotensive on admission  Now BP improved  Continues to have positive orthostatics  Holding chlorthalidone, Lasix, restarted metoprolol and amlodipine  Status post NS bolus  Continue to monitor

## 2021-12-29 NOTE — ASSESSMENT & PLAN NOTE
Chronic wilson catheter in place which has been changed in ER   UA showing  positive nitrite, large LE, moderate occult blood, glucose 250, proteinuria 30, packed WBC  Cont on IVF   Ceftriaxone-->bactrim  Follow up cultures

## 2021-12-29 NOTE — THERAPY
"Speech Language Pathology   Clinical Swallow Evaluation     Patient Name: Julien Dao  AGE:  89 y.o., SEX:  male  Medical Record #: [id number]  Today's Date: 2021     Precautions  Precautions: Fall Risk,Swallow Precautions ( See Comments)      HPI: Pt is a 89 y/o M who was admitted to the ED 21 after sustaining a GLF with reported 20 min LOC.    PMHx:  HTN, DM2, HLD    Diagnostic Imaging:  Head CT .  Diffuse atrophy and white matter changes.  2.  No acute intracranial hemorrhage or territorial infarct.    CXR .  Cardiomegaly.  2.  Atherosclerotic plaque.    Orientation:  Pt was oriented to self and . He stated he was at \"[facility]". Pt appeared confused and reported that he had been in the hospital for \"2 years\" and had \"not eaten in days\".     Prior Living Situation:  Unknown. Pt did endorse difficulty with consuming regular solids d/t poor dentition and requested \"soft food\".    Diet Prior to Evaluation:    NPO until cleared by speech pathology    Oral Mechanism Evaluation:  No facial asymmetry noted. Generalized weakness. Pt is mostly edentulous with few scattered dentition. Pt denied wearing/having dentures.    Subjective:  RN cleared the pt for speech tx. Pt was received awake and alert in bed. He appeared confused but was pleasant and cooperative.       Assessment  Pt was seen upright in brady's position while in the ED. SLP and pt regulated trials of ice chips, thin liquids by spoon/cup, mildly thick liquids by cup, puree and mixed fruit cocktail.  Appropriate oral acceptance. Intermittent mild anterior escape of thin and mildly thick liquids. Mastication time of 25-27 sec of the mixed fruit cocktail. Presumed slowed oral manipulation and lingual motion. Complete oral clearance. Pt exhibited coughing with 3/5 trials of thin liquids and 1/6 trials of puree when the pt was distracted. Suspect a delayed pharyngeal onset. Voice was dry pre and post oral intake.    "   Clinical Impressions  The pt presents with clinical signs of dysphagia c/b frequent coughing with thin liquids, likely acute 2/2 GLF and UTI. Oral phase findings appear chronic r/t poor dentition. Aspiration risk from oral intake appears elevated with indication for a short term modified diet. Swallow prognosis is good with medical management and improved mentation.       Recommendations  1.  Minced & Moist solids (MM5), Mildly Thick liquids (MT2)  2.  Swallowing Instructions & Precautions:   Supervision: 1:1 feeding with constant supervision, Encourage self-feeding  Positioning: Fully upright and midline during oral intake  Medication: Crush with puree  Strategies: Small bites/sips, Slow rate of intake, Liquids by cup only, Reduce environmental distractions  Oral Care: Q4h  3.  SLP to follow for dysphagia management.     Results and recs d/w pt and RN. Thank you.    Plan  Recommend Speech Therapy 3 times per week until therapy goals are met for the following treatments:  Dysphagia Training and Patient / Family / Caregiver Education.  Discharge Recommendations:  (TBD pending clinical progress)       12/29/21 0954   Total Time Spent   Total Time Spent (Mins) 30   Vitals   O2 Delivery Device None - Room Air   Prior Living Situation   Prior Services Unable To Determine At This Time   Housing / Facility Unable To Determine At This Time   Lives with - Patient's Self Care Capacity Unable To Determine At This Time   Prior Level Of Function   Communication Within Functional Limits   Swallow Unknown   Dentition Poor Quality    Dentures None   Hearing Within Functional Limits for Evaluation   Hearing Aid None   Vision Within Functional Limits for Evaluation   Patient's Primary Language English   Occupation (Pre-Hospital Vocational) Retired Due To Age   Oral Motor Eval    Is Patient Able to Complete Oral Motor Eval Yes, Within Normal Limits   Oral Food Presentation   Ice Chips Within Functional Limits   Single Swallow Mildly  "Thick (2) - (Nectar Thick)  Within Functional Limits   Serial Swallow Mildly Thick (2) - (Nectar Thick) Within Functional Limits   Single Swallow Thin (0) Impaired   Serial Swallow Thin (0) Impaired   Pureed (4) Minimal   Soft & Bite-Sized (6) - (Dysphagia III) Minimal   Self Feeding Needs Assistance   Dysphagia Strategies / Recommendations   Strategies / Interventions Recommended (Yes / No) Yes   Compensatory Strategies Strict 1:1 Feeding;Head of Bed 90 Degrees During Eating / Drinking;Liquids Via Cup;Single Sips / Bites;No Talking During Eating / Drinking   Diet / Liquid Recommendation Mildly Thick (2) - (Nectar Thick);Minced & Moist (5) - (Dysphagia II)   Medication Administration  Crush all Medications in Puree   Patient / Family Goals   Patient / Family Goal #1 \"I haven't eaten in days\"   Short Term Goals   Short Term Goal # 1 Pt will consume MM5/MT2 without overt signs of aspiration given modA.   Education Group   Education Provided Dysphagia   Dysphagia Patient Response Patient;Acceptance;Explanation;Reinforcement Needed   Problem List   Problem List Memory Deficit         "

## 2021-12-29 NOTE — PROGRESS NOTES
[facility] Medicine Daily Progress Note    Date of Service  12/29/2021    Chief Complaint  Julien Dao is a 89 y.o. male admitted 12/28/2021 with ground level fall     Hospital Course  This is a  89 y.o. male with a past medical hx of HTN, HLD, IDDM2, who presented 12/28/2021 with ground-level fall and reported 20-minute loss of consciousness that happened prior to arrival.     Per EMS report, the patient fell, hit his head, and lost consciousness.  Family was able to move him to a chair and called EMS.  When they arrived he was responsive to noxious stimulus but his GCS improved to 14 with loud voice as he does appear to be very hard of hearing.  He did not have any complaints but reported being treated currently for a urinary tract infection.  Patient admitted for further work up and treatment     Interval Problem Update  Patient seen and examined, afebrile, no overnight events, resting , bed, feels weak, denies CP or SOB.  Cont on IV abx for his UTI  PT/OT/eval     I have personally seen and examined the patient at bedside. I discussed the plan of care with patient.    Consultants/Specialty  None     Code Status  Full Code    Disposition  Patient is not medically cleared for discharge.   Anticipate discharge to TBD .  I have placed the appropriate orders for post-discharge needs.    Review of Systems  Review of Systems   Constitutional: Positive for malaise/fatigue.   HENT: Positive for hearing loss.    Respiratory: Negative for cough and shortness of breath.    Cardiovascular: Negative for chest pain, palpitations and orthopnea.   Gastrointestinal: Negative for heartburn, nausea and vomiting.   Genitourinary: Negative for dysuria, frequency and urgency.   Musculoskeletal: Positive for falls.   Neurological: Positive for loss of consciousness and weakness (generalized).        AMS   All other systems reviewed and are negative.       Physical Exam  Temp:  [36.6 °C (97.9 °F)] 36.6 °C (97.9 °F)  Pulse:  [60-73]  60  Resp:  [15-20] 20  BP: ()/(48-77) 122/58  SpO2:  [88 %-95 %] 95 %    Physical Exam  Vitals and nursing note reviewed.   Eyes:      General: No scleral icterus.        Right eye: No discharge.         Left eye: No discharge.   Cardiovascular:      Rate and Rhythm: Normal rate.      Heart sounds: No gallop.    Pulmonary:      Breath sounds: No wheezing or rales.   Abdominal:      General: There is no distension.      Tenderness: There is no abdominal tenderness. There is no guarding or rebound.   Musculoskeletal:         General: No swelling or tenderness.      Cervical back: Normal range of motion.   Skin:     General: Skin is warm.      Coloration: Skin is not jaundiced.   Neurological:      Mental Status: He is alert and oriented to person, place, and time. Mental status is at baseline.         Fluids    Intake/Output Summary (Last 24 hours) at 12/29/2021 1154  Last data filed at 12/29/2021 0700  Gross per 24 hour   Intake 0 ml   Output 1300 ml   Net -1300 ml       Laboratory  Recent Labs     12/28/21  1445 12/29/21  0644   WBC 8.9 8.1   RBC 5.29 4.70   HEMOGLOBIN 15.5 13.8*   HEMATOCRIT 45.3 40.4*   MCV 85.6 86.0   MCH 29.3 29.4   MCHC 34.2 34.2   RDW 45.6 44.6   PLATELETCT 147* 306   MPV 12.5 10.0     Recent Labs     12/28/21  1619 12/29/21  0644   SODIUM 134* 143   POTASSIUM 3.4* 2.5*   CHLORIDE 98 105   CO2 24 28   GLUCOSE 341* 84   BUN 21 17   CREATININE 1.03 0.84   CALCIUM 8.9 9.2     Recent Labs     12/28/21  1445   APTT 27.8   INR 1.03         Recent Labs     12/29/21  0644   TRIGLYCERIDE 128   HDL 31*   *       Imaging  CT-HEAD W/O   Final Result      1.  Diffuse atrophy and white matter changes.   2.  No acute intracranial hemorrhage or territorial infarct.            CT-CSPINE WITHOUT PLUS RECONS   Final Result      1.  Multilevel degenerative changes.   2.  Grade 1 anterolisthesis of C3 on C4, C4 on C5 and C5 of C6 is likely degenerative.   3.  Atherosclerotic plaque in the aorta and  carotid arteries bilaterally.   4.  Emphysematous changes.      DX-PELVIS-1 OR 2 VIEWS   Final Result      1.  No fracture or dislocation is seen.   2.  Mild degenerative changes of the hips, right greater than left.   3.  Atherosclerotic plaque.      DX-CHEST-LIMITED (1 VIEW)   Final Result      1.  Cardiomegaly.   2.  Atherosclerotic plaque.      EC-ECHOCARDIOGRAM COMPLETE W/O CONT    (Results Pending)        Assessment/Plan  * Syncope and collapse- (present on admission)  Assessment & Plan  Admitted with telemetry  Fall precautions in place  Echo pending  CT head negative for acute issues   OT/PT    HLD (hyperlipidemia)- (present on admission)  Assessment & Plan  Continue home meds      DM2 (diabetes mellitus, type 2) (HCC)- (present on admission)  Assessment & Plan  ISS  accuchecks  A1C    Benign essential HTN- (present on admission)  Assessment & Plan  Hypotensive on admission  Resolved   Continue to monitor      Acute UTI- (present on admission)  Assessment & Plan  Chronic wilson catheter in place which has been changed in ER   UA showing  positive nitrite, large LE, moderate occult blood, glucose 250, proteinuria 30, packed WBC  Cont on IVF   Ceftriaxone  Follow up cultures       Debility- (present on admission)  Assessment & Plan  OT/PT/Speech therapy         VTE prophylaxis: SCDs/TEDs    I have performed a physical exam and reviewed and updated ROS and Plan today (12/29/2021). In review of yesterday's note (12/28/2021), there are no changes except as documented above.

## 2021-12-29 NOTE — ASSESSMENT & PLAN NOTE
A1c 11  Home regimen: Lantus 35 units, wife reports he was on Metformin at some point but was taken off for some reason does not remember  Hypoglycemic overnight and today, required IVF  Decreased lantus 35-->30 units and SSI  Hypoglycemic last few days in am, starting to eat more so will restart lantus but only 10 units + SSI

## 2021-12-29 NOTE — ED NOTES
Med rec updated and complete. Allergies reviewed. Placed call to Mercedes (wife) to confirm medications and last doses taken.  Wife confirmed name and date of birth.    No antibiotic use in last 30 days.        [facility] [phone]

## 2021-12-29 NOTE — ASSESSMENT & PLAN NOTE
Likely in the setting of deconditioning and possible medications (recently increased Lasix and on multiple blood pressure medications as well as BPH medications), positive orthostatics  - holding lasix, chlorthalidone and BPH meds  - Echo EF 55%, grade 1 diastolic  - CT head negative for acute issues   - OT/PT

## 2021-12-29 NOTE — H&P
Hospital Medicine History & Physical Note    Date of Service  12/28/2021    Primary Care Physician  Danis Lehman M.D.    Consultants      Code Status  Full Code    Chief Complaint  Chief Complaint   Patient presents with   • Trauma Green       History of Presenting Illness  Baljit West is a 89 y.o. male with a past medical hx of HTN, HLD, IDDM2, who presented 12/28/2021 with ground-level fall and reported 20-minute loss of consciousness that happened prior to arrival.  Patient is largely unable to provide any information about the incident and information and history was gathered per report with EMS and wife.  Per EMS report, the patient fell, hit his head, and lost consciousness.  Family was able to move him to a chair and called EMS.  When they arrived he was responsive to noxious stimulus but his GCS improved to 14 with loud voice as he does appear to be very hard of hearing.  He did not have any complaints but reported being treated currently for a urinary tract infection.  Patient is AAAX3 and ambulates with walker at home per report.       Notable findings include: BP 74/48, HR 66, UA showing positive nitrite, large LE, moderate occult blood, glucose 250, proteinuria 30, packed WBC, Na 134, K 3.4, glucose 341, platelets 147    EKG showing ventricular paced rhythm, RBBB, LAAFB, first degree block    I discussed the plan of care with patient.    Review of Systems  Review of Systems   Constitutional: Positive for malaise/fatigue.   HENT: Positive for hearing loss.    Musculoskeletal: Positive for falls.   Neurological: Positive for loss of consciousness and weakness (generalized).        AMS   All other systems reviewed and are negative.      Past Medical History  Htn, HLD, debility, falls, IDDM2, CHF    Surgical History  Unknown.    Family History    Family history reviewed with patient. There is no family history that is pertinent to the chief complaint.     Social History    No reported use of EtOH,  cigarettes or other illicit substances.    Allergies  Allergies   Allergen Reactions   • Iodine        Medications  Prior to Admission Medications   Prescriptions Last Dose Informant Patient Reported? Taking?   Hypromellose, PF, (RETAINE [facility]) 0.3 % Solution 12/27/2021 at 1800 Family Member Yes Yes   Sig: Administer 1 Drop into affected eye(s) every 2 hours while awake.   amLODIPine (NORVASC) 5 MG Tab 12/27/2021 at 0900 Family Member Yes Yes   Sig: Take 5 mg by mouth every day.   chlorthalidone (HYGROTON) 25 MG Tab 12/27/2021 at 0900 Family Member Yes Yes   Sig: Take 25 mg by mouth every day.   finasteride (PROSCAR) 5 MG Tab 12/27/2021 at 0900 Family Member Yes Yes   Sig: Take 5 mg by mouth every day.   furosemide (LASIX) 20 MG Tab 12/27/2021 at 0900 Family Member Yes Yes   Sig: Take 20 mg by mouth 2 times a day.   gabapentin (NEURONTIN) 100 MG Cap 12/27/2021 at 1900 Family Member Yes Yes   Sig: Take 100 mg by mouth 2 times a day.   insulin glargine (LANTUS SOLOSTAR) 100 UNIT/ML Solution Pen-injector injection 12/27/2021 at 1900 Family Member Yes Yes   Sig: Inject 35 Units under the skin every evening.   levothyroxine (SYNTHROID) 25 MCG Tab 12/27/2021 at 0800 Family Member Yes Yes   Sig: Take 25 mcg by mouth every morning on an empty stomach.   metoprolol tartrate (LOPRESSOR) 25 MG Tab 12/27/2021 at 1900 Family Member Yes Yes   Sig: Take 12.5 mg by mouth 2 times a day.   potassium chloride SA (K-DUR) 10 MEQ Tab CR 12/27/2021 at 0900 Family Member Yes Yes   Sig: Take 10 mEq by mouth every day.   simvastatin (ZOCOR) 10 MG Tab 12/27/2021 at 1900 Family Member Yes Yes   Sig: Take 10 mg by mouth every evening.   tamsulosin (FLOMAX) 0.4 MG capsule 12/27/2021 at 1900 Family Member Yes Yes   Sig: Take 0.8 mg by mouth every evening.      Facility-Administered Medications: None       Physical Exam  Temp:  [36.6 °C (97.9 °F)] 36.6 °C (97.9 °F)  Pulse:  [60-73] 73  Resp:  [15-20] 15  BP: ()/(48-77) 144/62  SpO2:  [88 %-95  %] 95 %  Blood Pressure : 111/56   Temperature: 36.6 °C (97.9 °F)   Pulse: 63   Respiration: 15   Pulse Oximetry: 91 %       Physical Exam     Constitutional: Frail elderly male Resting comfortably in NAD   HENT: Normocephalic, no obvious evidence of acute trauma.  Eyes: No scleral icterus. Normal conjunctiva   Neck: Comfortable movement without any obvious restriction in the range of motion.  Cardiovascular: Upon ascultation I appreciate a regular heart rhythm and a normal rate with no murmurs, rubs or gallops  Thorax & Lungs: No respiratory distress. No wheezing, rales or rhonchi heard on ausculation.  there is no obvious chest wall tenderness. I appreciate normal air movement throughout.   Abdomen: The abdomen is not visibly distended. Upon palpation, I find it to be without tenderness.  No mass appreciated.  Skin: The exposed portions of skin reveal no obvious rash or other abnormalities.  Extremities/Musculoskeletal: no lower extremity edema with no asymmetry.  Neurologic: lethargic but arousable  Psychiatric: Normal affect appropriate for the clinical situation.    Laboratory:  Recent Labs     12/28/21  1445   WBC 8.9   RBC 5.29   HEMOGLOBIN 15.5   HEMATOCRIT 45.3   MCV 85.6   MCH 29.3   MCHC 34.2   RDW 45.6   PLATELETCT 147*   MPV 12.5     Recent Labs     12/28/21  1619   SODIUM 134*   POTASSIUM 3.4*   CHLORIDE 98   CO2 24   GLUCOSE 341*   BUN 21   CREATININE 1.03   CALCIUM 8.9     Recent Labs     12/28/21  1619   ALTSGPT 11   ASTSGOT 19   ALKPHOSPHAT 82   TBILIRUBIN 0.8   GLUCOSE 341*     Recent Labs     12/28/21  1445   APTT 27.8   INR 1.03     No results for input(s): NTPROBNP in the last 72 hours.      No results for input(s): TROPONINT in the last 72 hours.    Imaging:  CT-HEAD W/O   Final Result      1.  Diffuse atrophy and white matter changes.   2.  No acute intracranial hemorrhage or territorial infarct.            CT-CSPINE WITHOUT PLUS RECONS   Final Result      1.  Multilevel degenerative changes.    2.  Grade 1 anterolisthesis of C3 on C4, C4 on C5 and C5 of C6 is likely degenerative.   3.  Atherosclerotic plaque in the aorta and carotid arteries bilaterally.   4.  Emphysematous changes.      DX-PELVIS-1 OR 2 VIEWS   Final Result      1.  No fracture or dislocation is seen.   2.  Mild degenerative changes of the hips, right greater than left.   3.  Atherosclerotic plaque.      DX-CHEST-LIMITED (1 VIEW)   Final Result      1.  Cardiomegaly.   2.  Atherosclerotic plaque.              Assessment/Plan:  I anticipate this patient is appropriate for observation status at this time.    * Syncope and collapse- (present on admission)  Assessment & Plan  Admitted with telemetry  Fall precautions in place  OT/PT    Acute UTI- (present on admission)  Assessment & Plan  Chronic wilson catheter in place  UA showing  positive nitrite, large LE, moderate occult blood, glucose 250, proteinuria 30, packed WBC  IVF at 50cc/hour  Ceftriaxone  Awaiting cultures      HLD (hyperlipidemia)- (present on admission)  Assessment & Plan  Continue home meds  Lipid panel ordered to assess    DM2 (diabetes mellitus, type 2) (HCC)- (present on admission)  Assessment & Plan  ISS  accuchecks  A1C    Benign essential HTN- (present on admission)  Assessment & Plan  Hypotensive on admission  Admitted with telemetry  Continue to monitor  Held home BP meds    Debility- (present on admission)  Assessment & Plan  OT/PT/Speech therapy      VTE prophylaxis: SCDs/TEDs

## 2021-12-29 NOTE — ED NOTES
from Lab called with critical result of potassium 0733 at 0733. Critical lab result read back to .   Dr. Rasheed notified of critical lab result at 0733.  Critical lab result read back by Dr. Rasheed.

## 2021-12-30 ENCOUNTER — APPOINTMENT (OUTPATIENT)
Dept: CARDIOLOGY | Facility: MEDICAL CENTER | Age: 86
DRG: 699 | End: 2021-12-30
Attending: INTERNAL MEDICINE
Payer: MEDICARE

## 2021-12-30 PROBLEM — E87.6 HYPOKALEMIA: Status: ACTIVE | Noted: 2021-12-30

## 2021-12-30 PROBLEM — N40.1 BENIGN PROSTATIC HYPERPLASIA WITH URINARY OBSTRUCTION: Status: ACTIVE | Noted: 2021-12-30

## 2021-12-30 PROBLEM — N13.8 BENIGN PROSTATIC HYPERPLASIA WITH URINARY OBSTRUCTION: Status: ACTIVE | Noted: 2021-12-30

## 2021-12-30 LAB
ANION GAP SERPL CALC-SCNC: 12 MMOL/L (ref 7–16)
BASOPHILS # BLD AUTO: 0.5 % (ref 0–1.8)
BASOPHILS # BLD: 0.05 K/UL (ref 0–0.12)
BUN SERPL-MCNC: 16 MG/DL (ref 8–22)
CALCIUM SERPL-MCNC: 8.9 MG/DL (ref 8.5–10.5)
CHLORIDE SERPL-SCNC: 104 MMOL/L (ref 96–112)
CO2 SERPL-SCNC: 24 MMOL/L (ref 20–33)
CREAT SERPL-MCNC: 0.8 MG/DL (ref 0.5–1.4)
EKG IMPRESSION: NORMAL
EOSINOPHIL # BLD AUTO: 0.19 K/UL (ref 0–0.51)
EOSINOPHIL NFR BLD: 1.8 % (ref 0–6.9)
ERYTHROCYTE [DISTWIDTH] IN BLOOD BY AUTOMATED COUNT: 44.3 FL (ref 35.9–50)
GLUCOSE BLD-MCNC: 142 MG/DL (ref 65–99)
GLUCOSE BLD-MCNC: 147 MG/DL (ref 65–99)
GLUCOSE BLD-MCNC: 217 MG/DL (ref 65–99)
GLUCOSE SERPL-MCNC: 72 MG/DL (ref 65–99)
HCT VFR BLD AUTO: 38.2 % (ref 42–52)
HGB BLD-MCNC: 13 G/DL (ref 14–18)
IMM GRANULOCYTES # BLD AUTO: 0.05 K/UL (ref 0–0.11)
IMM GRANULOCYTES NFR BLD AUTO: 0.5 % (ref 0–0.9)
LV EJECT FRACT  99904: 55
LV EJECT FRACT MOD 2C 99903: 55.03
LV EJECT FRACT MOD 4C 99902: 56.31
LV EJECT FRACT MOD BP 99901: 54.43
LYMPHOCYTES # BLD AUTO: 3.06 K/UL (ref 1–4.8)
LYMPHOCYTES NFR BLD: 29.2 % (ref 22–41)
MAGNESIUM SERPL-MCNC: 1.9 MG/DL (ref 1.5–2.5)
MCH RBC QN AUTO: 29 PG (ref 27–33)
MCHC RBC AUTO-ENTMCNC: 34 G/DL (ref 33.7–35.3)
MCV RBC AUTO: 85.3 FL (ref 81.4–97.8)
MONOCYTES # BLD AUTO: 0.89 K/UL (ref 0–0.85)
MONOCYTES NFR BLD AUTO: 8.5 % (ref 0–13.4)
NEUTROPHILS # BLD AUTO: 6.25 K/UL (ref 1.82–7.42)
NEUTROPHILS NFR BLD: 59.5 % (ref 44–72)
NRBC # BLD AUTO: 0 K/UL
NRBC BLD-RTO: 0 /100 WBC
NT-PROBNP SERPL IA-MCNC: 508 PG/ML (ref 0–125)
PLATELET # BLD AUTO: 341 K/UL (ref 164–446)
PMV BLD AUTO: 10 FL (ref 9–12.9)
POTASSIUM SERPL-SCNC: 2.6 MMOL/L (ref 3.6–5.5)
POTASSIUM SERPL-SCNC: 3 MMOL/L (ref 3.6–5.5)
RBC # BLD AUTO: 4.48 M/UL (ref 4.7–6.1)
SODIUM SERPL-SCNC: 140 MMOL/L (ref 135–145)
TROPONIN T SERPL-MCNC: 33 NG/L (ref 6–19)
WBC # BLD AUTO: 10.5 K/UL (ref 4.8–10.8)

## 2021-12-30 PROCEDURE — 82962 GLUCOSE BLOOD TEST: CPT | Mod: 91

## 2021-12-30 PROCEDURE — 93005 ELECTROCARDIOGRAM TRACING: CPT | Performed by: STUDENT IN AN ORGANIZED HEALTH CARE EDUCATION/TRAINING PROGRAM

## 2021-12-30 PROCEDURE — 80048 BASIC METABOLIC PNL TOTAL CA: CPT

## 2021-12-30 PROCEDURE — 36415 COLL VENOUS BLD VENIPUNCTURE: CPT

## 2021-12-30 PROCEDURE — 97535 SELF CARE MNGMENT TRAINING: CPT

## 2021-12-30 PROCEDURE — 700111 HCHG RX REV CODE 636 W/ 250 OVERRIDE (IP): Performed by: STUDENT IN AN ORGANIZED HEALTH CARE EDUCATION/TRAINING PROGRAM

## 2021-12-30 PROCEDURE — 85025 COMPLETE CBC W/AUTO DIFF WBC: CPT

## 2021-12-30 PROCEDURE — 700102 HCHG RX REV CODE 250 W/ 637 OVERRIDE(OP): Performed by: INTERNAL MEDICINE

## 2021-12-30 PROCEDURE — 93306 TTE W/DOPPLER COMPLETE: CPT

## 2021-12-30 PROCEDURE — A9270 NON-COVERED ITEM OR SERVICE: HCPCS | Performed by: STUDENT IN AN ORGANIZED HEALTH CARE EDUCATION/TRAINING PROGRAM

## 2021-12-30 PROCEDURE — 700111 HCHG RX REV CODE 636 W/ 250 OVERRIDE (IP): Performed by: INTERNAL MEDICINE

## 2021-12-30 PROCEDURE — 97161 PT EVAL LOW COMPLEX 20 MIN: CPT

## 2021-12-30 PROCEDURE — 84484 ASSAY OF TROPONIN QUANT: CPT

## 2021-12-30 PROCEDURE — 84132 ASSAY OF SERUM POTASSIUM: CPT

## 2021-12-30 PROCEDURE — 700105 HCHG RX REV CODE 258: Performed by: STUDENT IN AN ORGANIZED HEALTH CARE EDUCATION/TRAINING PROGRAM

## 2021-12-30 PROCEDURE — 97166 OT EVAL MOD COMPLEX 45 MIN: CPT

## 2021-12-30 PROCEDURE — 83880 ASSAY OF NATRIURETIC PEPTIDE: CPT

## 2021-12-30 PROCEDURE — 700105 HCHG RX REV CODE 258: Performed by: INTERNAL MEDICINE

## 2021-12-30 PROCEDURE — 770001 HCHG ROOM/CARE - MED/SURG/GYN PRIV*

## 2021-12-30 PROCEDURE — 96372 THER/PROPH/DIAG INJ SC/IM: CPT

## 2021-12-30 PROCEDURE — A9270 NON-COVERED ITEM OR SERVICE: HCPCS | Performed by: INTERNAL MEDICINE

## 2021-12-30 PROCEDURE — 83735 ASSAY OF MAGNESIUM: CPT

## 2021-12-30 PROCEDURE — 93306 TTE W/DOPPLER COMPLETE: CPT | Mod: 26 | Performed by: INTERNAL MEDICINE

## 2021-12-30 PROCEDURE — 700102 HCHG RX REV CODE 250 W/ 637 OVERRIDE(OP): Performed by: STUDENT IN AN ORGANIZED HEALTH CARE EDUCATION/TRAINING PROGRAM

## 2021-12-30 PROCEDURE — 93010 ELECTROCARDIOGRAM REPORT: CPT | Performed by: INTERNAL MEDICINE

## 2021-12-30 PROCEDURE — 99233 SBSQ HOSP IP/OBS HIGH 50: CPT | Performed by: INTERNAL MEDICINE

## 2021-12-30 RX ORDER — POTASSIUM CHLORIDE 20 MEQ/1
40 TABLET, EXTENDED RELEASE ORAL 2 TIMES DAILY
Status: DISCONTINUED | OUTPATIENT
Start: 2021-12-30 | End: 2022-01-03

## 2021-12-30 RX ORDER — SODIUM CHLORIDE 9 MG/ML
500 INJECTION, SOLUTION INTRAVENOUS ONCE
Status: COMPLETED | OUTPATIENT
Start: 2021-12-30 | End: 2021-12-31

## 2021-12-30 RX ORDER — HALOPERIDOL 5 MG/ML
1 INJECTION INTRAMUSCULAR ONCE
Status: COMPLETED | OUTPATIENT
Start: 2021-12-30 | End: 2021-12-30

## 2021-12-30 RX ADMIN — GABAPENTIN 100 MG: 100 CAPSULE ORAL at 05:52

## 2021-12-30 RX ADMIN — ENOXAPARIN SODIUM 40 MG: 40 INJECTION SUBCUTANEOUS at 16:06

## 2021-12-30 RX ADMIN — CEFTRIAXONE SODIUM 2 G: 2 INJECTION, POWDER, FOR SOLUTION INTRAMUSCULAR; INTRAVENOUS at 05:53

## 2021-12-30 RX ADMIN — AMLODIPINE BESYLATE 5 MG: 5 TABLET ORAL at 05:52

## 2021-12-30 RX ADMIN — POTASSIUM CHLORIDE 10 MEQ: 7.46 INJECTION, SOLUTION INTRAVENOUS at 06:35

## 2021-12-30 RX ADMIN — SODIUM CHLORIDE 500 ML: 9 INJECTION, SOLUTION INTRAVENOUS at 13:45

## 2021-12-30 RX ADMIN — SODIUM CHLORIDE, POTASSIUM CHLORIDE, SODIUM LACTATE AND CALCIUM CHLORIDE: 600; 310; 30; 20 INJECTION, SOLUTION INTRAVENOUS at 01:38

## 2021-12-30 RX ADMIN — HALOPERIDOL LACTATE 1 MG: 5 INJECTION, SOLUTION INTRAMUSCULAR at 21:36

## 2021-12-30 RX ADMIN — POTASSIUM CHLORIDE 10 MEQ: 7.46 INJECTION, SOLUTION INTRAVENOUS at 04:11

## 2021-12-30 RX ADMIN — POTASSIUM CHLORIDE 10 MEQ: 20 TABLET, EXTENDED RELEASE ORAL at 06:00

## 2021-12-30 RX ADMIN — POTASSIUM CHLORIDE 40 MEQ: 1500 TABLET, EXTENDED RELEASE ORAL at 17:02

## 2021-12-30 RX ADMIN — POTASSIUM CHLORIDE 10 MEQ: 7.46 INJECTION, SOLUTION INTRAVENOUS at 03:08

## 2021-12-30 RX ADMIN — METOPROLOL TARTRATE 12.5 MG: 25 TABLET, FILM COATED ORAL at 05:52

## 2021-12-30 RX ADMIN — DOCUSATE SODIUM 50 MG AND SENNOSIDES 8.6 MG 2 TABLET: 8.6; 5 TABLET, FILM COATED ORAL at 05:52

## 2021-12-30 RX ADMIN — INSULIN GLARGINE 35 UNITS: 100 INJECTION, SOLUTION SUBCUTANEOUS at 17:40

## 2021-12-30 RX ADMIN — POTASSIUM CHLORIDE 10 MEQ: 7.46 INJECTION, SOLUTION INTRAVENOUS at 05:11

## 2021-12-30 RX ADMIN — SODIUM CHLORIDE, POTASSIUM CHLORIDE, SODIUM LACTATE AND CALCIUM CHLORIDE: 600; 310; 30; 20 INJECTION, SOLUTION INTRAVENOUS at 18:10

## 2021-12-30 RX ADMIN — LEVOTHYROXINE SODIUM 25 MCG: 0.03 TABLET ORAL at 05:52

## 2021-12-30 RX ADMIN — GABAPENTIN 100 MG: 100 CAPSULE ORAL at 17:01

## 2021-12-30 RX ADMIN — INSULIN HUMAN 4 UNITS: 100 INJECTION, SOLUTION PARENTERAL at 17:40

## 2021-12-30 RX ADMIN — FINASTERIDE 5 MG: 5 TABLET, FILM COATED ORAL at 05:53

## 2021-12-30 ASSESSMENT — ENCOUNTER SYMPTOMS
EYE DISCHARGE: 0
VOMITING: 0
NAUSEA: 0
FALLS: 1
CHILLS: 0
EYE REDNESS: 0
FEVER: 0
HEADACHES: 0
SHORTNESS OF BREATH: 0
SORE THROAT: 0

## 2021-12-30 ASSESSMENT — GAIT ASSESSMENTS
DEVIATION: DECREASED BASE OF SUPPORT;BRADYKINETIC;SHUFFLED GAIT
ASSISTIVE DEVICE: FRONT WHEEL WALKER
GAIT LEVEL OF ASSIST: MINIMAL ASSIST
DISTANCE (FEET): 50

## 2021-12-30 ASSESSMENT — COGNITIVE AND FUNCTIONAL STATUS - GENERAL
STANDING UP FROM CHAIR USING ARMS: A LITTLE
TURNING FROM BACK TO SIDE WHILE IN FLAT BAD: A LITTLE
SUGGESTED CMS G CODE MODIFIER MOBILITY: CL
WALKING IN HOSPITAL ROOM: A LITTLE
MOVING FROM LYING ON BACK TO SITTING ON SIDE OF FLAT BED: UNABLE
MOBILITY SCORE: 13
CLIMB 3 TO 5 STEPS WITH RAILING: A LOT
MOVING TO AND FROM BED TO CHAIR: UNABLE

## 2021-12-30 ASSESSMENT — PAIN DESCRIPTION - PAIN TYPE: TYPE: ACUTE PAIN

## 2021-12-30 ASSESSMENT — FIBROSIS 4 INDEX: FIB4 SCORE: 0.78

## 2021-12-30 NOTE — PROGRESS NOTES
Patient was sitting in chair and yelled out that he was feeling dizzy. 2 RNs escorted patient back to bed, patient's blood pressure was 95/57. After laying trendelenburg for 2 minutes patient stated he felt better. RN sat patient up in bed, blood pressure now is 123/61. MD notified.

## 2021-12-30 NOTE — ASSESSMENT & PLAN NOTE
Likely in setting of diuretics and poor po intake  Holding diuretics  Status post supplementation  Continue monitor  Magnesium normal

## 2021-12-30 NOTE — DISCHARGE PLANNING
Anticipated Discharge Disposition: SNF     Action: pt pending medical clearance, pt currently on 0 liters O2, 6 clicks are 19/14. Orientation: A&Ox self.      Barriers to Discharge: medical clearance     Plan: f/u with pt and medical team to discuss dc needs and barriers.

## 2021-12-30 NOTE — PROGRESS NOTES
Isabella from Lab called with critical result of K 2.6 at 0440. Critical lab result read back to Isabella.   Dr. Arredondo notified of critical lab result at 0448.  Critical lab result read back by Dr. Arredondo.    No new orders, will continue scheduled K riders and recheck K at 1000

## 2021-12-30 NOTE — DIETARY
"Nutrition services: Day 0 of admit.  Julien Dao is a 89 y.o. male with admitting DX of syncope and collapse  Consult received for MST 2- unsure weight loss    Met with pt at bedside. Pt was confused at time of visit. Per RN note pt is A&Ox1. When RD asked pt about his reported weight loss, pt did state that he has lost an unsure amount of weight recently d/t eating 'sandwiches with soup' and 'eating less.' He did not answer other questions when asked.     Assessment:  Height: 170.2 cm (5' 7\")  Weight: 64.1 kg (141 lb 5 oz)- stand up scale  Body mass index is 22.13 kg/m²., BMI classification: normal weight  Diet/Intake: Level 5-minced and moist, level 2-mildly thick. PO intake % x2 meals per ADL's.     Evaluation:   1. Pt admitted for syncope and collapse s/p GLF with debility, acute UTI, HTN, T2DM, HLD  2. Nutrition Focused Physical Exam: moderate muscle wasting temple region  3. Labs: Potassium 3.0, HgbA1C 11.1 (12/28)  4. Meds: insulin, potassium chloride, senna-docusate, Zofran, bowel regimen  5. Skin: no pressure related injuries noted  6. GI: last BM 12/30    Malnutrition Risk: Does not meet ASPEN criteria at this time.     Recommendations/Plan:  1. Continue current diet  2. Encourage intake of meals  3. Document intake of all meals  as % taken in ADL's to provide interdisciplinary communication across all shifts.   4. Monitor weight.  5. Nutrition rep will continue to see patient for ongoing meal and snack preferences.     RD following.          "

## 2021-12-30 NOTE — THERAPY
Physical Therapy   Initial Evaluation     Patient Name: Julien Dao  Age:  89 y.o., Sex:  male  Medical Record #: [id number]  Today's Date: 12/30/2021     Precautions  Precautions: Fall Risk;Swallow Precautions ( See Comments)    Assessment  Patient is 89 y.o. male who presented acutely after GLF and reported 20 min LOC.  Patient found to have syncope and acute UTI.  PMH includes HTN, HLD, & diabetes.  Today patient pleasantly confused.  He was able to perform STS and ambulate ~50 ft x 2 with FWW and min A.  Patient with slow pace, shuffled gait, and narrow LUCILA with tandem steps while turning.  Gait speed slowed with distance as patient reported lightheadedness while returning to room.  Recommend continued acute PT and post acute placement to address impairments in strength, balance, endurance, coordination, and maximize safety with functional mobility.    Plan    Recommend Physical Therapy 3 times per week until therapy goals are met for the following treatments:  Bed Mobility, Equipment, Gait Training, Neuro Re-Education / Balance, Self Care/Home Evaluation, Stair Training, Therapeutic Activities and Therapeutic Exercises    DC Equipment Recommendations: Unable to determine at this time  Discharge Recommendations: Recommend post-acute placement for additional physical therapy services prior to discharge home       Objective     12/30/21 0835   Prior Living Situation   Prior Services None   Housing / Facility 1 Story House   Steps Into Home 1   Equipment Owned Front-Wheel Walker   Lives with - Patient's Self Care Capacity Spouse   Comments Pt may be poor historian.   Prior Level of Functional Mobility   Comments Pt may be poor historian, reported he was IND with all mobility prior to admit.  Pt reported he was able to ambulate community distances with FWW.  Pt reported he does not drive anymore   History of Falls   History of Falls Yes   Cognition    Cognition / Consciousness X   Level of Consciousness Alert   Safety  Awareness Impaired   Comments Pleasantly confused, stated it was April 2020.  Pt stated spouse's name is Anna Marie, per RN wife's name is Mercedes.   Active ROM Lower Body    Active ROM Lower Body  WDL   Strength Lower Body   Lower Body Strength  WDL   Comments B LE grossly 5/5   Sensation Lower Body   Lower Extremity Sensation   X   Comments Reported numbness/tingling in feet   Balance Assessment   Sitting Balance (Static) Fair   Sitting Balance (Dynamic) Fair -   Standing Balance (Static) Fair -   Standing Balance (Dynamic) Poor +   Weight Shift Sitting Fair   Weight Shift Standing Poor   Comments w/ FWW   Gait Analysis   Gait Level Of Assist Minimal Assist   Assistive Device Front Wheel Walker   Distance (Feet) 50   # of Times Distance was Traveled 2   Deviation Decreased Base Of Support;Bradykinetic;Shuffled Gait   # of Stairs Climbed 0   Weight Bearing Status No restrictions   Comments Gait speed slowed with distance.  Pt reported lightheadedness/dizziness with ambulation, returned to room with onset of symptoms   Bed Mobility    Comments NT, in chair pre & post session   Functional Mobility   Sit to Stand Minimal Assist   Bed, Chair, Wheelchair Transfer Minimal Assist   Transfer Method Stand Step   Mobility ambulation   Activity Tolerance   Sitting in Chair 10+ min (pre & post session)   Sitting Edge of Bed NT   Standing 9 min   Short Term Goals    Short Term Goal # 1 Pt will perform supine <> sit without bed features with SPV within 6 visits in order to progress toward PLOF   Short Term Goal # 2 Pt will perform STS/functional transfers with FWW and SPV within 6 visits in order to progress OOB activity   Short Term Goal # 3 Pt will ambulate 150 ft with FWW and SPV within 6 visits in order to progress toward PLOF   Short Term Goal # 4 Pt will negotiate 1 step with FWW and SPV within 6 visits in order to access home   Session Information   Date / Session Number  12/30 - 1 (1/3, 1/5)

## 2021-12-30 NOTE — ASSESSMENT & PLAN NOTE
Patient with history of BPH with chronic Sher  Follows with urology outpatient  Home regimen: Flomax, finasteride  We will hold both inpatient given positive orthostatics and has chronic Sher anyway

## 2021-12-30 NOTE — CARE PLAN
The patient is Watcher - Medium risk of patient condition declining or worsening    Shift Goals  Clinical Goals: remain free from falls    Progress made toward(s) clinical / shift goals:    Problem: Knowledge Deficit - Standard  Goal: Patient and family/care givers will demonstrate understanding of plan of care, disease process/condition, diagnostic tests and medications  Outcome: Progressing     Problem: Skin Integrity  Goal: Skin integrity is maintained or improved  Outcome: Progressing       Patient is not progressing towards the following goals:

## 2021-12-30 NOTE — HOSPITAL COURSE
"89-year-old male with a history of hypertension, insulin-dependent type 2 diabetes, BPH with chronic Sher, HFpEF (EF 50%), multiple admissions in the last year in the setting of falls, hyperglycemia and UTIs.  Patient presents after another fall at home.  Per wife his legs just \"gave out\" and he fell and hit his head and after that was lethargic and altered.  In the ED he had CT head and C-spine without acute findings.  Labs remarkable for potassium 3.4, creatinine 1.03, A1c 11, urinalysis positive nitrites, large leukocyte esterase, packed WBC, moderate occult blood but only 2-5 RBC, many bacteria.  .  He was started on his home dose of Lasix and his potassium dropped to 2.5.  He was given potassium supplementation and started on ceftriaxone, urine culture grew Citrobacter freundii and patient was transitioned to Bactrim.    Hospital course c/b hypokalemia, hypoglycemia (had normal cortisol) and confusion/delirium.  After multiple conversations with patient and his wife they decided to transition to hospice.  "

## 2021-12-30 NOTE — PROGRESS NOTES
Assumed care of PT A&O 1. Pt resting in bed with no signs of labored breathing. On RA. Tele monitor in place, cardiac rhythm being monitored. Call light within reach, bed in lowest position, upper bed rails up. Pt was updated on plan of care for the day . Will continue to monitor.

## 2021-12-30 NOTE — PROGRESS NOTES
Isabella from Lab called with critical result of K 2.7 at 2312. Critical lab result read back to Isabella.   Dr. Arredondo notified of critical lab result at 2313.  Critical lab result read back by Dr. Arredondo. K tablet and JACQUELYN gaxiola ordered.

## 2021-12-30 NOTE — PROGRESS NOTES
"Hospital Medicine Daily Progress Note    Date of Service  12/30/2021    Chief Complaint  Julien Dao is a 89 y.o. male admitted 12/28/2021 with fall and AMS.    Hospital Course  89-year-old male with a history of hypertension, insulin-dependent type 2 diabetes, BPH with chronic Sher, HFpEF (EF 50%), multiple admissions in the last year in the setting of falls, hyperglycemia and UTIs.  Patient presents after another fall at home.  Per wife his legs just \"gave out\" and he fell and hit his head and after that was lethargic and altered.  In the ED he had CT head and C-spine without acute findings.  Labs remarkable for potassium 3.4, creatinine 1.03, A1c 11, urinalysis positive nitrites, large leukocyte esterase, packed WBC, moderate occult blood but only 2-5 RBC, many bacteria.  .  He was started on his home dose of Lasix and his potassium dropped to 2.5.  He was given potassium supplementation and started on ceftriaxone.      Interval Problem Update  -K increased to 3.0, lost multiple PIV's so switched to oral supplementation  -Orthostatics were positive, bolus was given  -Patient reports he feels like he is slurring his words however when I talked to his wife she reports that he stopped like that for \"a while\", denies any weakness or numbness, also reports that he did have some chest pain few weeks ago that lasted a few days and then resolved    I have personally seen and examined the patient at bedside. I discussed the plan of care with patient, family, bedside RN, charge RN,  and pharmacy.    Consultants/Specialty  None    Code Status  DNAR/DNI    Disposition  Patient is not medically cleared for discharge.   Anticipate discharge to to home with organized home healthcare and close outpatient follow-up.  I have placed the appropriate orders for post-discharge needs.    Review of Systems  Review of Systems   Constitutional: Negative for chills and fever.   HENT: Negative for sore throat.    Eyes: " Negative for discharge and redness.   Respiratory: Negative for shortness of breath.    Cardiovascular: Negative for chest pain and leg swelling.   Gastrointestinal: Negative for nausea and vomiting.   Genitourinary: Negative for hematuria.   Musculoskeletal: Positive for falls (At home).   Skin: Negative for rash.   Neurological: Negative for headaches.        Physical Exam  Temp:  [36.4 °C (97.5 °F)-37 °C (98.6 °F)] 36.5 °C (97.7 °F)  Pulse:  [61-75] 67  Resp:  [16] 16  BP: (100-162)/() 111/66  SpO2:  [93 %-94 %] 93 %    Physical Exam  Vitals and nursing note reviewed.   Constitutional:       General: He is not in acute distress.     Appearance: He is not ill-appearing, toxic-appearing or diaphoretic.   HENT:      Head: Normocephalic and atraumatic.      Nose: Nose normal.      Mouth/Throat:      Mouth: Mucous membranes are moist.   Eyes:      General: No scleral icterus.     Conjunctiva/sclera: Conjunctivae normal.   Cardiovascular:      Rate and Rhythm: Normal rate and regular rhythm.      Heart sounds: No murmur heard.  No friction rub. No gallop.    Pulmonary:      Effort: Pulmonary effort is normal.      Breath sounds: Normal breath sounds.   Abdominal:      General: Bowel sounds are normal. There is no distension.      Palpations: Abdomen is soft.      Tenderness: There is no abdominal tenderness.   Musculoskeletal:      Cervical back: Normal range of motion.      Right lower leg: No edema.      Left lower leg: No edema.   Skin:     Coloration: Skin is not jaundiced.      Findings: No rash.   Neurological:      Mental Status: He is alert and oriented to person, place, and time.      Cranial Nerves: No cranial nerve deficit.      Motor: No weakness.   Psychiatric:         Mood and Affect: Mood normal.         Behavior: Behavior normal.         Fluids    Intake/Output Summary (Last 24 hours) at 12/30/2021 1421  Last data filed at 12/30/2021 1224  Gross per 24 hour   Intake 480 ml   Output 2600 ml   Net  -2120 ml       Laboratory  Recent Labs     12/28/21  1445 12/29/21  0644 12/30/21  0329   WBC 8.9 8.1 10.5   RBC 5.29 4.70 4.48*   HEMOGLOBIN 15.5 13.8* 13.0*   HEMATOCRIT 45.3 40.4* 38.2*   MCV 85.6 86.0 85.3   MCH 29.3 29.4 29.0   MCHC 34.2 34.2 34.0   RDW 45.6 44.6 44.3   PLATELETCT 147* 306 341   MPV 12.5 10.0 10.0     Recent Labs     12/28/21  1619 12/28/21  1619 12/29/21  0644 12/29/21  0644 12/29/21  2241 12/30/21 0329 12/30/21  0947   SODIUM 134*  --  143  --   --  140  --    POTASSIUM 3.4*   < > 2.5*   < > 2.7* 2.6* 3.0*   CHLORIDE 98  --  105  --   --  104  --    CO2 24  --  28  --   --  24  --    GLUCOSE 341*  --  84  --   --  72  --    BUN 21  --  17  --   --  16  --    CREATININE 1.03  --  0.84  --   --  0.80  --    CALCIUM 8.9  --  9.2  --   --  8.9  --     < > = values in this interval not displayed.     Recent Labs     12/28/21  1445   APTT 27.8   INR 1.03         Recent Labs     12/29/21  0644   TRIGLYCERIDE 128   HDL 31*   *       Imaging  CT-HEAD W/O   Final Result      1.  Diffuse atrophy and white matter changes.   2.  No acute intracranial hemorrhage or territorial infarct.            CT-CSPINE WITHOUT PLUS RECONS   Final Result      1.  Multilevel degenerative changes.   2.  Grade 1 anterolisthesis of C3 on C4, C4 on C5 and C5 of C6 is likely degenerative.   3.  Atherosclerotic plaque in the aorta and carotid arteries bilaterally.   4.  Emphysematous changes.      DX-PELVIS-1 OR 2 VIEWS   Final Result      1.  No fracture or dislocation is seen.   2.  Mild degenerative changes of the hips, right greater than left.   3.  Atherosclerotic plaque.      DX-CHEST-LIMITED (1 VIEW)   Final Result      1.  Cardiomegaly.   2.  Atherosclerotic plaque.      EC-ECHOCARDIOGRAM COMPLETE W/O CONT    (Results Pending)        Assessment/Plan  * Syncope and collapse- (present on admission)  Assessment & Plan  Likely in the setting of deconditioning and possible medications (recently increased Lasix and  on multiple blood pressure medications as well as BPH medications), positive orthostatics  -Holding Lasix and chlorthalidone, will talk to urology about holding BPH meds if looking like he will need chronic Wilson anyway  - Echo pending  - CT head negative for acute issues   - OT/PT    Hypokalemia  Assessment & Plan  Likely in setting of diuretics  Holding diuretics  Status post supplementation  Continue monitor  Check magnesium    Benign prostatic hyperplasia with urinary obstruction  Assessment & Plan  Patient with history of BPH with chronic Wilson  Follows with urology outpatient  Home regimen: Flomax, finasteride  We will hold both inpatient given positive orthostatics and has chronic Wilson anyway  We will touch base with urology given changes to Lasix and BPH meds    HLD (hyperlipidemia)- (present on admission)  Assessment & Plan  Continue home meds      DM2 (diabetes mellitus, type 2) (HCC)- (present on admission)  Assessment & Plan  A1c 11  Home regimen: Lantus 35 units, wife reports he was on Metformin at some point but was taken off for some reason does not remember  Patient regimen: Lantus 35 units nightly plus sliding scale insulin, likely will need increase in insulin regimen at time of discharge    Benign essential HTN- (present on admission)  Assessment & Plan  Hypotensive on admission  Continues to have positive orthostatics  Holding chlorthalidone, Lasix, restarted metoprolol and amlodipine  Status post NS bolus, recheck orthostatics  Continue to monitor      Acute UTI- (present on admission)  Assessment & Plan  Chronic wilson catheter in place which has been changed in ER   UA showing  positive nitrite, large LE, moderate occult blood, glucose 250, proteinuria 30, packed WBC  Cont on IVF   Ceftriaxone  Follow up cultures       Debility- (present on admission)  Assessment & Plan  OT/PT/Speech therapy       VTE prophylaxis: enoxaparin ppx    I have performed a physical exam and reviewed and updated ROS  and Plan today (12/30/2021). In review of yesterday's note (12/29/2021), there are no changes except as documented above.

## 2021-12-31 LAB
ANION GAP SERPL CALC-SCNC: 11 MMOL/L (ref 7–16)
ANION GAP SERPL CALC-SCNC: 15 MMOL/L (ref 7–16)
BUN SERPL-MCNC: 13 MG/DL (ref 8–22)
BUN SERPL-MCNC: 9 MG/DL (ref 8–22)
CALCIUM SERPL-MCNC: 8.7 MG/DL (ref 8.5–10.5)
CALCIUM SERPL-MCNC: 8.8 MG/DL (ref 8.5–10.5)
CHLORIDE SERPL-SCNC: 101 MMOL/L (ref 96–112)
CHLORIDE SERPL-SCNC: 104 MMOL/L (ref 96–112)
CO2 SERPL-SCNC: 22 MMOL/L (ref 20–33)
CO2 SERPL-SCNC: 23 MMOL/L (ref 20–33)
CORTIS SERPL-MCNC: 14.7 UG/DL (ref 0–23)
CREAT SERPL-MCNC: 0.57 MG/DL (ref 0.5–1.4)
CREAT SERPL-MCNC: 0.65 MG/DL (ref 0.5–1.4)
GLUCOSE BLD-MCNC: 103 MG/DL (ref 65–99)
GLUCOSE BLD-MCNC: 162 MG/DL (ref 65–99)
GLUCOSE BLD-MCNC: 175 MG/DL (ref 65–99)
GLUCOSE BLD-MCNC: 213 MG/DL (ref 65–99)
GLUCOSE BLD-MCNC: 233 MG/DL (ref 65–99)
GLUCOSE BLD-MCNC: 55 MG/DL (ref 65–99)
GLUCOSE BLD-MCNC: 60 MG/DL (ref 65–99)
GLUCOSE BLD-MCNC: 68 MG/DL (ref 65–99)
GLUCOSE BLD-MCNC: 72 MG/DL (ref 65–99)
GLUCOSE BLD-MCNC: 73 MG/DL (ref 65–99)
GLUCOSE SERPL-MCNC: 206 MG/DL (ref 65–99)
GLUCOSE SERPL-MCNC: 53 MG/DL (ref 65–99)
POTASSIUM SERPL-SCNC: 2.9 MMOL/L (ref 3.6–5.5)
POTASSIUM SERPL-SCNC: 3.4 MMOL/L (ref 3.6–5.5)
SODIUM SERPL-SCNC: 134 MMOL/L (ref 135–145)
SODIUM SERPL-SCNC: 142 MMOL/L (ref 135–145)

## 2021-12-31 PROCEDURE — 99233 SBSQ HOSP IP/OBS HIGH 50: CPT | Performed by: INTERNAL MEDICINE

## 2021-12-31 PROCEDURE — 700111 HCHG RX REV CODE 636 W/ 250 OVERRIDE (IP): Performed by: INTERNAL MEDICINE

## 2021-12-31 PROCEDURE — 700111 HCHG RX REV CODE 636 W/ 250 OVERRIDE (IP): Performed by: STUDENT IN AN ORGANIZED HEALTH CARE EDUCATION/TRAINING PROGRAM

## 2021-12-31 PROCEDURE — A9270 NON-COVERED ITEM OR SERVICE: HCPCS | Performed by: INTERNAL MEDICINE

## 2021-12-31 PROCEDURE — 770020 HCHG ROOM/CARE - TELE (206)

## 2021-12-31 PROCEDURE — 82533 TOTAL CORTISOL: CPT

## 2021-12-31 PROCEDURE — 80048 BASIC METABOLIC PNL TOTAL CA: CPT | Mod: 91

## 2021-12-31 PROCEDURE — 36415 COLL VENOUS BLD VENIPUNCTURE: CPT

## 2021-12-31 PROCEDURE — A9270 NON-COVERED ITEM OR SERVICE: HCPCS | Performed by: STUDENT IN AN ORGANIZED HEALTH CARE EDUCATION/TRAINING PROGRAM

## 2021-12-31 PROCEDURE — 700101 HCHG RX REV CODE 250: Performed by: STUDENT IN AN ORGANIZED HEALTH CARE EDUCATION/TRAINING PROGRAM

## 2021-12-31 PROCEDURE — 700105 HCHG RX REV CODE 258: Performed by: INTERNAL MEDICINE

## 2021-12-31 PROCEDURE — 700102 HCHG RX REV CODE 250 W/ 637 OVERRIDE(OP): Performed by: STUDENT IN AN ORGANIZED HEALTH CARE EDUCATION/TRAINING PROGRAM

## 2021-12-31 PROCEDURE — 700105 HCHG RX REV CODE 258: Performed by: STUDENT IN AN ORGANIZED HEALTH CARE EDUCATION/TRAINING PROGRAM

## 2021-12-31 PROCEDURE — 700102 HCHG RX REV CODE 250 W/ 637 OVERRIDE(OP): Performed by: INTERNAL MEDICINE

## 2021-12-31 PROCEDURE — 82962 GLUCOSE BLOOD TEST: CPT | Mod: 91

## 2021-12-31 PROCEDURE — 92526 ORAL FUNCTION THERAPY: CPT

## 2021-12-31 RX ORDER — DEXTROSE MONOHYDRATE 25 G/50ML
50 INJECTION, SOLUTION INTRAVENOUS
Status: DISCONTINUED | OUTPATIENT
Start: 2021-12-31 | End: 2022-01-01

## 2021-12-31 RX ORDER — DEXTROSE MONOHYDRATE 50 MG/ML
INJECTION, SOLUTION INTRAVENOUS CONTINUOUS
Status: DISCONTINUED | OUTPATIENT
Start: 2021-12-31 | End: 2021-12-31

## 2021-12-31 RX ORDER — POTASSIUM CHLORIDE 20 MEQ/1
40 TABLET, EXTENDED RELEASE ORAL ONCE
Status: ACTIVE | OUTPATIENT
Start: 2021-12-31 | End: 2022-01-01

## 2021-12-31 RX ORDER — HALOPERIDOL 5 MG/ML
1 INJECTION INTRAMUSCULAR EVERY 4 HOURS PRN
Status: DISCONTINUED | OUTPATIENT
Start: 2021-12-31 | End: 2022-01-04 | Stop reason: HOSPADM

## 2021-12-31 RX ORDER — AMLODIPINE BESYLATE 5 MG/1
5 TABLET ORAL DAILY
Status: DISCONTINUED | OUTPATIENT
Start: 2021-12-31 | End: 2022-01-04 | Stop reason: HOSPADM

## 2021-12-31 RX ORDER — POTASSIUM CHLORIDE 7.45 MG/ML
10 INJECTION INTRAVENOUS
Status: COMPLETED | OUTPATIENT
Start: 2021-12-31 | End: 2021-12-31

## 2021-12-31 RX ADMIN — INSULIN HUMAN 3 UNITS: 100 INJECTION, SOLUTION PARENTERAL at 23:24

## 2021-12-31 RX ADMIN — AMLODIPINE BESYLATE 5 MG: 5 TABLET ORAL at 08:30

## 2021-12-31 RX ADMIN — GABAPENTIN 100 MG: 100 CAPSULE ORAL at 05:50

## 2021-12-31 RX ADMIN — ENOXAPARIN SODIUM 40 MG: 40 INJECTION SUBCUTANEOUS at 05:50

## 2021-12-31 RX ADMIN — DOCUSATE SODIUM 50 MG AND SENNOSIDES 8.6 MG 2 TABLET: 8.6; 5 TABLET, FILM COATED ORAL at 17:42

## 2021-12-31 RX ADMIN — POTASSIUM CHLORIDE 10 MEQ: 7.46 INJECTION, SOLUTION INTRAVENOUS at 05:55

## 2021-12-31 RX ADMIN — LEVOTHYROXINE SODIUM 25 MCG: 0.03 TABLET ORAL at 05:50

## 2021-12-31 RX ADMIN — DOCUSATE SODIUM 50 MG AND SENNOSIDES 8.6 MG 2 TABLET: 8.6; 5 TABLET, FILM COATED ORAL at 05:57

## 2021-12-31 RX ADMIN — METOPROLOL TARTRATE 12.5 MG: 25 TABLET, FILM COATED ORAL at 17:43

## 2021-12-31 RX ADMIN — POTASSIUM CHLORIDE 40 MEQ: 1500 TABLET, EXTENDED RELEASE ORAL at 16:52

## 2021-12-31 RX ADMIN — DEXTROSE MONOHYDRATE: 50 INJECTION, SOLUTION INTRAVENOUS at 06:31

## 2021-12-31 RX ADMIN — CEFTRIAXONE SODIUM 2 G: 2 INJECTION, POWDER, FOR SOLUTION INTRAMUSCULAR; INTRAVENOUS at 05:49

## 2021-12-31 RX ADMIN — POTASSIUM CHLORIDE 10 MEQ: 7.46 INJECTION, SOLUTION INTRAVENOUS at 06:50

## 2021-12-31 RX ADMIN — DEXTROSE MONOHYDRATE 50 ML: 25 INJECTION, SOLUTION INTRAVENOUS at 04:23

## 2021-12-31 RX ADMIN — GABAPENTIN 100 MG: 100 CAPSULE ORAL at 17:43

## 2021-12-31 RX ADMIN — ACETAMINOPHEN 650 MG: 325 TABLET, FILM COATED ORAL at 10:18

## 2021-12-31 RX ADMIN — METOPROLOL TARTRATE 12.5 MG: 25 TABLET, FILM COATED ORAL at 05:50

## 2021-12-31 RX ADMIN — POTASSIUM CHLORIDE 40 MEQ: 1500 TABLET, EXTENDED RELEASE ORAL at 05:50

## 2021-12-31 RX ADMIN — POTASSIUM CHLORIDE 10 MEQ: 7.46 INJECTION, SOLUTION INTRAVENOUS at 04:31

## 2021-12-31 RX ADMIN — POTASSIUM CHLORIDE 10 MEQ: 7.46 INJECTION, SOLUTION INTRAVENOUS at 07:51

## 2021-12-31 RX ADMIN — SIMVASTATIN 10 MG: 10 TABLET, FILM COATED ORAL at 21:58

## 2021-12-31 ASSESSMENT — FIBROSIS 4 INDEX
FIB4 SCORE: 0.78
FIB4 SCORE: 0.78

## 2021-12-31 ASSESSMENT — ENCOUNTER SYMPTOMS
EYE DISCHARGE: 0
SORE THROAT: 0
NAUSEA: 0
EYE REDNESS: 0
SHORTNESS OF BREATH: 0
HEADACHES: 0
CHILLS: 0
FALLS: 1
FEVER: 0
VOMITING: 0

## 2021-12-31 ASSESSMENT — PAIN DESCRIPTION - PAIN TYPE: TYPE: ACUTE PAIN

## 2021-12-31 NOTE — PROGRESS NOTES
"Hospital Medicine Daily Progress Note    Date of Service  12/31/2021    Chief Complaint  Julien Dao is a 89 y.o. male admitted 12/28/2021 with fall and AMS.    Hospital Course  89-year-old male with a history of hypertension, insulin-dependent type 2 diabetes, BPH with chronic Wilson, HFpEF (EF 50%), multiple admissions in the last year in the setting of falls, hyperglycemia and UTIs.  Patient presents after another fall at home.  Per wife his legs just \"gave out\" and he fell and hit his head and after that was lethargic and altered.  In the ED he had CT head and C-spine without acute findings.  Labs remarkable for potassium 3.4, creatinine 1.03, A1c 11, urinalysis positive nitrites, large leukocyte esterase, packed WBC, moderate occult blood but only 2-5 RBC, many bacteria.  .  He was started on his home dose of Lasix and his potassium dropped to 2.5.  He was given potassium supplementation and started on ceftriaxone.    Hospital course c/b hypokalemia, hypoglycemia (had normal cortisol) and confusion/delirium.        Interval Problem Update  - K still low 2.9  - hypoglycemic overnight  - spoke to Nancy with urology: no need for flomax or procar since needs chronic wilson  - large UOP >5L  - ucx growing GNRs    I have personally seen and examined the patient at bedside. I discussed the plan of care with patient, family, bedside RN, charge RN,  and pharmacy.    Consultants/Specialty  None    Code Status  DNAR/DNI    Disposition  Patient is not medically cleared for discharge.   Anticipate discharge to to home with organized home healthcare and close outpatient follow-up.  I have placed the appropriate orders for post-discharge needs.    Review of Systems  Review of Systems   Constitutional: Negative for chills and fever.   HENT: Negative for sore throat.    Eyes: Negative for discharge and redness.   Respiratory: Negative for shortness of breath.    Cardiovascular: Negative for chest pain and leg " swelling.   Gastrointestinal: Negative for nausea and vomiting.   Genitourinary: Negative for hematuria.   Musculoskeletal: Positive for falls (At home).   Skin: Negative for rash.   Neurological: Negative for headaches.        Physical Exam  Temp:  [36.2 °C (97.1 °F)-36.9 °C (98.5 °F)] 36.2 °C (97.1 °F)  Pulse:  [63-74] 70  Resp:  [16-18] 16  BP: (111-172)/(66-97) 157/83  SpO2:  [93 %-96 %] 93 %    Physical Exam  Vitals and nursing note reviewed.   Constitutional:       General: He is not in acute distress.     Appearance: He is not ill-appearing, toxic-appearing or diaphoretic.   HENT:      Head: Normocephalic and atraumatic.      Nose: Nose normal.      Mouth/Throat:      Mouth: Mucous membranes are moist.   Eyes:      General: No scleral icterus.     Conjunctiva/sclera: Conjunctivae normal.   Cardiovascular:      Rate and Rhythm: Normal rate and regular rhythm.      Heart sounds: No murmur heard.  No friction rub. No gallop.    Pulmonary:      Effort: Pulmonary effort is normal.      Breath sounds: Normal breath sounds.   Abdominal:      General: Bowel sounds are normal. There is no distension.      Palpations: Abdomen is soft.      Tenderness: There is no abdominal tenderness.   Musculoskeletal:      Cervical back: Normal range of motion.      Right lower leg: No edema.      Left lower leg: No edema.   Skin:     Coloration: Skin is not jaundiced.      Findings: No rash.   Neurological:      Mental Status: He is alert and oriented to person, place, and time.      Cranial Nerves: No cranial nerve deficit.      Motor: No weakness.   Psychiatric:         Mood and Affect: Mood normal.         Behavior: Behavior normal.         Fluids    Intake/Output Summary (Last 24 hours) at 12/31/2021 1210  Last data filed at 12/31/2021 1110  Gross per 24 hour   Intake 240 ml   Output 6400 ml   Net -6160 ml       Laboratory  Recent Labs     12/28/21  1445 12/29/21  0644 12/30/21  0329   WBC 8.9 8.1 10.5   RBC 5.29 4.70 4.48*    HEMOGLOBIN 15.5 13.8* 13.0*   HEMATOCRIT 45.3 40.4* 38.2*   MCV 85.6 86.0 85.3   MCH 29.3 29.4 29.0   MCHC 34.2 34.2 34.0   RDW 45.6 44.6 44.3   PLATELETCT 147* 306 341   MPV 12.5 10.0 10.0     Recent Labs     12/29/21  0644 12/29/21  2241 12/30/21  0329 12/30/21  0947 12/31/21  0247   SODIUM 143  --  140  --  142   POTASSIUM 2.5*   < > 2.6* 3.0* 2.9*   CHLORIDE 105  --  104  --  104   CO2 28  --  24  --  23   GLUCOSE 84  --  72  --  53*   BUN 17  --  16  --  13   CREATININE 0.84  --  0.80  --  0.65   CALCIUM 9.2  --  8.9  --  8.8    < > = values in this interval not displayed.     Recent Labs     12/28/21  1445   APTT 27.8   INR 1.03         Recent Labs     12/29/21  0644   TRIGLYCERIDE 128   HDL 31*   *       Imaging  EC-ECHOCARDIOGRAM COMPLETE W/O CONT   Final Result      CT-HEAD W/O   Final Result      1.  Diffuse atrophy and white matter changes.   2.  No acute intracranial hemorrhage or territorial infarct.            CT-CSPINE WITHOUT PLUS RECONS   Final Result      1.  Multilevel degenerative changes.   2.  Grade 1 anterolisthesis of C3 on C4, C4 on C5 and C5 of C6 is likely degenerative.   3.  Atherosclerotic plaque in the aorta and carotid arteries bilaterally.   4.  Emphysematous changes.      DX-PELVIS-1 OR 2 VIEWS   Final Result      1.  No fracture or dislocation is seen.   2.  Mild degenerative changes of the hips, right greater than left.   3.  Atherosclerotic plaque.      DX-CHEST-LIMITED (1 VIEW)   Final Result      1.  Cardiomegaly.   2.  Atherosclerotic plaque.           Assessment/Plan  * Syncope and collapse- (present on admission)  Assessment & Plan  Likely in the setting of deconditioning and possible medications (recently increased Lasix and on multiple blood pressure medications as well as BPH medications), positive orthostatics  - holding lasix, chlorthalidone and BPH meds  - Echo EF 55%, grade 1 diastolic  - CT head negative for acute issues   - OT/PT    Hypokalemia  Assessment &  Plan  Likely in setting of diuretics and poor po intake  Holding diuretics  Status post supplementation  Continue monitor  Check magnesium    Benign prostatic hyperplasia with urinary obstruction  Assessment & Plan  Patient with history of BPH with chronic Wilson  Follows with urology outpatient  Home regimen: Flomax, finasteride  We will hold both inpatient given positive orthostatics and has chronic Wilson anyway      HLD (hyperlipidemia)- (present on admission)  Assessment & Plan  Continue home meds      DM2 (diabetes mellitus, type 2) (HCC)- (present on admission)  Assessment & Plan  A1c 11  Home regimen: Lantus 35 units, wife reports he was on Metformin at some point but was taken off for some reason does not remember  Hypoglycemic overnight and today, required IVF  Decreased lantus 35-->30 units and SSI    Benign essential HTN- (present on admission)  Assessment & Plan  Hypotensive on admission  Continues to have positive orthostatics  Holding chlorthalidone, Lasix, restarted metoprolol and amlodipine  Status post NS bolus, recheck orthostatics  Continue to monitor      Acute UTI- (present on admission)  Assessment & Plan  Chronic wilson catheter in place which has been changed in ER   UA showing  positive nitrite, large LE, moderate occult blood, glucose 250, proteinuria 30, packed WBC  Cont on IVF   Ceftriaxone  Follow up cultures       Debility- (present on admission)  Assessment & Plan  OT/PT/Speech therapy       VTE prophylaxis: enoxaparin ppx    I have performed a physical exam and reviewed and updated ROS and Plan today (12/31/2021). In review of yesterday's note (12/30/2021), there are no changes except as documented above.

## 2021-12-31 NOTE — PROGRESS NOTES
Assumed care of PT A&O 1 to self. Pt resting in bed with no signs of labored breathing. On RA. Tele monitor in place, cardiac rhythm being monitored. Call light within reach, bed in lowest position, upper bed rails up. Pt was updated on plan of care for the day . Will continue to monitor.   Tele sitter in place.
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
## 2021-12-31 NOTE — PROGRESS NOTES
Pt BG checked at 0146, was 103. Rechecked with morning labs and BG was 53. Accu check obtained and pt was at 55. MD notified at 0417 and pt given 50ml D50. Rechecked BG 15 min after and BG was 175. 1hr later at 0600 BG checked and was 68. MD notified. Stat cortisol lab ordered, and MD to change sliding scale. Pt to be started on D5 instead of LR. Rn will give orange juice until D5 can be started

## 2021-12-31 NOTE — PROGRESS NOTES
Notified MD Arredondo of BG 60. Given 8 oz of orange juice and will recheck in 30min. No new orders at this time.

## 2021-12-31 NOTE — THERAPY
Occupational Therapy   Initial Evaluation     Patient Name: Julien Dao  Age:  89 y.o., Sex:  male  Medical Record #: [id number]  Today's Date: 12/30/2021     Precautions  Precautions: Fall Risk,Swallow Precautions ( See Comments)    Assessment  Patient presents with decreased independence in ADL and functional mobility. Patient with min A for bed mobility and functional mobility with FWW. Patient with mod A for pericare, assistance for LB dressing. Patient with standing grooming, assistance with standing balance. Patient would benefit from OT to increase independence in ADL and functional mobility.     Plan    Recommend Occupational Therapy 3 times per week until therapy goals are met for the following treatments:  Neuro Re-Education / Balance, Self Care/Activities of Daily Living, Therapeutic Activities and Therapeutic Exercises.    DC Equipment Recommendations: (P)  (Continue to assess for D/C needs )  Discharge Recommendations: (P) Recommend post-acute placement for additional occupational therapy services prior to discharge home     Subjective    Patient supine in bed upon arrival. Nurse tc occupational therapist to work with patient.       Objective       12/30/21 0808   Total Time Spent   Total Time Spent (Mins) 54   Charge Group   OT Evaluation OT Evaluation Mod   Initial Contact Note    Initial Contact Note Order Received and Verified, Occupational Therapy Evaluation in Progress with Full Report to Follow.   Prior Living Situation   Prior Services None   Housing / Facility 1 Story House   Steps Into Home 1   Equipment Owned Front-Wheel Walker   Lives with - Patient's Self Care Capacity Spouse   Comments Patient may be poor historian    Prior Level of ADL Function   Comments Unable to assess at this time    Prior Level of IADL Function   Occupation (Pre-Hospital Vocational) Retired Due To Age   History of Falls   History of Falls Yes   Precautions   Precautions Fall Risk   Pain   Pain Scales 0 to 10 Scale     Intervention Declines   Cognition    Level of Consciousness   (Alert and cooperative)   Safety Awareness Impaired   Comments Pleasantly confused, stated it was April 2020.  Pt stated spouse's name is Anna Marie, per RN wife's name is Mercedes.   Passive ROM Upper Body   Passive ROM Upper Body WDL   Active ROM Upper Body   Active ROM Upper Body  WDL   Strength Upper Body   Upper Body Strength  WDL   Comments >/=3+/5 grossly assessed during functional mobility    Sensation Upper Body   Upper Extremity Sensation  WDL   Comments BUE    Upper Body Muscle Tone   Upper Body Muscle Tone  WDL   Coordination Upper Body   Coordination WDL   Bed Mobility    Supine to Sit Minimal Assist   Sit to Supine Minimal Assist   Scooting Moderate Assist   Rolling Minimal Assist to Rt.;Minimum Assist to Lt.   ADL Assessment   Grooming   (Setup washing face/hands at sink side with SBA/CGA for balac)   Bathing Minimal Assist  (Sponge bath UB/LB at chair level )   Upper Body Dressing Minimal Assist  (Donning gown)   Lower Body Dressing Minimal Assist   Toileting Moderate Assist  (pericare after BM at toilet level )   Comments Patient utilized toilet in restroom,    Functional Mobility   Sit to Stand Minimal Assist  (FWW)   Bed, Chair, Wheelchair Transfer Minimal Assist   Transfer Method Stand Step   Comments Ambulated in room/restroom and hallway    Activity Tolerance   Sitting in Chair Left sitting in chair, sat EOB and toilet on this level    Sitting Edge of Bed ~10 minutes    Standing ~10 minutes    Patient / Family Goals   Patient / Family Goal #1 Get stronger    Short Term Goals   Short Term Goal # 1 Patient will perform UB/LB dressing with supervision    Short Term Goal # 2 Patient will perform 3/3 simple grooming task standing sink side with supervision, supervision for standing balance   Short Term Goal # 3 Patient will perform toileting with supervision    Education Group   Role of Occupational Therapist Patient Response  Patient;Acceptance;Explanation;Reinforcement Needed   Additional Comments Educated on importance of OOB activity and safety with functional mobility    Problem List   Problem List Decreased Active Daily Living Skills;Decreased Functional Mobility;Decreased Activity Tolerance;Impaired Cognitive Function   Anticipated Discharge Equipment and Recommendations   DC Equipment Recommendations   (Continue to assess for D/C needs )   Discharge Recommendations Recommend post-acute placement for additional occupational therapy services prior to discharge home   Interdisciplinary Plan of Care Collaboration   IDT Collaboration with  Nursing   Patient Position at End of Therapy Seated;Chair Alarm On;Call Light within Reach;Tray Table within Reach;Phone within Reach  (Nsg donned lap belt )   Session Information   Date / Session Number  12/20 #1 (1/3x 1/5/22)   Priority 2

## 2021-12-31 NOTE — PROGRESS NOTES
Interval summary:    RN notify patient hypoglycemic earlier in the night noted to be blood glucose of 60 and given subsequent orange juice with increased to 100.  Subsequently went down to 55 and D50 push increased blood glucose up to 175 which again subsequently came back down within 1 hour to 68.  D5 continuous infusion ordered, cortisol level stat 6 AM however lower suspicion for adrenal insufficiency given hemodynamics.  I have decreased basal and sliding scale insulin from 35 units to 30 units glargine nightly and high SSI to medium SSI.    Electronically signed:  Fidencio Arredondo DO

## 2021-12-31 NOTE — CARE PLAN
The patient is Watcher - Medium risk of patient condition declining or worsening    Shift Goals  Clinical Goals: remain free from falls    Progress made toward(s) clinical / shift goals:    Problem: Safety - Medical Restraint  Goal: Remains free of injury from restraints (Restraint for Interference with Medical Device)  Outcome: Progressing - Q2 restraint checks. No injury at this time. ROM performed, water and food offered.        Patient is not progressing towards the following goals:      Problem: Knowledge Deficit - Standard  Goal: Patient and family/care givers will demonstrate understanding of plan of care, disease process/condition, diagnostic tests and medications  Outcome: Not Progressing Patient is educated of disease process and condition. Treatment team has included patient in plan of care. All medications indications and side effects are explained. Patient is encouraged to ask questions. Patient confused and unable to understand.

## 2021-12-31 NOTE — FACE TO FACE
Face to Face Supporting Documentation - Home Health    The encounter with this patient was in whole or in part the primary reason for home health admission.    Date of encounter:   Patient:                    MRN:                       YOB: 2021  Julien Dao  [id number]  3/7/1929     Home health to see patient for:  Skilled Nursing care for assessment, interventions & education, Physical Therapy evaluation and treatment and Occupational therapy evaluation and treatment    Skilled need for:  New Onset Medical Diagnosis hypotension, UTI    Skilled nursing interventions to include:  Comment: med mgmt    Homebound status evidenced by:  Need the aid of supportive devices such as crutches, canes, wheelchairs or walkers or Needs the assistance of another person in order to leave the home. Leaving home requires a considerable and taxing effort. There is a normal inability to leave the home.    Community Physician to provide follow up care: Danis Lehman M.D.     Optional Interventions? No      I certify the face to face encounter for this home health care referral meets the CMS requirements and the encounter/clinical assessment with the patient was, in whole, or in part, for the medical condition(s) listed above, which is the primary reason for home health care. Based on my clinical findings: the service(s) are medically necessary, support the need for home health care, and the homebound criteria are met.  I certify that this patient has had a face to face encounter by myself.  Iona Calderon M.D. - NPI: [id number]

## 2021-12-31 NOTE — PROGRESS NOTES
"Pt started becoming agitated and verbally aggressive with RN and Care Aid. Verbal de-escalation unsuccessful, pt refusing to be touched to replace heart monitor sticker. Pt stripped off clothes, playing with wilson bag and removed multiple IVs and heart monitor. Tele sitter in place. Pt trying to get out of bed, when trying to redirect pt became verbally aggressive and stated \"I will fucking kill you\" and when trying to replace lead sticker pt stated \"Shove that sticker somewhere else\". MD notified. One time haldol order and restraints placed.   "

## 2022-01-01 LAB
ANION GAP SERPL CALC-SCNC: 11 MMOL/L (ref 7–16)
BACTERIA UR CULT: ABNORMAL
BACTERIA UR CULT: ABNORMAL
BUN SERPL-MCNC: 11 MG/DL (ref 8–22)
CALCIUM SERPL-MCNC: 9 MG/DL (ref 8.5–10.5)
CHLORIDE SERPL-SCNC: 102 MMOL/L (ref 96–112)
CO2 SERPL-SCNC: 24 MMOL/L (ref 20–33)
CREAT SERPL-MCNC: 0.64 MG/DL (ref 0.5–1.4)
ERYTHROCYTE [DISTWIDTH] IN BLOOD BY AUTOMATED COUNT: 44.6 FL (ref 35.9–50)
GLUCOSE BLD-MCNC: 281 MG/DL (ref 65–99)
GLUCOSE BLD-MCNC: 89 MG/DL (ref 65–99)
GLUCOSE BLD-MCNC: 90 MG/DL (ref 65–99)
GLUCOSE SERPL-MCNC: 65 MG/DL (ref 65–99)
HCT VFR BLD AUTO: 42.9 % (ref 42–52)
HGB BLD-MCNC: 14.3 G/DL (ref 14–18)
MCH RBC QN AUTO: 28.7 PG (ref 27–33)
MCHC RBC AUTO-ENTMCNC: 33.3 G/DL (ref 33.7–35.3)
MCV RBC AUTO: 86.1 FL (ref 81.4–97.8)
PLATELET # BLD AUTO: 351 K/UL (ref 164–446)
PMV BLD AUTO: 10.2 FL (ref 9–12.9)
POTASSIUM SERPL-SCNC: 3.4 MMOL/L (ref 3.6–5.5)
RBC # BLD AUTO: 4.98 M/UL (ref 4.7–6.1)
SIGNIFICANT IND 70042: ABNORMAL
SITE SITE: ABNORMAL
SODIUM SERPL-SCNC: 137 MMOL/L (ref 135–145)
SOURCE SOURCE: ABNORMAL
WBC # BLD AUTO: 9.3 K/UL (ref 4.8–10.8)

## 2022-01-01 PROCEDURE — 700111 HCHG RX REV CODE 636 W/ 250 OVERRIDE (IP): Performed by: INTERNAL MEDICINE

## 2022-01-01 PROCEDURE — A9270 NON-COVERED ITEM OR SERVICE: HCPCS | Performed by: STUDENT IN AN ORGANIZED HEALTH CARE EDUCATION/TRAINING PROGRAM

## 2022-01-01 PROCEDURE — 700102 HCHG RX REV CODE 250 W/ 637 OVERRIDE(OP): Performed by: INTERNAL MEDICINE

## 2022-01-01 PROCEDURE — 85027 COMPLETE CBC AUTOMATED: CPT

## 2022-01-01 PROCEDURE — 770001 HCHG ROOM/CARE - MED/SURG/GYN PRIV*

## 2022-01-01 PROCEDURE — A9270 NON-COVERED ITEM OR SERVICE: HCPCS | Performed by: INTERNAL MEDICINE

## 2022-01-01 PROCEDURE — 700105 HCHG RX REV CODE 258: Performed by: INTERNAL MEDICINE

## 2022-01-01 PROCEDURE — 82962 GLUCOSE BLOOD TEST: CPT | Mod: 91

## 2022-01-01 PROCEDURE — 700102 HCHG RX REV CODE 250 W/ 637 OVERRIDE(OP): Performed by: STUDENT IN AN ORGANIZED HEALTH CARE EDUCATION/TRAINING PROGRAM

## 2022-01-01 PROCEDURE — 80048 BASIC METABOLIC PNL TOTAL CA: CPT

## 2022-01-01 PROCEDURE — 36415 COLL VENOUS BLD VENIPUNCTURE: CPT

## 2022-01-01 PROCEDURE — 99232 SBSQ HOSP IP/OBS MODERATE 35: CPT | Performed by: INTERNAL MEDICINE

## 2022-01-01 RX ORDER — DEXTROSE MONOHYDRATE 25 G/50ML
50 INJECTION, SOLUTION INTRAVENOUS
Status: DISCONTINUED | OUTPATIENT
Start: 2022-01-01 | End: 2022-01-03

## 2022-01-01 RX ORDER — INSULIN LISPRO 100 [IU]/ML
2-9 INJECTION, SOLUTION INTRAVENOUS; SUBCUTANEOUS EVERY 6 HOURS
Status: DISCONTINUED | OUTPATIENT
Start: 2022-01-01 | End: 2022-01-03

## 2022-01-01 RX ORDER — SULFAMETHOXAZOLE AND TRIMETHOPRIM 800; 160 MG/1; MG/1
1 TABLET ORAL EVERY 12 HOURS
Status: DISCONTINUED | OUTPATIENT
Start: 2022-01-02 | End: 2022-01-04 | Stop reason: HOSPADM

## 2022-01-01 RX ADMIN — GABAPENTIN 100 MG: 100 CAPSULE ORAL at 06:02

## 2022-01-01 RX ADMIN — POTASSIUM CHLORIDE 40 MEQ: 1500 TABLET, EXTENDED RELEASE ORAL at 06:08

## 2022-01-01 RX ADMIN — GABAPENTIN 100 MG: 100 CAPSULE ORAL at 17:38

## 2022-01-01 RX ADMIN — ENOXAPARIN SODIUM 40 MG: 40 INJECTION SUBCUTANEOUS at 06:02

## 2022-01-01 RX ADMIN — METOPROLOL TARTRATE 12.5 MG: 25 TABLET, FILM COATED ORAL at 06:03

## 2022-01-01 RX ADMIN — CEFTRIAXONE SODIUM 2 G: 2 INJECTION, POWDER, FOR SOLUTION INTRAMUSCULAR; INTRAVENOUS at 06:13

## 2022-01-01 RX ADMIN — LEVOTHYROXINE SODIUM 25 MCG: 0.03 TABLET ORAL at 06:03

## 2022-01-01 RX ADMIN — DOCUSATE SODIUM 50 MG AND SENNOSIDES 8.6 MG 2 TABLET: 8.6; 5 TABLET, FILM COATED ORAL at 06:02

## 2022-01-01 RX ADMIN — SIMVASTATIN 10 MG: 10 TABLET, FILM COATED ORAL at 21:57

## 2022-01-01 RX ADMIN — AMLODIPINE BESYLATE 5 MG: 5 TABLET ORAL at 06:03

## 2022-01-01 RX ADMIN — POTASSIUM CHLORIDE 40 MEQ: 1500 TABLET, EXTENDED RELEASE ORAL at 17:38

## 2022-01-01 RX ADMIN — METOPROLOL TARTRATE 12.5 MG: 25 TABLET, FILM COATED ORAL at 17:38

## 2022-01-01 ASSESSMENT — ENCOUNTER SYMPTOMS
EYE DISCHARGE: 0
VOMITING: 0
NAUSEA: 0
HEADACHES: 0
FEVER: 0
SORE THROAT: 0
SHORTNESS OF BREATH: 0
FALLS: 1
EYE REDNESS: 0
CHILLS: 0

## 2022-01-01 ASSESSMENT — FIBROSIS 4 INDEX
FIB4 SCORE: 0.76
FIB4 SCORE: 0.76

## 2022-01-01 NOTE — DISCHARGE PLANNING
Anticipated Discharge Disposition: home with hh vs hospice    Action: Reviewed with Dr Calderon pt is ready to dc. Pt is agreeable to hospice. Pt also has been on service with [facility] and likes their services.  requested CM call to speak to pt’s wife to discuss going home with HH vs home with hospice. Called wife’s number [phone] and had to leave a message. Voalte texted MD with update.     Barriers to Discharge: awaiting decision of hh vs hospice    Plan: await call back from pt’s wife.

## 2022-01-01 NOTE — PROGRESS NOTES
"Hospital Medicine Daily Progress Note    Date of Service  1/1/2022    Chief Complaint  Julien Dao is a 89 y.o. male admitted 12/28/2021 with fall and AMS.    Hospital Course  89-year-old male with a history of hypertension, insulin-dependent type 2 diabetes, BPH with chronic Sher, HFpEF (EF 50%), multiple admissions in the last year in the setting of falls, hyperglycemia and UTIs.  Patient presents after another fall at home.  Per wife his legs just \"gave out\" and he fell and hit his head and after that was lethargic and altered.  In the ED he had CT head and C-spine without acute findings.  Labs remarkable for potassium 3.4, creatinine 1.03, A1c 11, urinalysis positive nitrites, large leukocyte esterase, packed WBC, moderate occult blood but only 2-5 RBC, many bacteria.  .  He was started on his home dose of Lasix and his potassium dropped to 2.5.  He was given potassium supplementation and started on ceftriaxone, urine culture grew Citrobacter freundii and patient was transitioned to Bactrim.    Hospital course c/b hypokalemia, hypoglycemia (had normal cortisol) and confusion/delirium.  After multiple conversations with patient and his wife they decided to transition to hospice.      Interval Problem Update  - K still low but improving 3.4  - hypoglycemic again overnight and didn't receive any lantus, decreased lantus 35-->20, high sugars during the day  - UOP 2.5L  -Urine culture grew Citrobacter friendly, transition to Bactrim  - Spoke to patient and his wife about GOC and hospice given so many recurrent hospitalizations and patient refusing SNF, both agreeable, placed referral, total time 20 minutes    I have personally seen and examined the patient at bedside. I discussed the plan of care with patient, family, bedside RN and .    Consultants/Specialty  None    Code Status  DNAR/DNI    Disposition  Patient is medically cleared for discharge.   Anticipate discharge to to hospice.  I have " placed the appropriate orders for post-discharge needs.    Review of Systems  Review of Systems   Constitutional: Negative for chills and fever.   HENT: Negative for sore throat.    Eyes: Negative for discharge and redness.   Respiratory: Negative for shortness of breath.    Cardiovascular: Negative for chest pain and leg swelling.   Gastrointestinal: Negative for nausea and vomiting.   Genitourinary: Negative for hematuria.   Musculoskeletal: Positive for falls (At home).   Skin: Negative for rash.   Neurological: Negative for headaches.        Physical Exam  Temp:  [36.2 °C (97.2 °F)-36.7 °C (98 °F)] 36.5 °C (97.7 °F)  Pulse:  [71-82] 71  Resp:  [15-16] 15  BP: (140-150)/(79-98) 150/98  SpO2:  [92 %-95 %] 95 %    Physical Exam  Vitals and nursing note reviewed.   Constitutional:       General: He is not in acute distress.     Appearance: He is not ill-appearing, toxic-appearing or diaphoretic.   HENT:      Head: Normocephalic and atraumatic.      Nose: Nose normal.      Mouth/Throat:      Mouth: Mucous membranes are moist.   Eyes:      General: No scleral icterus.     Conjunctiva/sclera: Conjunctivae normal.   Cardiovascular:      Rate and Rhythm: Normal rate and regular rhythm.      Heart sounds: No murmur heard.  No friction rub. No gallop.    Pulmonary:      Effort: Pulmonary effort is normal.      Breath sounds: Normal breath sounds.   Abdominal:      General: Bowel sounds are normal. There is no distension.      Palpations: Abdomen is soft.      Tenderness: There is no abdominal tenderness.   Musculoskeletal:      Cervical back: Normal range of motion.      Right lower leg: No edema.      Left lower leg: No edema.   Skin:     Coloration: Skin is not jaundiced.      Findings: No rash.   Neurological:      Mental Status: He is alert and oriented to person, place, and time.      Cranial Nerves: No cranial nerve deficit.      Motor: No weakness.   Psychiatric:         Mood and Affect: Mood normal.          Behavior: Behavior normal.         Fluids    Intake/Output Summary (Last 24 hours) at 1/1/2022 1519  Last data filed at 1/1/2022 1502  Gross per 24 hour   Intake 240 ml   Output 2000 ml   Net -1760 ml       Laboratory  Recent Labs     12/30/21  0329 01/01/22  0345   WBC 10.5 9.3   RBC 4.48* 4.98   HEMOGLOBIN 13.0* 14.3   HEMATOCRIT 38.2* 42.9   MCV 85.3 86.1   MCH 29.0 28.7   MCHC 34.0 33.3*   RDW 44.3 44.6   PLATELETCT 341 351   MPV 10.0 10.2     Recent Labs     12/31/21  0247 12/31/21  1341 01/01/22  0345   SODIUM 142 134* 137   POTASSIUM 2.9* 3.4* 3.4*   CHLORIDE 104 101 102   CO2 23 22 24   GLUCOSE 53* 206* 65   BUN 13 9 11   CREATININE 0.65 0.57 0.64   CALCIUM 8.8 8.7 9.0                   Imaging  EC-ECHOCARDIOGRAM COMPLETE W/O CONT   Final Result      CT-HEAD W/O   Final Result      1.  Diffuse atrophy and white matter changes.   2.  No acute intracranial hemorrhage or territorial infarct.            CT-CSPINE WITHOUT PLUS RECONS   Final Result      1.  Multilevel degenerative changes.   2.  Grade 1 anterolisthesis of C3 on C4, C4 on C5 and C5 of C6 is likely degenerative.   3.  Atherosclerotic plaque in the aorta and carotid arteries bilaterally.   4.  Emphysematous changes.      DX-PELVIS-1 OR 2 VIEWS   Final Result      1.  No fracture or dislocation is seen.   2.  Mild degenerative changes of the hips, right greater than left.   3.  Atherosclerotic plaque.      DX-CHEST-LIMITED (1 VIEW)   Final Result      1.  Cardiomegaly.   2.  Atherosclerotic plaque.           Assessment/Plan  * Syncope and collapse- (present on admission)  Assessment & Plan  Likely in the setting of deconditioning and possible medications (recently increased Lasix and on multiple blood pressure medications as well as BPH medications), positive orthostatics  - holding lasix, chlorthalidone and BPH meds  - Echo EF 55%, grade 1 diastolic  - CT head negative for acute issues   - OT/PT    Hypokalemia  Assessment & Plan  Likely in setting  of diuretics and poor po intake  Holding diuretics  Status post supplementation  Continue monitor  Check magnesium    Benign prostatic hyperplasia with urinary obstruction  Assessment & Plan  Patient with history of BPH with chronic Wilson  Follows with urology outpatient  Home regimen: Flomax, finasteride  We will hold both inpatient given positive orthostatics and has chronic Wilson anyway      HLD (hyperlipidemia)- (present on admission)  Assessment & Plan  Continue home meds      DM2 (diabetes mellitus, type 2) (HCC)- (present on admission)  Assessment & Plan  A1c 11  Home regimen: Lantus 35 units, wife reports he was on Metformin at some point but was taken off for some reason does not remember  Hypoglycemic overnight and today, required IVF  Decreased lantus 35-->30 units and SSI  Hypoglycemic despite not getting any long-acting, will hold lantus for now, just do SSI    Benign essential HTN- (present on admission)  Assessment & Plan  Hypotensive on admission  Continues to have positive orthostatics  Holding chlorthalidone, Lasix, restarted metoprolol and amlodipine  Status post NS bolus  Continue to monitor      Acute UTI- (present on admission)  Assessment & Plan  Chronic wilson catheter in place which has been changed in ER   UA showing  positive nitrite, large LE, moderate occult blood, glucose 250, proteinuria 30, packed WBC  Cont on IVF   Ceftriaxone-->bactrim  Follow up cultures       Debility- (present on admission)  Assessment & Plan  OT/PT/Speech therapy       VTE prophylaxis: enoxaparin ppx    I have performed a physical exam and reviewed and updated ROS and Plan today (1/1/2022). In review of yesterday's note (12/31/2021), there are no changes except as documented above.

## 2022-01-01 NOTE — PROGRESS NOTES
Received bedside report from RN, pt care assumed, VSS, pt assessment complete. Pt AAOx1, with no pain at this time. No signs of acute distress noted at this time. Plan of care discussed with pt and verbalizes no questions. Pt denies any additional needs at this time. Bed locked/in lowest position, bed alarm on, pt educated on fall risk and verbalized understanding, call light within reach, hourly rounding initiated.

## 2022-01-01 NOTE — CARE PLAN
Problem: Knowledge Deficit - Standard  Goal: Patient and family/care givers will demonstrate understanding of plan of care, disease process/condition, diagnostic tests and medications  Outcome: Progressing     Problem: Skin Integrity  Goal: Skin integrity is maintained or improved  Outcome: Progressing     Problem: Fall Risk  Goal: Patient will remain free from falls  Outcome: Progressing   The patient is Watcher - Medium risk of patient condition declining or worsening    Shift Goals  Clinical Goals: no falls  Patient Goals: discharge  Family Goals: na    Progress made toward(s) clinical / shift goals:  progressing    :

## 2022-01-02 LAB
ANION GAP SERPL CALC-SCNC: 12 MMOL/L (ref 7–16)
BUN SERPL-MCNC: 13 MG/DL (ref 8–22)
CALCIUM SERPL-MCNC: 9 MG/DL (ref 8.5–10.5)
CHLORIDE SERPL-SCNC: 108 MMOL/L (ref 96–112)
CO2 SERPL-SCNC: 23 MMOL/L (ref 20–33)
CREAT SERPL-MCNC: 0.71 MG/DL (ref 0.5–1.4)
GLUCOSE BLD-MCNC: 119 MG/DL (ref 65–99)
GLUCOSE BLD-MCNC: 170 MG/DL (ref 65–99)
GLUCOSE BLD-MCNC: 178 MG/DL (ref 65–99)
GLUCOSE SERPL-MCNC: 95 MG/DL (ref 65–99)
MAGNESIUM SERPL-MCNC: 2 MG/DL (ref 1.5–2.5)
POTASSIUM SERPL-SCNC: 4 MMOL/L (ref 3.6–5.5)
SODIUM SERPL-SCNC: 143 MMOL/L (ref 135–145)

## 2022-01-02 PROCEDURE — 700102 HCHG RX REV CODE 250 W/ 637 OVERRIDE(OP): Performed by: INTERNAL MEDICINE

## 2022-01-02 PROCEDURE — A9270 NON-COVERED ITEM OR SERVICE: HCPCS | Performed by: STUDENT IN AN ORGANIZED HEALTH CARE EDUCATION/TRAINING PROGRAM

## 2022-01-02 PROCEDURE — 80048 BASIC METABOLIC PNL TOTAL CA: CPT

## 2022-01-02 PROCEDURE — 36415 COLL VENOUS BLD VENIPUNCTURE: CPT

## 2022-01-02 PROCEDURE — 700111 HCHG RX REV CODE 636 W/ 250 OVERRIDE (IP): Performed by: INTERNAL MEDICINE

## 2022-01-02 PROCEDURE — 83735 ASSAY OF MAGNESIUM: CPT

## 2022-01-02 PROCEDURE — 700102 HCHG RX REV CODE 250 W/ 637 OVERRIDE(OP): Performed by: STUDENT IN AN ORGANIZED HEALTH CARE EDUCATION/TRAINING PROGRAM

## 2022-01-02 PROCEDURE — A9270 NON-COVERED ITEM OR SERVICE: HCPCS | Performed by: INTERNAL MEDICINE

## 2022-01-02 PROCEDURE — 700111 HCHG RX REV CODE 636 W/ 250 OVERRIDE (IP): Performed by: STUDENT IN AN ORGANIZED HEALTH CARE EDUCATION/TRAINING PROGRAM

## 2022-01-02 PROCEDURE — 99232 SBSQ HOSP IP/OBS MODERATE 35: CPT | Performed by: INTERNAL MEDICINE

## 2022-01-02 PROCEDURE — 770001 HCHG ROOM/CARE - MED/SURG/GYN PRIV*

## 2022-01-02 PROCEDURE — 82962 GLUCOSE BLOOD TEST: CPT | Mod: 91

## 2022-01-02 RX ORDER — HALOPERIDOL 5 MG/ML
1 INJECTION INTRAMUSCULAR ONCE
Status: COMPLETED | OUTPATIENT
Start: 2022-01-02 | End: 2022-01-02

## 2022-01-02 RX ADMIN — SULFAMETHOXAZOLE AND TRIMETHOPRIM 1 TABLET: 800; 160 TABLET ORAL at 17:53

## 2022-01-02 RX ADMIN — HALOPERIDOL LACTATE 1 MG: 5 INJECTION, SOLUTION INTRAMUSCULAR at 21:09

## 2022-01-02 RX ADMIN — POTASSIUM CHLORIDE 40 MEQ: 1500 TABLET, EXTENDED RELEASE ORAL at 17:54

## 2022-01-02 RX ADMIN — ENOXAPARIN SODIUM 40 MG: 40 INJECTION SUBCUTANEOUS at 05:15

## 2022-01-02 RX ADMIN — METOPROLOL TARTRATE 12.5 MG: 25 TABLET, FILM COATED ORAL at 05:18

## 2022-01-02 RX ADMIN — INSULIN LISPRO 2 UNITS: 100 INJECTION, SOLUTION INTRAVENOUS; SUBCUTANEOUS at 00:29

## 2022-01-02 RX ADMIN — METOPROLOL TARTRATE 12.5 MG: 25 TABLET, FILM COATED ORAL at 17:54

## 2022-01-02 RX ADMIN — GABAPENTIN 100 MG: 100 CAPSULE ORAL at 17:53

## 2022-01-02 RX ADMIN — SULFAMETHOXAZOLE AND TRIMETHOPRIM 1 TABLET: 800; 160 TABLET ORAL at 05:16

## 2022-01-02 RX ADMIN — AMLODIPINE BESYLATE 5 MG: 5 TABLET ORAL at 05:18

## 2022-01-02 RX ADMIN — POTASSIUM CHLORIDE 40 MEQ: 1500 TABLET, EXTENDED RELEASE ORAL at 05:16

## 2022-01-02 RX ADMIN — GABAPENTIN 100 MG: 100 CAPSULE ORAL at 05:16

## 2022-01-02 RX ADMIN — LEVOTHYROXINE SODIUM 25 MCG: 0.03 TABLET ORAL at 05:16

## 2022-01-02 RX ADMIN — SIMVASTATIN 10 MG: 10 TABLET, FILM COATED ORAL at 21:09

## 2022-01-02 ASSESSMENT — ENCOUNTER SYMPTOMS
SORE THROAT: 0
HEADACHES: 0
VOMITING: 0
EYE DISCHARGE: 0
SHORTNESS OF BREATH: 0
EYE REDNESS: 0
CHILLS: 0
FALLS: 1
NAUSEA: 0
FEVER: 0

## 2022-01-02 ASSESSMENT — FIBROSIS 4 INDEX: FIB4 SCORE: 0.76

## 2022-01-02 NOTE — PROGRESS NOTES
Received bedside report from RN, pt care assumed, VSS, pt assessment complete. Pt AAOx1, with no pain at this time. No signs of acute distress noted at this time. Plan of care discussed with pt and verbalizes no questions. Pt denies any additional needs at this time. Bed locked/in lowest position, bed alarm on,tele sitter present pt educated on fall risk and verbalized understanding, call light within reach, hourly rounding initiated.

## 2022-01-02 NOTE — PROGRESS NOTES
"[facility] Medicine Daily Progress Note    Date of Service  1/2/2022    Chief Complaint  Julien Dao is a 89 y.o. male admitted 12/28/2021 with fall and AMS.    Hospital Course  89-year-old male with a history of hypertension, insulin-dependent type 2 diabetes, BPH with chronic Sher, HFpEF (EF 50%), multiple admissions in the last year in the setting of falls, hyperglycemia and UTIs.  Patient presents after another fall at home.  Per wife his legs just \"gave out\" and he fell and hit his head and after that was lethargic and altered.  In the ED he had CT head and C-spine without acute findings.  Labs remarkable for potassium 3.4, creatinine 1.03, A1c 11, urinalysis positive nitrites, large leukocyte esterase, packed WBC, moderate occult blood but only 2-5 RBC, many bacteria.  .  He was started on his home dose of Lasix and his potassium dropped to 2.5.  He was given potassium supplementation and started on ceftriaxone, urine culture grew Citrobacter freundii and patient was transitioned to Bactrim.    Hospital course c/b hypokalemia, hypoglycemia (had normal cortisol) and confusion/delirium.  After multiple conversations with patient and his wife they decided to transition to hospice.      Interval Problem Update  - more lethargic today, did eat breakfast, didn't get any haldol  - K 4  - spoke to wife today about home with hospice, that's still the plan    I have personally seen and examined the patient at bedside. I discussed the plan of care with patient, family and bedside RN.    Consultants/Specialty  None    Code Status  DNAR/DNI    Disposition  Patient is medically cleared for discharge.   Anticipate discharge to to hospice.  I have placed the appropriate orders for post-discharge needs.    Review of Systems  Review of Systems   Constitutional: Positive for malaise/fatigue. Negative for chills and fever.   HENT: Negative for sore throat.    Eyes: Negative for discharge and redness.   Respiratory: " Negative for shortness of breath.    Cardiovascular: Negative for chest pain and leg swelling.   Gastrointestinal: Negative for nausea and vomiting.   Genitourinary: Negative for hematuria.   Musculoskeletal: Positive for falls (At home).   Skin: Negative for rash.   Neurological: Negative for headaches.        Physical Exam  Temp:  [36.2 °C (97.2 °F)-36.6 °C (97.9 °F)] 36.6 °C (97.9 °F)  Pulse:  [68-77] 68  Resp:  [15-17] 17  BP: (132-153)/(84-98) 132/98  SpO2:  [94 %-96 %] 96 %    Physical Exam  Vitals and nursing note reviewed.   Constitutional:       General: He is not in acute distress.     Appearance: He is not ill-appearing, toxic-appearing or diaphoretic.      Comments: Lethargic but wakes to voice   HENT:      Head: Normocephalic and atraumatic.      Nose: Nose normal.      Mouth/Throat:      Mouth: Mucous membranes are moist.   Eyes:      General: No scleral icterus.     Conjunctiva/sclera: Conjunctivae normal.   Cardiovascular:      Rate and Rhythm: Normal rate and regular rhythm.      Heart sounds: No murmur heard.  No friction rub. No gallop.    Pulmonary:      Effort: Pulmonary effort is normal.      Breath sounds: Normal breath sounds.   Abdominal:      General: Bowel sounds are normal. There is no distension.      Palpations: Abdomen is soft.      Tenderness: There is no abdominal tenderness.   Musculoskeletal:      Cervical back: Normal range of motion.      Right lower leg: No edema.      Left lower leg: No edema.   Skin:     Coloration: Skin is not jaundiced.      Findings: No rash.   Neurological:      Mental Status: He is disoriented.      Cranial Nerves: No cranial nerve deficit.      Motor: No weakness.   Psychiatric:         Mood and Affect: Mood normal.         Behavior: Behavior normal.         Fluids    Intake/Output Summary (Last 24 hours) at 1/2/2022 1057  Last data filed at 1/2/2022 0500  Gross per 24 hour   Intake 240 ml   Output 1950 ml   Net -1710 ml       Laboratory  Recent Labs      01/01/22  0345   WBC 9.3   RBC 4.98   HEMOGLOBIN 14.3   HEMATOCRIT 42.9   MCV 86.1   MCH 28.7   MCHC 33.3*   RDW 44.6   PLATELETCT 351   MPV 10.2     Recent Labs     12/31/21  1341 01/01/22  0345 01/02/22  0358   SODIUM 134* 137 143   POTASSIUM 3.4* 3.4* 4.0   CHLORIDE 101 102 108   CO2 22 24 23   GLUCOSE 206* 65 95   BUN 9 11 13   CREATININE 0.57 0.64 0.71   CALCIUM 8.7 9.0 9.0                   Imaging  EC-ECHOCARDIOGRAM COMPLETE W/O CONT   Final Result      CT-HEAD W/O   Final Result      1.  Diffuse atrophy and white matter changes.   2.  No acute intracranial hemorrhage or territorial infarct.            CT-CSPINE WITHOUT PLUS RECONS   Final Result      1.  Multilevel degenerative changes.   2.  Grade 1 anterolisthesis of C3 on C4, C4 on C5 and C5 of C6 is likely degenerative.   3.  Atherosclerotic plaque in the aorta and carotid arteries bilaterally.   4.  Emphysematous changes.      DX-PELVIS-1 OR 2 VIEWS   Final Result      1.  No fracture or dislocation is seen.   2.  Mild degenerative changes of the hips, right greater than left.   3.  Atherosclerotic plaque.      DX-CHEST-LIMITED (1 VIEW)   Final Result      1.  Cardiomegaly.   2.  Atherosclerotic plaque.           Assessment/Plan  * Syncope and collapse- (present on admission)  Assessment & Plan  Likely in the setting of deconditioning and possible medications (recently increased Lasix and on multiple blood pressure medications as well as BPH medications), positive orthostatics  - holding lasix, chlorthalidone and BPH meds  - Echo EF 55%, grade 1 diastolic  - CT head negative for acute issues   - OT/PT    Hypokalemia  Assessment & Plan  Likely in setting of diuretics and poor po intake  Holding diuretics  Status post supplementation  Continue monitor  Check magnesium    Benign prostatic hyperplasia with urinary obstruction  Assessment & Plan  Patient with history of BPH with chronic Sher  Follows with urology outpatient  Home regimen: Flomax,  finasteride  We will hold both inpatient given positive orthostatics and has chronic Wilson anyway      HLD (hyperlipidemia)- (present on admission)  Assessment & Plan  Continue home meds      DM2 (diabetes mellitus, type 2) (HCC)- (present on admission)  Assessment & Plan  A1c 11  Home regimen: Lantus 35 units, wife reports he was on Metformin at some point but was taken off for some reason does not remember  Hypoglycemic overnight and today, required IVF  Decreased lantus 35-->30 units and SSI  Hypoglycemic despite not getting any long-acting, will hold lantus for now, just do SSI    Benign essential HTN- (present on admission)  Assessment & Plan  Hypotensive on admission  Now BP improved  Continues to have positive orthostatics  Holding chlorthalidone, Lasix, restarted metoprolol and amlodipine  Status post NS bolus  Continue to monitor      Acute UTI- (present on admission)  Assessment & Plan  Chronic wilson catheter in place which has been changed in ER   UA showing  positive nitrite, large LE, moderate occult blood, glucose 250, proteinuria 30, packed WBC  Cont on IVF   Ceftriaxone-->bactrim  Follow up cultures       Debility- (present on admission)  Assessment & Plan  OT/PT/Speech therapy       VTE prophylaxis: enoxaparin ppx    I have performed a physical exam and reviewed and updated ROS and Plan today (1/2/2022). In review of yesterday's note (1/1/2022), there are no changes except as documented above.

## 2022-01-02 NOTE — CARE PLAN
Problem: Knowledge Deficit - Standard  Goal: Patient and family/care givers will demonstrate understanding of plan of care, disease process/condition, diagnostic tests and medications  Outcome: Progressing     Problem: Skin Integrity  Goal: Skin integrity is maintained or improved  Outcome: Progressing     Problem: Fall Risk  Goal: Patient will remain free from falls  Outcome: Progressing   The patient is Watcher - Medium risk of patient condition declining or worsening    Shift Goals  Clinical Goals: no falls  Patient Goals: discharge  Family Goals: na    Progress made toward(s) clinical / shift goals:  progressing

## 2022-01-03 PROBLEM — F03.90 DEMENTIA (HCC): Status: ACTIVE | Noted: 2022-01-03

## 2022-01-03 LAB
ANION GAP SERPL CALC-SCNC: 13 MMOL/L (ref 7–16)
BUN SERPL-MCNC: 19 MG/DL (ref 8–22)
CALCIUM SERPL-MCNC: 9.6 MG/DL (ref 8.5–10.5)
CHLORIDE SERPL-SCNC: 105 MMOL/L (ref 96–112)
CO2 SERPL-SCNC: 21 MMOL/L (ref 20–33)
CREAT SERPL-MCNC: 1 MG/DL (ref 0.5–1.4)
GLUCOSE BLD-MCNC: 103 MG/DL (ref 65–99)
GLUCOSE BLD-MCNC: 149 MG/DL (ref 65–99)
GLUCOSE BLD-MCNC: 194 MG/DL (ref 65–99)
GLUCOSE BLD-MCNC: 278 MG/DL (ref 65–99)
GLUCOSE BLD-MCNC: 72 MG/DL (ref 65–99)
GLUCOSE SERPL-MCNC: 74 MG/DL (ref 65–99)
MAGNESIUM SERPL-MCNC: 2.1 MG/DL (ref 1.5–2.5)
POTASSIUM SERPL-SCNC: 4.3 MMOL/L (ref 3.6–5.5)
SODIUM SERPL-SCNC: 139 MMOL/L (ref 135–145)

## 2022-01-03 PROCEDURE — 83735 ASSAY OF MAGNESIUM: CPT

## 2022-01-03 PROCEDURE — 99232 SBSQ HOSP IP/OBS MODERATE 35: CPT | Performed by: INTERNAL MEDICINE

## 2022-01-03 PROCEDURE — 700102 HCHG RX REV CODE 250 W/ 637 OVERRIDE(OP): Performed by: STUDENT IN AN ORGANIZED HEALTH CARE EDUCATION/TRAINING PROGRAM

## 2022-01-03 PROCEDURE — 700102 HCHG RX REV CODE 250 W/ 637 OVERRIDE(OP): Performed by: INTERNAL MEDICINE

## 2022-01-03 PROCEDURE — A9270 NON-COVERED ITEM OR SERVICE: HCPCS | Performed by: STUDENT IN AN ORGANIZED HEALTH CARE EDUCATION/TRAINING PROGRAM

## 2022-01-03 PROCEDURE — 36415 COLL VENOUS BLD VENIPUNCTURE: CPT

## 2022-01-03 PROCEDURE — A9270 NON-COVERED ITEM OR SERVICE: HCPCS | Performed by: INTERNAL MEDICINE

## 2022-01-03 PROCEDURE — 82962 GLUCOSE BLOOD TEST: CPT | Mod: 91

## 2022-01-03 PROCEDURE — 700111 HCHG RX REV CODE 636 W/ 250 OVERRIDE (IP): Performed by: INTERNAL MEDICINE

## 2022-01-03 PROCEDURE — 97530 THERAPEUTIC ACTIVITIES: CPT

## 2022-01-03 PROCEDURE — 770001 HCHG ROOM/CARE - MED/SURG/GYN PRIV*

## 2022-01-03 PROCEDURE — 80048 BASIC METABOLIC PNL TOTAL CA: CPT

## 2022-01-03 PROCEDURE — 92526 ORAL FUNCTION THERAPY: CPT

## 2022-01-03 RX ORDER — POTASSIUM CHLORIDE 20 MEQ/1
40 TABLET, EXTENDED RELEASE ORAL DAILY
Qty: 60 EACH | Refills: 0 | Status: SHIPPED | OUTPATIENT
Start: 2022-01-03

## 2022-01-03 RX ORDER — INSULIN LISPRO 100 [IU]/ML
2-9 INJECTION, SOLUTION INTRAVENOUS; SUBCUTANEOUS
Status: DISCONTINUED | OUTPATIENT
Start: 2022-01-03 | End: 2022-01-04 | Stop reason: HOSPADM

## 2022-01-03 RX ORDER — POTASSIUM CHLORIDE 20 MEQ/1
40 TABLET, EXTENDED RELEASE ORAL DAILY
Status: DISCONTINUED | OUTPATIENT
Start: 2022-01-04 | End: 2022-01-04 | Stop reason: HOSPADM

## 2022-01-03 RX ORDER — ACETAMINOPHEN 325 MG/1
650 TABLET ORAL EVERY 6 HOURS PRN
Qty: 30 TABLET | Refills: 0 | COMMUNITY
Start: 2022-01-03

## 2022-01-03 RX ORDER — SULFAMETHOXAZOLE AND TRIMETHOPRIM 800; 160 MG/1; MG/1
1 TABLET ORAL EVERY 12 HOURS
Qty: 4 TABLET | Refills: 0 | Status: SHIPPED | OUTPATIENT
Start: 2022-01-04 | End: 2022-01-06

## 2022-01-03 RX ORDER — DEXTROSE MONOHYDRATE 25 G/50ML
50 INJECTION, SOLUTION INTRAVENOUS
Status: DISCONTINUED | OUTPATIENT
Start: 2022-01-03 | End: 2022-01-04 | Stop reason: HOSPADM

## 2022-01-03 RX ORDER — INSULIN LISPRO 100 [IU]/ML
0.2 INJECTION, SOLUTION INTRAVENOUS; SUBCUTANEOUS
Status: DISCONTINUED | OUTPATIENT
Start: 2022-01-03 | End: 2022-01-04 | Stop reason: HOSPADM

## 2022-01-03 RX ORDER — INSULIN GLARGINE 100 [IU]/ML
10 INJECTION, SOLUTION SUBCUTANEOUS EVERY EVENING
Qty: 1 EACH | Refills: 0 | Status: SHIPPED | OUTPATIENT
Start: 2022-01-03

## 2022-01-03 RX ADMIN — INSULIN LISPRO 4 UNITS: 100 INJECTION, SOLUTION INTRAVENOUS; SUBCUTANEOUS at 17:26

## 2022-01-03 RX ADMIN — LEVOTHYROXINE SODIUM 25 MCG: 0.03 TABLET ORAL at 05:08

## 2022-01-03 RX ADMIN — SULFAMETHOXAZOLE AND TRIMETHOPRIM 1 TABLET: 800; 160 TABLET ORAL at 05:09

## 2022-01-03 RX ADMIN — METOPROLOL TARTRATE 12.5 MG: 25 TABLET, FILM COATED ORAL at 05:09

## 2022-01-03 RX ADMIN — GABAPENTIN 100 MG: 100 CAPSULE ORAL at 05:09

## 2022-01-03 RX ADMIN — AMLODIPINE BESYLATE 5 MG: 5 TABLET ORAL at 05:09

## 2022-01-03 RX ADMIN — GABAPENTIN 100 MG: 100 CAPSULE ORAL at 17:03

## 2022-01-03 RX ADMIN — SIMVASTATIN 10 MG: 10 TABLET, FILM COATED ORAL at 19:39

## 2022-01-03 RX ADMIN — ENOXAPARIN SODIUM 40 MG: 40 INJECTION SUBCUTANEOUS at 05:08

## 2022-01-03 RX ADMIN — SULFAMETHOXAZOLE AND TRIMETHOPRIM 1 TABLET: 800; 160 TABLET ORAL at 17:04

## 2022-01-03 RX ADMIN — METOPROLOL TARTRATE 12.5 MG: 25 TABLET, FILM COATED ORAL at 17:03

## 2022-01-03 RX ADMIN — POTASSIUM CHLORIDE 40 MEQ: 1500 TABLET, EXTENDED RELEASE ORAL at 05:09

## 2022-01-03 RX ADMIN — INSULIN LISPRO 5 UNITS: 100 INJECTION, SOLUTION INTRAVENOUS; SUBCUTANEOUS at 13:51

## 2022-01-03 RX ADMIN — INSULIN LISPRO 2 UNITS: 100 INJECTION, SOLUTION INTRAVENOUS; SUBCUTANEOUS at 01:30

## 2022-01-03 ASSESSMENT — ENCOUNTER SYMPTOMS
CHILLS: 0
EYE DISCHARGE: 0
HEADACHES: 0
EYE REDNESS: 0
FALLS: 1
VOMITING: 0
SORE THROAT: 0
SHORTNESS OF BREATH: 0
FEVER: 0
NAUSEA: 0

## 2022-01-03 ASSESSMENT — COGNITIVE AND FUNCTIONAL STATUS - GENERAL
MOVING FROM LYING ON BACK TO SITTING ON SIDE OF FLAT BED: UNABLE
SUGGESTED CMS G CODE MODIFIER MOBILITY: CL
WALKING IN HOSPITAL ROOM: A LITTLE
CLIMB 3 TO 5 STEPS WITH RAILING: A LOT
MOBILITY SCORE: 12
TURNING FROM BACK TO SIDE WHILE IN FLAT BAD: A LOT
STANDING UP FROM CHAIR USING ARMS: A LITTLE
MOVING TO AND FROM BED TO CHAIR: UNABLE

## 2022-01-03 NOTE — DISCHARGE PLANNING
Anticipated Discharge Disposition: Home with [facility]     Action: pt currently on 0 liters O2, 6 clicks are 19/13. Orientation: A&O self.      Barriers to Discharge: [facility] acceptance     Plan: f/u with pt and medical team to discuss dc needs and barriers.     [facility] Liaison speaking to Mercedes/pt currently

## 2022-01-03 NOTE — THERAPY
Physical Therapy   Daily Treatment     Patient Name: Julien Dao  Age:  89 y.o., Sex:  male  Medical Record #: [id number]  Today's Date: 1/3/2022     Precautions  Precautions: Fall Risk;Swallow Precautions ( See Comments)    Assessment    Patient required min A for supine <> sit with bed features.  Patient was able to perform STS, standing marches, and single limb stance with min A and FWW however fatigued quickly.  Patient was able to transfer to [facility] with min A with HHA, min A with FWW for return to EOB.  Patient was able to scoot toward HOB in supine with mod A, able to hold bridge for ~4 sec to adjust david pads.  Further mobility deferred as patient reported fatigue after transfers and fell asleep prior to PT leaving room.  PT to continue to follow.    Plan    Continue current treatment plan.    DC Equipment Recommendations: Front-Wheel Walker  Discharge Recommendations: Recommend home health for continued physical therapy services (if plan to DC home, if not rec post acute placement)     Objective     01/03/22 1520   Cognition    Cognition / Consciousness X   Level of Consciousness Alert   Safety Awareness Impaired   Comments Pleasant & cooperative   Balance   Sitting Balance (Static) Fair   Sitting Balance (Dynamic) Fair -   Standing Balance (Static) Poor +   Standing Balance (Dynamic) Poor   Weight Shift Sitting Poor   Weight Shift Standing Poor   Skilled Intervention Verbal Cuing;Sequencing;Postural Facilitation   Comments w/ FWW   Gait Analysis   Gait Level Of Assist (NT, transfers only today)   Bed Mobility    Supine to Sit Minimal Assist   Sit to Supine Minimal Assist   Scooting Moderate Assist   Skilled Intervention Compensatory Strategies;Postural Facilitation;Verbal Cuing;Sequencing   Comments HOB elevated   Functional Mobility   Sit to Stand Minimal Assist   Bed, Chair, Wheelchair Transfer Minimal Assist   Toilet Transfers Minimal Assist   Transfer Method Stand Pivot   Mobility supine > EOB > STS > BSC  > EOB > back to bed   Skilled Intervention Compensatory Strategies;Postural Facilitation;Sequencing;Verbal Cuing   Comments Cues to sequence transfer to/from BSC   Activity Tolerance   Sitting in Chair 10 min on BSC   Sitting Edge of Bed 5 min total   Standing 3 min total   Comments limited by fatigue, weakness   Short Term Goals    Short Term Goal # 1 Pt will perform supine <> sit without bed features with SPV within 6 visits in order to progress toward PLOF   Goal Outcome # 1 goal not met   Short Term Goal # 2 Pt will perform STS/functional transfers with FWW and SPV within 6 visits in order to progress OOB activity   Goal Outcome # 2 Goal not met   Short Term Goal # 3 Pt will ambulate 150 ft with FWW and SPV within 6 visits in order to progress toward PLOF   Goal Outcome # 3 Goal not met   Short Term Goal # 4 Pt will negotiate 1 step with FWW and SPV within 6 visits in order to access home   Goal Outcome # 4 Goal not met   Session Information   Date / Session Number  1/3 - 2 (2/3, 1/5)

## 2022-01-03 NOTE — PROGRESS NOTES
"Hospital Medicine Daily Progress Note    Date of Service  1/3/2022    Chief Complaint  Julien Dao is a 89 y.o. male admitted 12/28/2021 with fall and AMS.    Hospital Course  89-year-old male with a history of hypertension, insulin-dependent type 2 diabetes, BPH with chronic Sher, HFpEF (EF 50%), multiple admissions in the last year in the setting of falls, hyperglycemia and UTIs.  Patient presents after another fall at home.  Per wife his legs just \"gave out\" and he fell and hit his head and after that was lethargic and altered.  In the ED he had CT head and C-spine without acute findings.  Labs remarkable for potassium 3.4, creatinine 1.03, A1c 11, urinalysis positive nitrites, large leukocyte esterase, packed WBC, moderate occult blood but only 2-5 RBC, many bacteria.  .  He was started on his home dose of Lasix and his potassium dropped to 2.5.  He was given potassium supplementation and started on ceftriaxone, urine culture grew Citrobacter freundii and patient was transitioned to Bactrim.    Hospital course c/b hypokalemia, hypoglycemia (had normal cortisol) and confusion/delirium.  After multiple conversations with patient and his wife they decided to transition to hospice.      Interval Problem Update  -Continues to require restraints in the evening  -Having multiple bowel movements however also been getting Senokot  -Frustrated as patient wants to go home  -Working still and getting home with hospice  -K 4.3    I have personally seen and examined the patient at bedside. I discussed the plan of care with patient, bedside RN, charge RN,  and pharmacy.    Consultants/Specialty  None    Code Status  DNAR/DNI    Disposition  Patient is medically cleared for discharge.   Anticipate discharge to to hospice.  I have placed the appropriate orders for post-discharge needs.    Review of Systems  Review of Systems   Constitutional: Positive for malaise/fatigue. Negative for chills and fever. "   HENT: Negative for sore throat.    Eyes: Negative for discharge and redness.   Respiratory: Negative for shortness of breath.    Cardiovascular: Negative for chest pain and leg swelling.   Gastrointestinal: Negative for nausea and vomiting.   Genitourinary: Negative for hematuria.   Musculoskeletal: Positive for falls (At home).   Skin: Negative for rash.   Neurological: Negative for headaches.        Physical Exam  Temp:  [36.2 °C (97.1 °F)-37.1 °C (98.7 °F)] 36.2 °C (97.1 °F)  Pulse:  [66-86] 66  Resp:  [15-16] 15  BP: (117-132)/(67-75) 132/75  SpO2:  [92 %-96 %] 92 %    Physical Exam  Vitals and nursing note reviewed.   Constitutional:       General: He is not in acute distress.     Appearance: He is not ill-appearing, toxic-appearing or diaphoretic.   HENT:      Head: Normocephalic and atraumatic.      Nose: Nose normal.      Mouth/Throat:      Mouth: Mucous membranes are moist.   Eyes:      General: No scleral icterus.     Conjunctiva/sclera: Conjunctivae normal.   Cardiovascular:      Rate and Rhythm: Normal rate and regular rhythm.   Pulmonary:      Effort: Pulmonary effort is normal.   Abdominal:      General: There is no distension.      Palpations: Abdomen is soft.   Genitourinary:     Comments: Sher in place  Musculoskeletal:      Cervical back: Normal range of motion.      Right lower leg: No edema.      Left lower leg: No edema.   Skin:     Coloration: Skin is not jaundiced.      Findings: No rash.   Neurological:      Mental Status: He is alert. He is disoriented.      Cranial Nerves: No cranial nerve deficit.      Motor: No weakness.   Psychiatric:         Mood and Affect: Mood normal.         Behavior: Behavior normal.         Fluids    Intake/Output Summary (Last 24 hours) at 1/3/2022 1415  Last data filed at 1/3/2022 1012  Gross per 24 hour   Intake 360 ml   Output 1350 ml   Net -990 ml       Laboratory  Recent Labs     01/01/22  0345   WBC 9.3   RBC 4.98   HEMOGLOBIN 14.3   HEMATOCRIT 42.9    MCV 86.1   MCH 28.7   MCHC 33.3*   RDW 44.6   PLATELETCT 351   MPV 10.2     Recent Labs     01/01/22  0345 01/02/22  0358 01/03/22  0308   SODIUM 137 143 139   POTASSIUM 3.4* 4.0 4.3   CHLORIDE 102 108 105   CO2 24 23 21   GLUCOSE 65 95 74   BUN 11 13 19   CREATININE 0.64 0.71 1.00   CALCIUM 9.0 9.0 9.6                   Imaging  EC-ECHOCARDIOGRAM COMPLETE W/O CONT   Final Result      CT-HEAD W/O   Final Result      1.  Diffuse atrophy and white matter changes.   2.  No acute intracranial hemorrhage or territorial infarct.            CT-CSPINE WITHOUT PLUS RECONS   Final Result      1.  Multilevel degenerative changes.   2.  Grade 1 anterolisthesis of C3 on C4, C4 on C5 and C5 of C6 is likely degenerative.   3.  Atherosclerotic plaque in the aorta and carotid arteries bilaterally.   4.  Emphysematous changes.      DX-PELVIS-1 OR 2 VIEWS   Final Result      1.  No fracture or dislocation is seen.   2.  Mild degenerative changes of the hips, right greater than left.   3.  Atherosclerotic plaque.      DX-CHEST-LIMITED (1 VIEW)   Final Result      1.  Cardiomegaly.   2.  Atherosclerotic plaque.           Assessment/Plan  * Syncope and collapse- (present on admission)  Assessment & Plan  Likely in the setting of deconditioning and possible medications (recently increased Lasix and on multiple blood pressure medications as well as BPH medications), positive orthostatics  - holding lasix, chlorthalidone and BPH meds  - Echo EF 55%, grade 1 diastolic  - CT head negative for acute issues   - OT/PT    Hypokalemia  Assessment & Plan  Likely in setting of diuretics and poor po intake  Holding diuretics  Status post supplementation  Continue monitor  Check magnesium    Benign prostatic hyperplasia with urinary obstruction  Assessment & Plan  Patient with history of BPH with chronic Sher  Follows with urology outpatient  Home regimen: Flomax, finasteride  We will hold both inpatient given positive orthostatics and has chronic  Wilson anyway      HLD (hyperlipidemia)- (present on admission)  Assessment & Plan  Continue home meds      DM2 (diabetes mellitus, type 2) (HCC)- (present on admission)  Assessment & Plan  A1c 11  Home regimen: Lantus 35 units, wife reports he was on Metformin at some point but was taken off for some reason does not remember  Hypoglycemic overnight and today, required IVF  Decreased lantus 35-->30 units and SSI  Hypoglycemic despite not getting any long-acting, will hold lantus for now, just do SSI    Benign essential HTN- (present on admission)  Assessment & Plan  Hypotensive on admission  Now BP improved  Continues to have positive orthostatics  Holding chlorthalidone, Lasix, restarted metoprolol and amlodipine  Status post NS bolus  Continue to monitor      Acute UTI- (present on admission)  Assessment & Plan  Chronic wilson catheter in place which has been changed in ER   UA showing  positive nitrite, large LE, moderate occult blood, glucose 250, proteinuria 30, packed WBC  Cont on IVF   Ceftriaxone-->bactrim  Follow up cultures       Debility- (present on admission)  Assessment & Plan  OT/PT/Speech therapy       VTE prophylaxis: enoxaparin ppx    I have performed a physical exam and reviewed and updated ROS and Plan today (1/3/2022). In review of yesterday's note (1/2/2022), there are no changes except as documented above.

## 2022-01-03 NOTE — ASSESSMENT & PLAN NOTE
Patient with sundowning behaviors both here and at home  Likely has some dementia  Interventions to minimize the risk of delirium.   -do not disturb patient (vitals or lab draws) between the hours of 10 PM and 6 AM.  -frequent reorientation  -avoid sedatives  -up in chair for meals  -watch for constipation  -remove all unnecessary lines (central lines, peripheral IVs, feeding tubes)

## 2022-01-03 NOTE — CARE PLAN
Problem: Nutritional:  Goal: Achieve adequate nutritional intake  Description: Patient will consume >50% of meals  Outcome: Met    Per ADLs, pt's PO intake has been % of meals over past 3 days.

## 2022-01-03 NOTE — DOCUMENTATION QUERY
[facility]                                                                       Query Response Note      PATIENT:               SAMMY CLIFFORD  ACCT #:                  [id number]  MRN:                     [id number]  :                      3/7/1929  ADMIT DATE:       2021 2:30 PM  DISCH DATE:          RESPONDING  PROVIDER #:        [id number]           QUERY TEXT:    A cause and effect relationship may not be assumed and must be documented by a provider.    Please clarify the relationship, if any, between  acute UTI and chronic wilson catheter    Are the conditions:    If you have any questions please contact:  Felicita Arana CDI [facility]  [email or web address]  Felicita Arana [address]      The patient's clinical indicators include:  Urinary Tract Infection  Indwelling Urinary Catheter    Risk factors: chronic wilson catheter, HTN, DM2, BPH with obstruction  Treatment: Ceftriaxone-->bactrim, wilson changed in ED  Options provided:   -- UTI due to chronic indwelling wilson catheter present on admission   -- Unrelated to each other   -- Other, please specify   -- Clinically unable to determine   -- Unknown      Query created by: Felicita Arana on 1/3/2022 8:49 AM    RESPONSE TEXT:    UTI due to chronic indwelling wilson catheter present on admission          Electronically signed by:  ANDRIA COVARRUBIAS MD 1/3/2022 3:30 PM

## 2022-01-03 NOTE — DISCHARGE PLANNING
Received Choice form at 8002  Agency/Facility Name: [facility]   Referral sent per Choice form @ 2006

## 2022-01-03 NOTE — CARE PLAN
Problem: Skin Integrity  Goal: Skin integrity is maintained or improved  Outcome: Progressing     Problem: Fall Risk  Goal: Patient will remain free from falls  Outcome: Progressing     Problem: Safety - Medical Restraint  Goal: Remains free of injury from restraints (Restraint for Interference with Medical Device)  Outcome: Progressing   The patient is Stable - Low risk of patient condition declining or worsening    Shift Goals  Clinical Goals: safety  Patient Goals: discharge  Family Goals: na    Progress made toward(s) clinical / shift goals:  progressing

## 2022-01-03 NOTE — THERAPY
Speech Language Pathology  Daily Treatment     Patient Name: Julien Dao  Age:  89 y.o., Sex:  male  Medical Record #: [id number]  Today's Date: 1/3/2022     Precautions  Precautions: (P) Fall Risk,Swallow Precautions ( See Comments)    Assessment    Patient was seen for dysphagia tx at lunch with SB6/TNO meal tray. 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  Pt was sitting upright for meal, and eager to participate.  Pt self fed, with only min assist needed at times to scoop food onto utensils, however he required cueing to slow rate.  Pt consumed items on his tray (soft and bite sized solids, minced and moist solids, and thins) without s/sx of aspiration.  Mastication continues to be effective, with reduced bolus formation as evidenced by minimal lingual and dental residue seen post swallow.  Pt spontaneously cleared residue with subsequent swallows. Swallow trigger appeared timely throughout meal.  Recommend to continue current diet of soft and bite sized solids with thins (SB6/TN0) with direct supervision secondary to impaired cognition.     Recommendations:  1) Continue diet of Soft & Bite Sized solids (SB6) and Thin Liquids (TN0)              -Direct supervision with meals              -Small bites/sips, slow rate, upright for all PO intake              -Oral care after meals to mitigate risk of aspiration PNA   2) Wash medication whole w/ thins    Plan    Continue current treatment plan.    Discharge Recommendations: Other (defer to PT/OT)       Objective     01/03/22 1440   Dysphagia    Dysphagia X   Positioning / Behavior Modification Self Monitoring;Modulate Rate or Bite Size   Other Treatments SB6/TN0 meal tray   Diet / Liquid Recommendation Soft & Bite-Sized (6) - (Dysphagia III);Thin (0)   Nutritional Liquid Intake Rating Scale Non thickened beverages   Nutritional Food Intake  Rating Scale Total oral diet with multiple consistencies but requiring special preparation or compensations   Nursing Communication Swallow Precaution Sign Posted at Head of Bed   Skilled Intervention Compensatory Strategies;Verbal Cueing   Recommended Route of Medication Administration   Medication Administration  Whole with Liquid Wash   Short Term Goals   Short Term Goal # 1 B  Patient will consume meals of soft/bite sized solids and thin liquids with no s/sx of aspiration given direct meal supervision.   Goal Outcome  # 1 B Progressing as expected

## 2022-01-03 NOTE — PROGRESS NOTES
Bedside report received from NOC RN. Assumed care of pt. Pt awake, laying in bed. A/Ox1, disoriented to time ,event, VSS. No concerns, complaints or distress. Pt educated to call before getting out of bed. POC reviewed and white board updated. Pt is medical. Call light in reach. Bed locked in lowest position with 2 upper bed rails up. Bed alarm on. Pt is in bilateral 2 point wrist restrains without any signs of injury. Pt has tele sitter at bedside.

## 2022-01-04 VITALS
OXYGEN SATURATION: 96 % | SYSTOLIC BLOOD PRESSURE: 98 MMHG | WEIGHT: 129.85 LBS | DIASTOLIC BLOOD PRESSURE: 64 MMHG | BODY MASS INDEX: 20.38 KG/M2 | RESPIRATION RATE: 18 BRPM | TEMPERATURE: 97 F | HEART RATE: 62 BPM | HEIGHT: 67 IN

## 2022-01-04 LAB
ANION GAP SERPL CALC-SCNC: 16 MMOL/L (ref 7–16)
BUN SERPL-MCNC: 19 MG/DL (ref 8–22)
CALCIUM SERPL-MCNC: 9.2 MG/DL (ref 8.5–10.5)
CHLORIDE SERPL-SCNC: 107 MMOL/L (ref 96–112)
CO2 SERPL-SCNC: 19 MMOL/L (ref 20–33)
CREAT SERPL-MCNC: 0.95 MG/DL (ref 0.5–1.4)
GLUCOSE BLD-MCNC: 161 MG/DL (ref 65–99)
GLUCOSE SERPL-MCNC: 116 MG/DL (ref 65–99)
POTASSIUM SERPL-SCNC: 4.5 MMOL/L (ref 3.6–5.5)
SODIUM SERPL-SCNC: 142 MMOL/L (ref 135–145)

## 2022-01-04 PROCEDURE — 82962 GLUCOSE BLOOD TEST: CPT

## 2022-01-04 PROCEDURE — 36415 COLL VENOUS BLD VENIPUNCTURE: CPT

## 2022-01-04 PROCEDURE — 80048 BASIC METABOLIC PNL TOTAL CA: CPT

## 2022-01-04 PROCEDURE — 700111 HCHG RX REV CODE 636 W/ 250 OVERRIDE (IP): Performed by: INTERNAL MEDICINE

## 2022-01-04 PROCEDURE — A9270 NON-COVERED ITEM OR SERVICE: HCPCS | Performed by: INTERNAL MEDICINE

## 2022-01-04 PROCEDURE — 99239 HOSP IP/OBS DSCHRG MGMT >30: CPT | Performed by: HOSPITALIST

## 2022-01-04 PROCEDURE — 700102 HCHG RX REV CODE 250 W/ 637 OVERRIDE(OP): Performed by: INTERNAL MEDICINE

## 2022-01-04 PROCEDURE — 700102 HCHG RX REV CODE 250 W/ 637 OVERRIDE(OP): Performed by: STUDENT IN AN ORGANIZED HEALTH CARE EDUCATION/TRAINING PROGRAM

## 2022-01-04 PROCEDURE — A9270 NON-COVERED ITEM OR SERVICE: HCPCS | Performed by: STUDENT IN AN ORGANIZED HEALTH CARE EDUCATION/TRAINING PROGRAM

## 2022-01-04 RX ADMIN — POTASSIUM CHLORIDE 40 MEQ: 1500 TABLET, EXTENDED RELEASE ORAL at 05:50

## 2022-01-04 RX ADMIN — AMLODIPINE BESYLATE 5 MG: 5 TABLET ORAL at 05:50

## 2022-01-04 RX ADMIN — ENOXAPARIN SODIUM 40 MG: 40 INJECTION SUBCUTANEOUS at 05:51

## 2022-01-04 RX ADMIN — INSULIN LISPRO 2 UNITS: 100 INJECTION, SOLUTION INTRAVENOUS; SUBCUTANEOUS at 07:33

## 2022-01-04 RX ADMIN — GABAPENTIN 100 MG: 100 CAPSULE ORAL at 05:50

## 2022-01-04 RX ADMIN — METOPROLOL TARTRATE 12.5 MG: 25 TABLET, FILM COATED ORAL at 05:50

## 2022-01-04 RX ADMIN — LEVOTHYROXINE SODIUM 25 MCG: 0.03 TABLET ORAL at 05:50

## 2022-01-04 RX ADMIN — INSULIN LISPRO 4 UNITS: 100 INJECTION, SOLUTION INTRAVENOUS; SUBCUTANEOUS at 07:33

## 2022-01-04 RX ADMIN — SULFAMETHOXAZOLE AND TRIMETHOPRIM 1 TABLET: 800; 160 TABLET ORAL at 05:50

## 2022-01-04 ASSESSMENT — PAIN DESCRIPTION - PAIN TYPE: TYPE: ACUTE PAIN

## 2022-01-04 NOTE — CARE PLAN
The patient is Stable - Low risk of patient condition declining or worsening    Shift Goals  Clinical Goals: safety, reorientation  Patient Goals: d/c home  Family Goals: na    Progress made toward(s) clinical / shift goals:    Problem: Skin Integrity  Goal: Skin integrity is maintained or improved  Outcome: Progressing     Problem: Fall Risk  Goal: Patient will remain free from falls  Outcome: Progressing       Patient is not progressing towards the following goals:

## 2022-01-04 NOTE — CARE PLAN
Problem: Knowledge Deficit - Standard  Goal: Patient and family/care givers will demonstrate understanding of plan of care, disease process/condition, diagnostic tests and medications  Note: Home with hospice, reorient patient, maintain safety     Problem: Fall Risk  Goal: Patient will remain free from falls  Note: Bed alarm in place, reorient patient     Problem: Fall Risk  Goal: Patient will remain free from falls  Note: Bed alarm in place, reorient patient   The patient is Stable - Low risk of patient condition declining or worsening    Shift Goals  Clinical Goals: safety, reorientation  Patient Goals: d/c home  Family Goals: na    Progress made toward(s) clinical / shift goals:  reorient patient, tele sitter in place for safety     Patient is not progressing towards the following goals:

## 2022-01-04 NOTE — PROGRESS NOTES
Assumed care of pt. Bedside report received from Millie GIRON. Pt was updated on plan of care. Call light, phone and personal belongings in reach. Bed alarm on and working properly, bed in lowest position, and locked.

## 2022-01-04 NOTE — PROGRESS NOTES
"Pt is not oriented and does not have family at bedside. This RN called spouse Mercedes to go over discharge paperwork, Jazzy verbally stated \"she understands all instructions.\"     2 RN signed discharge paperwork w/ Mandy RN  "

## 2022-01-04 NOTE — PROGRESS NOTES
Pt A/Ox1, resting in bed. Pt is re-educated on use of call light, pt verbalized understanding and verbalized the need to call for help before getting out of the bed. Bilateral soft wrist restrains discontinued.

## 2022-01-04 NOTE — DISCHARGE INSTRUCTIONS
Discharge Instructions    Discharged to home by ambulance with self. Discharged via ambulance, hospital escort: Yes.  Special equipment needed: Not Applicable    Be sure to schedule a follow-up appointment with your primary care doctor or any specialists as instructed.     Discharge Plan:   Diet Plan: Discussed  Activity Level: Discussed  Confirmed Follow up Appointment: No (Comments) (pt home on hospice)  Confirmed Symptoms Management: Discussed  Medication Reconciliation Updated: Yes  Influenza Vaccine Indication: Patient Refuses    I understand that a diet low in cholesterol, fat, and sodium is recommended for good health. Unless I have been given specific instructions below for another diet, I accept this instruction as my diet prescription.   Other diet: Regular but soft and bite size to prevent chocking and aspiration     Special Instructions: None    · Is patient discharged on Warfarin / Coumadin?   No     Depression / Suicide Risk    As you are discharged from this [facility] facility, it is important to learn how to keep safe from harming yourself.    Recognize the warning signs:  · Abrupt changes in personality, positive or negative- including increase in energy   · Giving away possessions  · Change in eating patterns- significant weight changes-  positive or negative  · Change in sleeping patterns- unable to sleep or sleeping all the time   · Unwillingness or inability to communicate  · Depression  · Unusual sadness, discouragement and loneliness  · Talk of wanting to die  · Neglect of personal appearance   · Rebelliousness- reckless behavior  · Withdrawal from people/activities they love  · Confusion- inability to concentrate     If you or a loved one observes any of these behaviors or has concerns about self-harm, here's what you can do:  · Talk about it- your feelings and reasons for harming yourself  · Remove any means that you might use to hurt yourself (examples: pills, rope, extension cords,  firearm)  · Get professional help from the community (Mental Health, Substance Abuse, psychological counseling)  · Do not be alone:Call your Safe Contact- someone whom you trust who will be there for you.  · Call your local CRISIS HOTLINE 061-3256 or 887-034-6645  · Call your local Children's Mobile Crisis Response Team [place] (191) 163-8370 or www.Et3arraf  · Call the toll free National Suicide Prevention Hotlines   · National Suicide Prevention Lifeline 155-176-UIHE (3245)  · National Hope Line Network 800-SUICIDE (079-4850)

## 2022-01-04 NOTE — CARE PLAN
The patient is Stable - Low risk of patient condition declining or worsening    Shift Goals  Clinical Goals: safety, reorientation  Patient Goals: d/c home  Family Goals: na    Progress made toward(s) clinical / shift goals:      Patient is not progressing towards the following goals:      Problem: Knowledge Deficit - Standard  Goal: Patient and family/care givers will demonstrate understanding of plan of care, disease process/condition, diagnostic tests and medications  Outcome: Not Met    Problem: Fall Risk  Goal: Patient will remain free from falls  Outcome: Progressing     Problem: Safety - Medical Restraint  Goal: Remains free of injury from restraints (Restraint for Interference with Medical Device)  Outcome: Progressing

## 2022-01-04 NOTE — DISCHARGE SUMMARY
Discharge Summary    CHIEF COMPLAINT ON ADMISSION  Chief Complaint   Patient presents with   • Trauma Green       Reason for Admission  Trauma Green     Admission Date  12/28/2021    CODE STATUS  DNAR/DNI    HPI & HOSPITAL COURSE    89-year-old male with a history of hypertension, insulin-dependent type 2 diabetes, BPH with chronic Sher, HFpEF (EF 50%), multiple admissions in the last year in the setting of falls, hyperglycemia and UTIs.  Patient presents after another fall at home.   In the ED he had CT head and C-spine without acute findings. He as admitted for UTI and started on abx. His UCx grew   Citrobacter freundii and patient was transitioned to Bactrim.    Hospital course c/b hypokalemia, hypoglycemia (had normal cortisol) and confusion/delirium.  After multiple conversations with patient and his wife they decided to transition to hospice.      Therefore, he is discharged in fair and stable condition to hospice.    The patient met 2-midnight criteria for an inpatient stay at the time of discharge.    Discharge Date  1/4/22    FOLLOW UP ITEMS POST DISCHARGE  none    DISCHARGE DIAGNOSES  Principal Problem:    Syncope and collapse POA: Yes  Active Problems:    Debility POA: Yes    Acute UTI POA: Yes    Benign essential HTN POA: Yes    DM2 (diabetes mellitus, type 2) (HCC) POA: Yes    HLD (hyperlipidemia) POA: Yes    Benign prostatic hyperplasia with urinary obstruction POA: Unknown    Hypokalemia POA: Unknown    Dementia (HCC) POA: Unknown  Resolved Problems:    * No resolved hospital problems. *      FOLLOW UP  No future appointments.  No follow-up provider specified.    MEDICATIONS ON DISCHARGE     Medication List      START taking these medications      Instructions   acetaminophen 325 MG Tabs  Commonly known as: Tylenol   Take 2 Tablets by mouth every 6 hours as needed.  Dose: 650 mg     sulfamethoxazole-trimethoprim 800-160 MG tablet  Commonly known as: BACTRIM DS   Take 1 Tablet by mouth every 12 hours  for 2 days.  Dose: 1 Tablet        CHANGE how you take these medications      Instructions   Lantus SoloStar 100 UNIT/ML Sopn injection  What changed: how much to take  Generic drug: insulin glargine   Inject 10 Units under the skin every evening.  Dose: 10 Units     potassium chloride SA 20 MEQ Tbcr  What changed:   · medication strength  · how much to take  Commonly known as: Kdur   Take 2 Tablets by mouth every day.  Dose: 40 mEq        CONTINUE taking these medications      Instructions   amLODIPine 5 MG Tabs  Commonly known as: NORVASC   Take 5 mg by mouth every day.  Dose: 5 mg     gabapentin 100 MG Caps  Commonly known as: NEURONTIN   Take 100 mg by mouth 2 times a day.  Dose: 100 mg     levothyroxine 25 MCG Tabs  Commonly known as: SYNTHROID   Take 25 mcg by mouth every morning on an empty stomach.  Dose: 25 mcg     metoprolol tartrate 25 MG Tabs  Commonly known as: LOPRESSOR   Take 12.5 mg by mouth 2 times a day.  Dose: 12.5 mg     Retaine HPMC 0.3 % Soln  Generic drug: Hypromellose (PF)   Administer 1 Drop into affected eye(s) every 2 hours while awake.  Dose: 1 Drop     simvastatin 10 MG Tabs  Commonly known as: ZOCOR   Take 10 mg by mouth every evening.  Dose: 10 mg        STOP taking these medications    chlorthalidone 25 MG Tabs  Commonly known as: HYGROTON     finasteride 5 MG Tabs  Commonly known as: PROSCAR     furosemide 20 MG Tabs  Commonly known as: LASIX     tamsulosin 0.4 MG capsule  Commonly known as: FLOMAX            Allergies  Allergies   Allergen Reactions   • Iodine        DIET  Orders Placed This Encounter   Procedures   • Diet Order Diet: Level 6 - Soft and Bite Sized; Liquid level: Level 0 - Thin; Tray Modifications (optional): SLP - 1:1 Supervision by Nursing, SLP - Deliver to Nursing Station     Standing Status:   Standing     Number of Occurrences:   1     Order Specific Question:   Diet:     Answer:   Level 6 - Soft and Bite Sized [23]     Order Specific Question:   Liquid level      Answer:   Level 0 - Thin     Order Specific Question:   Tray Modifications (optional)     Answer:   SLP - 1:1 Supervision by Nursing     Order Specific Question:   Tray Modifications (optional)     Answer:   SLP - Deliver to Nursing Station       ACTIVITY  As tolerated.  Weight bearing as tolerated    CONSULTATIONS  none    PROCEDURES  none    LABORATORY  Lab Results   Component Value Date    SODIUM 142 01/04/2022    POTASSIUM 4.5 01/04/2022    CHLORIDE 107 01/04/2022    CO2 19 (L) 01/04/2022    GLUCOSE 116 (H) 01/04/2022    BUN 19 01/04/2022    CREATININE 0.95 01/04/2022        Lab Results   Component Value Date    WBC 9.3 01/01/2022    HEMOGLOBIN 14.3 01/01/2022    HEMATOCRIT 42.9 01/01/2022    PLATELETCT 351 01/01/2022        Total time of the discharge process exceeds 31 minutes.

## 2022-01-04 NOTE — DISCHARGE PLANNING
Anticipated Discharge Disposition: Home with [facility]     Action: pt currently on 0 liters O2, 6 clicks are 19/13. Orientation: A&Ox2 (not time, not situation).   [facility] transportation set up for 1130 this morning to home.      Barriers to Discharge: Will need a DNR/DNI script prior to discharge from MD.     Plan: f/u with pt and medical team to discuss dc needs and barriers.

## 2022-01-04 NOTE — DISCHARGE PLANNING
DC Transport Scheduled    Received request at: [id number]    Transport Company Scheduled: [facility]  Spoke with Bárbara  at [facility] to schedule transport.      Scheduled Date: 01/04/22(Tuesday)  Scheduled Time: 1130    Destination: Home  [id number] [place]    Notified care team of scheduled transport via Vo[employer].     If there are any changes needed to the DC transportation scheduled, please contact [facility] at ext. [phone] between the hours of 4626-8154 Mon-Fri. If outside those hours, contact the ED Case Manager at ext. [phone].

## 2022-01-04 NOTE — PROGRESS NOTES
Report received. Patient oriented x2. Pain level 0 out of 10. Denies SOB.  Fall risk interventions in place, bed in low position, repositioned, and bed alarm on.tele sitter in place  Assessment completed.

## 2022-01-04 NOTE — DISCHARGE PLANNING
CM called wife at home about  by [facility] at 1130. She is aware and agreeable with the discharge plan. IMM discussed with wife and she did not want to appeal. Pt will be under the auspices of [facility].     Care Transition Team Assessment    Information Source  Orientation Level: Oriented to place,Oriented to person,Disoriented to situation,Disoriented to time,Other (Comment) (pt new he was in hospital but thought in [place])  Information Given By: Spouse  Informant's Name: Mercedes  Who is responsible for making decisions for patient? : Spouse    Elopement Risk  Legal Hold: No  Ambulatory or Self Mobile in Wheelchair: Yes  Disoriented: Time-At Risk for Elopement,Situation-At Risk for Elopement  Psychiatric Symptoms: Impulsivity-at Risk for Elopement  History of Wandering: No  Elopement this Admit: No  Vocalizing Wanting to Leave: No  Displays Behaviors, Body Language Wanting to Leave: No-Not at Risk for Elopement  Elopement Risk: Not at Risk for Elopement    Interdisciplinary Discharge Planning  Does Admitting Nurse Feel This Could be a Complex Discharge?: No  Lives with - Patient's Self Care Capacity: Spouse  Patient or legal guardian wants to designate a caregiver: Yes  Caregiver name: Mercedes Haney  Support Systems: Spouse / Significant Other  Housing / Facility: [address]  Do You Take your Prescribed Medications Regularly: Yes  Prior Services: None  Patient Prefers to be Discharged to:: Hospice    Discharge Preparedness  What is your plan after discharge?: Other (comment) (Home with Hospice)    Finances  Financial Barriers to Discharge: No  Prescription Coverage: Yes    Vision / Hearing Impairment  Vision Impairment : Yes  Right Eye Vision: Impaired  Left Eye Vision: Impaired  Hearing Impairment : Yes  Hearing Impairment: Both Ears,Hearing Device Not Available  Does Pt Need Special Equipment for the Hearing Impaired?: No    Domestic Abuse  Have you ever been the victim of abuse or violence?:  No  Physical Abuse or Sexual Abuse: No  Verbal Abuse or Emotional Abuse: No  Possible Abuse/Neglect Reported to:: Not Applicable    Anticipated Discharge Information  Discharge Disposition: D/T to hospice home (50)

## 2022-02-04 ENCOUNTER — APPOINTMENT (OUTPATIENT)
Dept: RADIOLOGY | Facility: MEDICAL CENTER | Age: 87
End: 2022-02-04
Attending: EMERGENCY MEDICINE
Payer: OTHER GOVERNMENT

## 2022-02-04 ENCOUNTER — HOSPITAL ENCOUNTER (EMERGENCY)
Facility: MEDICAL CENTER | Age: 87
End: 2022-02-05
Attending: EMERGENCY MEDICINE
Payer: OTHER GOVERNMENT

## 2022-02-04 DIAGNOSIS — E87.6 HYPOKALEMIA: ICD-10-CM

## 2022-02-04 DIAGNOSIS — R53.1 WEAKNESS: ICD-10-CM

## 2022-02-04 DIAGNOSIS — E11.9 TYPE 2 DIABETES MELLITUS WITHOUT COMPLICATION, UNSPECIFIED WHETHER LONG TERM INSULIN USE (HCC): ICD-10-CM
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
## 2022-02-04 DIAGNOSIS — I10 HYPERTENSION, UNSPECIFIED TYPE: ICD-10-CM

## 2022-02-04 PROBLEM — G30.9 ALZHEIMER'S DEMENTIA WITHOUT BEHAVIORAL DISTURBANCE (HCC): Chronic | Status: ACTIVE | Noted: 2022-02-04
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
## 2022-02-04 PROBLEM — F03.90 DEMENTIA (HCC): Status: ACTIVE | Noted: 2022-02-04

## 2022-02-04 PROBLEM — F02.80 ALZHEIMER'S DEMENTIA WITHOUT BEHAVIORAL DISTURBANCE (HCC): Chronic | Status: ACTIVE | Noted: 2022-02-04

## 2022-02-04 LAB
ALBUMIN SERPL BCP-MCNC: 3.7 G/DL (ref 3.2–4.9)
ALBUMIN/GLOB SERPL: 1.1 G/DL
ALP SERPL-CCNC: 132 U/L (ref 30–99)
ALT SERPL-CCNC: 12 U/L (ref 2–50)
ANION GAP SERPL CALC-SCNC: 12 MMOL/L (ref 7–16)
APPEARANCE UR: CLEAR
AST SERPL-CCNC: 11 U/L (ref 12–45)
BACTERIA #/AREA URNS HPF: ABNORMAL /HPF
BASOPHILS # BLD AUTO: 0.5 % (ref 0–1.8)
BASOPHILS # BLD: 0.04 K/UL (ref 0–0.12)
BILIRUB SERPL-MCNC: 0.5 MG/DL (ref 0.1–1.5)
BILIRUB UR QL STRIP.AUTO: NEGATIVE
BUN SERPL-MCNC: 11 MG/DL (ref 8–22)
CALCIUM SERPL-MCNC: 8.6 MG/DL (ref 8.4–10.2)
CHLORIDE SERPL-SCNC: 100 MMOL/L (ref 96–112)
CO2 SERPL-SCNC: 25 MMOL/L (ref 20–33)
COLOR UR: YELLOW
CREAT SERPL-MCNC: 0.55 MG/DL (ref 0.5–1.4)
EKG IMPRESSION: NORMAL
EOSINOPHIL # BLD AUTO: 0.13 K/UL (ref 0–0.51)
EOSINOPHIL NFR BLD: 1.7 % (ref 0–6.9)
EPI CELLS #/AREA URNS HPF: ABNORMAL /HPF
ERYTHROCYTE [DISTWIDTH] IN BLOOD BY AUTOMATED COUNT: 48.7 FL (ref 35.9–50)
FLUAV RNA SPEC QL NAA+PROBE: NEGATIVE
FLUBV RNA SPEC QL NAA+PROBE: NEGATIVE
GLOBULIN SER CALC-MCNC: 3.3 G/DL (ref 1.9–3.5)
GLUCOSE BLD-MCNC: 375 MG/DL (ref 65–99)
GLUCOSE BLD-MCNC: 397 MG/DL (ref 65–99)
GLUCOSE SERPL-MCNC: 273 MG/DL (ref 65–99)
GLUCOSE UR STRIP.AUTO-MCNC: 500 MG/DL
HCT VFR BLD AUTO: 39.9 % (ref 42–52)
HGB BLD-MCNC: 13.2 G/DL (ref 14–18)
IMM GRANULOCYTES # BLD AUTO: 0.03 K/UL (ref 0–0.11)
IMM GRANULOCYTES NFR BLD AUTO: 0.4 % (ref 0–0.9)
KETONES UR STRIP.AUTO-MCNC: NEGATIVE MG/DL
LEUKOCYTE ESTERASE UR QL STRIP.AUTO: ABNORMAL
LYMPHOCYTES # BLD AUTO: 1.63 K/UL (ref 1–4.8)
LYMPHOCYTES NFR BLD: 21.7 % (ref 22–41)
MAGNESIUM SERPL-MCNC: 1.9 MG/DL (ref 1.5–2.5)
MCH RBC QN AUTO: 29.2 PG (ref 27–33)
MCHC RBC AUTO-ENTMCNC: 33.1 G/DL (ref 33.7–35.3)
MCV RBC AUTO: 88.3 FL (ref 81.4–97.8)
MICRO URNS: ABNORMAL
MONOCYTES # BLD AUTO: 0.69 K/UL (ref 0–0.85)
MONOCYTES NFR BLD AUTO: 9.2 % (ref 0–13.4)
MUCOUS THREADS #/AREA URNS HPF: ABNORMAL /HPF
NEUTROPHILS # BLD AUTO: 4.99 K/UL (ref 1.82–7.42)
NEUTROPHILS NFR BLD: 66.5 % (ref 44–72)
NITRITE UR QL STRIP.AUTO: NEGATIVE
NRBC # BLD AUTO: 0 K/UL
NRBC BLD-RTO: 0 /100 WBC
PH UR STRIP.AUTO: 6.5 [PH] (ref 5–8)
PLATELET # BLD AUTO: 261 K/UL (ref 164–446)
PMV BLD AUTO: 10 FL (ref 9–12.9)
POTASSIUM SERPL-SCNC: 2.8 MMOL/L (ref 3.6–5.5)
PROT SERPL-MCNC: 7 G/DL (ref 6–8.2)
PROT UR QL STRIP: NEGATIVE MG/DL
RBC # BLD AUTO: 4.52 M/UL (ref 4.7–6.1)
RBC # URNS HPF: ABNORMAL /HPF
RBC UR QL AUTO: ABNORMAL
RSV RNA SPEC QL NAA+PROBE: NEGATIVE
SARS-COV-2 RNA RESP QL NAA+PROBE: NOTDETECTED
SODIUM SERPL-SCNC: 137 MMOL/L (ref 135–145)
SP GR UR STRIP.AUTO: 1.01
SPECIMEN SOURCE: NORMAL
WBC # BLD AUTO: 7.5 K/UL (ref 4.8–10.8)
WBC #/AREA URNS HPF: ABNORMAL /HPF

## 2022-02-04 PROCEDURE — 99284 EMERGENCY DEPT VISIT MOD MDM: CPT | Mod: GW | Performed by: INTERNAL MEDICINE

## 2022-02-04 PROCEDURE — 700102 HCHG RX REV CODE 250 W/ 637 OVERRIDE(OP): Performed by: INTERNAL MEDICINE

## 2022-02-04 PROCEDURE — 700111 HCHG RX REV CODE 636 W/ 250 OVERRIDE (IP): Performed by: EMERGENCY MEDICINE

## 2022-02-04 PROCEDURE — 93005 ELECTROCARDIOGRAM TRACING: CPT | Performed by: EMERGENCY MEDICINE

## 2022-02-04 PROCEDURE — A9270 NON-COVERED ITEM OR SERVICE: HCPCS | Performed by: EMERGENCY MEDICINE

## 2022-02-04 PROCEDURE — C9803 HOPD COVID-19 SPEC COLLECT: HCPCS | Performed by: EMERGENCY MEDICINE

## 2022-02-04 PROCEDURE — 99285 EMERGENCY DEPT VISIT HI MDM: CPT

## 2022-02-04 PROCEDURE — 86480 TB TEST CELL IMMUN MEASURE: CPT

## 2022-02-04 PROCEDURE — 97162 PT EVAL MOD COMPLEX 30 MIN: CPT

## 2022-02-04 PROCEDURE — 83735 ASSAY OF MAGNESIUM: CPT

## 2022-02-04 PROCEDURE — 85025 COMPLETE CBC W/AUTO DIFF WBC: CPT

## 2022-02-04 PROCEDURE — 0241U HCHG SARS-COV-2 COVID-19 NFCT DS RESP RNA 4 TRGT MIC: CPT

## 2022-02-04 PROCEDURE — 96375 TX/PRO/DX INJ NEW DRUG ADDON: CPT

## 2022-02-04 PROCEDURE — 97166 OT EVAL MOD COMPLEX 45 MIN: CPT

## 2022-02-04 PROCEDURE — 73562 X-RAY EXAM OF KNEE 3: CPT | Mod: LT

## 2022-02-04 PROCEDURE — A9270 NON-COVERED ITEM OR SERVICE: HCPCS | Performed by: INTERNAL MEDICINE

## 2022-02-04 PROCEDURE — 96365 THER/PROPH/DIAG IV INF INIT: CPT

## 2022-02-04 PROCEDURE — 96372 THER/PROPH/DIAG INJ SC/IM: CPT

## 2022-02-04 PROCEDURE — 82962 GLUCOSE BLOOD TEST: CPT

## 2022-02-04 PROCEDURE — 80053 COMPREHEN METABOLIC PANEL: CPT

## 2022-02-04 PROCEDURE — 73110 X-RAY EXAM OF WRIST: CPT | Mod: LT

## 2022-02-04 PROCEDURE — 700102 HCHG RX REV CODE 250 W/ 637 OVERRIDE(OP): Performed by: EMERGENCY MEDICINE

## 2022-02-04 PROCEDURE — 81001 URINALYSIS AUTO W/SCOPE: CPT

## 2022-02-04 PROCEDURE — 36415 COLL VENOUS BLD VENIPUNCTURE: CPT

## 2022-02-04 RX ORDER — POLYETHYLENE GLYCOL 3350 17 G/17G
1 POWDER, FOR SOLUTION ORAL
Status: DISCONTINUED | OUTPATIENT
Start: 2022-02-04 | End: 2022-02-05 | Stop reason: HOSPADM

## 2022-02-04 RX ORDER — LEVOTHYROXINE SODIUM 0.05 MG/1
25 TABLET ORAL
Status: DISCONTINUED | OUTPATIENT
Start: 2022-02-05 | End: 2022-02-05 | Stop reason: HOSPADM

## 2022-02-04 RX ORDER — LORAZEPAM 2 MG/ML
0.5 INJECTION INTRAMUSCULAR ONCE
Status: COMPLETED | OUTPATIENT
Start: 2022-02-04 | End: 2022-02-04

## 2022-02-04 RX ORDER — AMLODIPINE BESYLATE 5 MG/1
5 TABLET ORAL DAILY
Status: DISCONTINUED | OUTPATIENT
Start: 2022-02-05 | End: 2022-02-05 | Stop reason: HOSPADM

## 2022-02-04 RX ORDER — AMOXICILLIN 250 MG
2 CAPSULE ORAL 2 TIMES DAILY
Status: DISCONTINUED | OUTPATIENT
Start: 2022-02-05 | End: 2022-02-05 | Stop reason: HOSPADM

## 2022-02-04 RX ORDER — CARBOXYMETHYLCELLULOSE SODIUM 5 MG/ML
1 SOLUTION/ DROPS OPHTHALMIC EVERY EVENING
Status: DISCONTINUED | OUTPATIENT
Start: 2022-02-04 | End: 2022-02-04

## 2022-02-04 RX ORDER — GABAPENTIN 100 MG/1
200 CAPSULE ORAL
Status: DISCONTINUED | OUTPATIENT
Start: 2022-02-04 | End: 2022-02-05 | Stop reason: HOSPADM

## 2022-02-04 RX ORDER — ACETAMINOPHEN 325 MG/1
650 TABLET ORAL EVERY 6 HOURS PRN
Status: DISCONTINUED | OUTPATIENT
Start: 2022-02-04 | End: 2022-02-05 | Stop reason: HOSPADM

## 2022-02-04 RX ORDER — GABAPENTIN 100 MG/1
200 CAPSULE ORAL
COMMUNITY

## 2022-02-04 RX ORDER — ONDANSETRON 4 MG/1
4 TABLET, ORALLY DISINTEGRATING ORAL EVERY 4 HOURS PRN
Status: DISCONTINUED | OUTPATIENT
Start: 2022-02-04 | End: 2022-02-05 | Stop reason: HOSPADM

## 2022-02-04 RX ORDER — POLYVINYL ALCOHOL 14 MG/ML
1 SOLUTION/ DROPS OPHTHALMIC
Status: DISCONTINUED | OUTPATIENT
Start: 2022-02-04 | End: 2022-02-05 | Stop reason: HOSPADM

## 2022-02-04 RX ORDER — DEXTROSE MONOHYDRATE 25 G/50ML
50 INJECTION, SOLUTION INTRAVENOUS
Status: DISCONTINUED | OUTPATIENT
Start: 2022-02-04 | End: 2022-02-05 | Stop reason: HOSPADM

## 2022-02-04 RX ORDER — LORAZEPAM 1 MG/1
0.25 TABLET ORAL ONCE
Status: COMPLETED | OUTPATIENT
Start: 2022-02-04 | End: 2022-02-04

## 2022-02-04 RX ORDER — POTASSIUM CHLORIDE 20 MEQ/1
40 TABLET, EXTENDED RELEASE ORAL ONCE
Status: COMPLETED | OUTPATIENT
Start: 2022-02-04 | End: 2022-02-04

## 2022-02-04 RX ORDER — LABETALOL HYDROCHLORIDE 5 MG/ML
10 INJECTION, SOLUTION INTRAVENOUS EVERY 4 HOURS PRN
Status: DISCONTINUED | OUTPATIENT
Start: 2022-02-04 | End: 2022-02-05 | Stop reason: HOSPADM

## 2022-02-04 RX ORDER — HEPARIN SODIUM 5000 [USP'U]/ML
5000 INJECTION, SOLUTION INTRAVENOUS; SUBCUTANEOUS EVERY 8 HOURS
Status: DISCONTINUED | OUTPATIENT
Start: 2022-02-04 | End: 2022-02-05 | Stop reason: HOSPADM

## 2022-02-04 RX ORDER — ONDANSETRON 2 MG/ML
4 INJECTION INTRAMUSCULAR; INTRAVENOUS EVERY 4 HOURS PRN
Status: DISCONTINUED | OUTPATIENT
Start: 2022-02-04 | End: 2022-02-05 | Stop reason: HOSPADM

## 2022-02-04 RX ORDER — POTASSIUM CHLORIDE 7.45 MG/ML
10 INJECTION INTRAVENOUS ONCE
Status: COMPLETED | OUTPATIENT
Start: 2022-02-04 | End: 2022-02-04

## 2022-02-04 RX ORDER — BISACODYL 10 MG
10 SUPPOSITORY, RECTAL RECTAL
Status: DISCONTINUED | OUTPATIENT
Start: 2022-02-04 | End: 2022-02-05 | Stop reason: HOSPADM

## 2022-02-04 RX ORDER — LORAZEPAM 0.5 MG/1
0.5 TABLET ORAL EVERY 4 HOURS PRN
COMMUNITY

## 2022-02-04 RX ADMIN — INSULIN HUMAN 5 UNITS: 100 INJECTION, SOLUTION PARENTERAL at 21:10

## 2022-02-04 RX ADMIN — LORAZEPAM 0.5 MG: 2 INJECTION INTRAMUSCULAR; INTRAVENOUS at 20:45

## 2022-02-04 RX ADMIN — POTASSIUM CHLORIDE 10 MEQ: 10 INJECTION, SOLUTION INTRAVENOUS at 10:24

## 2022-02-04 RX ADMIN — INSULIN GLARGINE 10 UNITS: 100 INJECTION, SOLUTION SUBCUTANEOUS at 18:35

## 2022-02-04 RX ADMIN — INSULIN HUMAN 5 UNITS: 100 INJECTION, SOLUTION PARENTERAL at 18:34

## 2022-02-04 RX ADMIN — POTASSIUM CHLORIDE 40 MEQ: 1500 TABLET, EXTENDED RELEASE ORAL at 10:24

## 2022-02-04 RX ADMIN — METOPROLOL TARTRATE 12.5 MG: 25 TABLET, FILM COATED ORAL at 18:34

## 2022-02-04 RX ADMIN — GABAPENTIN 200 MG: 100 CAPSULE ORAL at 20:10

## 2022-02-04 ASSESSMENT — ENCOUNTER SYMPTOMS
PALPITATIONS: 0
DIZZINESS: 0
BLURRED VISION: 0
ABDOMINAL PAIN: 0
INSOMNIA: 0
NAUSEA: 0
CHILLS: 0
BACK PAIN: 0
HEADACHES: 0
NERVOUS/ANXIOUS: 0
WEAKNESS: 1
VOMITING: 0
FEVER: 0
COUGH: 0
EYE PAIN: 0
SHORTNESS OF BREATH: 0

## 2022-02-04 ASSESSMENT — COGNITIVE AND FUNCTIONAL STATUS - GENERAL
MOVING FROM LYING ON BACK TO SITTING ON SIDE OF FLAT BED: A LITTLE
DAILY ACTIVITIY SCORE: 16
MOBILITY SCORE: 15
WALKING IN HOSPITAL ROOM: A LITTLE
MOVING TO AND FROM BED TO CHAIR: A LOT
HELP NEEDED FOR BATHING: A LOT
SUGGESTED CMS G CODE MODIFIER DAILY ACTIVITY: CK
PERSONAL GROOMING: A LITTLE
DRESSING REGULAR LOWER BODY CLOTHING: A LOT
CLIMB 3 TO 5 STEPS WITH RAILING: TOTAL
DRESSING REGULAR UPPER BODY CLOTHING: A LITTLE
TURNING FROM BACK TO SIDE WHILE IN FLAT BAD: A LITTLE
TOILETING: A LITTLE
SUGGESTED CMS G CODE MODIFIER MOBILITY: CK
STANDING UP FROM CHAIR USING ARMS: A LITTLE
EATING MEALS: A LITTLE

## 2022-02-04 ASSESSMENT — GAIT ASSESSMENTS
DEVIATION: STEP TO;DECREASED BASE OF SUPPORT;BRADYKINETIC
GAIT LEVEL OF ASSIST: MINIMAL ASSIST
DISTANCE (FEET): 50
ASSISTIVE DEVICE: FRONT WHEEL WALKER

## 2022-02-04 ASSESSMENT — ACTIVITIES OF DAILY LIVING (ADL): TOILETING: REQUIRES ASSIST

## 2022-02-04 ASSESSMENT — FIBROSIS 4 INDEX: FIB4 SCORE: 1.12

## 2022-02-04 NOTE — DISCHARGE PLANNING
Anticipated Discharge Disposition:  Home with Hospice. Hospice GIP. Group home placement.  Vs SNF.     Action: Call from Lisy at [facility]. Pushed her to call [facility] and get benefits in place. They will assist with placement. They did some limit setting. They have adjusted meds already. They did haldol, increased depakote 500 mg qhs and 250 mg x2 in day, he has ativan prn for behaviors and sleep. She wants him independent but to sleep. She declined  [facility] CNA qd yesterday.  ER CM requested to send to  fax for [place] [facility]  Send over Group home list  Attn Santos. Done, also included , Attendant, SR service resources list and memory units    Barriers to Discharge: Placement    Plan: Will cont to follow.

## 2022-02-04 NOTE — ED PROVIDER NOTES
ED Provider Note    ER PROVIDER NOTE          CHIEF COMPLAINT  Chief Complaint   Patient presents with   • GLF     pt BIB [facility] c/o multiple falls and increased weakness. per wife who cannot take care of him, would like to have pt placed in a SNF. pt is also in hospice care. per wife, pt tripped and fell. No LOC.    • Failure to Thrive       HPI  Julien Dao is a 89 y.o. male who presents to the emergency department complaining of generalized weakness, as well as increased falls, followed by a fall last night resulting in pain to his left wrist.  Patient as well as wife, telephone, reports that he is continued to have worsening weakness over the last several weeks as well as multiple falls.  He reports he was feeling weak last night, tripped and fell, landing on his left wrist and has pain to that area.  He does report pain to his left knee as well, as well as longstanding back pain with no new pain to that area.  He reports no headache or neck pain, no chest pain or abdominal pain.  No fevers or chills.  No focal weakness or numbness.      Patient is on hospice and with his increased falls and weakness at home, his wife is unable to get him off the floor and care for him    REVIEW OF SYSTEMS  Pertinent positives include weakness. Pertinent negatives include no fever or chills. See HPI for details. All other systems reviewed and are negative.    PAST MEDICAL HISTORY   has a past medical history of KOSTAS (acute kidney injury) ([facility]) (6/14/2021), CAD (coronary artery disease), Delirium (2/21/2020), DM (diabetes mellitus) ([facility]) (1/7/2014), Falls, Hypertension, and UTI (urinary tract infection) (11/4/2018).    SURGICAL HISTORY   has a past surgical history that includes other cardiac surgery; coronary artery bypass, 3; and janice rectal abscess.    FAMILY HISTORY  History reviewed. No pertinent family history.   Patient reports family history of cardiac disease    SOCIAL HISTORY  Social History     Socioeconomic History   •  Marital status:      Spouse name: Not on file   • Number of children: Not on file   • Years of education: Not on file   • Highest education level: Not on file   Occupational History   • Not on file   Tobacco Use   • Smoking status: Former Smoker     Quit date: 10/19/1972     Years since quittin.3   • Smokeless tobacco: Never Used   Vaping Use   • Vaping Use: Never used   Substance and Sexual Activity   • Alcohol use: Not Currently   • Drug use: No   • Sexual activity: Not on file   Other Topics Concern   • Not on file   Social History Narrative   • Not on file     Social Determinants of Health     Financial Resource Strain:    • Difficulty of Paying Living Expenses: Not on file   Food Insecurity:    • Worried About Running Out of Food in the Last Year: Not on file   • Ran Out of Food in the Last Year: Not on file   Transportation Needs:    • Lack of Transportation (Medical): Not on file   • Lack of Transportation (Non-Medical): Not on file   Physical Activity:    • Days of Exercise per Week: Not on file   • Minutes of Exercise per Session: Not on file   Stress:    • Feeling of Stress : Not on file   Social Connections:    • Frequency of Communication with Friends and Family: Not on file   • Frequency of Social Gatherings with Friends and Family: Not on file   • Attends Zoroastrianism Services: Not on file   • Active Member of Clubs or Organizations: Not on file   • Attends Club or Organization Meetings: Not on file   • Marital Status: Not on file   Intimate Partner Violence:    • Fear of Current or Ex-Partner: Not on file   • Emotionally Abused: Not on file   • Physically Abused: Not on file   • Sexually Abused: Not on file   Housing Stability:    • Unable to Pay for Housing in the Last Year: Not on file   • Number of Places Lived in the Last Year: Not on file   • Unstable Housing in the Last Year: Not on file      Social History     Substance and Sexual Activity   Drug Use No       CURRENT MEDICATIONS  Home  Medications     Reviewed by Anjum Cisneros (Pharmacy Tech) on 02/04/22 at 0842  Med List Status: Complete   Medication Last Dose Status   amLODIPine (NORVASC) 5 MG Tab 2/3/2022 Active   Carboxymethylcellulose Sodium (RETAINE CMC OP) > 2 WEEK AGO Active   cyclosporin (RESTASIS) 0.05 % ophthalmic emulsion > 2 WEEKS AGO Active   gabapentin (NEURONTIN) 100 MG Cap 2/3/2022 Active   insulin glargine (INSULIN GLARGINE) 100 UNIT/ML Solution Pen-injector injection 2/3/2022 Active   levothyroxine (SYNTHROID) 25 MCG Tab 2/3/2022 Active   LORazepam (ATIVAN) 0.5 MG Tab 2/4/2022 Active   metoprolol tartrate (LOPRESSOR) 25 MG Tab 2/3/2022 Active   Specialty Vitamins Products (PROSTATE PO) 2/3/2022 Active                ALLERGIES  Allergies   Allergen Reactions   • Iodine Anaphylaxis     Pt and pts wife report that it has been so long not sure what happens       PHYSICAL EXAM  VITAL SIGNS: /73   Pulse 63   Temp 36.8 °C (98.2 °F) (Temporal)   Resp 18   Wt 73.5 kg (162 lb)   SpO2 92%   BMI 25.37 kg/m²   Pulse ox interpretation: I interpret this pulse ox as normal.    Constitutional: Sleepy but easily arousable to verbal stimuli chronically ill-appearing  HENT: No signs of trauma, Bilateral external ears normal, Nose normal.   Eyes: Pupils are equal and reactive, Conjunctiva normal, Non-icteric.   Neck: Normal range of motion, No tenderness, Supple, No stridor.   Cardiovascular: Regular rate and rhythm, no murmurs.   Thorax & Lungs: Normal breath sounds, No respiratory distress, No wheezing, No chest tenderness.   Abdomen: Bowel sounds normal, Soft, No tenderness, No masses, No pulsatile masses. No peritoneal signs.  Sher catheter in place  Skin: Warm, Dry, No erythema, No rash.   Back: No bony tenderness, No CVA tenderness.   Extremities: Intact distal pulses, No edema, No tenderness, No cyanosis, Negative Patrice's sign.  Musculoskeletal: Tenderness without deformity noted over the dorsum of the left wrist, tenderness  over the left knee without deformity, no erythema or swelling, otherwise good range of motion in all major joints. No tenderness to palpation or major deformities noted.   Neurologic: Sleepy but easily arousable to verbal stimuli, there is no drift, equal  strength, no drift with lower extremities normal motor function, Normal sensory function, No focal deficits noted.   Psychiatric: Affect normal, Judgment normal, Mood normal.     DIAGNOSTIC STUDIES / PROCEDURES    EKG  Results for orders placed or performed during the hospital encounter of 02/04/22   CBC WITH DIFFERENTIAL   Result Value Ref Range    WBC 7.5 4.8 - 10.8 K/uL    RBC 4.52 (L) 4.70 - 6.10 M/uL    Hemoglobin 13.2 (L) 14.0 - 18.0 g/dL    Hematocrit 39.9 (L) 42.0 - 52.0 %    MCV 88.3 81.4 - 97.8 fL    MCH 29.2 27.0 - 33.0 pg    MCHC 33.1 (L) 33.7 - 35.3 g/dL    RDW 48.7 35.9 - 50.0 fL    Platelet Count 261 164 - 446 K/uL    MPV 10.0 9.0 - 12.9 fL    Neutrophils-Polys 66.50 44.00 - 72.00 %    Lymphocytes 21.70 (L) 22.00 - 41.00 %    Monocytes 9.20 0.00 - 13.40 %    Eosinophils 1.70 0.00 - 6.90 %    Basophils 0.50 0.00 - 1.80 %    Immature Granulocytes 0.40 0.00 - 0.90 %    Nucleated RBC 0.00 /100 WBC    Neutrophils (Absolute) 4.99 1.82 - 7.42 K/uL    Lymphs (Absolute) 1.63 1.00 - 4.80 K/uL    Monos (Absolute) 0.69 0.00 - 0.85 K/uL    Eos (Absolute) 0.13 0.00 - 0.51 K/uL    Baso (Absolute) 0.04 0.00 - 0.12 K/uL    Immature Granulocytes (abs) 0.03 0.00 - 0.11 K/uL    NRBC (Absolute) 0.00 K/uL   COMP METABOLIC PANEL   Result Value Ref Range    Sodium 137 135 - 145 mmol/L    Potassium 2.8 (L) 3.6 - 5.5 mmol/L    Chloride 100 96 - 112 mmol/L    Co2 25 20 - 33 mmol/L    Anion Gap 12.0 7.0 - 16.0    Glucose 273 (H) 65 - 99 mg/dL    Bun 11 8 - 22 mg/dL    Creatinine 0.55 0.50 - 1.40 mg/dL    Calcium 8.6 8.4 - 10.2 mg/dL    AST(SGOT) 11 (L) 12 - 45 U/L    ALT(SGPT) 12 2 - 50 U/L    Alkaline Phosphatase 132 (H) 30 - 99 U/L    Total Bilirubin 0.5 0.1 - 1.5 mg/dL     Albumin 3.7 3.2 - 4.9 g/dL    Total Protein 7.0 6.0 - 8.2 g/dL    Globulin 3.3 1.9 - 3.5 g/dL    A-G Ratio 1.1 g/dL   URINALYSIS    Specimen: Urine   Result Value Ref Range    Color Yellow     Character Clear     Specific Gravity 1.010 <1.035    Ph 6.5 5.0 - 8.0    Glucose 500 (A) Negative mg/dL    Ketones Negative Negative mg/dL    Protein Negative Negative mg/dL    Bilirubin Negative Negative    Nitrite Negative Negative    Leukocyte Esterase Moderate (A) Negative    Occult Blood Small (A) Negative    Micro Urine Req Microscopic    MAGNESIUM   Result Value Ref Range    Magnesium 1.9 1.5 - 2.5 mg/dL   ESTIMATED GFR   Result Value Ref Range    GFR If African American >60 >60 mL/min/1.73 m 2    GFR If Non African American >60 >60 mL/min/1.73 m 2   COV-2, FLU A/B, AND RSV BY PCR (2-4 HOURS CEPHEID): Collect NP swab in [facility]    Specimen: Respirate   Result Value Ref Range    Influenza virus A RNA Negative Negative    Influenza virus B, PCR Negative Negative    RSV, PCR Negative Negative    SARS-CoV-2 by PCR NotDetected     SARS-CoV-2 Source NP Swab    URINE MICROSCOPIC (W/UA)   Result Value Ref Range    WBC 20-50 (A) /hpf    RBC 2-5 (A) /hpf    Bacteria Rare (A) None /hpf    Epithelial Cells Rare Few /hpf    Mucous Threads Few /hpf   EKG (NOW)   Result Value Ref Range    Report       [facility]    Test Date:  2022  Pt Name:    SAMMY CLIFFORD                 Department: [facility]  MRN:        [id number]                      [facility]   Gender:     Male                         Technician: [id number]  :        [id number]                   Requested By:SAMMY RM  Order #:    [id number]                    Reading MD: SAMMY RM MD    Measurements  Intervals                                Axis  Rate:       71                           P:          -64  KY:         195                          QRS:        -71  QRSD:       165                          T:          19  QT:          485  QTc:        528    Interpretive Statements  Sinus rhythm  RBBB and LAFB  Compared to ECG 10/21/2021 11:28:59  Ectopic atrial rhythm now present  no change  Electronically Signed On 2-4-2022 10:17:27 PST by SAMMY RM MD           RADIOLOGY  DX-WRIST-COMPLETE 3+ LEFT   Final Result      Mild osteoarthritis. No evidence of fracture.         DX-KNEE 3 VIEWS LEFT   Final Result      1.  No evidence of acute fracture or dislocation.      2.  Mild tricompartment degenerative change.        The radiologist's interpretation of all radiological studies have been reviewed and images independently viewed by me.    COURSE & MEDICAL DECISION MAKING  Nursing notes, VS, PMSFHx reviewed in chart.    8:06 AM Patient seen and examined at bedside. Ordered for x-rays, CBC, CMP, urinalysis, ECG to evaluate his symptoms.     Patient noted to be hypokalemic, ordered for 10 mEq IV as well as 40 oral    9:50 AM  Patient is placed in the ED observation at 9:50 AM on 2/4/2022 for continued treatment of his hypokalemia as well as long-term placement in hospice care/SNF    10:18 AM  Patient is reevaluated, is awake, alert, requesting breakfast which he will be given    I discussed the case with case management, they have contacted the patient's wife, and will work on potential placement options.  I have ordered for PT and OT evaluation as well as COVID19 and TB testing    Hospitalist is consulted as well     1:19 PM  I discussed the case again with case management.  At this point, patient is having PT and OT evaluation determine need for skilled nursing facility otherwise may need to go to a group home      Patient's care will be signed over to Dr. Drake pending further placement evaluation        Decision Making:  This is a 89 y.o. male presented with generalized weakness as well as frequent falls.  Patient is on hospice for dementia and his wife reports she is no longer able to care for him at home.  Patient at this  point does not meet criteria for inpatient hospitalization so case management has been involved in determining appropriate placement for the patient.  He is hypokalemic and was started on repletion, 10 mEq IV as well as 40 p.o., his magnesium is normal.  He does not appear to have suffered any significant traumatic injury with his fall.    Patient is admitted to ED observation as above and care will be turned over to Dr. Drake pending further placement evaluation    FINAL IMPRESSION  1. Weakness    2. Fall, initial encounter    3. Hypokalemia    4. Type 2 diabetes mellitus without complication, unspecified whether long term insulin use (HCC)    5. Hypertension, unspecified type         The note accurately reflects work and decisions made by me.  Julien Guillen M.D.  2/4/2022  1:22 PM

## 2022-02-04 NOTE — ASSESSMENT & PLAN NOTE
Patient is on hospice with [place] for dementia  Reviewed post 03/2020 which showed DNR only, no other comments filled out.

## 2022-02-04 NOTE — CONSULTS
Hospital Medicine Consultation    Date of Service  2/4/2022    Referring Physician  Julien Guillen M.D.    Consulting Physician  Rudolph Mancera M.D.    Reason for Consultation  Medical management while awaiting placement    History of Presenting Illness  89 y.o. 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  Currently patient is awaiting placement in the ED. Mr. Dao is under [facility] for [facility].  Consulted for medical management while awaiting placement.  Patient stated he came to the ER due to falling at home.  He stated he was doing well, denied any abdominal pain, chest pain, shortness of breath, difficulty urinating, pain on urination.  He did state he may be constipated.  Patient is aware he is in ER.  Patient's thought he was in [facility] and currently does not understand why he remains in the ER.  Patient currently has a Sher catheter present.    As per case management note, patient does have compassionate care giving private pay caregivers from 8 PM to 7 AM.  Also indicates patient is up all night agitated and walking around which is a possible reason for his falls.    Review of Systems  Review of Systems   Constitutional: Negative for chills and fever.   HENT: Negative for congestion and hearing loss.    Eyes: Negative for blurred vision and pain.   Respiratory: Negative for cough and shortness of breath.    Cardiovascular: Negative for chest pain and palpitations.   Gastrointestinal: Negative for abdominal pain, nausea and vomiting.   Genitourinary: Negative for dysuria and hematuria.   Musculoskeletal: Negative for back pain and joint pain.   Skin: Negative for itching and rash.   Neurological: Positive for weakness. Negative for dizziness and headaches.   Psychiatric/Behavioral: The patient is not nervous/anxious and does not have insomnia.    All other systems reviewed and are  negative.      Past Medical History   has a past medical history of KOSTAS (acute kidney injury) ([facility]) (6/14/2021), CAD (coronary artery disease), Delirium (2/21/2020), DM (diabetes mellitus) ([facility]) (1/7/2014), Falls, Hypertension, and UTI (urinary tract infection) (11/4/2018). He also has no past medical history of ASTHMA, Cancer ([facility]), Congestive heart failure ([facility]), COPD, Liver disease, Seizure disorder ([facility]), or Stroke ([facility]).    Surgical History   has a past surgical history that includes other cardiac surgery; coronary artery bypass, 3; and janice rectal abscess.    Family History  Patient does not recall any family history.    Social History   reports that he quit smoking about 49 years ago. He has never used smokeless tobacco. He reports previous alcohol use. He reports that he does not use drugs.    Medications  Prior to Admission Medications   Prescriptions Last Dose Informant Patient Reported? Taking?   Carboxymethylcellulose Sodium (RETAINE [facility] OP) > 2 WEEK AGO at K Significant Other Yes No   Sig: Administer 1 Drop into both eyes every evening.   LORazepam (ATIVAN) 0.5 MG Tab 2/4/2022 at 0200 Significant Other Yes Yes   Sig: Take 0.5 mg by mouth every four hours as needed for Anxiety.   Specialty Vitamins Products (PROSTATE PO) 2/3/2022 at AM Significant Other Yes No   Sig: Take 2 Tablets by mouth every morning.   amLODIPine (NORVASC) 5 MG Tab 2/3/2022 at AM Significant Other No No   Sig: Take 1 Tab by mouth every day.   cyclosporin (RESTASIS) 0.05 % ophthalmic emulsion > 2 WEEKS AGO at K Significant Other Yes No   Sig: Administer 1 Drop into both eyes every evening. Indications: Drying and Inflammation of Cornea and Conjunctiva of Eyes   gabapentin (NEURONTIN) 100 MG Cap 2/3/2022 at PM Significant Other Yes Yes   Sig: Take 200 mg by mouth at bedtime.   insulin glargine (INSULIN GLARGINE) 100 UNIT/ML Solution Pen-injector injection 2/3/2022 at PM Significant Other Yes No   Sig: Inject 10 Units under the  skin every evening.   levothyroxine (SYNTHROID) 25 MCG Tab 2/3/2022 at AM Significant Other No No   Sig: Take 1 tablet by mouth Every morning on an empty stomach.   metoprolol tartrate (LOPRESSOR) 25 MG Tab 2/3/2022 at PM Significant Other Yes No   Sig: Take 12.5 mg by mouth 2 times a day.      Facility-Administered Medications: None       Allergies  Allergies   Allergen Reactions   • Iodine Anaphylaxis     Pt and pts wife report that it has been so long not sure what happens       Physical Exam  Temp:  [36.8 °C (98.2 °F)] 36.8 °C (98.2 °F)  Pulse:  [63] 63  Resp:  [18] 18  BP: (138)/(73) 138/73  SpO2:  [92 %] 92 %    Physical Exam  Vitals and nursing note reviewed.   Constitutional:       General: He is not in acute distress.     Comments: Frail-appearing elderly male   HENT:      Head: Normocephalic and atraumatic.      Nose: Nose normal. No congestion.   Eyes:      General: No scleral icterus.     Extraocular Movements: Extraocular movements intact.   Cardiovascular:      Rate and Rhythm: Normal rate and regular rhythm.      Pulses: Normal pulses.      Heart sounds: Normal heart sounds. No murmur heard.      Pulmonary:      Effort: Pulmonary effort is normal. No respiratory distress.      Breath sounds: Normal breath sounds.   Abdominal:      General: Abdomen is flat. Bowel sounds are normal. There is no distension.      Palpations: Abdomen is soft.   Genitourinary:     Comments: Sher in place.  Urine appears clear and light yellow.  Musculoskeletal:         General: No swelling. Normal range of motion.      Cervical back: Normal range of motion and neck supple.   Skin:     General: Skin is warm.      Capillary Refill: Capillary refill takes less than 2 seconds.      Coloration: Skin is not jaundiced.   Neurological:      General: No focal deficit present.      Mental Status: He is alert and oriented to person, place, and time. Mental status is at baseline.   Psychiatric:         Mood and Affect: Mood normal.          Behavior: Behavior normal.         Thought Content: Thought content normal.         Judgment: Judgment normal.         Fluids      Laboratory  Recent Labs     02/04/22  0850   WBC 7.5   RBC 4.52*   HEMOGLOBIN 13.2*   HEMATOCRIT 39.9*   MCV 88.3   MCH 29.2   MCHC 33.1*   RDW 48.7   PLATELETCT 261   MPV 10.0     Recent Labs     02/04/22  0850   SODIUM 137   POTASSIUM 2.8*   CHLORIDE 100   CO2 25   GLUCOSE 273*   BUN 11   CREATININE 0.55   CALCIUM 8.6                     Imaging  DX-WRIST-COMPLETE 3+ LEFT   Final Result      Mild osteoarthritis. No evidence of fracture.         DX-KNEE 3 VIEWS LEFT   Final Result      1.  No evidence of acute fracture or dislocation.      2.  Mild tricompartment degenerative change.          Assessment/Plan  Fall from ground level- (present on admission)  Assessment & Plan  As per EDP report patient has had multiple falls.  As per case management note patient is awake at night agitated active, following.  PT/OT  X-rays have been negative for acute fractures    Dementia (HCC)- (present on admission)  Assessment & Plan  Patient has moderate to severe dementia.  Under [facility] for dementia.  Type of dementia not diagnosed in chart.  As per case management note, wife has reported agitation at night.  Fall precautions  Delirium protocols  Pending placement    Urinary retention- (present on admission)  Assessment & Plan  Patient presents to the ED with Sher catheter from home.  UA slightly positive for pyuria but patient has chronic Sher with previous staph epidermidis on urine cultures.  Patient is not complaining of any symptoms of dysuria.  Sher catheter urine appears to be clear yellow.  Monitor, will hold antibiotics    DNR (do not resuscitate)- (present on admission)  Assessment & Plan  As stated above POL 03/2020 shows DNR, no comment or decision listed under intubation section    Hypothyroidism- (present on admission)  Assessment & Plan  Patient is on levothyroxine  25 mcg, continue    ACP (advance care planning)- (present on admission)  Assessment & Plan  Patient is on hospice with [facility] for dementia  Reviewed post 03/2020 which showed DNR only, no other comments filled out.      Atrial flutter (HCC)- (present on admission)  Assessment & Plan  History of atrial flutter, on metoprolol.  Rate controlled.  Continue metoprolol    Diabetes mellitus with hyperglycemia (HCC)- (present on admission)  Assessment & Plan  T2DM on chronic home insulin  Continue glargine 10 units  Insulin sliding scale, Accu-Cheks and hypoglycemia protocol  Diabetic diet    HTN (hypertension)- (present on admission)  Assessment & Plan  Patient is on amlodipine 5 mg and metoprolol 25 mg  Continue    CAD (coronary artery disease) - CABG Nov. 2004- (present on admission)  Assessment & Plan  Continue metoprolol    DVT prophylaxis: Not needed patient is hospice    Thank you for consulting Internal Medicine Hospitalist team.  We are glad to help in your patient's case.  At this time, patient does not meet criteria for admission.    Addendum 1754:  - re-evaluated patient's case, as per  goal to discharge tomorrow to Home.   - Patient on home medications, tolerating well.    Hospitalist team will SIGN OFF patient's case.   Please Re-consult if needed.

## 2022-02-04 NOTE — ED TRIAGE NOTES
Chief Complaint   Patient presents with   • GLF     pt BIB [facility] c/o multiple falls and increased weakness. per wife who cannot take care of him, would like to have pt placed in a SNF. pt is also in hospice care. per wife, pt tripped and fell. No LOC.    • Failure to Thrive     /73   Pulse 63   Temp 36.8 °C (98.2 °F) (Temporal)   Resp 18   SpO2 92%

## 2022-02-04 NOTE — ED NOTES
covid swab obtained and sent to lab. UA drawn from [facility] already in place per erp. Will replace and repeat if indicated.

## 2022-02-04 NOTE — ASSESSMENT & PLAN NOTE
T2DM on chronic home insulin  Continue glargine 10 units  Insulin sliding scale, Accu-Cheks and hypoglycemia protocol  Diabetic diet

## 2022-02-04 NOTE — THERAPY
"Occupational Therapy   Initial Evaluation     Patient Name: Julien Dao  Age:  89 y.o., Sex:  male  Medical Record #: [id number]  Today's Date: 2/4/2022     Precautions: (P) Fall Risk  Comments: (P) baseline dementia    Assessment  Patient is 89 y.o. male admitted with weakness and falls at home. Per CM wife reports that pt is \"up all night\" which is causing increased falls. Per chart at baseline pt has assist with all self cares, a night time caregiver 8pm-7am, [facility], and a bath aide. Pt presented today with poor safety, impaired cog, and poor motor planning as primary limiting factors impacting mobility and self cares. He demonstrated min/mod A with bed mobility, max A with LB dressing, and CGA with functional txfs with max verbal cues for sequencing. As this is likely baseline for mobility and cognition, recommend GH placement vs 24/7 assist at home with self cares and mobility. Patient will not be actively followed for occupational therapy services at this time, however may be seen if requested by physician for 1 more visit within 30 days to address any discharge or equipment needs.     Plan    Recommend Occupational Therapy for Evaluation only     DC Equipment Recommendations: (P) Unable to determine at this time  Discharge Recommendations: (P) Other - (Group home vs home with 24/7 caregiving)        02/04/22 1401   Prior Living Situation   Prior Services Intermittent Physical Support for ADL Per Service;Intermittent Physical Support for ADL Per Family  (bath aide and night time caregiver)   Housing / Facility 1 Story House   Bathroom Set up Walk In Shower;Shower Chair   Equipment Owned Front-Wheel Walker;4-Wheel Walker;Tub / Shower Seat   Lives with - Patient's Self Care Capacity Spouse;Attendant / Paid Care Giver   Comments Pt confused and poor historian. Per chart pt has caregiving assist during the night 8pm-7am (as he is active at night and has falls). Pt also has HH therapy and has been offered " caregiving assist (per CM).    Prior Level of ADL Function   Self Feeding Independent   Grooming / Hygiene Requires Assist   Bathing Requires Assist   Dressing Requires Assist   Toileting Requires Assist   Prior Level of IADL Function   Medication Management Dependent   Laundry Dependent   Kitchen Mobility Dependent   Finances Dependent   Home Management Dependent   Shopping Dependent   Prior Level Of Mobility Supervision With Device in Home   History of Falls   History of Falls Yes   Date of Last Fall   (reason for admit)   Precautions   Precautions Fall Risk   Comments baseline dementia   Cognition    Cognition / Consciousness X   Safety Awareness Impaired   New Learning Impaired   Attention Impaired   Sequencing Impaired   Comments Pt demonstrated poor initation, poor sequencing, and poor motor planning (requiring tactile and verbal cues to initate). This is likely baseline   Active ROM Upper Body   Active ROM Upper Body  WDL   Strength Upper Body   Upper Body Strength  WDL   Balance Assessment   Sitting Balance (Static) Fair   Sitting Balance (Dynamic) Fair -   Standing Balance (Static) Fair -   Standing Balance (Dynamic) Fair -   Weight Shift Sitting Fair   Weight Shift Standing Fair   Comments with FWW, initally unsteady, improved with short mobility   Bed Mobility    Supine to Sit Minimal Assist   Sit to Supine Moderate Assist   Scooting Moderate Assist   ADL Assessment   Grooming Minimal Assist;Seated   Upper Body Dressing Minimal Assist   Lower Body Dressing Maximal Assist   Comments reports assist at baseline with LB dressing   How much help from another person does the patient currently need...   6 Clicks Daily Activity Score 16   Functional Mobility   Sit to Stand Contact Guard Assist   Bed, Chair, Wheelchair Transfer Contact Guard Assist   Mobility room distances with FWW   Activity Tolerance   Sitting Edge of Bed 7 min   Standing 6 min   Patient / Family Goals   Patient / Family Goal #1 none stated    Education Group   Role of Occupational Therapist Patient Response Patient;Acceptance;Explanation   Problem List   Problem List Impaired Cognitive Function

## 2022-02-04 NOTE — ASSESSMENT & PLAN NOTE
Patient presents to the ED with Sher catheter from home.  UA slightly positive for pyuria but patient has chronic Sher with previous staph epidermidis on urine cultures.  Patient is not complaining of any symptoms of dysuria.  Sher catheter urine appears to be clear yellow.  Monitor, will hold antibiotics

## 2022-02-04 NOTE — ASSESSMENT & PLAN NOTE
As per EDP report patient has had multiple falls.  As per case management note patient is awake at night agitated active, following.  PT/OT  X-rays have been negative for acute fractures

## 2022-02-04 NOTE — ASSESSMENT & PLAN NOTE
Patient has moderate to severe dementia.  Under [facility] for dementia.  Type of dementia not diagnosed in chart.  As per case management note, wife has reported agitation at night.  Fall precautions  Delirium protocols  Pending placement

## 2022-02-04 NOTE — DISCHARGE PLANNING
Anticipated Discharge Disposition: Home vs Group home on Hospice    Action: Offered her a contact for group home. Provide [facility] Number  for group eval and costs. Encouraged her to reach out to Zaid at [facility] that he is still on service with for assitance. Stressed that she is seeking more care but sending caregivers away.  She notes it is because he does not want a shower daily advised that they could dress and assist with getting into chair for day. Reinforced that no need to admit or keep in ER and she will need to follow thru on accessing resources timely.     Barriers to Discharge: Hospice limited assistance. Wife limited abilities.    Plan: Will cont to follow

## 2022-02-04 NOTE — DISCHARGE PLANNING
Anticipated Discharge Disposition: Unknown. Hospice GIP  Vs SNF    Action: Call from ER RN. Wife is frustrated  She is recovering from surgery. She has caregivers in the home from 8pm to 7am with [employer] private pay. She is unwilling to extend hrs due to cost.  He has been on Hospice with [facility]. ER CM contacted [facility] to see if they can assist with placement or further care in home to aid wife. Wife says he is  Up all night agitated and walking around which is why he falls.  Await Call back from [facility].  Zaid CALERO  from [facility]. Reviewed Attendant  Care list. Reviewed group home list. Zaid he had been receiving help from [facility] but she did not sign him up with local [facility]. Wife refused to contact them. She was advised this by [facility]. Zaid notes he has cognitive decline. He met with wife yesterday. He will escalate this with [facility] team, she has not signed him off service and did not want to. Unclear if he would have a skilled need OT PT [facility] and Covid requested from MD.  [facility] done [facility]) [facility]    Barriers to Discharge: Unclear if [facility] will do GIP while wife recovers or need PT OT for SNF needs.    Plan: Will cont to follow

## 2022-02-04 NOTE — THERAPY
Physical Therapy   Initial Evaluation     Patient Name: Julien Dao  Age:  89 y.o., Sex:  male  Medical Record #: [id number]  Today's Date: 2/4/2022     Precautions  Precautions: Fall Risk  Comments: baseline dementia     Assessment  Patient is 89 y.o. male who was recently admitted for GLF, failure to thrive, and increased falls at home. Pt is familiar to therapist and therapy staff. Pt has been admitted into hospital for multiple falls and was recommended to go post acute therapy during last admission. Per medical chart pt has been receiving HH therapy services to improve functional mobility, however, pt continues to have falls regardless of therapy and caregiver assist at night. Pt currently presents with functional impairments with balance, gait, coordination, motor planning/sequencing, and poor cognition. Pt confusion is the primarily barrier for his functional mobility and puts him at a high risk for falls at all times despite functional improvements. Given current history of falls, age, PLOF, and limited social support at home pt does not appear to benefit from post acute therapy at this time, pt appears he will benefit from 24/7 care such as a group home at this time.    Plan    Patient will not be actively followed for physical therapy services at this time, however may be seen if requested by physician for 1 more visit within 30 days to address any discharge or equipment needs    DC Equipment Recommendations: (P) Unable to determine at this time  Discharge Recommendations: (P) Other - (pt will benefit from a group home setting w/ 24/7 care )    Objective       02/04/22 1430   Initial Contact Note    Initial Contact Note Order Received and Verified, Physical Therapy Evaluation in Progress with Full Report to Follow.   Precautions   Precautions Fall Risk   Comments baseline dementia    Prior Living Situation   Prior Services Intermittent Physical Support for ADL Per Service;Intermittent Physical Support for  ADL Per Family   Housing / Facility 1 Story House   Steps Into Home 1   Steps In Home 0   Bathroom Set up Walk In Shower   Equipment Owned Front-Wheel Walker;Tub / Shower Seat;Grab Bar(s) In Tub / Shower   Lives with - Patient's Self Care Capacity Spouse;Attendant / Paid Care Giver   Comments pt is currently confused and unable to provide history; history taken from prior evaluation; Per medical chart pt has caregivers assisting from 8pm to 7am; spouse unable to care for patient    Prior Level of Functional Mobility   Bed Mobility Independent   Transfer Status Independent   Ambulation Independent   Distance Ambulation (Feet)   (household )   Assistive Devices Used Front-Wheel Walker   Stairs Independent   Comments per prior evaluation pt was reported to be independent with functional mobility; however, pt appears to have a new baseline with frequent falls at home and may require increased assist    History of Falls   History of Falls Yes   Date of Last Fall   (hx of multiple falls and reason for admit)   Cognition    Cognition / Consciousness X   Safety Awareness Impaired   New Learning Impaired   Attention Impaired   Sequencing Impaired   Comments hx of dementia and poor ability to follow during session; requires frequent redirection and navigation throughout session; Poor motor planning/sequencing with functional mobility    Passive ROM Lower Body   Passive ROM Lower Body WDL   Active ROM Lower Body    Active ROM Lower Body  WDL   Strength Lower Body   Lower Body Strength  X   Gross Strength Generalized Weakness, Equal Bilaterally   Comments presents with gross strength of 4+/5; unable to formally test due to cognition; functional for ambulation    Sensation Lower Body   Lower Extremity Sensation   Not Tested   Lower Body Muscle Tone   Lower Body Muscle Tone  WDL   Strength Upper Body   Upper Body Strength  WDL   Upper Body Muscle Tone   Upper Body Muscle Tone  WDL   Neurological Concerns   Neurological Concerns  Yes   Comments hx of dementia   Coordination Lower Body    Coordination Lower Body  X   Comments poor  LUCILA initially, however, with increased mobility and ambulation pt was able to improve    Balance Assessment   Sitting Balance (Static) Fair   Sitting Balance (Dynamic) Fair -   Standing Balance (Static) Fair -   Standing Balance (Dynamic) Fair -   Weight Shift Sitting Fair   Weight Shift Standing Fair   Comments w/fww; presents with posterior LOB sitting at EOB; required balance assist for standing initially, however, improved with ambulation    Gait Analysis   Gait Level Of Assist Minimal Assist   Assistive Device Front Wheel Walker   Distance (Feet) 50   # of Times Distance was Traveled 1   Deviation Step To;Decreased Base Of Support;Bradykinetic   Weight Bearing Status fwb   Comments poor foot clearence, high risk of falling   Bed Mobility    Supine to Sit Moderate Assist   Sit to Supine Moderate Assist   Scooting Moderate Assist   Comments HOB elevated and rails down    Functional Mobility   Sit to Stand Minimal Assist   Bed, Chair, Wheelchair Transfer Minimal Assist   Transfer Method Stand Step   Mobility EOB, sit<>stand, ambulation, back to bed    How much difficulty does the patient currently have...   Turning over in bed (including adjusting bedclothes, sheets and blankets)? 3   Sitting down on and standing up from a chair with arms (e.g., wheelchair, bedside commode, etc.) 3   Moving from lying on back to sitting on the side of the bed? 2   How much help from another person does the patient currently need...   Moving to and from a bed to a chair (including a wheelchair)? 3   Need to walk in a hospital room? 3   Climbing 3-5 steps with a railing? 1   6 clicks Mobility Score 15   Activity Tolerance   Sitting in Chair NT   Sitting Edge of Bed 5 mins    Standing 5 mins   Comments limited due to confusion    Edema / Skin Assessment   Edema / Skin  Not Assessed   Education Group   Education Provided Role of  Physical Therapist   Role of Physical Therapist Patient Response Patient;Acceptance;Demonstration;Explanation;Verbal Demonstration;Action Demonstration   Problem List    Problems Impaired Bed Mobility;Impaired Ambulation;Impaired Transfers;Functional Strength Deficit;Impaired Balance;Impaired Coordination;Decreased Activity Tolerance;Safety Awareness Deficits / Cognition;Motor Planning / Sequencing   Anticipated Discharge Equipment and Recommendations   DC Equipment Recommendations Unable to determine at this time   Discharge Recommendations Other -  (pt will benefit from a group home setting w/ 24/7 care )   Interdisciplinary Plan of Care Collaboration   IDT Collaboration with  Nursing;Occupational Therapist   Patient Position at End of Therapy In Bed;Call Light within Reach;Tray Table within Reach;Phone within Reach   Collaboration Comments aware of visit and recs    Session Information   Date / Session Number  2/4 d/c needs only    Priority 0

## 2022-02-04 NOTE — ASSESSMENT & PLAN NOTE
As stated above [facility] 03/2020 shows DNR, no comment or decision listed under intubation section

## 2022-02-04 NOTE — ED NOTES
ERP at bedside. Pt agrees with plan of care discussed by ERP. AIDET acknowledged with patient. IV established. Blood sent to lab. Carlos in low position, side rail up for pt safety. Call light within reach. Will continue to monitor.

## 2022-02-05 VITALS
DIASTOLIC BLOOD PRESSURE: 78 MMHG | BODY MASS INDEX: 25.37 KG/M2 | WEIGHT: 162 LBS | TEMPERATURE: 97 F | RESPIRATION RATE: 18 BRPM | HEART RATE: 75 BPM | OXYGEN SATURATION: 98 % | SYSTOLIC BLOOD PRESSURE: 155 MMHG

## 2022-02-05 LAB
GLUCOSE BLD-MCNC: 174 MG/DL (ref 65–99)
GLUCOSE BLD-MCNC: 284 MG/DL (ref 65–99)

## 2022-02-05 PROCEDURE — 82962 GLUCOSE BLOOD TEST: CPT

## 2022-02-05 PROCEDURE — 700102 HCHG RX REV CODE 250 W/ 637 OVERRIDE(OP): Performed by: INTERNAL MEDICINE

## 2022-02-05 PROCEDURE — A9270 NON-COVERED ITEM OR SERVICE: HCPCS | Performed by: INTERNAL MEDICINE

## 2022-02-05 PROCEDURE — 96376 TX/PRO/DX INJ SAME DRUG ADON: CPT

## 2022-02-05 PROCEDURE — 96372 THER/PROPH/DIAG INJ SC/IM: CPT | Mod: XU

## 2022-02-05 RX ORDER — LORAZEPAM 2 MG/ML
1 INJECTION INTRAMUSCULAR ONCE
Status: DISCONTINUED | OUTPATIENT
Start: 2022-02-05 | End: 2022-02-05 | Stop reason: HOSPADM

## 2022-02-05 RX ADMIN — METOPROLOL TARTRATE 12.5 MG: 25 TABLET, FILM COATED ORAL at 07:22

## 2022-02-05 RX ADMIN — INSULIN HUMAN 1 UNITS: 100 INJECTION, SOLUTION PARENTERAL at 08:18

## 2022-02-05 RX ADMIN — INSULIN HUMAN 3 UNITS: 100 INJECTION, SOLUTION PARENTERAL at 11:13

## 2022-02-05 RX ADMIN — AMLODIPINE BESYLATE 5 MG: 5 TABLET ORAL at 07:22

## 2022-02-05 RX ADMIN — LEVOTHYROXINE SODIUM 25 MCG: 0.05 TABLET ORAL at 07:21

## 2022-02-05 NOTE — ED NOTES
Incontinence care completed. Patient transition back to [facility]. Bed alarm is on. Patient denied any acute needs.

## 2022-02-05 NOTE — DISCHARGE PLANNING
Call placed to Mercedes [phone] who states she will have someone come to  the patient. This CM phone number given to call back with  information.

## 2022-02-05 NOTE — ED NOTES
Patient's home medications have been reviewed by the pharmacy team.     Past Medical History:   Diagnosis Date   • KOSTAS (acute kidney injury) (Formerly [facility]) 6/14/2021   • CAD (coronary artery disease)    • Delirium 2/21/2020   • DM (diabetes mellitus) (Formerly [facility]) 1/7/2014   • Falls    • Hypertension    • UTI (urinary tract infection) 11/4/2018       Patient's Medications   New Prescriptions    No medications on file   Previous Medications    AMLODIPINE (NORVASC) 5 MG TAB    Take 1 Tab by mouth every day.    CARBOXYMETHYLCELLULOSE SODIUM (RETAINE [facility] OP)    Administer 1 Drop into both eyes every evening.    CYCLOSPORIN (RESTASIS) 0.05 % OPHTHALMIC EMULSION    Administer 1 Drop into both eyes every evening. Indications: Drying and Inflammation of Cornea and Conjunctiva of Eyes    GABAPENTIN (NEURONTIN) 100 MG CAP    Take 200 mg by mouth at bedtime.    INSULIN GLARGINE (INSULIN GLARGINE) 100 UNIT/ML SOLUTION PEN-INJECTOR INJECTION    Inject 10 Units under the skin every evening.    LEVOTHYROXINE (SYNTHROID) 25 MCG TAB    Take 1 tablet by mouth Every morning on an empty stomach.    LORAZEPAM (ATIVAN) 0.5 MG TAB    Take 0.5 mg by mouth every four hours as needed for Anxiety.    METOPROLOL TARTRATE (LOPRESSOR) 25 MG TAB    Take 12.5 mg by mouth 2 times a day.    SPECIALTY VITAMINS PRODUCTS (PROSTATE PO)    Take 2 Tablets by mouth every morning.   Modified Medications    No medications on file   Discontinued Medications    FINASTERIDE (PROSCAR) 5 MG TAB    Take 5 mg by mouth every morning.    SIMVASTATIN (ZOCOR) 10 MG TAB    Take 10 mg by mouth every evening.    TAMSULOSIN (FLOMAX) 0.4 MG CAPSULE    Take 0.8 mg by mouth every evening.     Home medications have been reordered as appropriate.  Patient's BG levels remained elevated this morning after receiving home glargine dose yesterday evening. SSI coverage provided. Plan for patient to be discharged today, will recommend escalating SSI if he remains in house.     Eneida León,  PharmD

## 2022-02-05 NOTE — ED NOTES
Assumed patient care. Pt assesement done.  Plan of care reviewed with patient. Cash 174. Sitter at bedside for pt safety.

## 2022-02-05 NOTE — ED PROVIDER NOTES
Patient:Julien Dao  Patient : 3/7/1929  Patient MRN: [id number]  Patient PCP: Danis Lehman M.D.    Admit Date: 2022  Discharge Date and Time: 22 1:42 PM  Discharge Diagnosis: Weakness, falls  Discharge Attending: Julien Guillen M.D.  Discharge Service: ED Observation    ED Course  Julien is a 89 y.o. male who was evaluated for multiple falls.  He is on hospice for dementia.    Patient was placed in ED observation with attempt at placement.    He was evaluated by PT and OT, and there was no indication for skilled nursing facility.  Case management additionally looked into group homes although patient's family decided they would prefer to take him home and he has been discharged home in stable condition      Discharge Exam:  /72   Pulse 76   Temp 36.1 °C (97 °F) (Temporal)   Resp 16   Wt 73.5 kg (162 lb)   SpO2 96%   BMI 25.37 kg/m² .    Constitutional: Awake and alert. Nontoxic  HENT:  Grossly normal  Eyes: Grossly normal  Neck: Normal range of motion  Cardiovascular: Normal heart rate   Thorax & Lungs: No respiratory distress  Abdomen: Nontender  Skin:  No pathologic rash.   Extremities: Well perfused  Psychiatric: Affect normal    Labs  Results for orders placed or performed during the hospital encounter of 22   CBC WITH DIFFERENTIAL   Result Value Ref Range    WBC 7.5 4.8 - 10.8 K/uL    RBC 4.52 (L) 4.70 - 6.10 M/uL    Hemoglobin 13.2 (L) 14.0 - 18.0 g/dL    Hematocrit 39.9 (L) 42.0 - 52.0 %    MCV 88.3 81.4 - 97.8 fL    MCH 29.2 27.0 - 33.0 pg    MCHC 33.1 (L) 33.7 - 35.3 g/dL    RDW 48.7 35.9 - 50.0 fL    Platelet Count 261 164 - 446 K/uL    MPV 10.0 9.0 - 12.9 fL    Neutrophils-Polys 66.50 44.00 - 72.00 %    Lymphocytes 21.70 (L) 22.00 - 41.00 %    Monocytes 9.20 0.00 - 13.40 %    Eosinophils 1.70 0.00 - 6.90 %    Basophils 0.50 0.00 - 1.80 %    Immature Granulocytes 0.40 0.00 - 0.90 %    Nucleated RBC 0.00 /100 WBC    Neutrophils (Absolute) 4.99 1.82 - 7.42 K/uL    Lymphs  (Absolute) 1.63 1.00 - 4.80 K/uL    Monos (Absolute) 0.69 0.00 - 0.85 K/uL    Eos (Absolute) 0.13 0.00 - 0.51 K/uL    Baso (Absolute) 0.04 0.00 - 0.12 K/uL    Immature Granulocytes (abs) 0.03 0.00 - 0.11 K/uL    NRBC (Absolute) 0.00 K/uL   COMP METABOLIC PANEL   Result Value Ref Range    Sodium 137 135 - 145 mmol/L    Potassium 2.8 (L) 3.6 - 5.5 mmol/L    Chloride 100 96 - 112 mmol/L    Co2 25 20 - 33 mmol/L    Anion Gap 12.0 7.0 - 16.0    Glucose 273 (H) 65 - 99 mg/dL    Bun 11 8 - 22 mg/dL    Creatinine 0.55 0.50 - 1.40 mg/dL    Calcium 8.6 8.4 - 10.2 mg/dL    AST(SGOT) 11 (L) 12 - 45 U/L    ALT(SGPT) 12 2 - 50 U/L    Alkaline Phosphatase 132 (H) 30 - 99 U/L    Total Bilirubin 0.5 0.1 - 1.5 mg/dL    Albumin 3.7 3.2 - 4.9 g/dL    Total Protein 7.0 6.0 - 8.2 g/dL    Globulin 3.3 1.9 - 3.5 g/dL    A-G Ratio 1.1 g/dL   URINALYSIS    Specimen: Urine   Result Value Ref Range    Color Yellow     Character Clear     Specific Gravity 1.010 <1.035    Ph 6.5 5.0 - 8.0    Glucose 500 (A) Negative mg/dL    Ketones Negative Negative mg/dL    Protein Negative Negative mg/dL    Bilirubin Negative Negative    Nitrite Negative Negative    Leukocyte Esterase Moderate (A) Negative    Occult Blood Small (A) Negative    Micro Urine Req Microscopic    MAGNESIUM   Result Value Ref Range    Magnesium 1.9 1.5 - 2.5 mg/dL   ESTIMATED GFR   Result Value Ref Range    GFR If African American >60 >60 mL/min/1.73 m 2    GFR If Non African American >60 >60 mL/min/1.73 m 2   COV-2, FLU A/B, AND RSV BY PCR (2-4 HOURS CEPHEID): Collect NP swab in VTM    Specimen: Respirate   Result Value Ref Range    Influenza virus A RNA Negative Negative    Influenza virus B, PCR Negative Negative    RSV, PCR Negative Negative    SARS-CoV-2 by PCR NotDetected     SARS-CoV-2 Source NP Swab    URINE MICROSCOPIC (W/UA)   Result Value Ref Range    WBC 20-50 (A) /hpf    RBC 2-5 (A) /hpf    Bacteria Rare (A) None /hpf    Epithelial Cells Rare Few /hpf    Mucous Threads  Few /hpf   EKG (NOW)   Result Value Ref Range    Report       [facility] Emergency Dept.    Test Date:  2022  Pt Name:    SAMMY CLIFFORD                 Department: [facility]  MRN:        [id number]                      [facility]  Gender:     Male                         Technician: [id number]  :        [id number]                   Requested By:SAMMY RM  Order #:    [id number]                    Reading MD: SAMMY RM MD    Measurements  Intervals                                Axis  Rate:       71                           P:          -64  OK:         195                          QRS:        -71  QRSD:       165                          T:          19  QT:         485  QTc:        528    Interpretive Statements  Sinus rhythm  RBBB and LAFB  Compared to ECG 10/21/2021 11:28:59  Ectopic atrial rhythm now present  no change  Electronically Signed On 2022 10:17:27 PST by SAMMY RM MD     POCT glucose device results   Result Value Ref Range    Glucose - Accu-Ck 375 (H) 65 - 99 mg/dL   POCT glucose device results   Result Value Ref Range    Glucose - Accu-Ck 397 (H) 65 - 99 mg/dL   POCT glucose device results   Result Value Ref Range    Glucose - Accu-Ck 174 (H) 65 - 99 mg/dL   POCT glucose device results   Result Value Ref Range    Glucose - Accu-Ck 284 (H) 65 - 99 mg/dL       Radiology  DX-WRIST-COMPLETE 3+ LEFT   Final Result      Mild osteoarthritis. No evidence of fracture.         DX-KNEE 3 VIEWS LEFT   Final Result      1.  No evidence of acute fracture or dislocation.      2.  Mild tricompartment degenerative change.          Disposition: Discharged in stable condition    Follow up: No follow-ups on file.    Medications:   New Prescriptions    No medications on file       Discharge Condition: Stable    Electronically signed by: Samym Rm M.D., 2022 1:42 PM

## 2022-02-05 NOTE — DISCHARGE PLANNING
Anticipated Discharge Disposition:  Home with Attendant care and [facility] tomorrow and pursue group home from home    Action: Spoke with Wife Mercedes She spoke with Chris from Group home but insulin needed is a barrier. Provided her Aneudy Seaman number [phone] and Rubina     Barriers to Discharge:  Insulin is barrier for group home    Plan: Cont to follow

## 2022-02-07 LAB
GAMMA INTERFERON BACKGROUND BLD IA-ACNC: 0.03 IU/ML
M TB IFN-G BLD-IMP: NEGATIVE
M TB IFN-G CD4+ BCKGRND COR BLD-ACNC: 0.07 IU/ML
MITOGEN IGNF BCKGRD COR BLD-ACNC: >10 IU/ML
QFT TB2 - NIL TBQ2: 0.09 IU/ML

## 2022-02-07 NOTE — DISCHARGE PLANNING
Anticipated Discharge Disposition: Home working with [facility] on group home    Action: ER CM check TB Quant is being run today.  Barriers to Discharge: None home    Plan: No further needs

## 2022-04-25 NOTE — PROGRESS NOTES
Telemetry Shift Summary    Rhythm:SR. VPOD  HR range:70-78  Ectopy:F PVC, F-O PAC, R Coup, R Trip  Measurements:0.24/0.16/0.44    Normal measurements  Rhythm: SR  HR range:   Measurements: 0.12-0.20/0.06-0.10/0.30-0.52    Tele strips reviewed and placed in chart.     no

## 2024-03-18 NOTE — PROGRESS NOTES
Assumed report and patient care from Arlene RN via bedside report. Patient is resting in bed. When RNs went to complete bedside handoff, pt was attempting to get out of bed. Bed and strip alarm verified. Pt repositioned in bed. POC discussed. No questions or concerns at this time. Safety measures in place, call light within reach.      1

## 2025-01-24 NOTE — ED NOTES
ERP is at bedside to speak with pt.    [facility] [place]        3 Children.        Robert 2002    Misha 1992    Ferny 1994     Social Determinants of Health     Financial Resource Strain: Not on file   Food Insecurity: Not on file   Transportation Needs: Not on file   Physical Activity: Not on file   Stress: Not on file   Social Connections: Not on file   Interpersonal Safety: Not on file       MEDICAL HISTORY   Anesthesia history: Reviewed  Family anesthesia history: Reviewed  Possible pregnancy (LMP): NO  Previous complications: None    PHYSICAL EXAM  Heart: NORMAL findings  Lungs: NORMAL findings    OTHER FINDINGS  Reviewed current medications and allergies: YES  Reviewed pertinent lab/diagnostic tests: YES    RISK STATUS: ASA 2 Normal patient with mild systemic disease    AIRWAY ASSESSMENT: Mallampati Class II - Soft palate uvula fauces pillars visible.  No difficulty.    PLAN FOR SEDATION: MAC    EKG Monitoring: YES    Risks, benefits and alternatives of  procedure were explained, informed consent was obtained.    REASSESSMENT IMMEDIATELY PRIOR TO PROCEDURE:   Patient remains a candidate for the planned sedation and procedure.    Assessing Physician: Alberto Rodriges MD
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
